# Patient Record
Sex: FEMALE | Race: WHITE | HISPANIC OR LATINO | Employment: OTHER | URBAN - METROPOLITAN AREA
[De-identification: names, ages, dates, MRNs, and addresses within clinical notes are randomized per-mention and may not be internally consistent; named-entity substitution may affect disease eponyms.]

---

## 2017-06-20 ENCOUNTER — ALLSCRIPTS OFFICE VISIT (OUTPATIENT)
Dept: OTHER | Facility: OTHER | Age: 57
End: 2017-06-20

## 2018-01-14 NOTE — MISCELLANEOUS
Provider Comments  Provider Comments:   Pt was a no-show for today's 1120am apt   L/M to call us and r/s      Signatures   Electronically signed by : Luis Armando Hess, ; Jun 20 2017 12:55PM EST                       (Author)

## 2018-06-19 ENCOUNTER — APPOINTMENT (INPATIENT)
Dept: NON INVASIVE DIAGNOSTICS | Facility: HOSPITAL | Age: 58
DRG: 375 | End: 2018-06-19
Payer: COMMERCIAL

## 2018-06-19 ENCOUNTER — HOSPITAL ENCOUNTER (INPATIENT)
Facility: HOSPITAL | Age: 58
LOS: 3 days | Discharge: HOME | DRG: 375 | End: 2018-06-22
Attending: SURGERY | Admitting: SURGERY
Payer: COMMERCIAL

## 2018-06-19 DIAGNOSIS — E11.8 TYPE 2 DIABETES MELLITUS WITH COMPLICATION, UNSPECIFIED WHETHER LONG TERM INSULIN USE: ICD-10-CM

## 2018-06-19 DIAGNOSIS — Z01.810 PREOPERATIVE CARDIOVASCULAR EXAMINATION: ICD-10-CM

## 2018-06-19 DIAGNOSIS — K63.89 COLONIC MASS: Primary | ICD-10-CM

## 2018-06-19 LAB
ALBUMIN SERPL BCP-MCNC: 2.8 G/DL (ref 3.5–5)
ALP SERPL-CCNC: 121 U/L (ref 46–116)
ALT SERPL W P-5'-P-CCNC: 14 U/L (ref 12–78)
ANION GAP SERPL CALCULATED.3IONS-SCNC: 4 MMOL/L (ref 4–13)
APTT PPP: 29 SECONDS (ref 24–36)
AST SERPL W P-5'-P-CCNC: 12 U/L (ref 5–45)
BASOPHILS # BLD AUTO: 0.05 THOUSANDS/ΜL (ref 0–0.1)
BASOPHILS NFR BLD AUTO: 0 % (ref 0–1)
BILIRUB SERPL-MCNC: 0.2 MG/DL (ref 0.2–1)
BUN SERPL-MCNC: 22 MG/DL (ref 5–25)
CALCIUM SERPL-MCNC: 8.8 MG/DL (ref 8.3–10.1)
CHLORIDE SERPL-SCNC: 104 MMOL/L (ref 100–108)
CO2 SERPL-SCNC: 29 MMOL/L (ref 21–32)
CREAT SERPL-MCNC: 1.62 MG/DL (ref 0.6–1.3)
EOSINOPHIL # BLD AUTO: 0.48 THOUSAND/ΜL (ref 0–0.61)
EOSINOPHIL NFR BLD AUTO: 4 % (ref 0–6)
ERYTHROCYTE [DISTWIDTH] IN BLOOD BY AUTOMATED COUNT: 14.2 % (ref 11.6–15.1)
GFR SERPL CREATININE-BSD FRML MDRD: 34 ML/MIN/1.73SQ M
GLUCOSE SERPL-MCNC: 164 MG/DL (ref 65–140)
HCT VFR BLD AUTO: 34.9 % (ref 34.8–46.1)
HGB BLD-MCNC: 11.1 G/DL (ref 11.5–15.4)
IMM GRANULOCYTES # BLD AUTO: 0.06 THOUSAND/UL (ref 0–0.2)
IMM GRANULOCYTES NFR BLD AUTO: 1 % (ref 0–2)
INR PPP: 0.9 (ref 0.86–1.17)
LYMPHOCYTES # BLD AUTO: 3.32 THOUSANDS/ΜL (ref 0.6–4.47)
LYMPHOCYTES NFR BLD AUTO: 28 % (ref 14–44)
MCH RBC QN AUTO: 28.5 PG (ref 26.8–34.3)
MCHC RBC AUTO-ENTMCNC: 31.8 G/DL (ref 31.4–37.4)
MCV RBC AUTO: 90 FL (ref 82–98)
MONOCYTES # BLD AUTO: 0.93 THOUSAND/ΜL (ref 0.17–1.22)
MONOCYTES NFR BLD AUTO: 8 % (ref 4–12)
NEUTROPHILS # BLD AUTO: 7.12 THOUSANDS/ΜL (ref 1.85–7.62)
NEUTS SEG NFR BLD AUTO: 59 % (ref 43–75)
NRBC BLD AUTO-RTO: 0 /100 WBCS
PLATELET # BLD AUTO: 471 THOUSANDS/UL (ref 149–390)
PMV BLD AUTO: 9.8 FL (ref 8.9–12.7)
POTASSIUM SERPL-SCNC: 4.5 MMOL/L (ref 3.5–5.3)
PROT SERPL-MCNC: 7 G/DL (ref 6.4–8.2)
PROTHROMBIN TIME: 12.3 SECONDS (ref 11.8–14.2)
RBC # BLD AUTO: 3.9 MILLION/UL (ref 3.81–5.12)
SODIUM SERPL-SCNC: 137 MMOL/L (ref 136–145)
WBC # BLD AUTO: 11.96 THOUSAND/UL (ref 4.31–10.16)

## 2018-06-19 PROCEDURE — 80053 COMPREHEN METABOLIC PANEL: CPT | Performed by: SURGERY

## 2018-06-19 PROCEDURE — 85730 THROMBOPLASTIN TIME PARTIAL: CPT | Performed by: SURGERY

## 2018-06-19 PROCEDURE — 93931 UPPER EXTREMITY STUDY: CPT

## 2018-06-19 PROCEDURE — 82948 REAGENT STRIP/BLOOD GLUCOSE: CPT

## 2018-06-19 PROCEDURE — 85025 COMPLETE CBC W/AUTO DIFF WBC: CPT | Performed by: SURGERY

## 2018-06-19 PROCEDURE — 85610 PROTHROMBIN TIME: CPT | Performed by: SURGERY

## 2018-06-19 PROCEDURE — 99221 1ST HOSP IP/OBS SF/LOW 40: CPT | Performed by: INTERNAL MEDICINE

## 2018-06-19 RX ORDER — ATORVASTATIN CALCIUM 80 MG/1
80 TABLET, FILM COATED ORAL DAILY
COMMUNITY
End: 2018-08-09 | Stop reason: SDUPTHER

## 2018-06-19 RX ORDER — OXYCODONE HYDROCHLORIDE 5 MG/1
5 TABLET ORAL EVERY 4 HOURS PRN
Status: DISCONTINUED | OUTPATIENT
Start: 2018-06-19 | End: 2018-06-22 | Stop reason: HOSPADM

## 2018-06-19 RX ORDER — FAMOTIDINE 20 MG/1
20 TABLET, FILM COATED ORAL 2 TIMES DAILY
COMMUNITY
End: 2018-08-30

## 2018-06-19 RX ORDER — ASPIRIN 81 MG/1
81 TABLET ORAL DAILY
Status: DISCONTINUED | OUTPATIENT
Start: 2018-06-20 | End: 2018-06-22 | Stop reason: HOSPADM

## 2018-06-19 RX ORDER — TRAZODONE HYDROCHLORIDE 50 MG/1
50 TABLET ORAL
COMMUNITY

## 2018-06-19 RX ORDER — FAMOTIDINE 20 MG/1
20 TABLET, FILM COATED ORAL 2 TIMES DAILY
Status: DISCONTINUED | OUTPATIENT
Start: 2018-06-19 | End: 2018-06-22 | Stop reason: HOSPADM

## 2018-06-19 RX ORDER — LISINOPRIL 20 MG/1
40 TABLET ORAL DAILY
Status: DISCONTINUED | OUTPATIENT
Start: 2018-06-20 | End: 2018-06-20

## 2018-06-19 RX ORDER — OXYCODONE HYDROCHLORIDE 10 MG/1
10 TABLET ORAL EVERY 4 HOURS PRN
Status: DISCONTINUED | OUTPATIENT
Start: 2018-06-19 | End: 2018-06-22 | Stop reason: HOSPADM

## 2018-06-19 RX ORDER — CLOPIDOGREL BISULFATE 75 MG/1
75 TABLET ORAL DAILY
Status: DISCONTINUED | OUTPATIENT
Start: 2018-06-20 | End: 2018-06-20

## 2018-06-19 RX ORDER — TRAZODONE HYDROCHLORIDE 50 MG/1
50 TABLET ORAL
Status: DISCONTINUED | OUTPATIENT
Start: 2018-06-19 | End: 2018-06-22 | Stop reason: HOSPADM

## 2018-06-19 RX ORDER — HEPARIN SODIUM 5000 [USP'U]/ML
5000 INJECTION, SOLUTION INTRAVENOUS; SUBCUTANEOUS EVERY 8 HOURS SCHEDULED
Status: DISCONTINUED | OUTPATIENT
Start: 2018-06-19 | End: 2018-06-20

## 2018-06-19 RX ORDER — ATORVASTATIN CALCIUM 80 MG/1
80 TABLET, FILM COATED ORAL
Status: DISCONTINUED | OUTPATIENT
Start: 2018-06-19 | End: 2018-06-22 | Stop reason: HOSPADM

## 2018-06-19 RX ORDER — SODIUM CHLORIDE, SODIUM LACTATE, POTASSIUM CHLORIDE, CALCIUM CHLORIDE 600; 310; 30; 20 MG/100ML; MG/100ML; MG/100ML; MG/100ML
100 INJECTION, SOLUTION INTRAVENOUS CONTINUOUS
Status: DISCONTINUED | OUTPATIENT
Start: 2018-06-19 | End: 2018-06-20

## 2018-06-19 RX ORDER — ASPIRIN 81 MG/1
81 TABLET ORAL DAILY
COMMUNITY
End: 2018-07-30 | Stop reason: HOSPADM

## 2018-06-19 RX ORDER — HEPARIN SODIUM 10000 [USP'U]/100ML
3-20 INJECTION, SOLUTION INTRAVENOUS
Status: DISCONTINUED | OUTPATIENT
Start: 2018-06-19 | End: 2018-06-20

## 2018-06-19 RX ORDER — CLOPIDOGREL BISULFATE 75 MG/1
75 TABLET ORAL DAILY
Status: ON HOLD | COMMUNITY
End: 2018-08-15

## 2018-06-19 RX ADMIN — HYDROMORPHONE HYDROCHLORIDE 0.5 MG: 1 INJECTION, SOLUTION INTRAMUSCULAR; INTRAVENOUS; SUBCUTANEOUS at 17:59

## 2018-06-19 RX ADMIN — OXYCODONE HYDROCHLORIDE 10 MG: 10 TABLET ORAL at 22:04

## 2018-06-19 RX ADMIN — METOPROLOL TARTRATE 12.5 MG: 25 TABLET ORAL at 22:03

## 2018-06-19 RX ADMIN — SODIUM CHLORIDE, SODIUM LACTATE, POTASSIUM CHLORIDE, AND CALCIUM CHLORIDE 100 ML/HR: .6; .31; .03; .02 INJECTION, SOLUTION INTRAVENOUS at 23:35

## 2018-06-19 RX ADMIN — ATORVASTATIN CALCIUM 80 MG: 80 TABLET, FILM COATED ORAL at 22:03

## 2018-06-19 RX ADMIN — TRAZODONE HYDROCHLORIDE 50 MG: 50 TABLET ORAL at 22:04

## 2018-06-19 RX ADMIN — HEPARIN SODIUM AND DEXTROSE 12 UNITS/KG/HR: 10000; 5 INJECTION INTRAVENOUS at 22:06

## 2018-06-19 RX ADMIN — FAMOTIDINE 20 MG: 20 TABLET ORAL at 22:03

## 2018-06-19 NOTE — CONSULTS
Inpatient Medical Consultation - [facility] Internal Medicine    Patient Information: Emily Abdullahi 60 y o  female MRN: [id number]  Unit/Bed#: [facility] 406-01 Encounter: [id number]  PCP: Drew Wang MD  Date of Admission:  6/19/2018  Date of Consultation: 06/19/18  Requesting Physician: Vandana England MD    Reason For Consultation:     Diabetes management    Assessment/Plan:    1  Diabetes mellitus type 2 with peripheral neuropathy, previous A1c 14 4 on record on 3/19/2016, patient told me she was on Lantus 38 units b i d  in addition to metformin, monitor blood sugar on insulin sliding scale, start Lantus at bedtime, check A1c, continue with diabetic diet  2  Intermittent right hand ischemia, workup and treatment per primary  3  Obstructed sigmoid colon mass, per colorectal plan for abdominal CT scan  4  Chronic kidney disease stage 3, baseline creatinine 1 18-1 20, monitor  5  CAD status post stent, continue aspirin statin and Lopressor  6  History of CVA on 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 associated with cocaine abuse  7  Remote history of cocaine abuse, last use was 1 month ago  8  Tobacco smoking, start nicotine patch  9  Hep C  10  Hyperlipidemia, continue statin  11  Schizoaffective disorder/ bipolar disorder, continue trazodone    Lab and Accu-Chek results are pending      VTE Prophylaxis: Heparin  / sequential compression device       Counseling / Coordination of Care Time: 1 hour  Greater than 50% of total time spent on patient counseling and coordination of care        History of Present Illness:    Emily Santos is a 60 y o  female who is originally admitted to the vascular service on 6/19/2018 due to intermittent right hand ischemia  SLIM  are consulted for diabetes mellitus management  Patient with underlying diabetes mellitus type 2 maintained on metformin and Lantus, last A1c on record is 14 4 on 3/19/2016, patient also with underlying CAD status post stent, hypertension, hyperlipidemia, diabetes neuropathy, history of CVA secondary to vasospasm from cocaine abuse, tobacco smoking, Schizoaffective disorder, bipolar disorder and hep C  Patient was admitted to [facility] 3 days ago due to abdominal pain and she was found to have partially obstructing sigmoid lesion  She was transferred to [facility] due to intermittent right hand numbness, weakness and possible ischemic    Daughters at bedside    Review of Systems:    Review of Systems   Constitutional: Negative for chills and fever  HENT: Negative for sore throat  Respiratory: Negative for chest tightness and shortness of breath  Cardiovascular: Negative for chest pain, palpitations and leg swelling  Gastrointestinal: Positive for abdominal pain, nausea and vomiting  Negative for diarrhea  Endocrine: Negative for polyuria  Genitourinary: Negative for difficulty urinating and dysuria  Neurological: Positive for numbness  Negative for dizziness, speech difficulty and headaches  All other systems reviewed and are negative        Past Medical and Surgical History:     Past Medical History:   Diagnosis Date    Bipolar depression ([facility] ) 3/17/2016    CAD S/P percutaneous coronary angioplasty 3/17/2016    Chronic pain     COPD (chronic obstructive pulmonary disease) (HCC)     CVA (cerebral vascular accident) ([facility] )     R sided weakness    Essential hypertension 3/17/2016    GERD (gastroesophageal reflux disease)     Hepatitis C     Heroin abuse 3/18/2016    History of gastric ulcer     Hypertension     NSTEMI (non-ST elevated myocardial infarction) ([facility] ) 07/2012    Psychiatric disorder 09/03/2014    Inpatient admission     [facility] disorder ([facility] ) 3/17/2016    Schizophrenia ([facility] ) 3/17/2016    STEMI (ST elevation [facility] infarction) ([facility] ) 12/2017    Type 2 diabetes mellitus ([facility] ) 3/17/2016       Past Surgical History:   Procedure Laterality Date    CARDIAC CATHETERIZATION  07/2016    CORONARY ANGIOPLASTY WITH STENT PLACEMENT  07/05/2012    MAKEDA (LAD), PTA (Diag)    CORONARY ANGIOPLASTY WITH STENT PLACEMENT  12/2017    MAKEDA/ PTA (RCA)       Meds/Allergies:    all medications and allergies reviewed    Allergies: No Known Allergies    Social History:     Marital Status: Single    Substance Use History:   History   Alcohol Use No     History   Smoking Status    Current Every Day Smoker    Types: Cigarettes   Smokeless Tobacco    Not on file     History   Drug Use    Types: Heroin       Family History:    non-contributory    Physical Exam:     Vitals:   Blood Pressure: 97/68 (06/19/18 1645)  Pulse: 68 (06/19/18 1645)  Temperature: 98 9 °F (37 2 °C) (06/19/18 1605)  Temp Source: Oral (06/19/18 1605)  Respirations: 18 (06/19/18 1605)  Weight - Scale: 58 6 kg (129 lb 3 oz) (06/19/18 1934)  SpO2: 97 % (06/19/18 1605)    Physical Exam   Constitutional: She is oriented to person, place, and time  She appears well-developed  HENT:   Head: Normocephalic and atraumatic  Mouth/Throat: Oropharynx is clear and moist  No oropharyngeal exudate  Eyes: Conjunctivae and EOM are normal  No scleral icterus  Neck: Normal range of motion  Neck supple  No JVD present  Cardiovascular: Normal rate, regular rhythm, normal heart sounds and intact distal pulses  No murmur heard  Pulmonary/Chest: Effort normal and breath sounds normal  No respiratory distress  She has no wheezes  Abdominal: Soft  Bowel sounds are normal  She exhibits no distension  There is tenderness (Pelvic tenderness)  There is no rebound  Musculoskeletal: Normal range of motion  She exhibits no edema or tenderness  Neurological: She is alert and oriented to person, place, and time  No cranial nerve deficit  Skin: Skin is warm and dry  No rash noted  Additional Data:     Lab Results: I have personally reviewed pertinent reports           previous lab results reviewed from Care everywhere        Invalid input(s): LABALBU        Imaging: I have personally reviewed pertinent reports  No results found  EKG, Pathology, and Other Studies Reviewed on Admission:   · yes    ** Please Note: This note has been constructed using a voice recognition system   **

## 2018-06-19 NOTE — CONSULTS
Consultation - Colorectal Surgery   Emily Abdullahi 60 y o  female MRN: [id number]  Unit/Bed#: [facility] 406-01 Encounter: [id number]    Assessment/Plan     Assessment:  62-year-old female with reportedly obstructing sigmoid colon mass, she was worked up at [facility] for possible colectomy versus diversion, at this point, she appears partially obstructed clinically and states she is passing gas and stools  She is not nauseated or vomiting  We do not have the images taken heart to review, we will obtain new CT scan of the abdomen pelvis with contrast    Plan:  Obtain the CT scan of abdomen pelvis  Patient will likely need colonoscopy following CT scan, with possible stent placement  Clear liquids as tolerated  Serial exams  We will plan for possible resection versus diversion this admission, will require extensive cardiac workup, this has been completed during her admission [facility], we will obtain these records to obtain their recommendations as well as consult our own Cardiology team for evaluation    History of Present Illness     HPI:  Emily Aiken is a 60 y o  female who presents with report of partially obstructing sigmoid mass  Patient presented to [facility] ED 2 days ago with abdominal pain and thin stools  Patient underwent CT scan at that time that demonstrated sigmoid colon mass with fecal stasis proximal to the mass concerning for possible obstruction  Patient denies nausea or vomiting  She has a good appetite  Patient denies recent weight loss or night sweats  Patient states she is passing gas and bowel movements  Patient does have abdominal pain, however is grossly nondistended at time of exam   Patient was subsequently transferred to [facility] for vascular surgery evaluation for suspected hand ischemia       Consults    Review of Systems  Complete 12 point review of systems was performed, all findings are negative unless otherwise noted by her HPI    Historical Information   Past Medical History:   Diagnosis Date    Bipolar depression ([place] [address] ) 3/17/2016    CAD S/P percutaneous coronary angioplasty 3/17/2016    Chronic pain     COPD (chronic obstructive pulmonary disease) (HCC)     CVA (cerebral vascular accident) ([place] [address] )     R sided weakness    Essential hypertension 3/17/2016    GERD (gastroesophageal reflux disease)     Hepatitis C     Heroin abuse 3/18/2016    History of gastric ulcer     Hypertension     NSTEMI (non-ST elevated myocardial infarction) ([place] [address] ) 07/2012    Psychiatric disorder 09/03/2014    Inpatient admission     Schizoaffective disorder ([place] [address] ) 3/17/2016    Schizophrenia ([place] [address] ) 3/17/2016    STEMI (ST elevation myocardial infarction) ([place] [address] ) 12/2017    Type 2 diabetes mellitus ([place] [address] ) 3/17/2016     Past Surgical History:   Procedure Laterality Date    CARDIAC CATHETERIZATION  07/2016    CORONARY ANGIOPLASTY WITH STENT PLACEMENT  07/05/2012    MAKEDA (LAD), PTA (Diag)    CORONARY ANGIOPLASTY WITH STENT PLACEMENT  12/2017    MAKEDA/ PTA (RCA)     Social History   History   Alcohol Use No     History   Drug Use    Types: Heroin     History   Smoking Status    Current Every Day Smoker    Types: Cigarettes   Smokeless Tobacco    Not on file     Family History: non-contributory    Meds/Allergies   all current active meds have been reviewed, current meds:   Current Facility-Administered Medications   Medication Dose Route Frequency    [START ON 6/20/2018] aspirin (ECOTRIN LOW STRENGTH) EC tablet 81 mg  81 mg Oral Daily    atorvastatin (LIPITOR) tablet 80 mg  80 mg Oral Daily With Dinner    [START ON 6/20/2018] clopidogrel (PLAVIX) tablet 75 mg  75 mg Oral Daily    famotidine (PEPCID) tablet 20 mg  20 mg Oral BID    heparin (porcine) subcutaneous injection 5,000 Units  5,000 Units Subcutaneous Q8H [facility]    HYDROmorphone (DILAUDID) injection 0 5 mg  0 5 mg Intravenous Q3H PRN    insulin lispro (HumaLOG) 100 units/mL subcutaneous injection 1-5 Units  1-5 Units Subcutaneous TID AC    [START ON 6/20/2018] lisinopril (ZESTRIL) tablet 40 mg  40 mg Oral Daily    metoprolol tartrate (LOPRESSOR) partial tablet 12 5 mg  12 5 mg Oral Q12H [address]    [START ON 6/20/2018] nicotine (NICODERM CQ) 7 mg/24hr TD 24 hr patch 1 patch  1 patch Transdermal Daily    oxyCODONE (ROXICODONE) immediate release tablet 10 mg  10 mg Oral Q4H PRN    oxyCODONE (ROXICODONE) IR tablet 5 mg  5 mg Oral Q4H PRN    traZODone (DESYREL) tablet 50 mg  50 mg Oral HS    and PTA meds:   Prior to Admission Medications   Prescriptions Last Dose Informant Patient Reported?  Taking?   aspirin (ECOTRIN LOW STRENGTH) 81 mg EC tablet   Yes Yes   Sig: Take 81 mg by mouth daily   atorvastatin (LIPITOR) 80 mg tablet   Yes Yes   Sig: Take 80 mg by mouth daily   clopidogrel (PLAVIX) 75 mg tablet   Yes Yes   Sig: Take 75 mg by mouth daily   famotidine (PEPCID) 20 mg tablet   Yes Yes   Sig: Take 20 mg by mouth 2 (two) times a day   metoprolol tartrate (LOPRESSOR) 25 mg tablet   Yes Yes   Sig: Take 25 mg by mouth every 12 (twelve) hours   traZODone (DESYREL) 50 mg tablet   Yes Yes   Sig: Take 50 mg by mouth daily at bedtime      Facility-Administered Medications: None     No Known Allergies    Objective   First Vitals:   Blood Pressure: (!) 83/45 (06/19/18 1605)  Pulse: 69 (06/19/18 1605)  Temperature: 98 9 °F (37 2 °C) (06/19/18 1605)  Temp Source: Oral (06/19/18 1605)  Respirations: 18 (06/19/18 1605)  SpO2: 97 % (06/19/18 1605)    Current Vitals:   Blood Pressure: (!) 83/45 (06/19/18 1605)  Pulse: 69 (06/19/18 1605)  Temperature: 98 9 °F (37 2 °C) (06/19/18 1605)  Temp Source: Oral (06/19/18 1605)  Respirations: 18 (06/19/18 1605)  SpO2: 97 % (06/19/18 1605)    No intake or output data in the 24 hours ending 06/19/18 1923    Invasive Devices     Peripheral Intravenous Line            Peripheral IV 03/17/16 Left Antecubital 823 days    Peripheral IV 06/19/18 Left Forearm less than 1 day                Physical Exam Constitutional: She is oriented to person, place, and time  She appears well-developed  HENT:   Head: Normocephalic  Eyes: Pupils are equal, round, and reactive to light  Neck: Normal range of motion  Cardiovascular: Normal rate and regular rhythm  Pulmonary/Chest: Effort normal    Abdominal: Soft  She exhibits no distension  There is tenderness  There is no rebound and no guarding  Musculoskeletal: Normal range of motion  She exhibits no edema  Neurological: She is alert and oriented to person, place, and time  Skin: Skin is warm and dry  Lab Results: CBC: No results found for: WBC, HGB, HCT, MCV, PLT, ADJUSTEDWBC, MCH, MCHC, RDW, MPV, NRBC, CMP: No results found for: NA, K, CL, CO2, ANIONGAP, BUN, CREATININE, GLUCOSE, CALCIUM, AST, ALT, ALKPHOS, PROT, ALBUMIN, BILITOT, EGFR, Coagulation: No results found for: PT, INR, APTT  Imaging: I have personally reviewed pertinent reports  and I have personally reviewed pertinent films in PACS  EKG, Pathology, and Other Studies: I have personally reviewed pertinent reports

## 2018-06-19 NOTE — H&P
H&P Exam - Vascular Surgery   Emily Abdullahi 60 y o  female MRN: [id number]  Unit/Bed#: [facility] 406-01 Encounter: [id number]    Assessment/Plan     Assessment:  60 y o  F female w/ partially obstructing colon mass in complaints of intermittent right hand ischemia  Patient states she has been having intermittent symptoms for the past several months, she states her symptoms spontaneously resolve and worsen  Given the subacute nature of this event, she may have right radial thrombus that may not be amenable to thrombectomy or lysis, regardless, her hand is not acutely threatened on exam    The more pressing issue at this time, appears to be her possibly obstructing colorectal lesion, we will consult Colorectal surgery to discuss further management    Plan: Will likely need colorectal surgery consult for colon mass   Will consult medicine and cardiology  No need for acute vascular intervention  We will start heparin gtt for poss h/o embolic event  Will obtain noninvasive studies for evaluate R hand perfusion      History of Present Illness     HPI:  Emily Osborn is a 60 y o  female who presents with several days of numbness and weakness of her right hand  Patient states approximately 2-3 days ago, she noted her hand to be numb at her finger tips and felt it was slightly weaker than normal  She states that she felt it was cool compared to her left hand  She was admitted to [facility] for abdominal pain at the same time and was found to have a partially obstructing sigmoid colon lesion  She was being worked up by Cardiology for clearance for colon resection, however states she had recurrent symptoms of numbness and coolness of her right hand today  She was seen and evaluated by vascular surgery at [facility] who recommended transfer to [facility]  On evaluation, patient states she has been having intermittent symptoms for the past several months  She does not take anticoagulation  She does not state that she has a known history of atrial fibrillation  She is complaining of mild abdominal pain, however at the time of evaluation, has sensation and motor of her hand       Review of Systems  A complete 12 point review of systems was performed, all findings are negative unless otherwise noted by the HPI    Historical Information   Past Medical History:   Diagnosis Date    Bipolar depression ([place] [address] ) 3/17/2016    CAD S/P percutaneous coronary angioplasty 3/17/2016    Chronic pain     COPD (chronic obstructive pulmonary disease) ([place] [address] )     CVA (cerebral vascular accident) ([place] [address] )     R sided weakness    Essential hypertension 3/17/2016    GERD (gastroesophageal reflux disease)     Hepatitis C     Heroin abuse 3/18/2016    History of gastric ulcer     Hypertension     NSTEMI (non-ST elevated myocardial infarction) ([place] [address] ) 07/2012    Psychiatric disorder 09/03/2014    Inpatient admission     Schizoaffective disorder ([place] [address] ) 3/17/2016    Schizophrenia ([place] [address] ) 3/17/2016    STEMI (ST elevation myocardial infarction) ([place] [address] ) 12/2017    Type 2 diabetes mellitus ([place] [address] ) 3/17/2016     Past Surgical History:   Procedure Laterality Date    CARDIAC CATHETERIZATION  07/2016    CORONARY ANGIOPLASTY WITH STENT PLACEMENT  07/05/2012    MAKEDA (LAD), PTA (Diag)    CORONARY ANGIOPLASTY WITH STENT PLACEMENT  12/2017    MAKEDA/ PTA (RCA)     Social History   History   Alcohol Use No     History   Drug Use    Types: Heroin     History   Smoking Status    Current Every Day Smoker    Types: Cigarettes   Smokeless Tobacco    Not on file     Family History: non-contributory    Meds/Allergies   all current active meds have been reviewed  No Known Allergies    Objective   Vitals: Blood pressure (!) 83/45, pulse 69, temperature 98 9 °F (37 2 °C), temperature source Oral, resp  rate 18, SpO2 97 %  ,There is no height or weight on file to calculate BMI    No intake or output data in the 24 hours ending 06/19/18 1746  Invasive Devices     Peripheral Intravenous Line            Peripheral IV 03/17/16 Left Antecubital 823 days    Peripheral IV 06/19/18 Left Forearm less than 1 day                Physical Exam   Constitutional: She is oriented to person, place, and time  No distress  HENT:   Head: Normocephalic and atraumatic  Eyes: Pupils are equal, round, and reactive to light  Neck: Normal range of motion  Cardiovascular: Normal rate and regular rhythm  2+ DP bilaterally, 2+ radial pulse left upper extremity    Ulnar and palmar Doppler signal present in right upper extremity, absent right radial signal   Pulmonary/Chest: Effort normal  No respiratory distress  Abdominal: Soft  She exhibits no distension  There is tenderness  There is no rebound and no guarding  Musculoskeletal: Normal range of motion  Neurological: She is alert and oriented to person, place, and time  Lab Results: Coags: No results found for: PT, PTT, INR, Creatinine: No results found for: CREATININE, CBC with diff: No results found for: WBC, HGB, HCT, MCV, PLT, ADJUSTEDWBC, MCH, MCHC, RDW, MPV, NRBC, BMP/CMP: No results found for: NA, K, CL, CO2, ANIONGAP, BUN, CREATININE, GLUCOSE, CALCIUM, AST, ALT, ALKPHOS, PROT, ALBUMIN, BILITOT, EGFR  Imaging: I have personally reviewed pertinent reports  EKG, Pathology, and Other Studies: I have personally reviewed pertinent reports        Code Status: Prior  Advance Directive and Living Will:      Power of :    POLST:

## 2018-06-20 PROBLEM — I25.10 CAD (CORONARY ARTERY DISEASE): Status: ACTIVE | Noted: 2018-06-20

## 2018-06-20 PROBLEM — M79.601 PAIN OF RIGHT UPPER EXTREMITY: Status: ACTIVE | Noted: 2018-06-20

## 2018-06-20 PROBLEM — N17.9 AKI (ACUTE KIDNEY INJURY) (HCC): Status: ACTIVE | Noted: 2018-06-20

## 2018-06-20 LAB
ANION GAP SERPL CALCULATED.3IONS-SCNC: 7 MMOL/L (ref 4–13)
APTT PPP: 33 SECONDS (ref 24–36)
ATRIAL RATE: 64 BPM
BASOPHILS # BLD AUTO: 0.05 THOUSANDS/ΜL (ref 0–0.1)
BASOPHILS NFR BLD AUTO: 0 % (ref 0–1)
BUN SERPL-MCNC: 22 MG/DL (ref 5–25)
CALCIUM SERPL-MCNC: 8.6 MG/DL (ref 8.3–10.1)
CEA SERPL-MCNC: 5.5 NG/ML (ref 0–3)
CHLORIDE SERPL-SCNC: 102 MMOL/L (ref 100–108)
CO2 SERPL-SCNC: 27 MMOL/L (ref 21–32)
CREAT SERPL-MCNC: 1.23 MG/DL (ref 0.6–1.3)
EOSINOPHIL # BLD AUTO: 0.57 THOUSAND/ΜL (ref 0–0.61)
EOSINOPHIL NFR BLD AUTO: 5 % (ref 0–6)
ERYTHROCYTE [DISTWIDTH] IN BLOOD BY AUTOMATED COUNT: 14.2 % (ref 11.6–15.1)
EST. AVERAGE GLUCOSE BLD GHB EST-MCNC: 252 MG/DL
GFR SERPL CREATININE-BSD FRML MDRD: 48 ML/MIN/1.73SQ M
GLUCOSE SERPL-MCNC: 128 MG/DL (ref 65–140)
GLUCOSE SERPL-MCNC: 163 MG/DL (ref 65–140)
GLUCOSE SERPL-MCNC: 176 MG/DL (ref 65–140)
GLUCOSE SERPL-MCNC: 206 MG/DL (ref 65–140)
GLUCOSE SERPL-MCNC: 261 MG/DL (ref 65–140)
GLUCOSE SERPL-MCNC: 290 MG/DL (ref 65–140)
HBA1C MFR BLD: 10.4 % (ref 4.2–6.3)
HCT VFR BLD AUTO: 33.7 % (ref 34.8–46.1)
HGB BLD-MCNC: 10.6 G/DL (ref 11.5–15.4)
IMM GRANULOCYTES # BLD AUTO: 0.07 THOUSAND/UL (ref 0–0.2)
IMM GRANULOCYTES NFR BLD AUTO: 1 % (ref 0–2)
LYMPHOCYTES # BLD AUTO: 3.45 THOUSANDS/ΜL (ref 0.6–4.47)
LYMPHOCYTES NFR BLD AUTO: 31 % (ref 14–44)
MCH RBC QN AUTO: 28 PG (ref 26.8–34.3)
MCHC RBC AUTO-ENTMCNC: 31.5 G/DL (ref 31.4–37.4)
MCV RBC AUTO: 89 FL (ref 82–98)
MONOCYTES # BLD AUTO: 0.93 THOUSAND/ΜL (ref 0.17–1.22)
MONOCYTES NFR BLD AUTO: 8 % (ref 4–12)
NEUTROPHILS # BLD AUTO: 6.21 THOUSANDS/ΜL (ref 1.85–7.62)
NEUTS SEG NFR BLD AUTO: 55 % (ref 43–75)
NRBC BLD AUTO-RTO: 0 /100 WBCS
P AXIS: 68 DEGREES
PLATELET # BLD AUTO: 433 THOUSANDS/UL (ref 149–390)
PMV BLD AUTO: 9.9 FL (ref 8.9–12.7)
POTASSIUM SERPL-SCNC: 4.7 MMOL/L (ref 3.5–5.3)
PR INTERVAL: 160 MS
QRS AXIS: 44 DEGREES
QRSD INTERVAL: 106 MS
QT INTERVAL: 408 MS
QTC INTERVAL: 420 MS
RBC # BLD AUTO: 3.78 MILLION/UL (ref 3.81–5.12)
SODIUM SERPL-SCNC: 136 MMOL/L (ref 136–145)
T WAVE AXIS: 37 DEGREES
VENTRICULAR RATE: 64 BPM
WBC # BLD AUTO: 11.28 THOUSAND/UL (ref 4.31–10.16)

## 2018-06-20 PROCEDURE — 83036 HEMOGLOBIN GLYCOSYLATED A1C: CPT | Performed by: SURGERY

## 2018-06-20 PROCEDURE — 93922 UPR/L XTREMITY ART 2 LEVELS: CPT | Performed by: SURGERY

## 2018-06-20 PROCEDURE — 85730 THROMBOPLASTIN TIME PARTIAL: CPT | Performed by: SURGERY

## 2018-06-20 PROCEDURE — 99222 1ST HOSP IP/OBS MODERATE 55: CPT | Performed by: INTERNAL MEDICINE

## 2018-06-20 PROCEDURE — 93005 ELECTROCARDIOGRAM TRACING: CPT

## 2018-06-20 PROCEDURE — 99222 1ST HOSP IP/OBS MODERATE 55: CPT | Performed by: SURGERY

## 2018-06-20 PROCEDURE — 85025 COMPLETE CBC W/AUTO DIFF WBC: CPT | Performed by: SURGERY

## 2018-06-20 PROCEDURE — 82948 REAGENT STRIP/BLOOD GLUCOSE: CPT

## 2018-06-20 PROCEDURE — 93926 LOWER EXTREMITY STUDY: CPT | Performed by: SURGERY

## 2018-06-20 PROCEDURE — 82378 CARCINOEMBRYONIC ANTIGEN: CPT | Performed by: SURGERY

## 2018-06-20 PROCEDURE — 99232 SBSQ HOSP IP/OBS MODERATE 35: CPT | Performed by: INTERNAL MEDICINE

## 2018-06-20 PROCEDURE — 80048 BASIC METABOLIC PNL TOTAL CA: CPT | Performed by: SURGERY

## 2018-06-20 PROCEDURE — 93010 ELECTROCARDIOGRAM REPORT: CPT | Performed by: INTERNAL MEDICINE

## 2018-06-20 RX ORDER — POLYETHYLENE GLYCOL 3350 17 G/17G
17 POWDER, FOR SOLUTION ORAL ONCE
Status: DISCONTINUED | OUTPATIENT
Start: 2018-06-21 | End: 2018-06-20

## 2018-06-20 RX ORDER — POLYETHYLENE GLYCOL 3350 17 G/17G
34 POWDER, FOR SOLUTION ORAL ONCE
Status: COMPLETED | OUTPATIENT
Start: 2018-06-20 | End: 2018-06-20

## 2018-06-20 RX ORDER — INSULIN GLARGINE 100 [IU]/ML
20 INJECTION, SOLUTION SUBCUTANEOUS
Status: DISCONTINUED | OUTPATIENT
Start: 2018-06-20 | End: 2018-06-22

## 2018-06-20 RX ORDER — HEPARIN SODIUM 5000 [USP'U]/ML
5000 INJECTION, SOLUTION INTRAVENOUS; SUBCUTANEOUS EVERY 8 HOURS SCHEDULED
Status: DISCONTINUED | OUTPATIENT
Start: 2018-06-20 | End: 2018-06-22 | Stop reason: HOSPADM

## 2018-06-20 RX ORDER — POLYETHYLENE GLYCOL 3350 17 G/17G
17 POWDER, FOR SOLUTION ORAL EVERY 4 HOURS
Status: DISCONTINUED | OUTPATIENT
Start: 2018-06-21 | End: 2018-06-22 | Stop reason: HOSPADM

## 2018-06-20 RX ADMIN — METOPROLOL TARTRATE 12.5 MG: 25 TABLET ORAL at 21:02

## 2018-06-20 RX ADMIN — ATORVASTATIN CALCIUM 80 MG: 80 TABLET, FILM COATED ORAL at 17:06

## 2018-06-20 RX ADMIN — INSULIN GLARGINE 20 UNITS: 100 INJECTION, SOLUTION SUBCUTANEOUS at 21:03

## 2018-06-20 RX ADMIN — INSULIN LISPRO 1 UNITS: 100 INJECTION, SOLUTION INTRAVENOUS; SUBCUTANEOUS at 17:04

## 2018-06-20 RX ADMIN — FAMOTIDINE 20 MG: 20 TABLET ORAL at 08:43

## 2018-06-20 RX ADMIN — NICOTINE 1 PATCH: 7 PATCH, EXTENDED RELEASE TRANSDERMAL at 08:42

## 2018-06-20 RX ADMIN — CLOPIDOGREL BISULFATE 75 MG: 75 TABLET ORAL at 08:43

## 2018-06-20 RX ADMIN — METOPROLOL TARTRATE 12.5 MG: 25 TABLET ORAL at 08:42

## 2018-06-20 RX ADMIN — HYDROMORPHONE HYDROCHLORIDE 0.5 MG: 1 INJECTION, SOLUTION INTRAMUSCULAR; INTRAVENOUS; SUBCUTANEOUS at 00:27

## 2018-06-20 RX ADMIN — OXYCODONE HYDROCHLORIDE 10 MG: 10 TABLET ORAL at 21:03

## 2018-06-20 RX ADMIN — TRAZODONE HYDROCHLORIDE 50 MG: 50 TABLET ORAL at 21:03

## 2018-06-20 RX ADMIN — INSULIN LISPRO 5 UNITS: 100 INJECTION, SOLUTION INTRAVENOUS; SUBCUTANEOUS at 08:42

## 2018-06-20 RX ADMIN — HYDROMORPHONE HYDROCHLORIDE 0.5 MG: 1 INJECTION, SOLUTION INTRAMUSCULAR; INTRAVENOUS; SUBCUTANEOUS at 22:38

## 2018-06-20 RX ADMIN — OXYCODONE HYDROCHLORIDE 10 MG: 10 TABLET ORAL at 06:28

## 2018-06-20 RX ADMIN — HYDROMORPHONE HYDROCHLORIDE 0.5 MG: 1 INJECTION, SOLUTION INTRAMUSCULAR; INTRAVENOUS; SUBCUTANEOUS at 10:03

## 2018-06-20 RX ADMIN — INSULIN LISPRO 2 UNITS: 100 INJECTION, SOLUTION INTRAVENOUS; SUBCUTANEOUS at 06:21

## 2018-06-20 RX ADMIN — ASPIRIN 81 MG: 81 TABLET, COATED ORAL at 08:43

## 2018-06-20 RX ADMIN — POLYETHYLENE GLYCOL 3350 34 G: 17 POWDER, FOR SOLUTION ORAL at 22:37

## 2018-06-20 RX ADMIN — HYDROMORPHONE HYDROCHLORIDE 0.5 MG: 1 INJECTION, SOLUTION INTRAMUSCULAR; INTRAVENOUS; SUBCUTANEOUS at 18:16

## 2018-06-20 RX ADMIN — INSULIN LISPRO 5 UNITS: 100 INJECTION, SOLUTION INTRAVENOUS; SUBCUTANEOUS at 17:05

## 2018-06-20 RX ADMIN — HEPARIN SODIUM 5000 UNITS: 5000 INJECTION, SOLUTION INTRAVENOUS; SUBCUTANEOUS at 17:00

## 2018-06-20 RX ADMIN — FAMOTIDINE 20 MG: 20 TABLET ORAL at 17:05

## 2018-06-20 RX ADMIN — HEPARIN SODIUM 5000 UNITS: 5000 INJECTION, SOLUTION INTRAVENOUS; SUBCUTANEOUS at 21:03

## 2018-06-20 RX ADMIN — INSULIN LISPRO 5 UNITS: 100 INJECTION, SOLUTION INTRAVENOUS; SUBCUTANEOUS at 12:11

## 2018-06-20 NOTE — CASE MANAGEMENT
Initial Clinical Review    Patient was admitted to [facility]  6/18 - 6/19  For c/o abdominal pain and she was found to have partially obstructing sigmoid lesion  She was transferred to [place] due to intermittent right hand numbness, weakness and possible ischemic bowel       Admission to [facility]  : Date/Time/Statement: 6/19/18 @ 1539     wgt  129 lb  3 oz   98 9   69   18   83/45   97% sat on ra    Orders Placed This Encounter   Procedures    Inpatient Admission     Standing Status:   Standing     Number of Occurrences:   1     Order Specific Question:   Admitting Physician     Answer:   Godwin Bray     Order Specific Question:   Level of Care     Answer:   Med Surg [16]     Order Specific Question:   Estimated length of stay     Answer:   Not Applicable     History of Illness:    60 y o  female who presents with several days of numbness and weakness of her right hand  Patient states approximately 2-3 days ago, she noted her hand to be numb at her finger tips and felt it was slightly weaker than normal  She states that she felt it was cool compared to her left hand  She was admitted to [facility] for abdominal pain at the same time and was found to have a partially obstructing sigmoid colon lesion  She was being worked up by Cardiology for clearance for colon resection, however states she had recurrent symptoms of numbness and coolness of her right hand today  She was seen and evaluated by vascular surgery at [facility] who recommended transfer to [facility]  On evaluation, patient states she has been having intermittent symptoms for the past several months  She does not take anticoagulation  She does not state that she has a known history of atrial fibrillation    Past Medical/Surgical History:    Active Ambulatory Problems     Diagnosis Date Noted    CVA (cerebral vascular accident) ([facility] [address] ) 03/17/2016    Type 2 diabetes mellitus with hyperglycemia ([facility] ) 03/17/2016    Schizoaffective disorder ([facility] ) 03/17/2016    Essential hypertension 03/17/2016    Bipolar depression ([place] [address] ) 03/17/2016    Schizophrenia ([place] [address] ) 03/17/2016     Resolved Ambulatory Problems     Diagnosis Date Noted    CAD S/P percutaneous coronary angioplasty 03/17/2016    [facility] 03/17/2016    Hyponatremia 03/17/2016    Heroin abuse 03/18/2016     Past Medical History:   Diagnosis Date    Bipolar depression ([place] [address] ) 3/17/2016    CAD S/P percutaneous coronary angioplasty 3/17/2016    Chronic pain     [facility] obstructive pulmonary disease[facility]     CVA (cerebral vascular accident) ([place] [address] )     Essential hypertension 3/17/2016    GERD (gastroesophageal reflux disease)     Hepatitis C     Heroin abuse 3/18/2016    History of gastric ulcer     Hypertension     NSTEMI (non-ST elevated myocardial infarction) ([place] [address] ) 07/2012    Psychiatric disorder 09/03/2014    Schizoaffective disorder ([place] [address] ) 3/17/2016    Schizophrenia ([place] [address] ) 3/17/2016    STEMI (ST elevation myocardial infarction) ([place] [address] ) 12/2017    Type 2 diabetes mellitus ([place] [address] ) 3/17/2016       Admitting Diagnosis: Right radial artery thrombus (HCC) [I74 2]    Assessment/Plan:   Will likely need colorectal surgery consult for colon mass   Will consult medicine and cardiology  No need for acute vascular intervention  We will start heparin gtt for poss h/o embolic event  Will obtain noninvasive studies for evaluate R hand perfusion        Admission Orders:  Scheduled Meds:   Current Facility-Administered Medications:  aspirin 81 mg Oral Daily Jacinto Patel MD   atorvastatin 80 mg Oral Daily With Valentino Dress, MD   famotidine 20 mg Oral BID Jacinto Patel MD   heparin (porcine) 5,000 Units Subcutaneous Q8H [facility] Re Tong PA-C   HYDROmorphone 0 5 mg Intravenous Q3H PRN Jacinto Patel MD   insulin glargine 20 Units Subcutaneous HS Luiz Aliment, DO   insulin lispro 1-5 Units Subcutaneous TID [facility] James Hanks MD   insulin lispro 5 Units Subcutaneous TID With Meals Mary Ann Mt, DO   metoprolol tartrate 12 5 mg Oral Q12H [address] James Hanks MD   nicotine 1 patch Transdermal Daily James Hanks MD   oxyCODONE 10 mg Oral Q4H PRN James Hanks MD   oxyCODONE 5 mg Oral Q4H PRN James Hanks MD   traZODone 50 mg Oral HS James Hanks MD          @ 2130  duplex of RUE = high grade stenosis of distal subclavian/axiallary artery and suspected occlusion of right radial artery mid/distal  Notified Mj Hensley from Vascular blue surgery team  Will obtain labs and start heparin gtt    6/20/2018  VASCULAR SURGERY   Pain of right upper extremity   Assessment & Plan     Intermittent right arm pain in setting of right axillary/distal subclavian stenosis >75% and occlusion vs HG stenosis mid-distal radial artery in patient with cocaine abuse and obstructing colon mass   UEAD- R 0 89, 1st digit pressure 40  Plan:  --Neurovascular checks  --Continue Heparin gtt  --Continue ASA, Plavix, lipitor       * Colonic mass   Assessment & Plan     Obstructing colon mass  Plan:  --Colorectal evaluation in progress       RYLAN (acute kidney injury) ([facility] [address] )   Assessment & Plan     Creatinine improving  Plan:  --IVF per medicine   --Hold lisinopril       CAD (coronary artery disease)   Assessment & Plan     CAD w/ Hx MI and coronary stenting, most recent 12/2017 STEMI w/ MAKEDA to LAD  --As per cardiology eval at [facility] "Patientt is at extreme risk of cardiac events  Patient is at risk of MI and death   Must weigh the merits of surgical procedure against the risks before having a procedure perfomed " Stress test was recommended  Plan:  --Cardiology consultation   --Continue ASA, Plavix, lipitor       6/20  COLORECTAL SURGERY  she appears partially obstructed clinically and states she is passing gas and stools  She is not nauseated or vomiting  We do not have the images taken heart to review, we will obtain new CT scan of the abdomen pelvis with contrast     (later)  cancelled the Plavix with a tentative goal of planning surgery  A colonoscopy will be needed, first  An MRI of the pelvis with rectal protocol is ordered, to evaluate invasion of tumor into the vagina or bladder  I viewed the CT  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 cath report in December 2017, Mid LAD has  within the previously stented segment   It is unchanged from previous angiogram  Distal LAD fills via left to left and right to left collaterals   -12 the EKG personally reviewed this admission; sinus rhythm with out any abnormal ST segment changes; patient currently denies chest pain, palpitations, shortness of breath, orthopnea, lightheadedness, dizziness    Hypertension-continue metoprolol, blood pressure has been well controlled this admission  Hyperlipidemia- Continue high intensity statin therapy   Uncontrolled Type 2 diabetes mellitus- hemoglobin A1c 10 4; management per SLIM  Tobacco abuse- patient educated regarding smoking cessation, nicotine patch has been ordered, nursing to provide smoking education packet upon discharge  Cocaine abuse- patient educated regarding cessation of drug use, she admits to having last used about 1 month ago

## 2018-06-20 NOTE — PROGRESS NOTES
Pt  Returned from stat duplex of RUE  Radiologist Tania Gerard reported high grade stenosis of distal subclavian/axiallary artery and suspected occlusion of right radial artery mid/distal  Notified Cynthia Polo from Vascular blue surgery team  Will obtain labs and start heparin gtt

## 2018-06-20 NOTE — PROGRESS NOTES
Vascular Surgery    Progress Note - Emily Abdullahi 1960, 60 y o  female MRN: [id number]    Unit/Bed#: [facility] 406-01 Encounter: [id number]    Primary Care Provider: Teri Stanford MD   Date and time admitted to hospital: 6/19/2018  3:39 PM    Pain of right upper extremity   Assessment & Plan    Intermittent right arm pain in setting of right axillary/distal subclavian stenosis >75% and occlusion vs HG stenosis mid-distal radial artery in patient with cocaine abuse and obstructing colon mass     UEAD- R 0 89, 1st digit pressure 40    Plan:  --Neurovascular checks  --Continue Heparin gtt  --Continue ASA, Plavix, lipitor        * Colonic mass   Assessment & Plan    Obstructing colon mass    Plan:  --Colorectal evaluation in progress        RYLAN (acute kidney injury) ([facility] [address] )   Assessment & Plan    Creatinine improving    Plan:  --IVF per medicine   --Hold lisinopril        CAD (coronary artery disease)   Assessment & Plan    CAD w/ Hx MI and coronary stenting, most recent 12/2017 STEMI w/ MAKEDA to LAD    --As per cardiology eval at [facility] "Patientt is at extreme risk of cardiac events  Patient is at risk of MI and death  Must weigh the merits of surgical procedure against the risks before having a procedure perfomed " Stress test was recommended    Plan:  --Cardiology consultation   --Continue ASA, Plavix, lipitor        Type 2 diabetes mellitus with complication [facility])   Assessment & Plan    Lab Results   Component Value Date    HGBA1C 10 4 (H) 06/20/2018       Recent Labs      06/19/18   2346  06/20/18   0611   POCGLU  206*  290*       Blood Sugar Average: Last 72 hrs:  (P) 248     Plan:  --SLIM management          Subjective:  Pt denies right arm pain this AM, no events overnight    Vitals:  /51 (BP Location: Left arm) Comment: Map70  Pulse 65   Temp 99 1 °F (37 3 °C) (Oral)   Resp 16   Wt 58 6 kg (129 lb 3 oz)   SpO2 94%   BMI 22 71 kg/m²     I/Os:  I/O last 3 completed shifts:   In: 293 1 [P O :240; I V :53 1]  Out: 650 [Urine:650]  No intake/output data recorded      Lab Results and Cultures:   Lab Results   Component Value Date    WBC 11 28 (H) 06/20/2018    HGB 10 6 (L) 06/20/2018    HCT 33 7 (L) 06/20/2018    MCV 89 06/20/2018     (H) 06/20/2018     Lab Results   Component Value Date    GLUCOSE 261 (H) 06/20/2018    CALCIUM 8 6 06/20/2018     06/20/2018    K 4 7 06/20/2018    CO2 27 06/20/2018     06/20/2018    BUN 22 06/20/2018    CREATININE 1 23 06/20/2018     Lab Results   Component Value Date    INR 0 90 06/19/2018    PROTIME 12 3 06/19/2018       Medications:  Current Facility-Administered Medications   Medication Dose Route Frequency    aspirin (ECOTRIN LOW STRENGTH) EC tablet 81 mg  81 mg Oral Daily    atorvastatin (LIPITOR) tablet 80 mg  80 mg Oral Daily With Dinner    clopidogrel (PLAVIX) tablet 75 mg  75 mg Oral Daily    famotidine (PEPCID) tablet 20 mg  20 mg Oral BID    heparin (porcine) 25,000 units in 250 mL infusion (premix)  3-20 Units/kg/hr (Order-Specific) Intravenous Titrated    HYDROmorphone (DILAUDID) injection 0 5 mg  0 5 mg Intravenous Q3H PRN    insulin glargine (LANTUS) subcutaneous injection 20 Units 0 2 mL  20 Units Subcutaneous HS    insulin lispro (HumaLOG) 100 units/mL subcutaneous injection 1-5 Units  1-5 Units Subcutaneous TID AC    insulin lispro (HumaLOG) 100 units/mL subcutaneous injection 5 Units  5 Units Subcutaneous TID With Meals    lactated ringers infusion  100 mL/hr Intravenous Continuous    metoprolol tartrate (LOPRESSOR) partial tablet 12 5 mg  12 5 mg Oral Q12H [facility]    nicotine (NICODERM CQ) 7 mg/24hr TD 24 hr patch 1 patch  1 patch Transdermal Daily    oxyCODONE (ROXICODONE) immediate release tablet 10 mg  10 mg Oral Q4H PRN    oxyCODONE (ROXICODONE) IR tablet 5 mg  5 mg Oral Q4H PRN    traZODone (DESYREL) tablet 50 mg  50 mg Oral HS       Physical Exam:    General appearance: alert and oriented, in no acute distress  Lungs: clear to auscultation bilaterally  Heart: regular rate and rhythm, S1, S2 normal, no murmur, click, rub or gallop  Abdomen: soft, non-tender; bowel sounds normal; no masses,  no organomegaly  Extremities: Right arm warm, M/S intact, pink    Pulse exam:  Radial: Right: doppler signal Left[de-identified] 2+  Ulnar: Right: doppler signal Left[de-identified] 1+  Palmar arch: Right: doppler signal Left: doppler signal      Inge Stone PA-C  6/20/2018

## 2018-06-20 NOTE — PLAN OF CARE
CARDIOVASCULAR - ADULT     Maintains optimal cardiac output and hemodynamic stability Progressing     Absence of cardiac dysrhythmias or at baseline rhythm Progressing        GASTROINTESTINAL - ADULT     Minimal or absence of nausea and/or vomiting Progressing     Maintains or returns to baseline bowel function Progressing     Maintains adequate nutritional intake Progressing     Establish and maintain optimal ostomy function Progressing        HEMATOLOGIC - ADULT     Maintains hematologic stability Progressing        METABOLIC, FLUID AND ELECTROLYTES - ADULT     Electrolytes maintained within normal limits Progressing     Fluid balance maintained Progressing     Glucose maintained within target range Progressing        Potential for Falls     Patient will remain free of falls Progressing

## 2018-06-20 NOTE — ASSESSMENT & PLAN NOTE
Lab Results   Component Value Date    HGBA1C 10 4 (H) 06/20/2018       Recent Labs      06/19/18   2346  06/20/18   0611   POCGLU  206*  290*       Blood Sugar Average: Last 72 hrs:  (P) 248     Plan:  --SLIM management

## 2018-06-20 NOTE — PROGRESS NOTES
[facility] Internal Medicine Progress Note  Patient: Emily Abdullahi 60 y o  female   MRN: [id number]  PCP: Rubin Escalante MD  Unit/Bed#: [facility] 406-01 Encounter: [id number]  Date Of Visit: 06/20/18    Assessment:    Principal Problem:    Colonic mass  Active Problems:    Type 2 diabetes mellitus with complication (HCC)    CKD (chronic kidney disease) stage 3, GFR 30-59 ml/min    Pain of right upper extremity    RYLAN (acute kidney injury) ([facility] [address] )      Plan:       1   DM type 2 with peripheral neuropathy, uncontrolled with hyperglycemia, A1c 10 4, start Lantus 20 units q h s  and Humalog with meals, continue to hold metformin, continue insulin sliding scale   2  Intermittent right hand ischemia, on heparin drip,  workup and treatment per primary  3  Obstructed sigmoid colon mass, per colorectal,  plan for abdominal CT scan  4  CKD stage 3, baseline creatinine 1 18-1 20, stable,  monitor  5  CAD s/p stent, continue aspirin, statin and Lopressor  6  History of CVA on 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 associated with cocaine abuse  7  Remote history of cocaine abuse, last use was 1 month ago  8  Tobacco smoking, continue nicotine patch  9  Hep C  10  Hyperlipidemia, continue statin  11  Schizoaffective disorder/ bipolar disorder, continue trazodone       VTE Pharmacologic Prophylaxis:   Pharmacologic: Heparin Drip  Mechanical VTE Prophylaxis in Place: Yes    Patient Centered Rounds: I have performed bedside rounds with nursing staff today  Discussions with Specialists or Other Care Team Provider:     Education and Discussions with Family / Patient:  Patient    Time Spent for Care: 30 minutes  More than 50% of total time spent on counseling and coordination of care as described above      Current Length of Stay: 1 day(s)    Current Patient Status: Inpatient   Certification Statement: The patient will continue to require additional inpatient hospital stay due to Management of right hand ischemia and abdominal mass        Code Status: Level 1 - Full Code      Subjective:   Patient seen and examined  Comfortable in bed  No chest pain or shortness of breath  Tolerating oral diet    Objective:     Vitals:   Temp (24hrs), Av 7 °F (37 1 °C), Min:98 2 °F (36 8 °C), Max:99 1 °F (37 3 °C)    HR:  [65-83] 65  Resp:  [16-18] 16  BP: ()/(45-68) 103/51  SpO2:  [94 %-99 %] 94 %  Body mass index is 22 71 kg/m²  Input and Output Summary (last 24 hours): Intake/Output Summary (Last 24 hours) at 18 0739  Last data filed at 18 3021   Gross per 24 hour   Intake           293 06 ml   Output              650 ml   Net          -356 94 ml       Physical Exam:     Physical Exam  Patient is awake alert in no acute distress  Lung clear to auscultation bilateral  Heart positive S1-S2 no murmur  Abdomen soft with pelvic tenderness positive bowel sounds  Lower extremities no edema      Additional Data:     Labs:      Results from last 7 days  Lab Units 18  0446   WBC Thousand/uL 11 28*   HEMOGLOBIN g/dL 10 6*   HEMATOCRIT % 33 7*   PLATELETS Thousands/uL 433*   NEUTROS PCT % 55   LYMPHS PCT % 31   MONOS PCT % 8   EOS PCT % 5       Results from last 7 days  Lab Units 18  0459 18  2139   SODIUM mmol/L 136 137   POTASSIUM mmol/L 4 7 4 5   CHLORIDE mmol/L 102 104   CO2 mmol/L 27 29   BUN mg/dL 22 22   CREATININE mg/dL 1 23 1 62*   CALCIUM mg/dL 8 6 8 8   TOTAL PROTEIN g/dL  --  7 0   BILIRUBIN TOTAL mg/dL  --  0 20   ALK PHOS U/L  --  121*   ALT U/L  --  14   AST U/L  --  12   GLUCOSE RANDOM mg/dL 261* 164*       Results from last 7 days  Lab Units 18  2139   INR  0 90       * I Have Reviewed All Lab Data Listed Above  * Additional Pertinent Lab Tests Reviewed:  [address] Admission Reviewed    Imaging:    Imaging Reports Reviewed Today Include:   Imaging Personally Reviewed by Myself Includes:     Recent Cultures (last 7 days):           Last 24 Hours Medication List:     Current Facility-Administered Medications:  aspirin 81 mg Oral Daily Michelene Carrel, MD    atorvastatin 80 mg Oral Daily With Christiano Pastrana MD    clopidogrel 75 mg Oral Daily Michelene Carrel, MD    famotidine 20 mg Oral BID Michelene Carrel, MD    heparin (porcine) 3-20 Units/kg/hr (Order-Specific) Intravenous Titrated May Porter MD Last Rate: 16 Units/kg/hr (06/20/18 0560)   heparin (porcine) 5,000 Units Subcutaneous [facility] Michelene Carrel, MD    HYDROmorphone 0 5 mg Intravenous Q3H PRN Michelene Carrel, MD    insulin lispro 1-5 Units Subcutaneous TID [facility] Michelene Carrel, MD    lactated ringers 100 mL/hr Intravenous Continuous Steph Spicer MD Last Rate: 100 mL/hr (06/19/18 9451)   metoprolol tartrate 12 5 mg Oral Q12H [address] Michelene Carrel, MD    nicotine 1 patch Transdermal Daily Michelene Carrel, MD    oxyCODONE 10 mg Oral Q4H PRN Michelene Carrel, MD    oxyCODONE 5 mg Oral Q4H PRN Michelene Carrel, MD    traZODone 50 mg Oral HS Michelene Carrel, MD         Today, Patient Was Seen By: Evelyn Felix DO    ** Please Note: This note has been constructed using a voice recognition system   **

## 2018-06-20 NOTE — ASSESSMENT & PLAN NOTE
Intermittent right arm pain in setting of right axillary/distal subclavian stenosis >75% and occlusion vs HG stenosis mid-distal radial artery in patient with cocaine abuse and obstructing colon mass     UEAD- R 0 89, 1st digit pressure 40    Plan:  --Neurovascular checks  --Continue Heparin gtt  --Continue ASA, Plavix, lipitor

## 2018-06-20 NOTE — ASSESSMENT & PLAN NOTE
CAD w/ Hx MI and coronary stenting, most recent 12/2017 STEMI w/ MAKEDA to LAD    --As per cardiology eval at [facility] "Patientt is at extreme risk of cardiac events  Patient is at risk of MI and death   Must weigh the merits of surgical procedure against the risks before having a procedure perfomed " Stress test was recommended    Plan:  --Cardiology consultation   --Continue ASA, Plavix, lipitor

## 2018-06-20 NOTE — PROGRESS NOTES
Progress Note - Colorectal Surgery   Emily Abdullahi 60 y o  female MRN: [id number]  Unit/Bed#: [facility] 406-01 Encounter: [id number]    Assessment:  30-year-old female with reportedly obstructing sigmoid colon mass and intermittent R hand ischemia    Plan:  Cardiac diet  IVF  Will order CT CAP when kidney function can tolerate  F/u cardiology  F/u CEA  SLIM following  Heparin drip per vascular, f/u UEADs  Primary per vascular surgery    Subjective/Objective   Chief Complaint:     Subjective: ELLIOT  Denies any numbness/tingling in hand  Has LLQ pain  No N/V  +F, +BMs-cannot describe them for me, denies blood in bowel movements  Objective:     Blood pressure 103/51, pulse 65, temperature 99 1 °F (37 3 °C), temperature source Oral, resp  rate 16, weight 58 6 kg (129 lb 3 oz), SpO2 94 %  ,Body mass index is 22 71 kg/m²  Intake/Output Summary (Last 24 hours) at 06/20/18 0543  Last data filed at 06/19/18 2240   Gross per 24 hour   Intake              240 ml   Output              200 ml   Net               40 ml       Invasive Devices     Peripheral Intravenous Line            Peripheral IV 03/17/16 Left Antecubital 824 days    Peripheral IV 06/19/18 Left Forearm less than 1 day                Physical Exam: NAD  AAOX3  Normal respiratory effort  Soft, TTP RLQ, ND  Non palp R radial pulse, <2 sec cap refil R hand  B/l 5/5  strength hands  No c/c/e    Lab, Imaging and other studies:  I have personally reviewed pertinent lab results    , CBC:   Lab Results   Component Value Date    WBC 11 28 (H) 06/20/2018    HGB 10 6 (L) 06/20/2018    HCT 33 7 (L) 06/20/2018    MCV 89 06/20/2018     (H) 06/20/2018    MCH 28 0 06/20/2018    MCHC 31 5 06/20/2018    RDW 14 2 06/20/2018    MPV 9 9 06/20/2018    NRBC 0 06/20/2018     VTE Pharmacologic Prophylaxis: Heparin  VTE Mechanical Prophylaxis: sequential compression device

## 2018-06-20 NOTE — CONSULTS
Consultation - Cardiology Team One  Emily Abdullahi 60 y o  female MRN: [id number]  Unit/Bed#: [facility] 406-01 Encounter: [id number]    Inpatient consult to Cardiology  Consult performed by: John Lam ordered by: Simona Alfonso        Chief complaint: Right hand numbness/weakness, abdominal pain    Physician Requesting Consult: Delisa Longo MD  Reason for Consult / Principal Problem:  Preoperative cardiovascular examination    History of Present Illness   HPI: Emily Pierce is a 60y o  year old female who has a history of  CAD with previous STEMI in December 2017 status post PCI of proximal RCA with placement of drug-eluting stent, also the status post stenting of the LAD and diagonal branch in July of 2012, hyperlipidemia, hypertension, CKD stage 3, history remote history of cocaine abuse approximately 1 month ago, current every day smoker, hepatitis-C, and uncontrolled type 2 diabetes with peripheral neuropathy  The patient follows with cardiologist Dr Emily Delacruz and was last seen in the office on 12/9/16; her scheduled appointment  The patient was to follow up with Dr Madeline Khan in the office on 6/20/17 however patient unfortunately did not appear for scheduled appointment  Patient presented to [address] [place] [address] with complaints of having intermittent symptoms of right hand numbness and weakness over the past several days; patient has had a that she has felt her right hand was very cool in comparison to her left hand  At the same time the patient also had complaints of abdominal pain, was worked up further and was found to have a partially obstructing sigmoid co ramon lesion  The patient was transferred over to [place] [address] for further evaluation by the colorectal surgery service, vascular surgery service, and Cardiology service for preoperative clearance for potential colorectal resection surgery         Cath Report [facility]) December 23, 2017  LM: Mid LM has 20-30% eccentric stenosis  LAD: Mid LAD has  within the previously stented segment  It is unchanged from previous angiogram  Distal LAD fills via left to left and right to left collaterals  LCF: Proximal and mid LCX has 20-30% long eccentric stenosis  RCA: 90-95% proximal RCA stenosis which did not improve after IC nitroglycerine  Mid to distal RCA with mild diffuse disease  Echocardiogram 03/18/16  Ejection fraction 55 to 60%, no regional wall motion abnormalities, wall thickness at the upper limits of normal, RV systolic function was normal wall thickness was normal; mitral valve valve structure was normal no evidence of stenosis or regurgitation; aortic valve, valve was trileaflet no evidence for stenosis or regurgitation the transaortic velocity was with in normal range; tricuspid valve, the valve structure was normal, there is no evidence for stenosis or regurgitation  Review of Systems   Cardiovascular: Negative for chest pain, claudication, cyanosis, dyspnea on exertion, irregular heartbeat, leg swelling, near-syncope, orthopnea, palpitations, paroxysmal nocturnal dyspnea and syncope  Respiratory: Negative for cough, shortness of breath and sleep disturbances due to breathing  Neurological:        Intermittent numbness and tingling in the right hand   All other systems reviewed and are negative      Historical Information   Past Medical History:   Diagnosis Date    Bipolar depression ([facility] ) 3/17/2016    CAD S/P percutaneous coronary angioplasty 3/17/2016    Chronic pain     COPD (chronic obstructive pulmonary disease) (HCC)     CVA (cerebral vascular accident) ([facility] )     R sided weakness    Essential hypertension 3/17/2016    GERD (gastroesophageal reflux disease)     Hepatitis C     Heroin abuse 3/18/2016    History of gastric ulcer     Hypertension     NSTEMI (non-ST elevated myocardial infarction) ([facility] ) 07/2012    Psychiatric disorder 09/03/2014    Inpatient admission     Schizoaffective disorder ([place] [address] ) 3/17/2016    Schizophrenia ([place] [address] ) 3/17/2016    STEMI (ST elevation myocardial infarction) ([place] [address] ) 12/2017    Type 2 diabetes mellitus ([place] [address] ) 3/17/2016     Past Surgical History:   Procedure Laterality Date    CARDIAC CATHETERIZATION  07/2016    CORONARY ANGIOPLASTY WITH STENT PLACEMENT  07/05/2012    MAKEDA (LAD), PTA (Diag)    CORONARY ANGIOPLASTY WITH STENT PLACEMENT  12/2017    MAKEDA/ PTA (RCA)     History   Alcohol Use No     History   Drug Use    Types: Heroin     History   Smoking Status    Current Every Day Smoker    Types: Cigarettes   Smokeless Tobacco    Not on file     Family History:   Family History   Problem Relation Age of Onset    Heart attack Father     Cancer Brother         gastric       Meds/Allergies   all current active meds have been reviewed, current meds:   Current Facility-Administered Medications   Medication Dose Route Frequency    aspirin (ECOTRIN LOW STRENGTH) EC tablet 81 mg  81 mg Oral Daily    atorvastatin (LIPITOR) tablet 80 mg  80 mg Oral Daily With Dinner    clopidogrel (PLAVIX) tablet 75 mg  75 mg Oral Daily    famotidine (PEPCID) tablet 20 mg  20 mg Oral BID    heparin (porcine) 25,000 units in 250 mL infusion (premix)  3-20 Units/kg/hr (Order-Specific) Intravenous Titrated    HYDROmorphone (DILAUDID) injection 0 5 mg  0 5 mg Intravenous Q3H PRN    insulin glargine (LANTUS) subcutaneous injection 20 Units 0 2 mL  20 Units Subcutaneous HS    insulin lispro (HumaLOG) 100 units/mL subcutaneous injection 1-5 Units  1-5 Units Subcutaneous TID AC    insulin lispro (HumaLOG) 100 units/mL subcutaneous injection 5 Units  5 Units Subcutaneous TID With Meals    lactated ringers infusion  100 mL/hr Intravenous Continuous    metoprolol tartrate (LOPRESSOR) partial tablet 12 5 mg  12 5 mg Oral Q12H [address]    nicotine (NICODERM CQ) 7 mg/24hr TD 24 hr patch 1 patch  1 patch Transdermal Daily    oxyCODONE (ROXICODONE) immediate release tablet 10 mg  10 mg Oral Q4H PRN  oxyCODONE (ROXICODONE) IR tablet 5 mg  5 mg Oral Q4H PRN    traZODone (DESYREL) tablet 50 mg  50 mg Oral HS    and PTA meds:   Prior to Admission Medications   Prescriptions Last Dose Informant Patient Reported? Taking?   aspirin (ECOTRIN LOW STRENGTH) 81 mg EC tablet   Yes Yes   Sig: Take 81 mg by mouth daily   atorvastatin (LIPITOR) 80 mg tablet   Yes Yes   Sig: Take 80 mg by mouth daily   clopidogrel (PLAVIX) 75 mg tablet   Yes Yes   Sig: Take 75 mg by mouth daily   famotidine (PEPCID) 20 mg tablet   Yes Yes   Sig: Take 20 mg by mouth 2 (two) times a day   metoprolol tartrate (LOPRESSOR) 25 mg tablet   Yes Yes   Sig: Take 25 mg by mouth every 12 (twelve) hours   traZODone (DESYREL) 50 mg tablet   Yes Yes   Sig: Take 50 mg by mouth daily at bedtime      Facility-Administered Medications: None       heparin (porcine) 3-20 Units/kg/hr (Order-Specific) Last Rate: 16 Units/kg/hr (06/20/18 0535)   lactated ringers 100 mL/hr Last Rate: 100 mL/hr (06/19/18 1724)       No Known Allergies    Objective   Vitals: Blood pressure 146/73, pulse 75, temperature 98 1 °F (36 7 °C), temperature source Oral, resp  rate 18, weight 58 6 kg (129 lb 3 oz), SpO2 94 %  ,     Body mass index is 22 71 kg/m²  ,     Systolic (50KNV), XPK:602 , Min:83 , ANUP:547     Diastolic (48SIZ), KARINA:46, Min:45, Max:73            Intake/Output Summary (Last 24 hours) at 06/20/18 0837  Last data filed at 06/20/18 8900   Gross per 24 hour   Intake           293 06 ml   Output              650 ml   Net          -356 94 ml     Weight (last 2 days)     Date/Time   Weight    06/19/18 1934  58 6 (129 19)            Invasive Devices     Peripheral Intravenous Line            Peripheral IV 03/17/16 Left Antecubital 824 days    Peripheral IV 06/19/18 Left Forearm less than 1 day                Physical Exam   Constitutional: She is oriented to person, place, and time  No distress  HENT:   Head: Normocephalic and atraumatic     Eyes: Conjunctivae and EOM are normal    Neck: Normal range of motion  Neck supple  Cardiovascular: Normal rate, regular rhythm and normal heart sounds  Right hand doppler signals (radial and ulnar), left hand +1 radial +1 ulnar, +1 b/l pedal pulses   Pulmonary/Chest: Effort normal and breath sounds normal    Abdominal: Soft  Bowel sounds are normal    Musculoskeletal: Normal range of motion  Neurological: She is alert and oriented to person, place, and time  Skin: Skin is warm and dry  She is not diaphoretic  Psychiatric: She has a normal mood and affect  Nursing note and vitals reviewed          LABORATORY RESULTS:      CBC with diff:   Results from last 7 days  Lab Units 06/20/18  0446 06/19/18  2139   WBC Thousand/uL 11 28* 11 96*   HEMOGLOBIN g/dL 10 6* 11 1*   HEMATOCRIT % 33 7* 34 9   MCV fL 89 90   PLATELETS Thousands/uL 433* 471*   MCH pg 28 0 28 5   MCHC g/dL 31 5 31 8   RDW % 14 2 14 2   MPV fL 9 9 9 8   NRBC AUTO /100 WBCs 0 0       CMP:  Results from last 7 days  Lab Units 06/20/18  0459 06/19/18  2139   SODIUM mmol/L 136 137   POTASSIUM mmol/L 4 7 4 5   CHLORIDE mmol/L 102 104   CO2 mmol/L 27 29   ANION GAP mmol/L 7 4   BUN mg/dL 22 22   CREATININE mg/dL 1 23 1 62*   GLUCOSE RANDOM mg/dL 261* 164*   CALCIUM mg/dL 8 6 8 8   AST U/L  --  12   ALT U/L  --  14   ALK PHOS U/L  --  121*   TOTAL PROTEIN g/dL  --  7 0   BILIRUBIN TOTAL mg/dL  --  0 20   EGFR ml/min/1 73sq m 48 34       BMP:  Results from last 7 days  Lab Units 06/20/18  0459 06/19/18  2139   SODIUM mmol/L 136 137   POTASSIUM mmol/L 4 7 4 5   CHLORIDE mmol/L 102 104   CO2 mmol/L 27 29   BUN mg/dL 22 22   CREATININE mg/dL 1 23 1 62*   GLUCOSE RANDOM mg/dL 261* 164*   CALCIUM mg/dL 8 6 8 8          No results found for: NTBNP                Results from last 7 days  Lab Units 06/20/18  0503   HEMOGLOBIN A1C % 10 4*                Results from last 7 days  Lab Units 06/19/18  2139   INR  0 90     Lipid Profile:   Lab Results   Component Value Date    CHOL 215 (H) 2016    CHOL 216 (H) 2016    CHOL 313 (H) 2016     Lab Results   Component Value Date    HDL 22 (L) 2016    HDL 21 (L) 2016    HDL 36 (L) 2016     Lab Results   Component Value Date    LDLCALC  2016      Comment:      Calculated LDL invalid, triglycerides >400 mg/dl    LDLCALC  2016      Comment:      Calculated LDL invalid, triglycerides >400 mg/dl    LDLCALC 200 (H) 2016     Lab Results   Component Value Date    TRIG 564 (H) 2016    TRIG 562 (H) 2016    TRIG 383 (H) 2016         Cardiac testing:   Results for orders placed during the hospital encounter of 16   Echo complete with contrast if indicated    Narrative [address] [place] [address], [address]  [phone]    Transthoracic Echocardiogram  2D, M-mode, Doppler, and Color Doppler    Study date:  18-Mar-2016    Patient: Paola Martinez  MR number: [id number]  Account number: [de-identified]  : 1960  Age: 61 years  Gender: Female  Status: Inpatient  Location: Bedside  Height: 63 in  Weight: 151 lb  BP: 116/ 67 mmHg    Indications: Stroke    Diagnoses: I63 9 - Cerebral infarction, unspecified    Sonographer:  HARJEET Salcido  Primary Physician:  Bennie Abarca MD  Referring Physician:  Marko Santoyo:  [facility]  Interpreting Physician:  Avi Jaquez, DO    SUMMARY    LEFT VENTRICLE:  Systolic function was normal  Ejection fraction was estimated in the range of  55 % to 60 %  There were no regional wall motion abnormalities  Wall thickness was at the upper limits of normal     HISTORY: PRIOR HISTORY: CAD, coronary angioplasty, heroin abuse, CVA, DM2,  schizophrenia, bipolar, hypertension, acute renal failure, tobacco abuse    PROCEDURE: The procedure was performed at the bedside  This was a routine  study  The transthoracic approach was used   The study included complete 2D  imaging, M-mode, complete spectral Doppler, and color Doppler  The heart rate  was 63 bpm, at the start of the study  Images were obtained from the  parasternal, apical, subcostal, and suprasternal notch acoustic windows  Image  quality was adequate  LEFT VENTRICLE: Size was normal  Systolic function was normal  Ejection  fraction was estimated in the range of 55 % to 60 %  There were no regional  wall motion abnormalities  Wall thickness was at the upper limits of normal   DOPPLER: Left ventricular diastolic function parameters were normal     RIGHT VENTRICLE: The size was normal  Systolic function was normal  Wall  thickness was normal     LEFT ATRIUM: Size was normal     RIGHT ATRIUM: Size was normal     MITRAL VALVE: Valve structure was normal  There was normal leaflet separation  DOPPLER: The transmitral velocity was within the normal range  There was no  evidence for stenosis  There was no regurgitation  AORTIC VALVE: The valve was trileaflet  Leaflets exhibited normal thickness and  normal cuspal separation  DOPPLER: Transaortic velocity was within the normal  range  There was no evidence for stenosis  There was no regurgitation  TRICUSPID VALVE: The valve structure was normal  There was normal leaflet  separation  DOPPLER: The transtricuspid velocity was within the normal range  There was no evidence for stenosis  There was no regurgitation  PULMONIC VALVE: Leaflets exhibited normal thickness, no calcification, and  normal cuspal separation  DOPPLER: The transpulmonic velocity was within the  normal range  There was no regurgitation  PERICARDIUM: There was no pericardial effusion  The pericardium was normal in  appearance  AORTA: The root exhibited normal size  SYSTEMIC VEINS: IVC: The inferior vena cava was normal in size and course  Respirophasic changes were normal     PULMONARY ARTERY: DOPPLER: The tricuspid jet envelope definition was inadequate  for estimation of RV systolic pressure   There are no indirect findings  (abnormal RV volume or geometry, altered pulmonary flow velocity profile, or  leftward septal displacement) which would suggest moderate or severe pulmonary  hypertension  SYSTEM MEASUREMENT TABLES    2D  %FS: 30 1 %  AV Diam: 2 6 cm  EDV(Teich): 80 8 ml  EF(Cube): 65 9 %  EF(Teich): 57 7 %  ESV(Cube): 26 2 ml  ESV(Teich): 34 2 ml  IVSd: 0 9 cm  LA Area: 14 6 cm2  LA Diam: 3 1 cm  LVEDV MOD A4C: 102 8 ml  LVEF MOD A4C: 73 8 %  LVESV MOD A4C: 26 9 ml  LVIDd: 4 3 cm  LVIDs: 3 cm  LVLd A4C: 8 1 cm  LVLs A4C: 6 7 cm  LVPWd: 1 cm  RA Area: 10 9 cm2  Rv Diam: 3 3 cm  SV MOD A4C: 75 9 ml  SV(Cube): 50 6 ml  SV(Teich): 46 7 ml    MM  TAPSE: 2 2 cm    PW  AVC: 410 5 ms  MELIDA: 410 5 ms  E': 0 1 m/s  E/E': 14 8  MV A [place]: 1 m/s  MV Dec [place]: 7 5 m/s2  MV DecT: 155 5 ms  MV E [place]: 1 2 m/s  MV E/A Ratio: 1 1    [facility] Accredited Echocardiography Laboratory    Prepared and electronically signed by    Avi Jaquez DO  Signed 18-Mar-2016 16:43:10       No results found for this or any previous visit  No procedure found  No results found for this or any previous visit  Imaging: I have personally reviewed pertinent reports  and I have personally reviewed pertinent films in PACS  No results found  Assessment/ Plan  Preoperative cardiac clearance- The patient is at moderate risk for a rodger-operative adverse cardiac event given her history of medication non-compliance, and coronary disease  CAD status post stenting of the proximal LAD in 2012  and status post PCI with placement of drug-eluting stent in 12/23/17 of the proximal RCA  -Patient states that she has been non-compliant with her cardiac medication is more specifically her aspirin, Plavix, and metoprolol    When asked when the patient had taken these medications last she had stated "I do not remember", per report by Dr Ayaan Pepe the patient had reported that she has not taken her ordered Plavix since 4/29/18  -According to [facility] cath report in December 2017, Mid LAD has  within the previously stented segment  It is unchanged from previous angiogram  Distal LAD fills via left to left and right to left collaterals   -12 the EKG personally reviewed this admission; sinus rhythm with out any abnormal ST segment changes; patient currently denies chest pain, palpitations, shortness of breath, orthopnea, lightheadedness, dizziness  -During this admission will continue aspirin, Plavix, high-intensity statin, and metoprolol  -Vascular surgery has been consuled to evaluate the patient's right hand numbness and tingling; noted patient has occlusive disease of her right subclavian artery, per vascular note; also colorectal surgery evaluating patient for possible surgical procedure  -Patient educated regarding medication compliance and adherence to her currently ordered doses and schedule  Hypertension-continue metoprolol, blood pressure has been well controlled this admission    Hyperlipidemia- Continue high intensity statin therapy     Uncontrolled Type 2 diabetes mellitus- hemoglobin A1c 10 4; management per SLIM    Tobacco abuse- patient educated regarding smoking cessation, nicotine patch has been ordered, nursing to provide smoking education packet upon discharge    Cocaine abuse- patient educated regarding cessation of drug use, she admits to having last used about 1 month ago    Thank you for allowing us to participate in this patient's care  This pt will follow up with Dr Florence Win once discharged  Counseling / Coordination of Care  Total floor / unit time spent today 45 minutes  Greater than 50% of total time was spent with the patient and / or family counseling and / or coordination of care  A description of the counseling / coordination of care: Review of history, current assessment, development of a plan        Code Status: Level 1 - Full Code    ** Please Note: Dragon 360 Dictation voice to text software may have been used in the creation of this document   **

## 2018-06-21 ENCOUNTER — APPOINTMENT (INPATIENT)
Dept: RADIOLOGY | Facility: HOSPITAL | Age: 58
DRG: 375 | End: 2018-06-21
Payer: COMMERCIAL

## 2018-06-21 LAB
ANION GAP SERPL CALCULATED.3IONS-SCNC: 7 MMOL/L (ref 4–13)
APTT PPP: 31 SECONDS (ref 24–36)
BUN SERPL-MCNC: 16 MG/DL (ref 5–25)
CALCIUM SERPL-MCNC: 8.8 MG/DL (ref 8.3–10.1)
CHLORIDE SERPL-SCNC: 103 MMOL/L (ref 100–108)
CO2 SERPL-SCNC: 28 MMOL/L (ref 21–32)
CREAT SERPL-MCNC: 1.03 MG/DL (ref 0.6–1.3)
ERYTHROCYTE [DISTWIDTH] IN BLOOD BY AUTOMATED COUNT: 14.2 % (ref 11.6–15.1)
GFR SERPL CREATININE-BSD FRML MDRD: 60 ML/MIN/1.73SQ M
GLUCOSE SERPL-MCNC: 111 MG/DL (ref 65–140)
GLUCOSE SERPL-MCNC: 120 MG/DL (ref 65–140)
GLUCOSE SERPL-MCNC: 185 MG/DL (ref 65–140)
GLUCOSE SERPL-MCNC: 209 MG/DL (ref 65–140)
GLUCOSE SERPL-MCNC: 248 MG/DL (ref 65–140)
HCT VFR BLD AUTO: 34.2 % (ref 34.8–46.1)
HGB BLD-MCNC: 10.6 G/DL (ref 11.5–15.4)
MCH RBC QN AUTO: 28.3 PG (ref 26.8–34.3)
MCHC RBC AUTO-ENTMCNC: 31 G/DL (ref 31.4–37.4)
MCV RBC AUTO: 91 FL (ref 82–98)
PLATELET # BLD AUTO: 448 THOUSANDS/UL (ref 149–390)
PMV BLD AUTO: 9.8 FL (ref 8.9–12.7)
POTASSIUM SERPL-SCNC: 4.2 MMOL/L (ref 3.5–5.3)
RBC # BLD AUTO: 3.75 MILLION/UL (ref 3.81–5.12)
SODIUM SERPL-SCNC: 138 MMOL/L (ref 136–145)
WBC # BLD AUTO: 10.04 THOUSAND/UL (ref 4.31–10.16)

## 2018-06-21 PROCEDURE — 82948 REAGENT STRIP/BLOOD GLUCOSE: CPT

## 2018-06-21 PROCEDURE — 85027 COMPLETE CBC AUTOMATED: CPT | Performed by: PHYSICIAN ASSISTANT

## 2018-06-21 PROCEDURE — A9585 GADOBUTROL INJECTION: HCPCS | Performed by: SURGERY

## 2018-06-21 PROCEDURE — 72197 MRI PELVIS W/O & W/DYE: CPT

## 2018-06-21 PROCEDURE — 99232 SBSQ HOSP IP/OBS MODERATE 35: CPT | Performed by: INTERNAL MEDICINE

## 2018-06-21 PROCEDURE — 99232 SBSQ HOSP IP/OBS MODERATE 35: CPT | Performed by: SURGERY

## 2018-06-21 PROCEDURE — 80048 BASIC METABOLIC PNL TOTAL CA: CPT | Performed by: PHYSICIAN ASSISTANT

## 2018-06-21 PROCEDURE — 85730 THROMBOPLASTIN TIME PARTIAL: CPT | Performed by: SURGERY

## 2018-06-21 RX ORDER — SODIUM CHLORIDE 9 MG/ML
75 INJECTION, SOLUTION INTRAVENOUS CONTINUOUS
Status: DISCONTINUED | OUTPATIENT
Start: 2018-06-21 | End: 2018-06-22

## 2018-06-21 RX ORDER — NICOTINE 21 MG/24HR
21 PATCH, TRANSDERMAL 24 HOURS TRANSDERMAL DAILY
Status: DISCONTINUED | OUTPATIENT
Start: 2018-06-22 | End: 2018-06-22 | Stop reason: HOSPADM

## 2018-06-21 RX ADMIN — POLYETHYLENE GLYCOL 3350 17 G: 17 POWDER, FOR SOLUTION ORAL at 14:25

## 2018-06-21 RX ADMIN — POLYETHYLENE GLYCOL 3350 17 G: 17 POWDER, FOR SOLUTION ORAL at 18:28

## 2018-06-21 RX ADMIN — POLYETHYLENE GLYCOL 3350 17 G: 17 POWDER, FOR SOLUTION ORAL at 22:04

## 2018-06-21 RX ADMIN — METOPROLOL TARTRATE 12.5 MG: 25 TABLET ORAL at 22:03

## 2018-06-21 RX ADMIN — OXYCODONE HYDROCHLORIDE 10 MG: 10 TABLET ORAL at 03:56

## 2018-06-21 RX ADMIN — INSULIN LISPRO 1 UNITS: 100 INJECTION, SOLUTION INTRAVENOUS; SUBCUTANEOUS at 18:28

## 2018-06-21 RX ADMIN — HEPARIN SODIUM 5000 UNITS: 5000 INJECTION, SOLUTION INTRAVENOUS; SUBCUTANEOUS at 22:03

## 2018-06-21 RX ADMIN — GADOBUTROL 6 ML: 604.72 INJECTION INTRAVENOUS at 18:19

## 2018-06-21 RX ADMIN — METOPROLOL TARTRATE 12.5 MG: 25 TABLET ORAL at 08:52

## 2018-06-21 RX ADMIN — ASPIRIN 81 MG: 81 TABLET, COATED ORAL at 08:52

## 2018-06-21 RX ADMIN — HEPARIN SODIUM 5000 UNITS: 5000 INJECTION, SOLUTION INTRAVENOUS; SUBCUTANEOUS at 05:53

## 2018-06-21 RX ADMIN — INSULIN LISPRO 2 UNITS: 100 INJECTION, SOLUTION INTRAVENOUS; SUBCUTANEOUS at 06:08

## 2018-06-21 RX ADMIN — POLYETHYLENE GLYCOL 3350, SODIUM SULFATE ANHYDROUS, SODIUM BICARBONATE, SODIUM CHLORIDE, POTASSIUM CHLORIDE 4000 ML: 236; 22.74; 6.74; 5.86; 2.97 POWDER, FOR SOLUTION ORAL at 15:03

## 2018-06-21 RX ADMIN — POLYETHYLENE GLYCOL 3350 17 G: 17 POWDER, FOR SOLUTION ORAL at 02:55

## 2018-06-21 RX ADMIN — FAMOTIDINE 20 MG: 20 TABLET ORAL at 18:28

## 2018-06-21 RX ADMIN — HYDROMORPHONE HYDROCHLORIDE 0.5 MG: 1 INJECTION, SOLUTION INTRAMUSCULAR; INTRAVENOUS; SUBCUTANEOUS at 05:52

## 2018-06-21 RX ADMIN — HEPARIN SODIUM 5000 UNITS: 5000 INJECTION, SOLUTION INTRAVENOUS; SUBCUTANEOUS at 14:20

## 2018-06-21 RX ADMIN — TRAZODONE HYDROCHLORIDE 50 MG: 50 TABLET ORAL at 22:03

## 2018-06-21 RX ADMIN — INSULIN LISPRO 5 UNITS: 100 INJECTION, SOLUTION INTRAVENOUS; SUBCUTANEOUS at 08:47

## 2018-06-21 RX ADMIN — OXYCODONE HYDROCHLORIDE 10 MG: 10 TABLET ORAL at 18:39

## 2018-06-21 RX ADMIN — FAMOTIDINE 20 MG: 20 TABLET ORAL at 08:52

## 2018-06-21 RX ADMIN — SODIUM CHLORIDE 75 ML/HR: 0.9 INJECTION, SOLUTION INTRAVENOUS at 11:32

## 2018-06-21 RX ADMIN — INSULIN GLARGINE 20 UNITS: 100 INJECTION, SOLUTION SUBCUTANEOUS at 22:03

## 2018-06-21 RX ADMIN — ATORVASTATIN CALCIUM 80 MG: 80 TABLET, FILM COATED ORAL at 18:28

## 2018-06-21 RX ADMIN — OXYCODONE HYDROCHLORIDE 10 MG: 10 TABLET ORAL at 08:59

## 2018-06-21 RX ADMIN — POLYETHYLENE GLYCOL 3350 17 G: 17 POWDER, FOR SOLUTION ORAL at 05:50

## 2018-06-21 RX ADMIN — OXYCODONE HYDROCHLORIDE 10 MG: 10 TABLET ORAL at 14:19

## 2018-06-21 RX ADMIN — NICOTINE 1 PATCH: 7 PATCH, EXTENDED RELEASE TRANSDERMAL at 08:50

## 2018-06-21 RX ADMIN — POLYETHYLENE GLYCOL 3350 17 G: 17 POWDER, FOR SOLUTION ORAL at 11:37

## 2018-06-21 NOTE — SOCIAL WORK
Pt is transferred to [facility] from [facility]  CM met w/ pt to obtain info  Pt reported she resides w/ her daughter only in a 2-story house w/ 13 steps between floors and 1 step to enter house  Pt's daughter Maxwell Jiménez ([phone]) is primary contact  Pt reported she uses a cane to ambulate, but is independent at baseline w/ performing her ADLS  Pt reported no additional DME in home  Pt reported recent hx of [address] services in 2018 w/ [facility]  Pt reported no hx of i/p rehab placements  Pt reported she has mental health Dx's of Bipolar Disorder and Schizophrenia  Pt denied having any hx of i/p psych tx  Pt reported she sees psychiatrist Dr Ronna Espinoza located in [place] [address]  Pt reported hx of i/p D&A tx in 2016 at [employer] located in [address] [place] [address]  Pt reported her PCP is Dr Portia Martinez through [facility] located in [facility]  Pt reported she uses [facility] pharmacy located at [address] in [facility] for her Rx needs  Pt reported she is not employed and receives SSDI benefits as her source of income  Pt is enrolled in [facility] for healthcare coverage and Rx benefits  Pt reported she does not have a legally pre-designated medical POA appointed for herself  Pt reported her family members can provide transportation for her at time of d/c     CM reviewed d/c planning process including the following: identifying help at home, patient preference for d/c planning needs, Discharge Lounge, [facility] Meds to Bed program, availability of treatment team to discuss questions or concerns patient and/or family may have regarding understanding medications and recognizing signs and symptoms once discharged  CM also encouraged patient to follow up with all recommended appointments after discharge  Patient advised of importance for patient and family to participate in managing patients medical well being   Patient/caregiver received discharge checklist  Content reviewed  Patient/caregiver encouraged to participate in discharge plan of care prior to discharge home  CM will reassess pt for d/c needs once recommendations for her aftercare are made by the tx team  CM to follow

## 2018-06-21 NOTE — ASSESSMENT & PLAN NOTE
Intermittent right arm pain in setting of right axillary/distal subclavian stenosis >75% and occlusion vs HG stenosis mid-distal radial artery in patient with cocaine abuse and obstructing colon mass     UEAD- R 0 89, 1st digit pressure 40    Plan:  --No planned vascular intervention at this time, outpatient follow-up in 2-4 weeks following discharge  --No need for full anticoagulation  --Continue ASA, Lipitor  --Plavix (MAKEDA) held for colorectal surgery

## 2018-06-21 NOTE — PROGRESS NOTES
[facility] Internal Medicine Progress Note  Patient: Emily Abdullahi 60 y o  female   MRN: [id number]  PCP: Nayeli Alan MD  Unit/Bed#: [facility] 406-01 Encounter: [id number]  Date Of Visit: 06/21/18    Assessment:    Principal Problem:    Colonic mass  Active Problems:    Type 2 diabetes mellitus with complication (HCC)    CKD (chronic kidney disease) stage 3, GFR 30-59 ml/min    Pain of right upper extremity    RYLAN (acute kidney injury) ([facility] [address] )    CAD (coronary artery disease)      Plan:       1   DM2 with peripheral neuropathy, uncontrolled with hyperglycemia, A1c 10 4, continue Lantus q h s  and Humalog, metformin on hold, continue insulin sliding scale    2  Right UE symptomatic ischemia, per vascular, no planned surgery continue aspirin statin, outpatient follow-up    3  Obstructed sigmoid colon mass, per colorectal, plan for colonoscopy tomorrow then surgery over the weekend  4  CKD stage 3, baseline creatinine 1 18-1 20, stable,  monitor  5  CAD s/p MAKEDA on 12/23/17 , continue aspirin, statin and Lopressor, cardiology is following, moderate risk for surgery  6  History of CVA on 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 associated with cocaine abuse  7  Remote history of cocaine abuse, last use was 1 month ago  8  Tobacco smoking, continue nicotine patch  9  Hep C  10  Hyperlipidemia, continue statin  11  Schizoaffective disorder/ bipolar disorder, continue trazodone       VTE Pharmacologic Prophylaxis:   Pharmacologic: Heparin   Mechanical VTE Prophylaxis in Place: Yes    Patient Centered Rounds: I have performed bedside rounds with nursing staff today  Discussions with Specialists or Other Care Team Provider:     Education and Discussions with Family / Patient:  Patient    Time Spent for Care: 30 minutes  More than 50% of total time spent on counseling and coordination of care as described above      Current Length of Stay: 2 day(s)    Current Patient Status: Inpatient   Certification Statement: The patient will continue to require additional inpatient hospital stay due to Management of right hand ischemia and abdominal mass        Code Status: Level 1 - Full Code      Subjective:   Patient seen and examined  Comfortable in bed  No chest pain or shortness of breath  Tolerating oral diet    Objective:     Vitals:   Temp (24hrs), Av 3 °F (36 8 °C), Min:97 6 °F (36 4 °C), Max:99 2 °F (37 3 °C)    HR:  [55-72] 70  Resp:  [16-18] 18  BP: (113-166)/(53-79) 127/63  SpO2:  [94 %-99 %] 99 %  Body mass index is 22 71 kg/m²  Input and Output Summary (last 24 hours): Intake/Output Summary (Last 24 hours) at 18 1046  Last data filed at 18 0800   Gross per 24 hour   Intake             1590 ml   Output             2600 ml   Net            -1010 ml       Physical Exam:     Physical Exam  Patient is awake alert in no acute distress  Lung clear to auscultation bilateral  Heart positive S1-S2 no murmur  Abdomen soft with pelvic tenderness positive bowel sounds  Lower extremities no edema      Additional Data:     Labs:      Results from last 7 days  Lab Units 18  0437 18  0446   WBC Thousand/uL 10 04 11 28*   HEMOGLOBIN g/dL 10 6* 10 6*   HEMATOCRIT % 34 2* 33 7*   PLATELETS Thousands/uL 448* 433*   NEUTROS PCT %  --  55   LYMPHS PCT %  --  31   MONOS PCT %  --  8   EOS PCT %  --  5       Results from last 7 days  Lab Units 18  0437  18  2139   SODIUM mmol/L 138  < > 137   POTASSIUM mmol/L 4 2  < > 4 5   CHLORIDE mmol/L 103  < > 104   CO2 mmol/L 28  < > 29   BUN mg/dL 16  < > 22   CREATININE mg/dL 1 03  < > 1 62*   CALCIUM mg/dL 8 8  < > 8 8   TOTAL PROTEIN g/dL  --   --  7 0   BILIRUBIN TOTAL mg/dL  --   --  0 20   ALK PHOS U/L  --   --  121*   ALT U/L  --   --  14   AST U/L  --   --  12   GLUCOSE RANDOM mg/dL 209*  < > 164*   < > = values in this interval not displayed  Results from last 7 days  Lab Units 18  2139   INR  0 90       * I Have Reviewed All Lab Data Listed Above    * Additional Pertinent Lab Tests Reviewed: [place] [address] Admission Reviewed    Imaging:    Imaging Reports Reviewed Today Include:   Imaging Personally Reviewed by Myself Includes:     Recent Cultures (last 7 days):           Last 24 Hours Medication List:     Current Facility-Administered Medications:  aspirin 81 mg Oral Daily Jn Peacock MD   atorvastatin 80 mg Oral Daily With Asuncion Angeles MD   famotidine 20 mg Oral BID Jn Peacock MD   heparin (porcine) 5,000 Units Subcutaneous Q8H [address] Darren Ortiz PA-C   insulin glargine 20 Units Subcutaneous HS Chu Worthington DO   insulin lispro 1-5 Units Subcutaneous TID [facility] Jn Peacock MD   insulin lispro 5 Units Subcutaneous TID With Meals Chu Worthington DO   metoprolol tartrate 12 5 mg Oral Q12H [address] Jn Peacock MD   [START ON 6/22/2018] nicotine 21 mg Transdermal Daily Jn Peacock MD   oxyCODONE 10 mg Oral Q4H PRN Jn Peacock MD   oxyCODONE 5 mg Oral Q4H PRN Jn Peacock MD   polyethylene glycol 4,000 mL Oral Once Staci Mcadams PA-C   polyethylene glycol 17 g Oral Q4H Amaris Nguyen MD   traZODone 50 mg Oral HS Jn Peacock MD        Today, Patient Was Seen By: Chu Worthington DO    ** Please Note: This note has been constructed using a voice recognition system   **

## 2018-06-21 NOTE — PROGRESS NOTES
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% and occlusion vs HG stenosis mid-distal radial artery in patient with cocaine abuse and obstructing colon mass     UEAD- R 0 89, 1st digit pressure 40    Plan:  --No planned vascular intervention at this time, outpatient follow-up in 2-4 weeks following discharge  --No need for full anticoagulation  --Continue ASA, Lipitor  --Plavix (MAKEDA) held for colorectal surgery              Subjective:  Pt denies hand pain or numbness    Vitals:  /63 (BP Location: Left arm)   Pulse 70   Temp 98 3 °F (36 8 °C) (Oral)   Resp 18   Wt 58 6 kg (129 lb 3 oz)   SpO2 99%   BMI 22 71 kg/m²     I/Os:  I/O last 3 completed shifts: In: 2907 8 [P O :1710;  I V :1197 8]  Out: 3050 [Urine:3050]  I/O this shift:  In: 120 [P O :120]  Out: 400 [Urine:400]    Lab Results and Cultures:   Lab Results   Component Value Date    WBC 10 04 06/21/2018    HGB 10 6 (L) 06/21/2018    HCT 34 2 (L) 06/21/2018    MCV 91 06/21/2018     (H) 06/21/2018     Lab Results   Component Value Date    GLUCOSE 209 (H) 06/21/2018    CALCIUM 8 8 06/21/2018     06/21/2018    K 4 2 06/21/2018    CO2 28 06/21/2018     06/21/2018    BUN 16 06/21/2018    CREATININE 1 03 06/21/2018     Lab Results   Component Value Date    INR 0 90 06/19/2018    PROTIME 12 3 06/19/2018        Medications:  Current Facility-Administered Medications   Medication Dose Route Frequency    aspirin (ECOTRIN LOW STRENGTH) EC tablet 81 mg  81 mg Oral Daily    atorvastatin (LIPITOR) tablet 80 mg  80 mg Oral Daily With Dinner    famotidine (PEPCID) tablet 20 mg  20 mg Oral BID    heparin (porcine) subcutaneous injection 5,000 Units  5,000 Units Subcutaneous Q8H [facility]    insulin glargine (LANTUS) subcutaneous injection 20 Units 0 2 mL  20 Units Subcutaneous HS    insulin lispro (HumaLOG) 100 units/mL subcutaneous injection 1-5 Units  1-5 Units Subcutaneous TID AC    insulin lispro (HumaLOG) 100 units/mL subcutaneous injection 5 Units  5 Units Subcutaneous TID With Meals    metoprolol tartrate (LOPRESSOR) partial tablet 12 5 mg  12 5 mg Oral Q12H [facility]    [START ON 6/22/2018] nicotine (NICODERM CQ) 21 mg/24 hr TD 24 hr patch 21 mg  21 mg Transdermal Daily    oxyCODONE (ROXICODONE) immediate release tablet 10 mg  10 mg Oral Q4H PRN    oxyCODONE (ROXICODONE) IR tablet 5 mg  5 mg Oral Q4H PRN    polyethylene glycol (MIRALAX) packet 17 g  17 g Oral Q4H    traZODone (DESYREL) tablet 50 mg  50 mg Oral HS       Physical Exam:    General appearance: alert and oriented, in no acute distress  Skin: Skin color, texture, turgor normal  No rashes or lesions  Neurologic: Grossly normal  Lungs: clear to auscultation bilaterally  Chest wall: no tenderness  Heart: regular rate and rhythm, S1, S2 normal, no murmur, click, rub or gallop  Abdomen: soft, non-tender; bowel sounds normal; no masses,  no organomegaly  Extremities: extremities normal, warm and well-perfused; no cyanosis, clubbing, or edema    Pulse exam:  Radial: Right: doppler signal Left[de-identified] 1+  Ulnar: Right: doppler signal Left[de-identified] 1+  Palmar Arch: Right: doppler signal Left: doppler signal    Alize Grande PA-C  6/21/2018

## 2018-06-21 NOTE — PROGRESS NOTES
Per white sx  Pt  To be going to MRI at 1630  Pt   To start bowel prep at 1500 per white sx  (mirian MANZO )

## 2018-06-21 NOTE — PROGRESS NOTES
Per MRI if pt  Has not had any changes or implants in the past two days they can transfer over her MRI screening form for old MRI order  Pt  And Pt 's daughter decline any changes/procedures/implants of any sort in the past two days  MRI tech aware

## 2018-06-21 NOTE — PROGRESS NOTES
Progress Note - Colorectal Surgery   Emily Abdullahi 60 y o  female MRN: [id number]  Unit/Bed#: [facility] 406-01 Encounter: [id number]    Assessment:  62-year-old female with reportedly obstructing sigmoid colon mass and intermittent R hand ischemia    Plan:  Clears-bowel prep for C scope tomorrow  F/u MRI Pelvis  Cardiology-moderate risk for surgery  CEA 5 5  SLIM following  Primary per vascular surgery-should be transferred to [place] service    Subjective/Objective   Chief Complaint:     Subjective: ELLIOT  Moving right hand without difficulty, asymptomatic  No abdominal pain, +BF  Objective:     Blood pressure 124/58, pulse 55, temperature 98 2 °F (36 8 °C), temperature source Oral, resp  rate 16, weight 58 6 kg (129 lb 3 oz), SpO2 97 %, not currently breastfeeding  ,Body mass index is 22 71 kg/m²  Intake/Output Summary (Last 24 hours) at 06/21/18 0550  Last data filed at 06/21/18 0403   Gross per 24 hour   Intake          2567 76 ml   Output             2850 ml   Net          -282 24 ml       Invasive Devices     Peripheral Intravenous Line            Peripheral IV 03/17/16 Left Antecubital 825 days    Peripheral IV 06/19/18 Left Forearm 1 day                Physical Exam: AAOx3  NAD  Normal respiratory effort  Soft, NT, ND  B/l hand 5/5  strength  Right hand motor-sensation intact, <2sec cap refil    Lab, Imaging and other studies:  I have personally reviewed pertinent lab results    , CBC:   Lab Results   Component Value Date    WBC 10 04 06/21/2018    HGB 10 6 (L) 06/21/2018    HCT 34 2 (L) 06/21/2018    MCV 91 06/21/2018     (H) 06/21/2018    MCH 28 3 06/21/2018    MCHC 31 0 (L) 06/21/2018    RDW 14 2 06/21/2018    MPV 9 8 06/21/2018     VTE Pharmacologic Prophylaxis: Heparin  VTE Mechanical Prophylaxis: sequential compression device

## 2018-06-21 NOTE — SOCIAL WORK
MCG Guide Used for Initial Round: intestinal obstruction/ colonic mass  Optimal GLOS: 2  Hospital Day: 2 days  DC Readiness:   Goal Length of Stay: 2 days   Note: Goal Length of Stay assumes optimal recovery, decision making, and care  Patients may be discharged to a lower level of care (either later than or sooner than the goal) when it is appropriate for their clinical status and care needs    Discharge Readiness  Return to top of Intestinal Obstruction RRG - [facility]   Discharge readiness is indicated by patient meeting Recovery Milestones, including ALL of the following:   Hemodynamic stability   Nausea and vomiting absent or controlled and acceptable for next level of care   Electrolytes normal or acceptable for next level of care   Pain absent or managed   Surgery not indicated   Ambulatory[M]   Oral hydration, medications, and diet   Discharge plans and education understood    Identified Barriers:  Plan abdominal surgery, DM2, CKD 3, right arm arterial stenosis, RYLAN, recent MAKEDA 12/17 for CAD, hx CVA, last used cocaine 1 month ago, hep C, schizoaffective disorder, bipolar disorder  Discussion Date (Time): 06/21/18 with Dr Yana Coppola

## 2018-06-21 NOTE — PROGRESS NOTES
Pt  Ordered for clear liquids  Per white sx  OK to hold off on with meal insulin and only provide sliding scale coverage  Pt  To be maintain tele order per white sx

## 2018-06-21 NOTE — PROGRESS NOTES
Spoke with MRI, they have patient scheduled for 4:45 pm this afternoon as long as the paperwork is completed  Spoke with Jose Raul Wilder RN and asked to please have the paperwork completed for the MRI  Important this MRI gets done today

## 2018-06-22 ENCOUNTER — ANESTHESIA EVENT (INPATIENT)
Dept: GASTROENTEROLOGY | Facility: HOSPITAL | Age: 58
DRG: 375 | End: 2018-06-22
Payer: COMMERCIAL

## 2018-06-22 ENCOUNTER — ANESTHESIA (INPATIENT)
Dept: GASTROENTEROLOGY | Facility: HOSPITAL | Age: 58
DRG: 375 | End: 2018-06-22
Payer: COMMERCIAL

## 2018-06-22 VITALS
OXYGEN SATURATION: 95 % | DIASTOLIC BLOOD PRESSURE: 93 MMHG | SYSTOLIC BLOOD PRESSURE: 166 MMHG | RESPIRATION RATE: 18 BRPM | HEART RATE: 77 BPM | WEIGHT: 129 LBS | TEMPERATURE: 98.1 F | HEIGHT: 63 IN | BODY MASS INDEX: 22.86 KG/M2

## 2018-06-22 LAB
ANION GAP SERPL CALCULATED.3IONS-SCNC: 11 MMOL/L (ref 4–13)
BASOPHILS # BLD AUTO: 0.05 THOUSANDS/ΜL (ref 0–0.1)
BASOPHILS NFR BLD AUTO: 0 % (ref 0–1)
BUN SERPL-MCNC: 10 MG/DL (ref 5–25)
CALCIUM SERPL-MCNC: 8.9 MG/DL (ref 8.3–10.1)
CHLORIDE SERPL-SCNC: 106 MMOL/L (ref 100–108)
CO2 SERPL-SCNC: 26 MMOL/L (ref 21–32)
CREAT SERPL-MCNC: 0.91 MG/DL (ref 0.6–1.3)
EOSINOPHIL # BLD AUTO: 0.61 THOUSAND/ΜL (ref 0–0.61)
EOSINOPHIL NFR BLD AUTO: 5 % (ref 0–6)
ERYTHROCYTE [DISTWIDTH] IN BLOOD BY AUTOMATED COUNT: 14.1 % (ref 11.6–15.1)
GFR SERPL CREATININE-BSD FRML MDRD: 69 ML/MIN/1.73SQ M
GLUCOSE SERPL-MCNC: 126 MG/DL (ref 65–140)
GLUCOSE SERPL-MCNC: 199 MG/DL (ref 65–140)
GLUCOSE SERPL-MCNC: 241 MG/DL (ref 65–140)
GLUCOSE SERPL-MCNC: 59 MG/DL (ref 65–140)
GLUCOSE SERPL-MCNC: 63 MG/DL (ref 65–140)
HCT VFR BLD AUTO: 36.1 % (ref 34.8–46.1)
HGB BLD-MCNC: 11.4 G/DL (ref 11.5–15.4)
IMM GRANULOCYTES # BLD AUTO: 0.08 THOUSAND/UL (ref 0–0.2)
IMM GRANULOCYTES NFR BLD AUTO: 1 % (ref 0–2)
LYMPHOCYTES # BLD AUTO: 3.65 THOUSANDS/ΜL (ref 0.6–4.47)
LYMPHOCYTES NFR BLD AUTO: 30 % (ref 14–44)
MAGNESIUM SERPL-MCNC: 1.7 MG/DL (ref 1.6–2.6)
MCH RBC QN AUTO: 28.6 PG (ref 26.8–34.3)
MCHC RBC AUTO-ENTMCNC: 31.6 G/DL (ref 31.4–37.4)
MCV RBC AUTO: 91 FL (ref 82–98)
MONOCYTES # BLD AUTO: 1.03 THOUSAND/ΜL (ref 0.17–1.22)
MONOCYTES NFR BLD AUTO: 9 % (ref 4–12)
NEUTROPHILS # BLD AUTO: 6.71 THOUSANDS/ΜL (ref 1.85–7.62)
NEUTS SEG NFR BLD AUTO: 55 % (ref 43–75)
NRBC BLD AUTO-RTO: 0 /100 WBCS
PLATELET # BLD AUTO: 480 THOUSANDS/UL (ref 149–390)
PMV BLD AUTO: 9.9 FL (ref 8.9–12.7)
POTASSIUM SERPL-SCNC: 3.4 MMOL/L (ref 3.5–5.3)
RBC # BLD AUTO: 3.98 MILLION/UL (ref 3.81–5.12)
SODIUM SERPL-SCNC: 143 MMOL/L (ref 136–145)
WBC # BLD AUTO: 12.13 THOUSAND/UL (ref 4.31–10.16)

## 2018-06-22 PROCEDURE — 0DBN8ZX EXCISION OF SIGMOID COLON, VIA NATURAL OR ARTIFICIAL OPENING ENDOSCOPIC, DIAGNOSTIC: ICD-10-PCS | Performed by: COLON & RECTAL SURGERY

## 2018-06-22 PROCEDURE — 80048 BASIC METABOLIC PNL TOTAL CA: CPT | Performed by: PHYSICIAN ASSISTANT

## 2018-06-22 PROCEDURE — 85025 COMPLETE CBC W/AUTO DIFF WBC: CPT | Performed by: PHYSICIAN ASSISTANT

## 2018-06-22 PROCEDURE — 88305 TISSUE EXAM BY PATHOLOGIST: CPT | Performed by: PATHOLOGY

## 2018-06-22 PROCEDURE — 83735 ASSAY OF MAGNESIUM: CPT | Performed by: PHYSICIAN ASSISTANT

## 2018-06-22 PROCEDURE — 99221 1ST HOSP IP/OBS SF/LOW 40: CPT | Performed by: UROLOGY

## 2018-06-22 PROCEDURE — 82948 REAGENT STRIP/BLOOD GLUCOSE: CPT

## 2018-06-22 PROCEDURE — 99222 1ST HOSP IP/OBS MODERATE 55: CPT | Performed by: INTERNAL MEDICINE

## 2018-06-22 PROCEDURE — 99232 SBSQ HOSP IP/OBS MODERATE 35: CPT | Performed by: INTERNAL MEDICINE

## 2018-06-22 RX ORDER — POTASSIUM CHLORIDE 20 MEQ/1
20 TABLET, EXTENDED RELEASE ORAL ONCE
Status: COMPLETED | OUTPATIENT
Start: 2018-06-22 | End: 2018-06-22

## 2018-06-22 RX ORDER — INSULIN GLARGINE 100 [IU]/ML
10 INJECTION, SOLUTION SUBCUTANEOUS
Status: DISCONTINUED | OUTPATIENT
Start: 2018-06-22 | End: 2018-06-22 | Stop reason: HOSPADM

## 2018-06-22 RX ORDER — POLYETHYLENE GLYCOL 3350 17 G/17G
17 POWDER, FOR SOLUTION ORAL 2 TIMES DAILY
Qty: 14 EACH | Refills: 0 | Status: SHIPPED | OUTPATIENT
Start: 2018-06-22 | End: 2018-07-16

## 2018-06-22 RX ORDER — PROPOFOL 10 MG/ML
INJECTION, EMULSION INTRAVENOUS AS NEEDED
Status: DISCONTINUED | OUTPATIENT
Start: 2018-06-22 | End: 2018-06-22 | Stop reason: SURG

## 2018-06-22 RX ORDER — CLOPIDOGREL BISULFATE 75 MG/1
75 TABLET ORAL DAILY
Status: DISCONTINUED | OUTPATIENT
Start: 2018-06-22 | End: 2018-06-22 | Stop reason: HOSPADM

## 2018-06-22 RX ORDER — DEXTROSE MONOHYDRATE 25 G/50ML
25 INJECTION, SOLUTION INTRAVENOUS ONCE
Status: COMPLETED | OUTPATIENT
Start: 2018-06-22 | End: 2018-06-22

## 2018-06-22 RX ADMIN — HEPARIN SODIUM 5000 UNITS: 5000 INJECTION, SOLUTION INTRAVENOUS; SUBCUTANEOUS at 05:28

## 2018-06-22 RX ADMIN — PROPOFOL 10 MG: 10 INJECTION, EMULSION INTRAVENOUS at 07:53

## 2018-06-22 RX ADMIN — PROPOFOL 30 MG: 10 INJECTION, EMULSION INTRAVENOUS at 07:41

## 2018-06-22 RX ADMIN — CLOPIDOGREL BISULFATE 75 MG: 75 TABLET ORAL at 10:05

## 2018-06-22 RX ADMIN — METOPROLOL TARTRATE 12.5 MG: 25 TABLET ORAL at 10:04

## 2018-06-22 RX ADMIN — NICOTINE 21 MG: 21 PATCH, EXTENDED RELEASE TRANSDERMAL at 10:02

## 2018-06-22 RX ADMIN — INSULIN LISPRO 1 UNITS: 100 INJECTION, SOLUTION INTRAVENOUS; SUBCUTANEOUS at 11:59

## 2018-06-22 RX ADMIN — SODIUM CHLORIDE: 0.9 INJECTION, SOLUTION INTRAVENOUS at 07:32

## 2018-06-22 RX ADMIN — FAMOTIDINE 20 MG: 20 TABLET ORAL at 10:05

## 2018-06-22 RX ADMIN — POTASSIUM CHLORIDE 20 MEQ: 1500 TABLET, EXTENDED RELEASE ORAL at 14:28

## 2018-06-22 RX ADMIN — PROPOFOL 20 MG: 10 INJECTION, EMULSION INTRAVENOUS at 07:44

## 2018-06-22 RX ADMIN — ASPIRIN 81 MG: 81 TABLET, COATED ORAL at 10:04

## 2018-06-22 RX ADMIN — ATORVASTATIN CALCIUM 80 MG: 80 TABLET, FILM COATED ORAL at 18:17

## 2018-06-22 RX ADMIN — SODIUM CHLORIDE 75 ML/HR: 0.9 INJECTION, SOLUTION INTRAVENOUS at 01:55

## 2018-06-22 RX ADMIN — POLYETHYLENE GLYCOL 3350 17 G: 17 POWDER, FOR SOLUTION ORAL at 05:32

## 2018-06-22 RX ADMIN — POLYETHYLENE GLYCOL 3350 17 G: 17 POWDER, FOR SOLUTION ORAL at 02:41

## 2018-06-22 RX ADMIN — FAMOTIDINE 20 MG: 20 TABLET ORAL at 18:16

## 2018-06-22 RX ADMIN — PROPOFOL 50 MG: 10 INJECTION, EMULSION INTRAVENOUS at 07:39

## 2018-06-22 RX ADMIN — DEXTROSE MONOHYDRATE 25 ML: 25 INJECTION, SOLUTION INTRAVENOUS at 06:41

## 2018-06-22 RX ADMIN — INSULIN LISPRO 3 UNITS: 100 INJECTION, SOLUTION INTRAVENOUS; SUBCUTANEOUS at 18:17

## 2018-06-22 RX ADMIN — INSULIN LISPRO 2 UNITS: 100 INJECTION, SOLUTION INTRAVENOUS; SUBCUTANEOUS at 18:17

## 2018-06-22 RX ADMIN — PROPOFOL 100 MG: 10 INJECTION, EMULSION INTRAVENOUS at 07:38

## 2018-06-22 RX ADMIN — HEPARIN SODIUM 5000 UNITS: 5000 INJECTION, SOLUTION INTRAVENOUS; SUBCUTANEOUS at 14:28

## 2018-06-22 RX ADMIN — PROPOFOL 20 MG: 10 INJECTION, EMULSION INTRAVENOUS at 07:48

## 2018-06-22 NOTE — PROGRESS NOTES
Per Noemy Rod  With blue sx - resident, Francisco Rojas  Will be down to evaluate pt 's left foot for  Pt s stated nerve pain

## 2018-06-22 NOTE — CONSULTS
Consultation - Radiation Oncology   Emily Abdullahi 60 y o  female MRN: [id number]  Unit/Bed#: [facility] 406-01 Encounter: [id number]        History of Present Illness   Physician Requesting Consult: Edison Hoang MD  Reason for Consult / Principal Problem:  Abdominal pains and right hand numbness  Hx and PE limited by:  No limitation  HPI: Emily Salgado is a 60y o  year old female who presents with symptoms of right hand ischemia and abdominal CT scan fo rher abdominal pains showed obstructing sigmoid mass  Additional findings of air in the vagina cannot exclude fistula and small fluid collection between sigmoid and bladder wall  There was a 1 3 cm lymph node to the left of the sigmoid mass  Vasvular study of right upper extremity reported arterial occlusive disease more pronounced in the radial artery  Doppler scan reported right upper limb occlusive disease or high-grade stenosis in the distal radial artery and 75% stenosis in the axillary and subclavian artery  MRI of the pelvis revealed findings of again the large sigmoid mass at least 10 cm and possible invasion of the bladder, few pericolic lymph nodes and the mass was adherent to the left lateral pelvic wall  This morning she had colonoscopy with findings of near obstructing mass with pinpoint lumen and it is at the sigmoid/rectosigmoid level approximately 20 cm from anal verge  Multiple biopsies were taken  Consults    Review of Systems    Constitutional: Negative for activity change, appetite change, chills, diaphoresis, fatigue, fever and unexpected weight change  HENT: Negative for congestion, dental problem, drooling, ear discharge, ear pain, facial swelling, hearing loss, mouth sores, nosebleeds, postnasal drip, rhinorrhea, sinus pressure, sneezing, sore throat, tinnitus, trouble swallowing and voice change  Eyes: Negative for photophobia, pain, discharge, redness, itching and visual disturbance     Respiratory: Negative for apnea, cough, choking, chest tightness, shortness of breath, wheezing and stridor  Cardiovascular: Negative for chest pain and palpitations  Gastrointestinal: Negative for abdominal distention, abdominal pain, anal bleeding, blood in stool, constipation, diarrhea, nausea, rectal pain and vomiting   large sigmoid colon mass  Endocrine: Negative for cold intolerance, heat intolerance, polydipsia, polyphagia and polyuria  Genitourinary: Negative for decreased urine volume, difficulty urinating, dyspareunia, dysuria, enuresis, flank pain, frequency, genital sores, hematuria, menstrual problem, pelvic pain, urgency, vaginal bleeding and vaginal discharge  Musculoskeletal: Negative for arthralgias, back pain, gait problem, joint swelling, myalgias, neck pain and neck stiffness  Skin: Negative for color change, pallor, rash and wound  Allergic/Immunologic: Negative for environmental allergies, food allergies and immunocompromised state  Neurological: Negative for dizziness, tremors, seizures, syncope, facial asymmetry, speech difficulty, weakness, light-headedness, numbness and headaches  Hematological: Negative for adenopathy  Does not bruise/bleed easily  Psychiatric/Behavioral: Negative for agitation, behavioral problems, confusion, decreased concentration, dysphoric mood, hallucinations, self-injury, sleep disturbance and suicidal ideas  The patient is not nervous/anxious and is not hyperactive  bipolar disorder    Historical Information   Previous Oncology History:  None    Past Medical History:   Diagnosis Date    Bipolar depression ([facility] ) 3/17/2016    CAD S/P percutaneous coronary angioplasty 3/17/2016    Chronic pain     Colonic mass     COPD (chronic obstructive pulmonary disease) (HCC)     CVA (cerebral vascular accident) ([facility] )     R sided weakness    Essential hypertension 3/17/2016    GERD (gastroesophageal reflux disease)     Hepatitis C     Heroin abuse 3/18/2016    History of gastric ulcer  Hypertension     NSTEMI (non-ST elevated myocardial infarction) ([place] [address] ) 07/2012    Psychiatric disorder 09/03/2014    Inpatient admission     Schizoaffective disorder ([place] [address] ) 3/17/2016    Schizophrenia ([place] [address] ) 3/17/2016    STEMI (ST elevation myocardial infarction) ([place] [address] ) 12/2017    Type 2 diabetes mellitus ([place] [address] ) 3/17/2016     Past Surgical History:   Procedure Laterality Date    CARDIAC CATHETERIZATION  07/2016    CORONARY ANGIOPLASTY WITH STENT PLACEMENT  07/05/2012    MAKEDA (LAD), PTA (Diag)    CORONARY ANGIOPLASTY WITH STENT PLACEMENT  12/2017    MAKEDA/ PTA (RCA)     OB/GYN History:  Not remarkable  Family History   Problem Relation Age of Onset    Heart attack Father     Cancer Brother         gastric     Social History   History   Alcohol Use No     History   Drug Use    Types: Heroin     Comment: only tried x 1      History   Smoking Status    Current Every Day Smoker    Types: Cigarettes   Smokeless Tobacco    Never Used       Meds/Allergies   all current active meds have been reviewed    No Known Allergies    Objective     Intake/Output Summary (Last 24 hours) at 06/22/18 1224  Last data filed at 06/22/18 1007   Gross per 24 hour   Intake          1916 25 ml   Output             2950 ml   Net         -1033 75 ml     Invasive Devices     Peripheral Intravenous Line            Peripheral IV 03/17/16 Left Antecubital 826 days    Peripheral IV 06/19/18 Left Forearm 2 days              Physical Exam She is alert and oriented, appears her stated age, cooperative not in acute distress  There are no palpable nodes  Lungs are clear  Heart tones are normal with sinus rhythm  Abdomen is soft, nontender and without masses  No signs of edema lower extremities  Defer pelvic examination  Lab Results: I have personally reviewed pertinent reports      Imaging Studies: I have personally reviewed pertinent films in PACS with a Radiologist   EKG, Pathology, and Other Studies: I have personally reviewed pertinent reports  Assessment/Plan     Assessment and Plan:  Pending results of biopsy probably carcinoma of sigmoid or rectosigmoid colon with near complete obstruction  If not resectable at this time, recommend kathi adjuvant chemo radiation therapy after diverting colostomy in view of potential fistula and also may completely obstruct colon at any time  Briefly discussed with patient and several members of her family  The radiation therapy course is usually 28 treatments total dose 50 4 Gy  It will take time to get the biopsy result back and will likely not available until middle of next week  Code Status: Level 1 - Full Code  Advance Directive and Living Will:      Power of :    POLST:      Counseling / Coordination of Care  Total floor / unit time spent today 30 minutes  Greater than 50% of total time was spent with the patient and / or family counseling and / or coordination of care  A description of the counseling / coordination of care:  Discuss with patient and large member family findings of tumor near obstructing at the rectosigmoid level  Biopsy result is pending

## 2018-06-22 NOTE — PROGRESS NOTES
Pt  C/o left foot "nerve pain"  Skin on top of left foot is red from pt  Stating she is putting pressure on top of left foot with right foot to help the pain  Pt  advised to try and avoid doing that so the skin does not break down  Pt  Is having pain with touching foot  GI nurse this morning stated she was able to palpate pulse over sock after pt  Had procedure  Pulse is palpated on left foot, foot is warm  Pt  Walking in halls at this time  Staci with white sx  Notified, was advised to contact blue sx  Joshua Berg  With blue sx  Notified and states she will be around to evaluate pt  Today

## 2018-06-22 NOTE — PROGRESS NOTES
I reviewed the case with Dr Nik Hurd from Radiation Oncology  I reviewed the symptoms with her  She is not obstructed and passes stool freely  She has no symptoms of passing stool per vagina  We feel it is safe to radiate her first and treat as needed for obstruction (colostomy)  We hope to initiate therapy by next week  I await the input from Medical Oncology

## 2018-06-22 NOTE — PROGRESS NOTES
[facility] Progress Note  Patient: Emily Abdullahi 60 y o  female   MRN: [id number]  PCP: Jadiel Lenz MD  Unit/Bed#: [facility] 406-01 Encounter: [id number]  Date Of Visit: 06/22/18    Assessment:    Principal Problem:    Colonic mass  Active Problems:    Type 2 diabetes mellitus with complication (HCC)    CKD (chronic kidney disease) stage 3, GFR 30-59 ml/min    Pain of right upper extremity    RYLAN (acute kidney injury) ([facility] [address] )    CAD (coronary artery disease)      Plan:       1   DM2 with peripheral neuropathy, A1c 10 4, decrease Lantus to 10 units q h s  due to hypoglycemia, continue with Humalog , metformin on hold, continue insulin sliding scale, change to diabetic diet  2  Intermittent RUE claudication with right axillary artery stenosis, per vascular no evidence of acute ischemia , continue aspirin statin, outpatient follow-up    3  Obstructed sigmoid colon mass, status post colonoscopy and biopsy today, management per colorectal  4  CKD stage 3, baseline creatinine 1 18-1 20, stable,  monitor  5  CAD s/p MAKEDA on 12/23/17 , continue aspirin, statin and Lopressor, cardiology is following, moderate risk for surgery  6  History of CVA on 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 associated with cocaine abuse  7  Remote history of cocaine abuse, last use was 1 month ago  8  Tobacco smoking, continue nicotine patch  9  Hep C  10  Hyperlipidemia, continue statin  11  Schizoaffective disorder/ bipolar disorder, continue trazodone       VTE Pharmacologic Prophylaxis:   Pharmacologic: Heparin   Mechanical VTE Prophylaxis in Place: Yes    Patient Centered Rounds: I have performed bedside rounds with nursing staff today  Discussions with Specialists or Other Care Team Provider:     Education and Discussions with Family / Patient:  Patient,  at bedside    Time Spent for Care: 30 minutes  More than 50% of total time spent on counseling and coordination of care as described above      Current Length of Stay: 3 day(s)    Current Patient Status: Inpatient   Certification Statement: The patient will continue to require additional inpatient hospital stay due to Management of right hand ischemia and abdominal mass        Code Status: Level 1 - Full Code      Subjective:   Patient seen and examined  Comfortable in bed  No chest pain or shortness of breath  Status post colonoscopy today    Objective:     Vitals:   Temp (24hrs), Av 1 °F (36 7 °C), Min:97 7 °F (36 5 °C), Max:98 3 °F (36 8 °C)    HR:  [57-79] 79  Resp:  [16-18] 18  BP: ()/(51-70) 93/69  SpO2:  [92 %-100 %] 92 %  Body mass index is 22 85 kg/m²  Input and Output Summary (last 24 hours): Intake/Output Summary (Last 24 hours) at 18 1331  Last data filed at 18 1227   Gross per 24 hour   Intake          1916 25 ml   Output             3150 ml   Net         -1233 75 ml       Physical Exam:     Physical Exam  Patient is awake alert in no acute distress  Lung clear to auscultation bilateral  Heart positive S1-S2 no murmur  Abdomen soft nontender positive bowel sounds  Lower extremities no edema      Additional Data:     Labs:      Results from last 7 days  Lab Units 18  0605   WBC Thousand/uL 12 13*   HEMOGLOBIN g/dL 11 4*   HEMATOCRIT % 36 1   PLATELETS Thousands/uL 480*   NEUTROS PCT % 55   LYMPHS PCT % 30   MONOS PCT % 9   EOS PCT % 5       Results from last 7 days  Lab Units 18  0605  18  2139   SODIUM mmol/L 143  < > 137   POTASSIUM mmol/L 3 4*  < > 4 5   CHLORIDE mmol/L 106  < > 104   CO2 mmol/L 26  < > 29   BUN mg/dL 10  < > 22   CREATININE mg/dL 0 91  < > 1 62*   CALCIUM mg/dL 8 9  < > 8 8   TOTAL PROTEIN g/dL  --   --  7 0   BILIRUBIN TOTAL mg/dL  --   --  0 20   ALK PHOS U/L  --   --  121*   ALT U/L  --   --  14   AST U/L  --   --  12   GLUCOSE RANDOM mg/dL 59*  < > 164*   < > = values in this interval not displayed      Results from last 7 days  Lab Units 18  2139   INR  0 90       * I Have Reviewed All Lab Data Listed Above  * Additional Pertinent Lab Tests Reviewed: [address] Admission Reviewed    Imaging:    Imaging Reports Reviewed Today Include:   Imaging Personally Reviewed by Myself Includes:     Recent Cultures (last 7 days):           Last 24 Hours Medication List:     Current Facility-Administered Medications:  aspirin 81 mg Oral Daily Mario Jean Baptiste MD   atorvastatin 80 mg Oral Daily With Dariusz Pride MD   clopidogrel 75 mg Oral Daily Staci Mcadams PA-C   famotidine 20 mg Oral BID Mario Jean Baptiste MD   heparin (porcine) 5,000 Units Subcutaneous Q8H [facility] Glendy Porter PA-C   insulin glargine 10 Units Subcutaneous HS Morteza Mcdonald DO   insulin lispro 1-5 Units Subcutaneous TID [facility] Mario Jean Baptiste MD   metoprolol tartrate 12 5 mg Oral Q12H [facility] Mario Jean Baptiste MD   nicotine 21 mg Transdermal Daily Mario Jean Baptiste MD   oxyCODONE 10 mg Oral Q4H PRN Mario Jean Baptiste MD   oxyCODONE 5 mg Oral Q4H PRN Mario Jean Baptiste MD   polyethylene glycol 17 g Oral Q4H Isma Hamlin MD   traZODone 50 mg Oral HS Mario Jean Baptiste MD        Today, Patient Was Seen By: Morteza Mcdonald DO    ** Please Note: This note has been constructed using a voice recognition system   **

## 2018-06-22 NOTE — DISCHARGE SUMMARY
Discharge Summary - Emily Abdullahi 60 y o  female MRN: [id number]    Unit/Bed#: [facility] 406-01 Encounter: [id number]    Admission Date:   Admission Orders     Ordered        06/19/18 1814  Inpatient Admission  Once               Admitting Diagnosis: Right radial artery thrombus ([address] ) [I74 2]    HPI: Emily Anderson is a 60 y o  female who presents with several days of numbness and weakness of her right hand  Patient states approximately 2-3 days ago, she noted her hand to be numb at her finger tips and felt it was slightly weaker than normal  She states that she felt it was cool compared to her left hand  She was admitted to [facility] for abdominal pain at the same time and was found to have a partially obstructing sigmoid colon lesion  She was being worked up by Cardiology for clearance for colon resection, however states she had recurrent symptoms of numbness and coolness of her right hand today  She was seen and evaluated by vascular surgery at [facility] who recommended transfer to [facility]  On evaluation, patient states she has been having intermittent symptoms for the past several months  She does not take anticoagulation  She does not state that she has a known history of atrial fibrillation  She is complaining of mild abdominal pain, however at the time of evaluation, has sensation and motor of her hand          Procedures Performed: No orders of the defined types were placed in this encounter  Summary of Hospital Course:  Patient was originally admitted to the vascular surgery service and found to have high-grade stenosis of her right radial artery, no evidence of acute ischemia of the right hand, and only intermittent arm claudication, she is asymptomatic from standpoint of her right arm during hospitalization  There is no indication for after intervention, she should continue her aspirin and Plavix    She was then transferred to the Colorectal surgery service for further workup of her partially obstructing colon mass  MRI was completed which showed a Large parasigmoid mass with pericolonic extension, abutting and extending into anterior superior uterus, tethers bladder wall suggestive invasion of bladder adhesed mass to L lateral pelvic sidewall fascia  She was prepped for colonoscopy which was completed and showed a near obstructing mass with a pinpoint lumen, multiple biopsies were taken  Post procedure patient was advanced to regular diet will continue to give it b i d  MiraLax  Radiation Oncology, Medical Oncology, and Urology were consulted for further workup of the mass and future plans with possible resection  Cardiology deemed her a moderate risk for surgery  During her hospital stay she continues to have flatus and bowel movements, she has no nausea vomiting, and no abdominal pain, remained asymptomatic in terms of her right hand  She is now fit for discharge home with further workup outpatient  Patient given clear instructions trend the hospital she is unable have a bowel movement, develops abdominal pain, nausea vomiting  Patient is instructed to take her aspirin and Plavix at home as well as MiraLax 2 times daily which she is given a prescription for, and continue a high fiber diet  She will follow up with Dr Dominik Batista of Colorectal surgery in two weeks  She will also follow up with Radiation Oncology and Medical Oncology  Significant Findings, Care, Treatment and Services Provided:   6/19 UEAD: high grade stenosis distal Rt radial art; 75% greater Rt axillary distal subclavian artery; Rt 1st digit 40 mmHg;  Left 1 st digit 71 mmHg  6/19 CTAP: 4mm LLL nodule f/u 1 yr; mid sigmoid colon mass, small fluid collection betw/ mid sigmoid and bladder w/ focal bladder thickening, prominent air in vagina, ? fistula  6/19 Nuclear stress: EF 67%  6/20 CEA: 5 5  6/21 Large parasigmoid mass with pericolonic extension, abutting and extending into anterior superior uterus, tethers bladder wall suggestive invasion of bladder adhesed mass to L lateral pelvic sidewall fascia   6/22 colonoscopy     Complications: none    Discharge Diagnosis: Right radial artery thrombus ([address] ) [I74 2]  Pelvic mass    Resolved Problems  Date Reviewed: 6/22/2018    None          Condition at Discharge: good         Discharge instructions/Information to patient and family:   See after visit summary for information provided to patient and family  Provisions for Follow-Up Care:  See after visit summary for information related to follow-up care and any pertinent home health orders  PCP: Warren De La Garza MD    Disposition: Home    Planned Readmission: Yes possibility exists if patient does not follow up outpatient and develops complete obstruction of her colon mass    Discharge Statement   I spent 30 minutes discharging the patient  This time was spent on the day of discharge  I had direct contact with the patient on the day of discharge  Additional documentation is required if more than 30 minutes were spent on discharge  Discharge Medications:  See after visit summary for reconciled discharge medications provided to patient and family

## 2018-06-22 NOTE — PROGRESS NOTES
Progress Note - Colorectal Surgery   Emily Abdullahi 60 y o  female MRN: [id number]  Unit/Bed#: [facility] 406-01 Encounter: [id number]    Assessment:  25-year-old female with reportedly obstructing sigmoid colon mass and intermittent R hand ischemia  -MRI pelvis shows mass abutting uterus and invading bladder and lateral pelvic wall    Plan:  NPO  IVF  Colonoscopy today  Cardiology-moderate risk for surgery  CEA 5 5  SLIM following  Vascular signed off  plavix on hold  SQH/SCDs    Subjective/Objective   Chief Complaint:     Subjective: ELLIOT  Having bowel movements with prep, reports they are clear  Wants to go home  No abdominal pain, denies pneumaturia or stool or gas per vagina  Objective:     Blood pressure 112/55, pulse 60, temperature 98 °F (36 7 °C), temperature source Oral, resp  rate 18, weight 58 6 kg (129 lb 3 oz), SpO2 99 %, not currently breastfeeding  ,Body mass index is 22 71 kg/m²  Intake/Output Summary (Last 24 hours) at 06/22/18 0538  Last data filed at 06/22/18 0155   Gross per 24 hour   Intake           1672 5 ml   Output             3550 ml   Net          -1877 5 ml       Invasive Devices     Peripheral Intravenous Line            Peripheral IV 03/17/16 Left Antecubital 826 days    Peripheral IV 06/19/18 Left Forearm 2 days                Physical Exam: NAD  AAOX3  normal respiratory effort  soft, NT, ND  R hadn <2 sec cap refil  5/5 b/l hadn  strength  no c/c/e    Lab, Imaging and other studies:  I have personally reviewed pertinent lab results    , CBC:   No results found for: WBC, HGB, HCT, MCV, PLT, ADJUSTEDWBC, MCH, MCHC, RDW, MPV, NRBC  VTE Pharmacologic Prophylaxis: Heparin  VTE Mechanical Prophylaxis: sequential compression device

## 2018-06-22 NOTE — OP NOTE
**** GI/ENDOSCOPY REPORT ****     PATIENT NAME: MOISES URENA ------ VISIT ID:  Patient ID: [id number]   YOB: 1960     INTRODUCTION: Colonoscopy - A 60 female patient presents for an inpatient   Colonoscopy at [address] [place] [address]  PREVIOUS COLONOSCOPY: No prior colonoscopy  INDICATIONS: Abnormal CT scan  CONSENT:  The benefits, risks, and alternatives to the procedure were   discussed and informed consent was obtained from the patient  PREPARATION: EKG, pulse, pulse oximetry and blood pressure were monitored   throughout the procedure  The patient was identified by myself both   verbally and by visual inspection of ID band  MEDICATIONS: Anesthesia-check records     PROCEDURE: The endoscope was passed without difficulty through the anus   under direct visualization and advanced to the sigmoid colon for   obstructing mass, poor preparation flushed copiously  RECTAL EXAM: Normal rectal exam      FINDINGS:  Near obstructing mass with pinpoint lumen, did not attempt pass   with gastroscope, multiple cold biopsies  This is at sigmoid/rectosigmoid   at ~20cm  COMPLICATIONS: There were no complications  IMPRESSIONS: Near obstructing mass with pinpoint lumen, did not attempt   pass with gastroscope, multiple cold biopsies  This is at   sigmoid/rectosigmoid at ~20cm  RECOMMENDATIONS: Low residue diet Ongoing surgical planning per Dr Anisa Phillips:     PATHOLOGY SPECIMENS: Yes     PROCEDURE CODES: Colonoscopy with biopsy     ICD-9 Codes: 793 4 Nonspecific (abnormal) findings on radiological and   other examination of gastrointestinal tract     ICD-10 Codes: R93 3 Abnormal findings on diagnostic imaging of other parts   of digestive tract     PERFORMED BY: ISHAAN Romero  on 06/22/2018  Version 1, electronically signed by ISHAAN Montague  on   06/22/2018 at 08:03

## 2018-06-22 NOTE — ANESTHESIA PREPROCEDURE EVALUATION
Review of Systems/Medical History  Patient summary reviewed  Chart reviewed      Cardiovascular  Hypertension controlled, Past MI > 6 months, CAD , CAD status: 2VD, Cardiac stents     Pulmonary  COPD moderate- medication dependent ,        GI/Hepatic    GERD well controlled, Liver disease , Hepatitis C,             Endo/Other  Diabetes well controlled type 2 Oral agent,      GYN       Hematology   Musculoskeletal       Neurology   Psychology   Depression , bipolar disorder, Schizophrenia             Physical Exam    Airway    Mallampati score: II  TM Distance: >3 FB  Neck ROM: limited     Dental   upper dentures and lower dentures,     Cardiovascular  Rhythm: regular, Rate: normal,     Pulmonary  Breath sounds clear to auscultation, Rhonchi, Decreased breath sounds,     Other Findings        Anesthesia Plan  ASA Score- 3     Anesthesia Type- IV sedation with anesthesia with ASA Monitors  Additional Monitors:   Airway Plan:         Plan Factors-    Induction- intravenous  Postoperative Plan- Plan for postoperative opioid use  Informed Consent- Anesthetic plan and risks discussed with patient  I personally reviewed this patient with the CRNA  Discussed and agreed on the Anesthesia Plan with the CRNA  Dulce Mcginnis

## 2018-06-22 NOTE — CONSULTS
UROLOGY CONSULTATION NOTE     Patient Identifiers: Emily Wilcox (MRN [id number])  Service Requesting Consultation:  Colorectal  Service Providing Consultation:  Urology, Park Sharp MD    Date of Service: 6/22/2018  Consults    Reason for Consultation:  Possible bladder invasion by sigmoid colon cancer    ASSESSMENT:     60 y o  old female with  cancer at junction of sigmoid and rectum, biopsy today by colonoscopy  MRI suggest possible invasion at the dome or anterior wall the bladder  Patient denies any bladder symptoms at all, no burning urgency frequency or gross hematuria  No prior bladder infections  PLAN:     Await biopsy results and further plans by Colorectal   Please notify urology as soon as you know any possible dates for surgery so we can coordinate and have some be available to assist as needed at surgery  If helpful, ureteral catheters can be placed preop      History of Present Illness:     Emily Wilcox is a 60 y o  old with a history of colon cancer as above    Past Medical, Past Surgical History:     Past Medical History:   Diagnosis Date    Bipolar depression ([place] [address] ) 3/17/2016    CAD S/P percutaneous coronary angioplasty 3/17/2016    Chronic pain     Colonic mass     COPD (chronic obstructive pulmonary disease) (HCC)     CVA (cerebral vascular accident) ([place] [address] )     R sided weakness    Essential hypertension 3/17/2016    GERD (gastroesophageal reflux disease)     Hepatitis C     Heroin abuse 3/18/2016    History of gastric ulcer     Hypertension     NSTEMI (non-ST elevated myocardial infarction) ([facility] ) 07/2012    Psychiatric disorder 09/03/2014    Inpatient admission     Schizoaffective disorder ([facility] ) 3/17/2016    Schizophrenia ([place] [address] ) 3/17/2016    STEMI (ST elevation myocardial infarction) ([facility] ) 12/2017    Type 2 diabetes mellitus ([facility] ) 3/17/2016   :    Past Surgical History:   Procedure Laterality Date    CARDIAC CATHETERIZATION  07/2016    CORONARY ANGIOPLASTY WITH STENT PLACEMENT  07/05/2012    MAKEDA (LAD), PTA (Diag)    CORONARY ANGIOPLASTY WITH STENT PLACEMENT  12/2017    MAKEDA/ PTA (RCA)   :    Medications, Allergies:     Current Facility-Administered Medications   Medication Dose Route Frequency    aspirin (ECOTRIN LOW STRENGTH) EC tablet 81 mg  81 mg Oral Daily    atorvastatin (LIPITOR) tablet 80 mg  80 mg Oral Daily With Dinner    clopidogrel (PLAVIX) tablet 75 mg  75 mg Oral Daily    famotidine (PEPCID) tablet 20 mg  20 mg Oral BID    heparin (porcine) subcutaneous injection 5,000 Units  5,000 Units Subcutaneous Q8H [address]    insulin glargine (LANTUS) subcutaneous injection 10 Units 0 1 mL  10 Units Subcutaneous HS    insulin lispro (HumaLOG) 100 units/mL subcutaneous injection 1-5 Units  1-5 Units Subcutaneous TID AC    insulin lispro (HumaLOG) 100 units/mL subcutaneous injection 3 Units  3 Units Subcutaneous TID With Meals    metoprolol tartrate (LOPRESSOR) partial tablet 12 5 mg  12 5 mg Oral Q12H ANNABELLA    nicotine (NICODERM CQ) 21 mg/24 hr TD 24 hr patch 21 mg  21 mg Transdermal Daily    oxyCODONE (ROXICODONE) immediate release tablet 10 mg  10 mg Oral Q4H PRN    oxyCODONE (ROXICODONE) IR tablet 5 mg  5 mg Oral Q4H PRN    polyethylene glycol (MIRALAX) packet 17 g  17 g Oral Q4H    traZODone (DESYREL) tablet 50 mg  50 mg Oral HS       Allergies:  No Known Allergies:    Social and Family History:   Social History:   Social History   Substance Use Topics    Smoking status: Current Every Day Smoker     Types: Cigarettes    Smokeless tobacco: Never Used    Alcohol use No        History   Smoking Status    Current Every Day Smoker    Types: Cigarettes   Smokeless Tobacco    Never Used       Family History:  Family History   Problem Relation Age of Onset    Heart attack Father     Cancer Brother         gastric   :     Review of Systems:     General:  No Fever, chills, or night sweats  Cardiac: Negative for chest pain      Pulmonary: Negative for shortness of breath  Gastrointestinal:  See present illness  Genitourinary:  No complaints  All other systems queried were negative  Physical Exam:     General appearance: alert and oriented, in no acute distress  Abdomen: soft, non-tender; bowel sounds normal; no masses,  no organomegaly      Labs:     Lab Results   Component Value Date    HGB 11 4 (L) 06/22/2018    HCT 36 1 06/22/2018    WBC 12 13 (H) 06/22/2018     (H) 06/22/2018   ]    Lab Results   Component Value Date     06/22/2018    K 3 4 (L) 06/22/2018     06/22/2018    CO2 26 06/22/2018    BUN 10 06/22/2018    CREATININE 0 91 06/22/2018    CALCIUM 8 9 06/22/2018    GLUCOSE 59 (L) 06/22/2018   ]    Imaging:     I personally reviewed the images and report of the following studies, and reviewed them with the patient:    MRI          Thank you for allowing me to participate in this patients care  Please do not hesitate to call with any additional questions    Archie Schwarz MD

## 2018-06-22 NOTE — DISCHARGE INSTRUCTIONS
F/u with Dr Florentino Jacob in 2 weeks- take miralax daily BID  F/u with radiation oncology and medical oncology

## 2018-06-22 NOTE — CONSULTS
Consultation - Medical Oncology   Emily Abdullahi 60 y o  female MRN: [id number]  Unit/Bed#: [facility] 406-01 Encounter: [id number]    History of Present Illness   Physician Requesting Consult: Autumn Mancia MD  Reason for Consult / Principal Problem:  Sigmoid colon tumor  HPI: Emily Mackenzie Conception is a 60y o  year old female who presents with a 2 month history of decreased appetite, left lower quadrant abdominal pain for which she has been using 3-4 doses of ibuprofen per day and weight loss from 150 lb to 125 lb  She notes that she wait about 180 lb a year ago  Stools have become pencil thin  Colonoscopy was done this morning by Dr Shiva Meade colon there was a near obstructing mass with pinpoint lumen at the sigmoid/rectosigmoid at 20 cm  Inpatient consult to Oncology  Consult performed by: Geoff Levy ordered by: Melvin Garlandoms:   General: Feels well, no chills or swaets  Head and Neck: No nosebleeds, no oral cavity or throat soreness  Cardiovascular: No chest pain, no lower extremity edema  Respriatory: No cough or dyspnea  GI:  See HPI  : No urinary frequency  Musculoskeletal: No back pains or joint pain    Skin: No skin rash  Neurological: No headache, no numbness, no weakness  Hematologic: No easy bruising  Psychiatric: No emotional problems    Historical Information   Past Medical History:   Diagnosis Date    Bipolar depression ([facility] ) 3/17/2016    CAD S/P percutaneous coronary angioplasty 3/17/2016    Chronic pain     Colonic mass     COPD (chronic obstructive pulmonary disease) (HCC)     [facility] )     R sided weakness    Essential hypertension 3/17/2016    GERD (gastroesophageal reflux disease)     Hepatitis C     Heroin abuse 3/18/2016    History of gastric ulcer     Hypertension     NSTEMI (non-ST elevated myocardial infarction) ([facility] ) 07/2012    Psychiatric disorder 09/03/2014    Inpatient admission     Schizoaffective disorder ([place] [address] ) 3/17/2016    Schizophrenia ([place] [address] ) 3/17/2016    STEMI (ST elevation myocardial infarction) ([place] [address] ) 2017    Type 2 diabetes mellitus ([place] [address] ) 3/17/2016     Past Surgical History:   Procedure Laterality Date    CARDIAC CATHETERIZATION  2016    CORONARY ANGIOPLASTY WITH STENT PLACEMENT  2012    MAKEDA (LAD), PTA (Diag)    CORONARY ANGIOPLASTY WITH STENT PLACEMENT  2017    MAKEDA/ PTA (RCA)     Social History   History   Alcohol Use No     History   Drug Use    Types: Heroin     Comment: only tried x 1      History   Smoking Status    Current Every Day Smoker    Types: Cigarettes   Smokeless Tobacco    Never Used     She has a common law  of 25 years  She has been a cocaine user in the distant past  She has a few alcoholic beverages per year  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    Family History:   Family History   Problem Relation Age of Onset    Heart attack Father     Cancer Brother         gastric     Her father  with advanced cancer at age 54  Her mother age 66 has diabetes mellitus and heart disease  She has 5 brothers, 1 has stomach cancer in remission another has liver cancer and cirrhosis of liver, and 3 other brothers have diabetes mellitus  She has 1 sister who was in good health    She has 3 daughters who are in good health    Meds/Allergies   current meds:   Current Facility-Administered Medications   Medication Dose Route Frequency    aspirin (ECOTRIN LOW STRENGTH) EC tablet 81 mg  81 mg Oral Daily    atorvastatin (LIPITOR) tablet 80 mg  80 mg Oral Daily With Dinner    clopidogrel (PLAVIX) tablet 75 mg  75 mg Oral Daily    famotidine (PEPCID) tablet 20 mg  20 mg Oral BID    heparin (porcine) subcutaneous injection 5,000 Units  5,000 Units Subcutaneous Q8H [facility]    insulin glargine (LANTUS) subcutaneous injection 10 Units 0 1 mL  10 Units Subcutaneous HS    insulin lispro (HumaLOG) 100 units/mL subcutaneous injection 1-5 Units  1-5 Units Subcutaneous TID AC    insulin lispro (HumaLOG) 100 units/mL subcutaneous injection 3 Units  3 Units Subcutaneous TID With Meals    metoprolol tartrate (LOPRESSOR) partial tablet 12 5 mg  12 5 mg Oral Q12H [facility]    nicotine (NICODERM CQ) 21 mg/24 hr TD 24 hr patch 21 mg  21 mg Transdermal Daily    oxyCODONE (ROXICODONE) immediate release tablet 10 mg  10 mg Oral Q4H PRN    oxyCODONE (ROXICODONE) IR tablet 5 mg  5 mg Oral Q4H PRN    polyethylene glycol (MIRALAX) packet 17 g  17 g Oral Q4H    traZODone (DESYREL) tablet 50 mg  50 mg Oral HS    and PTA meds:  Prescriptions Prior to Admission   Medication    aspirin (ECOTRIN LOW STRENGTH) 81 mg EC tablet    atorvastatin (LIPITOR) 80 mg tablet    clopidogrel (PLAVIX) 75 mg tablet    famotidine (PEPCID) 20 mg tablet    metoprolol tartrate (LOPRESSOR) 25 mg tablet    traZODone (DESYREL) 50 mg tablet     No Known Allergies    Objective   Vitals: Blood pressure 148/75, pulse 77, temperature 98 2 °F (36 8 °C), temperature source Oral, resp  rate 18, height 5' 3" (1 6 m), weight 58 5 kg (129 lb), SpO2 97 %, not currently breastfeeding  Intake/Output Summary (Last 24 hours) at 06/22/18 1723  Last data filed at 06/22/18 1626   Gross per 24 hour   Intake             1980 ml   Output             2500 ml   Net             -520 ml     Invasive Devices     Peripheral Intravenous Line            Peripheral IV 03/17/16 Left Antecubital 826 days    Peripheral IV 06/19/18 Left Forearm 3 days                Physical Exam: General appearance: Appears well  Head: Normocephalic  Eyes: Extraocular movements intact  Ears: No gross hearing deficit  Oropharynx: Clear  Neck: Supple, No lymphadenopathy  Chest: No axillary adenopathy  Lungs: Clear to auscultation bilaterally  Heart: Regular rate and rhythm  Abdomen: No tenderness, no hepatic or splenic enlargement;  No inguinal  lymphadenopathy  Extremities: No lower extremity edema bilaterally  Skin: No rashes  Neurologic: Grossly intact, no focal neurological deficit  Psychiatric: Oriented to person, place and time, normal mood and affect    ECOG 1    Lab Results:     From June 19, 2018:  Alk-phos 121, AST 12, ALT 14, bili 0 2    From June 20, 2018:  CEA is 5 5 (0 0-3 0)    From June 22, 2018:  Creatinine is 0 91, WBC 12 13, hemoglobin 11 4, platelets 982  CT of the abdomen pelvis from June 17, 2018: There is a 4 mm LLL nodule, liver and spleen are normal, no retroperitoneal lymphadenopathy, there is colonic obstruction in the midsigmoid colon likely due to a mass and possible small enhancing lymph node adjacent to the mass and a small fluid collection between the sigmoid colon and bladder with focal bladder thickening  Gated cardiac scan from June 19, 2018: The left ventricle is of normal size, no regional wall motion abnormalities, LVEF 67%    MRI of the pelvis from June 21, 2018: There is a 10 cm mass of the sigmoid colon which indents the dome of the bladder, the fat plane between the mass in the posterior aspect of the urinary bladder is obscured with suggestion of bladder invasion, the fat plane between the mass in the anterior and superior aspect of the fundus of the uterus is obscured, there is focal extension of the mass into the deep lateral pelvic wall fascia, there are few pericolonic nodes  Assessment/Plan     Assessment:    Obstructing mass of the sigmoid/rectosigmoid on colonoscopy earlier today, on MRI of the pelvis there is a 10 cm mass of the sigmoid colon with suggestion of bladder invasion, the mass abuts the uterus and there is focal extension of the mass into the deep lateral pelvic wall fascia, T4b Nx, stage IIIC  Biopsies of the sigmoid/rectosigmoid mass were taken earlier today and surgical pathology is yet pending      Plan:    Anticipating colonic adenocarcinoma, this lesion is locally unresectable because of the high risk of positive surgical margins at the site of pelvic sidewall invasion as discussed with Dr Anderson Pelaez by telephone today  Therefore, according to NCCN guidelines version 2 2 1018 for colon cancer initial management with infusional 5 FU/radiation therapy (preferred) or capecitabine/radiotherapy (preferred) or bolus 5 FU plus folinic acid/radiotherapy is recommended  Afterwards, the patient is to be re-evaluated for conversion to resectable disease and then surgery with or without intraoperative radiotherapy or systemic therapy  The purpose, means administration, potential risk of capecitabine given in conjunction with radiotherapy was reviewed with the patient and with her significant other Rebecca De Paz and her daughter April Mariscal at the bedside today  Capecitabine 825 mg/m2 b i d  throughout the entire course of radiation therapy is planned  Alternatively, 5 fluorouracil infusion given in conjunction with radiotherapy  As noted by Karina Nicolas a course of radiation in 28 fractions to a2 total dose of 50 4 Gy is anticipated  Counseling / Coordination of Care  Total floor / unit time spent today 45 minutes  Greater than 50% of total time was spent with the patient and / or family counseling and / or coordination of care  A description of the counseling / coordination of care:  Diagnostic tests, impressions and recommendations were reviewed with the patient and her significant other Rebecca De Paz and her daughter April Mariscal at the bedside today and with Phan Collins by telephone today

## 2018-06-23 NOTE — NURSING NOTE
Pt left floor with daughter and significant other to home, discharge paper work reviewed with pt and daughter, all pt belongings packed up and taken with, will discharge pt

## 2018-06-25 ENCOUNTER — APPOINTMENT (OUTPATIENT)
Dept: RADIATION ONCOLOGY | Facility: CLINIC | Age: 58
End: 2018-06-25
Attending: RADIOLOGY
Payer: COMMERCIAL

## 2018-06-25 DIAGNOSIS — C20 MALIGNANT NEOPLASM OF RECTUM (HCC): Primary | ICD-10-CM

## 2018-06-25 PROCEDURE — 77334 RADIATION TREATMENT AID(S): CPT | Performed by: RADIOLOGY

## 2018-06-25 PROCEDURE — 77470 SPECIAL RADIATION TREATMENT: CPT | Performed by: RADIOLOGY

## 2018-06-25 PROCEDURE — 77290 THER RAD SIMULAJ FIELD CPLX: CPT | Performed by: RADIOLOGY

## 2018-06-28 ENCOUNTER — TELEPHONE (OUTPATIENT)
Dept: INFUSION CENTER | Facility: CLINIC | Age: 58
End: 2018-06-28

## 2018-06-29 ENCOUNTER — TELEPHONE (OUTPATIENT)
Dept: HEMATOLOGY ONCOLOGY | Facility: CLINIC | Age: 58
End: 2018-06-29

## 2018-06-29 DIAGNOSIS — C19 RECTOSIGMOID CANCER (HCC): Primary | ICD-10-CM

## 2018-06-29 RX ORDER — CAPECITABINE 500 MG/1
1000 TABLET, FILM COATED ORAL 2 TIMES DAILY
Qty: 168 TABLET | Refills: 0 | Status: ON HOLD | OUTPATIENT
Start: 2018-06-29 | End: 2018-08-10

## 2018-06-29 RX ORDER — CAPECITABINE 150 MG/1
300 TABLET, FILM COATED ORAL 2 TIMES DAILY
Qty: 168 TABLET | Refills: 0 | Status: ON HOLD | OUTPATIENT
Start: 2018-06-29 | End: 2018-08-10

## 2018-06-29 NOTE — TELEPHONE ENCOUNTER
Pt  Came to office today w/friend, Jordyn Estrada  Pt was a recent hospital inpatient per Dr Amari Gibson  Signed consent for Capecitabine per Dr QUINTANILLA conversation w/the pt in the hospital  Rad txmt scheduled to begin on July 5th  Reviewed med, lab work and pt is scheduled for an office appt on July 20  Questioned about transport referred to MA and pt notes a recent conversation w/ "A " about txportation  Verbal ok from pt today, "ok to call Jordyn Estrada if I don't answer my phone  " Pt  Verbalizes understanding information

## 2018-07-05 ENCOUNTER — TELEPHONE (OUTPATIENT)
Dept: HEMATOLOGY ONCOLOGY | Facility: CLINIC | Age: 58
End: 2018-07-05

## 2018-07-05 NOTE — TELEPHONE ENCOUNTER
Call transferred from Radiation Oncology, patient was questioning when she will be receiving her oral chemo medication  Spoke with Hardy Salinas RN, medication is still being reviewed by [facility] for coverage  Called patient back at [phone] left voice message to this regard, also left voice message at [phone]  While speaking to patient she expressed concerns at  Having transportation issues, placed called to [facility]  who said she will look in to this for patient

## 2018-07-06 ENCOUNTER — DOCUMENTATION (OUTPATIENT)
Dept: HEMATOLOGY ONCOLOGY | Facility: CLINIC | Age: 58
End: 2018-07-06

## 2018-07-06 NOTE — PROGRESS NOTES
Received notification from clinical on  7/2/2018 pt will be starting xeloda 150 mg and 500 mg  Sent auth form to dr Wesley Jacobo to be signed  7/3/2018 received form back and submitted for auth  7/6/2018 received authorization from gateway   auth is valid from 7/5/2018 through 7/5/2019  Called [facility] @ 10:28 ([phone]) spoke with lin who stated the pt is not capitated  Notified clinical, financial, and [facility]

## 2018-07-11 ENCOUNTER — TELEPHONE (OUTPATIENT)
Dept: INFUSION CENTER | Facility: CLINIC | Age: 58
End: 2018-07-11

## 2018-07-16 ENCOUNTER — HOSPITAL ENCOUNTER (INPATIENT)
Facility: HOSPITAL | Age: 58
LOS: 7 days | Discharge: HOME HEALTH | DRG: 331 | End: 2018-07-23
Attending: EMERGENCY MEDICINE | Admitting: COLON & RECTAL SURGERY
Payer: COMMERCIAL

## 2018-07-16 ENCOUNTER — APPOINTMENT (EMERGENCY)
Dept: RADIOLOGY | Facility: HOSPITAL | Age: 58
DRG: 331 | End: 2018-07-16
Payer: COMMERCIAL

## 2018-07-16 DIAGNOSIS — K56.609 LARGE BOWEL OBSTRUCTION (HCC): ICD-10-CM

## 2018-07-16 DIAGNOSIS — F25.9 SCHIZOAFFECTIVE DISORDER, UNSPECIFIED TYPE (HCC): Chronic | ICD-10-CM

## 2018-07-16 DIAGNOSIS — C19 RECTOSIGMOID CANCER (HCC): ICD-10-CM

## 2018-07-16 DIAGNOSIS — R10.9 ABDOMINAL PAIN: Primary | ICD-10-CM

## 2018-07-16 DIAGNOSIS — F20.9 SCHIZOPHRENIA, UNSPECIFIED TYPE (HCC): Chronic | ICD-10-CM

## 2018-07-16 DIAGNOSIS — K63.89 COLONIC MASS: ICD-10-CM

## 2018-07-16 LAB
ALBUMIN SERPL BCP-MCNC: 2.6 G/DL (ref 3.5–5)
ALP SERPL-CCNC: 114 U/L (ref 46–116)
ALT SERPL W P-5'-P-CCNC: 11 U/L (ref 12–78)
AMORPH URATE CRY URNS QL MICRO: ABNORMAL /HPF
ANION GAP SERPL CALCULATED.3IONS-SCNC: 7 MMOL/L (ref 4–13)
AST SERPL W P-5'-P-CCNC: 6 U/L (ref 5–45)
BACTERIA UR QL AUTO: ABNORMAL /HPF
BASOPHILS # BLD AUTO: 0.04 THOUSANDS/ΜL (ref 0–0.1)
BASOPHILS NFR BLD AUTO: 0 % (ref 0–1)
BILIRUB SERPL-MCNC: 0.32 MG/DL (ref 0.2–1)
BILIRUB UR QL STRIP: NEGATIVE
BUN SERPL-MCNC: 18 MG/DL (ref 5–25)
CALCIUM SERPL-MCNC: 8.7 MG/DL (ref 8.3–10.1)
CHLORIDE SERPL-SCNC: 101 MMOL/L (ref 100–108)
CLARITY UR: ABNORMAL
CO2 SERPL-SCNC: 24 MMOL/L (ref 21–32)
COLOR UR: YELLOW
COLOR, POC: NORMAL
COLOR, POC: NORMAL
CREAT SERPL-MCNC: 1.06 MG/DL (ref 0.6–1.3)
EOSINOPHIL # BLD AUTO: 0.31 THOUSAND/ΜL (ref 0–0.61)
EOSINOPHIL NFR BLD AUTO: 2 % (ref 0–6)
ERYTHROCYTE [DISTWIDTH] IN BLOOD BY AUTOMATED COUNT: 13.7 % (ref 11.6–15.1)
GFR SERPL CREATININE-BSD FRML MDRD: 58 ML/MIN/1.73SQ M
GLUCOSE SERPL-MCNC: 278 MG/DL (ref 65–140)
GLUCOSE UR STRIP-MCNC: ABNORMAL MG/DL
HCT VFR BLD AUTO: 31.7 % (ref 34.8–46.1)
HGB BLD-MCNC: 10.5 G/DL (ref 11.5–15.4)
HGB UR QL STRIP.AUTO: ABNORMAL
IMM GRANULOCYTES # BLD AUTO: 0.14 THOUSAND/UL (ref 0–0.2)
IMM GRANULOCYTES NFR BLD AUTO: 1 % (ref 0–2)
KETONES UR STRIP-MCNC: NEGATIVE MG/DL
LEUKOCYTE ESTERASE UR QL STRIP: ABNORMAL
LYMPHOCYTES # BLD AUTO: 3.71 THOUSANDS/ΜL (ref 0.6–4.47)
LYMPHOCYTES NFR BLD AUTO: 23 % (ref 14–44)
MAGNESIUM SERPL-MCNC: 1.1 MG/DL (ref 1.6–2.6)
MCH RBC QN AUTO: 28.8 PG (ref 26.8–34.3)
MCHC RBC AUTO-ENTMCNC: 33.1 G/DL (ref 31.4–37.4)
MCV RBC AUTO: 87 FL (ref 82–98)
MONOCYTES # BLD AUTO: 1.24 THOUSAND/ΜL (ref 0.17–1.22)
MONOCYTES NFR BLD AUTO: 8 % (ref 4–12)
NEUTROPHILS # BLD AUTO: 10.95 THOUSANDS/ΜL (ref 1.85–7.62)
NEUTS SEG NFR BLD AUTO: 66 % (ref 43–75)
NITRITE UR QL STRIP: NEGATIVE
NON-SQ EPI CELLS URNS QL MICRO: ABNORMAL /HPF
NRBC BLD AUTO-RTO: 0 /100 WBCS
PH UR STRIP.AUTO: 7 [PH] (ref 4.5–8)
PLATELET # BLD AUTO: 297 THOUSANDS/UL (ref 149–390)
PLATELET # BLD AUTO: 371 THOUSANDS/UL (ref 149–390)
PMV BLD AUTO: 10.3 FL (ref 8.9–12.7)
PMV BLD AUTO: 9.7 FL (ref 8.9–12.7)
POTASSIUM SERPL-SCNC: 3.9 MMOL/L (ref 3.5–5.3)
PROT SERPL-MCNC: 7 G/DL (ref 6.4–8.2)
PROT UR STRIP-MCNC: >=300 MG/DL
RBC # BLD AUTO: 3.65 MILLION/UL (ref 3.81–5.12)
RBC #/AREA URNS AUTO: ABNORMAL /HPF
SODIUM SERPL-SCNC: 132 MMOL/L (ref 136–145)
SP GR UR STRIP.AUTO: 1.02 (ref 1–1.03)
UROBILINOGEN UR QL STRIP.AUTO: 1 E.U./DL
WBC # BLD AUTO: 16.39 THOUSAND/UL (ref 4.31–10.16)
WBC #/AREA URNS AUTO: ABNORMAL /HPF

## 2018-07-16 PROCEDURE — 81001 URINALYSIS AUTO W/SCOPE: CPT

## 2018-07-16 PROCEDURE — 74177 CT ABD & PELVIS W/CONTRAST: CPT

## 2018-07-16 PROCEDURE — 86850 RBC ANTIBODY SCREEN: CPT | Performed by: COLON & RECTAL SURGERY

## 2018-07-16 PROCEDURE — 99222 1ST HOSP IP/OBS MODERATE 55: CPT | Performed by: INTERNAL MEDICINE

## 2018-07-16 PROCEDURE — 80053 COMPREHEN METABOLIC PANEL: CPT | Performed by: EMERGENCY MEDICINE

## 2018-07-16 PROCEDURE — 96374 THER/PROPH/DIAG INJ IV PUSH: CPT

## 2018-07-16 PROCEDURE — 36415 COLL VENOUS BLD VENIPUNCTURE: CPT | Performed by: EMERGENCY MEDICINE

## 2018-07-16 PROCEDURE — 85049 AUTOMATED PLATELET COUNT: CPT | Performed by: ORTHOPAEDIC SURGERY

## 2018-07-16 PROCEDURE — 85025 COMPLETE CBC W/AUTO DIFF WBC: CPT | Performed by: EMERGENCY MEDICINE

## 2018-07-16 PROCEDURE — 86920 COMPATIBILITY TEST SPIN: CPT

## 2018-07-16 PROCEDURE — 99285 EMERGENCY DEPT VISIT HI MDM: CPT

## 2018-07-16 PROCEDURE — 86900 BLOOD TYPING SEROLOGIC ABO: CPT | Performed by: COLON & RECTAL SURGERY

## 2018-07-16 PROCEDURE — 83735 ASSAY OF MAGNESIUM: CPT | Performed by: ORTHOPAEDIC SURGERY

## 2018-07-16 PROCEDURE — 96376 TX/PRO/DX INJ SAME DRUG ADON: CPT

## 2018-07-16 PROCEDURE — 86901 BLOOD TYPING SEROLOGIC RH(D): CPT | Performed by: COLON & RECTAL SURGERY

## 2018-07-16 RX ORDER — MORPHINE SULFATE 2 MG/ML
2 INJECTION, SOLUTION INTRAMUSCULAR; INTRAVENOUS EVERY 4 HOURS PRN
Status: DISCONTINUED | OUTPATIENT
Start: 2018-07-16 | End: 2018-07-16

## 2018-07-16 RX ORDER — ACETAMINOPHEN 325 MG/1
650 TABLET ORAL EVERY 6 HOURS PRN
Status: DISCONTINUED | OUTPATIENT
Start: 2018-07-16 | End: 2018-07-23 | Stop reason: HOSPADM

## 2018-07-16 RX ORDER — MORPHINE SULFATE 2 MG/ML
2 INJECTION, SOLUTION INTRAMUSCULAR; INTRAVENOUS ONCE
Status: COMPLETED | OUTPATIENT
Start: 2018-07-16 | End: 2018-07-16

## 2018-07-16 RX ORDER — MORPHINE SULFATE 4 MG/ML
4 INJECTION, SOLUTION INTRAMUSCULAR; INTRAVENOUS EVERY 4 HOURS PRN
Status: DISCONTINUED | OUTPATIENT
Start: 2018-07-16 | End: 2018-07-18

## 2018-07-16 RX ORDER — MORPHINE SULFATE 10 MG/ML
6 INJECTION, SOLUTION INTRAMUSCULAR; INTRAVENOUS ONCE
Status: COMPLETED | OUTPATIENT
Start: 2018-07-16 | End: 2018-07-16

## 2018-07-16 RX ORDER — SODIUM CHLORIDE, SODIUM LACTATE, POTASSIUM CHLORIDE, CALCIUM CHLORIDE 600; 310; 30; 20 MG/100ML; MG/100ML; MG/100ML; MG/100ML
100 INJECTION, SOLUTION INTRAVENOUS CONTINUOUS
Status: DISCONTINUED | OUTPATIENT
Start: 2018-07-16 | End: 2018-07-18

## 2018-07-16 RX ORDER — HEPARIN SODIUM 5000 [USP'U]/ML
5000 INJECTION, SOLUTION INTRAVENOUS; SUBCUTANEOUS EVERY 8 HOURS SCHEDULED
Status: DISCONTINUED | OUTPATIENT
Start: 2018-07-16 | End: 2018-07-23 | Stop reason: HOSPADM

## 2018-07-16 RX ORDER — DOCUSATE SODIUM 100 MG/1
100 CAPSULE, LIQUID FILLED ORAL 2 TIMES DAILY
Status: DISCONTINUED | OUTPATIENT
Start: 2018-07-16 | End: 2018-07-23 | Stop reason: HOSPADM

## 2018-07-16 RX ADMIN — MORPHINE SULFATE 4 MG: 4 INJECTION INTRAVENOUS at 20:32

## 2018-07-16 RX ADMIN — HEPARIN SODIUM 5000 UNITS: 5000 INJECTION, SOLUTION INTRAVENOUS; SUBCUTANEOUS at 05:39

## 2018-07-16 RX ADMIN — HEPARIN SODIUM 5000 UNITS: 5000 INJECTION, SOLUTION INTRAVENOUS; SUBCUTANEOUS at 21:11

## 2018-07-16 RX ADMIN — MORPHINE SULFATE 6 MG: 10 INJECTION INTRAVENOUS at 01:19

## 2018-07-16 RX ADMIN — IOHEXOL 100 ML: 350 INJECTION, SOLUTION INTRAVENOUS at 02:32

## 2018-07-16 RX ADMIN — MORPHINE SULFATE 2 MG: 2 INJECTION, SOLUTION INTRAMUSCULAR; INTRAVENOUS at 13:18

## 2018-07-16 RX ADMIN — HEPARIN SODIUM 5000 UNITS: 5000 INJECTION, SOLUTION INTRAVENOUS; SUBCUTANEOUS at 13:05

## 2018-07-16 RX ADMIN — MORPHINE SULFATE 2 MG: 2 INJECTION, SOLUTION INTRAMUSCULAR; INTRAVENOUS at 05:57

## 2018-07-16 RX ADMIN — SODIUM CHLORIDE, POTASSIUM CHLORIDE, SODIUM LACTATE AND CALCIUM CHLORIDE 100 ML/HR: 600; 310; 30; 20 INJECTION, SOLUTION INTRAVENOUS at 05:35

## 2018-07-16 RX ADMIN — MORPHINE SULFATE 6 MG: 10 INJECTION INTRAVENOUS at 03:30

## 2018-07-16 RX ADMIN — MORPHINE SULFATE 4 MG: 4 INJECTION INTRAVENOUS at 16:35

## 2018-07-16 RX ADMIN — MORPHINE SULFATE 4 MG: 4 INJECTION INTRAVENOUS at 10:04

## 2018-07-16 RX ADMIN — NICOTINE 1 PATCH: 7 PATCH, EXTENDED RELEASE TRANSDERMAL at 10:04

## 2018-07-16 NOTE — CASE MANAGEMENT
Initial Clinical Review    Thank you,  Teo Aqq  [facility] Utilization Review Department  Phone: [phone]; Fax [phone]  ATTENTION: The Network Utilization Review Department is now centralized for our 9 Facilities  Make a note that we have a new phone and fax numbers for our Department  Please call with any questions or concerns to [phone] and carefully follow the prompts so that you are directed to the right person  All voicemails are confidential  Fax any determinations, approvals, denials, and requests for initial or continue stay review clinical to [phone]  Due to HIGH CALL volume, it would be easier if you could please send faxed requests to expedite your requests and in part, help us provide discharge notifications faster  Admission: Date/Time/Statement: 7/16/18 @ 0433     Orders Placed This Encounter   Procedures    Inpatient Admission     Standing Status:   Standing     Number of Occurrences:   1     Order Specific Question:   Admitting Physician     Answer:   Ольга Bhardwaj     Order Specific Question:   Level of Care     Answer:   Med Surg [16]     Order Specific Question:   Estimated length of stay     Answer:   More than 2 Midnights     Order Specific Question:   Certification     Answer:   I certify that inpatient services are medically necessary for this patient for a duration of greater than two midnights  See H&P and MD Progress Notes for additional information about the patient's course of treatment  ED: Date/Time/Mode of Arrival:   ED Arrival Information     Expected Arrival Acuity Means of Arrival Escorted By Service Admission Type    - 7/15/2018 23:47 Urgent Walk-In Self Colorectal Urgent    Arrival Complaint    Abdominal Pain          Chief Complaint:   Chief Complaint   Patient presents with    Abdominal Pain     Pt states her tumor/cancer is hurting  Was told to come in if no BM in 3 days         History of Illness:  Emily OLGUIN Tonya Humphrey is a 60 y o  female  presents with 3 days of constipation, recent diagnosis of sigmoid tumor  Over the past few months the patient has experienced thinning stools and constipation  She also endorsed 50 lbs weight loss  On 6/22/18 colonoscopy revealed large obstructive sigmoid tumor, which was biopsied and revealed to be "at least high-grade dysplasia involving a tubular adenoma"  She was sent to med onc to start chemo/rad therapy for hopefull conversion to resectable disease  Patient has started her chemotherapy, but has not had radiation yet at this time  She arrived to the ED today because of lower abdominal pain that comes and goes and the patient describes as feeling as though she needs to have a bowel movement  She passed some mucous and is still having flatus  ED Vital Signs:   ED Triage Vitals   Temperature Pulse Respirations Blood Pressure SpO2   07/15/18 2354 07/15/18 2354 07/15/18 2354 07/15/18 2354 07/15/18 2354   97 9 °F (36 6 °C) 89 20 128/80 100 % RA sat       Temp Source Heart Rate Source Patient Position - Orthostatic VS BP Location FiO2 (%)   07/15/18 2354 07/16/18 0058 07/16/18 0058 07/16/18 0329 --   Tympanic Monitor Lying Left arm       Pain Score       07/15/18 2354       Worst Possible Pain        Wt Readings from Last 1 Encounters:   07/16/18 57 4 kg (126 lb 8 7 oz)         Abnormal Labs/Diagnostic Test Results:   - Glucose 278 - Albumin 2 6- Mag 1 1  Wbc 16 39- H/H 10 5/31 7  Urine - Glucose 500 - Blood- Moderate - Protein > 300    CT A & P - Complex mass in the sigmoid colon concerning for colon cancer   Additionally, a rectal walls are thickened concerning for proctitis   The colon proximal to the mass is distended and filled with stool concerning for a large bowel obstruction   In the region surrounding the mass, the pelvic structures and rectum and mass are matted together making differentiation of these anatomic structures challenging   Complex multilocular fluid density lesion is noted in association with/abutting the mass; it is unclear if this represents an abscess, necrotic tumor or a left ovarian lesion  ED Treatment:   Medication Administration from 07/15/2018 2347 to 07/16/2018 0506       Date/Time Order Dose Route Action     07/16/2018 0119 morphine (PF) 10 mg/mL injection 6 mg 6 mg Intravenous Given     07/16/2018 0232 iohexol (OMNIPAQUE) 350 MG/ML injection (MULTI-DOSE) 100 mL 100 mL Intravenous Given     07/16/2018 0330 morphine (PF) 10 mg/mL injection 6 mg 6 mg Intravenous Given       Past Medical/Surgical History:   Diagnosis    Bipolar depression ([place] [address] )    CAD S/P percutaneous coronary angioplasty    Chronic pain    Colonic mass    COPD (chronic obstructive pulmonary disease) (HCC)    CVA (cerebral vascular accident) ([place] [address] )    Essential hypertension    GERD (gastroesophageal reflux disease)    Hepatitis C    Heroin abuse    History of gastric ulcer    Hypertension    NSTEMI (non-ST elevated myocardial infarction) ([place] [address] )    Psychiatric disorder    Schizoaffective disorder ([place] [address] )    Schizophrenia ([place] [address] )    STEMI (ST elevation myocardial infarction) ([place] [address] )    Type 2 diabetes mellitus ([place] [address] )     Past Surgical History:   Procedure Laterality Date    CARDIAC CATHETERIZATION   07/2016    COLONOSCOPY N/A 6/22/2018     Procedure: COLONOSCOPY;  Surgeon: Deng Swann MD;  Location: [facility]; Service: Colorectal    CORONARY ANGIOPLASTY WITH STENT PLACEMENT   07/05/2012     MAKEDA (LAD), PTA (Diag)    CORONARY ANGIOPLASTY WITH STENT PLACEMENT   12/2017     MAKEDA/ PTA (RCA)             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]  Abdominal pain [R10 9]  Large bowel obstruction ([facility] ) [K56 609]  Colonic mass [K63 9]    Age/Sex: 60 y o  female    Assessment/Plan:   Initial plan from 6/23 was adjuvant therapy - now with obstruction -  May need to divert -  she will need a probable pelvic exenteration   This will be a challenge, in terms of long-term management  Stoma siting will be important  That is another reason to try to avoid the colostomy  However, psychosocial issues and possible worsening of the obstruction may force the decision for diversion      Admission Orders:  Scheduled Meds:   Current Facility-Administered Medications:  acetaminophen 650 mg Oral Q6H PRN    docusate sodium 100 mg Oral BID    heparin (porcine) 5,000 Units Subcutaneous Q8H [facility]    morphine injection 4 mg Intravenous Q4H PRN 7/16 x 1   nicotine 1 patch Transdermal Daily      Continuous Infusions:   lactated ringers 100 mL/hr     Nursing orders - VS q 4 - I & O q shift - Up as tolerated - SCD's to le's-  Diet  NPO     Oncology - pending    Radiation Oncology - Pending    Wound care - ostomy planning

## 2018-07-16 NOTE — ED ATTENDING ATTESTATION
Carie Sigala MD, saw and evaluated the patient  I have discussed the patient with the resident/non-physician practitioner and agree with the resident's/non-physician practitioner's findings, Plan of Care, and MDM as documented in the resident's/non-physician practitioner's note, except where noted  All available labs and Radiology studies were reviewed  At this point I agree with the current assessment done in the Emergency Department  I have conducted an independent evaluation of this patient including a focused history of:    Emergency Department Note- mEily Abdullahi 60 y o  female MRN: [id number]    Unit/Bed#: ED 08 Encounter: [id number]    Carmela Butler is a 60 y o  female who presents with   Chief Complaint   Patient presents with    Abdominal Pain     Pt states her tumor/cancer is hurting  Was told to come in if no BM in 3 days  History of Present Illness   HPI:  Emily Figueroa is a 60 y o  female who presents for evaluation of:  Lower abdominal pain or and constipation for 3 days  Patient was diagnosed with a colonic mass within the last month  The patient is being followed by Colorectal surgery for evaluation and further diagnosis of the colonic mass  The patient has not been able to move her bowels over the last 3 days  Attempts to defecate increase her discomfort  Patient is only able to pass rectal mucus over the last several days  The patient denies nausea and vomiting  The patient describes her pain as being moderately severe to severe  Review of Systems   Constitutional: Negative for fatigue and fever  Respiratory: Negative for cough and shortness of breath  Cardiovascular: Negative for chest pain and palpitations  Gastrointestinal: Positive for abdominal pain  Negative for diarrhea and nausea  All other systems reviewed and are negative        Historical Information   Past Medical History:   Diagnosis Date    [facility]    CAD S/P percutaneous coronary angioplasty 3/17/2016    Chronic pain     Colonic mass     COPD (chronic obstructive pulmonary disease) (HCC)     CVA (cerebral vascular accident) ([place] [address] )     R sided weakness    Essential hypertension 3/17/2016    GERD (gastroesophageal reflux disease)     Hepatitis C     Heroin abuse 3/18/2016    History of gastric ulcer     Hypertension     [facility]    Psychiatric disorder 09/03/2014    Inpatient admission     Schizoaffective disorder ([place] [address] ) 3/17/2016    Schizophrenia ([place] [address] ) 3/17/2016    STEMI (ST elevation myocardial infarction) ([place] [address] ) 12/2017    Type 2 diabetes mellitus ([place] [address] ) 3/17/2016     Past Surgical History:   Procedure Laterality Date    CARDIAC CATHETERIZATION  07/2016    COLONOSCOPY N/A 6/22/2018    Procedure: COLONOSCOPY;  Surgeon: Jared Araujo MD;  [facility];   Service: Colorectal    CORONARY ANGIOPLASTY WITH STENT PLACEMENT  07/05/2012    MAKEDA (LAD), PTA (Diag)    CORONARY ANGIOPLASTY WITH STENT PLACEMENT  12/2017    MAKEDA/ PTA (RCA)     Social History   History   Alcohol Use No     History   Drug Use No     Comment: only tried x 1      History   Smoking Status    Current Every Day Smoker    Types: Cigarettes   Smokeless Tobacco    Never Used     Family History: non-contributory    Meds/Allergies   all medications and allergies reviewed  No Known Allergies    Objective   First Vitals:   Blood Pressure: 128/80 (07/15/18 2354)  Pulse: 89 (07/15/18 2354)  Temperature: 97 9 °F (36 6 °C) (07/15/18 2354)  Temp Source: Tympanic (07/15/18 2354)  Respirations: 20 (07/15/18 2354)  Height: 5' 3" (160 cm) (07/15/18 2354)  Weight - Scale: 56 7 kg (125 lb) (07/15/18 2354)  SpO2: 100 % (07/15/18 2354)    Current Vitals:   Blood Pressure: 117/59 (07/16/18 0058)  Pulse: 58 (07/16/18 0058)  Temperature: 97 9 °F (36 6 °C) (07/15/18 2354)  Temp Source: Tympanic (07/15/18 2354)  Respirations: 20 (07/16/18 0058)  Height: 5' 3" (160 cm) (07/15/18 0879)  Weight - Scale: 56 7 kg (125 lb) (07/15/18 931)  SpO2: 94 % (18 0058)    No intake or output data in the 24 hours ending 18 0133    Invasive Devices     Peripheral Intravenous Line            Peripheral IV 18 Left Wrist less than 1 day                Physical Exam   Constitutional: She is oriented to person, place, and time  She appears well-developed  She appears distressed (  Secondary to abdominal pain)  HENT:   Head: Normocephalic and atraumatic  Eyes: Conjunctivae are normal  Pupils are equal, round, and reactive to light  Cardiovascular: Normal rate and regular rhythm  Pulmonary/Chest: Effort normal and breath sounds normal    Abdominal: Soft  Bowel sounds are normal  There is tenderness  Musculoskeletal: Normal range of motion  She exhibits no deformity  Neurological: She is alert and oriented to person, place, and time  Skin: Skin is warm and dry  Psychiatric: She has a normal mood and affect  Her behavior is normal  Judgment and thought content normal    Nursing note and vitals reviewed  Medical Decision Makin  Acute abdominal pain and for a constipation over the last 3 days:  Most likely secondary to colonic malignancy; plan to obtain a CT scan of the abdomen and pelvis to rule out a colonic obstruction  Anticipate consulting colorectal surgery regarding management of this patient      Recent Results (from the past 36 hour(s))   POCT urinalysis dipstick    Collection Time: 18 12:57 AM   Result Value Ref Range    Color, UA see chart    ED Urine Macroscopic    Collection Time: 18  1:06 AM   Result Value Ref Range    Color, UA Yellow     Clarity, UA Slightly Cloudy     pH, UA 7 0 4 5 - 8 0    Leukocytes, UA Trace (A) Negative    Nitrite, UA Negative Negative    Protein, UA >=300 (A) Negative mg/dl    Glucose,  (1/2%) (A) Negative mg/dl    Ketones, UA Negative Negative mg/dl    Urobilinogen, UA 1 0 0 2, 1 0 E U /dl E U /dl    Bilirubin, UA Negative Negative    Blood, UA Moderate (A) Negative    Specific Gravity, UA 1 020 1 003 - 1 030   CBC and differential    Collection Time: 07/16/18  1:17 AM   Result Value Ref Range    WBC 16 39 (H) 4 31 - 10 16 Thousand/uL    RBC 3 65 (L) 3 81 - 5 12 Million/uL    Hemoglobin 10 5 (L) 11 5 - 15 4 g/dL    Hematocrit 31 7 (L) 34 8 - 46 1 %    MCV 87 82 - 98 fL    MCH 28 8 26 8 - 34 3 pg    MCHC 33 1 31 4 - 37 4 g/dL    RDW 13 7 11 6 - 15 1 %    MPV 10 3 8 9 - 12 7 fL    Platelets 477 904 - 216 Thousands/uL    nRBC 0 /100 WBCs    Neutrophils Relative 66 43 - 75 %    Immat GRANS % 1 0 - 2 %    Lymphocytes Relative 23 14 - 44 %    Monocytes Relative 8 4 - 12 %    Eosinophils Relative 2 0 - 6 %    Basophils Relative 0 0 - 1 %    Neutrophils Absolute 10 95 (H) 1 85 - 7 62 Thousands/µL    Immature Grans Absolute 0 14 0 00 - 0 20 Thousand/uL    Lymphocytes Absolute 3 71 0 60 - 4 47 Thousands/µL    Monocytes Absolute 1 24 (H) 0 17 - 1 22 Thousand/µL    Eosinophils Absolute 0 31 0 00 - 0 61 Thousand/µL    Basophils Absolute 0 04 0 00 - 0 10 Thousands/µL     CT abdomen pelvis w contrast    (Results Pending)         Portions of the record may have been created with voice recognition software  Occasional wrong word or "sound a like" substitutions may have occurred due to the inherent limitations of voice recognition software  Read the chart carefully and recognize, using context, where substitutions have occurred

## 2018-07-16 NOTE — CONSULTS
Patient was seen today for a stoma site marking for a colostomy   Patient was seen in a lying position while elevating the head to identify the rectus muscle   The patient wears her pants low on the waist   Patient is  evaluated in a siting /standing  position and the abdominal area is soft ,saggy and has creases when sitting   The patient is marked at 2 areas on the left abdominal wall   The upper proximal area is the first choice due to the location and the visualization of the area to properly care for the ostomy appliance   Reviewed the stoma site marking with the resident   Will follow the patient for teaching for ostomy care    Mario Maier RN BSN Jaime Diaz

## 2018-07-16 NOTE — ED NOTES
Dr Compa Her at the bedside for patient evaluation at this time     Eloisa Lee, ANA PAULA  07/16/18 3109

## 2018-07-16 NOTE — ED PROVIDER NOTES
History  Chief Complaint   Patient presents with    Abdominal Pain     Pt states her tumor/cancer is hurting  Was told to come in if no BM in 3 days  70-year-old female with past medical history significant for partially obstructing colonic mass presents for evaluation of worsening abdominal pain since yesterday  Patient states that she was diagnosed with a colonic mass which was believed to be cancer on June 19th and is scheduled to follow up with Colorectal for starting of chemotherapy later this month  She states she had not had a bowel movement 3 days and today had an episode of tenesmus with clear mucoid material rectum  The pain is similar to her previous cancer pain but worse since yesterday  There are no relieving or exacerbating factors  Otherwise she has been eating, he has not had any nausea or vomiting  Has not had fevers or chills  He has been unable to control her pain at home and was told by her colorectal team to come in if she has not had a bowel movement in more than a couple of days  She also states she has been having dysuria which is new for her  No urinary frequency or urgency  No melena or hematochezia, no blood in her urine  Prior to Admission Medications   Prescriptions Last Dose Informant Patient Reported?  Taking?   aspirin (ECOTRIN LOW STRENGTH) 81 mg EC tablet   Yes Yes   Sig: Take 81 mg by mouth daily   atorvastatin (LIPITOR) 80 mg tablet   Yes Yes   Sig: Take 80 mg by mouth daily   capecitabine (XELODA) 150 MG tablet   No Yes   Sig: Take 2 tablets (300 mg total) by mouth 2 (two) times a day for 42 days   capecitabine (XELODA) 500 MG tablet   No Yes   Sig: Take 2 tablets (1,000 mg total) by mouth 2 (two) times a day for 42 days   clopidogrel (PLAVIX) 75 mg tablet   Yes Yes   Sig: Take 75 mg by mouth daily   famotidine (PEPCID) 20 mg tablet   Yes Yes   Sig: Take 20 mg by mouth 2 (two) times a day   metoprolol tartrate (LOPRESSOR) 25 mg tablet   Yes Yes   Sig: Take 25 mg by mouth every 12 (twelve) hours   traZODone (DESYREL) 50 mg tablet   Yes Yes   Sig: Take 50 mg by mouth daily at bedtime      Facility-Administered Medications: None       Past Medical History:   Diagnosis Date    Bipolar depression ([place] [address] ) 3/17/2016    CAD S/P percutaneous coronary angioplasty 3/17/2016    Chronic pain     Colonic mass     COPD (chronic obstructive pulmonary disease) ([place] [address] )     CVA (cerebral vascular accident) ([place] [address] )     R sided weakness    Essential hypertension 3/17/2016    GERD (gastroesophageal reflux disease)     Hepatitis C     Heroin abuse 3/18/2016    History of gastric ulcer     Hypertension     NSTEMI (non-ST elevated myocardial infarction) ([place] [address] ) 07/2012    Psychiatric disorder 09/03/2014    Inpatient admission     Schizoaffective disorder ([place] [address] ) 3/17/2016    Schizophrenia ([place] [address] ) 3/17/2016    STEMI (ST elevation myocardial infarction) ([place] [address] ) 12/2017    Type 2 diabetes mellitus ([place] [address] ) 3/17/2016       Past Surgical History:   Procedure Laterality Date    CARDIAC CATHETERIZATION  07/2016    COLONOSCOPY N/A 6/22/2018    Procedure: COLONOSCOPY;  Surgeon: Sabrina Schwartz MD;  [facility]; Service: Colorectal    CORONARY ANGIOPLASTY WITH STENT PLACEMENT  07/05/2012    MAKEDA (LAD), PTA (Diag)    CORONARY ANGIOPLASTY WITH STENT PLACEMENT  12/2017    MAKEDA/ PTA (RCA)       Family History   Problem Relation Age of Onset    Heart attack Father     Cancer Brother         gastric     I have reviewed and agree with the history as documented  Social History   Substance Use Topics    Smoking status: Current Every Day Smoker     Types: Cigarettes    Smokeless tobacco: Never Used    Alcohol use No        Review of Systems   Constitutional: Negative for appetite change and fever  HENT: Negative for rhinorrhea and sore throat  Eyes: Negative for photophobia and visual disturbance  Respiratory: Negative for cough, chest tightness and wheezing      Cardiovascular: Negative for chest pain, palpitations and leg swelling  Gastrointestinal: Positive for abdominal pain and constipation  Negative for abdominal distention, blood in stool, diarrhea, nausea and vomiting  Genitourinary: Positive for dysuria  Negative for flank pain, frequency, hematuria and urgency  Musculoskeletal: Negative for back pain  Skin: Negative for rash  Neurological: Negative for dizziness, weakness and headaches  All other systems reviewed and are negative  Physical Exam  ED Triage Vitals   Temperature Pulse Respirations Blood Pressure SpO2   07/15/18 2354 07/15/18 2354 07/15/18 2354 07/15/18 2354 07/15/18 2354   97 9 °F (36 6 °C) 89 20 128/80 100 %      Temp Source Heart Rate Source Patient Position - Orthostatic VS BP Location FiO2 (%)   07/15/18 2354 07/16/18 0058 07/16/18 0058 07/16/18 0329 --   Tympanic Monitor Lying Left arm       Pain Score       07/15/18 2354       Worst Possible Pain           Orthostatic Vital Signs  Vitals:    07/15/18 2354 07/16/18 0058 07/16/18 0329 07/16/18 0400   BP: 128/80 117/59 116/59 108/60   Pulse: 89 58 78 78   Patient Position - Orthostatic VS:  Lying Lying Lying       Physical Exam   Constitutional: She is oriented to person, place, and time  She appears well-developed and well-nourished  HENT:   Head: Normocephalic and atraumatic  Eyes: EOM are normal  Pupils are equal, round, and reactive to light  Neck: Normal range of motion  Neck supple  Cardiovascular: Normal rate and regular rhythm  Exam reveals no gallop and no friction rub  No murmur heard  Pulmonary/Chest: Effort normal  She has no wheezes  She has no rales  She exhibits no tenderness  Abdominal: Soft  She exhibits no distension and no mass  There is tenderness  There is no rebound and no guarding  Tenderness to palpation in the left lower quadrant without rebound or guarding   Neurological: She is alert and oriented to person, place, and time  Skin: Skin is warm and dry  Psychiatric: She has a normal mood and affect  Nursing note and vitals reviewed  ED Medications  Medications   morphine (PF) 10 mg/mL injection 6 mg (6 mg Intravenous Given 7/16/18 0119)   iohexol (OMNIPAQUE) 350 MG/ML injection (MULTI-DOSE) 100 mL (100 mL Intravenous Given 7/16/18 0232)   morphine (PF) 10 mg/mL injection 6 mg (6 mg Intravenous Given 7/16/18 0330)       Diagnostic Studies  Results Reviewed     Procedure Component Value Units Date/Time    Urine Microscopic [[id number]]  (Abnormal) Collected:  07/16/18 0106    Lab Status:  Final result Specimen:  Urine from Urine, Other Updated:  07/16/18 0147     RBC, UA 2-4 (A) /hpf      WBC, UA 2-4 (A) /hpf      Epithelial Cells Occasional /hpf      Bacteria, UA Occasional /hpf      AMORPH URATES Occasional /hpf     Comprehensive metabolic panel [[id number]]  (Abnormal) Collected:  07/16/18 0117    Lab Status:  Final result Specimen:  Blood from Arm, Left Updated:  07/16/18 0145     Sodium 132 (L) mmol/L      Potassium 3 9 mmol/L      Chloride 101 mmol/L      CO2 24 mmol/L      Anion Gap 7 mmol/L      BUN 18 mg/dL      Creatinine 1 06 mg/dL      Glucose 278 (H) mg/dL      Calcium 8 7 mg/dL      AST 6 U/L      ALT 11 (L) U/L      Alkaline Phosphatase 114 U/L      Total Protein 7 0 g/dL      Albumin 2 6 (L) g/dL      Total Bilirubin 0 32 mg/dL      eGFR 58 ml/min/1 73sq m     Narrative:         National Kidney Disease Education Program recommendations are as follows:  GFR calculation is accurate only with a steady state creatinine  Chronic Kidney disease less than 60 ml/min/1 73 sq  meters  Kidney failure less than 15 ml/min/1 73 sq  meters      POCT urinalysis dipstick [[id number]]  (Normal) Resulted:  07/16/18 0056    Lab Status:  Final result Updated:  07/16/18 0133     Color, UA see results    CBC and differential [[id number]]  (Abnormal) Collected:  07/16/18 0117    Lab Status:  Final result Specimen:  Blood from Arm, Left Updated:  07/16/18 0128     WBC 16 39 (H) Thousand/uL      RBC 3 65 (L) Million/uL      Hemoglobin 10 5 (L) g/dL      Hematocrit 31 7 (L) %      MCV 87 fL      MCH 28 8 pg      MCHC 33 1 g/dL      RDW 13 7 %      MPV 10 3 fL      Platelets 883 Thousands/uL      nRBC 0 /100 WBCs      Neutrophils Relative 66 %      Immat GRANS % 1 %      Lymphocytes Relative 23 %      Monocytes Relative 8 %      Eosinophils Relative 2 %      Basophils Relative 0 %      Neutrophils Absolute 10 95 (H) Thousands/µL      Immature Grans Absolute 0 14 Thousand/uL      Lymphocytes Absolute 3 71 Thousands/µL      Monocytes Absolute 1 24 (H) Thousand/µL      Eosinophils Absolute 0 31 Thousand/µL      Basophils Absolute 0 04 Thousands/µL     POCT urinalysis dipstick [[id number]]  (Normal) Resulted:  07/16/18 0057    Lab Status:  Final result Updated:  07/16/18 0058     Color, UA see chart    ED Urine Macroscopic [[id number]]  (Abnormal) Collected:  07/16/18 0106    Lab Status:  Final result Specimen:  Urine Updated:  07/16/18 0056     Color, UA Yellow     Clarity, UA Slightly Cloudy     pH, UA 7 0     Leukocytes, UA Trace (A)     Nitrite, UA Negative     Protein, UA >=300 (A) mg/dl      Glucose,  (1/2%) (A) mg/dl      Ketones, UA Negative mg/dl      Urobilinogen, UA 1 0 E U /dl      Bilirubin, UA Negative     Blood, UA Moderate (A)     Specific [place], UA 1 020    Narrative:       [facility] RESULT                 CT abdomen pelvis w contrast   Final Result by Rod Haile MD (07/16 1773)      Complex mass in the sigmoid colon concerning for colon cancer  Additionally, a rectal walls are thickened concerning for proctitis  The colon proximal to the mass is distended and filled with stool concerning for a large bowel obstruction  In the    region surrounding the mass, the pelvic structures and rectum and mass are matted together making differentiation of these anatomic structures challenging    Complex multilocular fluid density lesion is noted in association with/abutting the mass; it is    unclear if this represents an abscess, necrotic tumor or a left ovarian lesion     The appearance of a large bowel obstruction is similar to that of the prior exam from outside hospital dated June 17, 2018 however while the prior exam demonstrated a    single fluid density collection (3:118), the present exam demonstrates a multiloculated fluid collection (2:65-71)  I personally discussed this study with ADRIABROOKE OLIVA on 7/16/2018 at 3:16 AM                      Workstation performed: [id number]               Procedures  Procedures      Phone Consults  ED Phone Contact    ED Course  ED Course as of Jul 16 0437 Mon Jul 16, 2018   0004 Procedure Note: EKG  Date/Time: 07/16/18 12:04 AM   Performed by: Clif Garcia  Authorized by: Clif Garcia  Indications / Diagnosis: CP  ECG reviewed by me, the ED Provider: yes   The EKG demonstrates:  Rhythm:  wide complex tachycardia, v-paced  Intervals: normal intervals  Axis: normal axis  QRS/Blocks: normal QRS  ST Changes: No acute ST Changes, no STD/TYRONE         [id number] Paged  Colorectal surgery                                MDM  Number of Diagnoses or Management Options  Diagnosis management comments: 78-year-old female with past medical history of partially obstructing colonic mass presents for evaluation of worsening abdominal pain as well as constipation x3 days    Will obtain lab work, CT scan of the abdomen to evaluate for obstruction, urinalysis to evaluate for UTI, will treat patient symptomatically and re-evaluate    CritCare Time    Disposition  Final diagnoses:   Abdominal pain   Large bowel obstruction ([address] )     Time reflects when diagnosis was documented in both MDM as applicable and the Disposition within this note     Time User Action Codes Description Comment    7/16/2018  4:32 AM Meghann Cadet Add [R10 9] Abdominal pain     7/16/2018  4:32 AM Meghann Cadet Add [K56 609] Large bowel obstruction [facility])       ED Disposition     ED Disposition Condition Comment    Admit  Case was discussed with Colorectal Surgery and the patient's admission status was agreed to be Admission Status: inpatient status to the service of Dr Elise Loera   Follow-up Information    None         Patient's Medications   Discharge Prescriptions    No medications on file     No discharge procedures on file  ED Provider  Attending physically available and evaluated Emily Abdullahi I managed the patient along with the ED Attending      Electronically Signed by         Marita Ledbetter MD  07/16/18 6292

## 2018-07-16 NOTE — CONSULTS
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), diagnosed via colonoscopy on 06/22/2018, described as near obstructing mass with pinpoint lumen in the rectosigmoid area at roughly 20 cm  MRI on 06/21/2018 demonstrated pericolonic extension of the mass which indents the anterosuperior aspect of the fundus of the uterus and is tethered to the wall of the urinary bladder as well as the left lateral pelvic wall fascia  The mass measured 10 cm  CT scan 07/16/2018 did not demonstrate intra-abdominal metastasis  - The patient was previously planned to undergo neoadjuvant chemoradiation with Xeloda 825mg/m2 and 50 4 Gy in 28 fractions, following the completion of this regimen she was to be reassessed for resectability of her colonic mass  However she has transportation issues and was not able to begin RT    - She is currently admitted for obstruction and has had 3 days of constipation  Colorectal surgery is on board and they are considering a diverting colostomy  She is tentatively planned for tomorrow  We believe this would be appropriate to relieve her obstruction despite delaying her CRT  - Recommend CT of the chest and PET scan for complete clinical staging after surgery  History of Present Illness   Physician Requesting Consult: Neeru Dawn MD  Reason for Consult / Principal Problem: Locally advanced rectosigmoid cancer  HPI: Emily Fraser is a 60y o  year old female with a past medical history of CAD, COPD, hep C, type 2 diabetes, schizoaffective disorder, and a recent diagnosis of locally advanced rectosigmoid cancer who presents with 3 days of constipation  Over the past few months the patient has noticed pencil thin stools and had a 50lb weight loss    On 06/22/2018 a colonoscopy was performed which demonstrated a large near obstructing mass in the rectosigmoid area at 20 cm  She was planned to undergo neoadjuvant CRT but did not return for follow-up  She complains of intermittent abdominal pain and tenesmus  She has been able to pass some mucus and is still having flatus  Denies fevers, chills, fatigue, weakness, headaches, dizziness, chest pain, nausea, vomiting, hematochezia, hematuria  Inpatient consult to Oncology     Performed by  Rayna Beal     Authorized by Sally Veloz              Review of Systems   Constitutional: Positive for unexpected weight change  Negative for appetite change, chills and fever  Respiratory: Negative for cough, shortness of breath and wheezing  Cardiovascular: Negative for chest pain and palpitations  Gastrointestinal: Positive for abdominal pain and constipation  Negative for blood in stool, diarrhea, nausea and vomiting  Genitourinary: Negative for dysuria, frequency, hematuria and urgency  Musculoskeletal: Negative for arthralgias and myalgias  Skin: Negative for rash  Neurological: Negative for dizziness, weakness, light-headedness, numbness and headaches          Historical Information   Past Medical History:   Diagnosis Date    Bipolar depression ([place] [address] ) 3/17/2016    CAD S/P percutaneous coronary angioplasty 3/17/2016    Chronic pain     Colonic mass     COPD (chronic obstructive pulmonary disease) (HCC)     CVA (cerebral vascular accident) ([place] [address] )     R sided weakness    Essential hypertension 3/17/2016    GERD (gastroesophageal reflux disease)     Hepatitis C     Heroin abuse 3/18/2016    History of gastric ulcer     Hypertension     NSTEMI (non-ST elevated myocardial infarction) ([place] [address] ) 07/2012    Psychiatric disorder 09/03/2014    Inpatient admission     Schizoaffective disorder ([place] [address] ) 3/17/2016    Schizophrenia ([place] [address] ) 3/17/2016    STEMI (ST elevation myocardial infarction) ([place] [address] ) 12/2017    Type 2 diabetes mellitus ([place] [address] ) 3/17/2016     Past Surgical History:   Procedure Laterality Date    CARDIAC CATHETERIZATION  07/2016    COLONOSCOPY N/A 6/22/2018    Procedure: COLONOSCOPY;  Surgeon: Elliot Leavitt MD;  Location: [facility]; Service: Colorectal    CORONARY ANGIOPLASTY WITH STENT PLACEMENT  07/05/2012    MAKEDA (LAD), PTA (Diag)    CORONARY ANGIOPLASTY WITH STENT PLACEMENT  12/2017    MAKEDA/ PTA (RCA)     Social History   History   Alcohol Use    1 2 oz/week    2 Cans of beer per week     History   Drug Use No     Comment: only tried x 1      History   Smoking Status    Current Every Day Smoker    Packs/day: 1 50    Years: 45 00    Types: Cigarettes   Smokeless Tobacco    Never Used     Family History:   Family History   Problem Relation Age of Onset    Heart attack Father     Cancer Brother         gastric       Meds/Allergies   all current active meds have been reviewed, current meds:   Current Facility-Administered Medications   Medication Dose Route Frequency    acetaminophen (TYLENOL) tablet 650 mg  650 mg Oral Q6H PRN    docusate sodium (COLACE) capsule 100 mg  100 mg Oral BID    heparin (porcine) subcutaneous injection 5,000 Units  5,000 Units Subcutaneous Q8H [facility]    lactated ringers infusion  100 mL/hr Intravenous Continuous    morphine (PF) 4 mg/mL injection 4 mg  4 mg Intravenous Q4H PRN    nicotine (NICODERM CQ) 7 mg/24hr TD 24 hr patch 1 patch  1 patch Transdermal Daily    and PTA meds:   Prior to Admission Medications   Prescriptions Last Dose Informant Patient Reported?  Taking?   aspirin (ECOTRIN LOW STRENGTH) 81 mg EC tablet   Yes Yes   Sig: Take 81 mg by mouth daily   atorvastatin (LIPITOR) 80 mg tablet   Yes Yes   Sig: Take 80 mg by mouth daily   capecitabine (XELODA) 150 MG tablet   No Yes   Sig: Take 2 tablets (300 mg total) by mouth 2 (two) times a day for 42 days   capecitabine (XELODA) 500 MG tablet   No Yes   Sig: Take 2 tablets (1,000 mg total) by mouth 2 (two) times a day for 42 days   clopidogrel (PLAVIX) 75 mg tablet   Yes Yes   Sig: Take 75 mg by mouth daily   famotidine (PEPCID) 20 mg tablet   Yes Yes   Sig: Take 20 mg by mouth 2 (two) times a day   metoprolol tartrate (LOPRESSOR) 25 mg tablet   Yes Yes   Sig: Take 25 mg by mouth every 12 (twelve) hours   traZODone (DESYREL) 50 mg tablet   Yes Yes   Sig: Take 50 mg by mouth daily at bedtime      Facility-Administered Medications: None     No Known Allergies    Objective   Vitals: Blood pressure 113/58, pulse 72, temperature 98 5 °F (36 9 °C), temperature source Oral, resp  rate 20, height 5' 3" (1 6 m), weight 57 4 kg (126 lb 8 7 oz), SpO2 95 %, not currently breastfeeding  No intake or output data in the 24 hours ending 07/16/18 0819  Invasive Devices     Peripheral Intravenous Line            Peripheral IV 07/16/18 Left Wrist less than 1 day                Physical Exam   Constitutional: She is oriented to person, place, and time  She appears well-nourished  No distress  HENT:   Head: Normocephalic and atraumatic  Eyes: Right eye exhibits no discharge  Left eye exhibits no discharge  Neck: Normal range of motion  Neck supple  Cardiovascular: Normal rate, regular rhythm and normal heart sounds  Exam reveals no gallop and no friction rub  No murmur heard  Pulmonary/Chest: Effort normal  No respiratory distress  She has no wheezes  She has rales  Abdominal: Soft  Bowel sounds are normal  She exhibits distension  There is tenderness  There is no rebound and no guarding  RUQ and LUQ tenderness   Musculoskeletal: Normal range of motion  Neurological: She is alert and oriented to person, place, and time  Skin: Skin is warm and dry  No rash noted  She is not diaphoretic  No pallor  Psychiatric: She has a normal mood and affect  Her behavior is normal        Lab Results:   I have reviewed all pertinent labs    CBC:   Lab Results   Component Value Date    WBC 16 39 (H) 07/16/2018    HGB 10 5 (L) 07/16/2018    HCT 31 7 (L) 07/16/2018    MCV 87 07/16/2018  07/16/2018    MCH 28 8 07/16/2018    MCHC 33 1 07/16/2018    RDW 13 7 07/16/2018    MPV 9 7 07/16/2018    NRBC 0 07/16/2018     CMP:   Lab Results   Component Value Date     (L) 07/16/2018     07/16/2018    CO2 24 07/16/2018    ANIONGAP 7 07/16/2018    BUN 18 07/16/2018    CREATININE 1 06 07/16/2018    GLUCOSE 278 (H) 07/16/2018    CALCIUM 8 7 07/16/2018    AST 6 07/16/2018    ALT 11 (L) 07/16/2018    ALKPHOS 114 07/16/2018    PROT 7 0 07/16/2018    BILITOT 0 32 07/16/2018    EGFR 58 07/16/2018     Imaging Studies: I have personally reviewed pertinent films in PACS   CT ABDOMEN AND PELVIS WITH IV CONTRAST     INDICATION:   abdominal pain      COMPARISON: None      TECHNIQUE:  CT examination of the abdomen and pelvis was performed  Axial, sagittal, and coronal 2D reformatted images were created from the source data and submitted for interpretation      Radiation dose length product (DLP) for this visit:  401 2 mGy-cm   This examination, like all CT scans performed in the [facility], was performed utilizing techniques to minimize radiation dose exposure, including the use of iterative   reconstruction and automated exposure control      IV Contrast:  100 mL of iohexol (OMNIPAQUE)  Enteric Contrast:  Enteric contrast was not administered      FINDINGS:     ABDOMEN     LOWER CHEST:  No clinically significant abnormality identified in the visualized lower chest  Bilateral basilar areas of subsegmental atelectasis        LIVER/BILIARY TREE:  Unremarkable      GALLBLADDER:  Gallbladder is surgically absent      SPLEEN:  Unremarkable      PANCREAS:  Unremarkable      ADRENAL GLANDS:  Unremarkable      KIDNEYS/URETERS:  Unremarkable  No hydronephrosis      STOMACH AND BOWEL:  Complex mass in the sigmoid colon concerning for colon cancer  Additionally, a rectal walls are thickened concerning for proctitis    The colon proximal to the mass is distended and filled with stool concerning for a large bowel   obstruction  In the region surrounding the mass, the pelvic structures and rectum and mass aren't matted together making differentiation of these anatomic structures challenging  Complex multilocular fluid density lesion is noted in association with   the mass; it is unclear if this represents an abscess or necrotic tumor     The appearance of a large bowel obstruction is similar to that of the prior exam from outside hospital dated June 17, 2018  The prior exam demonstrated a single fluid density   collection (3:118); the present exam demonstrates a multiloculated fluid collection (2:65-71)     APPENDIX:  No findings to suggest appendicitis      ABDOMINOPELVIC CAVITY:  No ascites or free intraperitoneal air  No lymphadenopathy      VESSELS:  Unremarkable for patient's age      PELVIS     REPRODUCTIVE ORGANS:  Unremarkable for patient's age      URINARY BLADDER:  Unremarkable      ABDOMINAL WALL/INGUINAL REGIONS:  Symmetric calcifications are seen in the right upper quadrant     OSSEOUS STRUCTURES:  No acute fracture or destructive osseous lesion      IMPRESSION:     Complex mass in the sigmoid colon concerning for colon cancer  Additionally, a rectal walls are thickened concerning for proctitis  The colon proximal to the mass is distended and filled with stool concerning for a large bowel obstruction  In the   region surrounding the mass, the pelvic structures and rectum and mass are matted together making differentiation of these anatomic structures challenging  Complex multilocular fluid density lesion is noted in association with/abutting the mass; it is   unclear if this represents an abscess, necrotic tumor or a left ovarian lesion     The appearance of a large bowel obstruction is similar to that of the prior exam from outside hospital dated June 17, 2018 however while the prior exam demonstrated a   single fluid density collection (3:118), the present exam demonstrates a multiloculated fluid collection (2:65-71)  MRI OF THE PELVIS WITH AND WITHOUT CONTRAST (GYNECOLOGIC)     INDICATION:     COMPARISON:     TECHNIQUE: Sagittal 2-D fiesta, coronal T2, sagittal T2, axial T2, axial diffusion-weighted, axial T1, post contrast fat sat coronal, sagittal images are obtained  IV Contrast:  6 mL of gadobutrol injection (MULTI-DOSE)      FINDINGS:BOWEL:  There is a mass which involves the sigmoid colon  This mass measures about 10 cm in length  This mass indents the dome of the urinary bladder  The fat plane between this mass and the posterior aspect of the urinary bladder is   obscured     There is no polypoidal extension of this mass within the lumen of the urinary bladder  The fat plane between this mass and the anterior and superior aspect of the fundus of the uterus is obscured  This mass is adherent to the anterior   aspect of the fundus of the uterus  However there are no foci of myometrial enhancement  There is a pericolonic extension of this lesion on the left side as seen in image 20 of series 6  This Focal extension is likely related to the deep lateral pelvic wall fascia  The left ovary lies posterior to the mass, as seen in image 25  Fluid-containing nonenhancing area is also noted measuring about 1 4 x 2 1 cm at the level of adhesion of this mass to the urinary bladder suggest fluid collection      UTERUS: The uterus measures 6 6 x 3 6 x 5 1 cm  Junctional zone:  Normal in thickness  Myometrium:  A fibroid is seen measuring about 7 mm  Endometrium: Endometrial thickness measures about 3 7 mm ADNEXA:    No adnexal mass is seen  The left ovary measures 1 4 x 1 6 cm and the right ovary measures 1 8 x 1 2 cm  BLADDER: There is no polypoidal mass within the urinary bladder  There is thickening of the urinary bladder wall at the dome just to the left of the midline      PELVIC CAVITY:  There is no pelvic sidewall lymph node enlargement seen    Few pericolonic lymph nodes are seen  Small amount of free fluid in the pelvis  OSSEOUS STRUCTURES:   No osseous destruction      VASCULAR STRUCTURES:  Visualized vasculature is normal      PELVIC WALL:  This mass comes in contact with the deep endopelvic fascia on the left side     IMPRESSION:     There is a large sigmoid mass with the pericolonic extension  This mass abuts and indents the anterosuperior aspect of the fundus of the uterus  The fat plane between this mass and the dome of the urinary bladder is obliterated with thickening and   tethering of the wall of the urinary bladder suggesting bladder invasion      The left ureter passes at the posterior aspect of this mass as seen in coronal image 25 of series 14 and in image 14 of series 13  This mass measures at least 10 cm     A fluid collection is also seen in relation to the anterior aspect of this mass and superior aspect of the bladder measuring 1 4 x 2 1 cm at the level of bladder involvement     Few pericolonic lymph nodes are seen     Adhesion of the mass to the left lateral pelvic wall fascia is also seen     No focal bony lesions seen  EKG, Pathology, and Other Studies: I have personally reviewed pertinent reports  VTE Prophylaxis: Sequential compression device (Venodyne)  and Heparin    Code Status: Prior    Counseling / Coordination of Care  Total floor / unit time spent today 60 minutes  Greater than 50% of total time was spent with the patient and / or family counseling and / or coordination of care  A description of the counseling / coordination of care: Chemotherapy for locally advanced rectosigmoid cancer, compliance issues, therapy options for near complete malignant bowel obstruction

## 2018-07-16 NOTE — H&P
H&P - Colorectal Surgery   Emily Abdullahi 60 y o  female MRN: [id number]  Unit/Bed#: ED 08 Encounter: [id number]    Assessment/Plan   Assessment:  Ms Roberto Coppola is a 61 yo female with history of large obstructing sigmoid tumor, who presents with 3 days of constipation  Plan:  - Admit to Colorectal Surgery, Dr Schilling Even  - Diet NPO   - IVF  - Celestina Pat  - Appropriate home meds      History of Present Illness   HPI:  Ilma Neda Cranker is a 60 y o  female with history of CAD, BPD, Schizoaffective, COPD, HEP C, HTN, GERD, T2DM, and recent new diagnosis of sigmoid tumor who presents with 3 days of constipation  Over the past few months the patient has experienced thinning stools and constipation  She also endorsed 50 lbs weight loss  On 6/22/18 colonoscopy revealed large obstructive sigmoid tumor, which was biopsied and revealed to be "at least high-grade dysplasia involving a tubular adenoma"  She was sent to med onc to start chemo/rad therapy for hopefull conversion to resectable disease  Patient has started her chemotherapy, but has not had radiation yet at this time  She arrived to the ED today because of lower abdominal pain that comes and goes and the patient describes as feeling as though she needs to have a bowel movement  She passed some mucous and is still having flatus  She denied any fever, chills, nausea, or emesis  Consults    Review of Systems   Constitutional: Positive for unexpected weight change  Negative for diaphoresis and fever  HENT: Negative  Eyes: Negative  Respiratory: Negative for shortness of breath  Cardiovascular: Negative for chest pain  Gastrointestinal: Positive for abdominal pain and constipation  Negative for nausea and vomiting  Endocrine: Negative  Genitourinary: Negative  Musculoskeletal: Negative  Skin: Negative  Allergic/Immunologic: Negative  Neurological: Negative for light-headedness and headaches  Hematological: Negative  Psychiatric/Behavioral: Negative  Historical Information   Past Medical History:   Diagnosis Date    Bipolar depression ([place] [address] ) 3/17/2016    CAD S/P percutaneous coronary angioplasty 3/17/2016    Chronic pain     Colonic mass     COPD (chronic obstructive pulmonary disease) (HCC)     CVA (cerebral vascular accident) ([place] [address] )     R sided weakness    Essential hypertension 3/17/2016    GERD (gastroesophageal reflux disease)     Hepatitis C     Heroin abuse 3/18/2016    History of gastric ulcer     Hypertension     NSTEMI (non-ST elevated myocardial infarction) ([place] [address] ) 07/2012    Psychiatric disorder 09/03/2014    Inpatient admission     Schizoaffective disorder ([place] [address] ) 3/17/2016    Schizophrenia ([place] [address] ) 3/17/2016    STEMI (ST elevation myocardial infarction) ([place] [address] ) 12/2017    Type 2 diabetes mellitus ([place] [address] ) 3/17/2016     Past Surgical History:   Procedure Laterality Date    CARDIAC CATHETERIZATION  07/2016    COLONOSCOPY N/A 6/22/2018    Procedure: COLONOSCOPY;  Surgeon: Andre Acharya MD;  [facility]; Service: Colorectal    CORONARY ANGIOPLASTY WITH STENT PLACEMENT  07/05/2012    MAKEDA (LAD), PTA (Diag)    CORONARY ANGIOPLASTY WITH STENT PLACEMENT  12/2017    MAKEDA/ PTA (RCA)     Social History   History   Alcohol Use No     History   Drug Use No     Comment: only tried x 1      History   Smoking Status    Current Every Day Smoker    Types: Cigarettes   Smokeless Tobacco    Never Used     Family History: non-contributory    Meds/Allergies   PTA meds:   Prior to Admission Medications   Prescriptions Last Dose Informant Patient Reported?  Taking?   aspirin (ECOTRIN LOW STRENGTH) 81 mg EC tablet   Yes Yes   Sig: Take 81 mg by mouth daily   atorvastatin (LIPITOR) 80 mg tablet   Yes Yes   Sig: Take 80 mg by mouth daily   capecitabine (XELODA) 150 MG tablet   No Yes   Sig: Take 2 tablets (300 mg total) by mouth 2 (two) times a day for 42 days   capecitabine (XELODA) 500 MG tablet   No Yes Sig: Take 2 tablets (1,000 mg total) by mouth 2 (two) times a day for 42 days   clopidogrel (PLAVIX) 75 mg tablet   Yes Yes   Sig: Take 75 mg by mouth daily   famotidine (PEPCID) 20 mg tablet   Yes Yes   Sig: Take 20 mg by mouth 2 (two) times a day   metoprolol tartrate (LOPRESSOR) 25 mg tablet   Yes Yes   Sig: Take 25 mg by mouth every 12 (twelve) hours   traZODone (DESYREL) 50 mg tablet   Yes Yes   Sig: Take 50 mg by mouth daily at bedtime      Facility-Administered Medications: None     No Known Allergies    Objective   First Vitals:   Blood Pressure: 128/80 (07/15/18 2354)  Pulse: 89 (07/15/18 2354)  Temperature: 97 9 °F (36 6 °C) (07/15/18 2354)  Temp Source: Tympanic (07/15/18 2354)  Respirations: 20 (07/15/18 2354)  Height: 5' 3" (160 cm) (07/15/18 2354)  Weight - Scale: 56 7 kg (125 lb) (07/15/18 2354)  SpO2: 100 % (07/15/18 2354)    Current Vitals:   Blood Pressure: 116/59 (07/16/18 0329)  Pulse: (!) 54 (07/16/18 0329)  Temperature: 97 9 °F (36 6 °C) (07/15/18 2354)  Temp Source: Tympanic (07/15/18 2354)  Respirations: 20 (07/16/18 0329)  Height: 5' 3" (160 cm) (07/15/18 2354)  Weight - Scale: 56 7 kg (125 lb) (07/15/18 2354)  SpO2: 97 % (07/16/18 0329)    No intake or output data in the 24 hours ending 07/16/18 0334    Invasive Devices     Peripheral Intravenous Line            Peripheral IV 07/16/18 Left Wrist less than 1 day                Physical Exam   Constitutional: She is oriented to person, place, and time  She appears well-developed and well-nourished  HENT:   Head: Normocephalic and atraumatic  Mouth/Throat: Oropharynx is clear and moist    Eyes: Conjunctivae and EOM are normal  Pupils are equal, round, and reactive to light  Neck: Neck supple  No JVD present  No thyromegaly present  Cardiovascular: Normal rate, regular rhythm and intact distal pulses  Pulmonary/Chest: Effort normal and breath sounds normal  No respiratory distress  Abdominal: Soft  She exhibits distension   There is no tenderness  There is no rebound and no guarding  Musculoskeletal: Normal range of motion  She exhibits no edema  Lymphadenopathy:     She has no cervical adenopathy  Neurological: She is alert and oriented to person, place, and time  Skin: Skin is warm and dry  Psychiatric: She has a normal mood and affect  Her behavior is normal        Lab Results:   CBC:   Lab Results   Component Value Date    WBC 16 39 (H) 07/16/2018    HGB 10 5 (L) 07/16/2018    HCT 31 7 (L) 07/16/2018    MCV 87 07/16/2018     07/16/2018    MCH 28 8 07/16/2018    MCHC 33 1 07/16/2018    RDW 13 7 07/16/2018    MPV 10 3 07/16/2018    NRBC 0 07/16/2018   , CMP:   Lab Results   Component Value Date     (L) 07/16/2018    K 3 9 07/16/2018     07/16/2018    CO2 24 07/16/2018    ANIONGAP 7 07/16/2018    BUN 18 07/16/2018    CREATININE 1 06 07/16/2018    GLUCOSE 278 (H) 07/16/2018    CALCIUM 8 7 07/16/2018    AST 6 07/16/2018    ALT 11 (L) 07/16/2018    ALKPHOS 114 07/16/2018    PROT 7 0 07/16/2018    BILITOT 0 32 07/16/2018    EGFR 58 07/16/2018   , Coagulation: No results found for: PT, INR, APTT, Urinalysis:   Lab Results   Component Value Date    COLORU Yellow 07/16/2018    CLARITYU Slightly Cloudy 07/16/2018    SPECGRAV 1 020 07/16/2018    PHUR 7 0 07/16/2018    LEUKOCYTESUR Trace (A) 07/16/2018    NITRITE Negative 07/16/2018    PROTEINUA >=300 (A) 07/16/2018    GLUCOSEU 500 (1/2%) (A) 07/16/2018    KETONESU Negative 07/16/2018    BILIRUBINUR Negative 07/16/2018    BLOODU Moderate (A) 07/16/2018     Imaging: I have personally reviewed pertinent reports  EKG, Pathology, and Other Studies: I have personally reviewed pertinent reports

## 2018-07-16 NOTE — PLAN OF CARE
Problem: Potential for Falls  Goal: Patient will remain free of falls  INTERVENTIONS:  - Assess patient frequently for physical needs  -  Identify cognitive and physical deficits and behaviors that affect risk of falls  -  [place] fall precautions as indicated by assessment   - Educate patient/family on patient safety including physical limitations  - Instruct patient to call for assistance with activity based on assessment  - Modify environment to reduce risk of injury  - Consider OT/PT consult to assist with strengthening/mobility   Outcome: Progressing      Problem: Prexisting or High Potential for Compromised Skin Integrity  Goal: Skin integrity is maintained or improved  INTERVENTIONS:  - Identify patients at risk for skin breakdown  - Assess and monitor skin integrity  - Assess and monitor nutrition and hydration status  - Monitor labs (i e  albumin)  - Assess for incontinence   - Turn and reposition patient  - Assist with mobility/ambulation  - Relieve pressure over bony prominences  - Avoid friction and shearing  - Provide appropriate hygiene as needed including keeping skin clean and dry  - Evaluate need for skin moisturizer/barrier cream  - Collaborate with interdisciplinary team (i e  Nutrition, Rehabilitation, etc )   - Patient/family teaching   Outcome: Progressing      Problem: Nutrition/Hydration-ADULT  Goal: Nutrient/Hydration intake appropriate for improving, restoring or maintaining nutritional needs  Monitor and assess patient's nutrition/hydration status for malnutrition (ex- brittle hair, bruises, dry skin, pale skin and conjunctiva, muscle wasting, smooth red tongue, and disorientation)  Collaborate with interdisciplinary team and initiate plan and interventions as ordered  Monitor patient's weight and dietary intake as ordered or per policy  Utilize nutrition screening tool and intervene per policy   Determine patient's food preferences and provide high-protein, high-caloric foods as appropriate       INTERVENTIONS:  - Monitor oral intake, urinary output, labs, and treatment plans  - Assess nutrition and hydration status and recommend course of action  - Evaluate amount of meals eaten  - Assist patient with eating if necessary   - Allow adequate time for meals  - Recommend/ encourage appropriate diets, oral nutritional supplements, and vitamin/mineral supplements  - Order, calculate, and assess calorie counts as needed  - Recommend, monitor, and adjust tube feedings and TPN/PPN based on assessed needs  - Assess need for intravenous fluids  - Provide specific nutrition/hydration education as appropriate  - Include patient/family/caregiver in decisions related to nutrition   Outcome: Progressing

## 2018-07-17 ENCOUNTER — ANESTHESIA EVENT (INPATIENT)
Dept: PERIOP | Facility: HOSPITAL | Age: 58
DRG: 331 | End: 2018-07-17
Payer: COMMERCIAL

## 2018-07-17 ENCOUNTER — ANESTHESIA (INPATIENT)
Dept: PERIOP | Facility: HOSPITAL | Age: 58
DRG: 331 | End: 2018-07-17
Payer: COMMERCIAL

## 2018-07-17 LAB
ABO GROUP BLD: NORMAL
ANION GAP SERPL CALCULATED.3IONS-SCNC: 5 MMOL/L (ref 4–13)
BASOPHILS # BLD AUTO: 0.05 THOUSANDS/ΜL (ref 0–0.1)
BASOPHILS NFR BLD AUTO: 0 % (ref 0–1)
BLD GP AB SCN SERPL QL: NEGATIVE
BUN SERPL-MCNC: 10 MG/DL (ref 5–25)
CALCIUM SERPL-MCNC: 9.1 MG/DL (ref 8.3–10.1)
CHLORIDE SERPL-SCNC: 105 MMOL/L (ref 100–108)
CO2 SERPL-SCNC: 26 MMOL/L (ref 21–32)
CREAT SERPL-MCNC: 0.94 MG/DL (ref 0.6–1.3)
EOSINOPHIL # BLD AUTO: 0.53 THOUSAND/ΜL (ref 0–0.61)
EOSINOPHIL NFR BLD AUTO: 4 % (ref 0–6)
ERYTHROCYTE [DISTWIDTH] IN BLOOD BY AUTOMATED COUNT: 13.8 % (ref 11.6–15.1)
GFR SERPL CREATININE-BSD FRML MDRD: 67 ML/MIN/1.73SQ M
GLUCOSE SERPL-MCNC: 124 MG/DL (ref 65–140)
GLUCOSE SERPL-MCNC: 74 MG/DL (ref 65–140)
GLUCOSE SERPL-MCNC: 89 MG/DL (ref 65–140)
HCT VFR BLD AUTO: 34 % (ref 34.8–46.1)
HGB BLD-MCNC: 10.8 G/DL (ref 11.5–15.4)
IMM GRANULOCYTES # BLD AUTO: 0.09 THOUSAND/UL (ref 0–0.2)
IMM GRANULOCYTES NFR BLD AUTO: 1 % (ref 0–2)
LYMPHOCYTES # BLD AUTO: 3.09 THOUSANDS/ΜL (ref 0.6–4.47)
LYMPHOCYTES NFR BLD AUTO: 22 % (ref 14–44)
MCH RBC QN AUTO: 28.2 PG (ref 26.8–34.3)
MCHC RBC AUTO-ENTMCNC: 31.8 G/DL (ref 31.4–37.4)
MCV RBC AUTO: 89 FL (ref 82–98)
MONOCYTES # BLD AUTO: 0.93 THOUSAND/ΜL (ref 0.17–1.22)
MONOCYTES NFR BLD AUTO: 7 % (ref 4–12)
NEUTROPHILS # BLD AUTO: 9.5 THOUSANDS/ΜL (ref 1.85–7.62)
NEUTS SEG NFR BLD AUTO: 66 % (ref 43–75)
NRBC BLD AUTO-RTO: 0 /100 WBCS
PLATELET # BLD AUTO: 413 THOUSANDS/UL (ref 149–390)
PMV BLD AUTO: 10.1 FL (ref 8.9–12.7)
POTASSIUM SERPL-SCNC: 4.2 MMOL/L (ref 3.5–5.3)
RBC # BLD AUTO: 3.83 MILLION/UL (ref 3.81–5.12)
RH BLD: POSITIVE
SODIUM SERPL-SCNC: 136 MMOL/L (ref 136–145)
SPECIMEN EXPIRATION DATE: NORMAL
WBC # BLD AUTO: 14.19 THOUSAND/UL (ref 4.31–10.16)

## 2018-07-17 PROCEDURE — 0D1L4Z4 BYPASS TRANSVERSE COLON TO CUTANEOUS, PERCUTANEOUS ENDOSCOPIC APPROACH: ICD-10-PCS | Performed by: COLON & RECTAL SURGERY

## 2018-07-17 PROCEDURE — 85025 COMPLETE CBC W/AUTO DIFF WBC: CPT | Performed by: STUDENT IN AN ORGANIZED HEALTH CARE EDUCATION/TRAINING PROGRAM

## 2018-07-17 PROCEDURE — 82948 REAGENT STRIP/BLOOD GLUCOSE: CPT

## 2018-07-17 PROCEDURE — 80048 BASIC METABOLIC PNL TOTAL CA: CPT | Performed by: STUDENT IN AN ORGANIZED HEALTH CARE EDUCATION/TRAINING PROGRAM

## 2018-07-17 RX ORDER — ONDANSETRON 2 MG/ML
INJECTION INTRAMUSCULAR; INTRAVENOUS AS NEEDED
Status: DISCONTINUED | OUTPATIENT
Start: 2018-07-17 | End: 2018-07-17 | Stop reason: SURG

## 2018-07-17 RX ORDER — GLYCOPYRROLATE 0.2 MG/ML
INJECTION INTRAMUSCULAR; INTRAVENOUS AS NEEDED
Status: DISCONTINUED | OUTPATIENT
Start: 2018-07-17 | End: 2018-07-17 | Stop reason: SURG

## 2018-07-17 RX ORDER — DEXAMETHASONE SODIUM PHOSPHATE 4 MG/ML
8 INJECTION, SOLUTION INTRA-ARTICULAR; INTRALESIONAL; INTRAMUSCULAR; INTRAVENOUS; SOFT TISSUE ONCE AS NEEDED
Status: DISCONTINUED | OUTPATIENT
Start: 2018-07-17 | End: 2018-07-17 | Stop reason: HOSPADM

## 2018-07-17 RX ORDER — OXYCODONE HYDROCHLORIDE AND ACETAMINOPHEN 5; 325 MG/1; MG/1
1 TABLET ORAL EVERY 4 HOURS PRN
Status: DISCONTINUED | OUTPATIENT
Start: 2018-07-17 | End: 2018-07-23 | Stop reason: HOSPADM

## 2018-07-17 RX ORDER — PROPOFOL 10 MG/ML
INJECTION, EMULSION INTRAVENOUS AS NEEDED
Status: DISCONTINUED | OUTPATIENT
Start: 2018-07-17 | End: 2018-07-17 | Stop reason: SURG

## 2018-07-17 RX ORDER — CEFAZOLIN SODIUM 1 G/3ML
INJECTION, POWDER, FOR SOLUTION INTRAMUSCULAR; INTRAVENOUS AS NEEDED
Status: DISCONTINUED | OUTPATIENT
Start: 2018-07-17 | End: 2018-07-17 | Stop reason: SURG

## 2018-07-17 RX ORDER — MIDAZOLAM HYDROCHLORIDE 1 MG/ML
INJECTION INTRAMUSCULAR; INTRAVENOUS AS NEEDED
Status: DISCONTINUED | OUTPATIENT
Start: 2018-07-17 | End: 2018-07-17 | Stop reason: SURG

## 2018-07-17 RX ORDER — FENTANYL CITRATE 50 UG/ML
INJECTION, SOLUTION INTRAMUSCULAR; INTRAVENOUS AS NEEDED
Status: DISCONTINUED | OUTPATIENT
Start: 2018-07-17 | End: 2018-07-17 | Stop reason: SURG

## 2018-07-17 RX ORDER — ONDANSETRON 2 MG/ML
4 INJECTION INTRAMUSCULAR; INTRAVENOUS ONCE AS NEEDED
Status: DISCONTINUED | OUTPATIENT
Start: 2018-07-17 | End: 2018-07-17 | Stop reason: HOSPADM

## 2018-07-17 RX ORDER — EPHEDRINE SULFATE 50 MG/ML
INJECTION, SOLUTION INTRAVENOUS AS NEEDED
Status: DISCONTINUED | OUTPATIENT
Start: 2018-07-17 | End: 2018-07-17 | Stop reason: SURG

## 2018-07-17 RX ORDER — ALBUMIN, HUMAN INJ 5% 5 %
SOLUTION INTRAVENOUS CONTINUOUS PRN
Status: DISCONTINUED | OUTPATIENT
Start: 2018-07-17 | End: 2018-07-17 | Stop reason: SURG

## 2018-07-17 RX ORDER — OXYCODONE HYDROCHLORIDE AND ACETAMINOPHEN 5; 325 MG/1; MG/1
2 TABLET ORAL EVERY 4 HOURS PRN
Status: DISCONTINUED | OUTPATIENT
Start: 2018-07-17 | End: 2018-07-18

## 2018-07-17 RX ORDER — SODIUM CHLORIDE 9 MG/ML
INJECTION, SOLUTION INTRAVENOUS CONTINUOUS PRN
Status: DISCONTINUED | OUTPATIENT
Start: 2018-07-17 | End: 2018-07-17 | Stop reason: SURG

## 2018-07-17 RX ORDER — DIPHENHYDRAMINE HYDROCHLORIDE 50 MG/ML
12.5 INJECTION INTRAMUSCULAR; INTRAVENOUS ONCE AS NEEDED
Status: DISCONTINUED | OUTPATIENT
Start: 2018-07-17 | End: 2018-07-17 | Stop reason: HOSPADM

## 2018-07-17 RX ORDER — LIDOCAINE HYDROCHLORIDE 10 MG/ML
INJECTION, SOLUTION INFILTRATION; PERINEURAL AS NEEDED
Status: DISCONTINUED | OUTPATIENT
Start: 2018-07-17 | End: 2018-07-17 | Stop reason: SURG

## 2018-07-17 RX ORDER — FENTANYL CITRATE/PF 50 MCG/ML
25 SYRINGE (ML) INJECTION
Status: DISCONTINUED | OUTPATIENT
Start: 2018-07-17 | End: 2018-07-17 | Stop reason: HOSPADM

## 2018-07-17 RX ORDER — LABETALOL HYDROCHLORIDE 5 MG/ML
INJECTION, SOLUTION INTRAVENOUS AS NEEDED
Status: DISCONTINUED | OUTPATIENT
Start: 2018-07-17 | End: 2018-07-17 | Stop reason: SURG

## 2018-07-17 RX ORDER — ROCURONIUM BROMIDE 10 MG/ML
INJECTION, SOLUTION INTRAVENOUS AS NEEDED
Status: DISCONTINUED | OUTPATIENT
Start: 2018-07-17 | End: 2018-07-17 | Stop reason: SURG

## 2018-07-17 RX ADMIN — GLYCOPYRROLATE 0.4 MG: 0.2 INJECTION, SOLUTION INTRAMUSCULAR; INTRAVENOUS at 19:37

## 2018-07-17 RX ADMIN — DOCUSATE SODIUM 100 MG: 100 CAPSULE, LIQUID FILLED ORAL at 10:18

## 2018-07-17 RX ADMIN — FENTANYL CITRATE 25 MCG: 50 INJECTION, SOLUTION INTRAMUSCULAR; INTRAVENOUS at 20:13

## 2018-07-17 RX ADMIN — FENTANYL CITRATE 25 MCG: 50 INJECTION, SOLUTION INTRAMUSCULAR; INTRAVENOUS at 20:08

## 2018-07-17 RX ADMIN — NEOSTIGMINE METHYLSULFATE 3 MG: 1 INJECTION, SOLUTION INTRAMUSCULAR; INTRAVENOUS; SUBCUTANEOUS at 19:37

## 2018-07-17 RX ADMIN — SODIUM CHLORIDE, POTASSIUM CHLORIDE, SODIUM LACTATE AND CALCIUM CHLORIDE 100 ML/HR: 600; 310; 30; 20 INJECTION, SOLUTION INTRAVENOUS at 02:46

## 2018-07-17 RX ADMIN — HYDROMORPHONE HYDROCHLORIDE 1 MG: 1 INJECTION, SOLUTION INTRAMUSCULAR; INTRAVENOUS; SUBCUTANEOUS at 20:01

## 2018-07-17 RX ADMIN — FENTANYL CITRATE 25 MCG: 50 INJECTION, SOLUTION INTRAMUSCULAR; INTRAVENOUS at 20:24

## 2018-07-17 RX ADMIN — MORPHINE SULFATE 4 MG: 4 INJECTION INTRAVENOUS at 10:08

## 2018-07-17 RX ADMIN — FENTANYL CITRATE 25 MCG: 50 INJECTION, SOLUTION INTRAMUSCULAR; INTRAVENOUS at 20:48

## 2018-07-17 RX ADMIN — FENTANYL CITRATE 50 MCG: 50 INJECTION, SOLUTION INTRAMUSCULAR; INTRAVENOUS at 18:50

## 2018-07-17 RX ADMIN — NICOTINE 1 PATCH: 7 PATCH, EXTENDED RELEASE TRANSDERMAL at 10:15

## 2018-07-17 RX ADMIN — ALBUMIN (HUMAN): 12.5 SOLUTION INTRAVENOUS at 19:19

## 2018-07-17 RX ADMIN — MORPHINE SULFATE 4 MG: 4 INJECTION INTRAVENOUS at 14:10

## 2018-07-17 RX ADMIN — SODIUM CHLORIDE, POTASSIUM CHLORIDE, SODIUM LACTATE AND CALCIUM CHLORIDE 100 ML/HR: 600; 310; 30; 20 INJECTION, SOLUTION INTRAVENOUS at 14:09

## 2018-07-17 RX ADMIN — HEPARIN SODIUM 5000 UNITS: 5000 INJECTION, SOLUTION INTRAVENOUS; SUBCUTANEOUS at 14:09

## 2018-07-17 RX ADMIN — ONDANSETRON 4 MG: 2 INJECTION INTRAMUSCULAR; INTRAVENOUS at 19:29

## 2018-07-17 RX ADMIN — ALBUMIN (HUMAN): 12.5 SOLUTION INTRAVENOUS at 18:36

## 2018-07-17 RX ADMIN — LABETALOL HYDROCHLORIDE 10 MG: 5 INJECTION, SOLUTION INTRAVENOUS at 19:43

## 2018-07-17 RX ADMIN — ROCURONIUM BROMIDE 50 MG: 10 INJECTION INTRAVENOUS at 18:00

## 2018-07-17 RX ADMIN — PROPOFOL 150 MG: 10 INJECTION, EMULSION INTRAVENOUS at 18:00

## 2018-07-17 RX ADMIN — SODIUM CHLORIDE: 0.9 INJECTION, SOLUTION INTRAVENOUS at 18:20

## 2018-07-17 RX ADMIN — HEPARIN SODIUM 5000 UNITS: 5000 INJECTION, SOLUTION INTRAVENOUS; SUBCUTANEOUS at 21:25

## 2018-07-17 RX ADMIN — HYDROMORPHONE HYDROCHLORIDE 0.5 MG: 1 INJECTION, SOLUTION INTRAMUSCULAR; INTRAVENOUS; SUBCUTANEOUS at 18:55

## 2018-07-17 RX ADMIN — OXYCODONE HYDROCHLORIDE AND ACETAMINOPHEN 2 TABLET: 5; 325 TABLET ORAL at 23:23

## 2018-07-17 RX ADMIN — HYDROMORPHONE HYDROCHLORIDE 1 MG: 1 INJECTION, SOLUTION INTRAMUSCULAR; INTRAVENOUS; SUBCUTANEOUS at 19:47

## 2018-07-17 RX ADMIN — MORPHINE SULFATE 4 MG: 4 INJECTION INTRAVENOUS at 05:36

## 2018-07-17 RX ADMIN — LIDOCAINE HYDROCHLORIDE 50 MG: 10 INJECTION, SOLUTION INFILTRATION; PERINEURAL at 18:00

## 2018-07-17 RX ADMIN — CEFAZOLIN 1000 MG: 1 INJECTION, POWDER, FOR SOLUTION INTRAVENOUS at 18:40

## 2018-07-17 RX ADMIN — MIDAZOLAM 2 MG: 1 INJECTION INTRAMUSCULAR; INTRAVENOUS at 17:55

## 2018-07-17 RX ADMIN — FENTANYL CITRATE 50 MCG: 50 INJECTION, SOLUTION INTRAMUSCULAR; INTRAVENOUS at 18:00

## 2018-07-17 RX ADMIN — SODIUM CHLORIDE, POTASSIUM CHLORIDE, SODIUM LACTATE AND CALCIUM CHLORIDE: 600; 310; 30; 20 INJECTION, SOLUTION INTRAVENOUS at 17:57

## 2018-07-17 RX ADMIN — EPHEDRINE SULFATE 5 MG: 50 INJECTION, SOLUTION INTRAMUSCULAR; INTRAVENOUS; SUBCUTANEOUS at 18:14

## 2018-07-17 RX ADMIN — FENTANYL CITRATE 25 MCG: 50 INJECTION, SOLUTION INTRAMUSCULAR; INTRAVENOUS at 20:03

## 2018-07-17 RX ADMIN — HYDROMORPHONE HYDROCHLORIDE 1 MG: 1 INJECTION, SOLUTION INTRAMUSCULAR; INTRAVENOUS; SUBCUTANEOUS at 21:29

## 2018-07-17 RX ADMIN — Medication 500 MG: at 18:40

## 2018-07-17 NOTE — PROGRESS NOTES
Progress Note - Colorectal Surgery   Emily Abdullahi 60 y o  female MRN: [id number]  Unit/Bed#: [facility] 621-01 Encounter: [id number]    Assessment:  58F with known history of large obstructing sigmoid tumor, presenting with 3 days of constipation  Plan:  -NPO  -IVF  -SQH  -Plan for OR add-on today    Subjective/Objective   Chief Complaint: constipation    Subjective: awaiting surgery, no problems overnight    Objective:   Blood pressure 131/61, pulse 74, temperature 98 7 °F (37 1 °C), temperature source Oral, resp  rate 20, height 5' 3" (1 6 m), weight 57 4 kg (126 lb 8 7 oz), SpO2 99 %, not currently breastfeeding  ,Body mass index is 22 42 kg/m²  Intake/Output Summary (Last 24 hours) at 07/17/18 0531  Last data filed at 07/17/18 0300   Gross per 24 hour   Intake          3558 33 ml   Output             1975 ml   Net          1583 33 ml       Invasive Devices     Peripheral Intravenous Line            Peripheral IV 07/16/18 Left Antecubital less than 1 day                Physical Exam:   General: No acute distress  HEENT: Normocephalic, atraumatic with MMM  No scleral icterus  Neck: Normal ROM   No tracheal deviation  Cardio: Normal rate, regular rhythym  Pulm: Normal respiratory effort  Abdomen: Soft, moderately distended but non-tender  Extremities: TAPIA, No edema  Neuro: Cranial nerves II-XII intact  Psych: Normal affect      Lab, Imaging and other studies:  CBC:   Lab Results   Component Value Date     07/16/2018    MPV 9 7 07/16/2018   , CMP: No results found for: NA, K, CL, CO2, ANIONGAP, BUN, CREATININE, GLUCOSE, CALCIUM, AST, ALT, ALKPHOS, PROT, ALBUMIN, BILITOT, EGFR  VTE Pharmacologic Prophylaxis: Heparin  VTE Mechanical Prophylaxis: sequential compression device

## 2018-07-17 NOTE — OP NOTE
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]     * Large bowel obstruction ([address] ) [R67 415]    Procedure(s) (LRB):  LAPAROSCOPY DIAGNOSTIC, Laproscopic transverse loop colostomy, lysis of adhesions (N/A)    Specimen(s):  * No specimens in log *    Estimated Blood Loss:   50 mL    Drains:  Colostomy Loop RUQ (Active)   Stomal Appliance Clean;Dry; Intact;1 piece 2018  7:29 PM   Stoma Shape Round 2018  7:29 PM   Number of days: 0       Urethral Catheter Latex 16 Fr  (Active)   Number of days: 0       Anesthesia Type:   * No anesthesia type entered *    Operative Indications:  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]  Large bowel obstruction ([address] ) [K56 609]      Operative Findings:  Area of transverse colon that was easily able to be pulled to the abdominal wall  Adhesions to the anterior abdominal wall    Complications:   None    Procedure and Technique:  Patient was seen in preoperative holding  The risks and benefits of procedure were explained to the patient was agreeable to proceed  She was then taken back to the operating room and identified by name and birth date  She is placed on the operating room table in supine position and general anesthesia was then achieved  The patient was then positioned in lithotomy with her arms tucked and padded in the usual fashion  The abdomen was prepped with chlorhexidine and the patient was given preoperative Ancef and Flagyl prior to beginning the procedure  A time-out was held prior to beginning of the procedure  An infraumbilical incision was made  An 11 mm port was then inserted in the abdomen under direct visualization  The bowel underlying the port insertion was checked and was found to be free of any injury  Next an additional port 12 mm was placed in the right upper quadrant  Adhesions were lysed from the anterior abdominal wall, additional ports were placed in the right lower quadrant and the left upper quadrant  These were 5 mm ports  Next the bowel was grasped and pulled up through the 12 mm port in the right upper quadrant  This was done easily, and was again inspected intra-abdominally was found to be free of any tension or twisting  The remainder of the ports were closed with 4 O Monocryl and histocryl  The ostomy was then opened and matured both in the proximal and distal directions with with 3-0 chromic suture  The patient tolerated the procedure and was extubated  She was brought to PACU in stable condition  Dr Eloy Cruz was present and scrubbed for the entire case         Patient Disposition:  PACU     SIGNATURE: Leighann Mejia  DATE: July 17, 2018  TIME: 7:57 PM

## 2018-07-17 NOTE — ANESTHESIA PREPROCEDURE EVALUATION
Review of Systems/Medical History  Patient summary reviewed  Chart reviewed  No history of anesthetic complications     Cardiovascular  EKG reviewed, Hypertension controlled, Past MI > 6 months, CAD , CAD status: 2VD, Cardiac stents    Comment: POOR EXERCISE TOLERANCE ,  Pulmonary  COPD mild- PRN medicaiton ,        GI/Hepatic    GERD well controlled, Hepatitis B,   Comment: COLONIC MASS, GASTRIC ULCER          Endo/Other  Diabetes poorly controlled type 2 Insulin,      GYN       Hematology   Musculoskeletal       Neurology    CVA , no residual symptoms,    Psychology   Depression , bipolar disorder, Schizophrenia    Comment: Schizoaffective disorder, CHRONIC PAIN, IVDA         Physical Exam    Airway    Mallampati score: II  TM Distance: >3 FB  Neck ROM: full     Dental   lower dentures and upper dentures,     Cardiovascular      Pulmonary      Other Findings        Anesthesia Plan  ASA Score- 4 Emergent    Anesthesia Type- general with ASA Monitors  Additional Monitors: arterial line  Airway Plan: ETT  Comment: GETA, IV'S, EMANUEL, POSSIBLE BLOOD PRODUCTS/ PROLONGED ETT, AND ALL COMPLICATIONS DISCUSSED W/ PT  Plan Factors-    Induction- intravenous  Postoperative Plan- Plan for postoperative opioid use  Planned trial extubation    Informed Consent- Anesthetic plan and risks discussed with patient  I personally reviewed this patient with the CRNA  Discussed and agreed on the Anesthesia Plan with the CRNA             Lab Results   Component Value Date    WBC 14 19 (H) 07/17/2018    HGB 10 8 (L) 07/17/2018     (H) 07/17/2018     Lab Results   Component Value Date     07/17/2018    K 4 2 07/17/2018    BUN 10 07/17/2018    CREATININE 0 94 07/17/2018    GLUCOSE 124 07/17/2018     Lab Results   Component Value Date    PTT 31 06/21/2018      Lab Results   Component Value Date    INR 0 90 06/19/2018       Blood type A    Lab Results   Component Value Date    HGBA1C 10 4 (H) 06/20/2018

## 2018-07-17 NOTE — RESTORATIVE TECHNICIAN NOTE
Restorative Specialist Mobility Note       Activity: Ambulate in [place]aragoza, Ambulate in room, Bathroom privileges, Dangle, Stand at bedside (Educated/encouraged pt to ambulate with assistance 3-4 x's/day   Pt callbell, phone/tray within reach )     Assistive Device: None          [employer] BS, Restorative Technician, [employer]

## 2018-07-17 NOTE — ANESTHESIA POSTPROCEDURE EVALUATION
Post-Op Assessment Note      /87 (07/17/18 1953)    Temp 98 5 °F (36 9 °C) (07/17/18 1953)    Pulse 78 (07/17/18 1953)   Resp 18 (07/17/18 1953)    SpO2 100 % (07/17/18 1953)

## 2018-07-17 NOTE — ANESTHESIA PROCEDURE NOTES
Arterial Line Insertion  Date/Time: 7/17/2018 6:13 PM  Performed by: Angela Cortez by: Sukhjinder Eye   Consent: Written consent obtained  Risks and benefits: risks, benefits and alternatives were discussed  Consent given by: patient and spouse  Patient understanding: patient states understanding of the procedure being performed  Patient consent: the patient's understanding of the procedure matches consent given  Procedure consent: procedure consent matches procedure scheduled  Relevant documents: relevant documents present and verified  Test results: test results available and properly labeled  Required items: required blood products, implants, devices, and special equipment available  Patient identity confirmed: verbally with patient, arm band, provided demographic data and hospital-assigned identification number  Time out: Immediately prior to procedure a "time out" was called to verify the correct patient, procedure, equipment, support staff and site/side marked as required  Preparation: Patient was prepped and draped in the usual sterile fashion  Indications: hemodynamic monitoring  Orientation:  Left  Location: radial artery  Sedation:  Patient sedated: GETA      Procedure Details:  Denver's test normal: yes  Needle gauge: 20  Seldinger technique: Seldinger technique used  Cutdown: cutdown required  Number of attempts: 1    Post-procedure:  Post-procedure: dressing applied  Waveform: good waveform  Post-procedure CNS: normal  Patient tolerance: Patient tolerated the procedure well with no immediate complications

## 2018-07-17 NOTE — PLAN OF CARE
Nutrition/Hydration-ADULT     Nutrient/Hydration intake appropriate for improving, restoring or maintaining nutritional needs Not Progressing

## 2018-07-17 NOTE — ANESTHESIA POSTPROCEDURE EVALUATION
Post-Op Assessment Note      CV Status:  Stable    Mental Status:  Agitated    Hydration Status:  Stable    PONV Controlled:  None    Airway Patency:  Patent    Post Op Vitals Reviewed: Yes          Staff: Anesthesiologist           /87 (07/17/18 1953)    Temp 98 5 °F (36 9 °C) (07/17/18 1953)    Pulse 78 (07/17/18 1953)   Resp 18 (07/17/18 1953)    SpO2 100 % (07/17/18 1953)

## 2018-07-18 LAB
ANION GAP SERPL CALCULATED.3IONS-SCNC: 6 MMOL/L (ref 4–13)
BASOPHILS # BLD AUTO: 0.04 THOUSANDS/ΜL (ref 0–0.1)
BASOPHILS NFR BLD AUTO: 0 % (ref 0–1)
BUN SERPL-MCNC: 11 MG/DL (ref 5–25)
CALCIUM SERPL-MCNC: 8.5 MG/DL (ref 8.3–10.1)
CHLORIDE SERPL-SCNC: 105 MMOL/L (ref 100–108)
CO2 SERPL-SCNC: 27 MMOL/L (ref 21–32)
CREAT SERPL-MCNC: 0.97 MG/DL (ref 0.6–1.3)
EOSINOPHIL # BLD AUTO: 0.15 THOUSAND/ΜL (ref 0–0.61)
EOSINOPHIL NFR BLD AUTO: 1 % (ref 0–6)
ERYTHROCYTE [DISTWIDTH] IN BLOOD BY AUTOMATED COUNT: 13.8 % (ref 11.6–15.1)
GFR SERPL CREATININE-BSD FRML MDRD: 64 ML/MIN/1.73SQ M
GLUCOSE SERPL-MCNC: 85 MG/DL (ref 65–140)
HCT VFR BLD AUTO: 29 % (ref 34.8–46.1)
HGB BLD-MCNC: 9 G/DL (ref 11.5–15.4)
IMM GRANULOCYTES # BLD AUTO: 0.06 THOUSAND/UL (ref 0–0.2)
IMM GRANULOCYTES NFR BLD AUTO: 1 % (ref 0–2)
LYMPHOCYTES # BLD AUTO: 2.14 THOUSANDS/ΜL (ref 0.6–4.47)
LYMPHOCYTES NFR BLD AUTO: 18 % (ref 14–44)
MCH RBC QN AUTO: 27.9 PG (ref 26.8–34.3)
MCHC RBC AUTO-ENTMCNC: 31 G/DL (ref 31.4–37.4)
MCV RBC AUTO: 90 FL (ref 82–98)
MONOCYTES # BLD AUTO: 0.89 THOUSAND/ΜL (ref 0.17–1.22)
MONOCYTES NFR BLD AUTO: 8 % (ref 4–12)
NEUTROPHILS # BLD AUTO: 8.41 THOUSANDS/ΜL (ref 1.85–7.62)
NEUTS SEG NFR BLD AUTO: 72 % (ref 43–75)
NRBC BLD AUTO-RTO: 0 /100 WBCS
PLATELET # BLD AUTO: 399 THOUSANDS/UL (ref 149–390)
PMV BLD AUTO: 10.4 FL (ref 8.9–12.7)
POTASSIUM SERPL-SCNC: 4.3 MMOL/L (ref 3.5–5.3)
RBC # BLD AUTO: 3.23 MILLION/UL (ref 3.81–5.12)
SODIUM SERPL-SCNC: 138 MMOL/L (ref 136–145)
WBC # BLD AUTO: 11.69 THOUSAND/UL (ref 4.31–10.16)

## 2018-07-18 PROCEDURE — 99254 IP/OBS CNSLTJ NEW/EST MOD 60: CPT | Performed by: PSYCHIATRY & NEUROLOGY

## 2018-07-18 PROCEDURE — 85025 COMPLETE CBC W/AUTO DIFF WBC: CPT | Performed by: STUDENT IN AN ORGANIZED HEALTH CARE EDUCATION/TRAINING PROGRAM

## 2018-07-18 PROCEDURE — 80048 BASIC METABOLIC PNL TOTAL CA: CPT | Performed by: STUDENT IN AN ORGANIZED HEALTH CARE EDUCATION/TRAINING PROGRAM

## 2018-07-18 RX ORDER — DEXTROSE, SODIUM CHLORIDE, AND POTASSIUM CHLORIDE 5; .45; .15 G/100ML; G/100ML; G/100ML
50 INJECTION INTRAVENOUS CONTINUOUS
Status: DISCONTINUED | OUTPATIENT
Start: 2018-07-18 | End: 2018-07-19

## 2018-07-18 RX ORDER — TRAZODONE HYDROCHLORIDE 50 MG/1
50 TABLET ORAL
Status: DISCONTINUED | OUTPATIENT
Start: 2018-07-18 | End: 2018-07-23 | Stop reason: HOSPADM

## 2018-07-18 RX ORDER — OXYCODONE HYDROCHLORIDE AND ACETAMINOPHEN 5; 325 MG/1; MG/1
2 TABLET ORAL EVERY 4 HOURS PRN
Status: DISCONTINUED | OUTPATIENT
Start: 2018-07-18 | End: 2018-07-23 | Stop reason: HOSPADM

## 2018-07-18 RX ADMIN — OXYCODONE HYDROCHLORIDE AND ACETAMINOPHEN 2 TABLET: 5; 325 TABLET ORAL at 12:21

## 2018-07-18 RX ADMIN — HEPARIN SODIUM 5000 UNITS: 5000 INJECTION, SOLUTION INTRAVENOUS; SUBCUTANEOUS at 14:18

## 2018-07-18 RX ADMIN — NICOTINE 1 PATCH: 7 PATCH, EXTENDED RELEASE TRANSDERMAL at 09:44

## 2018-07-18 RX ADMIN — SODIUM CHLORIDE, POTASSIUM CHLORIDE, SODIUM LACTATE AND CALCIUM CHLORIDE 100 ML/HR: 600; 310; 30; 20 INJECTION, SOLUTION INTRAVENOUS at 06:22

## 2018-07-18 RX ADMIN — HEPARIN SODIUM 5000 UNITS: 5000 INJECTION, SOLUTION INTRAVENOUS; SUBCUTANEOUS at 06:22

## 2018-07-18 RX ADMIN — SODIUM CHLORIDE 500 ML: 0.9 INJECTION, SOLUTION INTRAVENOUS at 17:48

## 2018-07-18 RX ADMIN — HYDROMORPHONE HYDROCHLORIDE 0.5 MG: 1 INJECTION, SOLUTION INTRAMUSCULAR; INTRAVENOUS; SUBCUTANEOUS at 23:01

## 2018-07-18 RX ADMIN — DOCUSATE SODIUM 100 MG: 100 CAPSULE, LIQUID FILLED ORAL at 17:42

## 2018-07-18 RX ADMIN — OXYCODONE HYDROCHLORIDE AND ACETAMINOPHEN 2 TABLET: 5; 325 TABLET ORAL at 06:21

## 2018-07-18 RX ADMIN — DEXTROSE, SODIUM CHLORIDE, AND POTASSIUM CHLORIDE 50 ML/HR: 5; .45; .15 INJECTION INTRAVENOUS at 09:49

## 2018-07-18 RX ADMIN — HYDROMORPHONE HYDROCHLORIDE 1 MG: 1 INJECTION, SOLUTION INTRAMUSCULAR; INTRAVENOUS; SUBCUTANEOUS at 04:16

## 2018-07-18 RX ADMIN — HEPARIN SODIUM 5000 UNITS: 5000 INJECTION, SOLUTION INTRAVENOUS; SUBCUTANEOUS at 21:39

## 2018-07-18 RX ADMIN — OXYCODONE HYDROCHLORIDE AND ACETAMINOPHEN 2 TABLET: 5; 325 TABLET ORAL at 20:10

## 2018-07-18 RX ADMIN — DOCUSATE SODIUM 100 MG: 100 CAPSULE, LIQUID FILLED ORAL at 09:45

## 2018-07-18 RX ADMIN — OXYCODONE HYDROCHLORIDE AND ACETAMINOPHEN 2 TABLET: 5; 325 TABLET ORAL at 16:04

## 2018-07-18 RX ADMIN — HYDROMORPHONE HYDROCHLORIDE 1 MG: 1 INJECTION, SOLUTION INTRAMUSCULAR; INTRAVENOUS; SUBCUTANEOUS at 09:45

## 2018-07-18 NOTE — SOCIAL WORK
Completed MA-51 and PASRR faxed to [address] [place] [address] [phone] for anticipated d/c to SNF

## 2018-07-18 NOTE — PROGRESS NOTES
Progress Note - Colorectal Surgery   Emily Abdullahi 60 y o  female MRN: [id number]  Unit/Bed#: [facility] 621-01 Encounter: [id number]    Assessment:  58F POD 1 from a diagnostic laparoscopy and diverting loop transverse ileostomy  Her pain is well controlled and she feels well    Plan:  -OOB/AAT  -Advance diet as tolerated  -colostomy teaching per nursing      Subjective/Objective   Chief Complaint: left hand "tingly"    Subjective: feels like her left hand is sometimes tingling, sometimes numb since surgery  States it feels cold    Objective: No distress, patient resting comfortably in bed    Blood pressure 121/63, pulse 92, temperature 98 7 °F (37 1 °C), temperature source Oral, resp  rate 18, height 5' 3" (1 6 m), weight 57 4 kg (126 lb 8 7 oz), SpO2 94 %, not currently breastfeeding  ,Body mass index is 22 42 kg/m²  Intake/Output Summary (Last 24 hours) at 07/18/18 0612  Last data filed at 07/18/18 0401   Gross per 24 hour   Intake          4101 67 ml   Output             3205 ml   Net           896 67 ml       Invasive Devices     Peripheral Intravenous Line            Peripheral IV 07/17/18 Left Forearm less than 1 day    Peripheral IV 07/17/18 Right Forearm less than 1 day    Peripheral IV 07/17/18 Right Forearm less than 1 day          Drain            Colostomy Loop RUQ less than 1 day    Urethral Catheter Latex 16 Fr  less than 1 day                Physical Exam:   General: No acute distress  HEENT: Normocephalic, atraumatic with MMM  No scleral icterus  Neck: Normal ROM  No tracheal deviation  Cardio: Normal rate, regular rhythym  No murmurs, rubs, or gallops  Pulm: Normal respiratory effort  Abdomen: Soft, non-tender, non-distended  Right sided ostomy with 50-100cc of serosanguinous output  Extremities: TAPIA, No edema   Left hand sensate, good radial pulse, 5+hand  strength, all equal to right hand  Neuro: Cranial nerves II-XII intact  Psych: Normal affect      Lab, Imaging and other studies:CBC: No results found for: WBC, HGB, HCT, MCV, PLT, ADJUSTEDWBC, MCH, MCHC, RDW, MPV, NRBC, CMP: No results found for: NA, K, CL, CO2, ANIONGAP, BUN, CREATININE, GLUCOSE, CALCIUM, AST, ALT, ALKPHOS, PROT, ALBUMIN, BILITOT, EGFR  VTE Pharmacologic Prophylaxis: Heparin  VTE Mechanical Prophylaxis: sequential compression device

## 2018-07-18 NOTE — PROGRESS NOTES
Post-Op Check - White Surgery   Emily Abdullahi 60 y o  female MRN: [id number]  Unit/Bed#: [facility] 621-01 Encounter: [id number]    Assessment:  59 y/o F s/p diagnostic laparoscopy, laparoscopic transverse loop colostomy, lysis of adhesions    Plan:  --NPO; ADAT  --LR @ 100 cc/hr  --Pain control  --Bowel regimen  --DVT ppx: SQH  --IS  --OOB, ambulate    Subjective/Objective     Subjective:     Pt is c/o 10/10 diffuse abdominal pain; pt had just received Percocet minutes before arrival  Currently NPO  Denies any N/V/D  Denies any flatus, bm  Objective:     Blood pressure 127/72, pulse 69, temperature 97 8 °F (36 6 °C), temperature source Oral, resp  rate 20, height 5' 3" (1 6 m), weight 57 4 kg (126 lb 8 7 oz), SpO2 100 %, not currently breastfeeding  ,Body mass index is 22 42 kg/m²  Intake/Output Summary (Last 24 hours) at 07/17/18 2353  Last data filed at 07/17/18 2052   Gross per 24 hour   Intake             5505 ml   Output             3725 ml   Net             1780 ml       Invasive Devices     Peripheral Intravenous Line            Peripheral IV 07/17/18 Left Forearm less than 1 day    Peripheral IV 07/17/18 Right Forearm less than 1 day    Peripheral IV 07/17/18 Right Forearm less than 1 day          Drain            Colostomy Loop RUQ less than 1 day    Urethral Catheter Latex 16 Fr  less than 1 day                Physical Exam:     GEN: NAD  HEENT: MMM  CV: RRR  Lung: normal effort  Ab: Soft, mildly distended, non-tender to palpation; stoma site appears pink, healthy; brown drainage from ostomy bag   Extrem: No CCE  Neuro:  A+Ox3, motor and sensation grossly intact  : Hanks in place    Lab, Imaging and other studies:  CBC:   Lab Results   Component Value Date    WBC 14 19 (H) 07/17/2018    HGB 10 8 (L) 07/17/2018    HCT 34 0 (L) 07/17/2018    MCV 89 07/17/2018     (H) 07/17/2018    MCH 28 2 07/17/2018    MCHC 31 8 07/17/2018    RDW 13 8 07/17/2018    MPV 10 1 07/17/2018    NRBC 0 07/17/2018   , CMP:   Lab Results   Component Value Date     07/17/2018    K 4 2 07/17/2018     07/17/2018    CO2 26 07/17/2018    ANIONGAP 5 07/17/2018    BUN 10 07/17/2018    CREATININE 0 94 07/17/2018    GLUCOSE 124 07/17/2018    CALCIUM 9 1 07/17/2018    EGFR 67 07/17/2018     VTE Pharmacologic Prophylaxis: Heparin  VTE Mechanical Prophylaxis: sequential compression device
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
## 2018-07-18 NOTE — WOUND OSTOMY CARE
Progress Note- Ostomy  Emily Abdullahi 60 y o  female  [id number]  [facility] 6[facility] 621-01        Assessment:  POD# 1 Patient is out of bed in chair, reports no nausea or abdominal discomfort, initially hesitant to ET nurse presence and ignoring by remaining with eyes closed  Once patient open her eyes, she was made aware ET nurse's visit purpose, patient listening but have no questions  Teaching started by introducing patient to what a colostomy, how it works, healing process and daily care  Patient was provided with "Life After Colostomy Surgery" booklet day prior to surgery with encouragement to read, however patient did not  Reviewed how to empty, clean skin and stoma, changing pouch and choosing appropriated pouching system as well as diet and ADL with an ostomy  Patient provided with additional ostomy care related booklet for reading, however patient asked reading materials to be placed on windowsill  Supplies placed in room, patient made aware ET nurse will return tomorrow for first pouch change with continued teaching  Stoma is well budded, red moist and placed to her RUQ, once piece pouch is in place with no leakage and minimal serosanguinous output  Plan: We will see patient tomorrow for teaching  Vitals:    07/18/18 0700   BP: 109/55   Pulse: 77   Resp: 18   Temp: 98 7 °F (37 1 °C)   SpO2: 99%       Incision 07/17/18 Abdomen Other (Comment) (Active)   Incision Description Clean;Dry; Intact 7/17/2018 11:23 PM   Monica-wound Assessment Clean;Dry; Intact 7/17/2018 11:23 PM   Closure Hystocryl 7/17/2018 11:23 PM   Drainage Amount None 7/17/2018 11:23 PM   Dressing Open to air 7/17/2018 11:23 PM   Patient Tolerance Tolerated well 7/17/2018 11:23 PM   Number of days: 1     Colostomy Loop RUQ (Active)   Stomal Appliance 1 piece;Clean;Dry; Intact 7/17/2018 11:23 PM   Stoma Assessment [place] 7/17/2018 11:23 PM   Stoma Shape Round 7/17/2018 11:23 PM   Peristomal Assessment Clean; Intact 7/17/2018 11:23 PM   Output (mL) 50 mL 7/18/2018 11:00 AM   Number of days: 1       Wound care to continue following, please call ext [phone] with questions or concerns      Tosin Knight RN, BSN, Darragh Axon

## 2018-07-18 NOTE — NUTRITION
07/18/18 1627   Recommendations/Interventions   Summary pt triggered for nutrition consult due to report of wt loss; per physician notes pt claims to have had a 50lb wt loss (? time frame) however all available documented/ presumed measured wts do not reveal 50lb wt loss   Interventions Diet: to Advance   Nutrition Recommendations Continue diet as ordered; Other (specify)  (increase oral diet as tolerated to CCD1)

## 2018-07-18 NOTE — SOCIAL WORK
GUILLERMO met with Pt and family to discuss the recommendation of SNF which she reported being agreeable to, and requested a referral to [facility]  CM has made the requested SNF referral and explained the Option process to Pt and family

## 2018-07-19 LAB
ANION GAP SERPL CALCULATED.3IONS-SCNC: 6 MMOL/L (ref 4–13)
BASOPHILS # BLD AUTO: 0.03 THOUSANDS/ΜL (ref 0–0.1)
BASOPHILS NFR BLD AUTO: 0 % (ref 0–1)
BUN SERPL-MCNC: 9 MG/DL (ref 5–25)
CALCIUM SERPL-MCNC: 8.5 MG/DL (ref 8.3–10.1)
CHLORIDE SERPL-SCNC: 104 MMOL/L (ref 100–108)
CO2 SERPL-SCNC: 26 MMOL/L (ref 21–32)
CREAT SERPL-MCNC: 0.77 MG/DL (ref 0.6–1.3)
EOSINOPHIL # BLD AUTO: 0.36 THOUSAND/ΜL (ref 0–0.61)
EOSINOPHIL NFR BLD AUTO: 4 % (ref 0–6)
ERYTHROCYTE [DISTWIDTH] IN BLOOD BY AUTOMATED COUNT: 13.7 % (ref 11.6–15.1)
GFR SERPL CREATININE-BSD FRML MDRD: 85 ML/MIN/1.73SQ M
GLUCOSE SERPL-MCNC: 167 MG/DL (ref 65–140)
HCT VFR BLD AUTO: 29.1 % (ref 34.8–46.1)
HGB BLD-MCNC: 9.1 G/DL (ref 11.5–15.4)
IMM GRANULOCYTES # BLD AUTO: 0.04 THOUSAND/UL (ref 0–0.2)
IMM GRANULOCYTES NFR BLD AUTO: 0 % (ref 0–2)
LYMPHOCYTES # BLD AUTO: 1.61 THOUSANDS/ΜL (ref 0.6–4.47)
LYMPHOCYTES NFR BLD AUTO: 17 % (ref 14–44)
MCH RBC QN AUTO: 28.5 PG (ref 26.8–34.3)
MCHC RBC AUTO-ENTMCNC: 31.3 G/DL (ref 31.4–37.4)
MCV RBC AUTO: 91 FL (ref 82–98)
MONOCYTES # BLD AUTO: 0.92 THOUSAND/ΜL (ref 0.17–1.22)
MONOCYTES NFR BLD AUTO: 10 % (ref 4–12)
NEUTROPHILS # BLD AUTO: 6.71 THOUSANDS/ΜL (ref 1.85–7.62)
NEUTS SEG NFR BLD AUTO: 69 % (ref 43–75)
NRBC BLD AUTO-RTO: 0 /100 WBCS
PLATELET # BLD AUTO: 423 THOUSANDS/UL (ref 149–390)
PMV BLD AUTO: 10 FL (ref 8.9–12.7)
POTASSIUM SERPL-SCNC: 4.2 MMOL/L (ref 3.5–5.3)
RBC # BLD AUTO: 3.19 MILLION/UL (ref 3.81–5.12)
SODIUM SERPL-SCNC: 136 MMOL/L (ref 136–145)
WBC # BLD AUTO: 9.67 THOUSAND/UL (ref 4.31–10.16)

## 2018-07-19 PROCEDURE — 85025 COMPLETE CBC W/AUTO DIFF WBC: CPT | Performed by: PHYSICIAN ASSISTANT

## 2018-07-19 PROCEDURE — G8979 MOBILITY GOAL STATUS: HCPCS

## 2018-07-19 PROCEDURE — G8978 MOBILITY CURRENT STATUS: HCPCS

## 2018-07-19 PROCEDURE — 80048 BASIC METABOLIC PNL TOTAL CA: CPT | Performed by: PHYSICIAN ASSISTANT

## 2018-07-19 PROCEDURE — 97162 PT EVAL MOD COMPLEX 30 MIN: CPT

## 2018-07-19 RX ADMIN — HEPARIN SODIUM 5000 UNITS: 5000 INJECTION, SOLUTION INTRAVENOUS; SUBCUTANEOUS at 05:27

## 2018-07-19 RX ADMIN — DOCUSATE SODIUM 100 MG: 100 CAPSULE, LIQUID FILLED ORAL at 09:58

## 2018-07-19 RX ADMIN — OXYCODONE HYDROCHLORIDE AND ACETAMINOPHEN 2 TABLET: 5; 325 TABLET ORAL at 14:45

## 2018-07-19 RX ADMIN — OXYCODONE HYDROCHLORIDE AND ACETAMINOPHEN 2 TABLET: 5; 325 TABLET ORAL at 21:42

## 2018-07-19 RX ADMIN — HEPARIN SODIUM 5000 UNITS: 5000 INJECTION, SOLUTION INTRAVENOUS; SUBCUTANEOUS at 21:42

## 2018-07-19 RX ADMIN — DOCUSATE SODIUM 100 MG: 100 CAPSULE, LIQUID FILLED ORAL at 20:05

## 2018-07-19 RX ADMIN — OXYCODONE HYDROCHLORIDE AND ACETAMINOPHEN 2 TABLET: 5; 325 TABLET ORAL at 09:58

## 2018-07-19 RX ADMIN — NICOTINE 1 PATCH: 7 PATCH, EXTENDED RELEASE TRANSDERMAL at 09:56

## 2018-07-19 RX ADMIN — HEPARIN SODIUM 5000 UNITS: 5000 INJECTION, SOLUTION INTRAVENOUS; SUBCUTANEOUS at 14:41

## 2018-07-19 NOTE — PROGRESS NOTES
Progress Note - Colorectal Surgery   Emily Abdullahi 60 y o  female MRN: [id number]  Unit/Bed#: [facility] 621-01 Encounter: [id number]    Assessment:  58F POD 1 from a diagnostic laparoscopy and diverting loop transverse ileostomy  Her pain is well controlled and she feels well    Plan:  - Diet - Maintain Clears  - OOB/Ambulate  - Continue work with CM  - PT/OT      Subjective/Objective   Chief Complaint:     Subjective: No complaints overnight  Mild pain in abdomen  No bowel movements into colostomy, no passing of flatus  Patient able to tolerate PO Clear liquids with no issues  No nausea vomiting, fevers or chills  Ambulated  Objective:     Blood pressure 123/56, pulse 80, temperature 98 9 °F (37 2 °C), temperature source Oral, resp  rate 18, height 5' 3" (1 6 m), weight 57 4 kg (126 lb 8 7 oz), SpO2 92 %, not currently breastfeeding  ,Body mass index is 22 42 kg/m²  Intake/Output Summary (Last 24 hours) at 07/19/18 0439  Last data filed at 07/19/18 0415   Gross per 24 hour   Intake          2158 33 ml   Output             2250 ml   Net           -91 67 ml       Invasive Devices     Peripheral Intravenous Line            Peripheral IV 07/17/18 Left Forearm 1 day    Peripheral IV 07/17/18 Right Forearm 1 day    Peripheral IV 07/17/18 Right Forearm 1 day          Drain            Colostomy Loop RUQ 1 day                Physical Exam:  General: AAOx3  Respiratory: BS b/l  Abdomen: Soft, NT, no rebound/guarding  Incision c/d/i, bowel sounds minimal  Diverting Loop Transverse Colostomy - no gas output, serosanguinous output of 125mL   No true stool yet  Heart: RRR, S1s2  Ext: Warm no cyanosis          Lab, Imaging and other studies:CBC:   Lab Results   Component Value Date    WBC 11 69 (H) 07/18/2018    HGB 9 0 (L) 07/18/2018    HCT 29 0 (L) 07/18/2018    MCV 90 07/18/2018     (H) 07/18/2018    MCH 27 9 07/18/2018    MCHC 31 0 (L) 07/18/2018    RDW 13 8 07/18/2018    MPV 10 4 07/18/2018    NRBC 0 07/18/2018   , CMP:   Lab Results   Component Value Date     07/18/2018    K 4 3 07/18/2018     07/18/2018    CO2 27 07/18/2018    ANIONGAP 6 07/18/2018    BUN 11 07/18/2018    CREATININE 0 97 07/18/2018    GLUCOSE 85 07/18/2018    CALCIUM 8 5 07/18/2018    EGFR 64 07/18/2018     VTE Pharmacologic Prophylaxis: Heparin  VTE Mechanical Prophylaxis: sequential compression device

## 2018-07-19 NOTE — RESTORATIVE TECHNICIAN NOTE
Restorative Specialist Mobility Note       Activity: Ambulate in zaragoza, Ambulate in room, Bathroom privileges, Dangle, Stand at bedside, Chair (Educated/encouraged pt to ambulate with assistance 3-4 x's/day   Pt callbell, phone/tray within reach )     Assistive Device: None          Cathy MICHELE, Restorative Technician, [employer]

## 2018-07-19 NOTE — WOUND OSTOMY CARE
Progress Note- Ostomy  Emily Abdullahi 60 y o  female  [id number]  [facility] 621-[facility] 621-01        Assessment:  Patient seen early afternoon for continue ostomy care teaching and first pouch change post op  On arrival patient laying bed stating she is now comfortable and ready to go sleep and would prefer ET nurse to return tomorrow  However she was receptive to verbal review of teaching done yesterday, she was able to remember correctly information provided and participated in emptying pouch  Stoma remains well budded, pink, moist with intact one piece pouch in place  Abdomen is minimally distended and non tender, no evidence of flatus or stool, minimal serosanguinos effluent noted  Reviewed how to empty and burp bag, encouraged patient to read booklets given  Plan: We will patient tomorrow for pouch change and continued teaching  Vitals:    07/19/18 0716   BP: 112/61   Pulse: 78   Resp: 18   Temp: 98 2 °F (36 8 °C)   SpO2: 95%     Incision 07/17/18 Abdomen Other (Comment) (Active)   Incision Description Clean;Dry; Intact 7/18/2018  8:00 PM   Monica-wound Assessment Clean;Dry; Intact 7/18/2018  8:00 PM   Closure Hystocryl 7/18/2018  8:00 PM   Drainage Amount None 7/18/2018  8:00 PM   Dressing Open to air 7/18/2018  8:00 PM   Patient Tolerance Tolerated well 7/18/2018 10:30 AM   Number of days: 2     Colostomy Loop RUQ (Active)   Stomal Appliance 1 piece;Clean;Dry; Intact 7/18/2018 10:30 AM   Stoma Assessment [place] 7/18/2018 10:30 AM   Stoma Shape Round 7/18/2018 10:30 AM   Peristomal Assessment Clean; Intact 7/18/2018 10:30 AM   Output (mL) 0 mL 7/18/2018  9:00 PM   Number of days: 2       Primary RN at bed side, aware of plans and amount of fluid emptied  We will continue following with care and teaching  Dana Loja, RN, BSN, [employer]

## 2018-07-19 NOTE — PHYSICAL THERAPY NOTE
Physical Therapy Initial Evaluation   Aileen Appiahcelia, PT   07/19/18 1641   Note Type   Note type Eval only   Pain Assessment   Pain Assessment 0-10   Pain Score No Pain   Home Living   Type of Home House   Home Layout Two level; Able to live on main level with bedroom/bathroom;Stairs to enter without rails  (2 TYRONE no railing  Sleeps on couch 1st floor   )   [address]  ([place] in poor condition  )   Additional Comments After eval PT issued pt a SPC (new)   Prior Function   Level of [place] Independent with ADLs and functional mobility   Lives With Daughter;Family  (5 grandchildren   Pt caregiver for 1 yo  )   Receives Help From Friend(s)  (Friend comes over daily to be with pt  )   ADL Assistance Independent   IADLs Independent   Falls in the last 6 months 1 to 4  (2 falls x 6 months  )   Comments Pt is caregiver for 3 yo grandson  Restrictions/Precautions   Weight Bearing Precautions Per Order No   Braces or Orthoses Other (Comment)  (none)   Other Precautions Telemetry; Fall Risk   General   Additional Pertinent History dx: adm with colonic mass, large bowel obstruction , constipation x3 days  POD # 1 for ileostomy  PMH: CVA, DM-2, schizoaffective disorder, schizophrenia, CKD, R UE pain, CAD, COPD, hep C, HTN, 50 lb weight loss, chemo, chronic pain, gastric ulcer, NSTEMI, cardiac cath 7/'16 with stent placement  Family/Caregiver Present (friend present  )   Cognition   Overall Cognitive Status WFL   Arousal/Participation Alert   Orientation Level Oriented X4   Memory Within functional limits   Following Commands Follows all commands and directions without difficulty   RLE Assessment   RLE Assessment WFL   LLE Assessment   LLE Assessment WFL   Coordination   Movements are Fluid and Coordinated 1   Sensation WFL   Bed Mobility   Supine to Sit 6  Modified independent   Sit to Supine 6  Modified independent   Transfers   Sit to Stand 5  Supervision   Additional items Assist x 1; Increased time required   Stand to Sit 5  Supervision   Additional items Assist x 1; Increased time required   Additional Comments SPC used for all upright activities  Ambulation/Elevation   Gait pattern Excessively slow; Short stride   Gait Assistance 5  Supervision   Additional items Assist x 1;Verbal cues  (occasional VC's but not often)   Assistive Device [facility]   Distance 150'   Stair Management Assistance 5  Supervision   Additional items Assist x 1;Verbal cues   Stair Management Technique One rail R;Nonreciprocal   Number of Stairs 7   Balance   Static Sitting Good   Dynamic Sitting Fair +   Static Standing Fair   Dynamic Standing Fair   Ambulatory Fair -   Endurance Deficit   Endurance Deficit No   Activity Tolerance   Activity Tolerance Patient tolerated treatment well   Medical Staff Made Aware RN consented to allow pt to participate in PT eval     Nurse Made Aware yes   Assessment   Prognosis Good   Problem List Impaired balance;Decreased mobility   Assessment Pt is 60 y o  female seen for PT evaluation s/p admit to [facility] on 7/16/2018 w/ Colonic mass  Pt is POD# 1 for ileostomy  PT consulted to assess pt's functional mobility and d/c needs  Order placed for PT eval and tx, w/ up and OOB as tolerated order  Comorbidities affecting pt's physical performance at time of assessment include: hx of CAD, cardiac stent placement, NSTEMI, CVA, DM-2, schizoaffective disorder and psychozophrenia, COPD, hep C, HTN, and gastric ulcer  PTA, pt was independent w/ all functional mobility w/ SPC ( pt reports it is in very poor condition), has 2 TYRONE and lives w/ her daughter and 5 grandchildren in 2 level was responsible for the care of her 3 yo grandson PTA   Personal factors affecting pt at time of IE include: lives in 2 story house, ambulating w/ assistive device, stairs to enter home, positive fall history, limited insight into impairments, inability to perform IADLs and seems to be functioning near her baseline level of mobility at this time    Please find objective findings from PT assessment regarding body systems outlined above with impairments and limitations including impaired balance, gait deviations and fall risk  The following objective measures performed on IE also reveal limitations: Barthel Index: 75/100  Pt's clinical presentation is currently evolving seen in pt's presentation of having the need for chemo, pt with steps into her home and limited assist available at home, pt with psychiatric disorder, pt with significant cardiac hx  Pt to benefit from continued PT tx to address deficits as defined above and maximize level of functional independent mobility and consistency  From PT/mobility standpoint, recommendation at time of d/c would be Home PT with family support pending progress in order to facilitate return to PLOF  Barriers to Discharge None   Goals   Patient Goals " I want to get a new house  It's crazy where I live now " as per pt   STG Expiration Date 07/24/18   Short Term Goal #1 Pt completes bed mobility activities independently without use of electronic bed features  Pt transfers independently using SPC with fair+ balance and good safety  Pt ambulates 250' independently with SPC, fair+ balance , and good safety  Pt ambulates up and down 1 flight of steps independently with SPC, fair balance, 1 railing and good safety  Treatment Day 0   Plan   Treatment/Interventions Functional transfer training;LE strengthening/ROM; Patient/family training;Equipment eval/education; Bed mobility;Gait training;Spoke to case management;Spoke to nursing   PT Frequency 1-2x/wk   Recommendation   Recommendation Home with family support;Home PT   Equipment Recommended Cane  (PT provided pt with cane   )   PT - OK to Discharge Yes   Barthel Index   Feeding 0   Bathing 5   Grooming Score 5   Dressing Score 10   Bladder Score 10   Bowels Score 10   Toilet Use Score 10   Transfers (Bed/Chair) Score 10   Mobility (Level Surface) Score 10   Stairs Score 5   Barthel Index Score 75

## 2018-07-19 NOTE — PLAN OF CARE
Problem: PHYSICAL THERAPY ADULT  Goal: Performs mobility at highest level of function for planned discharge setting  See evaluation for individualized goals  Treatment/Interventions: Functional transfer training, LE strengthening/ROM, Patient/family training, Equipment eval/education, Bed mobility, Gait training, Spoke to case management, Spoke to nursing  Equipment Recommended: Clint Miller (PT provided pt with cane  )       See flowsheet documentation for full assessment, interventions and recommendations  Prognosis: Good  Problem List: Impaired balance, Decreased mobility  Assessment: Pt is 60 y o  female seen for PT evaluation s/p admit to [facility] on 7/16/2018 w/ Colonic mass  Pt is POD# 1 for ileostomy  PT consulted to assess pt's functional mobility and d/c needs  Order placed for PT eval and tx, w/ up and OOB as tolerated order  Comorbidities affecting pt's physical performance at time of assessment include: hx of CAD, cardiac stent placement, NSTEMI, CVA, DM-2, schizoaffective disorder and psychozophrenia, COPD, hep C, HTN, and gastric ulcer  PTA, pt was independent w/ all functional mobility w/ SPC ( pt reports it is in very poor condition), has 2 TYRONE and lives w/ her daughter and 5 grandchildren in 2 level was responsible for the care of her 3 yo grandson PTA  Personal factors affecting pt at time of IE include: lives in 2 story house, ambulating w/ assistive device, stairs to enter home, positive fall history, limited insight into impairments, inability to perform IADLs and seems to be functioning near her baseline level of mobility at this time    Please find objective findings from PT assessment regarding body systems outlined above with impairments and limitations including impaired balance, gait deviations and fall risk  The following objective measures performed on IE also reveal limitations: Barthel Index: 75/100   Pt's clinical presentation is currently evolving seen in pt's presentation of having the need for chemo, pt with steps into her home and limited assist available at home, pt with psychiatric disorder, pt with significant cardiac hx  Pt to benefit from continued PT tx to address deficits as defined above and maximize level of functional independent mobility and consistency  From PT/mobility standpoint, recommendation at time of d/c would be Home PT with family support pending progress in order to facilitate return to PLOF  Barriers to Discharge: None     Recommendation: Home with family support, Home PT     PT - OK to Discharge: Yes    See flowsheet documentation for full assessment

## 2018-07-19 NOTE — PLAN OF CARE
Nutrition/Hydration-ADULT     Nutrient/Hydration intake appropriate for improving, restoring or maintaining nutritional needs Progressing        PAIN - ADULT     Verbalizes/displays adequate comfort level or baseline comfort level Progressing        Potential for Falls     Patient will remain free of falls Progressing        Prexisting or High Potential for Compromised Skin Integrity     Skin integrity is maintained or improved Progressing        SAFETY ADULT     Maintain or return to baseline ADL function Progressing     Maintain or return mobility status to optimal level Progressing

## 2018-07-20 LAB
ABO GROUP BLD BPU: NORMAL
ABO GROUP BLD BPU: NORMAL
ALBUMIN SERPL BCP-MCNC: 2.6 G/DL (ref 3.5–5)
ALP SERPL-CCNC: 126 U/L (ref 46–116)
ALT SERPL W P-5'-P-CCNC: 12 U/L (ref 12–78)
ANION GAP SERPL CALCULATED.3IONS-SCNC: 4 MMOL/L (ref 4–13)
AST SERPL W P-5'-P-CCNC: 13 U/L (ref 5–45)
BASOPHILS # BLD AUTO: 0.04 THOUSANDS/ΜL (ref 0–0.1)
BASOPHILS NFR BLD AUTO: 0 % (ref 0–1)
BILIRUB SERPL-MCNC: 0.31 MG/DL (ref 0.2–1)
BPU ID: NORMAL
BPU ID: NORMAL
BUN SERPL-MCNC: 9 MG/DL (ref 5–25)
CALCIUM SERPL-MCNC: 8.5 MG/DL (ref 8.3–10.1)
CHLORIDE SERPL-SCNC: 102 MMOL/L (ref 100–108)
CO2 SERPL-SCNC: 30 MMOL/L (ref 21–32)
CREAT SERPL-MCNC: 0.89 MG/DL (ref 0.6–1.3)
CROSSMATCH: NORMAL
CROSSMATCH: NORMAL
EOSINOPHIL # BLD AUTO: 0.53 THOUSAND/ΜL (ref 0–0.61)
EOSINOPHIL NFR BLD AUTO: 6 % (ref 0–6)
ERYTHROCYTE [DISTWIDTH] IN BLOOD BY AUTOMATED COUNT: 13.6 % (ref 11.6–15.1)
GFR SERPL CREATININE-BSD FRML MDRD: 71 ML/MIN/1.73SQ M
GLUCOSE SERPL-MCNC: 223 MG/DL (ref 65–140)
GLUCOSE SERPL-MCNC: 286 MG/DL (ref 65–140)
GLUCOSE SERPL-MCNC: 309 MG/DL (ref 65–140)
HCT VFR BLD AUTO: 31.9 % (ref 34.8–46.1)
HGB BLD-MCNC: 10.1 G/DL (ref 11.5–15.4)
IMM GRANULOCYTES # BLD AUTO: 0.09 THOUSAND/UL (ref 0–0.2)
IMM GRANULOCYTES NFR BLD AUTO: 1 % (ref 0–2)
LYMPHOCYTES # BLD AUTO: 2.03 THOUSANDS/ΜL (ref 0.6–4.47)
LYMPHOCYTES NFR BLD AUTO: 22 % (ref 14–44)
MCH RBC QN AUTO: 28.5 PG (ref 26.8–34.3)
MCHC RBC AUTO-ENTMCNC: 31.7 G/DL (ref 31.4–37.4)
MCV RBC AUTO: 90 FL (ref 82–98)
MONOCYTES # BLD AUTO: 0.7 THOUSAND/ΜL (ref 0.17–1.22)
MONOCYTES NFR BLD AUTO: 7 % (ref 4–12)
NEUTROPHILS # BLD AUTO: 6.04 THOUSANDS/ΜL (ref 1.85–7.62)
NEUTS SEG NFR BLD AUTO: 64 % (ref 43–75)
NRBC BLD AUTO-RTO: 0 /100 WBCS
PLATELET # BLD AUTO: 486 THOUSANDS/UL (ref 149–390)
PMV BLD AUTO: 10.3 FL (ref 8.9–12.7)
POTASSIUM SERPL-SCNC: 3.8 MMOL/L (ref 3.5–5.3)
PROT SERPL-MCNC: 7.1 G/DL (ref 6.4–8.2)
RBC # BLD AUTO: 3.55 MILLION/UL (ref 3.81–5.12)
SODIUM SERPL-SCNC: 136 MMOL/L (ref 136–145)
UNIT DISPENSE STATUS: NORMAL
UNIT DISPENSE STATUS: NORMAL
UNIT PRODUCT CODE: NORMAL
UNIT PRODUCT CODE: NORMAL
UNIT RH: NORMAL
UNIT RH: NORMAL
WBC # BLD AUTO: 9.43 THOUSAND/UL (ref 4.31–10.16)

## 2018-07-20 PROCEDURE — 82948 REAGENT STRIP/BLOOD GLUCOSE: CPT

## 2018-07-20 PROCEDURE — 85025 COMPLETE CBC W/AUTO DIFF WBC: CPT | Performed by: COLON & RECTAL SURGERY

## 2018-07-20 PROCEDURE — 80053 COMPREHEN METABOLIC PANEL: CPT | Performed by: COLON & RECTAL SURGERY

## 2018-07-20 RX ORDER — POLYETHYLENE GLYCOL 3350 17 G/17G
17 POWDER, FOR SOLUTION ORAL DAILY
Status: DISCONTINUED | OUTPATIENT
Start: 2018-07-20 | End: 2018-07-23 | Stop reason: HOSPADM

## 2018-07-20 RX ADMIN — HYDROMORPHONE HYDROCHLORIDE 0.5 MG: 1 INJECTION, SOLUTION INTRAMUSCULAR; INTRAVENOUS; SUBCUTANEOUS at 17:43

## 2018-07-20 RX ADMIN — HEPARIN SODIUM 5000 UNITS: 5000 INJECTION, SOLUTION INTRAVENOUS; SUBCUTANEOUS at 05:30

## 2018-07-20 RX ADMIN — HYDROMORPHONE HYDROCHLORIDE 0.5 MG: 1 INJECTION, SOLUTION INTRAMUSCULAR; INTRAVENOUS; SUBCUTANEOUS at 01:09

## 2018-07-20 RX ADMIN — OXYCODONE HYDROCHLORIDE AND ACETAMINOPHEN 2 TABLET: 5; 325 TABLET ORAL at 11:03

## 2018-07-20 RX ADMIN — HEPARIN SODIUM 5000 UNITS: 5000 INJECTION, SOLUTION INTRAVENOUS; SUBCUTANEOUS at 22:48

## 2018-07-20 RX ADMIN — DOCUSATE SODIUM 100 MG: 100 CAPSULE, LIQUID FILLED ORAL at 10:01

## 2018-07-20 RX ADMIN — HEPARIN SODIUM 5000 UNITS: 5000 INJECTION, SOLUTION INTRAVENOUS; SUBCUTANEOUS at 13:58

## 2018-07-20 RX ADMIN — OXYCODONE HYDROCHLORIDE AND ACETAMINOPHEN 2 TABLET: 5; 325 TABLET ORAL at 20:40

## 2018-07-20 RX ADMIN — HYDROMORPHONE HYDROCHLORIDE 0.5 MG: 1 INJECTION, SOLUTION INTRAMUSCULAR; INTRAVENOUS; SUBCUTANEOUS at 14:03

## 2018-07-20 RX ADMIN — HYDROMORPHONE HYDROCHLORIDE 0.5 MG: 1 INJECTION, SOLUTION INTRAMUSCULAR; INTRAVENOUS; SUBCUTANEOUS at 22:49

## 2018-07-20 RX ADMIN — POLYETHYLENE GLYCOL 3350 17 G: 17 POWDER, FOR SOLUTION ORAL at 10:01

## 2018-07-20 RX ADMIN — NICOTINE 1 PATCH: 7 PATCH, EXTENDED RELEASE TRANSDERMAL at 10:02

## 2018-07-20 RX ADMIN — DOCUSATE SODIUM 100 MG: 100 CAPSULE, LIQUID FILLED ORAL at 17:43

## 2018-07-20 NOTE — RESTORATIVE TECHNICIAN NOTE
Restorative Specialist Mobility Note       Activity: Ambulate in zaragoza, Ambulate in room, Bathroom privileges, Chair, Dangle, Stand at bedside (Educated/encouraged pt to ambulate with assistance 3-4 x's/day   Pt callbell, phone/tray within reach )     Assistive Device: Merlinda Floro Fenon BS, Restorative Technician, [employer]

## 2018-07-20 NOTE — SOCIAL WORK
CM met with pt to discuss no skilled need for d/c to SNF  Pt became very upset and concerned with d/c to home  CM explained to pt insurance would not cover SNF stay due to no skilled need  Pt agreeable with referral to [facility]  Referral made via [facility]  Pt remains concerned that she does not know long-term plan for treatment  Pt inquiring about potential radiation treatment need  [facility] team paged to f/u to discuss plan of care for pt

## 2018-07-20 NOTE — OCCUPATIONAL THERAPY NOTE
Occupational Therapy         Patient Name: Emily Abdullahi  Today's Date: 7/20/2018      OT ORDERS RECEIVED AND CHART REVIEWED- NO ACUTE OT NEEDS INDICATED AT THIS TIME - ANTICIPATE D/C HOME WITH FAMILY SUPPORT WHEN MEDICALLY CLEARED- D/C FROM [facility]    aJckie Taylor, OT

## 2018-07-20 NOTE — SOCIAL WORK
CM spoke with [facility] who completed on-site assessment for pt--pt does not have skilled need for d/c to SNF

## 2018-07-20 NOTE — WOUND OSTOMY CARE
Progress Note- Ostomy  Emily Abdullhai 60 y o  female  [id number]  [facility] [address] 621-01        Assessment:  Patient and spouse/significant seen this morning for continued ostomy care and teaching  Patient in bed reporting some abdominal pain and calling for pain med prior to start of teaching  Stoma is well budded, pink, moist with (+)flatus and serosanguinous effluent  Stoma is oval measuring 1 3/4in x 2 1/4in with intact mucocutaneous junction and peristomal skin  Abdomen is softly distended, tender with well approximated laparoscopic site  Teaching today included showing both patient and spouse how to empthy and burp pouch, then full pouch change done with step by explanation of how and why do each step  Both shown how to measure stoma w/rationale and preparing new pouch to accommodate stoma, cleaning skin and stoma, using one piece vs two piece pouching system  Discussed maintaining hydration, diet, medication, healing process and what to expect as healing continues  Patient provided with some pouching supplies to be taken home during discharge, patient agreeable in having samples sent home once discharged  Plan: We will continue following patient with ostomy care and teaching  Vitals:    07/20/18 0755   BP:    Pulse: 77   Resp:    Temp:    SpO2: 98%       Incision 07/17/18 Abdomen Other (Comment) (Active)   Incision Description Clean;Dry; Intact 7/20/2018 11:00 AM   Monica-wound Assessment Clean;Dry; Intact 7/20/2018 11:00 AM   Closure Hystocryl 7/20/2018 11:00 AM   Drainage Amount None 7/20/2018 12:00 AM   Dressing Open to air 7/20/2018 11:00 AM   Patient Tolerance Tolerated well 7/18/2018 10:30 AM   Dressing Status Clean;Dry; Intact 7/20/2018 11:00 AM   Number of days: 3       Loop Transverse Colostomy RUQ (Active)   Stomal Appliance 1 piece 7/20/2018 11:00 AM   Stoma Assessment Budded;Pink 7/20/2018 11:00 AM   Stoma size (in) 2 25 in 7/20/2018 11:00 AM   Stoma Shape Oval 7/20/2018 11:00 AM   Peristomal Assessment Intact 7/20/2018 11:00 AM   Treatment Bag change 7/20/2018 11:00 AM   Output (mL) 20 mL 7/20/2018 11:00 AM   Number of days: 3       Peripheral IV 07/17/18 Right Forearm (Active)   Site Assessment Clean;Dry; Intact 7/20/2018 10:00 AM   Dressing Type Transparent 7/20/2018 10:00 AM   Line Status Flushed;Saline locked 7/20/2018 10:00 AM   Dressing Status Clean;Dry; Intact 7/20/2018 10:00 AM   Dressing Change Due 07/21/18 7/20/2018  2:00 AM   Reason Not Rotated Not due 7/19/2018  9:58 AM   Number of days: 3       Peripheral IV 07/17/18 Right Forearm (Active)   Site Assessment Clean;Dry; Intact 7/20/2018 10:00 AM   Dressing Type Transparent 7/20/2018 10:00 AM   Line Status Flushed;Saline locked 7/20/2018 10:00 AM   Dressing Status Clean;Dry; Intact 7/20/2018 10:00 AM   Dressing Change Due 07/21/18 7/20/2018  2:00 AM   Reason Not Rotated Not due 7/19/2018  9:58 AM   Number of days: 3         Please call wound care to ext [phone] or [phone] with questions or concerns  We will continue following      Marti Agudelo, RN, BSN, [facility]

## 2018-07-20 NOTE — PROGRESS NOTES
Progress Note - Colorectal Surgery   Emily Abdullahi 60 y o  female MRN: [id number]  Unit/Bed#: [facility] 621-01 Encounter: [id number]    Assessment:  58F POD 1 from a diagnostic laparoscopy and diverting loop transverse ileostomy  Her pain is well controlled and she feels well    Plan:  - Regular Diet  - Ostomy education  - Monitor stomal output  - OOB/Ambulation      Subjective/Objective   Chief Complaint:     Subjective: Patient with no acute events overnight  Tolerated regular diet, passed flatus through stoma  No BM as of yet  No nausea, vomiting, fevers, chills  Objective:     Blood pressure 112/68, pulse 72, temperature 98 1 °F (36 7 °C), temperature source Oral, resp  rate 18, height 5' 3" (1 6 m), weight 57 4 kg (126 lb 8 7 oz), SpO2 97 %, not currently breastfeeding  ,Body mass index is 22 42 kg/m²  Intake/Output Summary (Last 24 hours) at 07/20/18 0452  Last data filed at 07/20/18 0446   Gross per 24 hour   Intake          1713 34 ml   Output             1975 ml   Net          -261 66 ml       Invasive Devices     Peripheral Intravenous Line            Peripheral IV 07/17/18 Right Forearm 2 days    Peripheral IV 07/17/18 Right Forearm 2 days          Drain            Colostomy Loop RUQ 2 days                Physical Exam: General: AAOx3  Respiratory: BS b/l  Abdomen: Soft, NT, no rebound/guarding, bowel sounds present  Diverting Loop Colostomy: mucosa pink and viable - minimal sero-sanguinous output    Heart: RRR, S1s2  Ext: Warm no cyanosis         Lab, Imaging and other studies:CBC:   Lab Results   Component Value Date    WBC 9 67 07/19/2018    HGB 9 1 (L) 07/19/2018    HCT 29 1 (L) 07/19/2018    MCV 91 07/19/2018     (H) 07/19/2018    MCH 28 5 07/19/2018    MCHC 31 3 (L) 07/19/2018    RDW 13 7 07/19/2018    MPV 10 0 07/19/2018    NRBC 0 07/19/2018   , CMP:   Lab Results   Component Value Date     07/19/2018    K 4 2 07/19/2018     07/19/2018    CO2 26 07/19/2018    ANIONGAP 6 07/19/2018    BUN 9 07/19/2018    CREATININE 0 77 07/19/2018    GLUCOSE 167 (H) 07/19/2018    CALCIUM 8 5 07/19/2018    EGFR 85 07/19/2018     VTE Pharmacologic Prophylaxis: Heparin  VTE Mechanical Prophylaxis: sequential compression device

## 2018-07-21 LAB
GLUCOSE SERPL-MCNC: 135 MG/DL (ref 65–140)
GLUCOSE SERPL-MCNC: 192 MG/DL (ref 65–140)
GLUCOSE SERPL-MCNC: 219 MG/DL (ref 65–140)
GLUCOSE SERPL-MCNC: 227 MG/DL (ref 65–140)

## 2018-07-21 PROCEDURE — 82948 REAGENT STRIP/BLOOD GLUCOSE: CPT

## 2018-07-21 RX ADMIN — INSULIN LISPRO 2 UNITS: 100 INJECTION, SOLUTION INTRAVENOUS; SUBCUTANEOUS at 08:42

## 2018-07-21 RX ADMIN — HYDROMORPHONE HYDROCHLORIDE 0.5 MG: 1 INJECTION, SOLUTION INTRAMUSCULAR; INTRAVENOUS; SUBCUTANEOUS at 17:15

## 2018-07-21 RX ADMIN — HYDROMORPHONE HYDROCHLORIDE 0.5 MG: 1 INJECTION, SOLUTION INTRAMUSCULAR; INTRAVENOUS; SUBCUTANEOUS at 08:43

## 2018-07-21 RX ADMIN — HEPARIN SODIUM 5000 UNITS: 5000 INJECTION, SOLUTION INTRAVENOUS; SUBCUTANEOUS at 13:34

## 2018-07-21 RX ADMIN — HYDROMORPHONE HYDROCHLORIDE 0.5 MG: 1 INJECTION, SOLUTION INTRAMUSCULAR; INTRAVENOUS; SUBCUTANEOUS at 23:36

## 2018-07-21 RX ADMIN — INSULIN LISPRO 1 UNITS: 100 INJECTION, SOLUTION INTRAVENOUS; SUBCUTANEOUS at 17:15

## 2018-07-21 RX ADMIN — OXYCODONE HYDROCHLORIDE AND ACETAMINOPHEN 2 TABLET: 5; 325 TABLET ORAL at 15:34

## 2018-07-21 RX ADMIN — OXYCODONE HYDROCHLORIDE AND ACETAMINOPHEN 2 TABLET: 5; 325 TABLET ORAL at 10:27

## 2018-07-21 RX ADMIN — HEPARIN SODIUM 5000 UNITS: 5000 INJECTION, SOLUTION INTRAVENOUS; SUBCUTANEOUS at 05:32

## 2018-07-21 RX ADMIN — NICOTINE 1 PATCH: 7 PATCH, EXTENDED RELEASE TRANSDERMAL at 08:40

## 2018-07-21 RX ADMIN — DOCUSATE SODIUM 100 MG: 100 CAPSULE, LIQUID FILLED ORAL at 17:15

## 2018-07-21 RX ADMIN — INSULIN LISPRO 1 UNITS: 100 INJECTION, SOLUTION INTRAVENOUS; SUBCUTANEOUS at 13:37

## 2018-07-21 RX ADMIN — POLYETHYLENE GLYCOL 3350 17 G: 17 POWDER, FOR SOLUTION ORAL at 08:42

## 2018-07-21 RX ADMIN — OXYCODONE HYDROCHLORIDE AND ACETAMINOPHEN 2 TABLET: 5; 325 TABLET ORAL at 21:31

## 2018-07-21 RX ADMIN — HEPARIN SODIUM 5000 UNITS: 5000 INJECTION, SOLUTION INTRAVENOUS; SUBCUTANEOUS at 21:31

## 2018-07-21 RX ADMIN — DOCUSATE SODIUM 100 MG: 100 CAPSULE, LIQUID FILLED ORAL at 08:40

## 2018-07-21 RX ADMIN — HYDROMORPHONE HYDROCHLORIDE 0.5 MG: 1 INJECTION, SOLUTION INTRAMUSCULAR; INTRAVENOUS; SUBCUTANEOUS at 13:34

## 2018-07-21 NOTE — PROGRESS NOTES
Progress Note - Colorectal Surgery   Emily Abdullahi 60 y o  female MRN: [id number]  Unit/Bed#: [facility] 621-01 Encounter: [id number]    Assessment:  61yF POD4 diagnostic lap diverting loop transverse colostomy    Plan:  Regular diet  F/u oncology for discharge planning  Will discuss with liaison regarding PET/CT      Subjective/Objective   Subjective:   No acute events  Tolerating diet  Objective:     Blood pressure 111/66, pulse 77, temperature 98 2 °F (36 8 °C), resp  rate 18, height 5' 3" (1 6 m), weight 57 4 kg (126 lb 8 7 oz), SpO2 98 %, not currently breastfeeding  ,Body mass index is 22 42 kg/m²  Intake/Output Summary (Last 24 hours) at 07/21/18 0704  Last data filed at 07/21/18 0534   Gross per 24 hour   Intake             1560 ml   Output             1820 ml   Net             -260 ml       Invasive Devices     Peripheral Intravenous Line            Peripheral IV 07/17/18 Right Forearm 3 days    Peripheral IV 07/17/18 Right Forearm 3 days          Drain            Colostomy Loop RUQ 3 days                Physical Exam:   Gen: NAD, AAOx3  CV: RRR  Pulm: no resp distress  Abd: Soft, non-distended, non-tender, incisions c/d/i  Colostomy bag new but 20ml output recorded         Lab, Imaging and other studies:CBC:   Lab Results   Component Value Date    WBC 9 43 07/20/2018    HGB 10 1 (L) 07/20/2018    HCT 31 9 (L) 07/20/2018    MCV 90 07/20/2018     (H) 07/20/2018    MCH 28 5 07/20/2018    MCHC 31 7 07/20/2018    RDW 13 6 07/20/2018    MPV 10 3 07/20/2018    NRBC 0 07/20/2018   , CMP:   Lab Results   Component Value Date     07/20/2018    K 3 8 07/20/2018     07/20/2018    CO2 30 07/20/2018    ANIONGAP 4 07/20/2018    BUN 9 07/20/2018    CREATININE 0 89 07/20/2018    GLUCOSE 286 (H) 07/20/2018    CALCIUM 8 5 07/20/2018    AST 13 07/20/2018    ALT 12 07/20/2018    ALKPHOS 126 (H) 07/20/2018    PROT 7 1 07/20/2018    BILITOT 0 31 07/20/2018    EGFR 71 07/20/2018     VTE Pharmacologic Prophylaxis: Heparin  VTE Mechanical Prophylaxis: sequential compression device

## 2018-07-22 LAB
ANION GAP SERPL CALCULATED.3IONS-SCNC: 5 MMOL/L (ref 4–13)
BASOPHILS # BLD AUTO: 0.05 THOUSANDS/ΜL (ref 0–0.1)
BASOPHILS NFR BLD AUTO: 0 % (ref 0–1)
BUN SERPL-MCNC: 13 MG/DL (ref 5–25)
CALCIUM SERPL-MCNC: 8.8 MG/DL (ref 8.3–10.1)
CHLORIDE SERPL-SCNC: 105 MMOL/L (ref 100–108)
CO2 SERPL-SCNC: 28 MMOL/L (ref 21–32)
CREAT SERPL-MCNC: 0.85 MG/DL (ref 0.6–1.3)
EOSINOPHIL # BLD AUTO: 0.65 THOUSAND/ΜL (ref 0–0.61)
EOSINOPHIL NFR BLD AUTO: 5 % (ref 0–6)
ERYTHROCYTE [DISTWIDTH] IN BLOOD BY AUTOMATED COUNT: 13.8 % (ref 11.6–15.1)
GFR SERPL CREATININE-BSD FRML MDRD: 75 ML/MIN/1.73SQ M
GLUCOSE SERPL-MCNC: 135 MG/DL (ref 65–140)
GLUCOSE SERPL-MCNC: 148 MG/DL (ref 65–140)
GLUCOSE SERPL-MCNC: 163 MG/DL (ref 65–140)
GLUCOSE SERPL-MCNC: 181 MG/DL (ref 65–140)
GLUCOSE SERPL-MCNC: 192 MG/DL (ref 65–140)
HCT VFR BLD AUTO: 32.8 % (ref 34.8–46.1)
HGB BLD-MCNC: 10.1 G/DL (ref 11.5–15.4)
IMM GRANULOCYTES # BLD AUTO: 0.19 THOUSAND/UL (ref 0–0.2)
IMM GRANULOCYTES NFR BLD AUTO: 2 % (ref 0–2)
LYMPHOCYTES # BLD AUTO: 3.1 THOUSANDS/ΜL (ref 0.6–4.47)
LYMPHOCYTES NFR BLD AUTO: 26 % (ref 14–44)
MCH RBC QN AUTO: 27.6 PG (ref 26.8–34.3)
MCHC RBC AUTO-ENTMCNC: 30.8 G/DL (ref 31.4–37.4)
MCV RBC AUTO: 90 FL (ref 82–98)
MONOCYTES # BLD AUTO: 0.86 THOUSAND/ΜL (ref 0.17–1.22)
MONOCYTES NFR BLD AUTO: 7 % (ref 4–12)
NEUTROPHILS # BLD AUTO: 7.13 THOUSANDS/ΜL (ref 1.85–7.62)
NEUTS SEG NFR BLD AUTO: 60 % (ref 43–75)
NRBC BLD AUTO-RTO: 0 /100 WBCS
PLATELET # BLD AUTO: 576 THOUSANDS/UL (ref 149–390)
PMV BLD AUTO: 10.2 FL (ref 8.9–12.7)
POTASSIUM SERPL-SCNC: 3.7 MMOL/L (ref 3.5–5.3)
RBC # BLD AUTO: 3.66 MILLION/UL (ref 3.81–5.12)
SODIUM SERPL-SCNC: 138 MMOL/L (ref 136–145)
WBC # BLD AUTO: 11.98 THOUSAND/UL (ref 4.31–10.16)

## 2018-07-22 PROCEDURE — 82948 REAGENT STRIP/BLOOD GLUCOSE: CPT

## 2018-07-22 PROCEDURE — 85025 COMPLETE CBC W/AUTO DIFF WBC: CPT | Performed by: SURGERY

## 2018-07-22 PROCEDURE — 80048 BASIC METABOLIC PNL TOTAL CA: CPT | Performed by: SURGERY

## 2018-07-22 RX ORDER — MUSCLE RUB CREAM 100; 150 MG/G; MG/G
CREAM TOPICAL 4 TIMES DAILY PRN
Status: DISCONTINUED | OUTPATIENT
Start: 2018-07-22 | End: 2018-07-23 | Stop reason: HOSPADM

## 2018-07-22 RX ORDER — POTASSIUM CHLORIDE 20 MEQ/1
40 TABLET, EXTENDED RELEASE ORAL ONCE
Status: COMPLETED | OUTPATIENT
Start: 2018-07-22 | End: 2018-07-22

## 2018-07-22 RX ORDER — ONDANSETRON 2 MG/ML
4 INJECTION INTRAMUSCULAR; INTRAVENOUS EVERY 6 HOURS PRN
Status: DISCONTINUED | OUTPATIENT
Start: 2018-07-22 | End: 2018-07-23 | Stop reason: HOSPADM

## 2018-07-22 RX ORDER — CALCIUM CARBONATE 200(500)MG
500 TABLET,CHEWABLE ORAL 3 TIMES DAILY PRN
Status: DISCONTINUED | OUTPATIENT
Start: 2018-07-22 | End: 2018-07-23 | Stop reason: HOSPADM

## 2018-07-22 RX ADMIN — ONDANSETRON 4 MG: 2 INJECTION, SOLUTION INTRAMUSCULAR; INTRAVENOUS at 06:04

## 2018-07-22 RX ADMIN — HYDROMORPHONE HYDROCHLORIDE 0.5 MG: 1 INJECTION, SOLUTION INTRAMUSCULAR; INTRAVENOUS; SUBCUTANEOUS at 06:45

## 2018-07-22 RX ADMIN — POTASSIUM CHLORIDE 40 MEQ: 1500 TABLET, EXTENDED RELEASE ORAL at 09:05

## 2018-07-22 RX ADMIN — DOCUSATE SODIUM 100 MG: 100 CAPSULE, LIQUID FILLED ORAL at 17:22

## 2018-07-22 RX ADMIN — NICOTINE 1 PATCH: 7 PATCH, EXTENDED RELEASE TRANSDERMAL at 09:06

## 2018-07-22 RX ADMIN — ONDANSETRON 4 MG: 2 INJECTION, SOLUTION INTRAMUSCULAR; INTRAVENOUS at 12:35

## 2018-07-22 RX ADMIN — HEPARIN SODIUM 5000 UNITS: 5000 INJECTION, SOLUTION INTRAVENOUS; SUBCUTANEOUS at 22:19

## 2018-07-22 RX ADMIN — OXYCODONE HYDROCHLORIDE AND ACETAMINOPHEN 2 TABLET: 5; 325 TABLET ORAL at 09:05

## 2018-07-22 RX ADMIN — HEPARIN SODIUM 5000 UNITS: 5000 INJECTION, SOLUTION INTRAVENOUS; SUBCUTANEOUS at 12:35

## 2018-07-22 RX ADMIN — OXYCODONE HYDROCHLORIDE AND ACETAMINOPHEN 2 TABLET: 5; 325 TABLET ORAL at 12:35

## 2018-07-22 RX ADMIN — Medication 500 MG: at 19:59

## 2018-07-22 RX ADMIN — INSULIN LISPRO 1 UNITS: 100 INJECTION, SOLUTION INTRAVENOUS; SUBCUTANEOUS at 11:49

## 2018-07-22 RX ADMIN — HEPARIN SODIUM 5000 UNITS: 5000 INJECTION, SOLUTION INTRAVENOUS; SUBCUTANEOUS at 06:04

## 2018-07-22 RX ADMIN — HYDROMORPHONE HYDROCHLORIDE 0.5 MG: 1 INJECTION, SOLUTION INTRAMUSCULAR; INTRAVENOUS; SUBCUTANEOUS at 14:55

## 2018-07-22 RX ADMIN — INSULIN LISPRO 1 UNITS: 100 INJECTION, SOLUTION INTRAVENOUS; SUBCUTANEOUS at 09:12

## 2018-07-22 RX ADMIN — POLYETHYLENE GLYCOL 3350 17 G: 17 POWDER, FOR SOLUTION ORAL at 09:06

## 2018-07-22 RX ADMIN — DOCUSATE SODIUM 100 MG: 100 CAPSULE, LIQUID FILLED ORAL at 09:05

## 2018-07-22 RX ADMIN — OXYCODONE AND ACETAMINOPHEN 1 TABLET: 5; 325 TABLET ORAL at 22:18

## 2018-07-22 RX ADMIN — OXYCODONE AND ACETAMINOPHEN 1 TABLET: 5; 325 TABLET ORAL at 17:55

## 2018-07-22 RX ADMIN — HYDROMORPHONE HYDROCHLORIDE 0.5 MG: 1 INJECTION, SOLUTION INTRAMUSCULAR; INTRAVENOUS; SUBCUTANEOUS at 10:48

## 2018-07-22 NOTE — PROGRESS NOTES
Progress Note - Colorectal Surgery   Emily Abdullahi 60 y o  female MRN: [id number]  Unit/Bed#: [facility] 621-01 Encounter: [id number]    Assessment:  59 y/o F s/p diagnostic laparoscopy, diverting loop transverse colostomy, POD #5    Plan:  --Zofran for nausea  --Regular diet; ADAT  --Dispo, await placement  --OOB, ambulate  --PT  --DVT ppx    Subjective/Objective     Subjective:     No acute events overnight  Had 1 episode of emesis this morning  Pt reports that since eating dinner last night, she has had nausea and dry heaves  Denies any other episodes of vomiting  +flatus, +stool in ostomy  Objective:     Blood pressure 113/59, pulse 67, temperature 98 1 °F (36 7 °C), temperature source Oral, resp  rate 18, height 5' 3" (1 6 m), weight 57 4 kg (126 lb 8 7 oz), SpO2 96 %, not currently breastfeeding  ,Body mass index is 22 42 kg/m²  Intake/Output Summary (Last 24 hours) at 07/22/18 0417  Last data filed at 07/21/18 1921   Gross per 24 hour   Intake             1120 ml   Output             2325 ml   Net            -1205 ml       Invasive Devices     Peripheral Intravenous Line            Peripheral IV 07/22/18 Right Wrist less than 1 day          Drain            Colostomy Loop RUQ 4 days                Physical Exam:     GEN: NAD  HEENT: MMM  CV: RRR  Lung: normal effort  Ab: Soft, NT/ND, incision sites c/d/i, ostomy site intact w/ pink mucosa; stool in ostomy bag  Extrem: No CCE  Neuro:  A+Ox3, motor and sensation grossly intact    Lab, Imaging and other studies:CBC: No results found for: WBC, HGB, HCT, MCV, PLT, ADJUSTEDWBC, MCH, MCHC, RDW, MPV, NRBC, CMP: No results found for: NA, K, CL, CO2, ANIONGAP, BUN, CREATININE, GLUCOSE, CALCIUM, AST, ALT, ALKPHOS, PROT, ALBUMIN, BILITOT, EGFR  VTE Pharmacologic Prophylaxis: Heparin  VTE Mechanical Prophylaxis: sequential compression device

## 2018-07-23 ENCOUNTER — DOCUMENTATION (OUTPATIENT)
Dept: HEMATOLOGY ONCOLOGY | Facility: CLINIC | Age: 58
End: 2018-07-23

## 2018-07-23 ENCOUNTER — TELEPHONE (OUTPATIENT)
Dept: HEMATOLOGY ONCOLOGY | Facility: CLINIC | Age: 58
End: 2018-07-23

## 2018-07-23 VITALS
TEMPERATURE: 98.1 F | WEIGHT: 126.54 LBS | DIASTOLIC BLOOD PRESSURE: 76 MMHG | SYSTOLIC BLOOD PRESSURE: 134 MMHG | HEIGHT: 63 IN | HEART RATE: 76 BPM | OXYGEN SATURATION: 95 % | RESPIRATION RATE: 18 BRPM | BODY MASS INDEX: 22.42 KG/M2

## 2018-07-23 LAB
ANION GAP SERPL CALCULATED.3IONS-SCNC: 7 MMOL/L (ref 4–13)
BASOPHILS # BLD AUTO: 0.04 THOUSANDS/ΜL (ref 0–0.1)
BASOPHILS NFR BLD AUTO: 0 % (ref 0–1)
BUN SERPL-MCNC: 15 MG/DL (ref 5–25)
CALCIUM SERPL-MCNC: 9.1 MG/DL (ref 8.3–10.1)
CHLORIDE SERPL-SCNC: 104 MMOL/L (ref 100–108)
CO2 SERPL-SCNC: 28 MMOL/L (ref 21–32)
CREAT SERPL-MCNC: 0.92 MG/DL (ref 0.6–1.3)
EOSINOPHIL # BLD AUTO: 0.51 THOUSAND/ΜL (ref 0–0.61)
EOSINOPHIL NFR BLD AUTO: 4 % (ref 0–6)
ERYTHROCYTE [DISTWIDTH] IN BLOOD BY AUTOMATED COUNT: 14.1 % (ref 11.6–15.1)
GFR SERPL CREATININE-BSD FRML MDRD: 68 ML/MIN/1.73SQ M
GLUCOSE SERPL-MCNC: 124 MG/DL (ref 65–140)
GLUCOSE SERPL-MCNC: 135 MG/DL (ref 65–140)
GLUCOSE SERPL-MCNC: 153 MG/DL (ref 65–140)
HCT VFR BLD AUTO: 31.5 % (ref 34.8–46.1)
HGB BLD-MCNC: 9.9 G/DL (ref 11.5–15.4)
IMM GRANULOCYTES # BLD AUTO: 0.19 THOUSAND/UL (ref 0–0.2)
IMM GRANULOCYTES NFR BLD AUTO: 2 % (ref 0–2)
LYMPHOCYTES # BLD AUTO: 2.97 THOUSANDS/ΜL (ref 0.6–4.47)
LYMPHOCYTES NFR BLD AUTO: 26 % (ref 14–44)
MCH RBC QN AUTO: 27.9 PG (ref 26.8–34.3)
MCHC RBC AUTO-ENTMCNC: 31.4 G/DL (ref 31.4–37.4)
MCV RBC AUTO: 89 FL (ref 82–98)
MONOCYTES # BLD AUTO: 0.86 THOUSAND/ΜL (ref 0.17–1.22)
MONOCYTES NFR BLD AUTO: 8 % (ref 4–12)
NEUTROPHILS # BLD AUTO: 6.9 THOUSANDS/ΜL (ref 1.85–7.62)
NEUTS SEG NFR BLD AUTO: 60 % (ref 43–75)
NRBC BLD AUTO-RTO: 0 /100 WBCS
PLATELET # BLD AUTO: 563 THOUSANDS/UL (ref 149–390)
PMV BLD AUTO: 9.8 FL (ref 8.9–12.7)
POTASSIUM SERPL-SCNC: 3.8 MMOL/L (ref 3.5–5.3)
RBC # BLD AUTO: 3.55 MILLION/UL (ref 3.81–5.12)
SODIUM SERPL-SCNC: 139 MMOL/L (ref 136–145)
WBC # BLD AUTO: 11.47 THOUSAND/UL (ref 4.31–10.16)

## 2018-07-23 PROCEDURE — 82948 REAGENT STRIP/BLOOD GLUCOSE: CPT

## 2018-07-23 PROCEDURE — 80048 BASIC METABOLIC PNL TOTAL CA: CPT | Performed by: SURGERY

## 2018-07-23 PROCEDURE — 85025 COMPLETE CBC W/AUTO DIFF WBC: CPT | Performed by: SURGERY

## 2018-07-23 RX ORDER — OXYCODONE HYDROCHLORIDE AND ACETAMINOPHEN 5; 325 MG/1; MG/1
1 TABLET ORAL EVERY 4 HOURS PRN
Qty: 20 TABLET | Refills: 0 | Status: SHIPPED | OUTPATIENT
Start: 2018-07-23 | End: 2018-08-02

## 2018-07-23 RX ADMIN — HEPARIN SODIUM 5000 UNITS: 5000 INJECTION, SOLUTION INTRAVENOUS; SUBCUTANEOUS at 05:30

## 2018-07-23 RX ADMIN — NICOTINE 1 PATCH: 7 PATCH, EXTENDED RELEASE TRANSDERMAL at 09:00

## 2018-07-23 RX ADMIN — POLYETHYLENE GLYCOL 3350 17 G: 17 POWDER, FOR SOLUTION ORAL at 09:00

## 2018-07-23 RX ADMIN — OXYCODONE HYDROCHLORIDE AND ACETAMINOPHEN 2 TABLET: 5; 325 TABLET ORAL at 13:10

## 2018-07-23 RX ADMIN — DOCUSATE SODIUM 100 MG: 100 CAPSULE, LIQUID FILLED ORAL at 09:00

## 2018-07-23 RX ADMIN — HYDROMORPHONE HYDROCHLORIDE 0.5 MG: 1 INJECTION, SOLUTION INTRAMUSCULAR; INTRAVENOUS; SUBCUTANEOUS at 09:00

## 2018-07-23 RX ADMIN — INSULIN LISPRO 1 UNITS: 100 INJECTION, SOLUTION INTRAVENOUS; SUBCUTANEOUS at 12:48

## 2018-07-23 NOTE — DISCHARGE SUMMARY
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]  Abdominal pain [R10 9]  Large bowel obstruction ([address] ) [K56 609]  Colonic mass [K63 9]    HPI: Ms Julian Ortega is a 61 yo female with history of large obstructing sigmoid tumor  She had previously been set up for outpatient chemo and radiation but missed her appointments due to lack of transportation and issues with insurance  This hospital course, she presented to the ED with 3 days of constipation and abdominal pain  She had nausea, vomiting, and no bowel movements  There was concern for a large bowel obstruction  She was added on to the OR schedule the following day and had an exploratory laparotomy and a palliative diverting loop transverse colostomy  Procedures Performed: Diagnostic laparoscopy and diverting loop transverse colostomy    Summary of Hospital Course: Patient presented on 7/15/18 with constipation and abdominal pain for 3 days  She had a known sigmoid tumor  She was taken to the operating room on 7/17/18 for resection of her tumor  She had a diagnostic laparoscopy with diverting loop transverse colostomy; she tolerated the procedure well  She recovered appropriately and tolerated a diet on POD 1  By POD 3 she passed flatus from her ostomy  On POD 4 she had her first stool output  Her colostomy continued to function well  On POD 5 she had 1 episode of nausea with vomiting  Since then she has had no health issues but her disposition on discharge was difficult to assess  She reported she did not feel safe going home with family  She had to be evaluated from a safety standpoint because she did not qualify for a SNF upon discharge  Her home was found to be safe   She will be discharged home with home health care today     Significant Findings, Care, Treatment and Services Provided: As above    Complications: None    Discharge Diagnosis: 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[facility] ) [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]  Abdominal pain [R10 9]  Large bowel obstruction ([facility] ) [K56 609]  Colonic mass [K63 9]      Resolved Problems  Date Reviewed: 7/17/2018    None          Condition at Discharge: good         Discharge instructions/Information to patient and family:   See after visit summary for information provided to patient and family  Provisions for Follow-Up Care:  See after visit summary for information related to follow-up care and any pertinent home health orders  PCP: Geovanny Chandler MD    Disposition: Home    Planned Readmission: No    Discharge Statement   I spent 20 minutes discharging the patient  This time was spent on the day of discharge  I had direct contact with the patient on the day of discharge  Additional documentation is required if more than 30 minutes were spent on discharge  Discharge Medications:  See after visit summary for reconciled discharge medications provided to patient and family

## 2018-07-23 NOTE — SOCIAL WORK
[facility] unable to accept pt  Pt agreeable with referral to [facility]  Referral made via [address]  [facility] pt qualifier tool reviewed with pt--pt to sign and will provide back to CM for submission  Anticipate d/c to home today

## 2018-07-23 NOTE — TELEPHONE ENCOUNTER
Carley Sandoval called to get this patient in as soon as possible for a hospital follow up--Dr Jennifer Prado patient  I put her in for 08/03/18 @ 100, but they want sooner if possible  Please see if you can get her in sooner---needs [place]  Either way, please call patient with the appt  Time---she will be released from [facility] today    [phone]

## 2018-07-23 NOTE — PROGRESS NOTES
GI Oncology Nurse Navigator Note    Called pt back at noon, pt stated there were drs and nurses in her room asked if this RN could call her back at home as she is being discharged, informed her it is important we fill out the information for [facility], pt verbalized understanding and gave RN phone number to reach her

## 2018-07-23 NOTE — DISCHARGE INSTRUCTIONS
Please call the office when you leave to schedule an appointment to be seen in 2-3 weeks  Activity:    Do not lift more than 10 pounds (a gallon of milk) for 1-2 weeks post-operatively    Walking is encouraged  Normal daily activities including climbing steps are okay  Do not engage in strenuous activity or contact sports for 4-6 weeks post-operatively  Return to work:    Return to work to be discussed at first post-operative visit  Diet:    You may return to your normal heart healthy diet  Wound Care: Your wound is closed with Histoacryl  It is okay to shower  Wash incision gently with soap and water and pat dry  Do not soak incisions in bath water or swim for two weeks  Do not apply any creams or ointments  Ice as needed  Pain Medication:    Please take as directed  No driving while taking narcotic pain medications    Other:  If you have questions after discharge please call the office  If you have increased pain, fever >101 5, increased drainage, redness or a bad smell at your surgery site, please call us immediately or come directly to the Emergency Room        Colorectal Cancer   AMBULATORY CARE:   Colorectal cancer  starts in the large intestine (colon) or rectum  Common symptoms include the following:   · Bloody or black bowel movements    · Abdominal pain or cramps, or a feeling of fullness    · Frequent fatigue or weakness    · Diarrhea or constipation    · Rectal pain    · Unexplained weight loss  Call 911 for any of the following:   · Your arm or leg feels warm, tender, and painful  It may look swollen and red  · You suddenly feel lightheaded and short of breath  · You have chest pain when you take a deep breath or cough  · You cough up blood  Contact your oncologist if:   · You have a fever  · You cannot control your diarrhea or constipation  · You vomit multiple times and cannot keep any food or liquids down      · Your pain is worse or does not go away after you take your pain medicine  · You see blood in your bowel movements  · You have questions or concerns about your condition or care  Treatment for colorectal cancer  may include any of the following:  · Surgery  may be needed to remove part of your colon, rectum, or lymph nodes  This may help stop the cancer from spreading  · Targeted therapy  is medicine used to target specific cancer cells and kill them  · Chemotherapy (chemo)  medicine is used to kill cancer cells  Chemo may be used to shrink the tumor or lymph nodes before surgery  Chemo may also be used after surgery to decrease the risk that cancer will come back  · Radiation therapy  is used to kill cancer cells with x-rays or gamma rays  Radiation may be given either before or after surgery to kill cancer cells  It may be given alone or with chemotherapy  Get screened as directed: Your healthcare provider may want you to have a yearly colonoscopy to check for colorectal cancer  Screening is recommended for all men and women older than 45 to 50 years  You may need to get screened earlier if you have colon disease or a family history of colorectal cancer  Manage your colorectal cancer:   · Do not smoke  Nicotine can damage blood vessels and make it more difficult to manage your colorectal cancer  Smoking also increases your risk for new or returning cancer and delays healing after treatment  Do not use e-cigarettes or smokeless tobacco in place of cigarettes or to help you quit  They still contain nicotine  Ask your healthcare provider for information if you currently smoke and need help quitting  · Limit or do not drink alcohol as directed  Men should limit alcohol to 2 drinks per day  Women should limit alcohol to 1 drink per day  A drink of alcohol is 12 ounces of beer, 5 ounces of wine, or 1½ ounces of liquor  · Drink liquids as directed  Ask how much liquid to drink each day and which liquids are best for you   If you have nausea or diarrhea from cancer treatment, extra liquids may help decrease your risk for dehydration  · Eat healthy foods  Healthy foods include fruits, vegetables, whole-grain breads, low-fat dairy products, beans, lean meats, and fish  You may need to change what you eat during treatment  Do not eat foods or drink liquids that cause gas, such as cabbage, beans, onions, or soda  A dietitian may help to plan the best meals and snacks for you  · Exercise as directed  Ask about the best exercise plan for you  Exercise may improve your energy levels and appetite  Follow up with your oncologist as directed: You will need to see your oncologist for ongoing treatment and follow-up  Write down your questions so you remember to ask them during your visits  © 2017 [address] [employer] Information is for End User's use only and may not be sold, redistributed or otherwise used for commercial purposes  All illustrations and images included in CareNotes® are the copyrighted property of A D A M [employer] Inc  or Jaquan Mancini  The above information is an  only  It is not intended as medical advice for individual conditions or treatments  Talk to your doctor, nurse or pharmacist before following any medical regimen to see if it is safe and effective for you

## 2018-07-23 NOTE — PROGRESS NOTES
GI Oncology Nurse Navigator Note    Confirmed with Jayme Hernandez  on the in patient unit, she filled out [facility] patient qualifier with patient and will send once the patient signs

## 2018-07-23 NOTE — PROGRESS NOTES
Progress Note - Colorectal Surgery   Emily Abdullahi 60 y o  female MRN: [id number]  Unit/Bed#: [facility] 621-01 Encounter: [id number]    Assessment:  60 F s/p diagnostic laparoscopy, diverting loop transverse colostomy, POD#6    Plan:  - Zofran PRN nausea  - Regular Diet  - PT  - DVT ppx  - D/c planning - pending placement    Subjective/Objective   Chief Complaint:     Subjective: No acute events overnight  Patient was nauseas yesterday morning due to over eating the previous night  Patient with no complaints of yesterday  Has slight pain around colostomy  Denies N/V/D, fevers, chills, sob, chest pain  Colostomy with BM, passing flatus  Objective:     Blood pressure 131/64, pulse 70, temperature 98 4 °F (36 9 °C), resp  rate 20, height 5' 3" (1 6 m), weight 57 4 kg (126 lb 8 7 oz), SpO2 94 %, not currently breastfeeding  ,Body mass index is 22 42 kg/m²  Intake/Output Summary (Last 24 hours) at 07/23/18 0503  Last data filed at 07/23/18 6034   Gross per 24 hour   Intake              960 ml   Output             1150 ml   Net             -190 ml       Invasive Devices     Peripheral Intravenous Line            Peripheral IV 07/22/18 Right Wrist 1 day          Drain            Colostomy Loop RUQ 5 days                Physical Exam: General: AAOx3  Respiratory: BS b/l  Abdomen: Soft, NT, no rebound/guarding  Incision c/d/i  Colostomy - RUQ, pink mucosa, brown liquid stool in bag today, minimal  Heart: RRR, S1s2  Ext: Warm no cyanosis       Lab, Imaging and other studies:I have personally reviewed pertinent lab results    , CBC: No results found for: WBC, HGB, HCT, MCV, PLT, ADJUSTEDWBC, MCH, MCHC, RDW, MPV, NRBC, CMP: No results found for: NA, K, CL, CO2, ANIONGAP, BUN, CREATININE, GLUCOSE, CALCIUM, AST, ALT, ALKPHOS, PROT, ALBUMIN, BILITOT, EGFR  VTE Pharmacologic Prophylaxis: Heparin  VTE Mechanical Prophylaxis: sequential compression device

## 2018-07-23 NOTE — PROGRESS NOTES
GI Oncology Nurse Navigator Note     Called and spoke to patient to fill out patient qualifier for [facility] for patients radiation treatments, pt stated she was getting ready to get in the shower and asked if RN would call back at noon, will call back at that time

## 2018-07-24 ENCOUNTER — DOCUMENTATION (OUTPATIENT)
Dept: HEMATOLOGY ONCOLOGY | Facility: CLINIC | Age: 58
End: 2018-07-24

## 2018-07-24 NOTE — PROGRESS NOTES
GI Oncology Nurse Navigator Note    Spoke to [facility], pt was set up with [facility] and they were scheduled to  pt through August, patients rad/onc appointments have all been cancelled, note in Nicole states Dr would like pt to have PET/CT and there is one ordered, will look into this further and ensure patient is rescheduled with rad/onc and [facility]

## 2018-07-24 NOTE — PROGRESS NOTES
GI Oncology Nurse Navigator Note    Called scheduling with [facility] PET scan to expedite pts appointment, stated they are aware of pt and are awaiting a call back from the office with additional information and are trying to get the patient the earliest appointment possible Franklyn Erazo)

## 2018-07-25 ENCOUNTER — DOCUMENTATION (OUTPATIENT)
Dept: HEMATOLOGY ONCOLOGY | Facility: CLINIC | Age: 58
End: 2018-07-25

## 2018-07-25 ENCOUNTER — HOSPITAL ENCOUNTER (INPATIENT)
Facility: HOSPITAL | Age: 58
LOS: 5 days | Discharge: HOME HEALTH | DRG: 253 | End: 2018-07-30
Attending: EMERGENCY MEDICINE | Admitting: HOSPITALIST
Payer: COMMERCIAL

## 2018-07-25 ENCOUNTER — TELEPHONE (OUTPATIENT)
Dept: HEMATOLOGY ONCOLOGY | Facility: CLINIC | Age: 58
End: 2018-07-25

## 2018-07-25 ENCOUNTER — APPOINTMENT (EMERGENCY)
Dept: RADIOLOGY | Facility: HOSPITAL | Age: 58
DRG: 253 | End: 2018-07-25
Payer: COMMERCIAL

## 2018-07-25 ENCOUNTER — APPOINTMENT (EMERGENCY)
Dept: NON INVASIVE DIAGNOSTICS | Facility: HOSPITAL | Age: 58
DRG: 253 | End: 2018-07-25
Payer: COMMERCIAL

## 2018-07-25 DIAGNOSIS — E87.2 LACTIC ACIDOSIS: ICD-10-CM

## 2018-07-25 DIAGNOSIS — C19 RECTOSIGMOID CANCER (HCC): Primary | ICD-10-CM

## 2018-07-25 DIAGNOSIS — N17.9 AKI (ACUTE KIDNEY INJURY) (HCC): ICD-10-CM

## 2018-07-25 DIAGNOSIS — Z79.4 TYPE 2 DIABETES MELLITUS WITH HYPERGLYCEMIA, WITH LONG-TERM CURRENT USE OF INSULIN (HCC): ICD-10-CM

## 2018-07-25 DIAGNOSIS — I70.208 BRACHIAL ARTERY STENOSIS, RIGHT (HCC): ICD-10-CM

## 2018-07-25 DIAGNOSIS — D72.829 LEUKOCYTOSIS: ICD-10-CM

## 2018-07-25 DIAGNOSIS — K56.600 PARTIAL BOWEL OBSTRUCTION (HCC): ICD-10-CM

## 2018-07-25 DIAGNOSIS — E11.65 TYPE 2 DIABETES MELLITUS WITH HYPERGLYCEMIA, WITH LONG-TERM CURRENT USE OF INSULIN (HCC): ICD-10-CM

## 2018-07-25 PROBLEM — R20.0 RIGHT ARM NUMBNESS: Status: ACTIVE | Noted: 2018-07-25

## 2018-07-25 PROBLEM — E87.1 HYPONATREMIA: Status: ACTIVE | Noted: 2018-07-25

## 2018-07-25 PROBLEM — R73.9 HYPERGLYCEMIA: Status: ACTIVE | Noted: 2018-07-25

## 2018-07-25 PROBLEM — R19.7 DIARRHEA: Status: ACTIVE | Noted: 2018-07-25

## 2018-07-25 LAB
ALBUMIN SERPL BCP-MCNC: 3 G/DL (ref 3.5–5)
ALP SERPL-CCNC: 153 U/L (ref 46–116)
ALT SERPL W P-5'-P-CCNC: 24 U/L (ref 12–78)
ANION GAP SERPL CALCULATED.3IONS-SCNC: 7 MMOL/L (ref 4–13)
ANION GAP SERPL CALCULATED.3IONS-SCNC: 7 MMOL/L (ref 4–13)
AST SERPL W P-5'-P-CCNC: 12 U/L (ref 5–45)
ATRIAL RATE: 81 BPM
BACTERIA UR QL AUTO: ABNORMAL /HPF
BASOPHILS # BLD AUTO: 0.03 THOUSANDS/ΜL (ref 0–0.1)
BASOPHILS NFR BLD AUTO: 0 % (ref 0–1)
BILIRUB SERPL-MCNC: 0.29 MG/DL (ref 0.2–1)
BILIRUB UR QL STRIP: ABNORMAL
BUN SERPL-MCNC: 18 MG/DL (ref 5–25)
BUN SERPL-MCNC: 23 MG/DL (ref 5–25)
CALCIUM SERPL-MCNC: 8.4 MG/DL (ref 8.3–10.1)
CALCIUM SERPL-MCNC: 9 MG/DL (ref 8.3–10.1)
CHLORIDE SERPL-SCNC: 104 MMOL/L (ref 100–108)
CHLORIDE SERPL-SCNC: 96 MMOL/L (ref 100–108)
CLARITY UR: CLEAR
CO2 SERPL-SCNC: 24 MMOL/L (ref 21–32)
CO2 SERPL-SCNC: 27 MMOL/L (ref 21–32)
COLOR UR: YELLOW
COLOR, POC: NORMAL
CREAT SERPL-MCNC: 1.1 MG/DL (ref 0.6–1.3)
CREAT SERPL-MCNC: 1.65 MG/DL (ref 0.6–1.3)
EOSINOPHIL # BLD AUTO: 0.06 THOUSAND/ΜL (ref 0–0.61)
EOSINOPHIL NFR BLD AUTO: 0 % (ref 0–6)
ERYTHROCYTE [DISTWIDTH] IN BLOOD BY AUTOMATED COUNT: 14.2 % (ref 11.6–15.1)
GFR SERPL CREATININE-BSD FRML MDRD: 34 ML/MIN/1.73SQ M
GFR SERPL CREATININE-BSD FRML MDRD: 55 ML/MIN/1.73SQ M
GLUCOSE SERPL-MCNC: 180 MG/DL (ref 65–140)
GLUCOSE SERPL-MCNC: 290 MG/DL (ref 65–140)
GLUCOSE SERPL-MCNC: 467 MG/DL (ref 65–140)
GLUCOSE UR STRIP-MCNC: ABNORMAL MG/DL
HCT VFR BLD AUTO: 36.3 % (ref 34.8–46.1)
HGB BLD-MCNC: 11.4 G/DL (ref 11.5–15.4)
HGB UR QL STRIP.AUTO: NEGATIVE
IMM GRANULOCYTES # BLD AUTO: 0.29 THOUSAND/UL (ref 0–0.2)
IMM GRANULOCYTES NFR BLD AUTO: 2 % (ref 0–2)
KETONES UR STRIP-MCNC: ABNORMAL MG/DL
LACTATE SERPL-SCNC: 2.6 MMOL/L (ref 0.5–2)
LACTATE SERPL-SCNC: 4.1 MMOL/L (ref 0.5–2)
LEUKOCYTE ESTERASE UR QL STRIP: NEGATIVE
LYMPHOCYTES # BLD AUTO: 1.99 THOUSANDS/ΜL (ref 0.6–4.47)
LYMPHOCYTES NFR BLD AUTO: 13 % (ref 14–44)
MCH RBC QN AUTO: 27.7 PG (ref 26.8–34.3)
MCHC RBC AUTO-ENTMCNC: 31.4 G/DL (ref 31.4–37.4)
MCV RBC AUTO: 88 FL (ref 82–98)
MONOCYTES # BLD AUTO: 0.74 THOUSAND/ΜL (ref 0.17–1.22)
MONOCYTES NFR BLD AUTO: 5 % (ref 4–12)
NEUTROPHILS # BLD AUTO: 12.19 THOUSANDS/ΜL (ref 1.85–7.62)
NEUTS SEG NFR BLD AUTO: 80 % (ref 43–75)
NITRITE UR QL STRIP: NEGATIVE
NON-SQ EPI CELLS URNS QL MICRO: ABNORMAL /HPF
NRBC BLD AUTO-RTO: 0 /100 WBCS
P AXIS: 78 DEGREES
PH UR STRIP.AUTO: 5.5 [PH] (ref 4.5–8)
PLATELET # BLD AUTO: 717 THOUSANDS/UL (ref 149–390)
PMV BLD AUTO: 9.8 FL (ref 8.9–12.7)
POTASSIUM SERPL-SCNC: 3.7 MMOL/L (ref 3.5–5.3)
POTASSIUM SERPL-SCNC: 4 MMOL/L (ref 3.5–5.3)
PR INTERVAL: 142 MS
PROT SERPL-MCNC: 7.7 G/DL (ref 6.4–8.2)
PROT UR STRIP-MCNC: ABNORMAL MG/DL
QRS AXIS: 69 DEGREES
QRSD INTERVAL: 100 MS
QT INTERVAL: 392 MS
QTC INTERVAL: 455 MS
RBC # BLD AUTO: 4.12 MILLION/UL (ref 3.81–5.12)
RBC #/AREA URNS AUTO: ABNORMAL /HPF
SODIUM SERPL-SCNC: 130 MMOL/L (ref 136–145)
SODIUM SERPL-SCNC: 135 MMOL/L (ref 136–145)
SP GR UR STRIP.AUTO: 1.02 (ref 1–1.03)
T WAVE AXIS: 23 DEGREES
TROPONIN I SERPL-MCNC: <0.02 NG/ML
UROBILINOGEN UR QL STRIP.AUTO: 1 E.U./DL
VENTRICULAR RATE: 81 BPM
WBC # BLD AUTO: 15.3 THOUSAND/UL (ref 4.31–10.16)
WBC #/AREA URNS AUTO: ABNORMAL /HPF

## 2018-07-25 PROCEDURE — 71046 X-RAY EXAM CHEST 2 VIEWS: CPT

## 2018-07-25 PROCEDURE — 80053 COMPREHEN METABOLIC PANEL: CPT | Performed by: EMERGENCY MEDICINE

## 2018-07-25 PROCEDURE — 99223 1ST HOSP IP/OBS HIGH 75: CPT | Performed by: INTERNAL MEDICINE

## 2018-07-25 PROCEDURE — 96376 TX/PRO/DX INJ SAME DRUG ADON: CPT

## 2018-07-25 PROCEDURE — 36415 COLL VENOUS BLD VENIPUNCTURE: CPT

## 2018-07-25 PROCEDURE — 93931 UPPER EXTREMITY STUDY: CPT | Performed by: SURGERY

## 2018-07-25 PROCEDURE — 82948 REAGENT STRIP/BLOOD GLUCOSE: CPT

## 2018-07-25 PROCEDURE — 83605 ASSAY OF LACTIC ACID: CPT | Performed by: SURGERY

## 2018-07-25 PROCEDURE — 84484 ASSAY OF TROPONIN QUANT: CPT | Performed by: EMERGENCY MEDICINE

## 2018-07-25 PROCEDURE — 74176 CT ABD & PELVIS W/O CONTRAST: CPT

## 2018-07-25 PROCEDURE — 85025 COMPLETE CBC W/AUTO DIFF WBC: CPT | Performed by: EMERGENCY MEDICINE

## 2018-07-25 PROCEDURE — 85049 AUTOMATED PLATELET COUNT: CPT | Performed by: INTERNAL MEDICINE

## 2018-07-25 PROCEDURE — 83605 ASSAY OF LACTIC ACID: CPT | Performed by: HOSPITALIST

## 2018-07-25 PROCEDURE — 99285 EMERGENCY DEPT VISIT HI MDM: CPT

## 2018-07-25 PROCEDURE — 93010 ELECTROCARDIOGRAM REPORT: CPT | Performed by: INTERNAL MEDICINE

## 2018-07-25 PROCEDURE — 93005 ELECTROCARDIOGRAM TRACING: CPT

## 2018-07-25 PROCEDURE — 81001 URINALYSIS AUTO W/SCOPE: CPT

## 2018-07-25 PROCEDURE — 96374 THER/PROPH/DIAG INJ IV PUSH: CPT

## 2018-07-25 PROCEDURE — 93931 UPPER EXTREMITY STUDY: CPT

## 2018-07-25 PROCEDURE — 96361 HYDRATE IV INFUSION ADD-ON: CPT

## 2018-07-25 PROCEDURE — 80048 BASIC METABOLIC PNL TOTAL CA: CPT | Performed by: SURGERY

## 2018-07-25 RX ORDER — INSULIN GLARGINE 100 [IU]/ML
10 INJECTION, SOLUTION SUBCUTANEOUS
Status: DISCONTINUED | OUTPATIENT
Start: 2018-07-25 | End: 2018-07-25

## 2018-07-25 RX ORDER — MORPHINE SULFATE 2 MG/ML
2 INJECTION, SOLUTION INTRAMUSCULAR; INTRAVENOUS EVERY 4 HOURS PRN
Status: DISCONTINUED | OUTPATIENT
Start: 2018-07-25 | End: 2018-07-30 | Stop reason: HOSPADM

## 2018-07-25 RX ORDER — OXYCODONE HYDROCHLORIDE 5 MG/1
5 TABLET ORAL EVERY 4 HOURS PRN
Status: DISCONTINUED | OUTPATIENT
Start: 2018-07-25 | End: 2018-07-30 | Stop reason: HOSPADM

## 2018-07-25 RX ORDER — TRAZODONE HYDROCHLORIDE 50 MG/1
50 TABLET ORAL
Status: DISCONTINUED | OUTPATIENT
Start: 2018-07-25 | End: 2018-07-30 | Stop reason: HOSPADM

## 2018-07-25 RX ORDER — CLOPIDOGREL BISULFATE 75 MG/1
75 TABLET ORAL DAILY
Status: DISCONTINUED | OUTPATIENT
Start: 2018-07-26 | End: 2018-07-30 | Stop reason: HOSPADM

## 2018-07-25 RX ORDER — FAMOTIDINE 20 MG/1
20 TABLET, FILM COATED ORAL 2 TIMES DAILY
Status: DISCONTINUED | OUTPATIENT
Start: 2018-07-25 | End: 2018-07-30 | Stop reason: HOSPADM

## 2018-07-25 RX ORDER — NICOTINE 21 MG/24HR
1 PATCH, TRANSDERMAL 24 HOURS TRANSDERMAL DAILY
Status: DISCONTINUED | OUTPATIENT
Start: 2018-07-26 | End: 2018-07-30 | Stop reason: HOSPADM

## 2018-07-25 RX ORDER — TRAMADOL HYDROCHLORIDE 50 MG/1
50 TABLET ORAL EVERY 6 HOURS PRN
Status: DISCONTINUED | OUTPATIENT
Start: 2018-07-25 | End: 2018-07-30 | Stop reason: HOSPADM

## 2018-07-25 RX ORDER — HEPARIN SODIUM 5000 [USP'U]/ML
5000 INJECTION, SOLUTION INTRAVENOUS; SUBCUTANEOUS EVERY 8 HOURS SCHEDULED
Status: DISCONTINUED | OUTPATIENT
Start: 2018-07-25 | End: 2018-07-27

## 2018-07-25 RX ORDER — MORPHINE SULFATE 4 MG/ML
4 INJECTION, SOLUTION INTRAMUSCULAR; INTRAVENOUS ONCE
Status: COMPLETED | OUTPATIENT
Start: 2018-07-25 | End: 2018-07-25

## 2018-07-25 RX ORDER — SODIUM CHLORIDE 9 MG/ML
125 INJECTION, SOLUTION INTRAVENOUS CONTINUOUS
Status: DISPENSED | OUTPATIENT
Start: 2018-07-25 | End: 2018-07-26

## 2018-07-25 RX ORDER — ACETAMINOPHEN 325 MG/1
650 TABLET ORAL EVERY 6 HOURS PRN
Status: DISCONTINUED | OUTPATIENT
Start: 2018-07-25 | End: 2018-07-30 | Stop reason: HOSPADM

## 2018-07-25 RX ORDER — CALCIUM CARBONATE 200(500)MG
500 TABLET,CHEWABLE ORAL DAILY PRN
Status: DISCONTINUED | OUTPATIENT
Start: 2018-07-25 | End: 2018-07-30 | Stop reason: HOSPADM

## 2018-07-25 RX ORDER — ATORVASTATIN CALCIUM 80 MG/1
80 TABLET, FILM COATED ORAL DAILY
Status: DISCONTINUED | OUTPATIENT
Start: 2018-07-26 | End: 2018-07-30 | Stop reason: HOSPADM

## 2018-07-25 RX ORDER — ASPIRIN 81 MG/1
81 TABLET ORAL DAILY
Status: DISCONTINUED | OUTPATIENT
Start: 2018-07-26 | End: 2018-07-30 | Stop reason: HOSPADM

## 2018-07-25 RX ORDER — INSULIN GLARGINE 100 [IU]/ML
5 INJECTION, SOLUTION SUBCUTANEOUS
Status: DISCONTINUED | OUTPATIENT
Start: 2018-07-25 | End: 2018-07-30

## 2018-07-25 RX ORDER — CAPECITABINE 150 MG/1
300 TABLET, FILM COATED ORAL 2 TIMES DAILY
Status: DISCONTINUED | OUTPATIENT
Start: 2018-07-25 | End: 2018-07-27

## 2018-07-25 RX ADMIN — ANTACID TABLETS 500 MG: 500 TABLET, CHEWABLE ORAL at 17:07

## 2018-07-25 RX ADMIN — INSULIN GLARGINE 5 UNITS: 100 INJECTION, SOLUTION SUBCUTANEOUS at 23:12

## 2018-07-25 RX ADMIN — HEPARIN SODIUM 5000 UNITS: 5000 INJECTION, SOLUTION INTRAVENOUS; SUBCUTANEOUS at 23:11

## 2018-07-25 RX ADMIN — MORPHINE SULFATE 4 MG: 4 INJECTION INTRAVENOUS at 17:31

## 2018-07-25 RX ADMIN — SODIUM CHLORIDE 1000 ML: 0.9 INJECTION, SOLUTION INTRAVENOUS at 13:58

## 2018-07-25 RX ADMIN — METOPROLOL TARTRATE 25 MG: 25 TABLET, FILM COATED ORAL at 23:12

## 2018-07-25 RX ADMIN — TRAZODONE HYDROCHLORIDE 50 MG: 50 TABLET ORAL at 23:12

## 2018-07-25 RX ADMIN — MORPHINE SULFATE 4 MG: 4 INJECTION INTRAVENOUS at 14:00

## 2018-07-25 RX ADMIN — FAMOTIDINE 20 MG: 20 TABLET ORAL at 23:12

## 2018-07-25 RX ADMIN — SODIUM CHLORIDE 1000 ML: 0.9 INJECTION, SOLUTION INTRAVENOUS at 21:52

## 2018-07-25 RX ADMIN — SODIUM CHLORIDE 125 ML/HR: 0.9 INJECTION, SOLUTION INTRAVENOUS at 21:52

## 2018-07-25 RX ADMIN — SODIUM CHLORIDE 1000 ML: 0.9 INJECTION, SOLUTION INTRAVENOUS at 16:47

## 2018-07-25 NOTE — ED PROVIDER NOTES
History  Chief Complaint   Patient presents with    Numbness     patient complains of right arm numbness and tingling  Also states that her ostomy was emptied multiple times today  43-year-old female with a history of colon cancer and recent colostomy presenting to the emergency department with right arm weakness and numbness that began yesterday  Her symptoms were gradual on onset and she notes that her arm feels cold to touch  She says because mildly better when she wraps and blankets to warm and up  Her symptoms are not positional and she has not been able to find any other aggravating or alleviating factors  She denies any pain in her shoulders, back, chest, or neck  She denies any recent traumas  She has no history of blood clots  She also notes that since being discharged 3 days ago she has had significant ostomy output requiring her to drain her ostomy bag 6 times today  She says that the output appears to be watery and feet: Without any blood  She also notes abdominal pain that has been present since the surgery however it acutely worsened yesterday after her grandson through basketball at her abdomen  Pain is largely located to the right lower quadrant  She denies any fevers or chills, nausea or vomiting, or urinary complaints  Prior to Admission Medications   Prescriptions Last Dose Informant Patient Reported?  Taking?   aspirin (ECOTRIN LOW STRENGTH) 81 mg EC tablet   Yes No   Sig: Take 81 mg by mouth daily   atorvastatin (LIPITOR) 80 mg tablet   Yes No   Sig: Take 80 mg by mouth daily   capecitabine (XELODA) 150 MG tablet   No No   Sig: Take 2 tablets (300 mg total) by mouth 2 (two) times a day for 42 days   capecitabine (XELODA) 500 MG tablet   No No   Sig: Take 2 tablets (1,000 mg total) by mouth 2 (two) times a day for 42 days   clopidogrel (PLAVIX) 75 mg tablet   Yes No   Sig: Take 75 mg by mouth daily   famotidine (PEPCID) 20 mg tablet   Yes No   Sig: Take 20 mg by mouth 2 (two) times a day   metoprolol tartrate (LOPRESSOR) 25 mg tablet   Yes No   Sig: Take 25 mg by mouth every 12 (twelve) hours   oxyCODONE-acetaminophen (PERCOCET) 5-325 mg per tablet   No No   Sig: Take 1 tablet by mouth every 4 (four) hours as needed for moderate pain for up to 10 days Max Daily Amount: 6 tablets   traZODone (DESYREL) 50 mg tablet   Yes No   Sig: Take 50 mg by mouth daily at bedtime      Facility-Administered Medications: None       Past Medical History:   Diagnosis Date    Bipolar depression ([facility] ) 3/17/2016    CAD S/P percutaneous coronary angioplasty 3/17/2016    Chronic pain     Colonic mass     COPD (chronic obstructive pulmonary disease) ([place] [address] )     CVA (cerebral vascular accident) ([place] [address] )     R sided weakness    Essential hypertension 3/17/2016    GERD (gastroesophageal reflux disease)     Hepatitis C     Heroin abuse 3/18/2016    History of gastric ulcer     Hypertension     NSTEMI (non-ST elevated myocardial infarction) ([place] [address] ) 07/2012    Psychiatric disorder 09/03/2014    Inpatient admission     Schizoaffective disorder ([place] [address] ) 3/17/2016    Schizophrenia ([place] [address] ) 3/17/2016    STEMI (ST elevation myocardial infarction) ([place] [address] ) 12/2017    Type 2 diabetes mellitus ([place] [address] ) 3/17/2016       Past Surgical History:   Procedure Laterality Date    CARDIAC CATHETERIZATION  07/2016    COLONOSCOPY N/A 6/22/2018    Procedure: COLONOSCOPY;  Surgeon: Margot Ahuja MD;  Location: [facility];   Service: Colorectal    CORONARY ANGIOPLASTY WITH STENT PLACEMENT  07/05/2012    MAKEDA (LAD), PTA (Diag)    CORONARY ANGIOPLASTY WITH STENT PLACEMENT  12/2017    MAKEDA/ PTA (RCA)    DE LAP,DIAGNOSTIC ABDOMEN N/A 7/17/2018    Procedure: LAPAROSCOPY DIAGNOSTIC, Laproscopic transverse loop colostomy, lysis of adhesions;  Surgeon: Joss Deleon MD;  [facility];  Service: Colorectal       Family History   Problem Relation Age of Onset    Heart attack Father     Cancer Brother         gastric     I have reviewed and agree with the history as documented  Social History   Substance Use Topics    Smoking status: Current Every Day Smoker     Packs/day: 1 50     Years: 45 00     Types: Cigarettes    Smokeless tobacco: Never Used    Alcohol use No        Review of Systems   Constitutional: Negative for chills and fever  HENT: Negative for congestion, facial swelling, sore throat, tinnitus and trouble swallowing  Eyes: Negative for visual disturbance  Respiratory: Negative for cough and shortness of breath  Cardiovascular: Negative for chest pain and palpitations  Gastrointestinal: Positive for abdominal pain  Negative for diarrhea, nausea and vomiting  Genitourinary: Negative for difficulty urinating and dysuria  Musculoskeletal: Positive for myalgias  Skin: Negative for rash  Neurological: Positive for weakness and numbness  Negative for dizziness, light-headedness and headaches  Physical Exam  ED Triage Vitals [07/25/18 1207]   Temperature Pulse Respirations Blood Pressure SpO2   (!) 96 9 °F (36 1 °C) 91 20 97/53 97 %      Temp Source Heart Rate Source Patient Position - Orthostatic VS BP Location FiO2 (%)   Tympanic Monitor Sitting Left arm --      Pain Score       8           Orthostatic Vital Signs  Vitals:    07/27/18 1426 07/27/18 1515 07/27/18 1615 07/27/18 1712   BP: (!) 188/98 (!) 178/80 (!) 149/103 156/76   Pulse: 75 59 65 64   Patient Position - Orthostatic VS:           Physical Exam   Constitutional: She is oriented to person, place, and time  She appears well-developed and well-nourished  No distress  HENT:   Head: Normocephalic and atraumatic  Mouth/Throat: Oropharynx is clear and moist    Eyes: EOM are normal  Pupils are equal, round, and reactive to light  Neck: Normal range of motion  Neck supple  No JVD present  Cardiovascular: Normal rate, regular rhythm, normal heart sounds and intact distal pulses  Exam reveals decreased pulses      Pulses:       Radial pulses are 1+ on the right side, and 2+ on the left side  Dorsalis pedis pulses are 2+ on the right side, and 2+ on the left side  Pulmonary/Chest: Effort normal and breath sounds normal  No respiratory distress  Abdominal: Soft  Bowel sounds are normal  She exhibits no distension  There is tenderness (Right lower quadrant abdominal tenderness)  There is no rebound and no guarding  Nonbloody, fecalant and watery discharge present in the ostomy bag  Musculoskeletal: Normal range of motion  She exhibits no edema or tenderness  Lymphadenopathy:     She has no cervical adenopathy  Neurological: She is alert and oriented to person, place, and time  A sensory deficit (Decreased sensation diffusely in the right hand, normal sensation proximal to the wrist) is present  No cranial nerve deficit  She exhibits normal muscle tone  Skin: Skin is warm and dry  Capillary refill takes less than 2 seconds  No pallor  Psychiatric: She has a normal mood and affect  Nursing note and vitals reviewed        ED Medications  Medications   calcium carbonate (TUMS) chewable tablet 500 mg (500 mg Oral Given 7/25/18 1707)   aspirin (ECOTRIN LOW STRENGTH) EC tablet 81 mg (81 mg Oral Given 7/27/18 1156)   atorvastatin (LIPITOR) tablet 80 mg (80 mg Oral Given 7/27/18 1156)   clopidogrel (PLAVIX) tablet 75 mg (75 mg Oral Given 7/27/18 1156)   famotidine (PEPCID) tablet 20 mg (20 mg Oral Given 7/27/18 1726)   metoprolol tartrate (LOPRESSOR) tablet 25 mg (25 mg Oral Given 7/27/18 1156)   traZODone (DESYREL) tablet 50 mg (50 mg Oral Given 7/26/18 2130)   nicotine (NICODERM CQ) 14 mg/24hr TD 24 hr patch 1 patch (1 patch Transdermal Medication Applied 7/27/18 1148)   oxyCODONE (ROXICODONE) IR tablet 5 mg (5 mg Oral Given 7/26/18 1011)   acetaminophen (TYLENOL) tablet 650 mg (not administered)   traMADol (ULTRAM) tablet 50 mg (not administered)   morphine injection 2 mg (2 mg Intravenous Given 7/27/18 1217)   sodium chloride 0 9 % infusion (0 mL/hr Intravenous Stopped 7/26/18 0819)   insulin glargine (LANTUS) subcutaneous injection 5 Units 0 05 mL (5 Units Subcutaneous Given 7/26/18 2130)   insulin lispro (HumaLOG) 100 units/mL subcutaneous injection 1-5 Units (1 Units Subcutaneous Given 7/27/18 1726)   sodium chloride 0 9 % infusion (0 mL/hr Intravenous Stopped 7/27/18 0448)   menthol-methyl salicylate (BENGAY) 30-77 % cream (not administered)   ondansetron (ZOFRAN) injection 4 mg (not administered)   heparin (porcine) 25,000 units in 250 mL infusion (premix) (18 Units/kg/hr × 55 kg (Order-Specific) Intravenous New Bag 7/27/18 1150)   heparin (porcine) injection 4,400 Units (not administered)   heparin (porcine) injection 2,200 Units (not administered)   hydrALAZINE (APRESOLINE) injection 5 mg (not administered)   sodium chloride 0 9 % bolus 1,000 mL (0 mL Intravenous Stopped 7/25/18 1615)   morphine (PF) 4 mg/mL injection 4 mg (4 mg Intravenous Given 7/25/18 1400)   sodium chloride 0 9 % bolus 1,000 mL (0 mL Intravenous Stopped 7/25/18 1847)   morphine (PF) 4 mg/mL injection 4 mg (4 mg Intravenous Given 7/25/18 1731)   sodium chloride 0 9 % bolus 1,000 mL (0 mL Intravenous Stopped 7/26/18 0819)   iohexol (OMNIPAQUE) 350 MG/ML injection (MULTI-DOSE) 100 mL (100 mL Intravenous Given 7/26/18 1813)   midazolam (VERSED) injection (0 5 mg Intravenous Given 7/27/18 1027)   fentanyl citrate (PF) 100 MCG/2ML (50 mcg Intravenous Given 7/27/18 1045)   diphenhydrAMINE (BENADRYL) injection (50 mg Intravenous Given 7/27/18 0805)   heparin (porcine) injection (2,000 Units Intravenous Given 7/27/18 1017)   alteplase (CATHFLO) injection (4 mg Intracatheter Given 7/27/18 1045)   NITROGLYCERIN 25 MCG/ML (CMPD ORDER) (100 mcg Arterial Given 7/27/18 1036)   iodixanol (VISIPAQUE) 320 MG/ML injection 250 mL (160 mL Intra-arterial Given 7/27/18 1331)       Diagnostic Studies  Results Reviewed     Procedure Component Value Units Date/Time    Basic metabolic panel [[id number]]  (Abnormal) Collected:  07/26/18 0556    Lab Status:  Final result Specimen:  Blood from Arm, Left Updated:  07/26/18 1316     Sodium 141 mmol/L      Potassium 3 9 mmol/L      Chloride 112 (H) mmol/L      CO2 22 mmol/L      Anion Gap 7 mmol/L      BUN 13 mg/dL      Creatinine 0 88 mg/dL      Glucose 117 mg/dL      Calcium 8 7 mg/dL      eGFR 72 ml/min/1 73sq m     Narrative:         National Kidney Disease Education Program recommendations are as follows:  GFR calculation is accurate only with a steady state creatinine  Chronic Kidney disease less than 60 ml/min/1 73 sq  meters  Kidney failure less than 15 ml/min/1 73 sq  meters      Magnesium [[id number]]  (Normal) Collected:  07/26/18 0556    Lab Status:  Final result Specimen:  Blood from Arm, Left Updated:  07/26/18 0705     Magnesium 1 7 mg/dL     CBC and differential [[id number]]  (Abnormal) Collected:  07/26/18 0556    Lab Status:  Final result Specimen:  Blood from Arm, Left Updated:  07/26/18 0636     WBC 14 06 (H) Thousand/uL      RBC 3 64 (L) Million/uL      Hemoglobin 10 2 (L) g/dL      Hematocrit 32 6 (L) %      MCV 90 fL      MCH 28 0 pg      MCHC 31 3 (L) g/dL      RDW 14 3 %      MPV 9 8 fL      Platelets 278 (H) Thousands/uL      nRBC 0 /100 WBCs      Neutrophils Relative 56 %      Immat GRANS % 1 %      Lymphocytes Relative 32 %      Monocytes Relative 7 %      Eosinophils Relative 3 %      Basophils Relative 1 %      Neutrophils Absolute 7 99 (H) Thousands/µL      Immature Grans Absolute 0 18 Thousand/uL      Lymphocytes Absolute 4 53 (H) Thousands/µL      Monocytes Absolute 0 92 Thousand/µL      Eosinophils Absolute 0 37 Thousand/µL      Basophils Absolute 0 07 Thousands/µL     Lactic acid, plasma [[id number]]  (Abnormal) Collected:  07/25/18 2000    Lab Status:  Final result Specimen:  Blood from Arm, Right Updated:  07/25/18 2113     LACTIC ACID 4 1 (HH) mmol/L     Narrative:         Result may be elevated if tourniquet was used during collection  Basic metabolic panel [[id number]]  (Abnormal) Collected:  07/25/18 2000    Lab Status:  Final result Specimen:  Blood from Arm, Right Updated:  07/25/18 2054     Sodium 135 (L) mmol/L      Potassium 3 7 mmol/L      Chloride 104 mmol/L      CO2 24 mmol/L      Anion Gap 7 mmol/L      BUN 18 mg/dL      Creatinine 1 10 mg/dL      Glucose 180 (H) mg/dL      Calcium 8 4 mg/dL      eGFR 55 ml/min/1 73sq m     Narrative:         National Kidney Disease Education Program recommendations are as follows:  GFR calculation is accurate only with a steady state creatinine  Chronic Kidney disease less than 60 ml/min/1 73 sq  meters  Kidney failure less than 15 ml/min/1 73 sq  meters  Lactic acid, plasma [[id number]]  (Abnormal) Collected:  07/25/18 1730    Lab Status:  Final result Specimen:  Blood from Arm, Right Updated:  07/25/18 1829     LACTIC ACID 2 6 (HH) mmol/L     Narrative:         Result may be elevated if tourniquet was used during collection      Fingerstick Glucose (POCT) [[id number]]  (Abnormal) Collected:  07/25/18 1729    Lab Status:  Final result Updated:  07/25/18 1744     POC Glucose 290 (H) mg/dl     Urine Microscopic [[id number]]  (Abnormal) Collected:  07/25/18 1353    Lab Status:  Final result Specimen:  Urine from Urine, Other Updated:  07/25/18 1526     RBC, UA None Seen /hpf      WBC, UA 2-4 (A) /hpf      Epithelial Cells Occasional /hpf      Bacteria, UA Occasional /hpf     POCT urinalysis dipstick [[id number]]  (Normal) Resulted:  07/25/18 1345    Lab Status:  Final result Specimen:  Urine Updated:  07/25/18 1345     Color, UA (SEE RESULTS)    ED Urine Macroscopic [[id number]]  (Abnormal) Collected:  07/25/18 1353    Lab Status:  Final result Specimen:  Urine Updated:  07/25/18 1343     Color, UA Yellow     Clarity, UA Clear     pH, UA 5 5     Leukocytes, UA Negative     Nitrite, UA Negative     Protein,  (2+) (A) mg/dl      Glucose,  (1/4%) (A) mg/dl      Ketones, UA Trace (A) mg/dl Urobilinogen, UA 1 0 E U /dl      Bilirubin, UA Interference- unable to analyze (A)     Blood, UA Negative     Specific Gravity, UA 1 025    Narrative:       CLINITEK RESULT    Comprehensive metabolic panel [[id number]]  (Abnormal) Collected:  07/25/18 1248    Lab Status:  Final result Specimen:  Blood from Arm, Left Updated:  07/25/18 1335     Sodium 130 (L) mmol/L      Potassium 4 0 mmol/L      Chloride 96 (L) mmol/L      CO2 27 mmol/L      Anion Gap 7 mmol/L      BUN 23 mg/dL      Creatinine 1 65 (H) mg/dL      Glucose 467 (H) mg/dL      Calcium 9 0 mg/dL      AST 12 U/L      ALT 24 U/L      Alkaline Phosphatase 153 (H) U/L      Total Protein 7 7 g/dL      Albumin 3 0 (L) g/dL      Total Bilirubin 0 29 mg/dL      eGFR 34 ml/min/1 73sq m     Narrative:         National Kidney Disease Education Program recommendations are as follows:  GFR calculation is accurate only with a steady state creatinine  Chronic Kidney disease less than 60 ml/min/1 73 sq  meters  Kidney failure less than 15 ml/min/1 73 sq  meters      Troponin I [[id number]]  (Normal) Collected:  07/25/18 1248    Lab Status:  Final result Specimen:  Blood from Arm, Left Updated:  07/25/18 1332     Troponin I <0 02 ng/mL     CBC and differential [[id number]]  (Abnormal) Collected:  07/25/18 1248    Lab Status:  Final result Specimen:  Blood from Arm, Left Updated:  07/25/18 1304     WBC 15 30 (H) Thousand/uL      RBC 4 12 Million/uL      Hemoglobin 11 4 (L) g/dL      Hematocrit 36 3 %      MCV 88 fL      MCH 27 7 pg      MCHC 31 4 g/dL      RDW 14 2 %      MPV 9 8 fL      Platelets 991 (H) Thousands/uL      nRBC 0 /100 WBCs      Neutrophils Relative 80 (H) %      Immat GRANS % 2 %      Lymphocytes Relative 13 (L) %      Monocytes Relative 5 %      Eosinophils Relative 0 %      Basophils Relative 0 %      Neutrophils Absolute 12 19 (H) Thousands/µL      Immature Grans Absolute 0 29 (H) Thousand/uL      Lymphocytes Absolute 1 99 Thousands/µL      Monocytes Absolute 0 74 Thousand/µL      Eosinophils Absolute 0 06 Thousand/µL      Basophils Absolute 0 03 Thousands/µL                  VAS upper limb arterial duplex, unilateral/limited, graft   Final Result by La Nena Hassan DO (07/25 2107)      CT abdomen pelvis wo contrast   Final Result by Verena Calle MD (07/25 9434)      1  Ascending colonic pneumatosis is a concerning finding  Review of chart shows that the patient's surgery was 8 days ago  Postsurgical pneumatosis can occur, but the finding also raises concern for potential ischemia  No free air or portal venous    gas  Recommend correlation with lactate levels  2   Distention of the distal small bowel and dilation of the proximal colon to the level of the patient's loop colostomy  This could represent postoperative ileus (favored) or partial obstruction         3   Encapsulated fluid collection adjacent to the patient's rectosigmoid mass appears smaller than on prior and is now unilocular (instead a multilocular), measuring 2 6 cm  No encapsulated collections adjacent to the ascending colon affected by    pneumatosis  Note:  I personally discussed this study with Dr Denzel Maza on 7/25/2018 at 2:51 PM       Workstation performed: [id number]         X-ray chest 2 views   Final Result by Manuel Cedillo MD (07/25 5819)      No acute cardiopulmonary disease  Workstation performed: [id number]         CTA upper extremity right w wo contrast    (Results Pending)   CTA chest wo w contrast    (Results Pending)   IR upper extremity    (Results Pending)         Procedures  Procedures   Normal sinus rhythm with a rate of 81  Normal axis and normal QRS complex  There are T-wave inversions in lead 3 similar to previous EKG as well as ST depressions in AVF which are new when compared to the EKG from June June 20, 2018    Conscious Sedation Assessment      Classification Score   ASA Scale Assessment  3-Severe systemic disease that results in functional limitation filed at 07/27/2018 0757   Mallampati Classification  Class I: soft palate, uvula, fauces, pillars visible - No Difficulty filed at 07/27/2018 1653          Phone Consults  ED Phone Contact    ED Course  ED Course as of Jul 27 2015 Wed Jul 25, 2018   154 50-year-old female with a history of colon cancer and recent colostomy presenting to the emergency department with right arm weakness and numbness that began yesterday  Her symptoms were gradual on onset and she notes that her arm feels cold to touch  She says because mildly better when she wraps and blankets to warm and up  Her symptoms are not positional and she has not been able to find any other aggravating or alleviating factors  She denies any pain in her shoulders, back, chest, or neck  She denies any recent traumas  She has no history of blood clots  She also notes that since being discharged 3 days ago she has had significant ostomy output requiring her to drain her ostomy bag 6 times today  She says that the output appears to be watery and feet: Without any blood  She also notes abdominal pain that has been present since the surgery however it acutely worsened yesterday after her grandson through basketball at her abdomen  Pain is largely located to the right lower quadrant  She denies any fevers or chills, nausea or vomiting, or urinary complaints   with white surgery, they will evaluate  MDM  Number of Diagnoses or Management Options  Partial bowel obstruction [facility]):   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[facility]):   Diagnosis management comments: 50-year-old female presenting emergency department with abdominal pain and increased ostomy output 5 days after having the loop ostomy placed  She has an elevated white blood cell count an elevated sugar on presentation which is concerning for acute inflammation    Her CT of her abdomen reveals a possible postoperative ileus versus partia small bowel obstruction  She is also complaining of right arm numbness and weakness that began 1 day ago  On evaluation she does have a decreased pulse in the right radial, however this is still palpable  She has a normal muscle strength in the right extremity on exam   Vascular or arterial duplex reveals a high-grade stenosis of the brachial artery which is similar to previous scan 1 month ago  The surgery service was consulted and recommended admission to the medical service with surgery consult for vascular surgery and colorectal surgery  CritCare Time    Disposition  Final diagnoses:   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[facility] )   Partial bowel obstruction (HCC)   Lactic acidosis   Brachial artery stenosis, right (HCC)   Leukocytosis   RYLAN (acute kidney injury) ([facility] )     Time reflects when diagnosis was documented in both MDM as applicable and the Disposition within this note     Time User Action Codes Description Comment    7/25/2018  4:11 PM [place], [address] [employer] 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[facility] )     7/25/2018  4:11 PM [place], [address] [facility] 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[facility] )     7/25/2018  4:11 PM Barak Ramirez Modify [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[facility] )     7/25/2018  4:12 PM [place], [address] [facility] [L36 096] Partial bowel obstruction ([facility] )     7/25/2018  7:41 PM [place], Lendia Shaka Add [E87 2] Lactic acidosis     7/25/2018  7:41 PM [place], Lendia Shaka Add [I70 208] Brachial artery stenosis, right ([facility] )     7/25/2018  7:41 PM Barak Speedy Add [W47 776] Leukocytosis     7/25/2018  7:41 PM Te, Lendia Shaka Add [N17 9] RYLAN (acute kidney injury) [facility])       ED Disposition     ED Disposition Condition Comment    Admit  Case was discussed with FLAKITA and the patient's admission status was agreed to be Admission Status: inpatient status to the service of Dr Dave Guzman           Follow-up Information     Follow up With Specialties Details Why Contact Info    [facility]  Follow up  Ph: [phone]    Adalid Avila  Follow up  Ph: [phone]  Fax: [phone] Current Discharge Medication List      CONTINUE these medications which have NOT CHANGED    Details   aspirin (ECOTRIN LOW STRENGTH) 81 mg EC tablet Take 81 mg by mouth daily      atorvastatin (LIPITOR) 80 mg tablet Take 80 mg by mouth daily      !! 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 ! capecitabine (XELODA) 500 MG tablet Take 2 tablets (1,000 mg total) by mouth 2 (two) times a day for 42 days  Qty: 168 tablet, Refills: 0    Associated Diagnoses: 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HCC)      clopidogrel (PLAVIX) 75 mg tablet Take 75 mg by mouth daily      famotidine (PEPCID) 20 mg tablet Take 20 mg by mouth 2 (two) times a day      metoprolol tartrate (LOPRESSOR) 25 mg tablet Take 25 mg by mouth every 12 (twelve) hours      oxyCODONE-acetaminophen (PERCOCET) 5-325 mg per tablet Take 1 tablet by mouth every 4 (four) hours as needed for moderate pain for up to 10 days Max Daily Amount: 6 tablets  Qty: 20 tablet, Refills: 0    Associated Diagnoses: 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 - Potential duplicate medications found  Please discuss with provider  No discharge procedures on file  ED Provider  Attending physically available and evaluated Emily Abdullahi I managed the patient along with the ED Attending      Electronically Signed by         Madelaine Molina MD  07/27/18 7873       Madelaine Molina MD  07/27/18 7653

## 2018-07-25 NOTE — H&P
Consultation  - Colorectal Surgery   Emily Abdullahi 60 y o  female MRN: [id number]  Unit/Bed#: ED 14 Encounter: [id number]    Assessment/Plan     Assessment:  60 F with high output end colostomy and dehydration, with transverse colostomy s/p diagnostic laparoscopy with diverting loop colostomy for obstructing sigmoid mass on 7/17    - Patient with Glucose at 467 -- has not taken insulin since discharge  - RYLAN - Cr 1 65 up from   92  - WBC 15k    Plan:  - Repeat Glucose  - F/u Lactate   - C  Diff PCR  - Continue IVF Hydration - [email or web address]  - Clears Diet  - Monitor Colostomy Output   - Strict I/O's  - Admit to [facility]    History of Present Illness     HPI:  Emily Lara is a 60 y o  female who presents with presents today with complaints of high output from her end colostomy for the past day  Patient states that two days prior she was resting at home when her grandson hit in in her ostomy site with a toy and she felt some sharp pain at that point  Over the next day she noticed that whenever she would get up to empty her ostomy bag it would be ready to empty within about 30 minutes after laying down  Patient also mentions having some right arm numbness and tingling  Patient was able to tolerate a diet at this time at home and was still passing flatus via the stoma  She states that last couple of nights she experienced chills and night sweats  No fevers  She denies nausea or vomiting  She is somewhat tearful and stressed about home situation  Review of Systems   Constitutional: Positive for chills and fatigue  Negative for fever  HENT: Negative  Eyes: Negative  Respiratory: Negative for chest tightness and shortness of breath  Cardiovascular: Negative for chest pain  Gastrointestinal: Positive for abdominal pain and diarrhea  Negative for abdominal distention, blood in stool, constipation, nausea and vomiting  Endocrine: Negative      Genitourinary: Negative for difficulty urinating, dyspareunia, hematuria, pelvic pain and urgency  Musculoskeletal: Negative  Skin: Negative  Allergic/Immunologic: Negative  Neurological: Negative  Hematological: Negative  Psychiatric/Behavioral: Negative  Historical Information   Past Medical History:   Diagnosis Date    Bipolar depression ([place] [address] ) 3/17/2016    CAD S/P percutaneous coronary angioplasty 3/17/2016    Chronic pain     Colonic mass     [facility]     CVA (cerebral vascular accident) ([place] [address] )     R sided weakness    Essential hypertension 3/17/2016    GERD (gastroesophageal reflux disease)     Hepatitis C     Heroin abuse 3/18/2016    History of gastric ulcer     Hypertension     NSTEMI (non-ST elevated myocardial infarction) ([place] [address] ) 07/2012    Psychiatric disorder 09/03/2014    Inpatient admission     Schizoaffective disorder ([place] [address] ) 3/17/2016    Schizophrenia ([place] [address] ) 3/17/2016    STEMI (ST elevation myocardial infarction) ([place] [address] ) 12/2017    Type 2 diabetes mellitus ([place] [address] ) 3/17/2016     Past Surgical History:   Procedure Laterality Date    CARDIAC CATHETERIZATION  07/2016    COLONOSCOPY N/A 6/22/2018    Procedure: COLONOSCOPY;  Surgeon: Woody Walden MD;  [facility];   Service: Colorectal    CORONARY ANGIOPLASTY WITH STENT PLACEMENT  07/05/2012    MAKEDA (LAD), PTA (Diag)    CORONARY ANGIOPLASTY WITH STENT PLACEMENT  12/2017    MAKEDA/ PTA (RCA)    MO LAP,DIAGNOSTIC ABDOMEN N/A 7/17/2018    Procedure: LAPAROSCOPY DIAGNOSTIC, Laproscopic transverse loop colostomy, lysis of adhesions;  Surgeon: Jaye Carroll MD;  [facility];  Service: Colorectal     Social History   History   Alcohol Use No     History   Drug Use No     Comment: only tried x 1      History   Smoking Status    Current Every Day Smoker    Packs/day: 1 50    Years: 45 00    Types: Cigarettes   Smokeless Tobacco    Never Used     Family History: non-contributory    Meds/Allergies   all medications and allergies reviewed  No Known Allergies    Objective   First Vitals:   Blood Pressure: 97/53 (07/25/18 1207)  Pulse: 91 (07/25/18 1207)  Temperature: (!) 96 9 °F (36 1 °C) (07/25/18 1207)  Temp Source: Tympanic (07/25/18 1207)  Respirations: 20 (07/25/18 1207)  Weight - Scale: 57 4 kg (126 lb 8 7 oz) (07/25/18 1207)  SpO2: 97 % (07/25/18 1207)    Current Vitals:   Blood Pressure: 143/62 (07/25/18 1700)  Pulse: 74 (07/25/18 1700)  Temperature: (!) 96 9 °F (36 1 °C) (07/25/18 1207)  Temp Source: Tympanic (07/25/18 1207)  Respirations: 18 (07/25/18 1700)  Weight - Scale: 57 4 kg (126 lb 8 7 oz) (07/25/18 1207)  SpO2: 100 % (07/25/18 1700)      Intake/Output Summary (Last 24 hours) at 07/25/18 1725  Last data filed at 07/25/18 1615   Gross per 24 hour   Intake             1000 ml   Output                0 ml   Net             1000 ml       Invasive Devices     Peripheral Intravenous Line            Peripheral IV 07/25/18 Left Antecubital less than 1 day          Drain            Colostomy Loop RUQ 7 days                Physical Exam   Constitutional: She is oriented to person, place, and time  HENT:   Head: Normocephalic and atraumatic  Neck: Normal range of motion  No JVD present  Cardiovascular: Normal rate, regular rhythm and normal heart sounds  Pulmonary/Chest: Effort normal  No respiratory distress  She has wheezes  Abdominal: Soft  Bowel sounds are normal  She exhibits no distension  There is no rebound and no guarding  Tenderness is appropriate and only located adjacent to RUQ Transverse Loop Colostomy    RUQ Transverse Loop Colostomy - liquid feculent green output  Mucosa is pink except for two areas of white streaking on the mucosa at 1 o'clock position   Musculoskeletal: Normal range of motion  Neurological: She is alert and oriented to person, place, and time  Skin: Skin is warm and dry  She is not diaphoretic     Skin tenting      RUE - Equal  strength bilaterally, no sensation or motor deficit in right hand or arm  Lab Results:   I have personally reviewed pertinent lab results  , CBC:   Lab Results   Component Value Date    WBC 15 30 (H) 07/25/2018    HGB 11 4 (L) 07/25/2018    HCT 36 3 07/25/2018    MCV 88 07/25/2018     (H) 07/25/2018    MCH 27 7 07/25/2018    MCHC 31 4 07/25/2018    RDW 14 2 07/25/2018    MPV 9 8 07/25/2018    NRBC 0 07/25/2018   , CMP:   Lab Results   Component Value Date     (L) 07/25/2018    K 4 0 07/25/2018    CL 96 (L) 07/25/2018    CO2 27 07/25/2018    ANIONGAP 7 07/25/2018    BUN 23 07/25/2018    CREATININE 1 65 (H) 07/25/2018    GLUCOSE 467 (H) 07/25/2018    CALCIUM 9 0 07/25/2018    AST 12 07/25/2018    ALT 24 07/25/2018    ALKPHOS 153 (H) 07/25/2018    PROT 7 7 07/25/2018    BILITOT 0 29 07/25/2018    EGFR 34 07/25/2018   Repeat Glucose POC - 290  Lactate      Imaging: I have personally reviewed pertinent reports  CT AP w/o Contrast - 7/25 in ED - IMPRESSION:     1  Ascending colonic pneumatosis is a concerning finding  Review of chart shows that the patient's surgery was 8 days ago  Postsurgical pneumatosis can occur, but the finding also raises concern for potential ischemia  No free air or portal venous   gas  Recommend correlation with lactate levels      2  Distention of the distal small bowel and dilation of the proximal colon to the level of the patient's loop colostomy  This could represent postoperative ileus (favored) or partial obstruction        3   Encapsulated fluid collection adjacent to the patient's rectosigmoid mass appears smaller than on prior and is now unilocular (instead a multilocular), measuring 2 6 cm  No encapsulated collections adjacent to the ascending colon affected by   pneumatosis  EKG, Pathology, and Other Studies: I have personally reviewed pertinent reports        Code Status: Prior  Advance Directive and Living Will:      Power of :    POLST:      Counseling / Coordination of Care  Total floor / unit time spent today 30 minutes  Greater than 50% of total time was spent with the patient and / or family counseling and / or coordination of care  A description of the counseling / coordination of care: 30

## 2018-07-25 NOTE — ED ATTENDING ATTESTATION
Adrian Hubbard MD, saw and evaluated the patient  I have discussed the patient with the resident/non-physician practitioner and agree with the resident's/non-physician practitioner's findings, Plan of Care, and MDM as documented in the resident's/non-physician practitioner's note, except where noted  All available labs and Radiology studies were reviewed  At this point I agree with the current assessment done in the Emergency Department  I have conducted an independent evaluation of this patient a history and physical is as follows: This 67-year-old female presents today with two complaints  The 1st complaint is right sided abdominal pain status post colostomy due to a sigmoid mass performed eight days ago  Patient states her grandson hit her in the belly with a plastic toy a ball yesterday  Patient also thinks that has been increased ostomy output although no blood  Patient has had no vomiting and no fever  Patient's 2nd complaint is of numbness, weakness, cold sensation to her right upper extremity  Patient denies history of similar in the past   Patient states that she is walking she feels like she has to hold the arm  On exam patient is awake and alert in no distress with stable vital signs  Patient has regular heart rate without murmur  Patient has clear lung sounds bilaterally  Patient has an ostomy in place to her abdomen  There is brown stool in this  Patient does have mild diffuse tenderness to her abdomen which could be consistent with her postoperative state, although patient states this is worse than she had yesterday  The patient has no swelling to arms or legs  Patient's right upper extremity does have a 1+ radial pulse there also scars from multiple ABGs attempted I am sure while admitted last week  Of note patient's hand is cool to touch  She is neurovascularly intact with intact  strength intact sensation     will check basic blood work will check CT scan of abdomen in light of recent surgery and abdominal trauma  Will also evaluate arterial ultrasound of upper extremity to ensure there is good blood flow  Patient will likely require admission  Patient's blood work is significant for a blood sugar greater than 400 in this known diabetic  Patient also has an RYLAN with an increasing creatinine of almost one since discharge  Patient's CT scan was significant for some pneumatosis which could be postoperative but could also be a complication  Surgery was consulted, will await their input    Patient was signed out to oncoming team   Critical Care Time  CritCare Time    Procedures

## 2018-07-25 NOTE — ED NOTES
Red surgery resident Aracelis Ward made aware of lactic and will notify white surgery resident Lucretia Tovar  Of lab result       Daryl An RN  07/25/18 3358

## 2018-07-25 NOTE — PROGRESS NOTES
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

## 2018-07-25 NOTE — CONSULTS
Consultation - Vascular Surgery   Emily Abdullahi 60 y o  female MRN: [id number]  Unit/Bed#: ED 14 Encounter: [id number]    Assessment/Plan     Assessment:  60 F with high output end colostomy and dehydration, with transverse colostomy s/p diagnostic laparoscopy with diverting loop colostomy for obstructing sigmoid mass on 7/17    - RUE with occlusion vs high grade stenosis in the  axillary/proximal brachial artery and a suspect occlusion in the mid to distal  radial artery  There is a 42 mm Hg Left >Right brachial pressure gradient  Forearm/Upper arm index:  0 55 (Prior 0 89)  - Patient with Glucose at 467 -- has not taken insulin since discharge      Plan:  - monitor RUE  At this time hand is warm with doppler palmar and ulnar   Hold off on heparin gtt at this time as this is chronic  Will discuss with attending further plans       History of Present Illness     HPI:  Emily Tripp is a 60 y o  female who presents with presents today with complaints of high output from her end colostomy for the past day  Patient states that two days prior she was resting at home when her grandson hit in in her ostomy site with a toy and she felt some sharp pain at that point  Over the next day she noticed that whenever she would get up to empty her ostomy bag it would be ready to empty within about 30 minutes after laying down  Patient also mentions having some right arm numbness and tingling  Patient was able to tolerate a diet at this time at home and was still passing flatus via the stoma  She states that last couple of nights she experienced chills and night sweats  No fevers  She denies nausea or vomiting  She is somewhat tearful and stressed about home situation  Review of Systems   Constitutional: Positive for chills and fatigue  Negative for fever  HENT: Negative  Eyes: Negative  Respiratory: Negative for chest tightness and shortness of breath  Cardiovascular: Negative for chest pain  Gastrointestinal: Positive for abdominal pain and diarrhea  Negative for abdominal distention, blood in stool, constipation, nausea and vomiting  Endocrine: Negative  Genitourinary: Negative for difficulty urinating, dyspareunia, hematuria, pelvic pain and urgency  Musculoskeletal: Negative  Skin: Negative  Allergic/Immunologic: Negative  Neurological: Negative  Hematological: Negative  Psychiatric/Behavioral: Negative  Historical Information   Past Medical History:   Diagnosis Date    Bipolar depression ([place] [address] ) 3/17/2016    CAD S/P percutaneous coronary angioplasty 3/17/2016    Chronic pain     Colonic mass     COPD (chronic obstructive pulmonary disease) ([facility])     CVA (cerebral vascular accident) ([place] [address] )     R sided weakness    Essential hypertension 3/17/2016    GERD (gastroesophageal reflux disease)     Hepatitis C     Heroin abuse 3/18/2016    History of gastric ulcer     Hypertension     NSTEMI (non-ST elevated myocardial infarction) ([place] [address] ) 07/2012    Psychiatric disorder 09/03/2014    Inpatient admission     Schizoaffective disorder ([place] [address] ) 3/17/2016    Schizophrenia ([place] [address] ) 3/17/2016    STEMI (ST elevation myocardial infarction) ([place] [address] ) 12/2017    Type 2 diabetes mellitus ([place] [address] ) 3/17/2016     Past Surgical History:   Procedure Laterality Date    CARDIAC CATHETERIZATION  07/2016    COLONOSCOPY N/A 6/22/2018    Procedure: COLONOSCOPY;  Surgeon: Andre Acharya MD;  [facility];   Service: Colorectal    CORONARY ANGIOPLASTY WITH STENT PLACEMENT  07/05/2012    MAKEDA (LAD), PTA (Diag)    CORONARY ANGIOPLASTY WITH STENT PLACEMENT  12/2017    MAKEDA/ PTA (RCA)    VA LAP,DIAGNOSTIC ABDOMEN N/A 7/17/2018    Procedure: LAPAROSCOPY DIAGNOSTIC, Laproscopic transverse loop colostomy, lysis of adhesions;  Surgeon: Neeru Dawn MD;  [facility];  Service: Colorectal     Social History   History   Alcohol Use No     History   Drug Use No     Comment: only tried x 1      History   Smoking Status    Current Every Day Smoker    Packs/day: 1 50    Years: 45 00    Types: Cigarettes   Smokeless Tobacco    Never Used     Family History: non-contributory    Meds/Allergies   all medications and allergies reviewed  No Known Allergies    Objective   First Vitals:   Blood Pressure: 97/53 (07/25/18 1207)  Pulse: 91 (07/25/18 1207)  Temperature: (!) 96 9 °F (36 1 °C) (07/25/18 1207)  Temp Source: Tympanic (07/25/18 1207)  Respirations: 20 (07/25/18 1207)  Weight - Scale: 57 4 kg (126 lb 8 7 oz) (07/25/18 1207)  SpO2: 97 % (07/25/18 1207)    Current Vitals:   Blood Pressure: 143/62 (07/25/18 1700)  Pulse: 74 (07/25/18 1700)  Temperature: (!) 96 9 °F (36 1 °C) (07/25/18 1207)  Temp Source: Tympanic (07/25/18 1207)  Respirations: 18 (07/25/18 1700)  Weight - Scale: 57 4 kg (126 lb 8 7 oz) (07/25/18 1207)  SpO2: 100 % (07/25/18 1700)      Intake/Output Summary (Last 24 hours) at 07/25/18 1725  Last data filed at 07/25/18 1615   Gross per 24 hour   Intake             1000 ml   Output                0 ml   Net             1000 ml       Invasive Devices     Peripheral Intravenous Line            Peripheral IV 07/25/18 Left Antecubital less than 1 day          Drain            Colostomy Loop RUQ 7 days                Physical Exam   Constitutional: She is oriented to person, place, and time  HENT:   Head: Normocephalic and atraumatic  Neck: Normal range of motion  No JVD present  Cardiovascular: Normal rate, regular rhythm and normal heart sounds  Pulmonary/Chest: Effort normal  No respiratory distress  She has wheezes  Abdominal: Soft  Bowel sounds are normal  She exhibits no distension  There is no rebound and no guarding     Tenderness is appropriate and only located adjacent to RUQ Transverse Loop Colostomy    RUQ Transverse Loop Colostomy - liquid feculent green output    RUE: doppler radial pulse at 1/3 distal arm, palpable ulnar + doppler palmar   Hand warm + movement sensation   LUE: warm palpble radial     Musculoskeletal: Normal range of motion  Neurological: She is alert and oriented to person, place, and time  Skin: Skin is warm and dry  She is not diaphoretic  Skin tenting      RUE - Equal  strength bilaterally, no sensation or motor deficit in right hand or arm  Lab Results:   I have personally reviewed pertinent lab results  , CBC:   Lab Results   Component Value Date    WBC 15 30 (H) 07/25/2018    HGB 11 4 (L) 07/25/2018    HCT 36 3 07/25/2018    MCV 88 07/25/2018     (H) 07/25/2018    MCH 27 7 07/25/2018    MCHC 31 4 07/25/2018    RDW 14 2 07/25/2018    MPV 9 8 07/25/2018    NRBC 0 07/25/2018   , CMP:   Lab Results   Component Value Date     (L) 07/25/2018    K 4 0 07/25/2018    CL 96 (L) 07/25/2018    CO2 27 07/25/2018    ANIONGAP 7 07/25/2018    BUN 23 07/25/2018    CREATININE 1 65 (H) 07/25/2018    GLUCOSE 467 (H) 07/25/2018    CALCIUM 9 0 07/25/2018    AST 12 07/25/2018    ALT 24 07/25/2018    ALKPHOS 153 (H) 07/25/2018    PROT 7 7 07/25/2018    BILITOT 0 29 07/25/2018    EGFR 34 07/25/2018   Repeat Glucose POC - 290  Lactate      Imaging: I have personally reviewed pertinent reports  CT AP w/o Contrast - 7/25 in ED - IMPRESSION:     1  Ascending colonic pneumatosis is a concerning finding  Review of chart shows that the patient's surgery was 8 days ago  Postsurgical pneumatosis can occur, but the finding also raises concern for potential ischemia  No free air or portal venous   gas  Recommend correlation with lactate levels      2  Distention of the distal small bowel and dilation of the proximal colon to the level of the patient's loop colostomy  This could represent postoperative ileus (favored) or partial obstruction        3   Encapsulated fluid collection adjacent to the patient's rectosigmoid mass appears smaller than on prior and is now unilocular (instead a multilocular), measuring 2 6 cm    No encapsulated collections adjacent to the ascending colon affected by   pneumatosis  EKG, Pathology, and Other Studies: I have personally reviewed pertinent reports  Code Status: Prior  Advance Directive and Living Will:      Power of :    POLST:      Counseling / Coordination of Care  Total floor / unit time spent today  minutes  Greater than 50% of total time was spent with the patient and / or family counseling and / or coordination of care  A description of the counseling / coordination of care: Brady Davis

## 2018-07-25 NOTE — PROGRESS NOTES
GI Oncology Nurse Navigator Note    Received a call from [facility] Pa[facility] (PET/CT), stated they can get pt in for PET/CT on 8/1 at 0930, attempted to call pt to change to earlier appointment, spoke to pts boyfriend Feliciano Carver who stated he will be with pt in 15 minutes and will have her call RN back at that time, if pt is agreeable will call [facility] and arrange transportation

## 2018-07-25 NOTE — ED NOTES
Assisted pt with colostomy bag draining  Cleaned and changed pt's bedding  Pt ambulated with no distress to bathroom  Pt aware we need urine sample        Jason Farnsworth RN  56/52/26 [id number]

## 2018-07-26 ENCOUNTER — APPOINTMENT (INPATIENT)
Dept: RADIOLOGY | Facility: HOSPITAL | Age: 58
DRG: 253 | End: 2018-07-26
Payer: COMMERCIAL

## 2018-07-26 PROBLEM — K56.7 ILEUS (HCC): Status: ACTIVE | Noted: 2018-07-25

## 2018-07-26 LAB
ANION GAP SERPL CALCULATED.3IONS-SCNC: 7 MMOL/L (ref 4–13)
BASOPHILS # BLD AUTO: 0.07 THOUSANDS/ΜL (ref 0–0.1)
BASOPHILS NFR BLD AUTO: 1 % (ref 0–1)
BUN SERPL-MCNC: 13 MG/DL (ref 5–25)
C DIFF TOX GENS STL QL NAA+PROBE: NORMAL
CALCIUM SERPL-MCNC: 8.7 MG/DL (ref 8.3–10.1)
CHLORIDE SERPL-SCNC: 112 MMOL/L (ref 100–108)
CO2 SERPL-SCNC: 22 MMOL/L (ref 21–32)
CREAT SERPL-MCNC: 0.88 MG/DL (ref 0.6–1.3)
EOSINOPHIL # BLD AUTO: 0.37 THOUSAND/ΜL (ref 0–0.61)
EOSINOPHIL NFR BLD AUTO: 3 % (ref 0–6)
ERYTHROCYTE [DISTWIDTH] IN BLOOD BY AUTOMATED COUNT: 14.3 % (ref 11.6–15.1)
GFR SERPL CREATININE-BSD FRML MDRD: 72 ML/MIN/1.73SQ M
GLUCOSE SERPL-MCNC: 117 MG/DL (ref 65–140)
GLUCOSE SERPL-MCNC: 132 MG/DL (ref 65–140)
GLUCOSE SERPL-MCNC: 141 MG/DL (ref 65–140)
GLUCOSE SERPL-MCNC: 146 MG/DL (ref 65–140)
GLUCOSE SERPL-MCNC: 166 MG/DL (ref 65–140)
GLUCOSE SERPL-MCNC: 324 MG/DL (ref 65–140)
HCT VFR BLD AUTO: 32.6 % (ref 34.8–46.1)
HGB BLD-MCNC: 10.2 G/DL (ref 11.5–15.4)
IMM GRANULOCYTES # BLD AUTO: 0.18 THOUSAND/UL (ref 0–0.2)
IMM GRANULOCYTES NFR BLD AUTO: 1 % (ref 0–2)
LACTATE SERPL-SCNC: 0.8 MMOL/L (ref 0.5–2)
LYMPHOCYTES # BLD AUTO: 4.53 THOUSANDS/ΜL (ref 0.6–4.47)
LYMPHOCYTES NFR BLD AUTO: 32 % (ref 14–44)
MAGNESIUM SERPL-MCNC: 1.7 MG/DL (ref 1.6–2.6)
MCH RBC QN AUTO: 28 PG (ref 26.8–34.3)
MCHC RBC AUTO-ENTMCNC: 31.3 G/DL (ref 31.4–37.4)
MCV RBC AUTO: 90 FL (ref 82–98)
MONOCYTES # BLD AUTO: 0.92 THOUSAND/ΜL (ref 0.17–1.22)
MONOCYTES NFR BLD AUTO: 7 % (ref 4–12)
NEUTROPHILS # BLD AUTO: 7.99 THOUSANDS/ΜL (ref 1.85–7.62)
NEUTS SEG NFR BLD AUTO: 56 % (ref 43–75)
NRBC BLD AUTO-RTO: 0 /100 WBCS
PLATELET # BLD AUTO: 608 THOUSANDS/UL (ref 149–390)
PLATELET # BLD AUTO: 616 THOUSANDS/UL (ref 149–390)
PMV BLD AUTO: 10.1 FL (ref 8.9–12.7)
PMV BLD AUTO: 9.8 FL (ref 8.9–12.7)
POTASSIUM SERPL-SCNC: 3.9 MMOL/L (ref 3.5–5.3)
RBC # BLD AUTO: 3.64 MILLION/UL (ref 3.81–5.12)
SODIUM SERPL-SCNC: 141 MMOL/L (ref 136–145)
WBC # BLD AUTO: 14.06 THOUSAND/UL (ref 4.31–10.16)

## 2018-07-26 PROCEDURE — 82948 REAGENT STRIP/BLOOD GLUCOSE: CPT

## 2018-07-26 PROCEDURE — 80048 BASIC METABOLIC PNL TOTAL CA: CPT | Performed by: SURGERY

## 2018-07-26 PROCEDURE — 87493 C DIFF AMPLIFIED PROBE: CPT | Performed by: SURGERY

## 2018-07-26 PROCEDURE — 99232 SBSQ HOSP IP/OBS MODERATE 35: CPT | Performed by: INTERNAL MEDICINE

## 2018-07-26 PROCEDURE — 83735 ASSAY OF MAGNESIUM: CPT | Performed by: SURGERY

## 2018-07-26 PROCEDURE — 73206 CT ANGIO UPR EXTRM W/O&W/DYE: CPT

## 2018-07-26 PROCEDURE — 99233 SBSQ HOSP IP/OBS HIGH 50: CPT | Performed by: SURGERY

## 2018-07-26 PROCEDURE — 85025 COMPLETE CBC W/AUTO DIFF WBC: CPT | Performed by: SURGERY

## 2018-07-26 RX ORDER — SODIUM CHLORIDE 9 MG/ML
75 INJECTION, SOLUTION INTRAVENOUS CONTINUOUS
Status: DISPENSED | OUTPATIENT
Start: 2018-07-26 | End: 2018-07-27

## 2018-07-26 RX ORDER — MUSCLE RUB CREAM 100; 150 MG/G; MG/G
CREAM TOPICAL 4 TIMES DAILY PRN
Status: DISCONTINUED | OUTPATIENT
Start: 2018-07-26 | End: 2018-07-30 | Stop reason: HOSPADM

## 2018-07-26 RX ADMIN — IOHEXOL 100 ML: 350 INJECTION, SOLUTION INTRAVENOUS at 18:13

## 2018-07-26 RX ADMIN — ATORVASTATIN CALCIUM 80 MG: 80 TABLET, FILM COATED ORAL at 11:07

## 2018-07-26 RX ADMIN — HEPARIN SODIUM 5000 UNITS: 5000 INJECTION, SOLUTION INTRAVENOUS; SUBCUTANEOUS at 21:30

## 2018-07-26 RX ADMIN — METOPROLOL TARTRATE 25 MG: 25 TABLET, FILM COATED ORAL at 11:07

## 2018-07-26 RX ADMIN — OXYCODONE HYDROCHLORIDE 5 MG: 5 TABLET ORAL at 10:11

## 2018-07-26 RX ADMIN — HEPARIN SODIUM 5000 UNITS: 5000 INJECTION, SOLUTION INTRAVENOUS; SUBCUTANEOUS at 05:34

## 2018-07-26 RX ADMIN — METOPROLOL TARTRATE 25 MG: 25 TABLET, FILM COATED ORAL at 21:30

## 2018-07-26 RX ADMIN — CLOPIDOGREL BISULFATE 75 MG: 75 TABLET, FILM COATED ORAL at 11:06

## 2018-07-26 RX ADMIN — INSULIN LISPRO 3 UNITS: 100 INJECTION, SOLUTION INTRAVENOUS; SUBCUTANEOUS at 12:52

## 2018-07-26 RX ADMIN — OXYCODONE HYDROCHLORIDE 5 MG: 5 TABLET ORAL at 00:10

## 2018-07-26 RX ADMIN — INSULIN GLARGINE 5 UNITS: 100 INJECTION, SOLUTION SUBCUTANEOUS at 21:30

## 2018-07-26 RX ADMIN — SODIUM CHLORIDE 75 ML/HR: 0.9 INJECTION, SOLUTION INTRAVENOUS at 17:42

## 2018-07-26 RX ADMIN — FAMOTIDINE 20 MG: 20 TABLET ORAL at 17:48

## 2018-07-26 RX ADMIN — TRAZODONE HYDROCHLORIDE 50 MG: 50 TABLET ORAL at 21:30

## 2018-07-26 RX ADMIN — HEPARIN SODIUM 5000 UNITS: 5000 INJECTION, SOLUTION INTRAVENOUS; SUBCUTANEOUS at 15:11

## 2018-07-26 RX ADMIN — FAMOTIDINE 20 MG: 20 TABLET ORAL at 11:06

## 2018-07-26 RX ADMIN — MORPHINE SULFATE 2 MG: 2 INJECTION, SOLUTION INTRAMUSCULAR; INTRAVENOUS at 21:38

## 2018-07-26 RX ADMIN — NICOTINE 1 PATCH: 14 PATCH, EXTENDED RELEASE TRANSDERMAL at 11:06

## 2018-07-26 RX ADMIN — ASPIRIN 81 MG: 81 TABLET, COATED ORAL at 11:06

## 2018-07-26 NOTE — PLAN OF CARE
Problem: DISCHARGE PLANNING - CARE MANAGEMENT  Goal: Discharge to post-acute care or home with appropriate resources  INTERVENTIONS:  - Conduct assessment to determine patient/family and health care team treatment goals, and need for post-acute services based on payer coverage, community resources, and patient preferences, and barriers to discharge  - Address psychosocial, clinical, and financial barriers to discharge as identified in assessment in conjunction with the patient/family and health care team  - Arrange appropriate level of post-acute services according to patient's   needs and preference and payer coverage in collaboration with the physician and health care team  - Communicate with and update the patient/family, physician, and health care team regarding progress on the discharge plan  - Arrange appropriate transportation to post-acute venues  -Anticipate return to home with resumption of [place] [id number] services  Outcome: Progressing

## 2018-07-26 NOTE — MEDICAL STUDENT
MEDICAL STUDENT  Inpatient H&P for TRAINING ONLY   Not Part of Legal Medical Record     Medical Student Progress Note    Unit/Bed#: [facility] 610-01 Encounter: [id number]        Emily Abdullahi 60 y o  female [id number]    Hospital Stay Days: 1      Assessment/Plan:      Right arm numbness  Secondary to: Brachial artery stenosis, right [facility])  Presented with Right arm numbness, tingling and coolness that has been occurring for months- worked up on 6/19 admission with dopplers showing stenosis vs occlusion in distal radial artery and 75% or> stenosis in axillary/distal subclavian artery: no intervention at the time because was only intermittent claudication; to continue ASA and plavix  7/26 VAS doppler study showed occlusion vs  High grade stenosis of axillary and proximal brachial artery and possible occlusion of mid-distal radial artery in RUE  Vascular and IR consulted for possible Agram  Continue neurovascular checks Q4H of right arm  Per vascular: doppler found of palmar and ulnar arteries; pulses not well appreciated per my exam  F/u vascular plan   Defer heparin gtt per vascular as condition is chronic      Type 2 diabetes mellitus with hyperglycemia ([facility] [address] )  Presented to ED with blood glucose of 467 and stated she had not taken insulin since discharge 7/17 after colostomy  She states she is supposed to use insulin and metformin at home  Story was erratic as to insulin use   Mostly uses the metformin  Started lantus 5 units at bedtime with humalog ss for meals  POCT Blood glucose today is 132 with serum glucose at 117; well controlled and stable  Patient denied any hypoglycemic symptoms      RYLAN (acute kidney injury) ([facility] [address] )   resolved after fluid hydration; likely secondary to dehydration and hyperglycemia with possible diabetic nephropathy component component given UA from ED: 250 glucose, trace ketones, 2+ protein  Cre 1 65->1 10->0 88      Lactic acidosis  Resolved; likely secondary to dehydration from high volume ostomy output; given fluid boluses for elevation  2 6->4 1-> 0 8      Partial bowel obstruction ([facility])  Secondary to colon cancer  7/25 abdominal CT:   1  ascending colonic pneumatosis; postsurgical vs  Ischemia (less likely as lactate levels normalized)   2  Distension of distal small bowel and dilation of proximal colon to level of loop colostomy; postop ileus vs partial obstruction   3  Encapsulated fluid adjacent to rectosigmoid mass; unilocular now and smaller than prior imaging  Sigmoid colon mass dx 6/19 requiring diverting loop colostomy when became obstructive  Was scheduled to start adjuvant chemotherapy for mass but patient had compliance issues and presented to ED with obstruction before starting chemotherapy so colorectal surgery opted for colostomy  Continue with current chemotherapy regimen; patient must bring in medication from home= xeloda   States she has been compliant with xeloda regimen twice daily  PET scan for staging not completed       Diarrhea  Was having to change colostomy bag frequently before admission  IVF given in ED to rehydrate; had lactic acidosis; resolved now  States she has changed colostomy bag 6-7x this AM but bag was only 1/4 full each time  Cdiff negative  Continue high fiber diet to bulk stool   WBC trending down; 15 30->14 06      Hyponatremia  Likely secondary to diarrhea; hypovolemic hyponatremia  130->135->141  Received NS IVF; Resolved    Disposition: Further hospitalization; discharge date unknown at this time; awaiting vascular surgery recommendations      Subjective:   Continues to have numbness in right forearm with tingling in fingers of right hand associated with coolness of fingers  Numbness in right arm began a few days before admission  Continues to have crampy abdominal pain on left side traveling around to her umbilicus which she states has been bothering her variably since her diverting loop colostomy done 7/17   She states ostomy output has decreased since admission but still changed bag 6-7 times just this morning but bag was only 1/4 full each time she changed it  She denies N, fevers, chills, SOB, CP and palpitations  Tolerated GI high fiber diet well this morning, eating all of her breakfast  No additional complaints at this time  Vitals: Temp (24hrs), Av 8 °F (36 6 °C), Min:96 9 °F (36 1 °C), Max:98 6 °F (37 °C)  Current: Temperature: 97 9 °F (36 6 °C)  Vitals:    18 1845 18 1854 18 2200 18 0745   BP:  116/50 125/84 135/83   BP Location:  Left arm Left arm    Pulse: 66 66 65 77   Resp:  16    Temp:   98 6 °F (37 °C) 97 9 °F (36 6 °C)   TempSrc:   Oral    SpO2: 97% 96% 96% 97%   Weight:        Body mass index is 22 42 kg/m²  I/O last 24 hours: In: 4306 3 [I V :1306 3; IV Piggyback:3000]  Out: 1100 [Urine:800; Stool:300]     Review of Systems: see above    Physical Exam:  Physical Exam   General appearance: alert and oriented, in no acute distress  Back: symmetric; no abnormalities  Lungs: clear to auscultation bilaterally  Heart: regular rate and rhythm  Abdomen: abnormal findings:  abdominal distension in LUQ and LLQ- tympanic to percussion, dull to percussion over R side  tenderness to palpation left upper and lower quadrant and inferior to umbilicus  Ostomy noted with brown liquid output in bag with mild hernia around ostomy site  Ostomy site, clean and dry   stretch marks noted across abdomen midaxillary lines bilaterally  Extremities: bilateral lower extremities and left upper extremity warm and well-perfused; RUE cool at fingers and hand with poor perfusion; pallor of fingers of right hand; Right hand > 3 sec cap refill  Pulses: RUE pulses not appreciated, LUE pulses appreciated 2+; carotid pulse 2+ bilaterally; pedal pulses 2+ bilaterally  Neurologic: Sensory: decreased sensation to touch RUE vs LUE; decreased sensation to touch right foot vs left foot; numbness and tingling in right forearm and hand; right fingers and hand cool to touch     Invasive Devices     Peripheral Intravenous Line            Peripheral IV 07/25/18 Left Antecubital less than 1 day          Drain            Colostomy Loop RUQ 8 days                    Labs:   Recent Results (from the past 24 hour(s))   ECG 12 lead    Collection Time: 07/25/18 12:20 PM   Result Value Ref Range    Ventricular Rate 81 BPM    Atrial Rate 81 BPM    UT Interval 142 ms    QRSD Interval 100 ms    QT Interval 392 ms    QTC Interval 455 ms    P Axis 78 degrees    QRS Axis 69 degrees    T Wave Axis 23 degrees   Comprehensive metabolic panel    Collection Time: 07/25/18 12:48 PM   Result Value Ref Range    Sodium 130 (L) 136 - 145 mmol/L    Potassium 4 0 3 5 - 5 3 mmol/L    Chloride 96 (L) 100 - 108 mmol/L    CO2 27 21 - 32 mmol/L    Anion Gap 7 4 - 13 mmol/L    BUN 23 5 - 25 mg/dL    Creatinine 1 65 (H) 0 60 - 1 30 mg/dL    Glucose 467 (H) 65 - 140 mg/dL    Calcium 9 0 8 3 - 10 1 mg/dL    AST 12 5 - 45 U/L    ALT 24 12 - 78 U/L    Alkaline Phosphatase 153 (H) 46 - 116 U/L    Total Protein 7 7 6 4 - 8 2 g/dL    Albumin 3 0 (L) 3 5 - 5 0 g/dL    Total Bilirubin 0 29 0 20 - 1 00 mg/dL    eGFR 34 ml/min/1 73sq m   CBC and differential    Collection Time: 07/25/18 12:48 PM   Result Value Ref Range    WBC 15 30 (H) 4 31 - 10 16 Thousand/uL    RBC 4 12 3 81 - 5 12 Million/uL    Hemoglobin 11 4 (L) 11 5 - 15 4 g/dL    Hematocrit 36 3 34 8 - 46 1 %    MCV 88 82 - 98 fL    MCH 27 7 26 8 - 34 3 pg    MCHC 31 4 31 4 - 37 4 g/dL    RDW 14 2 11 6 - 15 1 %    MPV 9 8 8 9 - 12 7 fL    Platelets 843 (H) 961 - 390 Thousands/uL    nRBC 0 /100 WBCs    Neutrophils Relative 80 (H) 43 - 75 %    Immat GRANS % 2 0 - 2 %    Lymphocytes Relative 13 (L) 14 - 44 %    Monocytes Relative 5 4 - 12 %    Eosinophils Relative 0 0 - 6 %    Basophils Relative 0 0 - 1 %    Neutrophils Absolute 12 19 (H) 1 85 - 7 62 Thousands/µL    Immature Grans Absolute 0 29 (H) 0 00 - 0 20 Thousand/uL    Lymphocytes Absolute 1  99 0 60 - 4 47 Thousands/µL    Monocytes Absolute 0 74 0 17 - 1 22 Thousand/µL    Eosinophils Absolute 0 06 0 00 - 0 61 Thousand/µL    Basophils Absolute 0 03 0 00 - 0 10 Thousands/µL   Troponin I    Collection Time: 07/25/18 12:48 PM   Result Value Ref Range    Troponin I <0 02 <=0 04 ng/mL   POCT urinalysis dipstick    Collection Time: 07/25/18  1:45 PM   Result Value Ref Range    Color, UA (SEE RESULTS)    ED Urine Macroscopic    Collection Time: 07/25/18  1:53 PM   Result Value Ref Range    Color, UA Yellow     Clarity, UA Clear     pH, UA 5 5 4 5 - 8 0    Leukocytes, UA Negative Negative    Nitrite, UA Negative Negative    Protein,  (2+) (A) Negative mg/dl    Glucose,  (1/4%) (A) Negative mg/dl    Ketones, UA Trace (A) Negative mg/dl    Urobilinogen, UA 1 0 0 2, 1 0 E U /dl E U /dl    Bilirubin, UA Interference- unable to analyze (A) Negative    Blood, UA Negative Negative    Specific Gravity, UA 1 025 1 003 - 1 030   Urine Microscopic    Collection Time: 07/25/18  1:53 PM   Result Value Ref Range    RBC, UA None Seen None Seen, 0-5 /hpf    WBC, UA 2-4 (A) None Seen, 0-5, 5-55, 5-65 /hpf    Epithelial Cells Occasional None Seen, Occasional /hpf    Bacteria, UA Occasional None Seen, Occasional /hpf   Fingerstick Glucose (POCT)    Collection Time: 07/25/18  5:29 PM   Result Value Ref Range    POC Glucose 290 (H) 65 - 140 mg/dl   Lactic acid, plasma    Collection Time: 07/25/18  5:30 PM   Result Value Ref Range    LACTIC ACID 2 6 (HH) 0 5 - 2 0 mmol/L   Basic metabolic panel    Collection Time: 07/25/18  8:00 PM   Result Value Ref Range    Sodium 135 (L) 136 - 145 mmol/L    Potassium 3 7 3 5 - 5 3 mmol/L    Chloride 104 100 - 108 mmol/L    CO2 24 21 - 32 mmol/L    Anion Gap 7 4 - 13 mmol/L    BUN 18 5 - 25 mg/dL    Creatinine 1 10 0 60 - 1 30 mg/dL    Glucose 180 (H) 65 - 140 mg/dL    Calcium 8 4 8 3 - 10 1 mg/dL    eGFR 55 ml/min/1 73sq m   Lactic acid, plasma    Collection Time: 07/25/18  8:00 PM Result Value Ref Range    LACTIC ACID 4 1 (HH) 0 5 - 2 0 mmol/L   Fingerstick Glucose (POCT)    Collection Time: 07/25/18 10:31 PM   Result Value Ref Range    POC Glucose 166 (H) 65 - 140 mg/dl   Platelet count    Collection Time: 07/25/18 11:42 PM   Result Value Ref Range    Platelets 549 (H) 343 - 390 Thousands/uL    MPV 10 1 8 9 - 12 7 fL   Lactic acid, plasma    Collection Time: 07/25/18 11:42 PM   Result Value Ref Range    LACTIC ACID 0 8 0 5 - 2 0 mmol/L   Basic metabolic panel    Collection Time: 07/26/18  5:56 AM   Result Value Ref Range    Sodium 141 136 - 145 mmol/L    Potassium 3 9 3 5 - 5 3 mmol/L    Chloride 112 (H) 100 - 108 mmol/L    CO2 22 21 - 32 mmol/L    Anion Gap 7 4 - 13 mmol/L    BUN 13 5 - 25 mg/dL    Creatinine 0 88 0 60 - 1 30 mg/dL    Glucose 117 65 - 140 mg/dL    Calcium 8 7 8 3 - 10 1 mg/dL    eGFR 72 ml/min/1 73sq m   CBC and differential    Collection Time: 07/26/18  5:56 AM   Result Value Ref Range    WBC 14 06 (H) 4 31 - 10 16 Thousand/uL    RBC 3 64 (L) 3 81 - 5 12 Million/uL    Hemoglobin 10 2 (L) 11 5 - 15 4 g/dL    Hematocrit 32 6 (L) 34 8 - 46 1 %    MCV 90 82 - 98 fL    MCH 28 0 26 8 - 34 3 pg    MCHC 31 3 (L) 31 4 - 37 4 g/dL    RDW 14 3 11 6 - 15 1 %    MPV 9 8 8 9 - 12 7 fL    Platelets 046 (H) 523 - 390 Thousands/uL    nRBC 0 /100 WBCs    Neutrophils Relative 56 43 - 75 %    Immat GRANS % 1 0 - 2 %    Lymphocytes Relative 32 14 - 44 %    Monocytes Relative 7 4 - 12 %    Eosinophils Relative 3 0 - 6 %    Basophils Relative 1 0 - 1 %    Neutrophils Absolute 7 99 (H) 1 85 - 7 62 Thousands/µL    Immature Grans Absolute 0 18 0 00 - 0 20 Thousand/uL    Lymphocytes Absolute 4 53 (H) 0 60 - 4 47 Thousands/µL    Monocytes Absolute 0 92 0 17 - 1 22 Thousand/µL    Eosinophils Absolute 0 37 0 00 - 0 61 Thousand/µL    Basophils Absolute 0 07 0 00 - 0 10 Thousands/µL   Magnesium    Collection Time: 07/26/18  5:56 AM   Result Value Ref Range    Magnesium 1 7 1 6 - 2 6 mg/dL   Fingerstick Glucose (POCT)    Collection Time: 07/26/18  7:53 AM   Result Value Ref Range    POC Glucose 132 65 - 140 mg/dl       Radiology Results: I have personally reviewed pertinent reports  VAS upper limb arterial duplex, unilateral/limited, graft   Final Result by Rolo Pham DO (07/25 2103)   RUL:occlusion vs high grade stenosis in axillary/proximal brachial artery and suspect occlusion in mid to distal radial artery  42mmHg L >R brachial pressure gradient  GILLIAN: patent   CT abdomen pelvis wo contrast   Final Result by Liam Holt MD (07/25 3546)      1  Ascending colonic pneumatosis is a concerning finding  Review of chart shows that the patient's surgery was 8 days ago  Postsurgical pneumatosis can occur, but the finding also raises concern for potential ischemia  No free air or portal venous    gas  Recommend correlation with lactate levels  2   Distention of the distal small bowel and dilation of the proximal colon to the level of the patient's loop colostomy  This could represent postoperative ileus (favored) or partial obstruction         3   Encapsulated fluid collection adjacent to the patient's rectosigmoid mass appears smaller than on prior and is now unilocular (instead a multilocular), measuring 2 6 cm  No encapsulated collections adjacent to the ascending colon affected by    pneumatosis  Note:  I personally discussed this study with Dr Sosa Tracy on 7/25/2018 at 2:51 PM       Workstation performed: [id number]         X-ray chest 2 views   Final Result by Jessica Cespedes MD (07/25 5621)      No acute cardiopulmonary disease  Workstation performed: [id number]         IR consult    (Results Pending)       Other Diagnostic Testing:   I have personally reviewed pertinent reports          Active Meds:   Current Facility-Administered Medications   Medication Dose Route Frequency    acetaminophen (TYLENOL) tablet 650 mg  650 mg Oral Q6H PRN    aspirin (ECOTRIN LOW STRENGTH) EC tablet 81 mg  81 mg Oral Daily    atorvastatin (LIPITOR) tablet 80 mg  80 mg Oral Daily    calcium carbonate (TUMS) chewable tablet 500 mg  500 mg Oral Daily PRN    capecitabine (XELODA) tablet 300 mg  300 mg Oral BID    clopidogrel (PLAVIX) tablet 75 mg  75 mg Oral Daily    famotidine (PEPCID) tablet 20 mg  20 mg Oral BID    heparin (porcine) subcutaneous injection 5,000 Units  5,000 Units Subcutaneous Q8H [facility]    insulin glargine (LANTUS) subcutaneous injection 5 Units 0 05 mL  5 Units Subcutaneous HS    insulin lispro (HumaLOG) 100 units/mL subcutaneous injection 1-5 Units  1-5 Units Subcutaneous 4x Daily (AC & HS)    metoprolol tartrate (LOPRESSOR) tablet 25 mg  25 mg Oral Q12H ANNABELLA    morphine injection 2 mg  2 mg Intravenous Q4H PRN    nicotine (NICODERM CQ) 14 mg/24hr TD 24 hr patch 1 patch  1 patch Transdermal Daily    oxyCODONE (ROXICODONE) IR tablet 5 mg  5 mg Oral Q4H PRN    traMADol (ULTRAM) tablet 50 mg  50 mg Oral Q6H PRN    traZODone (DESYREL) tablet 50 mg  50 mg Oral HS         VTE Pharmacologic Prophylaxis: Heparin  VTE Mechanical Prophylaxis: sequential compression device    Ruma Terry

## 2018-07-26 NOTE — PROGRESS NOTES
Progress Note - Vascular Surgery   Emily Abdullahi 60 y o  female MRN: [id number]  Unit/Bed#: [facility] 610-01 Encounter: [id number]    Assessment:  Pt is a 58y o  F with dehydration from high colostomy output and right arm numbness/tingling  -LA cleared to 0 8    Plan:  Neurovascular checks of right arm  Will discuss with attending this am  Colorectal sx following  Primary per SLIM    Subjective/Objective   Chief Complaint:     Subjective: ELLIOT  No change in right arm numbness/tingling  No F/C  Objective:     Blood pressure 125/84, pulse 65, temperature 98 6 °F (37 °C), temperature source Oral, resp  rate 16, weight 57 4 kg (126 lb 8 7 oz), SpO2 96 %, not currently breastfeeding  ,Body mass index is 22 42 kg/m²  Intake/Output Summary (Last 24 hours) at 07/26/18 0550  Last data filed at 07/26/18 0300   Gross per 24 hour   Intake          3641 67 ml   Output              300 ml   Net          3341 67 ml       Invasive Devices     Peripheral Intravenous Line            Peripheral IV 07/25/18 Left Antecubital less than 1 day          Drain            Colostomy Loop RUQ 8 days                Physical Exam: NAD  AAOX3  Right hand with <3sec cap refill  4/5  strength b/l hands  Normal respiratory effort  Soft, NT, ND  Ostomy pink with liquid brown stool  No c/c/e        Lab, Imaging and other studies:  I have personally reviewed pertinent lab results    , CBC:   Lab Results   Component Value Date    WBC 15 30 (H) 07/25/2018    HGB 11 4 (L) 07/25/2018    HCT 36 3 07/25/2018    MCV 88 07/25/2018     (H) 07/25/2018    MCH 27 7 07/25/2018    MCHC 31 4 07/25/2018    RDW 14 2 07/25/2018    MPV 10 1 07/25/2018    NRBC 0 07/25/2018   , CMP:   Lab Results   Component Value Date     (L) 07/25/2018    K 3 7 07/25/2018     07/25/2018    CO2 24 07/25/2018    ANIONGAP 7 07/25/2018    BUN 18 07/25/2018    CREATININE 1 10 07/25/2018    GLUCOSE 180 (H) 07/25/2018    CALCIUM 8 4 07/25/2018    AST 12 07/25/2018    ALT 24 07/25/2018    ALKPHOS 153 (H) 07/25/2018    PROT 7 7 07/25/2018    BILITOT 0 29 07/25/2018    EGFR 55 07/25/2018     VTE Pharmacologic Prophylaxis: Heparin  VTE Mechanical Prophylaxis: sequential compression device

## 2018-07-26 NOTE — CASE MANAGEMENT
Initial Clinical Review    Thank you,  Teo Aqq  [facility] Utilization Review Department  Phone: [phone]; Fax [phone]  ATTENTION: The Network Utilization Review Department is now centralized for our 9 Facilities  Make a note that we have a new phone and fax numbers for our Department  Please call with any questions or concerns to [phone] and carefully follow the prompts so that you are directed to the right person  All voicemails are confidential  Fax any determinations, approvals, denials, and requests for initial or continue stay review clinical to [phone]  Due to HIGH CALL volume, it would be easier if you could please send faxed requests to expedite your requests and in part, help us provide discharge notifications faster  Admission: Date/Time/Statement: 7/25/18 @ 1942     Orders Placed This Encounter   Procedures    Inpatient Admission (expected length of stay for this patient is greater than two midnights)     Standing Status:   Standing     Number of Occurrences:   1     Order Specific Question:   Admitting Physician     Answer:   Crissy Cooley [[id number]]     Order Specific Question:   Level of Care     Answer:   Med Surg [16]     Order Specific Question:   Estimated length of stay     Answer:   More than 2 Midnights     Order Specific Question:   Certification     Answer:   I certify that inpatient services are medically necessary for this patient for a duration of greater than two midnights  See H&P and MD Progress Notes for additional information about the patient's course of treatment           ED: Date/Time/Mode of Arrival:   ED Arrival Information     Expected Arrival Acuity Means of Arrival Escorted By Service Admission Type    - 7/25/2018 11:56 Urgent Walk-In Self General Medicine Urgent    Arrival Complaint    Device Malfunction          Chief Complaint:   Chief Complaint   Patient presents with    Numbness     patient complains of right arm numbness and tingling  Also states that her ostomy was emptied multiple times today  History of Illness: Emily Pickard is a 60 y o  female presents with right arm numbness  She was recently admitted from 7/16-7/23 with large bowel obstruction s/p ex lap with palliative diverting loop transverse colostomy on 7/17  She says that yesterday night she noted her right forearm from elbow down to her fingers was getting numb, tingling, felt cold, and at times painful  This continued today and she came to the ED  She does not have any restrictions to range of motion  Upper right arm sensation is intact  She also endorses pain in her colostomy site and says she has noticed increased stool output since yesterday and had emptied it 7 times today (was full to the brim 3-4 times)  She has not noticed any bloody output  Had an episode of nonbloody emesis yesterday  She does note that yesterday her 3yo grandson dropped his toy basketball hoop stand on her abdomen causing pain  She has had decreased PO intake since discharge due to feelings of depression  She also hasn't taken any insulin since she is "giving up on everything"      ED Vital Signs:   ED Triage Vitals [07/25/18 1207]   Temperature Pulse Respirations Blood Pressure SpO2   (!) 96 9 °F (36 1 °C) 91 20 97/53 97 % RA sat      Temp Source Heart Rate Source Patient Position - Orthostatic VS BP Location FiO2 (%)   Tympanic Monitor Sitting Left arm --      Pain Score       8        Wt Readings from Last 1 Encounters:   07/25/18 57 4 kg (126 lb 8 7 oz)         Abnormal Labs/Diagnostic Test Results:      Ref Range & Units 7/26/18 0556 7/25/18 2342 7/25/18 1248   WBC 4 31 - 10 16 Thousand/uL 14 06    15 30     RBC 3 81 - 5 12 Million/uL 3 64    4 12    Hemoglobin 11 5 - 15 4 g/dL 10 2    11 4     Hematocrit 34 8 - 46 1 % 32 6    36 3    MCV 82 - 98 fL 90   88    MCH 26 8 - 34 3 pg 28 0   27 7    MCHC 31 4 - 37 4 g/dL 31 3    31 4    RDW 11 6 - 15 1 % 14 3   14 2    MPV 8 9 - 12 7 fL 9 8  10 1  9 8    Platelets 101 - 582 Thousands/uL 616   608   717     nRBC /100 WBCs 0   0    Neutrophils Relative 43 - 75 % 56   80     Immat GRANS % 0 - 2 % 1   2    Lymphocytes Relative 14 - 44 % 32   13     Monocytes Relative 4 - 12 % 7   5    Eosinophils Relative 0 - 6 % 3   0    Basophils Relative 0 - 1 % 1   0    Neutrophils Absolute 1 85 - 7 62 Thousands/µL 7 99    12 19     Immature Grans Absolute 0 00 - 0 20 Thousand/uL 0 18   0 29     Lymphocytes Absolute 0 60 - 4 47 Thousands/µL 4 53    1 99    Monocytes Absolute 0 17 - 1 22 Thousand/µL 0 92   0 74    Eosinophils Absolute 0 00 - 0 61 Thousand/µL 0 37   0 06    Basophils Absolute 0 00 - 0 10 Thousands/µL 0 07   0 03               Ref Range & Units 7/26/18 0556 7/25/18 2000 7/25/18 1248   Sodium 136 - 145 mmol/L 141  135   130     Potassium 3 5 - 5 3 mmol/L 3 9  3 7  4 0    Chloride 100 - 108 mmol/L 112   104  96     CO2 21 - 32 mmol/L 22  24  27    Anion Gap 4 - 13 mmol/L 7  7  7    BUN 5 - 25 mg/dL 13  18  23    Creatinine 0 60 - 1 30 mg/dL 0 88  1 10CM  1 65CM     Glucose 65 - 140 mg/dL 117  180CM   467CM     Calcium 8 3 - 10 1 mg/dL 8 7  8 4  9 0    eGFR ml/min/1 73sq m 72  55  34              Ref Range & Units 7/25/18 2342 7/25/18 2000 7/25/18 1730   LACTIC ACID 0 5 - 2 0 mmol/L 0 8  4 1   2 6               Urine - Protein 100 - Glucose 250 - ketones - trace   C Diff- pending    Ct A & P - 1   Ascending colonic pneumatosis is a concerning finding   Review of chart shows that the patient's surgery was 8 days ago   Postsurgical pneumatosis can occur, but the finding also raises concern for potential ischemia   No free air or portal venous gas   Recommend correlation with lactate levels  2   Distention of the distal small bowel and dilation of the proximal colon to the level of the patient's loop colostomy   This could represent postoperative ileus (favored) or partial obstruction       3   Encapsulated fluid collection adjacent to the patient's rectosigmoid mass appears smaller than on prior and is now unilocular (instead a multilocular), measuring 2 6 cm   No encapsulated collections adjacent to the ascending colon affected by pneumatosis  ED Treatment:   Medication Administration from 07/25/2018 1156 to 07/25/2018 2021       Date/Time Order Dose Route Action     07/25/2018 1358 sodium chloride 0 9 % bolus 1,000 mL 1,000 mL Intravenous New Bag     07/25/2018 1400 morphine (PF) 4 mg/mL injection 4 mg 4 mg Intravenous Given     07/25/2018 1707 calcium carbonate (TUMS) chewable tablet 500 mg 500 mg Oral Given     07/25/2018 1647 sodium chloride 0 9 % bolus 1,000 mL 1,000 mL Intravenous New Bag     07/25/2018 1731 morphine (PF) 4 mg/mL injection 4 mg 4 mg Intravenous Given       Past Medical/Surgical History:   Diagnosis    Bipolar depression [facility]    CAD S/P percutaneous coronary angioplasty    Chronic pain    Colonic mass    COPD (chronic obstructive pulmonary disease) [facility]    CVA (cerebral vascular accident) [facility]    Essential hypertension    GERD (gastroesophageal reflux disease)    Hepatitis C    Heroin abuse    History of gastric ulcer    Hypertension    NSTEMI (non-ST elevated myocardial infarction) ([facility]    Psychiatric disorder    Schizoaffective disorder ([facility]    Schizophrenia ([facility]    [facility]    Type 2 diabetes mellitus ([facility] )     Past Surgical History:   Procedure Laterality Date    CARDIAC CATHETERIZATION   07/2016    COLONOSCOPY N/A 6/22/2018     Procedure: COLONOSCOPY;  Surgeon: Ranjan Varela MD;  Location: [facility];   Service: Colorectal    CORONARY ANGIOPLASTY WITH STENT PLACEMENT   07/05/2012     MAKEDA (LAD), PTA (Diag)    CORONARY ANGIOPLASTY WITH STENT PLACEMENT   12/2017     MAKEDA/ PTA (RCA)    MI LAP,DIAGNOSTIC ABDOMEN N/A 7/17/2018     Procedure: LAPAROSCOPY DIAGNOSTIC, Laproscopic transverse loop colostomy, lysis of adhesions;  Surgeon: Donald Lindsay MD; Location: [facility];  Service: Colorectal             Admitting Diagnosis: Lactic acidosis [E87 2]  Numbness [R20 0]  Leukocytosis [D72 829]  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]  Brachial artery stenosis, right ([facility] ) [I70 208]  RYLAN (acute kidney injury) ([facility] ) [N17 9]  Partial bowel obstruction ([facility] ) [K56 600]    Age/Sex: 60 y o  female    Assessment/Plan:   # Right arm numbness  - unlikely peripheral neuropathy given entire right forearm has decreased sensation and cool to touch  - doppler with occlusion vs high grade stenosis in axillary/proximal brachial artery and ?occlusion in mid-distal radial artery  - seen by vascular surgery - advised to hold off on heparin gtt  - neurovascular checks q4h     # Partial obstructinon vs post-op ileus  - soft, nontender abdomen and making stools  - appreciate surgery recs  - clear liquids  - will continue on IVFs   - pain management prn     # Ascending colonic pneumatosis  -? 2/2 post-op however lactate elevated with ?ischemia  - repeat lactate pending  - surgery onboard     # Increased stool output, diarrhea  - elevated WBCs  - check c diff  - CT findings as above     # Lactic acidosis  - likely elevated to poor PO intake and increased stool output however also has colonic pneumatosis as above and need to r/o ischemic colitis  - s/p 2L NS  - recheck  - surgery onboard     # RYLAN  - Cr elevated to 1 65 from prior of 0 92  - likely 2/2 prerenal  - IVFs as above  - avoid nephrotoxins     # Hyponatremia  - possible 2/2 poor intake  - IVFs asa lauri     # Hyperglycemia, T2DM  - was not discharged on insulin on last admission however was on lantus and SSI at that time   No home PO hypoglycemics  - will start on lantus 5U  - SSI  - will need to monitor 24hr regimen and adjust accordingly     # CAD s/p MAKEDA - cont ASA, statin, plavix, metoprolol  # Sigmoid tumor - cont xeloda  # GERD - cont famotidine    Admission Orders:  Scheduled Meds:   Current Facility-Administered Medications:  acetaminophen 650 mg Oral Q6H PRN    aspirin 81 mg Oral Daily    atorvastatin 80 mg Oral Daily    calcium carbonate 500 mg Oral Daily PRN 7/25 x 1   capecitabine 300 mg Oral BID    clopidogrel 75 mg Oral Daily    famotidine 20 mg Oral BID    heparin (porcine) 5,000 Units Subcutaneous Q8H [address]    insulin glargine 5 Units Subcutaneous HS    insulin lispro 1-5 Units Subcutaneous 4x Daily (AC & HS)    metoprolol tartrate 25 mg Oral Q12H ANNABELLA    morphine injection 2 mg Intravenous Q4H PRN    nicotine 1 patch Transdermal Daily    oxyCODONE 5 mg Oral Q4H PRN 7/26 x 2    traMADol 50 mg Oral Q6H PRN    traZODone 50 mg Oral HS        NSS @ 125 ml/hr - d/c'd 7/25 @ 07:51    Nursing orders - Vs q 4 - I & O q shift - neurovascular Checks q 4 (R arm) - SCD's to le's -  Diet Hi Fiber       Hilmar rectal Surgical eval     Vascular Surgery - RUE with occlusion vs high grade stenosis in the  axillary/proximal brachial artery and a suspect occlusion in the mid to distal radial artery  There is a 42 mm Hg Left >Right brachial pressure gradient    Forearm/Upper arm index:  0 55 (Prior 0 89)

## 2018-07-26 NOTE — PROGRESS NOTES
[facility] - Internal Medicine Progress Note      PATIENT INFORMATION      Patient: Emily Abdullahi 60 y o  female   MRN: [id number]  PCP: Ann-Marie Isaacs MD  Unit/Bed#: [facility] 610-01 Encounter: [id number]  Date Of Visit: 07/26/18       ASSESSMENTS & PLAN        1  Right upper extremity numbness  · RUE arterial duplex revealed axillary and proximal brachial artery stenosis with possible mid-distal radial artery occlusion - appreciate vascular surgery input who request interventional radiology evaluation for right upper extremity arteriogram    2  Rectosigmoid mass - Possible postoperative ileus - High colostomy output  · S/p transverse/diverting loop colostomy for palliative diversion previously  · Appreciate colorectal surgery input - monitor colostomy output and monitor electrolytes for insensible losses - maintain hydration is needed  · CT imaging revealed questionable postoperative pneumatosis/ileus - less likely bowel obstruction as patient is tolerating meals with colostomy output    3  Acute kidney injury  · Normalized with aggressive IV fluid hydration    4  Insulin-dependent diabetes mellitus  · Last HbA1c of 10 4 in June 2018 - patient openly acknowledges noncompliance with insulin regimen  · Thoroughly counseled on cessation  · Continue basal insulin with additional SSI coverage per Accu-Cheks    5  Lactic acidosis  · Likely secondary to dehydration with high colostomy output - normalized with multiple IV fluid boluses on admission    6  Leukocytosis  · Likely reactive secondary to acute issue coupled with dehydration - afebrile  · Monitor WBC count    7  Tobacco abuse  · Encourage cessation - transdermal nicotine patch on board    8  CAD  · Status post prior angioplasty - continue ASA/Plavix/Lipitor/Lopressor    9  Essential hypertension  · Continue Lopressor    10    GERD  · Continue Pepcid      DVT Prophylaxis:  Heparin SC      SUBJECTIVE     Seen/examined earlier this morning  Complains of intermittent anxiety and openly acknowledges she is noncompliant with her medications at home  She denies any nausea/vomiting at this time and is currently tolerating her high-fiber diet  OBJECTIVE     Vitals:   Temp (24hrs), Av 3 °F (36 8 °C), Min:97 9 °F (36 6 °C), Max:98 6 °F (37 °C)    HR:  [65-77] 77  Resp:  [16-21] 16  BP: (116-145)/(50-84) 135/83  SpO2:  [96 %-100 %] 97 %  Body mass index is 22 42 kg/m²  Input and Output Summary (last 24 hours):        Intake/Output Summary (Last 24 hours) at 18 1447  Last data filed at 18 1300   Gross per 24 hour   Intake          4906 25 ml   Output             1800 ml   Net          3106 25 ml       Physical Exam:     GENERAL:  Mildly disheveled - no emergent distress  HEAD:  Normocephalic - atraumatic  EYES: PERRL - EOMI   MOUTH:  Mucosa moist  NECK:  Supple - full range of motion  CARDIAC:  Regular rate/rhythm - S1/S2 positive  PULMONARY:  Clear breath sounds bilaterally - nonlabored respirations  ABDOMEN:  Soft - nontender/nondistended - active bowel sounds - ostomy bag in place with brown stool  MUSCULOSKELETAL:  Motor strength/range of motion intact - adequate right hand capillary refill with the collateral bilateral upper extremity motor strength  NEUROLOGIC:  Alert/oriented x 3  SKIN:  Chronic wrinkles/blemishes   PSYCHIATRIC:  Mood/affect stable      ADDITIONAL DATA     Labs & Recent Cultures:       Results from last 7 days  Lab Units 18  0556   WBC Thousand/uL 14 06*   HEMOGLOBIN g/dL 10 2*   HEMATOCRIT % 32 6*   PLATELETS Thousands/uL 616*   NEUTROS PCT % 56   LYMPHS PCT % 32   MONOS PCT % 7   EOS PCT % 3       Results from last 7 days  Lab Units 18  0556  18  1248   SODIUM mmol/L 141  < > 130*   POTASSIUM mmol/L 3 9  < > 4 0   CHLORIDE mmol/L 112*  < > 96*   CO2 mmol/L 22  < > 27   BUN mg/dL 13  < > 23   CREATININE mg/dL 0 88  < > 1 65*   CALCIUM mg/dL 8 7  < > 9 0   TOTAL PROTEIN g/dL  --   -- 7  7   BILIRUBIN TOTAL mg/dL  --   --  0 29   ALK PHOS U/L  --   --  153*   ALT U/L  --   --  24   AST U/L  --   --  12   GLUCOSE RANDOM mg/dL 117  < > 467*   < > = values in this interval not displayed  Results from last 7 days  Lab Units 07/26/18  1212   C DIFF TOXIN B  NEGATIVE for C difficle toxin by PCR  Last 24 Hours Medication List:     Current Facility-Administered Medications:  acetaminophen 650 mg Oral Q6H PRN Urmila Rm MD   aspirin 81 mg Oral Daily Urmila Rm MD   atorvastatin 80 mg Oral Daily Urmila Rm MD   calcium carbonate 500 mg Oral Daily PRN Raymond Daniels MD   capecitabine 300 mg Oral BID Urmila Rm MD   clopidogrel 75 mg Oral Daily Urmila Rm MD   famotidine 20 mg Oral BID Urmila Rm MD   heparin (porcine) 5,000 Units Subcutaneous [facility] Urmila Rm MD   insulin glargine 5 Units Subcutaneous HS Urmila Rm MD   insulin lispro 1-5 Units Subcutaneous 4x Daily (AC & HS) Surinder Eason MD   metoprolol tartrate 25 mg Oral Q12H [facility] Urmila Rm MD   morphine injection 2 mg Intravenous Q4H PRN Urmila Rm MD   nicotine 1 patch Transdermal Daily Urmila Rm MD   oxyCODONE 5 mg Oral Q4H PRN Urmila Rm MD   traMADol 50 mg Oral Q6H PRN Urmila Rm MD   traZODone 50 mg Oral HS Urmila Rm MD          Time Spent for Care: 37 minutes  More than 50% of total time spent on counseling and coordination of care as described above  Current Length of Stay: 1 day(s)      Code Status: Level 1 - Full Code          ** Please Note: This note is constructed using a voice recognition dictation system   **

## 2018-07-26 NOTE — PROGRESS NOTES
IR brief consult note:     Patient with right upper extremity symptoms, radial artery occlusion suggested on US  Patient is not currently NPO I am informed  Will order a CTA for vascular anatomy planning and lesion evaluation for tonight  Will also make patient NPO after midnight for anticipation of intervention tomorrow

## 2018-07-26 NOTE — H&P
H&P- Emily Abdullahi 1960, 60 y o  female MRN: [id number]    Unit/Bed#: [facility] 610-01 Encounter: [id number]    Primary Care Provider: Cortes Mai MD   Date and time admitted to hospital: 7/25/2018 12:28 PM      Assessment/Plan:  60 y o  female with a medical history significant for large obstructing sigmoid tumor s/p chemo/XRT, CAD s/p MAKEDA in Dec 2017, COPD, T2DM, stroke in 2016, HTN, bipolar d/o, schizophrenia, HCV who presents with right arm numbness  # Right arm numbness  - unlikely peripheral neuropathy given entire right forearm has decreased sensation and cool to touch  - doppler with occlusion vs high grade stenosis in axillary/proximal brachial artery and ?occlusion in mid-distal radial artery  - seen by vascular surgery - advised to hold off on heparin gtt  - neurovascular checks q4h    # Partial obstructinon vs post-op ileus  - soft, nontender abdomen and making stools  - appreciate surgery recs  - clear liquids  - will continue on IVFs   - pain management prn    # Ascending colonic pneumatosis  -? 2/2 post-op however lactate elevated with ?ischemia  - repeat lactate pending  - surgery onboard    # Increased stool output, diarrhea  - elevated WBCs  - check c diff  - CT findings as above    # Lactic acidosis  - likely elevated to poor PO intake and increased stool output however also has colonic pneumatosis as above and need to r/o ischemic colitis  - s/p 2L NS  - recheck  - surgery onboard    # RYLAN  - Cr elevated to 1 65 from prior of 0 92  - likely 2/2 prerenal  - IVFs as above  - avoid nephrotoxins    # Hyponatremia  - possible 2/2 poor intake  - IVFs asa lauri    # Hyperglycemia, T2DM  - was not discharged on insulin on last admission however was on lantus and SSI at that time   No home PO hypoglycemics  - will start on lantus 5U  - SSI  - will need to monitor 24hr regimen and adjust accordingly    # CAD s/p MAKEDA - cont ASA, statin, plavix, metoprolol  # Sigmoid tumor - cont xeloda  # GERD - cont famotidine    FEN: Diet Clear Liquid  VTE Prophylaxis: Heparin  / sequential compression device   Code: Level 1 - Full Code, as discussed at bedside on admission    I have discussed the plan of care with: patient    Anticipated Length of Stay:  Patient will be admitted on an Inpatient basis with an anticipated length of stay of greater than 2 midnights  Justification for Hospital Stay: IV fluids, partial obstruction    Total Time for Visit, including Counseling / Coordination of Care: 1 hour  Greater than 50% of this total time spent on direct patient counseling and coordination of care  Chief Complaint: numbness in right arm    History of Present Illness:  Eimly Santos is a 60 y o  female with a medical history significant for large obstructing sigmoid tumor s/p chemo/XRT, CAD s/p MAKEDA in Dec 2017, COPD, T2DM, stroke in 2016, HTN, bipolar d/o, schizophrenia, HCV who presents with right arm numbness  She was recently admitted from 7/16-7/23 with large bowel obstruction s/p ex lap with palliative diverting loop transverse colostomy on 7/17  She says that yesterday night she noted her right forearm from elbow down to her fingers was getting numb, tingling, felt cold, and at times painful  This continued today and she came to the ED  She does not have any restrictions to range of motion  Upper right arm sensation is intact  She also endorses pain in her colostomy site and says she has noticed increased stool output since yesterday and had emptied it 7 times today (was full to the brim 3-4 times)  She has not noticed any bloody output  Had an episode of nonbloody emesis yesterday  She does note that yesterday her 3yo grandson dropped his toy basketball hoop stand on her abdomen causing pain  She has had decreased PO intake since discharge due to feelings of depression  She also hasn't taken any insulin since she is "giving up on everything"  Denies SI  No chest pain, back pain, SOB, fevers  No weakness      In the ER, she was given morphine 4mg IV x 2 and 2L NS  Review of Systems:  All other review of systems reviewed and are negative except for as above in HPI  Past Medical History:     Past Medical History:   Diagnosis Date    [facility] ) 3/17/2016    CAD S/P percutaneous coronary angioplasty 3/17/2016    Chronic pain     Colonic mass     COPD (chronic obstructive pulmonary disease) (HCC)     CVA (cerebral vascular accident) ([place] [address] )     R sided weakness    Essential hypertension 3/17/2016    GERD (gastroesophageal reflux disease)     Hepatitis C     Heroin abuse 3/18/2016    History of gastric ulcer     Hypertension     NSTEMI (non-ST elevated myocardial infarction) ([place] [address] ) 07/2012    Psychiatric disorder 09/03/2014    Inpatient admission     Schizoaffective disorder ([place] [address] ) 3/17/2016    Schizophrenia ([place] [address] ) 3/17/2016    STEMI (ST elevation myocardial infarction) ([place] [address] ) 12/2017    Type 2 diabetes mellitus ([place] [address] ) 3/17/2016       Past Surgical History:    Past Surgical History:   Procedure Laterality Date    CARDIAC CATHETERIZATION  07/2016    COLONOSCOPY N/A 6/22/2018    Procedure: COLONOSCOPY;  Surgeon: Nik Harper MD;  Location: [facility]; Service: Colorectal    CORONARY ANGIOPLASTY WITH STENT PLACEMENT  07/05/2012    MAKEDA (LAD), PTA (Diag)    CORONARY ANGIOPLASTY WITH STENT PLACEMENT  12/2017    MAKEDA/ PTA (RCA)    CT LAP,DIAGNOSTIC ABDOMEN N/A 7/17/2018    Procedure: LAPAROSCOPY DIAGNOSTIC, Laproscopic transverse loop colostomy, lysis of adhesions;  Surgeon: Rd Arshad MD;  Location: [facility];  Service: Colorectal       Home Medications:    Prior to Admission medications    Medication Sig Start Date End Date Taking?  Authorizing Provider   aspirin (ECOTRIN LOW STRENGTH) 81 mg EC tablet Take 81 mg by mouth daily    Historical Provider, MD   atorvastatin (LIPITOR) 80 mg tablet Take 80 mg by mouth daily    Historical Provider, MD   capecitabine (XELODA) 150 MG tablet Take 2 tablets (300 mg total) by mouth 2 (two) times a day for 42 days 6/29/18 8/10/18  Dorothy Nicholson MD   capecitabine (XELODA) 500 MG tablet Take 2 tablets (1,000 mg total) by mouth 2 (two) times a day for 42 days 6/29/18 8/10/18  Dorothy Nicholson MD   clopidogrel (PLAVIX) 75 mg tablet Take 75 mg by mouth daily    Historical Provider, MD   famotidine (PEPCID) 20 mg tablet Take 20 mg by mouth 2 (two) times a day    Historical Provider, MD   metoprolol tartrate (LOPRESSOR) 25 mg tablet Take 25 mg by mouth every 12 (twelve) hours    Historical Provider, MD   oxyCODONE-acetaminophen (PERCOCET) 5-325 mg per tablet Take 1 tablet by mouth every 4 (four) hours as needed for moderate pain for up to 10 days Max Daily Amount: 6 tablets 7/23/18 8/2/18  Kely Avalos MD   traZODone (DESYREL) 50 mg tablet Take 50 mg by mouth daily at bedtime    Historical Provider, MD       Home meds reviewed and reconciled based on recent discharge      Allergies:  No Known Allergies    Social History:     Marital Status: Single   Substance Use History:   History   Alcohol Use No     History   Smoking Status    Current Every Day Smoker    Packs/day: 1 50    Years: 45 00    Types: Cigarettes   Smokeless Tobacco    Never Used     History   Drug Use No     Comment: only tried x 1        Family History:  Family History   Problem Relation Age of Onset    Heart attack Father     Cancer Brother         gastric       Physical Exam:     Vitals:   Blood Pressure: 116/50 (07/25/18 1854)  Pulse: 66 (07/25/18 1854)  Temperature: (!) 96 9 °F (36 1 °C) (07/25/18 1207)  Temp Source: Tympanic (07/25/18 1207)  Respirations: 18 (07/25/18 1854)  Weight - Scale: 57 4 kg (126 lb 8 7 oz) (07/25/18 1207)  SpO2: 96 % (07/25/18 1854)    General: Awake, alert, no acute distress  HEENT: NC/AT, EOMI, dry mm  Neck: supple, no LAD  Lungs: CTA bl, no w/c/r  Heart: regular, nl S1/S2, no appreciable murmurs  Abdomen: +BS, soft, nontender, no guarding, colostomy site is clean  Back: no spinal tenderness  Ext: no LE edema  Skin: no rash  Neuro: 5/5 strength throughout, decreased sensation throughout the right forearm and hand    Additional Data:     Lab Results: I have personally reviewed pertinent reports  and I have personally reviewed pertinent films in PACS      Results from last 7 days  Lab Units 07/25/18  1248   WBC Thousand/uL 15 30*   HEMOGLOBIN g/dL 11 4*   HEMATOCRIT % 36 3   PLATELETS Thousands/uL 717*   NEUTROS PCT % 80*   LYMPHS PCT % 13*   MONOS PCT % 5   EOS PCT % 0       Results from last 7 days  Lab Units 07/25/18 2000 07/25/18  1248   SODIUM mmol/L 135* 130*   POTASSIUM mmol/L 3 7 4 0   CHLORIDE mmol/L 104 96*   CO2 mmol/L 24 27   BUN mg/dL 18 23   CREATININE mg/dL 1 10 1 65*   CALCIUM mg/dL 8 4 9 0   TOTAL PROTEIN g/dL  --  7 7   BILIRUBIN TOTAL mg/dL  --  0 29   ALK PHOS U/L  --  153*   ALT U/L  --  24   AST U/L  --  12   GLUCOSE RANDOM mg/dL 180* 467*           Imaging: I have personally reviewed pertinent reports  and I have personally reviewed pertinent films in PACS    Ct Abdomen Pelvis Wo Contrast    Result Date: 7/25/2018  Narrative: CT ABDOMEN AND PELVIS WITHOUT IV CONTRAST INDICATION:   History of recent loop colostomy  Right lower quadrant pain    COMPARISON:  CT of the abdomen/pelvis dated 7/16/2018  TECHNIQUE:  CT examination of the abdomen and pelvis was performed without intravenous contrast   Axial, sagittal, and coronal 2D reformatted images were created from the source data and submitted for interpretation  Radiation dose length product (DLP) for this visit:  331 15 mGy-cm   This examination, like all CT scans performed in the [facility], was performed utilizing techniques to minimize radiation dose exposure, including the use of iterative  reconstruction and automated exposure control  Enteric contrast was administered   Note: Image numbers reported for findings (if any) are taken from axial series 2 unless otherwise indicated  FINDINGS: ABDOMEN LOWER CHEST:  5 mm x 3 mm solid, noncalcified pulmonary nodule in the left lower lobe on image 5  The finding is unchanged from most recent prior, but not imaged before 2018  No acute findings at the lung bases are lower mediastinum  LIVER/BILIARY TREE:  Unremarkable  GALLBLADDER:  Gallbladder is surgically absent  SPLEEN:  Unremarkable  PANCREAS:  Unremarkable  ADRENAL GLANDS:  Mild adrenal hypertrophy without focal nodules  KIDNEYS/URETERS:  Unremarkable  No hydronephrosis  STOMACH AND BOWEL:  Distal small bowel is distended but not frankly dilated  There or air-fluid levels demonstrated, but none are differential (i e , 2 cm height difference in the same loop)  Ascending colon is also dilated air-fluid level  Linear to bubbly pneumatosis is demonstrated within the ascending colonic wall  No portal venous gas  Colonic dilation extends to the patient's loop colostomy  There is moderate amount of retained stool in the colon distal to the colostomy  Rectosigmoid mass was better evaluated on prior postcontrast study  2 6 cm rounded low-density focus adjacent to or within the mass on image 72 appears smaller than prior  APPENDIX:  A normal appendix was visualized  ABDOMINOPELVIC CAVITY:  Small amount of free pelvic fluid  Other than the small collection adjacent to the rectosigmoid mass, there are no new or increasing encapsulated fluid collections  No free air  Stranding in the fat at the patient's stoma is not unexpected given recency of surgery  VESSELS:  Unremarkable for patient's age  PELVIS REPRODUCTIVE ORGANS:  Unremarkable for patient's age  URINARY BLADDER:  Normal when accounting for degree of nondistention  ABDOMINAL WALL/INGUINAL REGIONS:  Changes from the recent laparotomy and loop colostomy  No peristomal hernia  OSSEOUS STRUCTURES:  Partially calcified disc extrusion in the spinal canal at the L5-S1 level  No acute fracture or destructive osseous lesion  Impression: 1  Ascending colonic pneumatosis is a concerning finding  Review of chart shows that the patient's surgery was 8 days ago  Postsurgical pneumatosis can occur, but the finding also raises concern for potential ischemia  No free air or portal venous gas  Recommend correlation with lactate levels  2   Distention of the distal small bowel and dilation of the proximal colon to the level of the patient's loop colostomy  This could represent postoperative ileus (favored) or partial obstruction    3   Encapsulated fluid collection adjacent to the patient's rectosigmoid mass appears smaller than on prior and is now unilocular (instead a multilocular), measuring 2 6 cm  No encapsulated collections adjacent to the ascending colon affected by pneumatosis  Note:  I personally discussed this study with Dr Rick Mckoy on 7/25/2018 at 2:51 PM  Workstation performed: [id number]     X-ray Chest 2 Views    Result Date: 7/25/2018  Narrative: CHEST INDICATION:   chest pain  COMPARISON:  None EXAM PERFORMED/VIEWS:  XR CHEST PA & LATERAL FINDINGS: Cardiomediastinal silhouette appears unremarkable  The lungs are clear  No pneumothorax or pleural effusion  Osseous structures appear within normal limits for patient age  Impression: No acute cardiopulmonary disease  Workstation performed: [id number]     Vas Upper Limb Arterial Duplex, Unilateral/limited, Graft    Result Date: 7/25/2018  Narrative:  [facility] REPORT CLINICAL: Indications: Patient complains of intermittent right arm pain and numbness which becomes cold to the touch  Risk Factors The patient has history of HTN, Diabetes, CVA, Renal Disease, and CA  FINDINGS:  Right                Impression  Pressure (mmHg)  PSV (cm/s)  Subclavian                                               153  Axillary, 2nd Part                                        69  Prox   Brachial       100%                                     Wrist 66              Dist  Radial Artery  100%                                     Mid Brachial                                              30  Dist  Brachial                                            27  Dist  Ulnar Artery                                        27  Prox  Radial                                              26  Mid Radial           100%                                     Prox  Ulnar                                               33  Mid  Ulnar                                                20   Left                 Pressure (mmHg)  PSV (cm/s)  Subclavian                                   210  Axillary, 2nd Part                           142  Prox  Brachial                                63  Wrist                            120              Dist  Radial Artery                           17  Mid Brachial                                  61  Dist  Ulnar Artery                            27     CONCLUSION: RIGHT UPPER LIMB: ABNORMAL: This resting evaluation reveals an occlusion vs high grade stenosis in the axillary/proximal brachial artery and a suspect occlusion in the mid to distal radial artery  There is a 42 mm Hg Left >Right brachial pressure gradient  Forearm/Upper arm index:  0 55 (Prior 0 89)  LEFT UPPER LIMB: Patent subclavian, axillary, brachial, radial and ulnar arteries  Forearm/Upper arm index: 1 00 (Prior 0 96)  Compared to previous study on 2018, there is a probable occlusion vs previously noted high grade stenosis in the axillary/proximal brachial artery  EKG, Pathology, and Other Studies Reviewed on Admission:   EKbpm, sinus, twi in III, aVF    Normal sinus rhythm  Possible Left atrial enlargement  Borderline ECG  When compared with ECG of 2018 07:40,  T wave inversion now evident in Inferior leads  Confirmed by Juan M Weller, [address] [place] [address] [id number]) on 2018 2:38:00 PM    Allscripts / Epic Records Reviewed:  Yes

## 2018-07-26 NOTE — PROGRESS NOTES
Progress Note - Colorectal Surgery   Emily Abdullahi 60 y o  female MRN: [id number]  Unit/Bed#: [facility] 610-01 Encounter: [id number]    Assessment:  Pt is a 58y o  F with dehydration from high colostomy output and right arm numbness/tingling  -LA cleared to 0 8    Plan:  Clears  F/u C  diff  Strict glucose control  Vascular surgery following  Primary per SLIM    Subjective/Objective   Chief Complaint:     Subjective: ELLIOT  No changes in right arm symptoms  Decrease in ostomy output, emptied by nursing once last light with runny brown stool  Objective:     Blood pressure 125/84, pulse 65, temperature 98 6 °F (37 °C), temperature source Oral, resp  rate 16, weight 57 4 kg (126 lb 8 7 oz), SpO2 96 %, not currently breastfeeding  ,Body mass index is 22 42 kg/m²  Intake/Output Summary (Last 24 hours) at 07/26/18 0450  Last data filed at 07/26/18 0300   Gross per 24 hour   Intake          3641 67 ml   Output              300 ml   Net          3341 67 ml       Invasive Devices     Peripheral Intravenous Line            Peripheral IV 07/25/18 Left Antecubital less than 1 day          Drain            Colostomy Loop RUQ 8 days                Physical Exam: NAD  AAOX3  Normal respiratory effort  Soft, NT, ND  Right hand <3 sec cap refil, 4/5  strength b/l hands  Ostomy pink and functioning with runny brown stool  No c/c/e      Lab, Imaging and other studies:  I have personally reviewed pertinent lab results    , CBC:   Lab Results   Component Value Date    WBC 15 30 (H) 07/25/2018    HGB 11 4 (L) 07/25/2018    HCT 36 3 07/25/2018    MCV 88 07/25/2018     (H) 07/25/2018    MCH 27 7 07/25/2018    MCHC 31 4 07/25/2018    RDW 14 2 07/25/2018    MPV 10 1 07/25/2018    NRBC 0 07/25/2018   , CMP:   Lab Results   Component Value Date     (L) 07/25/2018    K 3 7 07/25/2018     07/25/2018    CO2 24 07/25/2018    ANIONGAP 7 07/25/2018    BUN 18 07/25/2018    CREATININE 1 10 07/25/2018    GLUCOSE 180 (H) 07/25/2018    CALCIUM 8 4 07/25/2018    AST 12 07/25/2018    ALT 24 07/25/2018    ALKPHOS 153 (H) 07/25/2018    PROT 7 7 07/25/2018    BILITOT 0 29 07/25/2018    EGFR 55 07/25/2018     VTE Pharmacologic Prophylaxis: Heparin  VTE Mechanical Prophylaxis: sequential compression device

## 2018-07-26 NOTE — PLAN OF CARE
GASTROINTESTINAL - ADULT     Minimal or absence of nausea and/or vomiting Progressing     Establish and maintain optimal ostomy function Progressing        METABOLIC, FLUID AND ELECTROLYTES - ADULT     Electrolytes maintained within normal limits Progressing     Fluid balance maintained Progressing     Glucose maintained within target range Progressing        NEUROSENSORY - ADULT     Achieves stable or improved neurological status Progressing        Potential for Falls     Patient will remain free of falls Progressing        SKIN/TISSUE INTEGRITY - ADULT     Skin integrity remains intact Progressing

## 2018-07-26 NOTE — SOCIAL WORK
Met with pt and discussed role of CM  Pt re-admit for R arm numbness/ high colostomy outpt  Pt lives with her dtr Jeanette Salter and 5 children in a 2-story home with 2 steps at entrance  Pt is independent in ADLs  Pt has DME including: cane  Pt currently open to [facility] and is agreeable with referral for resumption of care  Referral made via [address]  Pt has dx Bipolar and Schizoaffective d/o  Pt denies having any inpt MH or D&A tx hx  Preference for pharmacy is [facility] [address] in [place], [place]  No POA  Main contact: Significant Narda Antonieta ([phone])  CM reviewed d/c planning process including the following: identifying help at home, patient preference for d/c planning needs, Discharge Lounge, [facility] Meds to Bed program, availability of treatment team to discuss questions or concerns patient and/or family may have regarding understanding medications and recognizing signs and symptoms once discharged  CM also encouraged patient to follow up with all recommended appointments after discharge  Patient advised of importance for patient and family to participate in managing patients medical well being  Patient/caregiver received discharge checklist  Content reviewed  Patient/caregiver encouraged to participate in discharge plan of care prior to discharge home

## 2018-07-27 ENCOUNTER — APPOINTMENT (INPATIENT)
Dept: RADIOLOGY | Facility: HOSPITAL | Age: 58
DRG: 253 | End: 2018-07-27
Payer: COMMERCIAL

## 2018-07-27 ENCOUNTER — TELEPHONE (OUTPATIENT)
Dept: HEMATOLOGY ONCOLOGY | Facility: CLINIC | Age: 58
End: 2018-07-27

## 2018-07-27 ENCOUNTER — DOCUMENTATION (OUTPATIENT)
Dept: HEMATOLOGY ONCOLOGY | Facility: CLINIC | Age: 58
End: 2018-07-27

## 2018-07-27 LAB
ANION GAP SERPL CALCULATED.3IONS-SCNC: 8 MMOL/L (ref 4–13)
APTT PPP: 54 SECONDS (ref 24–36)
APTT PPP: >210 SECONDS (ref 24–36)
BASOPHILS # BLD AUTO: 0.05 THOUSANDS/ΜL (ref 0–0.1)
BASOPHILS NFR BLD AUTO: 0 % (ref 0–1)
BUN SERPL-MCNC: 10 MG/DL (ref 5–25)
CALCIUM SERPL-MCNC: 8.8 MG/DL (ref 8.3–10.1)
CHLORIDE SERPL-SCNC: 110 MMOL/L (ref 100–108)
CO2 SERPL-SCNC: 23 MMOL/L (ref 21–32)
CREAT SERPL-MCNC: 0.82 MG/DL (ref 0.6–1.3)
EOSINOPHIL # BLD AUTO: 0.37 THOUSAND/ΜL (ref 0–0.61)
EOSINOPHIL NFR BLD AUTO: 3 % (ref 0–6)
ERYTHROCYTE [DISTWIDTH] IN BLOOD BY AUTOMATED COUNT: 14.3 % (ref 11.6–15.1)
ERYTHROCYTE [DISTWIDTH] IN BLOOD BY AUTOMATED COUNT: 14.4 % (ref 11.6–15.1)
GFR SERPL CREATININE-BSD FRML MDRD: 79 ML/MIN/1.73SQ M
GLUCOSE SERPL-MCNC: 118 MG/DL (ref 65–140)
GLUCOSE SERPL-MCNC: 131 MG/DL (ref 65–140)
GLUCOSE SERPL-MCNC: 133 MG/DL (ref 65–140)
GLUCOSE SERPL-MCNC: 172 MG/DL (ref 65–140)
GLUCOSE SERPL-MCNC: 199 MG/DL (ref 65–140)
HCT VFR BLD AUTO: 29.7 % (ref 34.8–46.1)
HCT VFR BLD AUTO: 32.4 % (ref 34.8–46.1)
HGB BLD-MCNC: 9 G/DL (ref 11.5–15.4)
HGB BLD-MCNC: 9.8 G/DL (ref 11.5–15.4)
IMM GRANULOCYTES # BLD AUTO: 0.11 THOUSAND/UL (ref 0–0.2)
IMM GRANULOCYTES NFR BLD AUTO: 1 % (ref 0–2)
INR PPP: 0.94 (ref 0.86–1.17)
INR PPP: 1.02 (ref 0.86–1.17)
KCT BLD-ACNC: 155 SEC (ref 89–137)
KCT BLD-ACNC: 155 SEC (ref 89–137)
LYMPHOCYTES # BLD AUTO: 3.76 THOUSANDS/ΜL (ref 0.6–4.47)
LYMPHOCYTES NFR BLD AUTO: 31 % (ref 14–44)
MCH RBC QN AUTO: 27.8 PG (ref 26.8–34.3)
MCH RBC QN AUTO: 27.8 PG (ref 26.8–34.3)
MCHC RBC AUTO-ENTMCNC: 30.2 G/DL (ref 31.4–37.4)
MCHC RBC AUTO-ENTMCNC: 30.3 G/DL (ref 31.4–37.4)
MCV RBC AUTO: 92 FL (ref 82–98)
MCV RBC AUTO: 92 FL (ref 82–98)
MONOCYTES # BLD AUTO: 0.75 THOUSAND/ΜL (ref 0.17–1.22)
MONOCYTES NFR BLD AUTO: 6 % (ref 4–12)
NEUTROPHILS # BLD AUTO: 7.01 THOUSANDS/ΜL (ref 1.85–7.62)
NEUTS SEG NFR BLD AUTO: 59 % (ref 43–75)
NRBC BLD AUTO-RTO: 0 /100 WBCS
PLATELET # BLD AUTO: 405 THOUSANDS/UL (ref 149–390)
PLATELET # BLD AUTO: 556 THOUSANDS/UL (ref 149–390)
PMV BLD AUTO: 10.3 FL (ref 8.9–12.7)
PMV BLD AUTO: 9.8 FL (ref 8.9–12.7)
POTASSIUM SERPL-SCNC: 3.5 MMOL/L (ref 3.5–5.3)
PROTHROMBIN TIME: 12.7 SECONDS (ref 11.8–14.2)
PROTHROMBIN TIME: 13.5 SECONDS (ref 11.8–14.2)
RBC # BLD AUTO: 3.24 MILLION/UL (ref 3.81–5.12)
RBC # BLD AUTO: 3.53 MILLION/UL (ref 3.81–5.12)
SODIUM SERPL-SCNC: 141 MMOL/L (ref 136–145)
SPECIMEN SOURCE: ABNORMAL
SPECIMEN SOURCE: ABNORMAL
WBC # BLD AUTO: 11.5 THOUSAND/UL (ref 4.31–10.16)
WBC # BLD AUTO: 12.05 THOUSAND/UL (ref 4.31–10.16)

## 2018-07-27 PROCEDURE — 80048 BASIC METABOLIC PNL TOTAL CA: CPT | Performed by: INTERNAL MEDICINE

## 2018-07-27 PROCEDURE — 03C93ZZ EXTIRPATION OF MATTER FROM RIGHT ULNAR ARTERY, PERCUTANEOUS APPROACH: ICD-10-PCS | Performed by: RADIOLOGY

## 2018-07-27 PROCEDURE — C1887 CATHETER, GUIDING: HCPCS

## 2018-07-27 PROCEDURE — 99233 SBSQ HOSP IP/OBS HIGH 50: CPT | Performed by: INTERNAL MEDICINE

## 2018-07-27 PROCEDURE — C1769 GUIDE WIRE: HCPCS

## 2018-07-27 PROCEDURE — 36217 PLACE CATHETER IN ARTERY: CPT | Performed by: RADIOLOGY

## 2018-07-27 PROCEDURE — 85610 PROTHROMBIN TIME: CPT | Performed by: RADIOLOGY

## 2018-07-27 PROCEDURE — 82948 REAGENT STRIP/BLOOD GLUCOSE: CPT

## 2018-07-27 PROCEDURE — 85730 THROMBOPLASTIN TIME PARTIAL: CPT | Performed by: RADIOLOGY

## 2018-07-27 PROCEDURE — B31NYZZ FLUOROSCOPY OF OTHER UPPER ARTERIES USING OTHER CONTRAST: ICD-10-PCS | Performed by: RADIOLOGY

## 2018-07-27 PROCEDURE — 37246 TRLUML BALO ANGIOP 1ST ART: CPT | Performed by: RADIOLOGY

## 2018-07-27 PROCEDURE — C1894 INTRO/SHEATH, NON-LASER: HCPCS

## 2018-07-27 PROCEDURE — C2623 CATH, TRANSLUMIN, DRUG-COAT: HCPCS

## 2018-07-27 PROCEDURE — 03753ZZ DILATION OF RIGHT AXILLARY ARTERY, PERCUTANEOUS APPROACH: ICD-10-PCS | Performed by: RADIOLOGY

## 2018-07-27 PROCEDURE — 3E05317 INTRODUCTION OF OTHER THROMBOLYTIC INTO PERIPHERAL ARTERY, PERCUTANEOUS APPROACH: ICD-10-PCS | Performed by: RADIOLOGY

## 2018-07-27 PROCEDURE — C1725 CATH, TRANSLUMIN NON-LASER: HCPCS

## 2018-07-27 PROCEDURE — 37184 PRIM ART M-THRMBC 1ST VSL: CPT

## 2018-07-27 PROCEDURE — 76937 US GUIDE VASCULAR ACCESS: CPT | Performed by: RADIOLOGY

## 2018-07-27 PROCEDURE — C1757 CATH, THROMBECTOMY/EMBOLECT: HCPCS

## 2018-07-27 PROCEDURE — 85027 COMPLETE CBC AUTOMATED: CPT | Performed by: RADIOLOGY

## 2018-07-27 PROCEDURE — 99153 MOD SED SAME PHYS/QHP EA: CPT

## 2018-07-27 PROCEDURE — C1760 CLOSURE DEV, VASC: HCPCS

## 2018-07-27 PROCEDURE — 99152 MOD SED SAME PHYS/QHP 5/>YRS: CPT

## 2018-07-27 PROCEDURE — 85610 PROTHROMBIN TIME: CPT | Performed by: STUDENT IN AN ORGANIZED HEALTH CARE EDUCATION/TRAINING PROGRAM

## 2018-07-27 PROCEDURE — 03773ZZ DILATION OF RIGHT BRACHIAL ARTERY, PERCUTANEOUS APPROACH: ICD-10-PCS | Performed by: RADIOLOGY

## 2018-07-27 PROCEDURE — 37246 TRLUML BALO ANGIOP 1ST ART: CPT

## 2018-07-27 PROCEDURE — 37186 SEC ART THROMBECTOMY ADD-ON: CPT | Performed by: RADIOLOGY

## 2018-07-27 PROCEDURE — 85347 COAGULATION TIME ACTIVATED: CPT

## 2018-07-27 PROCEDURE — 03C73ZZ EXTIRPATION OF MATTER FROM RIGHT BRACHIAL ARTERY, PERCUTANEOUS APPROACH: ICD-10-PCS | Performed by: RADIOLOGY

## 2018-07-27 PROCEDURE — 99152 MOD SED SAME PHYS/QHP 5/>YRS: CPT | Performed by: RADIOLOGY

## 2018-07-27 PROCEDURE — 85730 THROMBOPLASTIN TIME PARTIAL: CPT | Performed by: INTERNAL MEDICINE

## 2018-07-27 PROCEDURE — 85025 COMPLETE CBC W/AUTO DIFF WBC: CPT | Performed by: INTERNAL MEDICINE

## 2018-07-27 RX ORDER — HEPARIN SODIUM 1000 [USP'U]/ML
2200 INJECTION, SOLUTION INTRAVENOUS; SUBCUTANEOUS AS NEEDED
Status: DISCONTINUED | OUTPATIENT
Start: 2018-07-27 | End: 2018-07-30

## 2018-07-27 RX ORDER — DIPHENHYDRAMINE HYDROCHLORIDE 50 MG/ML
INJECTION INTRAMUSCULAR; INTRAVENOUS CODE/TRAUMA/SEDATION MEDICATION
Status: COMPLETED | OUTPATIENT
Start: 2018-07-27 | End: 2018-07-27

## 2018-07-27 RX ORDER — HEPARIN SODIUM 1000 [USP'U]/ML
4400 INJECTION, SOLUTION INTRAVENOUS; SUBCUTANEOUS AS NEEDED
Status: DISCONTINUED | OUTPATIENT
Start: 2018-07-27 | End: 2018-07-30

## 2018-07-27 RX ORDER — HEPARIN SODIUM 10000 [USP'U]/100ML
3-30 INJECTION, SOLUTION INTRAVENOUS
Status: DISCONTINUED | OUTPATIENT
Start: 2018-07-27 | End: 2018-07-30

## 2018-07-27 RX ORDER — HYDRALAZINE HYDROCHLORIDE 20 MG/ML
5 INJECTION INTRAMUSCULAR; INTRAVENOUS EVERY 6 HOURS PRN
Status: DISCONTINUED | OUTPATIENT
Start: 2018-07-27 | End: 2018-07-30 | Stop reason: HOSPADM

## 2018-07-27 RX ORDER — ONDANSETRON 2 MG/ML
4 INJECTION INTRAMUSCULAR; INTRAVENOUS EVERY 6 HOURS PRN
Status: DISCONTINUED | OUTPATIENT
Start: 2018-07-27 | End: 2018-07-30 | Stop reason: HOSPADM

## 2018-07-27 RX ORDER — FENTANYL CITRATE 50 UG/ML
INJECTION, SOLUTION INTRAMUSCULAR; INTRAVENOUS CODE/TRAUMA/SEDATION MEDICATION
Status: COMPLETED | OUTPATIENT
Start: 2018-07-27 | End: 2018-07-27

## 2018-07-27 RX ORDER — MIDAZOLAM HYDROCHLORIDE 1 MG/ML
INJECTION INTRAMUSCULAR; INTRAVENOUS CODE/TRAUMA/SEDATION MEDICATION
Status: COMPLETED | OUTPATIENT
Start: 2018-07-27 | End: 2018-07-27

## 2018-07-27 RX ORDER — HEPARIN SODIUM 1000 [USP'U]/ML
INJECTION, SOLUTION INTRAVENOUS; SUBCUTANEOUS CODE/TRAUMA/SEDATION MEDICATION
Status: COMPLETED | OUTPATIENT
Start: 2018-07-27 | End: 2018-07-27

## 2018-07-27 RX ADMIN — TRAZODONE HYDROCHLORIDE 50 MG: 50 TABLET ORAL at 21:00

## 2018-07-27 RX ADMIN — MORPHINE SULFATE 2 MG: 2 INJECTION, SOLUTION INTRAMUSCULAR; INTRAVENOUS at 12:17

## 2018-07-27 RX ADMIN — NICOTINE 1 PATCH: 14 PATCH, EXTENDED RELEASE TRANSDERMAL at 11:48

## 2018-07-27 RX ADMIN — FENTANYL CITRATE 25 MCG: 50 INJECTION, SOLUTION INTRAMUSCULAR; INTRAVENOUS at 09:55

## 2018-07-27 RX ADMIN — NITROGLYCERIN 75 MCG: 20 INJECTION INTRAVENOUS at 10:07

## 2018-07-27 RX ADMIN — INSULIN LISPRO 1 UNITS: 100 INJECTION, SOLUTION INTRAVENOUS; SUBCUTANEOUS at 17:26

## 2018-07-27 RX ADMIN — FENTANYL CITRATE 25 MCG: 50 INJECTION, SOLUTION INTRAMUSCULAR; INTRAVENOUS at 09:20

## 2018-07-27 RX ADMIN — NITROGLYCERIN 100 MCG: 20 INJECTION INTRAVENOUS at 10:00

## 2018-07-27 RX ADMIN — DIPHENHYDRAMINE HYDROCHLORIDE 50 MG: 50 INJECTION, SOLUTION INTRAMUSCULAR; INTRAVENOUS at 08:05

## 2018-07-27 RX ADMIN — MIDAZOLAM 0.5 MG: 1 INJECTION INTRAMUSCULAR; INTRAVENOUS at 08:53

## 2018-07-27 RX ADMIN — MIDAZOLAM 0.5 MG: 1 INJECTION INTRAMUSCULAR; INTRAVENOUS at 10:27

## 2018-07-27 RX ADMIN — ALTEPLASE 4 MG: 2.2 INJECTION, POWDER, LYOPHILIZED, FOR SOLUTION INTRAVENOUS at 10:45

## 2018-07-27 RX ADMIN — HEPARIN SODIUM 2000 UNITS: 1000 INJECTION INTRAVENOUS; SUBCUTANEOUS at 09:45

## 2018-07-27 RX ADMIN — METOPROLOL TARTRATE 25 MG: 25 TABLET, FILM COATED ORAL at 11:56

## 2018-07-27 RX ADMIN — FENTANYL CITRATE 25 MCG: 50 INJECTION, SOLUTION INTRAMUSCULAR; INTRAVENOUS at 10:27

## 2018-07-27 RX ADMIN — HEPARIN SODIUM 2000 UNITS: 1000 INJECTION INTRAVENOUS; SUBCUTANEOUS at 10:17

## 2018-07-27 RX ADMIN — INSULIN GLARGINE 5 UNITS: 100 INJECTION, SOLUTION SUBCUTANEOUS at 21:00

## 2018-07-27 RX ADMIN — FENTANYL CITRATE 25 MCG: 50 INJECTION, SOLUTION INTRAMUSCULAR; INTRAVENOUS at 08:39

## 2018-07-27 RX ADMIN — HEPARIN SODIUM 18 UNITS/KG/HR: 10000 INJECTION, SOLUTION INTRAVENOUS at 11:50

## 2018-07-27 RX ADMIN — ASPIRIN 81 MG: 81 TABLET, COATED ORAL at 11:56

## 2018-07-27 RX ADMIN — MIDAZOLAM 0.5 MG: 1 INJECTION INTRAMUSCULAR; INTRAVENOUS at 09:55

## 2018-07-27 RX ADMIN — HEPARIN SODIUM 2200 UNITS: 1000 INJECTION, SOLUTION INTRAVENOUS; SUBCUTANEOUS at 21:06

## 2018-07-27 RX ADMIN — CLOPIDOGREL BISULFATE 75 MG: 75 TABLET, FILM COATED ORAL at 11:56

## 2018-07-27 RX ADMIN — ATORVASTATIN CALCIUM 80 MG: 80 TABLET, FILM COATED ORAL at 11:56

## 2018-07-27 RX ADMIN — FAMOTIDINE 20 MG: 20 TABLET ORAL at 17:26

## 2018-07-27 RX ADMIN — FENTANYL CITRATE 50 MCG: 50 INJECTION, SOLUTION INTRAMUSCULAR; INTRAVENOUS at 08:00

## 2018-07-27 RX ADMIN — METOPROLOL TARTRATE 25 MG: 25 TABLET, FILM COATED ORAL at 21:02

## 2018-07-27 RX ADMIN — FENTANYL CITRATE 50 MCG: 50 INJECTION, SOLUTION INTRAMUSCULAR; INTRAVENOUS at 08:16

## 2018-07-27 RX ADMIN — HEPARIN SODIUM 3000 UNITS: 1000 INJECTION INTRAVENOUS; SUBCUTANEOUS at 08:39

## 2018-07-27 RX ADMIN — MIDAZOLAM 1 MG: 1 INJECTION INTRAMUSCULAR; INTRAVENOUS at 08:00

## 2018-07-27 RX ADMIN — FENTANYL CITRATE 50 MCG: 50 INJECTION, SOLUTION INTRAMUSCULAR; INTRAVENOUS at 10:45

## 2018-07-27 RX ADMIN — FAMOTIDINE 20 MG: 20 TABLET ORAL at 11:56

## 2018-07-27 RX ADMIN — INSULIN LISPRO 1 UNITS: 100 INJECTION, SOLUTION INTRAVENOUS; SUBCUTANEOUS at 21:00

## 2018-07-27 RX ADMIN — IODIXANOL 160 ML: 320 INJECTION, SOLUTION INTRAVASCULAR at 13:31

## 2018-07-27 RX ADMIN — NITROGLYCERIN 75 MCG: 20 INJECTION INTRAVENOUS at 10:20

## 2018-07-27 RX ADMIN — MORPHINE SULFATE 2 MG: 2 INJECTION, SOLUTION INTRAMUSCULAR; INTRAVENOUS at 21:09

## 2018-07-27 RX ADMIN — MIDAZOLAM 0.5 MG: 1 INJECTION INTRAMUSCULAR; INTRAVENOUS at 09:20

## 2018-07-27 RX ADMIN — FENTANYL CITRATE 25 MCG: 50 INJECTION, SOLUTION INTRAMUSCULAR; INTRAVENOUS at 08:35

## 2018-07-27 RX ADMIN — FENTANYL CITRATE 25 MCG: 50 INJECTION, SOLUTION INTRAMUSCULAR; INTRAVENOUS at 08:54

## 2018-07-27 RX ADMIN — NITROGLYCERIN 100 MCG: 20 INJECTION INTRAVENOUS at 10:36

## 2018-07-27 RX ADMIN — ALTEPLASE 4 MG: 2.2 INJECTION, POWDER, LYOPHILIZED, FOR SOLUTION INTRAVENOUS at 09:51

## 2018-07-27 NOTE — PLAN OF CARE
DISCHARGE PLANNING - CARE MANAGEMENT     Discharge to post-acute care or home with appropriate resources Progressing        GASTROINTESTINAL - ADULT     Minimal or absence of nausea and/or vomiting Progressing     Establish and maintain optimal ostomy function Progressing        METABOLIC, FLUID AND ELECTROLYTES - ADULT     Electrolytes maintained within normal limits Progressing     Fluid balance maintained Progressing     Glucose maintained within target range Progressing        NEUROSENSORY - ADULT     Achieves stable or improved neurological status Progressing        Nutrition/Hydration-ADULT     Nutrient/Hydration intake appropriate for improving, restoring or maintaining nutritional needs Progressing        PAIN - ADULT     Verbalizes/displays adequate comfort level or baseline comfort level Progressing        Potential for Falls     Patient will remain free of falls Progressing        SKIN/TISSUE INTEGRITY - ADULT     Skin integrity remains intact Progressing

## 2018-07-27 NOTE — MEDICAL STUDENT
Paloma Mcnamara MEDICAL STUDENT  Inpatient H&P for TRAINING ONLY   Not Part of Legal Medical Record     Medical Student Progress Note    Unit/Bed#: [facility] 610-01 Encounter: [id number]        Emily Abdullahi 60 y o  female [id number]    Hospital Stay Days: 2      Assessment/Plan:   Right arm numbness  Secondary to: Brachial artery stenosis, right [facility])  Presented with Right arm numbness, tingling and coolness that has been occurring for months- worked up on 6/19 admission with dopplers showing stenosis vs occlusion in distal radial artery and 75% or> stenosis in axillary/distal subclavian artery: no intervention at the time because was only intermittent claudication; to continue ASA and plavix  7/26 VAS doppler study showed occlusion vs  High grade stenosis of axillary and proximal brachial artery and possible occlusion of mid-distal radial artery in RUE  Vascular and IR consulted   Continue neurovascular checks Q4H of right arm  Per vascular: doppler found of palmar and ulnar arteries; pulses not well appreciated per my exam  IR today for possible RUE intervention and agram; f/u CTA RUE performed last night    For axillary-brachial artery chronic total occlusion: 3mm then 4mm drug coated balloon angioplasty   New embolus mid brachial artery treated with cat6 suction thrombectomy and tpa   Ulnar artery embolus treated with cat 3 suction thrombectomy and tpa  On heparin gtt; will need long-term anticoagulation due to new clot in brachial artery; compliance may be an issue so wouldn't suggest coumadin  eliquis might be a better choice       Type 2 diabetes mellitus with hyperglycemia ([address] )  Presented to ED with blood glucose of 467 and stated she had not taken insulin since discharge 7/17 after colostomy  She states she is supposed to use insulin and metformin at home  Story was erratic as to insulin use   Mostly uses the metformin  Started lantus 5 units at bedtime with humalog ss for meals  POCT Blood glucose today is 133; well controlled and stable with current insulin regimen       RYLAN (acute kidney injury) ([address] )   Resolved after fluid hydration; likely secondary to dehydration and hyperglycemia with possible diabetic nephropathy component component given UA from ED: 250 glucose, trace ketones, 2+ protein  Cre 1 65->1 10->0 88->0 82       Lactic acidosis  Resolved; likely secondary to dehydration from high volume ostomy output; given fluid boluses for elevation  2 6->4 1-> 0 8       Partial bowel obstruction (HCC) vs  Ileus  Obstruction secondary to colon cancer vs ileus secondary to post-surgery  7/25 abdominal CT:              1  ascending colonic pneumatosis; postsurgical vs  Ischemia (less likely as lactate levels normalized)              2  Distension of distal small bowel and dilation of proximal colon to level of loop colostomy; postop ileus vs partial obstruction              3  Encapsulated fluid adjacent to rectosigmoid mass; unilocular now and smaller than prior imaging  Sigmoid colon mass dx 6/19 requiring diverting loop colostomy when became obstructive  Was scheduled to start adjuvant chemotherapy for mass but patient had compliance issues and presented to ED with obstruction before starting chemotherapy so colorectal surgery opted for colostomy  Continue with current chemotherapy regimen; patient must bring in medication from home= xeloda; states she has none at home to bring in              States she has been compliant with xeloda regimen twice daily but has none at home that she has been taking  PET scan for staging not completed   Strict f/u plan with oncology and gastroenterology      Diarrhea  Was having to change colostomy bag frequently before admission  IVF given in ED to rehydrate; had lactic acidosis; resolved now  States she has changed colostomy bag 6-7x this AM but bag was only 1/4 full each time  Cdiff negative  Was on high fiber diet before agram; colorectal surgery states   WBC trending down; 15 30->14 06->12 05->11 50  NPO for agram and IR procedure, colorectal surgery recommends regular diet post-IR       Hyponatremia  Likely secondary to diarrhea; hypovolemic hyponatremia  130->135->141  Received NS IVF; Resolved      Anemia  Hb 10 2-> 9 8; normocytic with stable hematocrit; thrombophilia 556  Likely anemia of chronic disease secondary to colon cancer       Hypertension, Essential  BP spike from 0269-3320 today to 180s/80s likely secondary to upcoming IR procedure  Given versed at 8am with gradual decrease in BP to 169/76  Continue ASA, Lipitor, Plavix, lopressor 25mg BID    Disposition: further hospitalization; discharge date potentially Monday       Subjective:   Was seen post-IR revascularization procedure  Groggy from anesthesia and stated her hands felt very cold  Offered no other complaints  Interview limited by patient's sedation  Vitals: Temp (24hrs), Av 2 °F (36 8 °C), Min:97 5 °F (36 4 °C), Max:98 6 °F (37 °C)  Current: Temperature: 98 6 °F (37 °C)  Vitals:    18 0759 18 0804 18 0809 18 0814   BP: (!) 187/82 (!) 180/69 (!) 179/79 (!) 189/82   Pulse: 61 61 61 61   Resp:     Temp:       TempSrc:       SpO2: 100% 100% 100% 100%   Weight:       Height:        Body mass index is 22 42 kg/m²  I/O last 24 hours: In: 2277 1 [P O :780; I V :1497 1]  Out: 4950 [Urine:4450; Stool:500]      Physical Exam: General appearance: pale, slowed mentation and mildly alert to vocal stimulus secondary to recent anesthesia   Extremities: right hand pale, cold with ecchymosis from IR intervention;  Left hand mildly pale but well perfused  Skin: pallor of right hand and fingers and distal forearm     Invasive Devices     Peripheral Intravenous Line            Peripheral IV 18 Left Antecubital 1 day          Drain            Colostomy Loop RUQ 9 days                          Labs:   Recent Results (from the past 24 hour(s))   Fingerstick Glucose (POCT) Collection Time: 07/26/18 11:44 AM   Result Value Ref Range    POC Glucose 324 (H) 65 - 140 mg/dl   Clostridium difficile toxin by PCR    Collection Time: 07/26/18 12:12 PM   Result Value Ref Range    C difficile toxin by PCR NEGATIVE for C difficle toxin by PCR  NEGATIVE for C difficle toxin by PCR      Fingerstick Glucose (POCT)    Collection Time: 07/26/18  5:09 PM   Result Value Ref Range    POC Glucose 141 (H) 65 - 140 mg/dl   Fingerstick Glucose (POCT)    Collection Time: 07/26/18  9:05 PM   Result Value Ref Range    POC Glucose 146 (H) 65 - 140 mg/dl   CBC and differential    Collection Time: 07/27/18  5:15 AM   Result Value Ref Range    WBC 12 05 (H) 4 31 - 10 16 Thousand/uL    RBC 3 53 (L) 3 81 - 5 12 Million/uL    Hemoglobin 9 8 (L) 11 5 - 15 4 g/dL    Hematocrit 32 4 (L) 34 8 - 46 1 %    MCV 92 82 - 98 fL    MCH 27 8 26 8 - 34 3 pg    MCHC 30 2 (L) 31 4 - 37 4 g/dL    RDW 14 3 11 6 - 15 1 %    MPV 10 3 8 9 - 12 7 fL    Platelets 051 (H) 641 - 390 Thousands/uL    nRBC 0 /100 WBCs    Neutrophils Relative 59 43 - 75 %    Immat GRANS % 1 0 - 2 %    Lymphocytes Relative 31 14 - 44 %    Monocytes Relative 6 4 - 12 %    Eosinophils Relative 3 0 - 6 %    Basophils Relative 0 0 - 1 %    Neutrophils Absolute 7 01 1 85 - 7 62 Thousands/µL    Immature Grans Absolute 0 11 0 00 - 0 20 Thousand/uL    Lymphocytes Absolute 3 76 0 60 - 4 47 Thousands/µL    Monocytes Absolute 0 75 0 17 - 1 22 Thousand/µL    Eosinophils Absolute 0 37 0 00 - 0 61 Thousand/µL    Basophils Absolute 0 05 0 00 - 0 10 Thousands/µL   Basic metabolic panel    Collection Time: 07/27/18  5:15 AM   Result Value Ref Range    Sodium 141 136 - 145 mmol/L    Potassium 3 5 3 5 - 5 3 mmol/L    Chloride 110 (H) 100 - 108 mmol/L    CO2 23 21 - 32 mmol/L    Anion Gap 8 4 - 13 mmol/L    BUN 10 5 - 25 mg/dL    Creatinine 0 82 0 60 - 1 30 mg/dL    Glucose 118 65 - 140 mg/dL    Calcium 8 8 8 3 - 10 1 mg/dL    eGFR 79 ml/min/1 73sq m   Protime-INR    Collection Time: 07/27/18  5:15 AM   Result Value Ref Range    Protime 12 7 11 8 - 14 2 seconds    INR 0 94 0 86 - 1 17       Radiology Results: I have personally reviewed pertinent reports  Pending CTA RUE read    Other Diagnostic Testing:   I have personally reviewed pertinent reports      IR procedure note as outlined above in A/P    Active Meds:   Current Facility-Administered Medications   Medication Dose Route Frequency    acetaminophen (TYLENOL) tablet 650 mg  650 mg Oral Q6H PRN    aspirin (ECOTRIN LOW STRENGTH) EC tablet 81 mg  81 mg Oral Daily    atorvastatin (LIPITOR) tablet 80 mg  80 mg Oral Daily    calcium carbonate (TUMS) chewable tablet 500 mg  500 mg Oral Daily PRN    capecitabine (XELODA) tablet 300 mg  300 mg Oral BID    clopidogrel (PLAVIX) tablet 75 mg  75 mg Oral Daily    diphenhydrAMINE (BENADRYL) injection   Intravenous Code/Trauma/Sedation Med    famotidine (PEPCID) tablet 20 mg  20 mg Oral BID    fentanyl citrate (PF) 100 MCG/2ML   Intravenous Code/Trauma/Sedation Med    heparin (porcine) subcutaneous injection 5,000 Units  5,000 Units Subcutaneous Q8H [facility]    insulin glargine (LANTUS) subcutaneous injection 5 Units 0 05 mL  5 Units Subcutaneous HS    insulin lispro (HumaLOG) 100 units/mL subcutaneous injection 1-5 Units  1-5 Units Subcutaneous 4x Daily (AC & HS)    menthol-methyl salicylate (BENGAY) 74-85 % cream   Apply externally 4x Daily PRN    metoprolol tartrate (LOPRESSOR) tablet 25 mg  25 mg Oral Q12H [facility]    midazolam (VERSED) injection    Code/Trauma/Sedation Med    morphine injection 2 mg  2 mg Intravenous Q4H PRN    nicotine (NICODERM CQ) 14 mg/24hr TD 24 hr patch 1 patch  1 patch Transdermal Daily    oxyCODONE (ROXICODONE) IR tablet 5 mg  5 mg Oral Q4H PRN    traMADol (ULTRAM) tablet 50 mg  50 mg Oral Q6H PRN    traZODone (DESYREL) tablet 50 mg  50 mg Oral HS         VTE Pharmacologic Prophylaxis: Heparin  VTE Mechanical Prophylaxis: sequential compression device [employer]

## 2018-07-27 NOTE — PROGRESS NOTES
Progress Note - Colorectal Surgery   Emily Abdullahi 60 y o  female MRN: [id number]  Unit/Bed#: [facility] 610-01 Encounter: [id number]    Assessment:  Pt is a 58y o  F with dehydration from high colostomy output and right arm numbness/tingling  Plan:  - regular diet after arteriogram today  - f/u C diff  - monitor ostomy output  - no indication for SNF placement per PT/OT/CM      Subjective/Objective   Chief Complaint:     Subjective: Still complains of R hand numbness and tingling  Tolerated diet yesterday    Objective:     Blood pressure 156/71, pulse 64, temperature 97 5 °F (36 4 °C), resp  rate 16, height 5' 3" (1 6 m), weight 57 4 kg (126 lb 8 7 oz), SpO2 95 %, not currently breastfeeding  ,Body mass index is 22 42 kg/m²  Intake/Output Summary (Last 24 hours) at 07/27/18 0559  Last data filed at 07/27/18 0515   Gross per 24 hour   Intake          2277 08 ml   Output             4950 ml   Net         -2672 92 ml       Invasive Devices     Peripheral Intravenous Line            Peripheral IV 07/25/18 Left Antecubital 1 day          Drain            Colostomy Loop RUQ 9 days                Physical Exam:   NAD  Norm resp effort on RA  RRR  Abd soft, NT/ND  RUE w doppler radial/ulnar/palmar  Ostomy in place with air and stool in bag, stoma pink and healthy      Lab, Imaging and other studies:  I have personally reviewed pertinent lab results    , CBC:   No results found for: WBC, HGB, HCT, MCV, PLT, ADJUSTEDWBC, MCH, MCHC, RDW, MPV, NRBC, CMP:   No results found for: NA, K, CL, CO2, ANIONGAP, BUN, CREATININE, GLUCOSE, CALCIUM, AST, ALT, ALKPHOS, PROT, ALBUMIN, BILITOT, EGFR  VTE Pharmacologic Prophylaxis: Heparin  VTE Mechanical Prophylaxis: sequential compression device

## 2018-07-27 NOTE — PLAN OF CARE
PAIN - ADULT     Verbalizes/displays adequate comfort level or baseline comfort level Not Progressing          DISCHARGE PLANNING - CARE MANAGEMENT     Discharge to post-acute care or home with appropriate resources Progressing        GASTROINTESTINAL - ADULT     Minimal or absence of nausea and/or vomiting Progressing     Establish and maintain optimal ostomy function Progressing        METABOLIC, FLUID AND ELECTROLYTES - ADULT     Electrolytes maintained within normal limits Progressing     Fluid balance maintained Progressing     Glucose maintained within target range Progressing        NEUROSENSORY - ADULT     Achieves stable or improved neurological status Progressing        Nutrition/Hydration-ADULT     Nutrient/Hydration intake appropriate for improving, restoring or maintaining nutritional needs Progressing        Potential for Falls     Patient will remain free of falls Progressing        SKIN/TISSUE INTEGRITY - ADULT     Skin integrity remains intact Progressing

## 2018-07-27 NOTE — PROGRESS NOTES
GI Oncology Nurse Navigator Note    Spoke to Ghulam Snow,  today about patient and asked to be involved with patient as patient seems to need some additional help, explained what has been going on with the patient and that she is currently in patient at [facility]

## 2018-07-27 NOTE — PROGRESS NOTES
[facility] Hospitalist Service - Internal Medicine Progress Note      PATIENT INFORMATION      Patient: Emily Abdullahi 60 y o  female   MRN: [id number]  PCP: Tiesha Andersen MD  Unit/Bed#: [facility] 610-01 Encounter: [id number]  Date Of Visit: 07/27/18       ASSESSMENTS & PLAN        1  Right upper extremity numbness - Right brachial artery stenosis  · RUE arterial duplex revealed axillary and proximal brachial artery stenosis with possible mid-distal radial artery occlusion - CTA a right arm today also revealed approximate 4 cm occlusion of the proximal brachial artery - underwent IR guided brachial artery angioplasty with incidental finding of a 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 status post suction thrombectomy with tPA administration - placed on heparin drip     2  Rectosigmoid mass - Possible postoperative ileus - High colostomy output  · S/p transverse/diverting loop colostomy for palliative diversion previously  · Appreciate colorectal surgery input - monitor colostomy output and monitor electrolytes for insensible losses - maintain hydration is needed  · CT imaging revealed questionable postoperative pneumatosis/ileus - less likely bowel obstruction as patient is tolerating meals with colostomy output  · Stool for C  difficile PCR negative    3  Acute kidney injury  · Normalized with aggressive IV fluid hydration    4  Insulin-dependent diabetes mellitus   · Last HbA1c of 10 4 in June 2018 - patient openly acknowledges noncompliance with insulin regimen  · Thoroughly counseled on cessation  · Continue basal insulin with additional SSI coverage per Accu-Cheks    5  Lactic acidosis  · Likely secondary to dehydration with high colostomy output - normalized with multiple IV fluid boluses on admission    6  Leukocytosis  · Likely reactive secondary to acute issue coupled with dehydration - remains afebrile  · Monitor WBC count    7    Tobacco abuse  · Encourage cessation - transdermal nicotine patch on board    8  CAD  · Status post prior angioplasty - continue ASA/Plavix/Lipitor/Lopressor    9  Essential hypertension  · Continue Lopressor  · PRN IV Hydralazine for BP spikes     10  GERD  · Continue Pepcid      DVT Prophylaxis:  Heparin drip      SUBJECTIVE     Seen/examined earlier today shortly after vascular intervention  She reported some tingling/numbness of her right arm but denied any significant pain upon my encounter  Generally weak/fatigued this morning  OBJECTIVE     Vitals:   Temp (24hrs), Av 2 °F (36 8 °C), Min:97 5 °F (36 4 °C), Max:98 6 °F (37 °C)    HR:  [58-85] 64  Resp:  [18] 18  BP: (135-189)/() 156/76  SpO2:  [95 %-100 %] 100 %  Body mass index is 22 42 kg/m²  Input and Output Summary (last 24 hours):        Intake/Output Summary (Last 24 hours) at 18 1903  Last data filed at 18 1400   Gross per 24 hour   Intake           1252 5 ml   Output             4400 ml   Net          -3147 5 ml       Physical Exam:     GENERAL:  No emergent distress  HEAD:  Normocephalic - atraumatic  EYES: PERRL - EOMI   MOUTH:  Mucosa moist  NECK:  Supple - full range of motion  CARDIAC:  Regular rate/rhythm - S1/S2 positive  PULMONARY:  Clear to auscultation bilaterally - nonlabored respirations  ABDOMEN:  Soft - nontender/nondistended - active bowel sounds - ostomy bag in place with brown stool  MUSCULOSKELETAL:  Motor strength/range of motion intact but mildly deconditioned today  NEUROLOGIC:  Alert/oriented  SKIN:  Chronic wrinkles/blemishes       ADDITIONAL DATA     Labs & Recent Cultures:       Results from last 7 days  Lab Units 18  1139 18  0515   WBC Thousand/uL 11 50* 12 05*   HEMOGLOBIN g/dL 9 0* 9 8*   HEMATOCRIT % 29 7* 32 4*   PLATELETS Thousands/uL 405* 556*   NEUTROS PCT %  --  59   LYMPHS PCT %  --  31   MONOS PCT %  --  6   EOS PCT %  --  3       Results from last 7 days  Lab Units 18  0515  18  1248   SODIUM mmol/L 141  < > 130*   POTASSIUM mmol/L 3 5  < > 4 0   CHLORIDE mmol/L 110*  < > 96*   CO2 mmol/L 23  < > 27   BUN mg/dL 10  < > 23   CREATININE mg/dL 0 82  < > 1 65*   CALCIUM mg/dL 8 8  < > 9 0   TOTAL PROTEIN g/dL  --   --  7 7   BILIRUBIN TOTAL mg/dL  --   --  0 29   ALK PHOS U/L  --   --  153*   ALT U/L  --   --  24   AST U/L  --   --  12   GLUCOSE RANDOM mg/dL 118  < > 467*   < > = values in this interval not displayed  Results from last 7 days  Lab Units 07/27/18  1139   INR  1 02           Results from last 7 days  Lab Units 07/26/18  1212   C DIFF TOXIN B  NEGATIVE for C difficle toxin by PCR  Last 24 Hours Medication List:     Current Facility-Administered Medications:  acetaminophen 650 mg Oral Q6H PRN Kathe Hamlin MD    aspirin 81 mg Oral Daily Kathe Hamlin MD    atorvastatin 80 mg Oral Daily Kathe Hamlin MD    calcium carbonate 500 mg Oral Daily PRN Maximilian Barrios MD    clopidogrel 75 mg Oral Daily Kathe Hamlin MD    famotidine 20 mg Oral BID Kathe Hamlin MD    heparin (porcine) 3-30 Units/kg/hr (Order-Specific) Intravenous Titrated Jesi Chinchilla MD Last Rate: 18 Units/kg/hr (07/27/18 1150)   heparin (porcine) 2,200 Units Intravenous PRN Jesi Chinchilla MD    heparin (porcine) 4,400 Units Intravenous PRN Jesi Chinchilla MD    hydrALAZINE 5 mg Intravenous Q6H PRN Lesli Whalen MD    insulin glargine 5 Units Subcutaneous HS Kathe Hamlin MD    insulin lispro 1-5 Units Subcutaneous 4x Daily (AC & HS) Lesli Whalen MD    menthol-methyl salicylate  Apply externally 4x Daily PRN Erik Park PA-C    metoprolol tartrate 25 mg Oral Q12H [facility] Kathe Hamlin MD    morphine injection 2 mg Intravenous Q4H PRN Kathe Hamlin MD    nicotine 1 patch Transdermal Daily Kathe Hamlin MD    ondansetron 4 mg Intravenous Q6H PRN Jesi Chinchilla MD    oxyCODONE 5 mg Oral Q4H PRN Kathe Hamlin MD    traMADol 50 mg Oral Q6H PRN Kathe Hamlin MD    traZODone 50 mg Oral HS Kathe Hamlin MD           Time Spent for Care: 36 minutes    More than 50% of total time spent on counseling and coordination of care as described above  Current Length of Stay: 2 day(s)      Code Status: Level 1 - Full Code          ** Please Note: This note is constructed using a voice recognition dictation system   **

## 2018-07-27 NOTE — TELEPHONE ENCOUNTER
Call from GI Nurse NavigatorDung   Pt hospitalized 7 17-23 2018/ReAdmitted to hospital 7 25 18  Question concerning plan of care/medication  Pt was originally seen by Dr Tati Lawrence 6 22 18 while inpt  at the hospital  Rx-Capecitabine(Xeloda) was discussed inpt w/the pt  Per call from Dr Tati Lawrence   Pt was to come to the office for medication education and signing of consent  Called pt-appt scheduled for June 26 @ 1000 for med consent  Pt  was a no show on 6 26 18  Several calls to the home and daughter's cell phone to follow up  Pt  arrived at the office on 6 29 18 for med consent appt  Pt arrived w/a male "caregiver" per pt  Pattie Marques  Pt requesting that the med information be reviewed w/the "caregiver" present  Medication information reviewed, consent was signed and pt  made aware the insurance auth process takes a few days  Rec'd call from pharmacy on 7 17 18-med auth process was complete and the phcy was trying to contact the pt  via phone about delivery of med  Pharmacy was made aware on 7 17 18 the pt was being seen in the ER  Called pharmacy to follow up today-the medication has not been shipped as of this date

## 2018-07-27 NOTE — BRIEF OP NOTE (RAD/CATH)
Procedure Note    1  US guided right common femoral access  2  Right upper extremity angiogram   3  Crossing right upper extremity axillary/brachial artery chronic total occlusion  4  3 mm balloon angioplasty of occlusion segment  5  4 mm impact drug coated balloon angioplasty of occlusion segment   6  Pharmacologic and mechanical thrombectomy of Brachial and ulnar artery embolus  7  4 mm repeat angioplasty of occlusion segment   8  Completion right upper extremity angiogram   9  Groin puncture site angiogram   10  Groin puncture site closure with mynx  PATIENT NAME: Emily Abdullahi  : 1960  MRN: [id number]     Pre-op Diagnosis:   1  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[address] )    2  Partial bowel obstruction ([address] )    3  Lactic acidosis    4  Brachial artery stenosis, right (HCC)    5  Leukocytosis    6  RYLAN (acute kidney injury) ([address] )      Post-op Diagnosis:   1  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[address] )    2  Partial bowel obstruction ([address] )    3  Lactic acidosis    4  Brachial artery stenosis, right (HCC)    5  Leukocytosis    6  RYLAN (acute kidney injury) ([facility] )        Surgeon:   Arlette Guajardo MD  Assistants:     No qualified resident was available, Resident is only observing    Estimated Blood Loss: less than 500 mL     Findings:     Right upper extremity axillary extending to brachial artery chronic total occlusion which was crossed and treated with 3 mm and subsequent 4 mm drug coated balloon angioplasty  Occluded segment was now open and with good flow  Unfortunately there was embolus in the mid brachial artery which was new  This was addressed with cat 6 suction thrombectomy, TPA administration intraarterial  Brachial artery clot was cleared, but smaller segment now distal in the ulnar artery  Cat 3 suction thrombectomy performed as well as additional TPA, with clearing of the ulnar artery embolus  The ulnar artery when injected directly filled nicely, but injecting from the sheath flow was seen in the artery but very sluggish  Pulse was doppler able in the wrist, and capillary refill was maintained  Neurosensory and motor strength intact  Right groin puncture site was closed with mynx successfully  Specimens: none    Complications:  Distal embolus after  crossing and angioplasty  This was mostly cleared, but final imaging was patent but decreased rate of flow        Anesthesia: Conscious sedation and Rosalind Lee MD     Date: 7/27/2018  Time: 11:05 AM

## 2018-07-27 NOTE — PROGRESS NOTES
Progress Note - Vascular Surgery   Emily Abdullahi 60 y o  female MRN: [id number]  Unit/Bed#: [facility] 610-01 Encounter: [id number]    Assessment:  Pt is a 58y o  F with dehydration from high colostomy output and right arm numbness/tingling  Plan:  - NPO now  - diet after agram per colorectal  - f/u CTA read  - IR today for arteriogram RUE  - SQH/SCDs      Subjective/Objective   Chief Complaint:     Subjective: Still complains of R hand numbness and tingling  Tolerated diet yesterday    Objective:     Blood pressure 156/71, pulse 64, temperature 97 5 °F (36 4 °C), resp  rate 16, height 5' 3" (1 6 m), weight 57 4 kg (126 lb 8 7 oz), SpO2 95 %, not currently breastfeeding  ,Body mass index is 22 42 kg/m²  Intake/Output Summary (Last 24 hours) at 07/27/18 0703  Last data filed at 07/27/18 0515   Gross per 24 hour   Intake          2277 08 ml   Output             4950 ml   Net         -2672 92 ml       Invasive Devices     Peripheral Intravenous Line            Peripheral IV 07/25/18 Left Antecubital 1 day          Drain            Colostomy Loop RUQ 9 days                Physical Exam:   NAD  Norm resp effort on RA  RRR  Abd soft, NT/ND  RUE w doppler radial/ulnar/palmar  Ostomy in place with air and stool in bag, stoma pink and healthy      Lab, Imaging and other studies:  I have personally reviewed pertinent lab results    , CBC:   Lab Results   Component Value Date    WBC 12 05 (H) 07/27/2018    HGB 9 8 (L) 07/27/2018    HCT 32 4 (L) 07/27/2018    MCV 92 07/27/2018     (H) 07/27/2018    MCH 27 8 07/27/2018    MCHC 30 2 (L) 07/27/2018    RDW 14 3 07/27/2018    MPV 10 3 07/27/2018    NRBC 0 07/27/2018   , CMP:   Lab Results   Component Value Date     07/27/2018    K 3 5 07/27/2018     (H) 07/27/2018    CO2 23 07/27/2018    ANIONGAP 8 07/27/2018    BUN 10 07/27/2018    CREATININE 0 82 07/27/2018    GLUCOSE 118 07/27/2018    CALCIUM 8 8 07/27/2018    EGFR 79 07/27/2018     VTE Pharmacologic Prophylaxis: Heparin  VTE Mechanical Prophylaxis: sequential compression device

## 2018-07-28 PROBLEM — K21.9 GERD (GASTROESOPHAGEAL REFLUX DISEASE): Status: ACTIVE | Noted: 2018-07-28

## 2018-07-28 PROBLEM — D72.829 LEUKOCYTOSIS: Status: ACTIVE | Noted: 2018-07-28

## 2018-07-28 PROBLEM — K62.89 RECTAL MASS: Status: ACTIVE | Noted: 2018-07-28

## 2018-07-28 PROBLEM — Z72.0 TOBACCO ABUSE: Status: ACTIVE | Noted: 2018-07-28

## 2018-07-28 LAB
ANION GAP SERPL CALCULATED.3IONS-SCNC: 11 MMOL/L (ref 4–13)
APTT PPP: 49 SECONDS (ref 24–36)
APTT PPP: 60 SECONDS (ref 24–36)
APTT PPP: 67 SECONDS (ref 24–36)
BASOPHILS # BLD AUTO: 0.04 THOUSANDS/ΜL (ref 0–0.1)
BASOPHILS NFR BLD AUTO: 0 % (ref 0–1)
BUN SERPL-MCNC: 11 MG/DL (ref 5–25)
CALCIUM SERPL-MCNC: 8.3 MG/DL (ref 8.3–10.1)
CHLORIDE SERPL-SCNC: 106 MMOL/L (ref 100–108)
CO2 SERPL-SCNC: 22 MMOL/L (ref 21–32)
CREAT SERPL-MCNC: 1.04 MG/DL (ref 0.6–1.3)
EOSINOPHIL # BLD AUTO: 0.54 THOUSAND/ΜL (ref 0–0.61)
EOSINOPHIL NFR BLD AUTO: 4 % (ref 0–6)
ERYTHROCYTE [DISTWIDTH] IN BLOOD BY AUTOMATED COUNT: 14.3 % (ref 11.6–15.1)
GFR SERPL CREATININE-BSD FRML MDRD: 59 ML/MIN/1.73SQ M
GLUCOSE SERPL-MCNC: 134 MG/DL (ref 65–140)
GLUCOSE SERPL-MCNC: 194 MG/DL (ref 65–140)
GLUCOSE SERPL-MCNC: 214 MG/DL (ref 65–140)
GLUCOSE SERPL-MCNC: 258 MG/DL (ref 65–140)
GLUCOSE SERPL-MCNC: 284 MG/DL (ref 65–140)
HCT VFR BLD AUTO: 29.4 % (ref 34.8–46.1)
HGB BLD-MCNC: 9.4 G/DL (ref 11.5–15.4)
IMM GRANULOCYTES # BLD AUTO: 0.16 THOUSAND/UL (ref 0–0.2)
IMM GRANULOCYTES NFR BLD AUTO: 1 % (ref 0–2)
LYMPHOCYTES # BLD AUTO: 3.86 THOUSANDS/ΜL (ref 0.6–4.47)
LYMPHOCYTES NFR BLD AUTO: 27 % (ref 14–44)
MCH RBC QN AUTO: 28.5 PG (ref 26.8–34.3)
MCHC RBC AUTO-ENTMCNC: 32 G/DL (ref 31.4–37.4)
MCV RBC AUTO: 89 FL (ref 82–98)
MONOCYTES # BLD AUTO: 0.76 THOUSAND/ΜL (ref 0.17–1.22)
MONOCYTES NFR BLD AUTO: 5 % (ref 4–12)
NEUTROPHILS # BLD AUTO: 8.86 THOUSANDS/ΜL (ref 1.85–7.62)
NEUTS SEG NFR BLD AUTO: 63 % (ref 43–75)
NRBC BLD AUTO-RTO: 0 /100 WBCS
PLATELET # BLD AUTO: 609 THOUSANDS/UL (ref 149–390)
PMV BLD AUTO: 9.7 FL (ref 8.9–12.7)
POTASSIUM SERPL-SCNC: 3.5 MMOL/L (ref 3.5–5.3)
RBC # BLD AUTO: 3.3 MILLION/UL (ref 3.81–5.12)
SODIUM SERPL-SCNC: 139 MMOL/L (ref 136–145)
WBC # BLD AUTO: 14.22 THOUSAND/UL (ref 4.31–10.16)

## 2018-07-28 PROCEDURE — 85025 COMPLETE CBC W/AUTO DIFF WBC: CPT | Performed by: INTERNAL MEDICINE

## 2018-07-28 PROCEDURE — 99232 SBSQ HOSP IP/OBS MODERATE 35: CPT | Performed by: INTERNAL MEDICINE

## 2018-07-28 PROCEDURE — 82948 REAGENT STRIP/BLOOD GLUCOSE: CPT

## 2018-07-28 PROCEDURE — 80048 BASIC METABOLIC PNL TOTAL CA: CPT | Performed by: INTERNAL MEDICINE

## 2018-07-28 PROCEDURE — 85730 THROMBOPLASTIN TIME PARTIAL: CPT | Performed by: INTERNAL MEDICINE

## 2018-07-28 RX ADMIN — INSULIN LISPRO 2 UNITS: 100 INJECTION, SOLUTION INTRAVENOUS; SUBCUTANEOUS at 17:47

## 2018-07-28 RX ADMIN — FAMOTIDINE 20 MG: 20 TABLET ORAL at 08:26

## 2018-07-28 RX ADMIN — MORPHINE SULFATE 2 MG: 2 INJECTION, SOLUTION INTRAMUSCULAR; INTRAVENOUS at 08:24

## 2018-07-28 RX ADMIN — CLOPIDOGREL BISULFATE 75 MG: 75 TABLET, FILM COATED ORAL at 08:26

## 2018-07-28 RX ADMIN — NICOTINE 1 PATCH: 14 PATCH, EXTENDED RELEASE TRANSDERMAL at 08:28

## 2018-07-28 RX ADMIN — ASPIRIN 81 MG: 81 TABLET, COATED ORAL at 08:26

## 2018-07-28 RX ADMIN — INSULIN GLARGINE 5 UNITS: 100 INJECTION, SOLUTION SUBCUTANEOUS at 21:38

## 2018-07-28 RX ADMIN — METOPROLOL TARTRATE 25 MG: 25 TABLET, FILM COATED ORAL at 08:26

## 2018-07-28 RX ADMIN — HEPARIN SODIUM 22 UNITS/KG/HR: 10000 INJECTION, SOLUTION INTRAVENOUS at 08:30

## 2018-07-28 RX ADMIN — FAMOTIDINE 20 MG: 20 TABLET ORAL at 17:25

## 2018-07-28 RX ADMIN — INSULIN LISPRO 3 UNITS: 100 INJECTION, SOLUTION INTRAVENOUS; SUBCUTANEOUS at 21:38

## 2018-07-28 RX ADMIN — TRAZODONE HYDROCHLORIDE 50 MG: 50 TABLET ORAL at 21:03

## 2018-07-28 RX ADMIN — ATORVASTATIN CALCIUM 80 MG: 80 TABLET, FILM COATED ORAL at 08:26

## 2018-07-28 RX ADMIN — OXYCODONE HYDROCHLORIDE 5 MG: 5 TABLET ORAL at 17:25

## 2018-07-28 RX ADMIN — HEPARIN SODIUM 2200 UNITS: 1000 INJECTION, SOLUTION INTRAVENOUS; SUBCUTANEOUS at 03:40

## 2018-07-28 RX ADMIN — METOPROLOL TARTRATE 25 MG: 25 TABLET, FILM COATED ORAL at 21:03

## 2018-07-28 RX ADMIN — INSULIN LISPRO 2 UNITS: 100 INJECTION, SOLUTION INTRAVENOUS; SUBCUTANEOUS at 08:38

## 2018-07-28 NOTE — PROGRESS NOTES
[address] Hospitalist Service - Internal Medicine Progress Note       PATIENT INFORMATION      Patient: Emily Abdullahi 60 y o  female   MRN: [id number]  PCP: Rick Salgado MD  Unit/Bed#: [facility] 610-01 Encounter: [id number]  Date Of Visit: 07/28/18       ASSESSMENTS & PLAN     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   Assessment & Plan    · RUE arterial duplex revealed axillary and proximal brachial artery stenosis with possible mid-distal radial artery occlusion - CTA a right arm on 7/26 also revealed approximate 4 cm occlusion of the proximal brachial artery per vascular surgeon (awaiting official radiologist read)  · underwent IR guided brachial artery angioplasty yesterday with incidental finding of a 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 status post suction thrombectomy with tPA administration - placed on heparin drip   · Will appreciate case management input regarding pricing for oral anticoagulation agents however noncompliance to other home medications noted       Rectosigmoid mass - High colostomy output   Assessment & Plan    - S/p transverse/diverting loop colostomy for palliative diversion previously  - Appreciate colorectal surgery input - monitor colostomy output and monitor electrolytes for insensible losses - maintain hydration is needed  - CT imaging on admission revealed questionable postoperative pneumatosis/ileus - less likely bowel obstruction as patient is tolerating meals with colostomy output   - Stool for C  difficile PCR negative  - noncompliant with home Xeloda regimen       RYLAN (acute kidney injury)    Assessment & Plan    - normalized with IV fluids  - monitor renal function      Insulin-dependent diabetes mellitus   Assessment & Plan    - HbA1c of 10 4 in June 2018 - admits to noncompliance with home insulin regimen  - thoroughly counseled on compliance  - continue basal insulin with additional SSI coverage per Accu-Cheks       Lactic acidosis Assessment & Plan    - initially due to intravascular depletion with high colostomy output - normalized with aggressive IV fluid hydration/boluses on admission       Leukocytosis   Assessment & Plan    - reactive secondary to acute issues   - continue WBC count monitoring - remains afebrile      CAD (coronary artery disease)   Assessment & Plan    - status post prior angioplasty - continue ASA/Plavix/Lipitor/Lopressor      Essential hypertension   Assessment & Plan    - continue Lopressor  - PRN IV Hydralazine for BP spikes       GERD (gastroesophageal reflux disease)   Assessment & Plan    - continue Pepcid        Tobacco abuse   Assessment & Plan    - counseled on cessation  - transdermal nicotine patch on board        VTE Prophylaxis:  Heparin drip      SUBJECTIVE     Seen/examined earlier today  No acute overnight events  She notes that her right arm is cooler than her left although she denies any tingling/numbness currently or pain  She also notes her stool formation in her colostomy bag is more formed today  OBJECTIVE     Vitals:   Temp (24hrs), Av 3 °F (36 8 °C), Min:97 9 °F (36 6 °C), Max:98 4 °F (36 9 °C)    HR:  [59-65] 64  Resp:  [18] 18  BP: (108-178)/() 119/66  SpO2:  [99 %-100 %] 100 %  Body mass index is 22 42 kg/m²  Input and Output Summary (last 24 hours):        Intake/Output Summary (Last 24 hours) at 18 1429  Last data filed at 18 1419   Gross per 24 hour   Intake           1142 1 ml   Output             1900 ml   Net           -757 9 ml       Physical Exam:     GENERAL:  Well-developed/nourished - no acute distress  HEAD:  Normocephalic - atraumatic  EYES: PERRL - EOMI   MOUTH:  Mucosa moist  NECK:  Supple - full range of motion  CARDIAC:  Regular rate/rhythm - S1/S2 positive  PULMONARY:  Clear breath sounds bilaterally - nonlabored respirations  ABDOMEN:  Soft - nontender/nondistended - active bowel sounds - ostomy bag intact with formed stool  MUSCULOSKELETAL: Motor strength/range of motion intact - right arm cooler to touch than left although improved from yesterday  NEUROLOGIC:  Alert/oriented x 3  SKIN:  Age-appropriate wrinkles/blemishes       ADDITIONAL DATA       Labs & Recent Cultures:       Results from last 7 days  Lab Units 07/28/18  0519   WBC Thousand/uL 14 22*   HEMOGLOBIN g/dL 9 4*   HEMATOCRIT % 29 4*   PLATELETS Thousands/uL 609*   NEUTROS PCT % 63   LYMPHS PCT % 27   MONOS PCT % 5   EOS PCT % 4       Results from last 7 days  Lab Units 07/28/18  0519  07/25/18  1248   SODIUM mmol/L 139  < > 130*   POTASSIUM mmol/L 3 5  < > 4 0   CHLORIDE mmol/L 106  < > 96*   CO2 mmol/L 22  < > 27   BUN mg/dL 11  < > 23   CREATININE mg/dL 1 04  < > 1 65*   CALCIUM mg/dL 8 3  < > 9 0   TOTAL PROTEIN g/dL  --   --  7 7   BILIRUBIN TOTAL mg/dL  --   --  0 29   ALK PHOS U/L  --   --  153*   ALT U/L  --   --  24   AST U/L  --   --  12   GLUCOSE RANDOM mg/dL 194*  < > 467*   < > = values in this interval not displayed  Results from last 7 days  Lab Units 07/27/18  1139   INR  1 02           Results from last 7 days  Lab Units 07/26/18  1212   C DIFF TOXIN B  NEGATIVE for C difficle toxin by PCR            Last 24 Hours Medication List:     Current Facility-Administered Medications:  acetaminophen 650 mg Oral Q6H PRN Josiane Marinelli MD    aspirin 81 mg Oral Daily Josiane Marinelli MD    atorvastatin 80 mg Oral Daily Josiane Marinelli MD    calcium carbonate 500 mg Oral Daily PRN Karina Aiken MD    clopidogrel 75 mg Oral Daily Josiane Marinelli MD    famotidine 20 mg Oral BID Josiane Marinelli MD    heparin (porcine) 3-30 Units/kg/hr (Order-Specific) Intravenous Titrated Mnauela Tom MD Last Rate: 22 Units/kg/hr (07/28/18 0830)   heparin (porcine) 2,200 Units Intravenous PRN Manuela Tom MD    heparin (porcine) 4,400 Units Intravenous PRN Manuela Tom MD    hydrALAZINE 5 mg Intravenous Q6H PRN Mera Scott MD    insulin glargine 5 Units Subcutaneous HS Josiane Marinelli MD    insulin lispro 1-5 Units Subcutaneous 4x Daily (AC & HS) Saintclair Curling, MD    menthol-methyl salicylate  Apply externally 4x Daily PRN Sanford Orr PA-C    metoprolol tartrate 25 mg Oral Q12H [facility] Yvette Calvert MD    morphine injection 2 mg Intravenous Q4H PRN Yvette Calvert MD    nicotine 1 patch Transdermal Daily Yvette Calvert MD    ondansetron 4 mg Intravenous Q6H PRN Kobe Triplett MD    oxyCODONE 5 mg Oral Q4H PRN Yevtte Calvert MD    traMADol 50 mg Oral Q6H PRN Yvette Calvert MD    traZODone 50 mg Oral HS Yvette Calvert MD           Time Spent for Care: 33 minutes  More than 50% of total time spent on counseling and coordination of care as described above  Current Length of Stay: 3 day(s)      Code Status: Level 1 - Full Code         ** Please Note: This note is constructed using a voice recognition dictation system   **

## 2018-07-28 NOTE — ASSESSMENT & PLAN NOTE
- S/p transverse/diverting loop colostomy for palliative diversion previously  - Appreciate colorectal surgery input - monitor colostomy output and monitor electrolytes for insensible losses - maintain hydration is needed  - CT imaging on admission revealed questionable postoperative pneumatosis/ileus - less likely bowel obstruction as patient is tolerating meals with colostomy output   - Stool for C  difficile PCR negative  - noncompliant with home Xeloda regimen

## 2018-07-28 NOTE — ASSESSMENT & PLAN NOTE
· RUE arterial duplex revealed axillary and proximal brachial artery stenosis with possible mid-distal radial artery occlusion - CTA a right arm on 7/26 also revealed approximate 4 cm occlusion of the proximal brachial artery per vascular surgeon   · underwent IR guided brachial artery angioplasty on 7/27 with incidental finding of a 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 status post suction thrombectomy with tPA administration - remains on heparin drip - await right groin pseudoaneurysm study  · will appreciate case management input regarding pricing for oral anticoagulation agents however noncompliance to other home medications noted including insulin and oral chemotherapy agent so concern to further noncompliance to any new meds exists - patient thoroughly counseled on compliance

## 2018-07-28 NOTE — ASSESSMENT & PLAN NOTE
- initially due to intravascular depletion with high colostomy output - normalized with aggressive IV fluid hydration/boluses on admission

## 2018-07-28 NOTE — PROGRESS NOTES
Progress Note - General Surgery   Emily Abdullahi 60 y o  female MRN: [id number]  Unit/Bed#: [facility] 610-01 Encounter: [id number]    Assessment:  58F with PMH of R axillary/brachial thrombosis s/p thromboectomy 7/27/18    Plan:  -heparin gtt, see if CM can get price for NOAC  -continue neurovascular checks  -ok to discharge from a vascular surgery standpoint    Subjective/Objective   Chief Complaint: bad gas overnight    Subjective: feels fine, no abdominal pain, no n/v, no darrhea per colostomy    Objective:   Blood pressure 108/56, pulse 63, temperature 98 4 °F (36 9 °C), temperature source Oral, resp  rate 18, height 5' 3" (1 6 m), weight 57 4 kg (126 lb 8 7 oz), SpO2 100 %, not currently breastfeeding  ,Body mass index is 22 42 kg/m²  Intake/Output Summary (Last 24 hours) at 07/28/18 0700  Last data filed at 07/28/18 0510   Gross per 24 hour   Intake            842 1 ml   Output             2200 ml   Net          -1357 9 ml       Invasive Devices     Peripheral Intravenous Line            Peripheral IV 07/27/18 Left Forearm less than 1 day          Drain            Colostomy Loop RUQ 10 days              Physical Exam:   General: No acute distress  HEENT: Normocephalic, atraumatic with MMM  No scleral icterus  Neck: Normal ROM  No tracheal deviation  Cardio: Normal rate  Pulm: Normal respiratory effort  Abdomen: Soft, non-tender, non-distended  Colostomy bag with some loose brown stool and gas  Extremities: TAPIA, No edema   R hand with doppler signals in the ulnar artery and palmar arch  Neuro: Cranial nerves II-XII intact  Psych: Normal affect        Lab, Imaging and other studies:  CBC:   Lab Results   Component Value Date    WBC 14 22 (H) 07/28/2018    HGB 9 4 (L) 07/28/2018    HCT 29 4 (L) 07/28/2018    MCV 89 07/28/2018     (H) 07/28/2018    MCH 28 5 07/28/2018    MCHC 32 0 07/28/2018    RDW 14 3 07/28/2018    MPV 9 7 07/28/2018    NRBC 0 07/28/2018   , CMP:   Lab Results   Component Value Date    NA 139 07/28/2018    K 3 5 07/28/2018     07/28/2018    CO2 22 07/28/2018    ANIONGAP 11 07/28/2018    BUN 11 07/28/2018    CREATININE 1 04 07/28/2018    GLUCOSE 194 (H) 07/28/2018    CALCIUM 8 3 07/28/2018    EGFR 59 07/28/2018   , Coagulation:   Lab Results   Component Value Date    INR 1 02 07/27/2018     VTE Pharmacologic Prophylaxis: Heparin  VTE Mechanical Prophylaxis: sequential compression device

## 2018-07-28 NOTE — ASSESSMENT & PLAN NOTE
- HbA1c of 10 4 in June 2018 - admits to noncompliance with home insulin regimen  - thoroughly counseled on compliance  - continue basal insulin with additional SSI coverage per Accu-Cheks

## 2018-07-29 ENCOUNTER — APPOINTMENT (INPATIENT)
Dept: NON INVASIVE DIAGNOSTICS | Facility: HOSPITAL | Age: 58
DRG: 253 | End: 2018-07-29
Payer: COMMERCIAL

## 2018-07-29 LAB
APTT PPP: 129 SECONDS (ref 24–36)
APTT PPP: 58 SECONDS (ref 24–36)
APTT PPP: 64 SECONDS (ref 24–36)
BASOPHILS # BLD AUTO: 0.05 THOUSANDS/ΜL (ref 0–0.1)
BASOPHILS NFR BLD AUTO: 0 % (ref 0–1)
EOSINOPHIL # BLD AUTO: 0.6 THOUSAND/ΜL (ref 0–0.61)
EOSINOPHIL NFR BLD AUTO: 4 % (ref 0–6)
ERYTHROCYTE [DISTWIDTH] IN BLOOD BY AUTOMATED COUNT: 14.5 % (ref 11.6–15.1)
GLUCOSE SERPL-MCNC: 180 MG/DL (ref 65–140)
GLUCOSE SERPL-MCNC: 212 MG/DL (ref 65–140)
GLUCOSE SERPL-MCNC: 287 MG/DL (ref 65–140)
GLUCOSE SERPL-MCNC: 299 MG/DL (ref 65–140)
HCT VFR BLD AUTO: 31.8 % (ref 34.8–46.1)
HGB BLD-MCNC: 9.7 G/DL (ref 11.5–15.4)
IMM GRANULOCYTES # BLD AUTO: 0.15 THOUSAND/UL (ref 0–0.2)
IMM GRANULOCYTES NFR BLD AUTO: 1 % (ref 0–2)
LYMPHOCYTES # BLD AUTO: 2.53 THOUSANDS/ΜL (ref 0.6–4.47)
LYMPHOCYTES NFR BLD AUTO: 18 % (ref 14–44)
MCH RBC QN AUTO: 27.9 PG (ref 26.8–34.3)
MCHC RBC AUTO-ENTMCNC: 30.5 G/DL (ref 31.4–37.4)
MCV RBC AUTO: 91 FL (ref 82–98)
MONOCYTES # BLD AUTO: 0.82 THOUSAND/ΜL (ref 0.17–1.22)
MONOCYTES NFR BLD AUTO: 6 % (ref 4–12)
NEUTROPHILS # BLD AUTO: 9.91 THOUSANDS/ΜL (ref 1.85–7.62)
NEUTS SEG NFR BLD AUTO: 71 % (ref 43–75)
NRBC BLD AUTO-RTO: 0 /100 WBCS
PLATELET # BLD AUTO: 605 THOUSANDS/UL (ref 149–390)
PMV BLD AUTO: 9.6 FL (ref 8.9–12.7)
RBC # BLD AUTO: 3.48 MILLION/UL (ref 3.81–5.12)
WBC # BLD AUTO: 14.06 THOUSAND/UL (ref 4.31–10.16)

## 2018-07-29 PROCEDURE — 82948 REAGENT STRIP/BLOOD GLUCOSE: CPT

## 2018-07-29 PROCEDURE — 93926 LOWER EXTREMITY STUDY: CPT

## 2018-07-29 PROCEDURE — 93925 LOWER EXTREMITY STUDY: CPT | Performed by: SURGERY

## 2018-07-29 PROCEDURE — 85730 THROMBOPLASTIN TIME PARTIAL: CPT | Performed by: INTERNAL MEDICINE

## 2018-07-29 PROCEDURE — 85025 COMPLETE CBC W/AUTO DIFF WBC: CPT | Performed by: INTERNAL MEDICINE

## 2018-07-29 PROCEDURE — 99232 SBSQ HOSP IP/OBS MODERATE 35: CPT | Performed by: INTERNAL MEDICINE

## 2018-07-29 RX ADMIN — FAMOTIDINE 20 MG: 20 TABLET ORAL at 17:31

## 2018-07-29 RX ADMIN — TRAZODONE HYDROCHLORIDE 50 MG: 50 TABLET ORAL at 21:06

## 2018-07-29 RX ADMIN — ASPIRIN 81 MG: 81 TABLET, COATED ORAL at 08:33

## 2018-07-29 RX ADMIN — INSULIN LISPRO 3 UNITS: 100 INJECTION, SOLUTION INTRAVENOUS; SUBCUTANEOUS at 08:30

## 2018-07-29 RX ADMIN — OXYCODONE HYDROCHLORIDE 5 MG: 5 TABLET ORAL at 07:27

## 2018-07-29 RX ADMIN — FAMOTIDINE 20 MG: 20 TABLET ORAL at 08:33

## 2018-07-29 RX ADMIN — NICOTINE 1 PATCH: 14 PATCH, EXTENDED RELEASE TRANSDERMAL at 08:32

## 2018-07-29 RX ADMIN — METOPROLOL TARTRATE 25 MG: 25 TABLET, FILM COATED ORAL at 21:06

## 2018-07-29 RX ADMIN — HEPARIN SODIUM 2200 UNITS: 1000 INJECTION, SOLUTION INTRAVENOUS; SUBCUTANEOUS at 07:28

## 2018-07-29 RX ADMIN — ATORVASTATIN CALCIUM 80 MG: 80 TABLET, FILM COATED ORAL at 08:33

## 2018-07-29 RX ADMIN — OXYCODONE HYDROCHLORIDE 5 MG: 5 TABLET ORAL at 17:31

## 2018-07-29 RX ADMIN — INSULIN GLARGINE 5 UNITS: 100 INJECTION, SOLUTION SUBCUTANEOUS at 21:06

## 2018-07-29 RX ADMIN — HEPARIN SODIUM 22 UNITS/KG/HR: 10000 INJECTION, SOLUTION INTRAVENOUS at 03:59

## 2018-07-29 RX ADMIN — INSULIN LISPRO 2 UNITS: 100 INJECTION, SOLUTION INTRAVENOUS; SUBCUTANEOUS at 17:31

## 2018-07-29 RX ADMIN — METOPROLOL TARTRATE 25 MG: 25 TABLET, FILM COATED ORAL at 08:33

## 2018-07-29 RX ADMIN — INSULIN LISPRO 1 UNITS: 100 INJECTION, SOLUTION INTRAVENOUS; SUBCUTANEOUS at 13:12

## 2018-07-29 RX ADMIN — CLOPIDOGREL BISULFATE 75 MG: 75 TABLET, FILM COATED ORAL at 08:33

## 2018-07-29 RX ADMIN — INSULIN LISPRO 3 UNITS: 100 INJECTION, SOLUTION INTRAVENOUS; SUBCUTANEOUS at 21:06

## 2018-07-29 NOTE — PROGRESS NOTES
[address] Hospitalist Service - Internal Medicine Progress Note       PATIENT INFORMATION      Patient: Emily Abdullahi 60 y o  female   MRN: [id number]  PCP: Vargas Moffett MD  Unit/Bed#: [facility] 610-01 Encounter: [id number]  Date Of Visit: 07/29/18       ASSESSMENTS & PLAN     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   Assessment & Plan    · RUE arterial duplex revealed axillary and proximal brachial artery stenosis with possible mid-distal radial artery occlusion - CTA a right arm on 7/26 also revealed approximate 4 cm occlusion of the proximal brachial artery per vascular surgeon   · underwent IR guided brachial artery angioplasty on 7/27 with incidental finding of a 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 status post suction thrombectomy with tPA administration - remains on heparin drip - await right groin pseudoaneurysm study  · will appreciate case management input regarding pricing for oral anticoagulation agents however noncompliance to other home medications noted including insulin and oral chemotherapy agent so concern to further noncompliance to any new meds exists - patient thoroughly counseled on compliance        Rectosigmoid mass - High colostomy output   Assessment & Plan    - S/p transverse/diverting loop colostomy for palliative diversion previously  - Appreciate colorectal surgery input - monitor colostomy output and monitor electrolytes for insensible losses - maintain hydration is needed  - CT imaging on admission revealed questionable postoperative pneumatosis/ileus - less likely bowel obstruction as patient is tolerating meals with colostomy output   - Stool for C  difficile PCR negative  - noncompliant with home Xeloda regimen         RYLAN (acute kidney injury)    Assessment & Plan    - normalized with IV fluids  - monitor renal function          Insulin-dependent diabetes mellitus   Assessment & Plan    - HbA1c of 10 4 in June 2018 - admits to noncompliance with home insulin regimen  - thoroughly counseled on compliance  - continue basal insulin with additional SSI coverage per Accu-Cheks           Lactic acidosis   Assessment & Plan    - initially due to intravascular depletion with high colostomy output - normalized with aggressive IV fluid hydration/boluses on admission         Leukocytosis   Assessment & Plan    - reactive secondary to acute issues   - continue WBC count monitoring - remains afebrile        CAD (coronary artery disease)   Assessment & Plan    - status post prior angioplasty - continue ASA/Plavix/Lipitor/Lopressor          Essential hypertension   Assessment & Plan    - continue Lopressor  - PRN IV Hydralazine for BP spikes         GERD (gastroesophageal reflux disease)   Assessment & Plan    - continue Pepcid        Tobacco abuse   Assessment & Plan    - counseled on cessation  - transdermal nicotine patch on board            VTE Prophylaxis:  Heparin drip      SUBJECTIVE     No acute overnight events  She notes that her right arm discomfort has improved today and extremity feels warmer to touch similar to her left arm  OBJECTIVE     Vitals:   Temp (24hrs), Av 9 °F (36 6 °C), Min:97 5 °F (36 4 °C), Max:98 2 °F (36 8 °C)    HR:  [63-69] 69  Resp:  [18-20] 20  BP: (123-127)/(60-62) 127/62  SpO2:  [100 %] 100 %  Body mass index is 22 42 kg/m²  Input and Output Summary (last 24 hours):        Intake/Output Summary (Last 24 hours) at 18 1616  Last data filed at 18 1400   Gross per 24 hour   Intake          1133 82 ml   Output             3925 ml   Net         -2791 18 ml       Physical Exam:     GENERAL:  Well-developed/nourished - no immediate distress  HEAD:  Normocephalic - atraumatic  EYES: PERRL - EOMI   MOUTH:  Mucosa moist  NECK:  Supple - full range of motion  CARDIAC:  Regular rate/rhythm - S1/S2 positive  PULMONARY:  Clear to auscultation bilaterally - nonlabored respirations  ABDOMEN:  Soft - nontender/nondistended - active bowel sounds - ostomy bag intact with formed stool  MUSCULOSKELETAL:  Motor strength/range of motion intact - right arm warm to touch today  NEUROLOGIC:  Alert/oriented x 3  SKIN:   chronic wrinkles/blemishes       ADDITIONAL DATA       Labs & Recent Cultures:       Results from last 7 days  Lab Units 07/29/18  0600   WBC Thousand/uL 14 06*   HEMOGLOBIN g/dL 9 7*   HEMATOCRIT % 31 8*   PLATELETS Thousands/uL 605*   NEUTROS PCT % 71   LYMPHS PCT % 18   MONOS PCT % 6   EOS PCT % 4       Results from last 7 days  Lab Units 07/28/18  0519  07/25/18  1248   SODIUM mmol/L 139  < > 130*   POTASSIUM mmol/L 3 5  < > 4 0   CHLORIDE mmol/L 106  < > 96*   CO2 mmol/L 22  < > 27   BUN mg/dL 11  < > 23   CREATININE mg/dL 1 04  < > 1 65*   CALCIUM mg/dL 8 3  < > 9 0   TOTAL PROTEIN g/dL  --   --  7 7   BILIRUBIN TOTAL mg/dL  --   --  0 29   ALK PHOS U/L  --   --  153*   ALT U/L  --   --  24   AST U/L  --   --  12   GLUCOSE RANDOM mg/dL 194*  < > 467*   < > = values in this interval not displayed  Results from last 7 days  Lab Units 07/27/18  1139   INR  1 02           Results from last 7 days  Lab Units 07/26/18  1212   C DIFF TOXIN B  NEGATIVE for C difficle toxin by PCR            Last 24 Hours Medication List:     Current Facility-Administered Medications:  acetaminophen 650 mg Oral Q6H PRN Holley Dunne MD    aspirin 81 mg Oral Daily Holley Dunne MD    atorvastatin 80 mg Oral Daily Holley Dunne MD    calcium carbonate 500 mg Oral Daily PRN Zoraida Watts MD    clopidogrel 75 mg Oral Daily Holley Dunne MD    famotidine 20 mg Oral BID Holley Dunne MD    heparin (porcine) 3-30 Units/kg/hr (Order-Specific) Intravenous Titrated Heaven Espinoza MD Last Rate: Stopped (07/29/18 1441)   heparin (porcine) 2,200 Units Intravenous PRN Heaven Espinoza MD    heparin (porcine) 4,400 Units Intravenous PRN Mau Krause MD    hydrALAZINE 5 mg Intravenous Q6H PRN Deng Carvalho MD    insulin glargine 5 Units Subcutaneous HS Holley Dunne MD insulin lispro 1-5 Units Subcutaneous 4x Daily (AC & HS) Wally Cooney MD    menthol-methyl salicylate  Apply externally 4x Daily PRN Rosmery Pemberton PA-C    metoprolol tartrate 25 mg Oral Q12H [facility] Susan Crooks MD    morphine injection 2 mg Intravenous Q4H PRN Susan Crooks MD    nicotine 1 patch Transdermal Daily Susan Crooks MD    ondansetron 4 mg Intravenous Q6H PRN Blaine Chamberlain MD    oxyCODONE 5 mg Oral Q4H PRN Susan Crooks MD    traMADol 50 mg Oral Q6H PRN Susan Crooks MD    traZODone 50 mg Oral HS Susan Crooks MD           Time Spent for Care: 34 minutes  More than 50% of total time spent on counseling and coordination of care as described above  Current Length of Stay: 4 day(s)      Code Status: Level 1 - Full Code         ** Please Note: This note is constructed using a voice recognition dictation system   **

## 2018-07-29 NOTE — PROGRESS NOTES
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    Plan:  - successful intervention of right brachial thromboembolus  - will transition from heparin gtt to orals  Continue with therapeutic anticoagulation  - case management about NOAC pricing prior to initiation   - CTA RUE and Chest final read pending   - stable for discharge from vascular standpoint  ______________________________________________________________________    Subjective:  No acute events overnight  She admits to some abdominal pain, but is passing gas, and having regular bowel movements  She states that her right upper extremity, and hand are improving since the surgery  She denies any coldness or tingling in her right hand  She denies any recent nausea, vomiting, fever, chills, shortness of breath, or chest pains  Vitals:  /60   Pulse 63   Temp 97 5 °F (36 4 °C) (Oral)   Resp 18   Ht 5' 3" (1 6 m) Comment: per patient  Wt 57 4 kg (126 lb 8 7 oz)   SpO2 100%   BMI 22 42 kg/m²     I/Os:  I/O last 3 completed shifts: In: 1862 1 [P O :1680; I V :182 1]  Out: 3800 [Urine:3800]  I/O this shift:   In: 0   Out: 2200 [Urine:1600; Stool:600]    Lab Results and Cultures:   CBC with diff:   Lab Results   Component Value Date    WBC 14 22 (H) 07/28/2018    HGB 9 4 (L) 07/28/2018    HCT 29 4 (L) 07/28/2018    MCV 89 07/28/2018     (H) 07/28/2018    MCH 28 5 07/28/2018    MCHC 32 0 07/28/2018    RDW 14 3 07/28/2018    MPV 9 7 07/28/2018    NRBC 0 07/28/2018   ,   BMP/CMP:  Lab Results   Component Value Date     07/28/2018     06/19/2018    K 3 5 07/28/2018    K 4 2 06/19/2018     07/28/2018     06/19/2018    CO2 22 07/28/2018    CO2 26 06/19/2018    ANIONGAP 11 07/28/2018    ANIONGAP 7 06/19/2018    BUN 11 07/28/2018    BUN 16 06/19/2018    CREATININE 1 04 07/28/2018    CREATININE 1 06 09/03/2014    GLUCOSE 194 (H) 07/28/2018    GLUCOSE 175 (H) 06/17/2018    CALCIUM 8 3 07/28/2018    CALCIUM 9 4 06/19/2018    AST 12 07/25/2018    AST 12 (L) 06/17/2018    ALT 24 07/25/2018    ALT 22 06/17/2018    ALKPHOS 153 (H) 07/25/2018    ALKPHOS 134 (H) 06/17/2018    PROT 7 7 07/25/2018    PROT 7 2 06/17/2018    BILITOT 0 29 07/25/2018    BILITOT 0 4 06/17/2018    EGFR 59 07/28/2018   ,   Lipid Panel:   Lab Results   Component Value Date    CHOL 215 (H) 03/18/2016    CHOL 216 (H) 03/18/2016    CHOL 279 09/03/2014   ,   Coags:   Lab Results   Component Value Date    PTT 67 (H) 07/28/2018    INR 1 02 07/27/2018   ,     Blood Culture:   Lab Results   Component Value Date    BLOODCX No Growth After 5 Days   03/21/2016   ,   Urinalysis:   Lab Results   Component Value Date    COLORU Yellow 07/25/2018    COLORU YELLOW 06/17/2018    CLARITYU Clear 07/25/2018    CLARITYU CLOUDY 06/17/2018    SPECGRAV 1 025 07/25/2018    SPECGRAV 1 010 06/17/2018    PHUR 5 5 07/25/2018    PHUR 6 06/17/2018    LEUKOCYTESUR Negative 07/25/2018    LEUKOCYTESUR >=500/uL (A) 06/17/2018    NITRITE Negative 07/25/2018    NITRITE NEGATIVE 06/17/2018    PROTEINUA 100 (2+) (A) 07/25/2018    PROTEINUA 100 (A) 06/17/2018    GLUCOSEU 250 (1/4%) (A) 07/25/2018    GLUCOSEU NEGATIVE 06/17/2018    KETONESU Trace (A) 07/25/2018    KETONESU NEGATIVE 06/17/2018    BILIRUBINUR Interference- unable to analyze (A) 07/25/2018    BILIRUBINUR NEGATIVE 06/17/2018    BLOODU Negative 07/25/2018    BLOODU 25/uL (A) 06/17/2018   ,   Urine Culture:   Lab Results   Component Value Date    URINECX >100,000 cfu/ml Escherichia coli 03/17/2016   ,   Wound Culure: No results found for: WOUNDCULT    Medications:  Current Facility-Administered Medications   Medication Dose Route Frequency    acetaminophen (TYLENOL) tablet 650 mg  650 mg Oral Q6H PRN    aspirin (ECOTRIN LOW STRENGTH) EC tablet 81 mg  81 mg Oral Daily    atorvastatin (LIPITOR) tablet 80 mg  80 mg Oral Daily  calcium carbonate (TUMS) chewable tablet 500 mg  500 mg Oral Daily PRN    clopidogrel (PLAVIX) tablet 75 mg  75 mg Oral Daily    famotidine (PEPCID) tablet 20 mg  20 mg Oral BID    heparin (porcine) 25,000 units in 250 mL infusion (premix)  3-30 Units/kg/hr (Order-Specific) Intravenous Titrated    heparin (porcine) injection 2,200 Units  2,200 Units Intravenous PRN    heparin (porcine) injection 4,400 Units  4,400 Units Intravenous PRN    hydrALAZINE (APRESOLINE) injection 5 mg  5 mg Intravenous Q6H PRN    insulin glargine (LANTUS) subcutaneous injection 5 Units 0 05 mL  5 Units Subcutaneous HS    insulin lispro (HumaLOG) 100 units/mL subcutaneous injection 1-5 Units  1-5 Units Subcutaneous 4x Daily (AC & HS)    menthol-methyl salicylate (BENGAY) 48-58 % cream   Apply externally 4x Daily PRN    metoprolol tartrate (LOPRESSOR) tablet 25 mg  25 mg Oral Q12H [address]    morphine injection 2 mg  2 mg Intravenous Q4H PRN    nicotine (NICODERM CQ) 14 mg/24hr TD 24 hr patch 1 patch  1 patch Transdermal Daily    ondansetron (ZOFRAN) injection 4 mg  4 mg Intravenous Q6H PRN    oxyCODONE (ROXICODONE) IR tablet 5 mg  5 mg Oral Q4H PRN    traMADol (ULTRAM) tablet 50 mg  50 mg Oral Q6H PRN    traZODone (DESYREL) tablet 50 mg  50 mg Oral HS       Physical Exam:    General: Alert and oriented, NAD, non-toxic in appearance  CV: RRR, no murmurs noted  Respiratory: Clear to ausculation B/l, no rales or wheezing noted  Abdominal: Soft, non-tender, bowel sounds normal  Extremities:  No edema bilaterally    Right hand with dopplerable radial signal, and palpable ulnar signal   Motor sensory in the right upper extremities intact  Neurologic: No neurological deficits noted      Miladys Kraus  7/29/2018

## 2018-07-30 ENCOUNTER — DOCUMENTATION (OUTPATIENT)
Dept: HEMATOLOGY ONCOLOGY | Facility: CLINIC | Age: 58
End: 2018-07-30

## 2018-07-30 VITALS
HEART RATE: 66 BPM | OXYGEN SATURATION: 97 % | TEMPERATURE: 97.9 F | DIASTOLIC BLOOD PRESSURE: 58 MMHG | HEIGHT: 63 IN | SYSTOLIC BLOOD PRESSURE: 113 MMHG | RESPIRATION RATE: 18 BRPM | BODY MASS INDEX: 22.42 KG/M2 | WEIGHT: 126.54 LBS

## 2018-07-30 PROBLEM — N17.9 AKI (ACUTE KIDNEY INJURY) (HCC): Status: RESOLVED | Noted: 2018-06-20 | Resolved: 2018-07-30

## 2018-07-30 PROBLEM — R20.0 RIGHT ARM NUMBNESS: Status: RESOLVED | Noted: 2018-07-25 | Resolved: 2018-07-30

## 2018-07-30 PROBLEM — K56.7 ILEUS (HCC): Status: RESOLVED | Noted: 2018-07-25 | Resolved: 2018-07-30

## 2018-07-30 PROBLEM — E87.2 LACTIC ACIDOSIS: Status: RESOLVED | Noted: 2018-07-25 | Resolved: 2018-07-30

## 2018-07-30 PROBLEM — R19.7 DIARRHEA: Status: RESOLVED | Noted: 2018-07-25 | Resolved: 2018-07-30

## 2018-07-30 PROBLEM — E87.1 HYPONATREMIA: Status: RESOLVED | Noted: 2018-07-25 | Resolved: 2018-07-30

## 2018-07-30 LAB
APTT PPP: 116 SECONDS (ref 24–36)
APTT PPP: 59 SECONDS (ref 24–36)
BASOPHILS # BLD AUTO: 0.03 THOUSANDS/ΜL (ref 0–0.1)
BASOPHILS NFR BLD AUTO: 0 % (ref 0–1)
EOSINOPHIL # BLD AUTO: 0.56 THOUSAND/ΜL (ref 0–0.61)
EOSINOPHIL NFR BLD AUTO: 5 % (ref 0–6)
ERYTHROCYTE [DISTWIDTH] IN BLOOD BY AUTOMATED COUNT: 14.5 % (ref 11.6–15.1)
GLUCOSE SERPL-MCNC: 202 MG/DL (ref 65–140)
GLUCOSE SERPL-MCNC: 265 MG/DL (ref 65–140)
HCT VFR BLD AUTO: 29.6 % (ref 34.8–46.1)
HGB BLD-MCNC: 9.4 G/DL (ref 11.5–15.4)
IMM GRANULOCYTES # BLD AUTO: 0.09 THOUSAND/UL (ref 0–0.2)
IMM GRANULOCYTES NFR BLD AUTO: 1 % (ref 0–2)
LYMPHOCYTES # BLD AUTO: 4.01 THOUSANDS/ΜL (ref 0.6–4.47)
LYMPHOCYTES NFR BLD AUTO: 33 % (ref 14–44)
MCH RBC QN AUTO: 28.3 PG (ref 26.8–34.3)
MCHC RBC AUTO-ENTMCNC: 31.8 G/DL (ref 31.4–37.4)
MCV RBC AUTO: 89 FL (ref 82–98)
MONOCYTES # BLD AUTO: 0.71 THOUSAND/ΜL (ref 0.17–1.22)
MONOCYTES NFR BLD AUTO: 6 % (ref 4–12)
NEUTROPHILS # BLD AUTO: 6.71 THOUSANDS/ΜL (ref 1.85–7.62)
NEUTS SEG NFR BLD AUTO: 55 % (ref 43–75)
NRBC BLD AUTO-RTO: 0 /100 WBCS
PLATELET # BLD AUTO: 591 THOUSANDS/UL (ref 149–390)
PMV BLD AUTO: 9.6 FL (ref 8.9–12.7)
RBC # BLD AUTO: 3.32 MILLION/UL (ref 3.81–5.12)
WBC # BLD AUTO: 12.11 THOUSAND/UL (ref 4.31–10.16)

## 2018-07-30 PROCEDURE — 82948 REAGENT STRIP/BLOOD GLUCOSE: CPT

## 2018-07-30 PROCEDURE — 85025 COMPLETE CBC W/AUTO DIFF WBC: CPT | Performed by: INTERNAL MEDICINE

## 2018-07-30 PROCEDURE — 99239 HOSP IP/OBS DSCHRG MGMT >30: CPT | Performed by: INTERNAL MEDICINE

## 2018-07-30 PROCEDURE — 85730 THROMBOPLASTIN TIME PARTIAL: CPT | Performed by: INTERNAL MEDICINE

## 2018-07-30 RX ORDER — INSULIN GLARGINE 100 [IU]/ML
10 INJECTION, SOLUTION SUBCUTANEOUS
Qty: 10 ML | Refills: 0 | Status: SHIPPED | OUTPATIENT
Start: 2018-07-30 | End: 2018-10-05 | Stop reason: HOSPADM

## 2018-07-30 RX ORDER — INSULIN GLARGINE 100 [IU]/ML
10 INJECTION, SOLUTION SUBCUTANEOUS
Status: DISCONTINUED | OUTPATIENT
Start: 2018-07-30 | End: 2018-07-30 | Stop reason: HOSPADM

## 2018-07-30 RX ADMIN — HEPARIN SODIUM 22 UNITS/KG/HR: 10000 INJECTION, SOLUTION INTRAVENOUS at 02:22

## 2018-07-30 RX ADMIN — INSULIN LISPRO 2 UNITS: 100 INJECTION, SOLUTION INTRAVENOUS; SUBCUTANEOUS at 12:40

## 2018-07-30 RX ADMIN — ONDANSETRON 4 MG: 2 INJECTION, SOLUTION INTRAMUSCULAR; INTRAVENOUS at 09:18

## 2018-07-30 RX ADMIN — MENTHOL, METHYL SALICYLATE: 10; 15 CREAM TOPICAL at 09:12

## 2018-07-30 RX ADMIN — ASPIRIN 81 MG: 81 TABLET, COATED ORAL at 09:10

## 2018-07-30 RX ADMIN — OXYCODONE HYDROCHLORIDE 5 MG: 5 TABLET ORAL at 05:32

## 2018-07-30 RX ADMIN — INSULIN LISPRO 1 UNITS: 100 INJECTION, SOLUTION INTRAVENOUS; SUBCUTANEOUS at 09:07

## 2018-07-30 RX ADMIN — NICOTINE 1 PATCH: 14 PATCH, EXTENDED RELEASE TRANSDERMAL at 09:09

## 2018-07-30 RX ADMIN — ATORVASTATIN CALCIUM 80 MG: 80 TABLET, FILM COATED ORAL at 09:10

## 2018-07-30 RX ADMIN — HEPARIN SODIUM 2200 UNITS: 1000 INJECTION, SOLUTION INTRAVENOUS; SUBCUTANEOUS at 05:33

## 2018-07-30 RX ADMIN — METOPROLOL TARTRATE 25 MG: 25 TABLET, FILM COATED ORAL at 09:10

## 2018-07-30 RX ADMIN — CLOPIDOGREL BISULFATE 75 MG: 75 TABLET, FILM COATED ORAL at 09:10

## 2018-07-30 RX ADMIN — FAMOTIDINE 20 MG: 20 TABLET ORAL at 09:10

## 2018-07-30 NOTE — PROGRESS NOTES
I have been in touch with Imani Baer, cancer care coordinator  We have been doing extensive planning for initiation of cancer treatment since her prior admission  Ms Jordan Oviedo is aware of the admission  The critical need for treatment has been discussed with the patient and her family, as well  Compliance is a problem

## 2018-07-30 NOTE — PROGRESS NOTES
07/30/18 0900   [facility] [address] [facility] 62   Spiritual Beliefs/Perceptions   Concept of God Accepting   Relationship with God Close   Support Systems Children   Coping Responses   Patient Coping Open/discussion   Plan of Care   Comments cultivated a relatioship of care and support; facilitated story telling; provided chaplaincy education; offered spiritual resources and sacrament; provided prayer; pt expressed gratitude   Assessment Completed by: Unit visit

## 2018-07-30 NOTE — PROGRESS NOTES
GI Oncology Nurse Navigator Note    Brittnee Crenshaw to [facility] [place] today to see patient in order to coordinate patients upcoming appointments so patient can get chemo/rads started, spoke to Dr Nohemy Buck, Dr Vicky Hernandes RN, Harlo Fothergill RN, and Ghanshyam Coles all in an attempt to properly coordinate patients care as patient is difficult to reach by phone, patient was already scheduled for PET/CT for 8/1, per Dr Dale Donohue, keeping this appointment scheduled, this RN personally [facility], [facility], pt is set up to be picked up for this appointment, this information was written down on list and given to patient and verbally explained to patient and boyfriend, Symone Glass, both verbalized understanding, set up appointment for patient with Dr Drew Westbrook for 8/3 for [facility] per Dr Dale Donohue at [facility] and then to see Dr Zenia Bryson at 1pm both at the [facility], this RN personally set up [employer], spoke to Armani Abernathy, pt is set up to be picked up, this information was written down on a list and given to patient and verbally explained to patient and boyfriend, Symone Glass, both verbalized understanding, called patients PCP office, Dr Iain Brothres to set up follow up appointment, informed by that office patient already has appointment made for 8/27 at 9:30am and that this is the earliest appointment they have, this appointment was also written on the list and given to patient and verbally explained to patient and boyfriendSymone, both verbalized understanding, explained the importance of all of the above appointments to patient and boyfriend, both verbalized understanding, both patient and boyfriend were given this RNs card with phone number and instructed to call this RN if there were any needs, questions or concerns, will continue to follow up with this patient

## 2018-07-30 NOTE — SOCIAL WORK
Scripts for [employer] and Xarelto sent to [employer] for [employer]

## 2018-07-30 NOTE — PROGRESS NOTES
07/30/18 1000   Clinical Encounter Type   Visited With Patient and family together   Routine Visit Introduction   Religion Encounters   Religion Needs Prayer   Patient Spiritual Encounters   Spiritual Encounter Notes pt appreciated visit    Family Spiritual Encounters   Family Participation in Care 5   Family Support During Treatment 5

## 2018-07-30 NOTE — DISCHARGE INSTRUCTIONS
DISCHARGE INSTRUCTIONS  ARTERIOGRAM/ANGIOPLASTY/STENT    Following discharge from the hospital, you may have some questions about your procedure, your activities or your general condition  These instructions may answer some of your questions and help you adjust during the first few weeks following your operation  ACTIVITY: The evening following the procedure you should be sure someone remains with you until the next morning  Rest as much as possible, sitting, lying or reclining  Avoid lifting heavy objects  The following day, limit your activity to walking  Walking up steps and normal activities may be resumed as you feel ready  You should not drive a car for at least two days following discharge from the hospital  You may ride in a car  If you have any questions regarding a particular activity, please discuss with your doctor or nurse before you are discharged  DIET:  Resume your normal diet  Try to eat low fat and low cholesterol foods  Drink more liquids than usual for the next 24 hours  INCISION: Your doctor may have chosen to use a type of adhesive glue, to close your incision  The glue is used to cover the incision, assist in closure, and prevent contamination  This adhesive will darken and peel away on its own within one to two weeks  You may shower after the procedure, but do not scrub the incision or submerge in water  It is normal to have some bruising, swelling or mild discoloration around the incision  IF increasing redness or pain develops, call our office immediately  If present, you may remove the band-aid or steri-strips over your wound after two days  If you notice any active bleeding at the site, apply pressure to the site and call our office ([phone])  FOLLOW UP STUDIES:  Your doctor will discuss whether further treatments or follow-up studies are necessary at your first post procedure visit      Appt w/ Dr Emily Caldwell: 8/16/18 at Jennifer Latif [address] [facility], [address] [facility], [place], [address] [place] [address]    PLEASE CALL THE OFFICE IF YOU HAVE ANY QUESTIONS  [phone] Oumar Saldaña [facility] FREE [phone]  [address] [facility] , [address], [place], [address]  [address], [place], [address]  [address], [facility], [address]  [address] [facility], One [facility],[address], [facility], [address]  [address], [address], Devin Doherty 34  [address] [facility], [facility] 97   1201 [facility], [address] [place] [address], [address] [place] [address]                                                     One [facility], [address] [facility], [facility] 6                 ARTERIOGRAM    WHAT YOU SHOULD KNOW:   An angiogram is a procedure to look at arteries in your body  Arteries are the blood vessels that carry blood from your heart to your body  AFTER YOU LEAVE:     Self-care:   · Limit activity: Rest for the remainder of the day of your procedure  Have some one with you until the next morning  Keep your arm or leg straight as much as possible  Rest as much as possible, sitting lying or reclining  Walk only to go to the bathroom, to bed or to eat  If the angiogram catheter was put in your leg, use the stairs as little as possible  No driving  · Keep your wound clean and dry  You may shower 24 hours after your procedure  The bandage you have on should fall off in 2-3 days  If there is any drainage from the puncture site, you should put on a clean bandage  · Watch for bleeding and bruising: It is normal to have a bruise and soreness where the angiogram catheter went in  · Diet:   · You may resume your regular diet, Sips of flat soda will help with mild nausea    · Drink more liquids than usual for the next 24 hours      · IMMEDIATELY Contact Interventional Radiology at [phone] Hi PATIENTS: Contact Interventional Radiology at [phone]) Bozena Andrade PATIENTS: Contact Interventional Radiology at [phone]) if any of the following occur:  · If your bruise gets larger or if you notice any active bleeding  APPLY DIRECT PRESSURE TO THE BLEEDING SITE  · If you notice increased swelling or have increased pain at the puncture site   · If you have any numbness or pain in the extremity of the puncture site   · If that extremity seems cold or pale      · You have fever greater than 101  · Persistent nausea or vomitting    Follow up with your primary healthcare provider  as directed: Write down your questions so you remember to ask them during your visits

## 2018-07-30 NOTE — DISCHARGE SUMMARY
Discharge Summary - [facility] Hospitalist Service - Internal Medicine      Patient Information: Emily Abdullahi 60 y o  female MRN: [id number]  Unit/Bed#: [facility] 610-01 Encounter: [id number]    Discharging Physician / Practitioner: Lesli Whalen MD  PCP: Eun Phoenix MD  Admission Date:   Admission Orders     Ordered        07/25/18 1942  Inpatient Admission (expected length of stay for this patient is greater than two midnights)  Once             Discharge Date: 07/30/18      Reason for Admission:  Right arm numbness/discomfort      Discharge Diagnoses:     Principal Problem (Resolved):    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    Chronic Problems:    Rectosigmoid mass    Insulin-dependent diabetes mellitus    CAD (coronary artery disease)    Essential hypertension    GERD (gastroesophageal reflux disease)    Tobacco abuse    Medication noncompliance    Resolved Problems:    RYLAN (acute kidney injury)     Lactic acidosis    Ileus     Diarrhea    Hyponatremia    High colostomy output      Consultations During Hospital Stay:  · Vascular surgery  · Colorectal surgery  · Interventional radiology       Hospital Course:     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   Assessment & Plan     · RUE arterial duplex revealed axillary and proximal brachial artery stenosis with possible mid-distal radial artery occlusion - CTA a right arm on 7/26 also revealed approximate 4 cm occlusion of the proximal brachial artery per vascular surgeon   · underwent IR guided brachial artery angioplasty on 7/27 with incidental finding of a 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 status post suction thrombectomy with tPA administration - heparin drip transitioned to Xarelto on discharge (15 mg BID x 21 days, then 20 mg daily thereafter)  · however noncompliance to other home medications noted including insulin and oral chemotherapy agent is a concern to further noncompliance to any new meds exists - patient thoroughly counseled on compliance will be discharged with home health/visiting nurse services       Rectosigmoid mass - High colostomy output   Assessment & Plan     - S/p transverse/diverting loop colostomy for palliative diversion previously  - Appreciate colorectal surgery input - monitored colostomy output and monitored electrolytes for insensible losses - maintained on hydration as needed  - CT imaging on admission revealed questionable postoperative pneumatosis/ileus - less likely bowel obstruction as patient is tolerating meals with colostomy output - symptomatically resolved  - Stool for C  difficile PCR negative  - noncompliant with home Xeloda regimen - outpatient follow-up with Medical Oncology and Radiation Oncology as scheduled       RYLAN (acute kidney injury)    Assessment & Plan     - normalized with IV fluids       Insulin-dependent diabetes mellitus   Assessment & Plan     - HbA1c of 10 4 in June 2018 - admits to noncompliance with home insulin regimen  - thoroughly counseled on compliance  - continued on basal insulin at discharge - ordered additional SSI coverage per Accu-Cheks through inpatient course       Lactic acidosis   Assessment & Plan     - initially due to intravascular depletion with high colostomy output - normalized with aggressive IV fluid hydration/boluses on admission        Leukocytosis   Assessment & Plan     - reactive secondary to acute issues   - progressively improved through day of discharge - remains afebrile       CAD (coronary artery disease)   Assessment & Plan     - status post prior angioplasty - continue ASA/Plavix/Lipitor/Lopressor       Essential hypertension   Assessment & Plan     - continue Lopressor  - PRN IV Hydralazine for BP spikes during inpatient course       GERD (gastroesophageal reflux disease)   Assessment & Plan     - continue Pepcid       Tobacco abuse   Assessment & Plan     - counseled on cessation  - transdermal nicotine patch was on board during inpatient course           Condition at Discharge: fair       Discharge Day Visit / Exam:     Vitals: Blood Pressure: 113/58 (07/30/18 0650)  Pulse: 66 (07/30/18 0650)  Temperature: 97 9 °F (36 6 °C) (07/30/18 0650)  Temp Source: Oral (07/30/18 0650)  Respirations: 18 (07/30/18 0650)  Height: 5' 3" (160 cm) (per patient) (07/26/18 1410)  Weight - Scale: 57 4 kg (126 lb 8 7 oz) (07/25/18 1207)  SpO2: 97 % (07/30/18 0650)      Physical exam - I had a face-to-face encounter with the patient on day of discharge  Discussion with Patient and/or Family:  The patient has been advised to return to the ER immediately if any symptoms recur or worsen  Discharge instructions/Information to Patient and/or Family:   See after visit summary for information provided to patient and/or family  Provisions for Follow-Up Care:  See after visit summary for information related to follow-up care and any pertinent home health orders  Disposition:   Home [place] [facility] [place] [facility] services       Discharge Medications:  See after visit summary for reconciled discharge medications provided to patient and/or family  Discharge Statement:  I spent 38 minutes discharging the patient  This time was spent on the day of discharge  I had direct contact with the patient on the day of discharge  Greater than 50% of the total time was spent examining patient, answering all patient questions, arranging and discussing plan of care with patient as well as directly providing post-discharge instructions  Additional time then spent on discharge activities     ** Please Note: This note is constructed using a voice recognition dictation system   **

## 2018-07-30 NOTE — PROGRESS NOTES
Progress Note - Vascular Surgery   Emily Abdullahi 60 y o  female MRN: [id number]  Unit/Bed#: [facility] 610-01 Encounter: [id number]    Assessment:  58F with PMH of R axillary/brachial thrombosis s/p thromboectomy 7/27/18  She had a R groin PSA scan yesterday  Her R hand feels the same today and her numbness/tingling is at baseline    Plan:  -f/u PSA scan, groin site c/d/i without hematoma or pulsatile mass  -continue neurovascular checks  -ok to d/c from vascular surgery once anticoagulation is established    Subjective/Objective   Chief Complaint: none    Subjective: slept fine, no n/v/chills/fevers    Objective:   Blood pressure 113/58, pulse 66, temperature 97 9 °F (36 6 °C), temperature source Oral, resp  rate 18, height 5' 3" (1 6 m), weight 57 4 kg (126 lb 8 7 oz), SpO2 97 %, not currently breastfeeding  ,Body mass index is 22 42 kg/m²  Intake/Output Summary (Last 24 hours) at 07/30/18 0705  Last data filed at 07/30/18 3355   Gross per 24 hour   Intake          1253 82 ml   Output             3325 ml   Net         -2071 18 ml       Invasive Devices     Peripheral Intravenous Line            Peripheral IV 07/27/18 Left Forearm 2 days          Drain            Colostomy Loop RUQ 12 days                Physical Exam:   General: No acute distress  HEENT: Normocephalic, atraumatic  Neck: No tracheal deviation  Cardio: Normal rate  Pulm: Normal respiratory effort  Abdomen: Soft, non-tender, non-distended  Extremities: TAPIA, No edema   R arm with palpable ulnar pulse, doppler signals at proximal radial artery and palmar arch  Neuro: Cranial nerves II-XII intact  Psych: Normal affect      Lab, Imaging and other studies:  CBC:   Lab Results   Component Value Date    WBC 12 11 (H) 07/30/2018    HGB 9 4 (L) 07/30/2018    HCT 29 6 (L) 07/30/2018    MCV 89 07/30/2018     (H) 07/30/2018    MCH 28 3 07/30/2018    MCHC 31 8 07/30/2018    RDW 14 5 07/30/2018    MPV 9 6 07/30/2018    NRBC 0 07/30/2018   , CMP: No results found for: NA, K, CL, CO2, ANIONGAP, BUN, CREATININE, GLUCOSE, CALCIUM, AST, ALT, ALKPHOS, PROT, ALBUMIN, BILITOT, EGFR  VTE Pharmacologic Prophylaxis: Heparin  VTE Mechanical Prophylaxis: sequential compression device

## 2018-07-30 NOTE — SOCIAL WORK
Xarelto co-pay $2 58  Pt to be d/c with free 30-day supply  Pt to be d/c to home today with [facility]

## 2018-08-01 ENCOUNTER — HOSPITAL ENCOUNTER (OUTPATIENT)
Dept: RADIOLOGY | Age: 58
Discharge: HOME | End: 2018-08-01
Payer: COMMERCIAL

## 2018-08-01 DIAGNOSIS — C19 MALIGNANT NEOPLASM OF RECTOSIGMOID (COLON) (HCC): ICD-10-CM

## 2018-08-01 LAB — GLUCOSE SERPL-MCNC: 180 MG/DL (ref 65–140)

## 2018-08-01 PROCEDURE — 78815 PET IMAGE W/CT SKULL-THIGH: CPT

## 2018-08-01 PROCEDURE — A9552 F18 FDG: HCPCS

## 2018-08-01 PROCEDURE — 82948 REAGENT STRIP/BLOOD GLUCOSE: CPT

## 2018-08-01 RX ADMIN — IOHEXOL 5 ML: 240 INJECTION, SOLUTION INTRATHECAL; INTRAVASCULAR; INTRAVENOUS; ORAL at 09:49

## 2018-08-02 ENCOUNTER — TELEPHONE (OUTPATIENT)
Dept: HEMATOLOGY ONCOLOGY | Facility: CLINIC | Age: 58
End: 2018-08-02

## 2018-08-02 ENCOUNTER — DOCUMENTATION (OUTPATIENT)
Dept: HEMATOLOGY ONCOLOGY | Facility: CLINIC | Age: 58
End: 2018-08-02

## 2018-08-02 NOTE — PROGRESS NOTES
GI Oncology Nurse Navigator Note    Spoke to Luz Elena, pts boyfriend, reminded him of patients appointments tomorrow (rad/onc 11am and Dr Mychal Sanabria) stated he was going to be taking pt to these appointments, asked if he remembered to cancel [facility], stated he was going to ask Emily if she wanted him to take her or if she wanted [facility] to take her and if he was going to take her, he would call [facility] and cancel them (this RN provided him with [address] phone number in the hospital), reviewed location of the appointments and how important these appointments are for the patient, Luz Elena verbalized understanding and ensured this RN pt would be at both appointments, will continue to watch this pt

## 2018-08-03 ENCOUNTER — OFFICE VISIT (OUTPATIENT)
Dept: HEMATOLOGY ONCOLOGY | Facility: CLINIC | Age: 58
End: 2018-08-03
Payer: COMMERCIAL

## 2018-08-03 ENCOUNTER — APPOINTMENT (OUTPATIENT)
Dept: RADIATION ONCOLOGY | Facility: CLINIC | Age: 58
End: 2018-08-03
Attending: RADIOLOGY
Payer: COMMERCIAL

## 2018-08-03 ENCOUNTER — DOCUMENTATION (OUTPATIENT)
Dept: HEMATOLOGY ONCOLOGY | Facility: HOSPITAL | Age: 58
End: 2018-08-03

## 2018-08-03 ENCOUNTER — DOCUMENTATION (OUTPATIENT)
Dept: HEMATOLOGY ONCOLOGY | Facility: CLINIC | Age: 58
End: 2018-08-03

## 2018-08-03 VITALS
HEART RATE: 76 BPM | SYSTOLIC BLOOD PRESSURE: 122 MMHG | DIASTOLIC BLOOD PRESSURE: 70 MMHG | HEIGHT: 63 IN | RESPIRATION RATE: 18 BRPM | TEMPERATURE: 98.4 F | WEIGHT: 123.4 LBS | BODY MASS INDEX: 21.86 KG/M2

## 2018-08-03 DIAGNOSIS — C19 RECTOSIGMOID CANCER (HCC): Primary | ICD-10-CM

## 2018-08-03 DIAGNOSIS — I70.208 BRACHIAL ARTERY STENOSIS, RIGHT (HCC): ICD-10-CM

## 2018-08-03 DIAGNOSIS — C78.7 LIVER METASTASIS (HCC): ICD-10-CM

## 2018-08-03 PROCEDURE — 99215 OFFICE O/P EST HI 40 MIN: CPT | Performed by: INTERNAL MEDICINE

## 2018-08-03 PROCEDURE — 77290 THER RAD SIMULAJ FIELD CPLX: CPT | Performed by: RADIOLOGY

## 2018-08-03 NOTE — PROGRESS NOTES
Pt seen for office visit today post recent hospitalization w/discharge on 7 30 18  Dx:Colon Ca, St  4  Per Dr QUINTANILLA-chemo plan as follows Oxaliplatin 85mg/m2, Folinic Acid 400mg/m2, 5FU 1200mg/m2 over 48 hours d1-2, 5FU 400mg d  1  No radiation txmt plan at this time per Dr Angie Montero    Education session w/pt post office visit  Pt  Initially did not want to remain in the office for the education session  Advised education and consenting for chemo meds/therapy are part of the policy and procedure for the practice  Pt states "I want to go home " Spouse/significant other also wanting to leave the office  Pt  agrees to the education session but states "make it quick and what do I need to sign " Education and consenting completed  Pt  engagement at the time of the session is minimal  Spouse/SO did leave the office and did not participate in the session  Pt  noting that she "told home health not to come to her house" as planned at hospital discharge  Pt states " I just didn't have time when they wanted to come to my house " Pt  requesting transportation means and colostomy care supplies  Pt  made aware transportation would be set up for the appts  scheduled in our office today  ([facility] had been established for the pt at time of hospital discharge )  Encouraged pt  to call home health and plan to be evaluated for home care as planned at discharge  Pt  verbalizes understanding information above  Notable pt engagement is minimal for the session today  Will send email to social service and GI nurse navigator for completion of interdisciplinary communication concerning the patient's visit today

## 2018-08-03 NOTE — PROGRESS NOTES
GI Oncology Nurse Navigator Note    Received phone call from Minnie Laguna, he stated he did not think he could take her to her appointments today, asked him if he cancelled [facility], he stated he did not, asked Emily if she cancelled, at that point she got on the phone, this RN personally asked her if she cancelled [facility], she stated she did not, she asked what time [facility] would arrive, RN asked what time they arrived when they picked her up for her last appointment, she stated they came approximately 45 minutes prior to appointment time, this RN stated they would most likely be there about the same amount of time before this appointment as well, informed her this appointment time was 11am, Emily asked "how long is all of this going to take", informed her she has an 11am appointment with rad/onc and they are going to get her prepped to start her treatments so it may take a while there and then she is to go to Dr Melissa Green office for a 1pm appointment and that was for a visit, instructed her to finish her appointment with rad/onc and go to Dr Melissa Green office, reviewed the importance of these appointments to pt, pt did verbalize understanding    Received another call from Shashank--stated he now decided he was going to personally drive Emily to these appointments and take her to breakfast first, instructed him to call [facility] and cancel, provided him with the phone number, encouraged him to call in case he changes his mind about taking her again, also reiterated the importance of Emily getting to these appointments today and how the continuity of her care is dependant upon keeping these appointments, Luz Elena verbalized understanding

## 2018-08-03 NOTE — PROGRESS NOTES
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  On CT angiogram of the right arm of July 26, 2018 there was an approximate 4 cm occlusion of the proximal brachial artery  IR guided brachial artery angiography on July 27, 2018 revealed 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 for which she underwent suction thrombectomy with tPA administration, heparin infusion transition to rivaroxaban  The rivaroxaban has been well tolerated, there has been no nose bleeds or gingival bleeding or excessive bruising  At this time she notes decreased appetite  She denies abdominal pains  There has been loose stool in the colostomy bag  Hematology/Oncology History:     Obstructing mass of the sigmoid/rectosigmoid on colonoscopy June 22, 2018, on MRI of the pelvis there is a 10 cm mass of the sigmoid colon with suggestion of bladder invasion, the mass abuts the uterus and there is focal extension of the mass into the deep lateral pelvic wall fascia, T4b Nx, stage IIIC  Biopsies of the sigmoid/rectosigmoid mass revealed at least high-grade dysplasia involving a tubular adenoma  Review ofsystems:    General: Feels well, no chills or swaets  Head and Neck: No nosebleeds, no oral cavity or throat soreness  Cardiovascular: No chest pain, no lower extremity edema  Respriatory: No cough or dyspnea  GI:  See HPI  : No urinary frequency  Musculoskeletal: No back pains or joint pain    Skin: No skin rash  Neurological: No headache, no numbness, no weakness  Hematologic: No easy bruising  Psychiatric: No emotional problems    Physical Examination:    Blood pressure 122/70, pulse 76, temperature 98 4 °F (36 9 °C), resp  rate 18, height 5' 3" (1 6 m), weight 56 kg (123 lb 6 4 oz), not currently breastfeeding  Body surface area is 1 58 meters squared  General appearance: Appears well  Head: Normocephalic  Eyes: Extraocular movements intact  Ears: No gross hearing deficit  Oropharynx: Clear  Neck: Supple, No lymphadenopathy  Chest: No axillary adenopathy  Lungs: Clear to auscultation bilaterally  Heart: Regular rate and rhythm  Abdomen: The right upper colostomy with liquidy brown stool in the colostomy bag, recent abdominal surgical sites are all well healed  No inguinal lymphadenopathy  Extremities: No lower extremity edema bilaterally  Skin: No rashes  There faded bruises of the upper extremities bilaterally  Neurologic: Grossly intact, no focal neurological deficit  Psychiatric: Oriented to person, place and time, normal mood and affect     ECOG 1    Laboratory:    From June 17, 2018:  CEA is 11 8 (0 0-3 0)    From July 16, 2018:  AST 6, ALT 11, alk-phos 114, bili 0 32  From July 27, 2018:  PT INR is 1 02, creatinine is 0 82    From July 30, 2018:  WBC is 12 11, hemoglobin 9 4, platelets 266    PET-CT from August 1, 2018: There is a 6 mm LLL lung nodule, too small for PET evaluation, hypermetabolic anterior liver mass, SUV 6 9, most compatible with metastasis ill-defined on corresponding CT images but measures approximately 2 3 x 2 cm based upon the extent of FDG activity, large sigmoid mass, SUV 17 7, compatible with no malignancy with infiltration 2 adjacent soft tissues contiguous with bladder and uterus      Assessment/Plan:    Obstructing mass of the sigmoid/rectosigmoid on colonoscopy June 22, 2018, on MRI of the pelvis there is a 10 cm mass of the sigmoid colon with suggestion of bladder invasion, the mass abuts the uterus and there is focal extension of the mass into the deep lateral pelvic wall fascia, T4b Nx, stage IIIC  Biopsies of the sigmoid/rectosigmoid mass revealed at least high-grade dysplasia involving a tubular adenoma  In addition on PET-CT there is a hypermetabolic anterior liver mass approximately 2 3 x 2 cm compatible with metastasis  I discussed this case with Dr Ted Ahmadi by telephone today who indicated that he does not recommend starting with chemoradiation as had been previously planned in view of the suspected liver metastasis in addition to the patient having undergone diverting colostomy  He recommended to begin with systemic therapy and then consider chemoradiation to the pelvic mass if there has been a favorable response to the systemic therapy  The patient was instructed not to take capecitabine has had been previously planned to be given in conjunction with the radiotherapy  Further evaluation with biopsy of the anterior liver lesion would be recommended to obtain a tissue diagnosis and document metastatic disease provided that the lesion could be detected on imaging studies suitable for biopsy  The other option to obtain a tissue diagnosis would be that of sigmoidoscopy  Sufficient tissue should be obtained not only for diagnosis but for molecular testing including mismatch repair protein deficiency, KRAS and NRAS mutation analysis and B Berto mutation analysis  Anticipating a diagnosis of colonic adenocarcinoma, potential chemotherapy with a FOLFOX regimen was reviewed with the patient and Rebecca Learn her common-law  of 25 years including the purpose, means administration and potential risks  The patient was given information regarding the chemotherapy agents and written informed consent was obtained  The plan is oxaliplatin 85 mg/m2 day 1, 5  mg/m2 day 1, folinic acid 400 mg/m2 day 1 and 5 fluorouracil 1200 mg/m2 over 48 hr days 1-2 on an every 2 week schedule    If this regimen is well tolerated and in particular if there is documented liver metastasis, bevacizumab may be added to this regimen  Prior to initiation of the chemotherapy regimen the patient will require chest port in view of limited IV access  The patient and her  Rebecca Zandra are aware to seek medical attention for fever i e  temperature  100 5 or higher, chills, nausea, mouth soreness, liquidy stool in the colostomy, pain or redness or of the hands or feet, nose bleeds, easy bruising, other bleeding, excessive fatigue, or if other new problems arise  Otherwise, plan to see her again in about 5 weeks

## 2018-08-03 NOTE — PATIENT INSTRUCTIONS
The patient and her  Charli Gordon who was with her in the office today aware to seek medical attention for fever i e  temperature  100 5 or higher, chills, nausea, mouth soreness, liquidy stool in the colostomy, pain or redness or of the hands or feet, nose bleeds, easy bruising, other bleeding,excessive fatigue, or if other new problems arise

## 2018-08-06 ENCOUNTER — DOCUMENTATION (OUTPATIENT)
Dept: HEMATOLOGY ONCOLOGY | Facility: CLINIC | Age: 58
End: 2018-08-06

## 2018-08-06 RX ORDER — DOCUSATE SODIUM 100 MG/1
100 CAPSULE, LIQUID FILLED ORAL
COMMUNITY
End: 2018-08-30

## 2018-08-06 RX ORDER — DULOXETIN HYDROCHLORIDE 60 MG/1
60 CAPSULE, DELAYED RELEASE ORAL DAILY
COMMUNITY
End: 2018-10-05 | Stop reason: HOSPADM

## 2018-08-06 RX ORDER — DIVALPROEX SODIUM 250 MG/1
3 TABLET, DELAYED RELEASE ORAL DAILY
COMMUNITY
End: 2018-10-05 | Stop reason: HOSPADM

## 2018-08-06 RX ORDER — IBUPROFEN 600 MG/1
1 TABLET ORAL EVERY 6 HOURS PRN
COMMUNITY
End: 2018-08-30

## 2018-08-06 RX ORDER — ASPIRIN 81 MG/1
TABLET, CHEWABLE ORAL
COMMUNITY
End: 2018-08-09

## 2018-08-06 RX ORDER — NITROGLYCERIN 0.4 MG/1
0.4 TABLET SUBLINGUAL
COMMUNITY
Start: 2017-12-26 | End: 2018-10-05 | Stop reason: HOSPADM

## 2018-08-06 RX ORDER — SENNA PLUS 8.6 MG/1
TABLET ORAL
Status: ON HOLD | COMMUNITY
End: 2018-10-05

## 2018-08-06 RX ORDER — TERBINAFINE HYDROCHLORIDE 250 MG/1
1 TABLET ORAL DAILY
COMMUNITY
End: 2018-10-05 | Stop reason: HOSPADM

## 2018-08-06 RX ORDER — BENZONATATE 200 MG/1
CAPSULE ORAL
COMMUNITY
End: 2018-08-30

## 2018-08-06 RX ORDER — PREGABALIN 50 MG/1
1 CAPSULE ORAL DAILY
Status: ON HOLD | COMMUNITY
End: 2018-08-10

## 2018-08-06 RX ORDER — DOCUSATE SODIUM 100 MG/1
CAPSULE, LIQUID FILLED ORAL
COMMUNITY
End: 2018-08-06 | Stop reason: SDUPTHER

## 2018-08-06 RX ORDER — INSULIN ASPART 100 [IU]/ML
38 INJECTION, SUSPENSION SUBCUTANEOUS 2 TIMES DAILY
Status: ON HOLD | COMMUNITY
End: 2018-09-10

## 2018-08-06 RX ORDER — PROMETHAZINE HYDROCHLORIDE 12.5 MG/1
12.5 TABLET ORAL DAILY
COMMUNITY
End: 2018-10-05 | Stop reason: HOSPADM

## 2018-08-06 RX ORDER — MIRTAZAPINE 15 MG/1
1 TABLET, FILM COATED ORAL DAILY
COMMUNITY
End: 2018-10-05 | Stop reason: HOSPADM

## 2018-08-06 RX ORDER — LISINOPRIL 40 MG/1
TABLET ORAL
Refills: 0 | COMMUNITY
Start: 2018-06-04 | End: 2018-08-09

## 2018-08-06 RX ORDER — ATENOLOL 25 MG/1
TABLET ORAL
Refills: 0 | COMMUNITY
Start: 2018-04-29 | End: 2018-08-09 | Stop reason: ALTCHOICE

## 2018-08-06 RX ORDER — FAMOTIDINE 20 MG/1
1 TABLET, FILM COATED ORAL DAILY
COMMUNITY
End: 2018-08-06 | Stop reason: SDUPTHER

## 2018-08-06 NOTE — PROGRESS NOTES
GI Oncology Nurse Navigator Note    Called and spoke to Luz Elena, patients , asked if this RN should set up [address] transport for all of patients upcoming infusion appointments and appointment for cardiology and vascular center, he stated yes, informed him that Elif Gill take patient to appointment for port insertion that she must have someone take her for that appointment, informed him of this multiple times while on the phone, he did verbalize understanding, this RN called [facility], spoke to Nathan Cruz, all appointments set up and reviewed with Nathan Cruz

## 2018-08-08 ENCOUNTER — DOCUMENTATION (OUTPATIENT)
Dept: HEMATOLOGY ONCOLOGY | Facility: CLINIC | Age: 58
End: 2018-08-08

## 2018-08-08 PROBLEM — E78.2 MIXED HYPERLIPIDEMIA: Status: ACTIVE | Noted: 2018-08-08

## 2018-08-08 NOTE — PROGRESS NOTES
GI Oncology Nurse Navigator Note    Received call back from Emily, she stated she was calling because she was so tired, stated she does something and then has to rest right away, states she walks up the stairs and has to sit and rest because she is so tired, denies SOB, denies lightheadedness, states she is not sleeping at night but states this is not new, she said her daughter and 3year old grandson live with her and they keep her up at night, states she is eating well, states she has a service that is similar to meals on wheels that brings her meals and she eats those, states she is drinking plenty of fluids because everyone has told her to do so, asked if she is checking her blood sugars, she stated she is and they have been fine and she also stated she is taking all of her medication as ordered, asked if she is feeling any different than when she was discharged from the hospital, stated nothing is different besides the tiredness, she asked if this RN could come to her house and check on her and do some light cleaning for her, informed her that this RN could not do that for her, she then stated she would like to go into a nursing home but her insurance would not pay for her to go there either, she stated she needs someone to care for her as she is too tired to do this herself, she stated there is a nurse that lives 3 houses down from her and she asked her to wash her and do some light cleaning for her and this nurse stated she has other patients right now and she could not, patient asked about getting home health care to come and assist her, there was a referral from her last hospital discharge, informed patient this RN will look into this for her and will let her know what is found, message sent to Todd Mathis RN to help in finding out about home health care, will also reach out to her  Catherine

## 2018-08-09 ENCOUNTER — DOCUMENTATION (OUTPATIENT)
Dept: HEMATOLOGY ONCOLOGY | Facility: CLINIC | Age: 58
End: 2018-08-09

## 2018-08-09 ENCOUNTER — OFFICE VISIT (OUTPATIENT)
Dept: CARDIOLOGY CLINIC | Facility: CLINIC | Age: 58
End: 2018-08-09
Payer: COMMERCIAL

## 2018-08-09 ENCOUNTER — ANESTHESIA EVENT (OUTPATIENT)
Dept: PERIOP | Facility: HOSPITAL | Age: 58
End: 2018-08-09

## 2018-08-09 VITALS
DIASTOLIC BLOOD PRESSURE: 60 MMHG | BODY MASS INDEX: 21.93 KG/M2 | HEART RATE: 64 BPM | SYSTOLIC BLOOD PRESSURE: 90 MMHG | HEIGHT: 63 IN | WEIGHT: 123.8 LBS

## 2018-08-09 DIAGNOSIS — I25.10 CORONARY ARTERY DISEASE INVOLVING NATIVE CORONARY ARTERY OF NATIVE HEART WITHOUT ANGINA PECTORIS: Primary | ICD-10-CM

## 2018-08-09 DIAGNOSIS — I10 ESSENTIAL HYPERTENSION: Chronic | ICD-10-CM

## 2018-08-09 DIAGNOSIS — E78.2 MIXED HYPERLIPIDEMIA: ICD-10-CM

## 2018-08-09 PROCEDURE — 99214 OFFICE O/P EST MOD 30 MIN: CPT | Performed by: INTERNAL MEDICINE

## 2018-08-09 RX ORDER — ATORVASTATIN CALCIUM 10 MG/1
10 TABLET, FILM COATED ORAL DAILY
Qty: 30 TABLET | Refills: 11 | Status: SHIPPED | OUTPATIENT
Start: 2018-08-09 | End: 2018-10-05 | Stop reason: HOSPADM

## 2018-08-09 NOTE — PROGRESS NOTES
Cardiology Follow Up    Emily Abdullahi  1960  [id number]  [address] [facility] [address] [phone]    Reason for visit: Overdue for follow-up  Here for CAD status post nontransmural myocardial infarction 2012 with stenting of the left anterior descending artery and diagonal branch at that time  Recovery of wall motion was seen  Also has hypertension hyperlipidemia with myocardial infarction in July of 2016 with closure of the stent to the left anterior descending artery  1  Coronary artery disease involving native coronary artery of native heart without angina pectoris     2  Essential hypertension     3  Mixed hyperlipidemia         Interval History: The patient returns after a long hiatus  She has been diagnosed with colon cancer metastatic to the liver  She had a diverting colostomy and will be undergoing chemotherapy  She denies CP or SOB  She denies edema  She denies palpitations or dizziness  She recently had a brachial artery PTA and was put on Xarelto    Had peripheral clots    Patient Active Problem List   Diagnosis    CVA (cerebral vascular accident) ([address] )    Insulin-dependent diabetes mellitus    Schizoaffective disorder, bipolar type ([address] )    Essential hypertension    Colonic mass    Type 2 diabetes mellitus with complication ([address] )    CKD (chronic kidney disease) stage 3, GFR 30-59 ml/min    Pain of right upper extremity    CAD (coronary artery disease)    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HCC)    Large bowel obstruction ([address] )    Brachial artery stenosis, right (HCC)    Rectosigmoid mass - High colostomy output    Leukocytosis    GERD (gastroesophageal reflux disease)    Tobacco abuse    Liver metastasis (HCC)    Mixed hyperlipidemia     Past Medical History:   Diagnosis Date    Bipolar depression [facility] [address] [facility] 3/17/2016    CAD S/P percutaneous coronary angioplasty 3/17/2016    Chronic pain  Colonic mass     COPD (chronic obstructive pulmonary disease) (HCC)     CVA (cerebral vascular accident) ([place] [address] )     R sided weakness    Essential hypertension 3/17/2016    GERD (gastroesophageal reflux disease)     Hepatitis C     Heroin abuse 3/18/2016    History of gastric ulcer     Hypertension     NSTEMI (non-ST elevated myocardial infarction) ([place] [address] ) 07/2012    Psychiatric disorder 09/03/2014    Inpatient admission     Schizoaffective disorder ([place] [address] ) 3/17/2016    Schizophrenia ([place] [address] ) 3/17/2016    STEMI (ST elevation myocardial infarction) ([place] [address] ) 12/2017    Type 2 diabetes mellitus ([place] [address] ) 3/17/2016     Social History     Social History    Marital status: Single     Spouse name: N/A    Number of children: N/A    Years of education: N/A     Occupational History    Not on file  Social History Main Topics    Smoking status: Current Every Day Smoker     Packs/day: 1 50     Years: 45 00     Types: Cigarettes    Smokeless tobacco: Never Used    Alcohol use No    Drug use: No      Comment: only tried x 1     Sexual activity: Not on file     Other Topics Concern    Not on file     Social History Narrative    No narrative on file      Family History   Problem Relation Age of Onset    Heart attack Father     Cancer Brother         gastric     Past Surgical History:   Procedure Laterality Date    CARDIAC CATHETERIZATION  07/2016    COLONOSCOPY N/A 6/22/2018    Procedure: COLONOSCOPY;  Surgeon: Shiva Meade MD;  Location: [facility];   Service: Colorectal    CORONARY ANGIOPLASTY WITH STENT PLACEMENT  07/05/2012    MAKEDA (LAD), PTA (Diag)    CORONARY ANGIOPLASTY WITH STENT PLACEMENT  12/2017    MAKEDA/ PTA (RCA)    CA LAP,DIAGNOSTIC ABDOMEN N/A 7/17/2018    Procedure: LAPAROSCOPY DIAGNOSTIC, Laproscopic transverse loop colostomy, lysis of adhesions;  Surgeon: Autumn Mancia MD;  Location: [facility];  Service: Colorectal       Current Outpatient Prescriptions:     aspirin 81 mg chewable tablet, aspirin 81 mg chewable tablet, Disp: , Rfl:     atenolol (TENORMIN) 25 mg tablet, , Disp: , Rfl: 0    atorvastatin (LIPITOR) 80 mg tablet, Take 80 mg by mouth daily, Disp: , Rfl:     benzonatate (TESSALON) 200 MG capsule, Take by mouth, Disp: , Rfl:     capecitabine (XELODA) 150 MG tablet, Take 2 tablets (300 mg total) by mouth 2 (two) times a day for 42 days, Disp: 168 tablet, Rfl: 0    capecitabine (XELODA) 500 MG tablet, Take 2 tablets (1,000 mg total) by mouth 2 (two) times a day for 42 days, Disp: 168 tablet, Rfl: 0    clopidogrel (PLAVIX) 75 mg tablet, Take 75 mg by mouth daily, Disp: , Rfl:     divalproex sodium (DEPAKOTE) 250 mg EC tablet, Take 3 tablets by mouth daily, Disp: , Rfl:     docusate sodium (COLACE) 100 mg capsule, Take 100 mg by mouth, Disp: , Rfl:     DULoxetine (CYMBALTA) 60 mg delayed release capsule, Take 1 capsule by mouth daily, Disp: , Rfl:     famotidine (PEPCID) 20 mg tablet, Take 20 mg by mouth 2 (two) times a day, Disp: , Rfl:     ibuprofen (MOTRIN) 600 mg tablet, Take 1 tablet by mouth every 6 (six) hours as needed, Disp: , Rfl:     insulin aspart protamine-insulin aspart (NOVOLOG MIX 70/30) 100 units/mL injection, Inject under the skin Twice daily, Disp: , Rfl:     insulin glargine (LANTUS) 100 units/mL subcutaneous injection, Inject 10 Units under the skin daily at bedtime, Disp: 10 mL, Rfl: 0    lisinopril (ZESTRIL) 40 mg tablet, , Disp: , Rfl: 0    metFORMIN (GLUCOPHAGE) 1000 MG tablet, metformin 1,000 mg tablet, Disp: , Rfl:     metoprolol tartrate (LOPRESSOR) 25 mg tablet, Take 25 mg by mouth every 12 (twelve) hours, Disp: , Rfl:     mirtazapine (REMERON) 15 mg tablet, Take 1 tablet by mouth, Disp: , Rfl:     nitroglycerin (NITROSTAT) 0 4 mg SL tablet, Place 0 4 mg under the tongue, Disp: , Rfl:     pregabalin (LYRICA) 50 mg capsule, Take 1 capsule by mouth daily, Disp: , Rfl:     promethazine (PHENERGAN) 12 5 MG tablet, Take by mouth, Disp: , Rfl:   [START ON 8/20/2018] rivaroxaban (XARELTO) 20 mg tablet, Take 1 tablet (20 mg total) by mouth daily with breakfast - starting 8/20, Disp: 30 tablet, Rfl: 0    senna ([employer] SENNA) 8 6 MG tablet, Take by mouth, Disp: , Rfl:     terbinafine (LAMISIL) 250 mg tablet, Take 1 tablet by mouth daily, Disp: , Rfl:     traZODone (DESYREL) 50 mg tablet, Take 50 mg by mouth daily at bedtime, Disp: , Rfl:   No Known Allergies    Review of Systems:  Review of Systems   Constitutional: Positive for appetite change, fatigue and unexpected weight change  Negative for diaphoresis  Respiratory: Positive for cough  Negative for chest tightness, shortness of breath and wheezing  Cardiovascular: Negative for chest pain, palpitations and leg swelling  Gastrointestinal: Positive for abdominal pain  Negative for blood in stool, constipation and diarrhea  Genitourinary: Positive for frequency  Negative for dysuria, hematuria and urgency  Musculoskeletal: Positive for arthralgias  Negative for back pain, gait problem and joint swelling  Neurological: Negative for dizziness, speech difficulty, light-headedness, numbness and headaches  Psychiatric/Behavioral: Positive for dysphoric mood  Negative for agitation, behavioral problems, confusion and decreased concentration  Physical Exam:  Vitals:    08/09/18 1129   BP: 90/60   BP Location: Left arm   Patient Position: Sitting   Cuff Size: Standard   Pulse: 64   Weight: 56 2 kg (123 lb 12 8 oz)   Height: 5' 3" (1 6 m)       Physical Exam   Constitutional: She is oriented to person, place, and time  No distress  cachexic   HENT:   Head: Normocephalic and atraumatic  Mouth/Throat: No oropharyngeal exudate  Eyes: No scleral icterus  Conjunctiva pale   Neck: Neck supple  Normal carotid pulses and no JVD present  Carotid bruit is not present  No thyromegaly present  Cardiovascular: Normal rate and regular rhythm  Exam reveals no gallop and no friction rub      Murmur heard    Crescendo decrescendo systolic (basal) murmur is present with a grade of 2/6    No diastolic murmur is present   Pulses:       Dorsalis pedis pulses are 0 on the right side, and 0 on the left side  Posterior tibial pulses are 0 on the right side, and 0 on the left side  Pulmonary/Chest: She has decreased breath sounds  She has no wheezes  She has no rhonchi  She has no rales  Abdominal: Soft  There is no hepatosplenomegaly  There is tenderness in the right lower quadrant and left lower quadrant  There is no guarding  Musculoskeletal: She exhibits deformity (kyphosis)  She exhibits no edema or tenderness  Neurological: She is alert and oriented to person, place, and time  She has normal strength  No cranial nerve deficit or sensory deficit  Skin: Skin is dry  No rash noted  No erythema  No pallor  Psychiatric: She has a normal mood and affect  Her behavior is normal  Judgment and thought content normal           Discussion/Summary:  1    CAD  Stenting in 2012 with recurrent MI an occluded stent of the left anterior descending artery without intervention in 2016  Having no active angina  Still on dual antiplatelet therapy despite Xarelto being added  Will stop aspirin at this time to hopefully avoid any bleeding complications  Continue low-dose beta-blocker in the form metoprolol 25 mg q 12 hours  2  Essential hypertension  Blood pressure borderline low  Will stop lisinopril and continue metoprolol at the current dosage  3  Mixed hyperlipidemia  Patient remains on high dose atorvastatin despite metastatic involvement of  Liver    Will reduce to 10 mg daily      FU 6 months      Mary Kate Zavaleta MD

## 2018-08-10 ENCOUNTER — ANESTHESIA (OUTPATIENT)
Dept: PERIOP | Facility: HOSPITAL | Age: 58
End: 2018-08-10

## 2018-08-10 ENCOUNTER — HOSPITAL ENCOUNTER (OUTPATIENT)
Facility: HOSPITAL | Age: 58
Setting detail: OUTPATIENT SURGERY
Discharge: HOME | End: 2018-08-10
Attending: COLON & RECTAL SURGERY | Admitting: COLON & RECTAL SURGERY
Payer: COMMERCIAL

## 2018-08-10 ENCOUNTER — TELEPHONE (OUTPATIENT)
Dept: CARDIOLOGY CLINIC | Facility: CLINIC | Age: 58
End: 2018-08-10

## 2018-08-10 ENCOUNTER — HOSPITAL ENCOUNTER (OUTPATIENT)
Dept: RADIOLOGY | Facility: HOSPITAL | Age: 58
Setting detail: OUTPATIENT SURGERY
Discharge: HOME | End: 2018-08-10
Payer: COMMERCIAL

## 2018-08-10 ENCOUNTER — TELEPHONE (OUTPATIENT)
Dept: HEMATOLOGY ONCOLOGY | Facility: CLINIC | Age: 58
End: 2018-08-10

## 2018-08-10 VITALS
RESPIRATION RATE: 18 BRPM | SYSTOLIC BLOOD PRESSURE: 99 MMHG | TEMPERATURE: 100 F | DIASTOLIC BLOOD PRESSURE: 55 MMHG | HEART RATE: 70 BPM | OXYGEN SATURATION: 100 %

## 2018-08-10 DIAGNOSIS — C80.1 CANCER (HCC): ICD-10-CM

## 2018-08-10 LAB — GLUCOSE SERPL-MCNC: 101 MG/DL (ref 65–140)

## 2018-08-10 PROCEDURE — 82948 REAGENT STRIP/BLOOD GLUCOSE: CPT

## 2018-08-10 RX ORDER — SODIUM CHLORIDE, SODIUM LACTATE, POTASSIUM CHLORIDE, CALCIUM CHLORIDE 600; 310; 30; 20 MG/100ML; MG/100ML; MG/100ML; MG/100ML
125 INJECTION, SOLUTION INTRAVENOUS CONTINUOUS
Status: DISCONTINUED | OUTPATIENT
Start: 2018-08-10 | End: 2018-08-10 | Stop reason: HOSPADM

## 2018-08-10 NOTE — TELEPHONE ENCOUNTER
Per Dr Beather Kussmaul back from Dr Sarah Gonzalez   Pt to have port placement and bx done on 8 15 18

## 2018-08-10 NOTE — TELEPHONE ENCOUNTER
Phone call from Isidro/Dr Nolvia Freeman colon Rectal surgeon  Emily was sched to have port placement today and did not tell them she was taking Plavix  Reviewing Dr Anna Silver note, she was also to be on Xarelto  Reviewing [facility] medications, during her visit today it appears xarelto was taken off her medication list     Dr Nolvia Freeman is requesting a hold request for her Plavix  They are trying to resched port placement for Aug 14 or 15        Order may be faxed to [phone]

## 2018-08-10 NOTE — TELEPHONE ENCOUNTER
Patient was scheduled to have a port placement today done by Forde Bloch  However, the patient took Plavix this morning and Dr Bruno was no longer comfortable doing the port placement  Alba Kwon from his office called to speak to Og Martinez to find out if she can do oral chemo or if the patient needs the port  In which case, it would need to be rescheduled  She needs a call back   [phone]

## 2018-08-13 ENCOUNTER — DOCUMENTATION (OUTPATIENT)
Dept: HEMATOLOGY ONCOLOGY | Facility: CLINIC | Age: 58
End: 2018-08-13

## 2018-08-13 ENCOUNTER — TELEPHONE (OUTPATIENT)
Dept: HEMATOLOGY ONCOLOGY | Facility: CLINIC | Age: 58
End: 2018-08-13

## 2018-08-13 ENCOUNTER — HOSPITAL ENCOUNTER (OUTPATIENT)
Dept: INFUSION CENTER | Facility: CLINIC | Age: 58
Discharge: HOME | End: 2018-08-13

## 2018-08-13 NOTE — PROGRESS NOTES
GI Oncology Nurse Navigator Note    Received a call from Luz Elena, patients , Luz Elena had questions regarding patients treatment and what the plan was, informed him there would be an update later this week, then Emily got on the phone, she stated she had stopped taking her Plavix as instructed to prepare for her surgery on 8/15 and asked if this was still happening, this RN informed her this was still scheduled, she also asked about home health care, reminded her that this RN called [address] last week and they stated they were going to call patient to set up appointment to come to her home, Emily stated she did not receive a phone call from them, we reviewed the correct phone number and that was in fact the phone number this RN provided to [facility], informed pt this RN would reach out to them again to see what happened (after phone call with patient, this RN spoke to Eric Felix at [address] and they informed this RN they did call this patient on 8/9 and left a message on that phone number's voicemail and did not receive a call back from patient, they stated they would call her again today and try to set something up for this week with her), patient also asked about colostomy bags, instructed her to ask home health care about this as well, patient also asked about receiving free Ensure or Boost and  asked about gas cards because of running patient to appointments and not having enough money, will reach out to Catherine  to ask about helping with these issues, instructed patient to call with any other questions or concerns, will call tomorrow to remind of surgical appointment Wednesday

## 2018-08-14 ENCOUNTER — DOCUMENTATION (OUTPATIENT)
Dept: HEMATOLOGY ONCOLOGY | Facility: CLINIC | Age: 58
End: 2018-08-14

## 2018-08-14 ENCOUNTER — DOCUMENTATION (OUTPATIENT)
Dept: RADIATION ONCOLOGY | Facility: HOSPITAL | Age: 58
End: 2018-08-14

## 2018-08-14 NOTE — PROGRESS NOTES
GI Oncology Nurse Navigator Note    Called [facility] and cancelled [place] pickup for tomorrow 8/15 for infusion appointment that was cancelled

## 2018-08-14 NOTE — PRE-PROCEDURE INSTRUCTIONS
Pre-Surgery Instructions:   Medication Instructions    atorvastatin (LIPITOR) 10 mg tablet Instructed patient per Anesthesia Guidelines   clopidogrel (PLAVIX) 75 mg tablet Patient was instructed by Physician and understands   divalproex sodium (DEPAKOTE) 250 mg EC tablet Instructed patient per Anesthesia Guidelines   docusate sodium (COLACE) 100 mg capsule Instructed patient per Anesthesia Guidelines   DULoxetine (CYMBALTA) 60 mg delayed release capsule Instructed patient per Anesthesia Guidelines   famotidine (PEPCID) 20 mg tablet Instructed patient per Anesthesia Guidelines   ibuprofen (MOTRIN) 600 mg tablet Patient was instructed by Physician and understands   insulin glargine (LANTUS) 100 units/mL subcutaneous injection Instructed patient per Anesthesia Guidelines   metFORMIN (GLUCOPHAGE) 1000 MG tablet Instructed patient per Anesthesia Guidelines   metoprolol tartrate (LOPRESSOR) 25 mg tablet Instructed patient per Anesthesia Guidelines   mirtazapine (REMERON) 15 mg tablet Instructed patient per Anesthesia Guidelines   nitroglycerin (NITROSTAT) 0 4 mg SL tablet Instructed patient per Anesthesia Guidelines   promethazine (PHENERGAN) 12 5 MG tablet Instructed patient per Anesthesia Guidelines   senna ([employer] SENNA) 8 6 MG tablet Instructed patient per Anesthesia Guidelines   terbinafine (LAMISIL) 250 mg tablet Instructed patient per Anesthesia Guidelines   traZODone (DESYREL) 50 mg tablet Instructed patient per Anesthesia Guidelines  Pre op and bathing instructions reviewed  Pt will get hibiclens

## 2018-08-14 NOTE — PROGRESS NOTES
GI Oncology Nurse Navigator Note    Received a call from [facility], stated he was concerned the procedure for Emily tomorrow (8/15) is in [facility] and he only has enough gas to get her here and not home, informed him this RN was working on getting him a gas card, he also stated he did not have enough money to get an enema needed for prep for her procedure tomorrow, asked him about looking into the generic brand, he stated the generic brand is $4 and that is all the money he has, informed him this RN will call the surgeons office and speak to them about this issue and will return a call to him when all of the above has been addressed and this RN has answers for him, asked [facility] if they received a call from [facility] care yesterday, he stated he was not sure, informed him this RN called them yesterday and last week and they stated they reached out to patient last Thursday and received no call back, [facility] asked which phone number this RN had given them, this RN told him , he stated that is the best number, and the [facility] stated they were going to call yesterday, [facility] then stated he believes they did call her yesterday and they will be coming to see her Thursday    Will speak with Vikas De La Torre about gas card and [place] [employer] in surgeons office and will get back to patient

## 2018-08-14 NOTE — PROGRESS NOTES
GI Oncology Nurse Navigator Note    Spoke to [facility] from Dr Burns Apo office, informed her pt tried to  prescription for enema but it was not at the pharmacy    Call back from [facility], she stated they did not have the enema in stock but ordered another one and they had it and it was covered by patients insurance and stated she called the patient and provided them with this information

## 2018-08-14 NOTE — PROGRESS NOTES
I was approached by Georgi Bridges and Claudia Williamson RN OCN re pt's need for gas cards  Provided Claudia with a $50 00 gas card from [facility] and 2 $25 00 gas cards from [facility] that claudia will give to the pt/significant other on 8/15/18

## 2018-08-14 NOTE — PROGRESS NOTES
GI Oncology Nurse Navigator Note    Spoke to social Catherine worker, asked about providing patient and  with gas cards per their request, stated she would speak with Magalis Tapia  at the [facility] (pt will have procedure at [facility]), also asked about the patients request for ensure or boost (pt is diabetic) will speak to dietician or financial counselor per Catherine to see what we can do for patient

## 2018-08-14 NOTE — PROGRESS NOTES
GI Oncology Nurse Navigator Note    Spoke to both Emily and Phyllis Pollock today and let them know that this RN will be providing them with gas cards tomorrow 8/15 when they come for the procedure, reviewed that they are coming to [place] for the procedure, both verbalized understanding, Phyllis Pollock stated he was informed by Violette Najera from Dr Vipul Hamilton office that she would call in a prescription for the enema and he went to the pharmacy and it was not there, informed them this RN would reach out to Violette Najera and find out what was going on, informed them they would receive a call after 4pm with a time they would need to be here tomorrow for procedure to make sure they get the phone call, both verbalized understanding, informed them no eating or drinking after midnight, both verbalized understanding, Phyllis Pollock stated he would get Emily the enemas no matter what, he stated he may have to borrow money but he will get it, informed them this RN will make some calls and get back to them

## 2018-08-15 ENCOUNTER — DOCUMENTATION (OUTPATIENT)
Dept: HEMATOLOGY ONCOLOGY | Facility: CLINIC | Age: 58
End: 2018-08-15

## 2018-08-15 ENCOUNTER — HOSPITAL ENCOUNTER (OUTPATIENT)
Dept: INFUSION CENTER | Facility: CLINIC | Age: 58
Discharge: HOME | End: 2018-08-15

## 2018-08-15 ENCOUNTER — HOSPITAL ENCOUNTER (OUTPATIENT)
Facility: HOSPITAL | Age: 58
Setting detail: OUTPATIENT SURGERY
Discharge: HOME | End: 2018-08-15
Attending: COLON & RECTAL SURGERY | Admitting: COLON & RECTAL SURGERY
Payer: COMMERCIAL

## 2018-08-15 ENCOUNTER — APPOINTMENT (OUTPATIENT)
Dept: RADIOLOGY | Facility: HOSPITAL | Age: 58
End: 2018-08-15
Payer: COMMERCIAL

## 2018-08-15 ENCOUNTER — ANESTHESIA (OUTPATIENT)
Dept: PERIOP | Facility: HOSPITAL | Age: 58
End: 2018-08-15
Payer: COMMERCIAL

## 2018-08-15 ENCOUNTER — ANESTHESIA EVENT (OUTPATIENT)
Dept: PERIOP | Facility: HOSPITAL | Age: 58
End: 2018-08-15
Payer: COMMERCIAL

## 2018-08-15 VITALS
BODY MASS INDEX: 21.26 KG/M2 | DIASTOLIC BLOOD PRESSURE: 80 MMHG | TEMPERATURE: 97.2 F | HEART RATE: 74 BPM | OXYGEN SATURATION: 100 % | RESPIRATION RATE: 20 BRPM | SYSTOLIC BLOOD PRESSURE: 139 MMHG | HEIGHT: 63 IN | WEIGHT: 120 LBS

## 2018-08-15 DIAGNOSIS — K63.89 COLONIC MASS: Primary | ICD-10-CM

## 2018-08-15 DIAGNOSIS — C19 MALIGNANT NEOPLASM OF RECTOSIGMOID JUNCTION (HCC): ICD-10-CM

## 2018-08-15 LAB
GLUCOSE SERPL-MCNC: 163 MG/DL (ref 65–140)
GLUCOSE SERPL-MCNC: 286 MG/DL (ref 65–140)

## 2018-08-15 PROCEDURE — 88305 TISSUE EXAM BY PATHOLOGIST: CPT | Performed by: PATHOLOGY

## 2018-08-15 PROCEDURE — C1788 PORT, INDWELLING, IMP: HCPCS | Performed by: COLON & RECTAL SURGERY

## 2018-08-15 PROCEDURE — 71045 X-RAY EXAM CHEST 1 VIEW: CPT

## 2018-08-15 PROCEDURE — 77001 FLUOROGUIDE FOR VEIN DEVICE: CPT

## 2018-08-15 PROCEDURE — 88363 XM ARCHIVE TISSUE MOLEC ANAL: CPT | Performed by: PATHOLOGY

## 2018-08-15 PROCEDURE — 88342 IMHCHEM/IMCYTCHM 1ST ANTB: CPT | Performed by: PATHOLOGY

## 2018-08-15 PROCEDURE — 88341 IMHCHEM/IMCYTCHM EA ADD ANTB: CPT | Performed by: PATHOLOGY

## 2018-08-15 PROCEDURE — 82948 REAGENT STRIP/BLOOD GLUCOSE: CPT

## 2018-08-15 DEVICE — PORT HIGH PROFILE 8FR KIT PRO-FUSE: Type: IMPLANTABLE DEVICE | Site: CHEST  WALL | Status: FUNCTIONAL

## 2018-08-15 RX ORDER — OXYCODONE HYDROCHLORIDE AND ACETAMINOPHEN 5; 325 MG/1; MG/1
1 TABLET ORAL EVERY 4 HOURS PRN
Qty: 20 TABLET | Refills: 0 | Status: SHIPPED | OUTPATIENT
Start: 2018-08-15 | End: 2018-08-24 | Stop reason: SDUPTHER

## 2018-08-15 RX ORDER — OXYCODONE HYDROCHLORIDE AND ACETAMINOPHEN 5; 325 MG/1; MG/1
1 TABLET ORAL EVERY 4 HOURS PRN
Status: DISCONTINUED | OUTPATIENT
Start: 2018-08-15 | End: 2018-08-15 | Stop reason: HOSPADM

## 2018-08-15 RX ORDER — LABETALOL HYDROCHLORIDE 5 MG/ML
10 INJECTION, SOLUTION INTRAVENOUS AS NEEDED
Status: DISCONTINUED | OUTPATIENT
Start: 2018-08-15 | End: 2018-08-15 | Stop reason: HOSPADM

## 2018-08-15 RX ORDER — ONDANSETRON 2 MG/ML
4 INJECTION INTRAMUSCULAR; INTRAVENOUS ONCE AS NEEDED
Status: DISCONTINUED | OUTPATIENT
Start: 2018-08-15 | End: 2018-08-15 | Stop reason: HOSPADM

## 2018-08-15 RX ORDER — SODIUM CHLORIDE 9 MG/ML
INJECTION, SOLUTION INTRAVENOUS CONTINUOUS PRN
Status: DISCONTINUED | OUTPATIENT
Start: 2018-08-15 | End: 2018-08-15 | Stop reason: SURG

## 2018-08-15 RX ORDER — LIDOCAINE HYDROCHLORIDE 10 MG/ML
INJECTION, SOLUTION INFILTRATION; PERINEURAL AS NEEDED
Status: DISCONTINUED | OUTPATIENT
Start: 2018-08-15 | End: 2018-08-15 | Stop reason: HOSPADM

## 2018-08-15 RX ORDER — METOPROLOL TARTRATE 5 MG/5ML
INJECTION INTRAVENOUS AS NEEDED
Status: DISCONTINUED | OUTPATIENT
Start: 2018-08-15 | End: 2018-08-15 | Stop reason: SURG

## 2018-08-15 RX ORDER — CLOPIDOGREL BISULFATE 75 MG/1
75 TABLET ORAL DAILY
Refills: 0
Start: 2018-08-17 | End: 2018-10-05 | Stop reason: HOSPADM

## 2018-08-15 RX ORDER — PROPOFOL 10 MG/ML
INJECTION, EMULSION INTRAVENOUS CONTINUOUS PRN
Status: DISCONTINUED | OUTPATIENT
Start: 2018-08-15 | End: 2018-08-15 | Stop reason: SURG

## 2018-08-15 RX ORDER — MIDAZOLAM HYDROCHLORIDE 1 MG/ML
INJECTION INTRAMUSCULAR; INTRAVENOUS AS NEEDED
Status: DISCONTINUED | OUTPATIENT
Start: 2018-08-15 | End: 2018-08-15 | Stop reason: SURG

## 2018-08-15 RX ORDER — ONDANSETRON 2 MG/ML
INJECTION INTRAMUSCULAR; INTRAVENOUS AS NEEDED
Status: DISCONTINUED | OUTPATIENT
Start: 2018-08-15 | End: 2018-08-15 | Stop reason: SURG

## 2018-08-15 RX ORDER — FENTANYL CITRATE/PF 50 MCG/ML
25 SYRINGE (ML) INJECTION
Status: DISCONTINUED | OUTPATIENT
Start: 2018-08-15 | End: 2018-08-15 | Stop reason: HOSPADM

## 2018-08-15 RX ORDER — PROPOFOL 10 MG/ML
INJECTION, EMULSION INTRAVENOUS AS NEEDED
Status: DISCONTINUED | OUTPATIENT
Start: 2018-08-15 | End: 2018-08-15 | Stop reason: SURG

## 2018-08-15 RX ADMIN — SODIUM CHLORIDE: 0.9 INJECTION, SOLUTION INTRAVENOUS at 12:00

## 2018-08-15 RX ADMIN — INSULIN HUMAN 5 UNITS: 100 INJECTION, SOLUTION PARENTERAL at 12:36

## 2018-08-15 RX ADMIN — MIDAZOLAM HYDROCHLORIDE 1 MG: 1 INJECTION, SOLUTION INTRAMUSCULAR; INTRAVENOUS at 12:00

## 2018-08-15 RX ADMIN — PROPOFOL 50 MG: 10 INJECTION, EMULSION INTRAVENOUS at 12:13

## 2018-08-15 RX ADMIN — FENTANYL CITRATE 25 MCG: 50 INJECTION INTRAMUSCULAR; INTRAVENOUS at 13:59

## 2018-08-15 RX ADMIN — PROPOFOL 70 MG: 10 INJECTION, EMULSION INTRAVENOUS at 12:05

## 2018-08-15 RX ADMIN — ONDANSETRON 4 MG: 2 INJECTION INTRAMUSCULAR; INTRAVENOUS at 12:00

## 2018-08-15 RX ADMIN — PROPOFOL 50 MG: 10 INJECTION, EMULSION INTRAVENOUS at 12:17

## 2018-08-15 RX ADMIN — MIDAZOLAM HYDROCHLORIDE 1 MG: 1 INJECTION, SOLUTION INTRAMUSCULAR; INTRAVENOUS at 12:04

## 2018-08-15 RX ADMIN — FENTANYL CITRATE 25 MCG: 50 INJECTION INTRAMUSCULAR; INTRAVENOUS at 13:54

## 2018-08-15 RX ADMIN — PROPOFOL 100 MCG/KG/MIN: 10 INJECTION, EMULSION INTRAVENOUS at 12:07

## 2018-08-15 RX ADMIN — SODIUM CHLORIDE: 0.9 INJECTION, SOLUTION INTRAVENOUS at 13:30

## 2018-08-15 RX ADMIN — PROPOFOL 100 MG: 10 INJECTION, EMULSION INTRAVENOUS at 12:23

## 2018-08-15 RX ADMIN — PROPOFOL 50 MG: 10 INJECTION, EMULSION INTRAVENOUS at 12:19

## 2018-08-15 RX ADMIN — CEFAZOLIN SODIUM 1000 MG: 2 SOLUTION INTRAVENOUS at 12:06

## 2018-08-15 RX ADMIN — OXYCODONE HYDROCHLORIDE AND ACETAMINOPHEN 1 TABLET: 5; 325 TABLET ORAL at 14:44

## 2018-08-15 RX ADMIN — METOPROLOL TARTRATE 2 MG: 1 INJECTION, SOLUTION INTRAVENOUS at 12:59

## 2018-08-15 NOTE — ANESTHESIA PREPROCEDURE EVALUATION
Review of Systems/Medical History  Patient summary reviewed    No history of anesthetic complications     Cardiovascular  EKG reviewed, Exercise tolerance (METS): >4,  Hyperlipidemia, Hypertension controlled, Past MI > 6 months, CAD , CAD status: 2VD, Cardiac stents > 1 year    Pulmonary  Smoker cigarette smoker  , COPD mild- PRN medicaiton ,        GI/Hepatic    GERD well controlled, Hepatitis C, GI malignancy,        Negative  ROS        Endo/Other  Diabetes poorly controlled type 2 Insulin,      GYN       Hematology  Negative hematology ROS      Musculoskeletal  Negative musculoskeletal ROS        Neurology    CVA , no residual symptoms,    Psychology   Depression ,              Physical Exam    Airway    Mallampati score: I  TM Distance: >3 FB  Neck ROM: full     Dental   lower dentures and upper dentures,     Cardiovascular  Rhythm: regular, Rate: normal,     Pulmonary  Breath sounds clear to auscultation,     Other Findings        Anesthesia Plan  ASA Score- 3     Anesthesia Type- IV sedation with anesthesia with ASA Monitors  Additional Monitors:   Airway Plan:         Plan Factors- Patient instructed to abstain from smoking on day of procedure  Patient smoked on day of surgery  Induction- intravenous  Postoperative Plan-   Planned trial extubation    Informed Consent- Anesthetic plan and risks discussed with patient (The patient's FSG is 286  discuessed with the patient and Dr Brian Malloy  this may be the patient's best condition  Dr Brian Malloy likes the procedure done today  )  I personally reviewed this patient with the CRNA  Discussed and agreed on the Anesthesia Plan with the CRNA  Dulce Mcginnis

## 2018-08-15 NOTE — ANESTHESIA POSTPROCEDURE EVALUATION
Post-Op Assessment Note      CV Status:  Stable    Mental Status:  Alert and awake    Hydration Status:  Euvolemic    PONV Controlled:  Controlled    Airway Patency:  Patent    Post Op Vitals Reviewed: Yes          Staff: CRNA, Anesthesiologist           BP (!) (P) 183/85 (08/15/18 1335)    Temp (!) (P) 97 °F (36 1 °C) (08/15/18 1335)    Pulse (P) 68 (08/15/18 1335)   Resp (P) 15 (08/15/18 1335)    SpO2 (P) 100 % (08/15/18 1335)

## 2018-08-15 NOTE — PROGRESS NOTES
GI Oncology Nurse Navigator Note    Received voicemail this morning from Luz Elena stating he did not receive a call from anyone last evening stating what time they are to report to the [facility] procedures today 8/15, called and spoke to [facility] at Dr Tali Gonzalez office, stated she will call over to pre admission testing to see if she can find a time for the patient to report and call patient and call this RN back    Received call back from [facility], stated she spoke to pre admission testing and patient is to report at 11am and to do the enema at 10am, stated she spoke to patient and relayed this information, this RN will also call patient to ensure she understands, pre admission testing called a phone number that was listed in the patients chart that patient states is her daughters phone number and patient stated to never use that phone number, this RN removed that phone number and replaced with phone number she provided

## 2018-08-15 NOTE — OP NOTE
OPERATIVE REPORT  PATIENT NAME: Emily Abdullahi    :  1960  MRN: [id number]  Pt Location: AN OR ROOM 02    SURGERY DATE: 8/15/2018    Surgeon(s) and Role:  Panel 1:     * Richardson Felton MD - Primary    Panel 2:     * Richardson Felton MD - Primary    Preop Diagnosis:  Malignant neoplasm of rectosigmoid junction ([address] ) Harrold Severance    Post-Op Diagnosis Codes:     * Malignant neoplasm of rectosigmoid junction ([address] ) [place]ld Severance    Procedure:  Flexible sigmoidoscopy with multiple biopsies    Specimen(s):  ID Type Source Tests Collected by Time Destination   1 : rectosigmoid mass bx #1 Tissue Colon TISSUE [place] Maximo Richardson Felton MD 8/15/2018 1208    2 : recto sigmoid colon bx #2 Tissue Colon TISSUE EXAM Richardson Felton MD 8/15/2018 1214        Estimated Blood Loss:   Minimal    Drains:  [REMOVED] Colostomy Loop RUQ (Removed)   Number of days: 24       Anesthesia Type:   IV Sedation with Anesthesia    Operative Indications:  Malignant neoplasm of rectosigmoid junction ([address] ) [C19]      Operative Findings:  Large fungating villous mass the rectosigmoid colon  Multiple biopsies taken  Complications:   None    Procedure and Technique:  After preoperative identification the preoperative holding area, the stigma to the operating room placed in the left lateral decubitus position  Patient was placed under mac level IV anesthesia  Time-out was undertaken procedure begun  The scope was introduced through the anus and advanced to the rectosigmoid colon  Approximately 25-30 cm was a large fungating mass  Multiple biopsies were taken of this to include attempted to take deeper sections  There is a significant Gloria component however multiple large pieces of tissue were retrieved  Scope was withdrawn procedure was completed  The patient was then prepared for Port-A-Cath insertion  Please see separate dictation for this description     I was present for the entire procedure    Patient Disposition:  PACU     SIGNATURE: Richardson Felton MD  DATE: August 15, 2018  TIME: 12:22 PM

## 2018-08-15 NOTE — PROGRESS NOTES
GI Oncology Nurse Navigator Note    Met with Jeremy Olmedo and Emily in the surgical waiting area and presented them with the gift cards provided by  Marely Huffman, gave them $50 gift card to [employer] and $25 gift card to [place], the other $25 Nanda gift card to be returned to [facility] as per [facility], reminded pt of vascular appointment tomorrow, pt had no recollection of appointment, they stated they had the home care nurse coming tomorrow, informed them this RN would get appointments coordinated and call them, also informed them of a plan as follows:    Pt to undergo port placement and sigmoidoscopy with biopsies today, follow up appointment with Dr Gerardo Denson on 8/28/18 at 10:20am, as long as results of biopsy are in, plan will be set up by Dr Gerardo Denson, pt and  verbalized understanding    Called [facility] home care to coordinate appointment for tomorrow to not interfere with vascular appointment,  stated pt has appointment today, cancelled that appointment as pt is in hospital for procedure, pt cannot have home care tomorrow per [facility] due to office visit on same day, rescheduled pt for Friday with home care, home care will call patient Thursday after 4pm with a time for Friday    [facility], informed him home care was to come in today and that appointment was now cancelled, stated he forgot, informed him the new appointment was set for Friday 8/17 and that home care RN will call Thursday 8/16 after 4pm with time for Friday arrival, he verbalized understanding, also reminded him of appointment with vascular at [facility] tomorrow 8/16 at 1:00pm and that [facility] is set up to pick her up and take her there, Jeremy Olmedo verbalized understanding, no other questions at this time, instructed them to call with any other questions

## 2018-08-15 NOTE — H&P
History and Physical   Colon and Rectal Surgery   Emily Abdullahi 60 y o  female MRN: [id number]  Unit/Bed#: OR [facility] Encounter: [id number]  08/15/18   @NOW    No chief complaint on file  History of Present Illness   HPI:  Ilma Margarito Cogan is a 60 y o  female who presents with metastatic rectosigmoid cancer  She has undergone diverting colostomy to treat obstruction of this  She is preparing to undergo chemotherapy  She does have metastatic disease by CT scan  She presents today for further evaluation  Specifically she needs insertion of Port-A-Cath for chemotherapy as well as biopsies requested a primary tumor for further pathologic evaluation  Historical Information   Past Medical History:   Diagnosis Date    Bipolar depression ([facility] ) 3/17/2016    CAD S/P percutaneous coronary angioplasty 3/17/2016    Chronic pain     Colonic mass     COPD (chronic obstructive pulmonary disease) (HCC)     CVA (cerebral vascular accident) ([facility] )     R sided weakness    Essential hypertension 3/17/2016    GERD (gastroesophageal reflux disease)     Hepatitis C     Heroin abuse 3/18/2016    History of gastric ulcer     Hypertension     NSTEMI (non-ST elevated myocardial infarction) ([facility] ) 07/2012    Psychiatric disorder 09/03/2014    Inpatient admission     Schizoaffective disorder ([facility] ) 3/17/2016    Schizophrenia ([place] [address] ) 3/17/2016    STEMI (ST elevation myocardial infarction) [facility] ) 12/2017    Type 2 diabetes mellitus ([facility] ) 3/17/2016     Past Surgical History:   Procedure Laterality Date    CARDIAC CATHETERIZATION  07/2016    COLONOSCOPY N/A 6/22/2018    Procedure: COLONOSCOPY;  Surgeon: Manuela Galindo MD;  Location: [facility];   Service: Colorectal    CORONARY ANGIOPLASTY WITH STENT PLACEMENT  07/05/2012    MAKEDA (LAD), PTA (Diag)    CORONARY ANGIOPLASTY WITH STENT PLACEMENT  12/2017    MAKEDA/ PTA (RCA)    TX LAP,DIAGNOSTIC ABDOMEN N/A 7/17/2018    Procedure: LAPAROSCOPY DIAGNOSTIC, Laproscopic transverse loop colostomy, lysis of adhesions;  Surgeon: Deandra Disla MD;  Location: [facility];  Service: Colorectal       Meds/Allergies     Prescriptions Prior to Admission   Medication    atorvastatin (LIPITOR) 10 mg tablet    clopidogrel (PLAVIX) 75 mg tablet    divalproex sodium (DEPAKOTE) 250 mg EC tablet    docusate sodium (COLACE) 100 mg capsule    DULoxetine (CYMBALTA) 60 mg delayed release capsule    famotidine (PEPCID) 20 mg tablet    ibuprofen (MOTRIN) 600 mg tablet    insulin glargine (LANTUS) 100 units/mL subcutaneous injection    metFORMIN (GLUCOPHAGE) 1000 MG tablet    metoprolol tartrate (LOPRESSOR) 25 mg tablet    mirtazapine (REMERON) 15 mg tablet    promethazine (PHENERGAN) 12 5 MG tablet    senna ([employer] SENNA) 8 6 MG tablet    terbinafine (LAMISIL) 250 mg tablet    traZODone (DESYREL) 50 mg tablet    benzonatate (TESSALON) 200 MG capsule    insulin aspart protamine-insulin aspart (NOVOLOG MIX 70/30) 100 units/mL injection    nitroglycerin (NITROSTAT) 0 4 mg SL tablet         Current Facility-Administered Medications:     ceFAZolin (ANCEF) IVPB (premix) 2,000 mg, 2,000 mg, Intravenous, Once, Karol Gloria MD    No Known Allergies      Social History   History   Alcohol Use    1 2 oz/week    2 Cans of beer per week     Comment: daily     History   Drug Use No     Comment: only tried x 1      History   Smoking Status    Current Every Day Smoker    Packs/day: 1 00    Years: 45 00    Types: Cigarettes   Smokeless Tobacco    Never Used         Family History:   Family History   Problem Relation Age of Onset    Heart attack Father     Cancer Brother         gastric         Objective     Current Vitals:   Blood Pressure: 125/70 (08/15/18 1122)  Pulse: 79 (08/15/18 1122)  Temperature: 97 7 °F (36 5 °C) (08/15/18 1122)  Temp Source: Temporal (08/15/18 1122)  Respirations: 18 (08/15/18 1122)  Height: 5' 3" (160 cm) (08/15/18 1122)  Weight - Scale: 54 4 kg (120 lb) (08/15/18 1122)  SpO2: 99 % (08/15/18 1122)  No intake or output data in the 24 hours ending 08/15/18 1136    Physical Exam:  General:  Resting comfortably in bed   Eyes:Sclera anicteric  ENT: Trachea midline  Pulm:  Symmetric chest raise  No respiratory Distress  CV:  Regular on monitor  Abdomen:  Soft NT ND  Extremities:  No clubbing/ cyanosis/ edema    Lab Results: I have personally reviewed pertinent lab results  Imaging: I have personally reviewed pertinent reports  ASSESSMENT:  Emily Tripp is a 60 y o  female who presents for outpatient sigmoidoscopy with biopsies and port placement      PLAN:  Patient notes she has been off all medications to include anticoagulation and anti-platelet agents for the past 5 days  Risks/ Benefits reviewed to include but not limited to anesthesia, bleeding, missed lesions, and colonoscopic perforation requiring surgery  Wrist support reviewed to include but not be limited to risk of bleeding risk of infection requiring port removal, risk of injury to adjacent structures to include pneumothorax were all described to the patient  Patient understands these risks and wished to proceed

## 2018-08-15 NOTE — DISCHARGE INSTRUCTIONS
May shower beginning tomorrow  Wound should be kept clean and dry and should not be immersed in water for 2 weeks  Plavix to be restarted on Friday in 48 hours  All other medications can be restarted today  Pain medication as prescribed for pain not to be combined with Tylenol  May use ibuprofen as well     Call for worsening pain, incision changes or fever greater than 101 5  4 weeks follow-up Dr Evangelina Henry [phone]

## 2018-08-15 NOTE — OP NOTE
OPERATIVE REPORT  PATIENT NAME: Emily Abdullahi    :  1960  MRN: [id number]  Pt Location: AN OR ROOM 02    SURGERY DATE: 8/15/2018    Surgeon(s) and Role:  Panel 1:     * Carlyn Peabody, MD - Primary    Panel 2:     * Carlyn Peabody, MD - Primary    Preop Diagnosis:  Malignant neoplasm of rectosigmoid junction ([address] ) Kit Mehrdad    Post-Op Diagnosis Codes:     * Malignant neoplasm of rectosigmoid junction ([address] ) [C19]    Procedure(s) (LRB):  INSERTION VENOUS PORT (PORT-A-CATH) (N/A)  SIGMOIDOSCOPY FLEXIBLE (N/A)    Specimen(s):  ID Type Source Tests Collected by Time Destination   1 : rectosigmoid mass bx #1 Tissue Colon TISSUE Debby Pelaez MD 8/15/2018 1208    2 : recto sigmoid colon bx #2 Tissue Colon MIRANDA Pelaez MD 8/15/2018 1214        Estimated Blood Loss:   Minimal    Drains:  [REMOVED] Colostomy Loop RUQ (Removed)   Number of days: 24       Anesthesia Type:   IV Sedation with Anesthesia    Operative Indications:  Malignant neoplasm of rectosigmoid junction ([address] ) [C19]      Operative Findings:  Patent right internal jugular vein    Complications:   None    Procedure and Technique:  After preoperative identification preoperative holding area the patient was taken to the operating room  Patient underwent flexible sigmoidoscopy  The room was then converted to sterile room  Patient was placed on the operating room table  Patient was placed in supine position with arms tucked  Patient was prepped and draped usual sterile fashion  Time-out was undertaken procedure begun  Right internal jugular vein was interrogated using the SonoSite ultrasound  This noted be widely patent  Using single stick technique the vessel was cannulated with good blood flow return  Wire was introduced into the needle and the wire was noted on fluoroscopy to into the right atrium  There is small amount of ectopy so this was withdrawn into the superior vena cava    Incision was made below the genu of the clavicle by about 2 finger breaths  Pocket was made down to the clavicle pectoral fascia  The tunnel was then placed from an incision at the needle in the right neck to the incision at the right chest through which the catheter was tunneled  Catheter was flushed with saline  The needle was then removed in the introducer sheath was placed over the wire via sterile Seldinger technique  The trocar was removed and the sheath was left intact  The catheter was then placed through the sheath and advanced  Good blood return was noted  Fluoroscopy confirmed this to be present the superior vena cava  The sheath was then removed and the catheter was placed in the subcutaneous position  There was some oozing from the tract so pressure was held for 5 minutes  No bleeding was noted the completion of this  The port was then secured to the clavipectoral fascia using 2 0 Prolene sutures  The port was then hooked to the catheter after separate flush  Good blood return and good flush were easily noted was saline to the catheter  After the catheter was attached to the port the lock was left in place  Final flush was obtained using heparinized saline  This was then placed in a subcutaneous position  Prolene sutures were tightened down  Skin was then closed using 3 0 Vicryl in 4 O Monocryl  This was used as skin dressing no bleeding was noted completion of the case  Patient was awakened from anesthesia and transferred to recovery room for stat chest x-ray was ordered     I was present for the entire procedure    Patient Disposition:  PACU     SIGNATURE: Bonita Serarno MD  DATE: August 15, 2018  TIME: 1:33 PM

## 2018-08-16 ENCOUNTER — OFFICE VISIT (OUTPATIENT)
Dept: VASCULAR SURGERY | Facility: CLINIC | Age: 58
End: 2018-08-16
Payer: COMMERCIAL

## 2018-08-16 VITALS
BODY MASS INDEX: 21.26 KG/M2 | SYSTOLIC BLOOD PRESSURE: 98 MMHG | DIASTOLIC BLOOD PRESSURE: 56 MMHG | HEART RATE: 66 BPM | RESPIRATION RATE: 16 BRPM | WEIGHT: 120 LBS | HEIGHT: 63 IN

## 2018-08-16 DIAGNOSIS — N18.30 CKD (CHRONIC KIDNEY DISEASE) STAGE 3, GFR 30-59 ML/MIN (HCC): ICD-10-CM

## 2018-08-16 DIAGNOSIS — K56.609 LARGE BOWEL OBSTRUCTION (HCC): ICD-10-CM

## 2018-08-16 DIAGNOSIS — I70.208 BRACHIAL ARTERY STENOSIS, RIGHT (HCC): Primary | ICD-10-CM

## 2018-08-16 PROCEDURE — 99213 OFFICE O/P EST LOW 20 MIN: CPT | Performed by: SURGERY

## 2018-08-16 NOTE — PATIENT INSTRUCTIONS
Recommend you stop smoking  During recent hospitalization you underwent a procedure to examine the circulation to your right arm  A blockage was found and was adequately treated  You now have good circulation to the right hand  No need to follow up with regards to right upper extremity as you have adequate circulation at this time  Please call with any questions and/or concerns

## 2018-08-16 NOTE — PROGRESS NOTES
Assessment/Plan:    Brachial artery stenosis, right ([facility] [address] )    Ms Lizbeth Waggoner is a 67-year-old with multiple comorbidities who we recently were consulted to see during hospitalization   Due to concern for right upper extremity /hand ischemia  She was hospitalized for large bowel obstruction  She was ultimately found to have a large colonic mass now  status post colostomy  Noninvasive imaging suggested  High-grade stenosis versus occlusion of the right axillary, brachial and radial arteries  She underwent right upper extremity arteriography with successful angioplasty of axillary/ brachial occlusions  She follows up in the office for routine postprocedure visit  She underwent insertion of chemo port yesterday by Colorectal surgery  Her right hand is warm and well perfused  She has a palpable right brachial and ulnar pulse  Right radial artery pulse is not palpable consistent with arteriography findings of chronic occlusion  No additional vascular workup /intervention warranted at this time  Patient may follow up on an as-needed basis  Counseled on the ill effects of continued smoking  Diagnoses and all orders for this visit:    Brachial artery stenosis, right (HCC)    CKD (chronic kidney disease) stage 3, GFR 30-59 ml/min    Large bowel obstruction (HCC)          Subjective:      Patient ID: Emily Lara is a 60 y o  female  Patient is new to our practice  Patient had an [address] on 7/27/2018  Patient was experiencing a cold and numb right hand prior to procedure She does still experience numbness at times and it is still cold daily   Patient is diabetic  Patient takes Plavix and Lipitor  She currently smokes 6-7 cigarettes daily  Emily   Is a 67-year-old recently admitted secondary to large bowel obstruction  She was found to have an obstructing colonic mass  She is status post colostomy  She underwent insertion of chemo port on 08/ 15    Vascular surgery was consulted during hospitalization for an obstructing colonic mass due to concern for right upper extremity ischemia  Noninvasive imaging did demonstrate occlusion of the axillary/ brachial as well as distal radial arteries  There are reports that patient was a former IV drug user however patient reports that she only tried once "a long long time ago"  She continues to smoke  She continues to complain of occasional tingling to the right hand  Denies any weakness  Sensation is grossly intact  The following portions of the patient's history were reviewed and updated as appropriate: allergies, current medications, past family history, past medical history, past social history, past surgical history and problem list     Review of Systems   Constitutional: Negative  HENT: Negative  Eyes: Negative  Respiratory: Negative  Cardiovascular: Negative  Negative for leg swelling  Gastrointestinal: Negative  Negative for diarrhea, nausea and vomiting  Endocrine: Positive for cold intolerance  Genitourinary: Negative  Musculoskeletal: Positive for gait problem  Skin: Negative  Allergic/Immunologic: Negative  Neurological: Positive for numbness  Hematological: Bruises/bleeds easily  Psychiatric/Behavioral: Negative          Vitals:    08/16/18 1332   BP: 98/56   BP Location: Left arm   Patient Position: Sitting   Pulse: 66   Resp: 16   Weight: 54 4 kg (120 lb)   Height: 5' 3" (1 6 m)       Patient Active Problem List   Diagnosis    CVA (cerebral vascular accident) ([address] )    Insulin-dependent diabetes mellitus    Schizoaffective disorder, bipolar type ([address] )    Essential hypertension    Colonic mass    Type 2 diabetes mellitus with complication (HCC)    CKD (chronic kidney disease) stage 3, GFR 30-59 ml/min    Pain of right upper extremity    CAD (coronary artery disease)    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HCC)    Large bowel obstruction (HCC)    Brachial artery stenosis, right (HCC)    Rectosigmoid mass - High colostomy output    Leukocytosis    GERD (gastroesophageal reflux disease)    Tobacco abuse    Liver metastasis ([facility] [address] )    Mixed hyperlipidemia       Past Surgical History:   Procedure Laterality Date    CARDIAC CATHETERIZATION  07/2016    COLONOSCOPY N/A 6/22/2018    Procedure: COLONOSCOPY;  Surgeon: Jared Araujo MD;  [facility]: [facility]; Service: Colorectal    CORONARY ANGIOPLASTY WITH STENT PLACEMENT  07/05/2012    MAKEDA (LAD), PTA (Diag)    CORONARY ANGIOPLASTY WITH STENT PLACEMENT  12/2017    MAKEDA/ PTA (RCA)    HI INSERT PICC W/ SUB-Q PORT N/A 8/15/2018    Procedure: INSERTION VENOUS PORT (PORT-A-CATH); Surgeon: Nellie Osborn MD;  [facility];  Service: Colorectal    HI LAP,DIAGNOSTIC ABDOMEN N/A 7/17/2018    Procedure: LAPAROSCOPY DIAGNOSTIC, Laproscopic transverse loop colostomy, lysis of adhesions;  Surgeon: Simon Lee MD;  [facility];  Service: Colorectal    HI SIGMOIDOSCOPY FLX DX W/COLLJ SPEC BR/WA IF PFRMD N/A 8/15/2018    Procedure: Manjit Carlin;  Surgeon: Nellie Osborn MD;  [facility];  Service: Colorectal       Family History   Problem Relation Age of Onset    Heart attack Father     Cancer Brother         gastric       Social History     Social History    Marital status: Single     Spouse name: N/A    Number of children: N/A    Years of education: N/A     Occupational History    Not on file       Social History Main Topics    Smoking status: Current Every Day Smoker     Packs/day: 1 00     Years: 45 00     Types: Cigarettes    Smokeless tobacco: Never Used    Alcohol use 1 2 oz/week     2 Cans of beer per week      Comment: daily    Drug use: No      Comment: only tried x 1     Sexual activity: Not on file     Other Topics Concern    Not on file     Social History Narrative    No narrative on file       No Known Allergies      Current Outpatient Prescriptions:     atorvastatin (LIPITOR) 10 mg tablet, Take 1 tablet (10 mg total) by mouth daily, Disp: 30 tablet, Rfl: 11    [START ON 8/17/2018] clopidogrel (PLAVIX) 75 mg tablet, Take 1 tablet (75 mg total) by mouth daily, Disp: , Rfl: 0    divalproex sodium (DEPAKOTE) 250 mg EC tablet, Take 3 tablets by mouth daily, Disp: , Rfl:     DULoxetine (CYMBALTA) 60 mg delayed release capsule, Take 60 mg by mouth daily  , Disp: , Rfl:     famotidine (PEPCID) 20 mg tablet, Take 20 mg by mouth 2 (two) times a day, Disp: , Rfl:     ibuprofen (MOTRIN) 600 mg tablet, Take 1 tablet by mouth every 6 (six) hours as needed, Disp: , Rfl:     insulin aspart protamine-insulin aspart (NOVOLOG MIX 70/30) 100 units/mL injection, Inject 38 Units under the skin Twice daily  , Disp: , Rfl:     insulin glargine (LANTUS) 100 units/mL subcutaneous injection, Inject 10 Units under the skin daily at bedtime, Disp: 10 mL, Rfl: 0    metoprolol tartrate (LOPRESSOR) 25 mg tablet, Take 25 mg by mouth every 12 (twelve) hours, Disp: , Rfl:     mirtazapine (REMERON) 15 mg tablet, Take 1 tablet by mouth daily  , Disp: , Rfl:     nitroglycerin (NITROSTAT) 0 4 mg SL tablet, Place 0 4 mg under the tongue, Disp: , Rfl:     oxyCODONE-acetaminophen (PERCOCET) 5-325 mg per tablet, Take 1 tablet by mouth every 4 (four) hours as needed for moderate pain for up to 10 days Max Daily Amount: 6 tablets, Disp: 20 tablet, Rfl: 0    promethazine (PHENERGAN) 12 5 MG tablet, Take 12 5 mg by mouth daily  , Disp: , Rfl:     senna ([employer] SENNA) 8 6 MG tablet, Take by mouth, Disp: , Rfl:     terbinafine (LAMISIL) 250 mg tablet, Take 1 tablet by mouth daily, Disp: , Rfl:     traZODone (DESYREL) 50 mg tablet, Take 50 mg by mouth daily at bedtime, Disp: , Rfl:     benzonatate (TESSALON) 200 MG capsule, Take by mouth, Disp: , Rfl:     docusate sodium (COLACE) 100 mg capsule, Take 100 mg by mouth, Disp: , Rfl:     metFORMIN (GLUCOPHAGE) 1000 MG tablet, metformin 1,000 mg tablet  BID, Disp: , Rfl:   No current facility-administered medications for this visit  Objective:      BP 98/56 (BP Location: Left arm, Patient Position: Sitting)   Pulse 66   Resp 16   Ht 5' 3" (1 6 m)   Wt 54 4 kg (120 lb)   BMI 21 26 kg/m²          Physical Exam   Constitutional: She appears well-nourished  No distress  HENT:   Head: Normocephalic and atraumatic  Eyes: No scleral icterus  Neck: No JVD present  No tracheal deviation present  Cardiovascular: Normal rate and regular rhythm  Palpable right ulnar pulse  Right wrist radial artery nonpalpable consistent with known occlusion  Easily palpable right brachial artery pulse  Right hand/fingers warm and well perfused with good capillary refill  Pulmonary/Chest: Effort normal and breath sounds normal    Abdominal:     Colostomy bag in place  Abdomen is soft and nondistended  Musculoskeletal: She exhibits no edema  Neurological: She is alert  Imaging:    I have reviewed the imaging and the findings are:  Impression:      "Successful crossing of right upper extremity distal axillary and proximal brachial occlusion  This was treated successfully with angioplasty with good result across this lesion      After the angioplasty there was distal embolus for which mechanical and pharmacologic thrombectomy was performed    The single ulnar artery was patent at the end of the procedure although decreased flow may be secondary to spasm or small residual clot not identified on angiogram      Radial artery is completely occluded "

## 2018-08-20 ENCOUNTER — DOCUMENTATION (OUTPATIENT)
Dept: HEMATOLOGY ONCOLOGY | Facility: CLINIC | Age: 58
End: 2018-08-20

## 2018-08-20 NOTE — PROGRESS NOTES
GI Oncology Nurse Navigator Note    Spoke to Emily today, since her pathology results are complete asked to reschedule her appointment with Dr Fannie Arboleda, there was an open appointment for this Friday 8/24 at 12:40, pt stated she could make that appointment, confirmed with Lauryn (Dr Gloria Leach MA) called and set up transportation with [facility] and re-confirmed with Emily of new appointment date, time and location, pt verbalized understanding, will call patient again on Thursday before appointment to remind her, other appointment cancelled

## 2018-08-20 NOTE — ANESTHESIA POSTPROCEDURE EVALUATION
Post-Op Assessment Note      CV Status:  Stable    Mental Status:  Lethargic    Hydration Status:  Stable and euvolemic    PONV Controlled:  None    Airway Patency:  Patent and adequate    Post Op Vitals Reviewed: Yes          Staff: CRNA           BP   92/55   Temp      Pulse  58   Resp   16   SpO2   95 WDL

## 2018-08-21 ENCOUNTER — DOCUMENTATION (OUTPATIENT)
Dept: HEMATOLOGY ONCOLOGY | Facility: CLINIC | Age: 58
End: 2018-08-21

## 2018-08-21 NOTE — PROGRESS NOTES
GI Oncology Nurse Navigator Note    Received a call from Emily asking what time the home health care nurse would be coming, informed her that was a question she would have to ask [facility] home health and the phone number was given to her, she stated her and Symone Call were no longer together but he was still taking calls on her behalf, asked how this RN was to get in touch with her regarding appointments and scheduling, she stated the phone at the house was now in working order and she would call back when she found out the number and provide this RN with the number, expressed the importance of this RN having that phone number so that the lines of communication can stay open and we can ensure she gets to the appointments and we can schedule her appropriately, also reminded her of her upcoming appointment on Friday with Dr Zenia Bryson and Torri Mckeon was scheduled to pick her up, she verbalized understanding but at the same time did not let RN completely finish sentence before she stated she would call her daughter to find out the phone number and call this RN back to give the phone number, will await call back with new phone number and add to patients demographic area of chart

## 2018-08-21 NOTE — PROGRESS NOTES
GI Oncology Nurse Navigator Note    Received a call from Emily with new phone number, stated they did not have a home phone number for a little while but now they do and can now reach her at the new phone number, stated she tried to reach the home health care nurse to see what time she will be coming today but there was no answer, she thinks it is between 11 and 2, instructed her to stay at the house between that time as it is important for her to receive the home care, she verbalized understanding and agreed, she stated she does need the help at home and was just trying to find out what time as she has an appointment at the [facility] at 2pm and was supposed to go look at an apartment later today but stated she will wait for the nurse as she has trouble bathing herself, spoke about the appointment with Dr Wesley Jacobo on Friday at 12:40 and reiterated that [address] will be picking her up, informed her that this appointment is very important and that she should not miss it, she asked if he will be hooking her up to a pump before she leaves the appointment, informed her that the appointment is for him to give her the results of the test (biopsy) she had done last week and to give her the plan that will be best for her for the future so that she may move forward, she stated she understood this, she stated she was going to all of her appointments that have been scheduled thus far, informed her this RN was aware of this and that this RN will continue to help her and coordinate her appointments while she is continuing to attend them, she stated she was going to continue to attend her appointments because she wants to get better, while on the phone with her she asked someone to give her a cigarette, counseled patient on the harmful effects of smoking and patient stated she wanted to quit but needs patches, informed her that would be something to speak to her doctor about, pt agreeable, will send her information on smoking cessation, instructed her to call with any other questions or concerns, will continue to monitor

## 2018-08-23 ENCOUNTER — DOCUMENTATION (OUTPATIENT)
Dept: HEMATOLOGY ONCOLOGY | Facility: CLINIC | Age: 58
End: 2018-08-23

## 2018-08-23 NOTE — PROGRESS NOTES
GI Oncology Nurse Navigator Note    Spoke to Emily today, called to remind her of her appointment tomorrow 8/24/18 with Dr Jairo Hull in [facility] at 12:40, she stated she did remember and that someone had called and reminded her that "the bus" ([facility]) will be there at "11 something", informed her she was correct, she had some questions about home health care such as if they could come out more often than they told her and how long it would take until her colostomy supplies would come in, informed her she would have to ask them, she stated they would be coming out tomorrow after her doctor appointment, no other questions at this time, instructed her to call with any other questions or concerns

## 2018-08-24 ENCOUNTER — OFFICE VISIT (OUTPATIENT)
Dept: HEMATOLOGY ONCOLOGY | Facility: CLINIC | Age: 58
End: 2018-08-24
Payer: COMMERCIAL

## 2018-08-24 ENCOUNTER — TELEPHONE (OUTPATIENT)
Dept: HEMATOLOGY ONCOLOGY | Facility: CLINIC | Age: 58
End: 2018-08-24

## 2018-08-24 VITALS
HEART RATE: 78 BPM | HEIGHT: 63 IN | RESPIRATION RATE: 17 BRPM | BODY MASS INDEX: 22.15 KG/M2 | WEIGHT: 125 LBS | OXYGEN SATURATION: 96 % | SYSTOLIC BLOOD PRESSURE: 128 MMHG | DIASTOLIC BLOOD PRESSURE: 72 MMHG | TEMPERATURE: 97.9 F

## 2018-08-24 DIAGNOSIS — C78.7 LIVER METASTASIS (HCC): Primary | ICD-10-CM

## 2018-08-24 DIAGNOSIS — C19 RECTOSIGMOID CANCER (HCC): ICD-10-CM

## 2018-08-24 DIAGNOSIS — K63.89 COLONIC MASS: ICD-10-CM

## 2018-08-24 PROCEDURE — 99214 OFFICE O/P EST MOD 30 MIN: CPT | Performed by: INTERNAL MEDICINE

## 2018-08-24 RX ORDER — OXYCODONE HYDROCHLORIDE AND ACETAMINOPHEN 5; 325 MG/1; MG/1
1 TABLET ORAL EVERY 8 HOURS PRN
Qty: 60 TABLET | Refills: 0 | Status: SHIPPED | OUTPATIENT
Start: 2018-08-24 | End: 2018-09-03

## 2018-08-24 NOTE — PATIENT INSTRUCTIONS
The patient is aware to seek medical attention for fever i e  temperature  100 5 or higher, chills, nausea, mouth soreness, liquidy stool in the colostomy, pain or redness or of the hands or feet, nose bleeds, easy bruising, other bleeding, excessive fatigue, or if other new problems arise

## 2018-08-24 NOTE — TELEPHONE ENCOUNTER
Spoke with pt and she asked "Do you have any bags for my colostomy"  I told pt we do not carry those in our office  I asked pt where she gets them from usually and she said "my nurse that comes to my house gives them to me"  I asked her to contact her nurse and ask if she is able to get more and when she can get them considering the nurse only comes once a week the pt said  The pt did not have the number so I investigated and got the number  I was able to get the main number for [facility] ([phone]) and gave it to the pt  Pt stated that's she will call and ask for more  I also was looking in pts chart and seen a note from Cyndee Monsivais (nurse navigator for GI), that pt mentioned her home nurse will be coming today (8/24/18)  I asked pt about her nurse coming today and she said she will be coming, I told pt to ask about the bags tonight when the nurse comes and she agreed

## 2018-08-24 NOTE — PROGRESS NOTES
8/24/2018    Emily Win    She notes decreased appetite, vague lower abdominal area pressure and easy fatigue  She has semi formed stool in the colostomy bag  She has been taking about 2 doses of oxycodone-acetaminophen 5-325 mg per day  Hematology/Oncology History:    1  Obstructing mass of the sigmoid/rectosigmoid on colonoscopy June 22, 2018, on MRI of the pelvis there is a 10 cm mass of the sigmoid colon with suggestion of bladder invasion, the mass abuts the uterus and there is focal extension of the mass into the deep lateral pelvic wall fascia, T4b Nx, stage IIIC  Biopsies of the sigmoid/rectosigmoid mass revealed at least high-grade dysplasia involving a tubular adenoma  Laparoscopic transverse loop colostomy and lysis of adhesions was done on July 17, 2018  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, 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  2  IR guided brachial artery angiography on July 27, 2018 revealed 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 for which she underwent suction thrombectomy with tPA administration, heparin infusion and  transition to rivaroxaban  3  Right chest port was placed on August 15, 2018  Review of systems:      General:  She notes fatigue, no chills or swaets  Head and Neck: No nosebleeds, no oral cavity or throat soreness  Cardiovascular: No chest pain, no lower extremity edema  Respriatory: No cough or dyspnea  GI:  See HPI  : No urinary frequency  Musculoskeletal: No back pains or joint pain  Skin: No skin rash  Neurological: No headache, no numbness, no weakness  Hematologic: No easy bruising  Psychiatric: No emotional problems    Physical Examination:    Blood pressure 128/72, pulse 78, temperature 97 9 °F (36 6 °C), temperature source Tympanic, resp  rate 17, height 5' 3" (1 6 m), weight 56 7 kg (125 lb), SpO2 96 %, not currently breastfeeding  Body surface area is 1 58 meters squared       General appearance: Appears well  Head: Normocephalic  Eyes: Extraocular movements intact  Ears: No gross hearing deficit  Oropharynx: Clear  Neck: Supple, No lymphadenopathy  Chest: No axillary adenopathy, the right chest port site is healing well  Lungs: Clear to auscultation bilaterally  Heart: Regular rate and rhythm  Abdomen: The right upper colostomy with liquidy brown stool in the colostomy bag  No inguinal lymphadenopathy  Extremities: No lower extremity edema bilaterally  Skin: No rashes  There faded bruises of the upper extremities bilaterally  Neurologic: Grossly intact, no focal neurological deficit  Psychiatric: Oriented to person, place and time, normal mood and affect     ECOG 1    Laboratory:    PET-CT from August 1, 2018: There is a 6 mm LLL lung nodule, too small for PET evaluation, hypermetabolic anterior liver mass, SUV 6 9, most compatible with metastasis ill-defined on corresponding CT images but measures approximately 2 3 x 2 cm based upon the extent of FDG activity, large sigmoid mass, SUV 17 7, compatible with no malignancy with infiltration 2 adjacent soft tissues contiguous with bladder and uterus  Assessment/Plan:    Obstructing mass of the sigmoid/rectosigmoid on colonoscopy June 22, 2018, on MRI of the pelvis there is a 10 cm mass of the sigmoid colon with suggestion of bladder invasion, the mass abuts the uterus and there is focal extension of the mass into the deep lateral pelvic wall fascia, T4b Nx, stage IIIC  In addition on PET-CT there is a hypermetabolic anterior liver mass approximately 2 3 x 2 cm compatible with metastasis  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, 2018 confirm moderately differentiated adenocarcinoma      Tumor tissue from August 15, 2018 is to be sent for KRAS and NRAS mutation analysis and B Berto mutation analysis      Potential chemotherapy with a FOLFOX regimen was again reviewed with the patient    She is aware of risk of nausea, mouth soreness, loose stool in the colostomy, pain or redness of the hands or feet, pain or numbness of the extremities, cold sensitivity especially of the mouth, hands and feet, fever or serious infection, nose bleeds or other bleeding complications especially being on anticoagulation with rivaroxaban, excessive fatigue, and other potential chemotherapy related side effects  The patient was given information regarding the chemotherapy agents and written informed consent was obtained  The plan is oxaliplatin 85 mg/m2 day 1, 5  mg/m2 day 1, folinic acid 400 mg/m2 day 1 and 5 fluorouracil 1200 mg/m2 over 48 hr days 1-2 on an every 2 week schedule  Treatment is scheduled to begin on August 28, 2018  If this regimen is well tolerated, bevacizumab may be added to this regimen       The patient is aware to seek medical attention for fever i e  temperature  100 5 or higher, chills, nausea, mouth soreness, liquidy stool in the colostomy, pain or redness or of the hands or feet, nose bleeds, easy bruising, other bleeding, excessive fatigue, or if other new problems arise  Otherwise, plan to see her again in 4 weeks

## 2018-08-27 ENCOUNTER — DOCUMENTATION (OUTPATIENT)
Dept: HEMATOLOGY ONCOLOGY | Facility: CLINIC | Age: 58
End: 2018-08-27

## 2018-08-27 RX ORDER — FLUOROURACIL 50 MG/ML
632 INJECTION, SOLUTION INTRAVENOUS ONCE
Status: COMPLETED | OUTPATIENT
Start: 2018-08-28 | End: 2018-08-28

## 2018-08-27 RX ORDER — DEXTROSE MONOHYDRATE 50 MG/ML
20 INJECTION, SOLUTION INTRAVENOUS CONTINUOUS
Status: DISCONTINUED | OUTPATIENT
Start: 2018-08-28 | End: 2018-08-31 | Stop reason: HOSPADM

## 2018-08-27 NOTE — PROGRESS NOTES
GI Oncology Nurse Navigator Note    Received a call from Emily, she asked what time [facility] would be picking her up for her infusion tomorrow 8/28/18, informed her her appointment time was 11:30am so they would most likely be arriving at her home between 10:30 and 10:45am, she verbalized understanding, she stated "I will be there 4 hours" informed her that was correct, she asked what time she would be "getting out of there", informed her it would be approximately 3-3:30pm, she again verbalized understanding, she also asked when her "bags" (colostomy) would be arriving, informed her this was information I did not have, she then stated home care told her either Friday or today, instructed her to wait until today and if they do not arrive today to try calling the home health care nurse to see if she has heard anything further, pt agreeable, pt stated she is still looking for an apartment, informed her this RN will try to get over to [place] infusion for her first appointment, pt agreeable, instructed her to call with any other questions or concerns

## 2018-08-28 ENCOUNTER — HOSPITAL ENCOUNTER (OUTPATIENT)
Dept: INFUSION CENTER | Facility: CLINIC | Age: 58
Discharge: HOME | End: 2018-08-28
Payer: COMMERCIAL

## 2018-08-28 ENCOUNTER — DOCUMENTATION (OUTPATIENT)
Dept: HEMATOLOGY ONCOLOGY | Facility: CLINIC | Age: 58
End: 2018-08-28

## 2018-08-28 VITALS
HEIGHT: 63 IN | SYSTOLIC BLOOD PRESSURE: 128 MMHG | RESPIRATION RATE: 18 BRPM | TEMPERATURE: 98 F | HEART RATE: 74 BPM | BODY MASS INDEX: 22.66 KG/M2 | DIASTOLIC BLOOD PRESSURE: 74 MMHG | WEIGHT: 127.87 LBS

## 2018-08-28 DIAGNOSIS — C19 RECTOSIGMOID CANCER (HCC): ICD-10-CM

## 2018-08-28 DIAGNOSIS — C78.7 LIVER METASTASIS (HCC): ICD-10-CM

## 2018-08-28 LAB
ALBUMIN SERPL BCP-MCNC: 3 G/DL (ref 3.5–5.7)
ALP SERPL-CCNC: 105 U/L (ref 46–116)
ALT SERPL W P-5'-P-CCNC: 16 U/L (ref 12–78)
ANION GAP SERPL CALCULATED.3IONS-SCNC: 9 MMOL/L (ref 4–13)
ANISOCYTOSIS BLD QL SMEAR: PRESENT
AST SERPL W P-5'-P-CCNC: 20 U/L (ref 5–45)
BASOPHILS # BLD AUTO: 0 THOUSAND/UL (ref 0–0.1)
BASOPHILS NFR MAR MANUAL: 0 % (ref 0–1)
BILIRUB SERPL-MCNC: 0.4 MG/DL (ref 0.2–1)
BUN SERPL-MCNC: 22 MG/DL (ref 5–25)
CALCIUM SERPL-MCNC: 9.2 MG/DL (ref 8.3–10.1)
CEA SERPL-MCNC: 8.8 NG/ML (ref 0–3)
CHLORIDE SERPL-SCNC: 104 MMOL/L (ref 98–108)
CO2 SERPL-SCNC: 24 MMOL/L (ref 21–32)
CREAT SERPL-MCNC: 0.8 MG/DL (ref 0.6–1.3)
EOSINOPHIL # BLD AUTO: 0.22 THOUSAND/UL (ref 0–0.61)
EOSINOPHIL NFR BLD MANUAL: 2 % (ref 0–6)
ERYTHROCYTE [DISTWIDTH] IN BLOOD BY AUTOMATED COUNT: 16.2 % (ref 11.6–15.1)
GFR SERPL CREATININE-BSD FRML MDRD: 81 ML/MIN/1.73SQ M
GLUCOSE SERPL-MCNC: 309 MG/DL (ref 65–140)
HCT VFR BLD AUTO: 29.6 % (ref 34.8–46.1)
HGB BLD-MCNC: 9 G/DL (ref 11.5–15.4)
LYMPHOCYTES # BLD AUTO: 2.89 THOUSAND/UL (ref 0.6–4.47)
LYMPHOCYTES # BLD AUTO: 26 % (ref 14–44)
MCH RBC QN AUTO: 27 PG (ref 26.8–34.3)
MCHC RBC AUTO-ENTMCNC: 30.5 G/DL (ref 31.4–37.4)
MCV RBC AUTO: 89 FL (ref 82–98)
MONOCYTES # BLD AUTO: 0.33 THOUSAND/UL (ref 0–1.22)
MONOCYTES NFR BLD AUTO: 3 % (ref 4–12)
NEUTS BAND NFR BLD MANUAL: 2 % (ref 0–8)
NEUTS SEG # BLD: 7.66 THOUSAND/UL (ref 1.81–6.82)
NEUTS SEG NFR BLD AUTO: 67 % (ref 43–75)
PLATELET # BLD AUTO: 334 THOUSANDS/UL (ref 149–390)
PLATELET BLD QL SMEAR: ADEQUATE
PMV BLD AUTO: 7.8 FL (ref 8.9–12.7)
POTASSIUM SERPL-SCNC: 4.7 MMOL/L (ref 3.5–5.3)
PROT SERPL-MCNC: 7 G/DL (ref 6.4–8.2)
RBC # BLD AUTO: 3.34 MILLION/UL (ref 3.81–5.12)
SODIUM SERPL-SCNC: 137 MMOL/L (ref 136–145)
TOTAL CELLS COUNTED SPEC: 100
WBC # BLD AUTO: 11.1 THOUSAND/UL (ref 4.31–10.16)
WBC NRBC COR # BLD: 11.1 THOUSAND/UL (ref 4.31–10.16)

## 2018-08-28 PROCEDURE — 85027 COMPLETE CBC AUTOMATED: CPT

## 2018-08-28 PROCEDURE — 96415 CHEMO IV INFUSION ADDL HR: CPT

## 2018-08-28 PROCEDURE — 80053 COMPREHEN METABOLIC PANEL: CPT

## 2018-08-28 PROCEDURE — 96413 CHEMO IV INFUSION 1 HR: CPT

## 2018-08-28 PROCEDURE — 96368 THER/DIAG CONCURRENT INF: CPT

## 2018-08-28 PROCEDURE — 96367 TX/PROPH/DG ADDL SEQ IV INF: CPT

## 2018-08-28 PROCEDURE — 85007 BL SMEAR W/DIFF WBC COUNT: CPT

## 2018-08-28 PROCEDURE — G0498 CHEMO EXTEND IV INFUS W/PUMP: HCPCS

## 2018-08-28 PROCEDURE — 82378 CARCINOEMBRYONIC ANTIGEN: CPT

## 2018-08-28 PROCEDURE — 96411 CHEMO IV PUSH ADDL DRUG: CPT

## 2018-08-28 RX ADMIN — DEXTROSE 20 ML/HR: 5 SOLUTION INTRAVENOUS at 12:55

## 2018-08-28 RX ADMIN — DEXAMETHASONE SODIUM PHOSPHATE: 10 INJECTION, SOLUTION INTRAMUSCULAR; INTRAVENOUS at 12:55

## 2018-08-28 RX ADMIN — OXALIPLATIN 134 MG: 5 INJECTION, SOLUTION INTRAVENOUS at 13:15

## 2018-08-28 RX ADMIN — FLUOROURACIL 632 MG: 50 INJECTION, SOLUTION INTRAVENOUS at 15:22

## 2018-08-28 RX ADMIN — LEUCOVORIN CALCIUM 632 MG: 200 INJECTION, POWDER, LYOPHILIZED, FOR SOLUTION INTRAMUSCULAR; INTRAVENOUS at 13:15

## 2018-08-28 NOTE — PROGRESS NOTES
GI Oncology Nurse Navigator Note    Received a call this morning from Luz Elena asking what time [facility] was coming to pick Emily up for her infusion, stated she forgot, informed him they would probably be there between 10:30-10:45am, he stated they found an apartment and they were very excited and would be hopefully moving in soon, he also thanked this RN for all of the help    Met Emily at the [facility] for her first infusion, we reviewed going home with the pump and making sure to not let the dog or her grandson get at the pump, verbalized understanding, she asked how often she has to come for treatment, informed her every 2 weeks, we also spoke about the apartment and she spoke about how good that would be for her and Luz Elena to have their own place as she stated the other place has too many people that come and go and she cannot rest and Mingjoe and her daughter do not get along, she stated Luz Elena takes good care of her and cooks for her and it will be good for them, she states she just wants to get better and she has been going to all of her appointments and trying to get better, all of her questions were answered, Law Moser the cancer counselor joined us and also spoke to her, Emily was also concerned that her colostomy bags had not been delivered yet, spoke to Luz Elena and he stated [facility] called and they are expected to deliver the supplies today, instructed them to call with any other questions or concerns

## 2018-08-28 NOTE — PLAN OF CARE
Problem: Potential for Falls  Goal: Patient will remain free of falls  INTERVENTIONS:  - Assess patient frequently for physical needs  -  Identify cognitive and physical deficits and behaviors that affect risk of falls    -  [place] fall precautions as indicated by assessment   - Educate patient/family on patient safety including physical limitations  - Instruct patient to call for assistance with activity based on assessment  - Modify environment to reduce risk of injury  - Consider OT/PT consult to assist with strengthening/mobility   Outcome: Progressing

## 2018-08-28 NOTE — PROGRESS NOTES
Pt arrived to unit for cycle 1 FOLFOX  Pt anxious but pleasant and in good spirits  Pt's CBC, CMP drawn and results within approved parameters for treatment today  Pt tolerated all treatment meds well without adverse reaction  Pt left unit with PAC accessed, CADD pump infusing 5FU as per orders  Pt knowledgeable re: care of accessed PAC at home  Pt verbalized understanding of monitoring and care of CADD pump as well  Pt aware of need to return at 1330 on Thursday for CADD pump disconnect  Pt left unit in stable condition without question or concern

## 2018-08-29 ENCOUNTER — DOCUMENTATION (OUTPATIENT)
Dept: HEMATOLOGY ONCOLOGY | Facility: CLINIC | Age: 58
End: 2018-08-29

## 2018-08-29 NOTE — PROGRESS NOTES
GI Oncology Nurse Navigator Note    Received a message from Emily, called back and spoke to her, she stated she received a call yesterday from someone asking her for her insurance information and stated they needed it for her colostomy bags, she is concerned as she is wearing her last bag, she said she called someone (was unsure who) and was waiting for a call back, this RN called [address] to find out the status, they stated they do the ordering from an outside company and just help with the patients first order, informed them that she is on her last bag, they stated they could have the home nurse bring her a couple bags at her next home visit, stated that would be a great help, they stated she is scheduled to be seen again on 8/31, they also stated she has missed some appointments and they do have a hard time getting ahold of her at times, the company for the colostomy supplies is [employer], account # and order # were provided, called [address] and spoke to representative, they looked up her account and stated there were some missing pieces, also stated they were going to send her some samples immediately so she did not run out of supplies, helped them with the information needed as they stated she has been difficult to get ahold of, they stated they now had all of the information they needed and will process the order, called Emily back and informed her of the above information, and that there would be sample bags arriving within the next few days and the home nurse would be bringing a couple as well, also instructed her to make sure she is answering her phone as people are trying to call her to facilitate and manage her care and are not able to get in touch with her, she stated she was having trouble with her phone but her daughter fixed it last night and she will now be able to receive phone calls, asked how she was feeling today and she stated she was feeling good, she asked what time [facility] would be picking her up tomorrow for her pump d/c, informed her they would most likely be there between 12:30-12:45, pt verbalized understanding, instructed her to call with any other questions or concerns, will continue to follow

## 2018-08-30 ENCOUNTER — DOCUMENTATION (OUTPATIENT)
Dept: HEMATOLOGY ONCOLOGY | Facility: CLINIC | Age: 58
End: 2018-08-30

## 2018-08-30 ENCOUNTER — HOSPITAL ENCOUNTER (EMERGENCY)
Facility: HOSPITAL | Age: 58
Discharge: HOME | End: 2018-08-30
Attending: EMERGENCY MEDICINE
Payer: COMMERCIAL

## 2018-08-30 ENCOUNTER — HOSPITAL ENCOUNTER (OUTPATIENT)
Dept: INFUSION CENTER | Facility: CLINIC | Age: 58
Discharge: HOME | End: 2018-08-30
Payer: COMMERCIAL

## 2018-08-30 VITALS
TEMPERATURE: 96.7 F | HEART RATE: 87 BPM | OXYGEN SATURATION: 99 % | RESPIRATION RATE: 16 BRPM | SYSTOLIC BLOOD PRESSURE: 137 MMHG | DIASTOLIC BLOOD PRESSURE: 72 MMHG

## 2018-08-30 VITALS
TEMPERATURE: 97.6 F | RESPIRATION RATE: 16 BRPM | DIASTOLIC BLOOD PRESSURE: 69 MMHG | SYSTOLIC BLOOD PRESSURE: 107 MMHG | HEART RATE: 96 BPM

## 2018-08-30 DIAGNOSIS — T81.31XA DEHISCENCE OF OPERATIVE WOUND, INITIAL ENCOUNTER: Primary | ICD-10-CM

## 2018-08-30 PROCEDURE — 99283 EMERGENCY DEPT VISIT LOW MDM: CPT

## 2018-08-30 RX ORDER — BACITRACIN, NEOMYCIN, POLYMYXIN B 400; 3.5; 5 [USP'U]/G; MG/G; [USP'U]/G
1 OINTMENT TOPICAL ONCE
Status: COMPLETED | OUTPATIENT
Start: 2018-08-30 | End: 2018-08-30

## 2018-08-30 RX ADMIN — Medication 300 UNITS: at 14:10

## 2018-08-30 RX ADMIN — BACITRACIN, NEOMYCIN, POLYMYXIN B 1 SMALL APPLICATION: 400; 3.5; 5 OINTMENT TOPICAL at 16:32

## 2018-08-30 NOTE — PROGRESS NOTES
Pt  Denies new symptoms or concerns at this time  5FU cadd pump disconnected and flushed per protocol as ordered  RN noted tan/yellow exudate oozing out of lateral side of port a cath incision  Area appears mildly reddened at this time  Pt  Denies pain and is afebrile  Office of Dr Shruthi Zurita notified  Spoke with Angel Degroot RN who instructed to send Pt to ER  Pt  Cain Six to go to [facility] ER  Pt  [facility]  Pt  Verbalizes agreement and understanding  RN notified [facility] ER and spoke to BAILEY  Assessment and instruction provided

## 2018-08-30 NOTE — DISCHARGE INSTRUCTIONS
Keep wound clean with gentle soap and water, cover with light dressing until it is fully healed well  You can neosporin on the open site  Follow up or return to ED if you experience fever, increasing pain at the port site, or increasing drainage from the open part

## 2018-08-30 NOTE — CONSULTS
Consultation - General Surgery   Emily Abdullahi 60 y o  female MRN: [id number]  Unit/Bed#: ED 27 Encounter: [id number]    Assessment/Plan     Assessment:  Pleasant 59 yo F recently diagnosed with advanced stage colon adenocarcinoma s/p recent right sided Mediport insertion now with partial wound separation prompting ER evaluation and surgery second opinion  1  Partial wound disruption, POA   -right chest wall Mediport   -2 weeks post-op   -no signs of infection   -no full thickness disruption   -wound care instructions provided   -advised to monitor for progression/infection    Plan:  Patient clinically stable without indication of wound infection or full thickness wound separation  No surgical intervention needed  No obvious suture exposed, if Monocryl suture used this will eventually breakdown and slough off with time  If exposed suture becomes evident, could be cut/removed bedside  Wound care instructions provided  Follow up with Dr Kaitlynn Michael for wound check  History of Present Illness   HPI:  Emily Boles is a 60 y o  female with recent diagnosis of advanced stage colon adenocarcinoma s/p Mediport insertion on 08/15/2018 found to have drainage from incision while at treatment center prompting ER evaluation and surgical opinion  Denies pain, fever, swelling  Voices no concerns  Consult to surgery general  Consult performed by: Lucinda Latif  Consult ordered by: Dank Howell  Reason for consult: surgical wound  Assessment/Recommendations: Partial wound disruption s/p placement right chest wall Mediport 2 weeks prior  No evidence of infection, abscess, cellulitis, or full thickness disruption  Review of Systems   Constitutional: Negative for fever  Respiratory: Negative for shortness of breath  Cardiovascular: Negative for chest pain         Historical Information   Past Medical History:   Diagnosis Date    Bipolar depression ([facility] [address] ) 3/17/2016    CAD S/P percutaneous coronary angioplasty 3/17/2016    Chronic pain     Colonic mass     COPD (chronic obstructive pulmonary disease) (HCC)     CVA (cerebral vascular accident) ([place] [address] )     R sided weakness    Essential hypertension 3/17/2016    GERD (gastroesophageal reflux disease)     Hepatitis C     Heroin abuse 3/18/2016    History of gastric ulcer     Hypertension     NSTEMI (non-ST elevated myocardial infarction) ([place] [address] ) 07/2012    Psychiatric disorder 09/03/2014    Inpatient admission     Schizoaffective disorder ([place] [address] ) 3/17/2016    Schizophrenia ([place] [address] ) 3/17/2016    STEMI (ST elevation myocardial infarction) ([place] [address] ) 12/2017    Type 2 diabetes mellitus ([place] [address] ) 3/17/2016     Past Surgical History:   Procedure Laterality Date    CARDIAC CATHETERIZATION  07/2016    COLONOSCOPY N/A 6/22/2018    Procedure: COLONOSCOPY;  Surgeon: Woody Walden MD;  [facility]; Service: Colorectal    CORONARY ANGIOPLASTY WITH STENT PLACEMENT  07/05/2012    MAKEDA (LAD), PTA (Diag)    CORONARY ANGIOPLASTY WITH STENT PLACEMENT  12/2017    MAKEDA/ PTA (RCA)    FL GUIDED CENTRAL VENOUS ACCESS REPLACEMENT  8/15/2018    [place] INSERT PICC W/ SUB-Q PORT N/A 8/15/2018    Procedure: INSERTION VENOUS PORT (PORT-A-CATH);   Surgeon: Kendall Parr MD;  [facility];  Service: Colorectal    [place] LAP,DIAGNOSTIC ABDOMEN N/A 7/17/2018    Procedure: LAPAROSCOPY DIAGNOSTIC, Laproscopic transverse loop colostomy, lysis of adhesions;  Surgeon: Jaye Carroll MD;  [facility];  Service: Colorectal    W[place] SIGMOIDOSCOPY FLX DX W/COLLJ SPEC BR/WA IF PFRMD N/A 8/15/2018    Procedure: Maybelle Matter;  Surgeon: Kendall Parr MD;  [facility];  Service: Colorectal     Social History   History   Alcohol Use No     History   Drug Use No     Comment: only tried x 1      History   Smoking Status    Current Every Day Smoker    Packs/day: 1 00    Years: 45 00    Types: Cigarettes   Smokeless Tobacco    Never Used Family History:   Family History   Problem Relation Age of Onset    Heart attack Father     Cancer Brother         gastric       Meds/Allergies   all current active meds have been reviewed  No Known Allergies    Objective   First Vitals:   Blood Pressure: 137/72 (08/30/18 1544)  Pulse: 87 (08/30/18 1544)  Temperature: (!) 96 7 °F (35 9 °C) (08/30/18 1544)  Temp Source: Temporal (08/30/18 1544)  Respirations: 16 (08/30/18 1544)  SpO2: 99 % (08/30/18 1544)    Current Vitals:   Blood Pressure: 137/72 (08/30/18 1544)  Pulse: 87 (08/30/18 1544)  Temperature: (!) 96 7 °F (35 9 °C) (08/30/18 1544)  Temp Source: Temporal (08/30/18 1544)  Respirations: 16 (08/30/18 1544)  SpO2: 99 % (08/30/18 1544)    No intake or output data in the 24 hours ending 08/30/18 1631    Invasive Devices     Central Venous Catheter Line            Port A Cath 08/28/18 Right Chest 2 days                Physical Exam   Constitutional: She is oriented to person, place, and time  She appears well-developed and well-nourished  No distress  HENT:   Head: Normocephalic and atraumatic  Eyes: Conjunctivae are normal  Pupils are equal, round, and reactive to light  Neck: Normal range of motion  Cardiovascular: Normal rate and regular rhythm  Pulmonary/Chest: No respiratory distress  Musculoskeletal: Normal range of motion  Neurological: She is alert and oriented to person, place, and time  Skin: Skin is warm and dry  She is not diaphoretic  Psychiatric: She has a normal mood and affect  Her behavior is normal        Lab Results: I have personally reviewed pertinent lab results  Imaging: I have personally reviewed pertinent reports  EKG, Pathology, and Other Studies: I have personally reviewed pertinent reports  Counseling / Coordination of Care  Total floor / unit time spent today 15 minutes  Greater than 50% of total time was spent with the patient and / or family counseling and / or coordination of care    A description of the counseling / coordination of care: patient education, treatment planning

## 2018-08-30 NOTE — ED NOTES
Pt reporting that her ride home is growing impatient, pt requesting to leave department at this time without waiting for discharge instructions  Dr Robbins Self made aware  Reviewed basic wound care with patient, instructing her to keep incision clean and dry and that she may apply triple antibiotic topically as needed, pt acknowledged understanding        Horace Downs RN  08/30/18 1524

## 2018-08-30 NOTE — PLAN OF CARE
Problem: PAIN - ADULT  Goal: Verbalizes/displays adequate comfort level or baseline comfort level  Interventions:  - Encourage patient to monitor pain and request assistance  - Assess pain using appropriate pain scale  - Administer analgesics based on type and severity of pain and evaluate response  - Implement non-pharmacological measures as appropriate and evaluate response  - Consider cultural and social influences on pain and pain management  - Notify physician/advanced practitioner if interventions unsuccessful or patient reports new pain  Outcome: Progressing      Problem: INFECTION - ADULT  Goal: Absence or prevention of progression during hospitalization  INTERVENTIONS:  - Assess and monitor for signs and symptoms of infection  - Monitor lab/diagnostic results  - Monitor all insertion sites, i e  indwelling lines, tubes, and drains  - Monitor endotracheal (as able) and nasal secretions for changes in amount and color  - [place] appropriate cooling/warming therapies per order  - Administer medications as ordered  - Instruct and encourage patient and family to use good hand hygiene technique  - Identify and instruct in appropriate isolation precautions for identified infection/condition  Outcome: Progressing    Goal: Absence of fever/infection during neutropenic period  INTERVENTIONS:  - Monitor WBC  - Implement neutropenic guidelines  Outcome: Progressing      Problem: Knowledge Deficit  Goal: Patient/family/caregiver demonstrates understanding of disease process, treatment plan, medications, and discharge instructions  Complete learning assessment and assess knowledge base    Interventions:  - Provide teaching at level of understanding  - Provide teaching via preferred learning methods  Outcome: Progressing

## 2018-08-30 NOTE — PROGRESS NOTES
GI Oncology Nurse Navigator Note    Received a call from Luz Elena, he stated that he and Emily will be most likely moving into their new apartment tomorrow, the new address was given [address], [facility] [address], asked if they were definitely moving tomorrow as things would need to be changed, he stated he thinks so, instructed him to call this RN when the move is definite so this RN can help facilitate the necessary changes, such as [facility] pick ups, address change in system, etc, Luz Elena stated he will call when he has the keys in his hands, Luz Elena also stated he called Emily to wake her up as she has an appointment to have her pump disconnected this morning and she stated her colostomy bag had leaked, she has her last one on, informed him of the conversation Emily and I had yesterday (see note from yesterday) informed Luz Elena this RN would call Orville Rajan today and see if the order has shipped yet and would then call him back    [facility] [employer] and spoke to Friday [facility], informed her of why this RN was calling and asked her if Emily's order has shipped yet, she stated that the order was being processed and she was fairly certain the order would be shipped out today and would be shipped out via overnight and the patient should have the supplies by tomorrow 8/31, asked Friday [facility] about the samples the lady from yesterday stated she had shipped out, Friday [facility] stated the samples that were shipped yesterday were overnighted via [employer] and Emily should be receiving them today    Jaycee Betancur back and informed him of the above information, he was pleased with this information and stated he would wait at the house for the supplies to arrive, he stated he was excited to move to the new apartment so Emily could have more time to rest as the place where she is staying now is very hectic and there are multiple people in and out at all times, explained to him that she will need to rest and take care of herself while she is going through treatment, Bakari Mejía also asked if it was possible to have more gas cards as he is running her to other appointments such as podiatry appointments, informed him that is not something this RN determines, will send this request to their cancer counselor, Zachary Porter verbalized understanding, Bakari Mejía will inform this RN when move is complete and this RN will inform correct people/places of new address, instructed Bakari Mejía to call with any other questions or concerns

## 2018-08-30 NOTE — PROGRESS NOTES
GI Oncology Nurse Navigator Note    Received a message from Luz Elena asking if this RN knew where Emily was, stated she left this morning to have her pump d/c'd and was not home yet at 3:30pm, looked through her notes and saw pt was at the ED, and then was discharged, called and spoke to Luz Elena who said he had already picked her up, instructed them to call with any other questions or concerns

## 2018-08-30 NOTE — ED PROVIDER NOTES
History  Chief Complaint   Patient presents with    Vascular Access Problem     had chemotherapy treatment today, reports nurse telling patient to be seen in ED for "pus" coming out of port side  port was accessed today  59 yo female sent to ED from chemo infusion center for evaluation of the recently placed port site with concern for "pus"   She completed infusion using the site today  She denies fever, chills, or pain at the site  History provided by:  Patient      Prior to Admission Medications   Prescriptions Last Dose Informant Patient Reported? Taking?    DULoxetine (CYMBALTA) 60 mg delayed release capsule  Self Yes Yes   Sig: Take 60 mg by mouth daily     atorvastatin (LIPITOR) 10 mg tablet  Self No Yes   Sig: Take 1 tablet (10 mg total) by mouth daily   clopidogrel (PLAVIX) 75 mg tablet  Self No Yes   Sig: Take 1 tablet (75 mg total) by mouth daily   divalproex sodium (DEPAKOTE) 250 mg EC tablet  Self Yes Yes   Sig: Take 3 tablets by mouth daily   insulin aspart protamine-insulin aspart (NOVOLOG MIX 70/30) 100 units/mL injection  Self Yes Yes   Sig: Inject 38 Units under the skin Twice daily     insulin glargine (LANTUS) 100 units/mL subcutaneous injection  Self No Yes   Sig: Inject 10 Units under the skin daily at bedtime   metFORMIN (GLUCOPHAGE) 1000 MG tablet  Self Yes Yes   Sig: metformin 1,000 mg tablet  BID   metoprolol tartrate (LOPRESSOR) 25 mg tablet  Self Yes Yes   Sig: Take 25 mg by mouth every 12 (twelve) hours   mirtazapine (REMERON) 15 mg tablet  Self Yes Yes   Sig: Take 1 tablet by mouth daily     nitroglycerin (NITROSTAT) 0 4 mg SL tablet  Self Yes Yes   Sig: Place 0 4 mg under the tongue   oxyCODONE-acetaminophen (PERCOCET) 5-325 mg per tablet   No Yes   Sig: Take 1 tablet by mouth every 8 (eight) hours as needed for moderate pain for up to 10 days Max Daily Amount: 3 tablets   promethazine (PHENERGAN) 12 5 MG tablet  Self Yes Yes   Sig: Take 12 5 mg by mouth daily     senna ([employer] SENNA) 8 6 MG tablet  Self Yes Yes   Sig: Take by mouth   terbinafine (LAMISIL) 250 mg tablet  Self Yes Yes   Sig: Take 1 tablet by mouth daily   traZODone (DESYREL) 50 mg tablet  Self Yes Yes   Sig: Take 50 mg by mouth daily at bedtime      Facility-Administered Medications: None       Past Medical History:   Diagnosis Date    Bipolar depression ([place] [address] ) 3/17/2016    CAD S/P percutaneous coronary angioplasty 3/17/2016    Chronic pain     Colonic mass     COPD (chronic obstructive pulmonary disease) (HCC)     CVA (cerebral vascular accident) ([place] [address] )     R sided weakness    Essential hypertension 3/17/2016    GERD (gastroesophageal reflux disease)     Hepatitis C     Heroin abuse 3/18/2016    History of gastric ulcer     Hypertension     NSTEMI (non-ST elevated myocardial infarction) ([place] [address] ) 07/2012    Psychiatric disorder 09/03/2014    Inpatient admission     Schizoaffective disorder ([place] [address] ) 3/17/2016    Schizophrenia ([place] [address] ) 3/17/2016    STEMI (ST elevation myocardial infarction) ([place] [address] ) 12/2017    Type 2 diabetes mellitus ([place] [address] ) 3/17/2016       Past Surgical History:   Procedure Laterality Date    CARDIAC CATHETERIZATION  07/2016    COLONOSCOPY N/A 6/22/2018    Procedure: COLONOSCOPY;  Surgeon: Nik Harper MD;  [facility]; Service: Colorectal    CORONARY ANGIOPLASTY WITH STENT PLACEMENT  07/05/2012    MAKEDA (LAD), PTA (Diag)    CORONARY ANGIOPLASTY WITH STENT PLACEMENT  12/2017    MAKEDA/ PTA (RCA)    FL GUIDED CENTRAL VENOUS ACCESS REPLACEMENT  8/15/2018    [place] INSERT PICC W/ SUB-Q PORT N/A 8/15/2018    Procedure: INSERTION VENOUS PORT (PORT-A-CATH);   Surgeon: Kisha Sanabria MD;  [facility];  Service: Colorectal    [place] LAP,DIAGNOSTIC ABDOMEN N/A 7/17/2018    Procedure: LAPAROSCOPY DIAGNOSTIC, Laproscopic transverse loop colostomy, lysis of adhesions;  Surgeon: Juan Kwon MD;  [facility];  Service: Colorectal    [place] SIGMOIDOSCOPY FLX DX W/COLLJ SPEC BR/WA IF PFRMD N/A 8/15/2018    Procedure: Emily Sheth;  Surgeon: Mary Drake MD;  [facility];  Service: Colorectal       Family History   Problem Relation Age of Onset    Heart attack Father     Cancer Brother         gastric     I have reviewed and agree with the history as documented  Social History   Substance Use Topics    Smoking status: Current Every Day Smoker     Packs/day: 1 00     Years: 45 00     Types: Cigarettes    Smokeless tobacco: Never Used    Alcohol use No        Review of Systems   Constitutional: Negative for appetite change, chills and fever  HENT: Negative for sore throat  Respiratory: Negative for cough, shortness of breath and wheezing  Cardiovascular: Negative for chest pain and palpitations  Gastrointestinal: Negative for abdominal pain, diarrhea, nausea and vomiting  Genitourinary: Negative for dysuria and hematuria  Musculoskeletal: Negative for neck pain  Skin: Negative for rash  Neurological: Negative for dizziness, weakness and headaches  Psychiatric/Behavioral: Negative for suicidal ideas  All other systems reviewed and are negative  Physical Exam  Physical Exam   Constitutional: She is oriented to person, place, and time  She appears well-developed and well-nourished  No distress  HENT:   Head: Normocephalic and atraumatic  Right Ear: External ear normal    Left Ear: External ear normal    Nose: Nose normal    Eyes: Conjunctivae and EOM are normal  Pupils are equal, round, and reactive to light  Neck: Normal range of motion  Neck supple  Cardiovascular: Normal rate and regular rhythm  Pulmonary/Chest: Effort normal  She exhibits no tenderness and no crepitus  Abdominal: Soft  Musculoskeletal: Normal range of motion  Neurological: She is alert and oriented to person, place, and time  She has normal strength  Gait normal    Skin: Skin is warm and dry  No rash noted  She is not diaphoretic     Psychiatric: She has a normal mood and affect  Her behavior is normal  Judgment and thought content normal    Nursing note and vitals reviewed  Vital Signs  ED Triage Vitals [08/30/18 1544]   Temperature Pulse Respirations Blood Pressure SpO2   (!) 96 7 °F (35 9 °C) 87 16 137/72 99 %      Temp Source Heart Rate Source Patient Position - Orthostatic VS BP Location FiO2 (%)   Temporal Monitor Sitting Left arm --      Pain Score       No Pain           Vitals:    08/30/18 1544   BP: 137/72   Pulse: 87   Patient Position - Orthostatic VS: Sitting       Visual Acuity      ED Medications  Medications   neomycin-bacitracin-polymyxin b (NEOSPORIN) ointment 1 small application (1 small application Topical Given 8/30/18 1632)       Diagnostic Studies  Results Reviewed     None                 No orders to display              Procedures  Procedures       Phone Contacts  ED Phone Contact    ED Course  ED Course as of Aug 30 1640   Thu Aug 30, 2018   1632 Johnny Min with Dr Danica Leblanc, examined wound in ED and agrees it is minimal dehiscence at what is likely the subcu suture site   He cleaned it up and dressed, with recommendation to keep clean with gentle soap/water and follow up as planned  MDM  CritCare Time    Disposition  Final diagnoses:   Dehiscence of operative wound, initial encounter     Time reflects when diagnosis was documented in both MDM as applicable and the Disposition within this note     Time User Action Codes Description Comment    8/30/2018  4:14 PM Lory Hough Add [[id number]] Dehiscence of operative wound, initial encounter       ED Disposition     ED Disposition Condition Comment    Discharge  Emily A Abdullahi discharge to home/self care      Condition at discharge: Good        Follow-up Information     Follow up With Specialties Details Why Vlad Zhao MD Hematology and Oncology, Oncology, Hematology Go to For followup as needed [id number] [facility] [address] [facility]  [phone]            Patient's Medications   Discharge Prescriptions    No medications on file     No discharge procedures on file      ED Provider  Electronically Signed by           Guille Concepcion MD  08/30/18 1640

## 2018-09-04 ENCOUNTER — DOCUMENTATION (OUTPATIENT)
Dept: HEMATOLOGY ONCOLOGY | Facility: CLINIC | Age: 58
End: 2018-09-04

## 2018-09-04 NOTE — PROGRESS NOTES
GI Oncology Nurse Navigator Note    Received a call from Emily, stated her and Francois Diaz have moved into their new apartment and she reviewed the new address with this RN, also, a new home phone number was given, Emily stated home care needs to know the new address, asked her if she made them aware, stated she did not and she thinks they are coming to the home today and she did not have their phone number as everything was still in boxes, informed her this RN would call and inform them of the change, [address] and spoke to Ольга Solares, informed him of the patients new address and home phone number and he stated the home aide was going out tomorrow and the RN would be going out on Thursday, call made to [address] transport, informed them of the new address and new home phone number as well, called Emily back to let her know home care was not going to her home today that they would be coming out as mentioned above and to make sure she answers her phone as to not miss the calls from the aide and nurse so they can come in the home and complete their visits, Emily verbalized understanding, home address and home phone number changed in demographic area of patients chart as well, message sent to Dr Pizarro Kaushik office also to make them aware of the changes, instructed pt to call with any other questions or concerns

## 2018-09-07 ENCOUNTER — DOCUMENTATION (OUTPATIENT)
Dept: HEMATOLOGY ONCOLOGY | Facility: CLINIC | Age: 58
End: 2018-09-07

## 2018-09-07 ENCOUNTER — APPOINTMENT (INPATIENT)
Dept: RADIOLOGY | Facility: HOSPITAL | Age: 58
DRG: 683 | End: 2018-09-07
Payer: COMMERCIAL

## 2018-09-07 ENCOUNTER — HOSPITAL ENCOUNTER (INPATIENT)
Facility: HOSPITAL | Age: 58
LOS: 3 days | Discharge: HOME HEALTH | DRG: 683 | End: 2018-09-10
Attending: EMERGENCY MEDICINE | Admitting: INTERNAL MEDICINE
Payer: COMMERCIAL

## 2018-09-07 ENCOUNTER — APPOINTMENT (EMERGENCY)
Dept: RADIOLOGY | Facility: HOSPITAL | Age: 58
DRG: 683 | End: 2018-09-07
Payer: COMMERCIAL

## 2018-09-07 DIAGNOSIS — N17.9 AKI (ACUTE KIDNEY INJURY) (HCC): Primary | ICD-10-CM

## 2018-09-07 DIAGNOSIS — C19 RECTOSIGMOID CANCER (HCC): ICD-10-CM

## 2018-09-07 DIAGNOSIS — R55 NEAR SYNCOPE: ICD-10-CM

## 2018-09-07 DIAGNOSIS — E11.9 TYPE 2 DIABETES MELLITUS WITHOUT COMPLICATION, WITHOUT LONG-TERM CURRENT USE OF INSULIN (HCC): ICD-10-CM

## 2018-09-07 DIAGNOSIS — E87.1 HYPONATREMIA: ICD-10-CM

## 2018-09-07 PROBLEM — R42 DIZZINESS: Status: ACTIVE | Noted: 2018-09-07

## 2018-09-07 PROBLEM — R26.2 AMBULATORY DYSFUNCTION: Status: ACTIVE | Noted: 2018-09-07

## 2018-09-07 LAB
ANION GAP SERPL CALCULATED.3IONS-SCNC: 8 MMOL/L (ref 4–13)
BASOPHILS # BLD AUTO: 0.03 THOUSANDS/ΜL (ref 0–0.1)
BASOPHILS NFR BLD AUTO: 0 % (ref 0–1)
BUN SERPL-MCNC: 29 MG/DL (ref 5–25)
CALCIUM SERPL-MCNC: 10 MG/DL (ref 8.3–10.1)
CHLORIDE SERPL-SCNC: 100 MMOL/L (ref 100–108)
CO2 SERPL-SCNC: 25 MMOL/L (ref 21–32)
CREAT SERPL-MCNC: 1.99 MG/DL (ref 0.6–1.3)
EOSINOPHIL # BLD AUTO: 0.22 THOUSAND/ΜL (ref 0–0.61)
EOSINOPHIL NFR BLD AUTO: 2 % (ref 0–6)
ERYTHROCYTE [DISTWIDTH] IN BLOOD BY AUTOMATED COUNT: 13.6 % (ref 11.6–15.1)
GFR SERPL CREATININE-BSD FRML MDRD: 27 ML/MIN/1.73SQ M
GLUCOSE SERPL-MCNC: 248 MG/DL (ref 65–140)
GLUCOSE SERPL-MCNC: 292 MG/DL (ref 65–140)
HCT VFR BLD AUTO: 33.3 % (ref 34.8–46.1)
HGB BLD-MCNC: 10.4 G/DL (ref 11.5–15.4)
IMM GRANULOCYTES # BLD AUTO: 0.04 THOUSAND/UL (ref 0–0.2)
IMM GRANULOCYTES NFR BLD AUTO: 0 % (ref 0–2)
LYMPHOCYTES # BLD AUTO: 3.11 THOUSANDS/ΜL (ref 0.6–4.47)
LYMPHOCYTES NFR BLD AUTO: 32 % (ref 14–44)
MCH RBC QN AUTO: 27.7 PG (ref 26.8–34.3)
MCHC RBC AUTO-ENTMCNC: 31.2 G/DL (ref 31.4–37.4)
MCV RBC AUTO: 89 FL (ref 82–98)
MONOCYTES # BLD AUTO: 0.65 THOUSAND/ΜL (ref 0.17–1.22)
MONOCYTES NFR BLD AUTO: 7 % (ref 4–12)
NEUTROPHILS # BLD AUTO: 5.69 THOUSANDS/ΜL (ref 1.85–7.62)
NEUTS SEG NFR BLD AUTO: 59 % (ref 43–75)
NRBC BLD AUTO-RTO: 0 /100 WBCS
PLATELET # BLD AUTO: 323 THOUSANDS/UL (ref 149–390)
PMV BLD AUTO: 9.7 FL (ref 8.9–12.7)
POTASSIUM SERPL-SCNC: 4.2 MMOL/L (ref 3.5–5.3)
RBC # BLD AUTO: 3.76 MILLION/UL (ref 3.81–5.12)
SODIUM SERPL-SCNC: 133 MMOL/L (ref 136–145)
TROPONIN I SERPL-MCNC: <0.02 NG/ML
WBC # BLD AUTO: 9.74 THOUSAND/UL (ref 4.31–10.16)

## 2018-09-07 PROCEDURE — 36415 COLL VENOUS BLD VENIPUNCTURE: CPT | Performed by: EMERGENCY MEDICINE

## 2018-09-07 PROCEDURE — 82948 REAGENT STRIP/BLOOD GLUCOSE: CPT

## 2018-09-07 PROCEDURE — 80048 BASIC METABOLIC PNL TOTAL CA: CPT | Performed by: EMERGENCY MEDICINE

## 2018-09-07 PROCEDURE — 99223 1ST HOSP IP/OBS HIGH 75: CPT | Performed by: INTERNAL MEDICINE

## 2018-09-07 PROCEDURE — 85025 COMPLETE CBC W/AUTO DIFF WBC: CPT | Performed by: EMERGENCY MEDICINE

## 2018-09-07 PROCEDURE — 96361 HYDRATE IV INFUSION ADD-ON: CPT

## 2018-09-07 PROCEDURE — 84484 ASSAY OF TROPONIN QUANT: CPT | Performed by: EMERGENCY MEDICINE

## 2018-09-07 PROCEDURE — 96360 HYDRATION IV INFUSION INIT: CPT

## 2018-09-07 PROCEDURE — 70450 CT HEAD/BRAIN W/O DYE: CPT

## 2018-09-07 PROCEDURE — 71046 X-RAY EXAM CHEST 2 VIEWS: CPT

## 2018-09-07 PROCEDURE — 99285 EMERGENCY DEPT VISIT HI MDM: CPT

## 2018-09-07 PROCEDURE — 93005 ELECTROCARDIOGRAM TRACING: CPT

## 2018-09-07 RX ORDER — MIRTAZAPINE 15 MG/1
15 TABLET, FILM COATED ORAL DAILY
Status: DISCONTINUED | OUTPATIENT
Start: 2018-09-08 | End: 2018-09-10 | Stop reason: HOSPADM

## 2018-09-07 RX ORDER — SODIUM CHLORIDE 9 MG/ML
50 INJECTION, SOLUTION INTRAVENOUS CONTINUOUS
Status: DISPENSED | OUTPATIENT
Start: 2018-09-07 | End: 2018-09-08

## 2018-09-07 RX ORDER — NICOTINE 21 MG/24HR
1 PATCH, TRANSDERMAL 24 HOURS TRANSDERMAL DAILY
Status: DISCONTINUED | OUTPATIENT
Start: 2018-09-08 | End: 2018-09-10 | Stop reason: HOSPADM

## 2018-09-07 RX ORDER — SENNOSIDES 8.6 MG
1 TABLET ORAL
Status: DISCONTINUED | OUTPATIENT
Start: 2018-09-07 | End: 2018-09-10 | Stop reason: HOSPADM

## 2018-09-07 RX ORDER — ACETAMINOPHEN 325 MG/1
650 TABLET ORAL EVERY 6 HOURS PRN
Status: DISCONTINUED | OUTPATIENT
Start: 2018-09-07 | End: 2018-09-10 | Stop reason: HOSPADM

## 2018-09-07 RX ORDER — INSULIN ASPART 100 [IU]/ML
38 INJECTION, SUSPENSION SUBCUTANEOUS
Status: DISCONTINUED | OUTPATIENT
Start: 2018-09-08 | End: 2018-09-09

## 2018-09-07 RX ORDER — ONDANSETRON 2 MG/ML
4 INJECTION INTRAMUSCULAR; INTRAVENOUS EVERY 6 HOURS PRN
Status: DISCONTINUED | OUTPATIENT
Start: 2018-09-07 | End: 2018-09-10 | Stop reason: HOSPADM

## 2018-09-07 RX ORDER — TRAZODONE HYDROCHLORIDE 50 MG/1
50 TABLET ORAL
Status: DISCONTINUED | OUTPATIENT
Start: 2018-09-07 | End: 2018-09-10 | Stop reason: HOSPADM

## 2018-09-07 RX ORDER — CALCIUM CARBONATE 200(500)MG
1000 TABLET,CHEWABLE ORAL DAILY PRN
Status: DISCONTINUED | OUTPATIENT
Start: 2018-09-07 | End: 2018-09-10 | Stop reason: HOSPADM

## 2018-09-07 RX ORDER — CLOPIDOGREL BISULFATE 75 MG/1
75 TABLET ORAL DAILY
Status: DISCONTINUED | OUTPATIENT
Start: 2018-09-08 | End: 2018-09-10 | Stop reason: HOSPADM

## 2018-09-07 RX ORDER — ATORVASTATIN CALCIUM 10 MG/1
10 TABLET, FILM COATED ORAL DAILY
Status: DISCONTINUED | OUTPATIENT
Start: 2018-09-08 | End: 2018-09-10 | Stop reason: HOSPADM

## 2018-09-07 RX ORDER — INSULIN GLARGINE 100 [IU]/ML
10 INJECTION, SOLUTION SUBCUTANEOUS
Status: DISCONTINUED | OUTPATIENT
Start: 2018-09-07 | End: 2018-09-10 | Stop reason: HOSPADM

## 2018-09-07 RX ORDER — DULOXETIN HYDROCHLORIDE 60 MG/1
60 CAPSULE, DELAYED RELEASE ORAL DAILY
Status: DISCONTINUED | OUTPATIENT
Start: 2018-09-08 | End: 2018-09-10 | Stop reason: HOSPADM

## 2018-09-07 RX ADMIN — SODIUM CHLORIDE 1000 ML: 0.9 INJECTION, SOLUTION INTRAVENOUS at 19:42

## 2018-09-07 RX ADMIN — INSULIN LISPRO 2 UNITS: 100 INJECTION, SOLUTION INTRAVENOUS; SUBCUTANEOUS at 22:53

## 2018-09-07 RX ADMIN — INSULIN GLARGINE 10 UNITS: 100 INJECTION, SOLUTION SUBCUTANEOUS at 22:50

## 2018-09-07 RX ADMIN — SODIUM CHLORIDE 50 ML/HR: 0.9 INJECTION, SOLUTION INTRAVENOUS at 22:51

## 2018-09-07 RX ADMIN — TRAZODONE HYDROCHLORIDE 50 MG: 50 TABLET ORAL at 22:50

## 2018-09-07 RX ADMIN — SODIUM CHLORIDE 1000 ML: 0.9 INJECTION, SOLUTION INTRAVENOUS at 16:41

## 2018-09-07 NOTE — PROGRESS NOTES
GI Oncology Nurse Navigator Note    Received a call from Phyllis Pollock, he was very upset and speaking very fast and some of what he was saying was unclear, he was expressing his frustration that Emily was in the ED for (per Phyllis Pollock- 2 hours and had not been seen yet and he was told there were 7 or 8 people in front of him and he was getting frustrated and had to leave so he didn't say anything nasty but Emily was still there, he also said Emily wanted to leave but he told her she had to stay until the Dr saw her but she is becoming antsy, he stated she keeps calling him and telling him to come and get her that she wants to leave and that he is afraid she is going to walk out of there and fall and get hurt, he explained that he was very stressed out from taking care of her, that it is very hard caring for her and he doesn't know if he can do this, he stated he is getting no help from anyone, he said she has 3 daughters and none of them are helping him at all that everything is falling on him, he stated he is washing her, cooking for her, taking her everywhere she needs to go and he also brought up that they are having money issues, they just moved into an apartment and he said they are very low on money, he said they have no air conditioner and as hot as it has been it has been extremely uncomfortable in the apartment, he said they don't have money for food or gas, asked him if they are receiving food stamps and he stated they will start next week, informed him this RN will speak to Blake Thayer, , on Monday to see how we can help them more, he was very appreciative, he was also upset stating they are again out of colostomy bags and he was cleaning the same one out and he wasn't sure how much longer it was going to last, he again stated that caring for her is just too much for him, he stated he loves her and would do anything for her but it's just too much, informed him this RN would help with as much as possible and will talk with home care on Monday to see if we can coordinate times to ensure they can get in to help, informed him they did not get in last week because they could not get ahold of Magalys Arellano and stated they may have had a hard time finding the new apartment, he stated you have to walk through a breezeway to get to their door so this RN suggested maybe setting up a time with the RN and aide and meeting them in the front and showing them where their front door is so they know for future visits, Olamide Pride was agreeable, he sounded more calm after our conversation, instructed him to call with any other questions or concerns    Called and spoke to [place] at [facility] (the company who supplies colostomy bags), [facility] had called on 8/30 to inquire about the bags the patient had ordered and still not received, this RN was told on that day the ordered was being processed and was to be shipped overnight that day, he stated he just received the confirmation from Alexandre Kamara57 ([phone]) that they were dropped off at 1:30pm today at the patients new address ([address]), he apologized for the amount of time it took to get the patient the supplies    [facility] and informed him that the supplies were tracked and should be at the house per the rep from [facility], he stated he would check when he gets home, he was very thankful    Will continue to follow this patient

## 2018-09-07 NOTE — ED ATTENDING ATTESTATION
Maday Maynard MD, saw and evaluated the patient  All available labs and X-rays were ordered by me or the resident and have been reviewed by myself  I discussed the patient with the resident / non-physician and agree with the resident's / non-physician practitioner's findings and plan as documented in the resident's / non-physician practicitioner's note, except where noted  At this point, I agree with the current assessment done in the ED  Chief Complaint   Patient presents with    Dizziness      "She is being treated for cancer but dizzy spells and blacking out for awhile now  A couple of weeks " Pt's  answering all the pt's questions and he is "annoyed with everything"      60 F:  - a few months ago, diagnosed with colorectal cancer   - had surgery, now with colostomy bag    - she's on chemo, last dose 1 5 weeks ago  She is not getting radiation  - 3 episodes of severe dizziness / LH and falls with it without any injuries  - she called oncology who made her come in for evaluation  - denies f/ch/v  +nausea with reduced oral intake  - no falls, trauma  - the dizziness occurs with changes in position  Resolves when she is flat  - no prodrome to the dizziness episodes  PMH:  - HTN  - Hepatitis C  - Schizoaffective disorder  - DM2  - HTN  - CAD  - Heroin abuse  - COPD  - CVA  PSH:  - multiple  No smoking, drinking  Former drugs  PE:  Vitals:    09/09/18 1100 09/09/18 1101 09/09/18 1102 09/09/18 1540   BP: 96/59 101/61 (!) 83/52 (!) 81/53   BP Location: Left arm Left arm Left arm    Pulse: 76   80   Resp: 18      Temp: (!) 97 2 °F (36 2 °C)   98 96 °F (37 2 °C)   TempSrc: Oral      SpO2: 95%   98%   Weight:       Height:       General: VSS, NAD, awake, alert  Well-nourished, well-developed  Appears stated age  Speaking normally in full sentences  Head: Normocephalic, atraumatic, nontender  Eyes: PERRL, EOM-I  No diplopia  No hyphema     No subconjunctival hemorrhages  Symmetrical lids  ENT: Atraumatic external nose and ears  Mildly dry MM  No malocclusion  No stridor  Normal phonation  No drooling  Normal swallowing  Neck: Symmetric, trachea midline  No JVD  CV: RRR  +S1/S2  No murmurs or gallops  Peripheral pulses +2 throughout  No chest wall tenderness  Lungs:   Unlabored No retractions  CTAB, lungs sounds equal bilateral    No tachypnea  Abd: +BS, soft, NT/ND  Colostomy bag in place with a small amount of normal looking stool present  MSK:   Normal gait  FROM  Back:   No rashes  Skin: Dry, intact  No signs of cellulitis for port on the chest    Neuro: AAOx3, GCS 15, CN II-XII grossly intact  Motor grossly intact  Psychiatric/Behavioral: Appropriate mood and affect   Exam: deferred  A/P:  - dizziness: fluids, likely DC    - 13 point ROS was performed and all are normal unless stated in the history above  - Nursing note reviewed  Vitals reviewed  - Orders placed by myself and/or advanced practitioner / resident     - Previous chart was not reviewed  - No language barrier    - History obtained from patient  - There are no limitations to the history obtained  - Critical care time: Not applicable for this patient  Final Diagnosis:  1  RYLAN (acute kidney injury) ([facility] [place] [address] )    2  Near syncope    3  Hyponatremia    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[facility])      ED Course as of Sep 09 2344   Fri Sep 07, 2018   1659 This EKG was interpreted by me  The EKG demonstrates Normal sinus rhythm, normal intervals and axis, normal QRS, no acute ST changes present  HR 70  Similar to previous  1718 Creatinine: (!) 1 99   1719 RYLAN compared to previous  Receiving IVF    Creatinine: (!) 1 99     Medications   atorvastatin (LIPITOR) tablet 10 mg (10 mg Oral Given 9/9/18 1704)   clopidogrel (PLAVIX) tablet 75 mg (75 mg Oral Given 9/9/18 1047)   divalproex sodium (DEPAKOTE) EC tablet 750 mg (750 mg Oral Given 9/9/18 1051)   DULoxetine (CYMBALTA) delayed release capsule 60 mg (60 mg Oral Given 9/9/18 1047)   insulin glargine (LANTUS) subcutaneous injection 10 Units 0 1 mL (10 Units Subcutaneous Given 9/9/18 2203)   mirtazapine (REMERON) tablet 15 mg (15 mg Oral Given 9/9/18 1047)   traZODone (DESYREL) tablet 50 mg (50 mg Oral Given 9/9/18 2203)   sodium chloride 0 9 % infusion (0 mL/hr Intravenous Stopped 9/8/18 1331)   senna (SENOKOT) tablet 8 6 mg (not administered)   ondansetron (ZOFRAN) injection 4 mg (not administered)   calcium carbonate (TUMS) chewable tablet 1,000 mg (not administered)   nicotine (NICODERM CQ) 14 mg/24hr TD 24 hr patch 1 patch (1 patch Transdermal Medication Applied 9/9/18 1049)   acetaminophen (TYLENOL) tablet 650 mg (not administered)   insulin lispro (HumaLOG) 100 units/mL subcutaneous injection 1-5 Units (1 Units Subcutaneous Given 9/9/18 1705)   insulin lispro (HumaLOG) 100 units/mL subcutaneous injection 1-5 Units (2 Units Subcutaneous Given 9/9/18 2203)   heparin (porcine) subcutaneous injection 5,000 Units (5,000 Units Subcutaneous Given 9/9/18 2203)   melatonin tablet 6 mg (6 mg Oral Given 9/9/18 2203)   sodium chloride 0 9 % bolus 1,000 mL (0 mL Intravenous Stopped 9/7/18 1815)   sodium chloride 0 9 % bolus 1,000 mL (1,000 mL Intravenous New Bag 9/7/18 1942)     CT head wo contrast   Final Result      No acute intracranial abnormality  Mild chronic small vessel ischemic changes and volume loss  Old left basal ganglia and thalamic lacunar infarcts  Workstation performed: [id number]         XR chest 2 views   Final Result      No acute cardiopulmonary disease              Workstation performed: [id number]           Orders Placed This Encounter   Procedures    XR chest 2 views    CT head wo contrast    CBC and differential    Basic metabolic panel    Troponin I    Comprehensive metabolic panel    CBC and differential    Basic metabolic panel    CBC and Platelet    Diet Sukhi/CHO Controlled; Consistent Carbohydrate Diet Level 2 (5 carb servings/75 grams CHO/meal)    Continuous cardiac monitoring    Insert peripheral IV    Continuous pulse oximetry    Nursing communcation Continue IV as ordered     Gisela Whaley Notify admitting physician    Notify admitting physician on arrival    Vital Signs per unit routine    Up with assistance    I/O    Daily weights    Insert peripheral IV    Maintain IV access    Place sequential compression device    Orthostatic blood pressure    Insulin Subcutaneous Notify Physician    Insulin Subcutaneous Instruction    Fingerstick Glucose (POCT) Before meals and at bedtime    Fingerstick Glucose (POCT)    Orthostatic blood pressure    Level 1-Full Code: all life saving measures are indicated    Inpatient consult to Palliative Care    Inpatient consult to Case Management    OT eval and treat    PT eval and treat    EKG RESULTS    POCT urinalysis dipstick    ECG 12 lead    ECG 12 lead    Inpatient Admission (expected length of stay for this patient is greater than two midnights)     Labs Reviewed   CBC AND DIFFERENTIAL - Abnormal        Result Value Ref Range Status    WBC 9 74  4 31 - 10 16 Thousand/uL Final    RBC 3 76 (*) 3 81 - 5 12 Million/uL Final    Hemoglobin 10 4 (*) 11 5 - 15 4 g/dL Final    Hematocrit 33 3 (*) 34 8 - 46 1 % Final    MCV 89  82 - 98 fL Final    MCH 27 7  26 8 - 34 3 pg Final    MCHC 31 2 (*) 31 4 - 37 4 g/dL Final    RDW 13 6  11 6 - 15 1 % Final    MPV 9 7  8 9 - 12 7 fL Final    Platelets 156  746 - 390 Thousands/uL Final    nRBC 0  /100 WBCs Final    Neutrophils Relative 59  43 - 75 % Final    Immat GRANS % 0  0 - 2 % Final    Lymphocytes Relative 32  14 - 44 % Final    Monocytes Relative 7  4 - 12 % Final    Eosinophils Relative 2  0 - 6 % Final    Basophils Relative 0  0 - 1 % Final    Neutrophils Absolute 5 69  1 85 - 7 62 Thousands/µL Final    Immature Grans Absolute 0 04  0 00 - 0 20 Thousand/uL Final    Lymphocytes Absolute 3 11  0 60 - 4 47 Thousands/µL Final    Monocytes Absolute 0 65  0 17 - 1 22 Thousand/µL Final    Eosinophils Absolute 0 22  0 00 - 0 61 Thousand/µL Final    Basophils Absolute 0 03  0 00 - 0 10 Thousands/µL Final   BASIC METABOLIC PANEL - Abnormal     Sodium 133 (*) 136 - 145 mmol/L Final    Potassium 4 2  3 5 - 5 3 mmol/L Final    Chloride 100  100 - 108 mmol/L Final    CO2 25  21 - 32 mmol/L Final    ANION GAP 8  4 - 13 mmol/L Final    BUN 29 (*) 5 - 25 mg/dL Final    Creatinine 1 99 (*) 0 60 - 1 30 mg/dL Final    Comment: Standardized to IDMS reference method    Glucose 292 (*) 65 - 140 mg/dL Final    Comment:   If the patient is fasting, the ADA then defines impaired fasting glucose as > 100 mg/dL and diabetes as > or equal to 123 mg/dL  Specimen collection should occur prior to Sulfasalazine administration due to the potential for falsely depressed results  Specimen collection should occur prior to Sulfapyridine administration due to the potential for falsely elevated results  Calcium 10 0  8 3 - 10 1 mg/dL Final    eGFR 27  ml/min/1 73sq m Final    Narrative:     National Kidney Disease Education Program recommendations are as follows:  GFR calculation is accurate only with a steady state creatinine  Chronic Kidney disease less than 60 ml/min/1 73 sq  meters  Kidney failure less than 15 ml/min/1 73 sq  meters  TROPONIN I - Normal    Troponin I <0 02  <=0 04 ng/mL Final    Comment:   [employer] Chemistry analyzer 99% cutoff is > 0 04 ng/mL in network labs     o cTnI 99% cutoff is useful only when applied to patients in the clinical setting of myocardial ischemia   o cTnI 99% cutoff should be interpreted in the context of clinical history, ECG findings and possibly cardiac imaging to establish correct diagnosis  o cTnI 99% cutoff may be suggestive but clearly not indicative of a coronary event without the clinical setting of myocardial ischemia       POCT URINALYSIS DIPSTICK     Time reflects when diagnosis was documented in both MDM as applicable and the Disposition within this note     Time User Action Codes Description Comment    9/7/2018  3:58 PM Begum Mulch Add  ERRONEOUS ENCOUNTER--DISREGARD     9/7/2018  3:59 PM Begum Mulch Remove  ERRONEOUS ENCOUNTER--DISREGARD     9/7/2018  6:41 PM Begum Mulch Add [N17 9] RYLAN (acute kidney injury) ([facility] [address] )     9/7/2018  6:41 PM Begum Mulch Add [R55] Near syncope     9/7/2018  6:41 PM Begum Mulch Add [E87 1] Hyponatremia     9/7/2018  7:51 PM MorenMadeline Dimmer 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[facility] )     9/7/2018  7:51 PM Willodean Mew 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[facility])       ED Disposition     ED Disposition Condition Comment    Admit  Case was discussed with FLAKITA and the patient's admission status was agreed to be Admission Status: inpatient status to the service of Dr Win Nicolas           Follow-up Information    None       Current Discharge Medication List      CONTINUE these medications which have NOT CHANGED    Details   atorvastatin (LIPITOR) 10 mg tablet Take 1 tablet (10 mg total) by mouth daily  Qty: 30 tablet, Refills: 11    Associated Diagnoses: Mixed hyperlipidemia      clopidogrel (PLAVIX) 75 mg tablet Take 1 tablet (75 mg total) by mouth daily  Refills: 0    Associated Diagnoses: Colonic mass      divalproex sodium (DEPAKOTE) 250 mg EC tablet Take 3 tablets by mouth daily      DULoxetine (CYMBALTA) 60 mg delayed release capsule Take 60 mg by mouth daily        insulin aspart protamine-insulin aspart (NOVOLOG MIX 70/30) 100 units/mL injection Inject 38 Units under the skin Twice daily        insulin glargine (LANTUS) 100 units/mL subcutaneous injection Inject 10 Units under the skin daily at bedtime  Qty: 10 mL, Refills: 0    Associated Diagnoses: Type 2 diabetes mellitus with hyperglycemia, with long-term current use of insulin (HCC)      metFORMIN (GLUCOPHAGE) 1000 MG tablet metformin 1,000 mg tablet  BID      metoprolol tartrate (LOPRESSOR) 25 mg tablet Take 25 mg by mouth every 12 (twelve) hours mirtazapine (REMERON) 15 mg tablet Take 1 tablet by mouth daily        nitroglycerin (NITROSTAT) 0 4 mg SL tablet Place 0 4 mg under the tongue      promethazine (PHENERGAN) 12 5 MG tablet Take 12 5 mg by mouth daily        senna ([employer] SENNA) 8 6 MG tablet Take by mouth      terbinafine (LAMISIL) 250 mg tablet Take 1 tablet by mouth daily      traZODone (DESYREL) 50 mg tablet Take 50 mg by mouth daily at bedtime           No discharge procedures on file  Prior to Admission Medications   Prescriptions Last Dose Informant Patient Reported? Taking?    DULoxetine (CYMBALTA) 60 mg delayed release capsule  Self Yes No   Sig: Take 60 mg by mouth daily     atorvastatin (LIPITOR) 10 mg tablet  Self No No   Sig: Take 1 tablet (10 mg total) by mouth daily   clopidogrel (PLAVIX) 75 mg tablet  Self No No   Sig: Take 1 tablet (75 mg total) by mouth daily   divalproex sodium (DEPAKOTE) 250 mg EC tablet  Self Yes No   Sig: Take 3 tablets by mouth daily   insulin aspart protamine-insulin aspart (NOVOLOG MIX 70/30) 100 units/mL injection  Self Yes No   Sig: Inject 38 Units under the skin Twice daily     insulin glargine (LANTUS) 100 units/mL subcutaneous injection  Self No No   Sig: Inject 10 Units under the skin daily at bedtime   metFORMIN (GLUCOPHAGE) 1000 MG tablet  Self Yes No   Sig: metformin 1,000 mg tablet  BID   metoprolol tartrate (LOPRESSOR) 25 mg tablet  Self Yes No   Sig: Take 25 mg by mouth every 12 (twelve) hours   mirtazapine (REMERON) 15 mg tablet  Self Yes No   Sig: Take 1 tablet by mouth daily     nitroglycerin (NITROSTAT) 0 4 mg SL tablet  Self Yes No   Sig: Place 0 4 mg under the tongue   promethazine (PHENERGAN) 12 5 MG tablet  Self Yes No   Sig: Take 12 5 mg by mouth daily     senna ([employer] SENNA) 8 6 MG tablet  Self Yes No   Sig: Take by mouth   terbinafine (LAMISIL) 250 mg tablet  Self Yes No   Sig: Take 1 tablet by mouth daily   traZODone (DESYREL) 50 mg tablet  Self Yes No   Sig: Take 50 mg by mouth daily at bedtime      Facility-Administered Medications: None       Portions of the record may have been created with voice recognition software  Occasional wrong word or "sound a like" substitutions may have occurred due to the inherent limitations of voice recognition software  Read the chart carefully and recognize, using context, where substitutions have occurred      Electronically signed by:  Leon Hernandes

## 2018-09-07 NOTE — ED NOTES
Pt's  initially talking for the pt and talking over the pt  Pt's  "I called and talked to Sheeba Baker, she is head of all you here at [facility]  She said to bring her in  No one is doing anything and I am about to flip out   No disrespect but we aint go no other bags and that is not going to hold " Pt's  walked out stating "If I stay, I am going to flip out on someone'      Charlene Bronson 12, RN  09/07/18 6029

## 2018-09-07 NOTE — ED PROVIDER NOTES
History  Chief Complaint   Patient presents with    Dizziness      "She is being treated for cancer but dizzy spells and blacking out for awhile now  A couple of weeks " Pt's  answering all the pt's questions and he is "annoyed with everything"      59 yo F presents to ED for near syncope  Pt says that she was diagnosed with colon cancer a few months ago and had a colectomy with colostomy bag around that time  Pt says she last had chemo treatment about 1 5 wks ago and since then she has had decreased appetite, occasional nausea, a few episodes of vomiting, and three episodes of near syncope  Every episode occurs while pt goes from a seated to standing position  Pt says most recent episode occurred today  She had stood up to go get something and became very lightheaded and dizzy  She says that she fell, but her  was there to assist her, so she did not have any injuries  Did not hit her head  Did not have LOC  She says she now feels back to baseline, but her oncology nurse and  insisted that she be evaluated  She denies having any chest pain, shortness of breath, heart palpitations, abdominal pain, lower extremity swelling, weakness, sensory deficits  Prior to Admission Medications   Prescriptions Last Dose Informant Patient Reported? Taking?    DULoxetine (CYMBALTA) 60 mg delayed release capsule  Self Yes No   Sig: Take 60 mg by mouth daily     atorvastatin (LIPITOR) 10 mg tablet  Self No No   Sig: Take 1 tablet (10 mg total) by mouth daily   clopidogrel (PLAVIX) 75 mg tablet  Self No No   Sig: Take 1 tablet (75 mg total) by mouth daily   divalproex sodium (DEPAKOTE) 250 mg EC tablet  Self Yes No   Sig: Take 3 tablets by mouth daily   insulin aspart protamine-insulin aspart (NOVOLOG MIX 70/30) 100 units/mL injection  Self Yes No   Sig: Inject 38 Units under the skin Twice daily     insulin glargine (LANTUS) 100 units/mL subcutaneous injection  Self No No   Sig: Inject 10 Units under the skin daily at bedtime   metFORMIN (GLUCOPHAGE) 1000 MG tablet  Self Yes No   Sig: metformin 1,000 mg tablet  BID   metoprolol tartrate (LOPRESSOR) 25 mg tablet  Self Yes No   Sig: Take 25 mg by mouth every 12 (twelve) hours   mirtazapine (REMERON) 15 mg tablet  Self Yes No   Sig: Take 1 tablet by mouth daily     nitroglycerin (NITROSTAT) 0 4 mg SL tablet  Self Yes No   Sig: Place 0 4 mg under the tongue   promethazine (PHENERGAN) 12 5 MG tablet  Self Yes No   Sig: Take 12 5 mg by mouth daily     senna ([employer] SENNA) 8 6 MG tablet  Self Yes No   Sig: Take by mouth   terbinafine (LAMISIL) 250 mg tablet  Self Yes No   Sig: Take 1 tablet by mouth daily   traZODone (DESYREL) 50 mg tablet  Self Yes No   Sig: Take 50 mg by mouth daily at bedtime      Facility-Administered Medications: None       Past Medical History:   Diagnosis Date    Bipolar depression ([facility] ) 3/17/2016    CAD S/P percutaneous coronary angioplasty 3/17/2016    Cancer ([place] [address] )     Chronic pain     Colonic mass     Colostomy care ([place] [address] )     COPD (chronic obstructive pulmonary disease) ([facility] )     CVA (cerebral vascular accident) ([place] [address] )     R sided weakness    Essential hypertension 3/17/2016    GERD (gastroesophageal reflux disease)     Hepatitis C     Heroin abuse 3/18/2016    History of gastric ulcer     Hypertension     NSTEMI (non-ST elevated myocardial infarction) ([facility] ) 07/2012    Psychiatric disorder 09/03/2014    Inpatient admission     Schizoaffective disorder ([facility] ) 3/17/2016    Schizophrenia ([facility] ) 3/17/2016    STEMI (ST elevation myocardial infarction) ([place] [address] ) 12/2017    Type 2 diabetes mellitus ([facility] ) 3/17/2016       Past Surgical History:   Procedure Laterality Date    CARDIAC CATHETERIZATION  07/2016    COLONOSCOPY N/A 6/22/2018    Procedure: COLONOSCOPY;  Surgeon: Simon Mark MD;  [facility];   Service: Colorectal    CORONARY ANGIOPLASTY WITH STENT PLACEMENT  07/05/2012    MAKEDA (LAD), PTA (Diag)    CORONARY ANGIOPLASTY WITH STENT PLACEMENT  12/2017    MAKEDA/ PTA (RCA)    FL GUIDED CENTRAL VENOUS ACCESS REPLACEMENT  8/15/2018    DE INSERT PICC W/ SUB-Q PORT N/A 8/15/2018    Procedure: INSERTION VENOUS PORT (PORT-A-CATH); Surgeon: Kendall Parr MD;  [facility];  Service: Colorectal    DE LAP,DIAGNOSTIC ABDOMEN N/A 7/17/2018    Procedure: LAPAROSCOPY DIAGNOSTIC, Laproscopic transverse loop colostomy, lysis of adhesions;  Surgeon: Jaye Carroll MD;  [facility];  Service: Colorectal    DE SIGMOIDOSCOPY FLX DX W/COLLJ SPEC BR/WA IF PFRMD N/A 8/15/2018    Procedure: Maybelle Matter;  Surgeon: Kendall Parr MD;  [facility];  Service: Colorectal       Family History   Problem Relation Age of Onset    Heart attack Father     Cancer Brother         gastric     I have reviewed and agree with the history as documented  Social History   Substance Use Topics    Smoking status: Current Every Day Smoker     Packs/day: 1 00     Years: 45 00     Types: Cigarettes    Smokeless tobacco: Never Used    Alcohol use No      Comment: "only on occasion"        Review of Systems   Constitutional: Positive for fatigue  Negative for chills and fever  HENT: Negative for congestion, rhinorrhea, sore throat and trouble swallowing  Eyes: Negative for pain, discharge and visual disturbance  Respiratory: Negative for cough, chest tightness and shortness of breath  Cardiovascular: Negative for chest pain, palpitations and leg swelling  Gastrointestinal: Positive for nausea and vomiting  Negative for abdominal pain  Genitourinary: Negative for dysuria, frequency and urgency  Musculoskeletal: Negative for gait problem, neck pain and neck stiffness  Skin: Negative for color change, rash and wound  Neurological: Positive for dizziness and light-headedness  Negative for syncope and headaches         Physical Exam  ED Triage Vitals [09/07/18 1346]   Temperature Pulse Respirations Blood Pressure SpO2   97 9 °F (36 6 °C) 77 18 94/50 94 %      Temp Source Heart Rate Source Patient Position - Orthostatic VS BP Location FiO2 (%)   Tympanic Monitor Sitting Right arm --      Pain Score       9           Orthostatic Vital Signs  Vitals:    09/07/18 2034 09/07/18 2035 09/07/18 2100 09/07/18 2344   BP: 104/63 94/57 94/57 106/58   Pulse:  73  75   Patient Position - Orthostatic VS: Sitting Standing         Physical Exam   Constitutional: She is oriented to person, place, and time  She appears well-developed and well-nourished  No distress  HENT:   Head: Normocephalic and atraumatic  Mouth/Throat: Oropharynx is clear and moist    Eyes: Conjunctivae and EOM are normal  Right eye exhibits no discharge  Left eye exhibits no discharge  Mucus membranes dry   Neck: Normal range of motion  Neck supple  Cardiovascular: Normal rate, regular rhythm and intact distal pulses  Pulmonary/Chest: Effort normal and breath sounds normal  No stridor  No respiratory distress  She exhibits no tenderness  Abdominal: Soft  There is no tenderness  There is no rebound and no guarding  Musculoskeletal: Normal range of motion  She exhibits no edema or tenderness  Neurological: She is alert and oriented to person, place, and time  No sensory deficit  She exhibits normal muscle tone  Normal gait   Skin: Skin is warm and dry  Nursing note and vitals reviewed        ED Medications  Medications   atorvastatin (LIPITOR) tablet 10 mg (not administered)   clopidogrel (PLAVIX) tablet 75 mg (not administered)   divalproex sodium (DEPAKOTE) EC tablet 750 mg (not administered)   DULoxetine (CYMBALTA) delayed release capsule 60 mg (not administered)   insulin aspart protamine-insulin aspart (NovoLOG 70/30) 100 units/mL subcutaneous injection 38 Units (not administered)   insulin glargine (LANTUS) subcutaneous injection 10 Units 0 1 mL (10 Units Subcutaneous Given 9/7/18 5210)   metoprolol tartrate (LOPRESSOR) tablet 25 mg (25 mg Oral Not Given 9/7/18 2250)   mirtazapine (REMERON) tablet 15 mg (not administered)   traZODone (DESYREL) tablet 50 mg (50 mg Oral Given 9/7/18 2250)   sodium chloride 0 9 % infusion (50 mL/hr Intravenous New Bag 9/7/18 2251)   senna (SENOKOT) tablet 8 6 mg (not administered)   ondansetron (ZOFRAN) injection 4 mg (not administered)   calcium carbonate (TUMS) chewable tablet 1,000 mg (not administered)   nicotine (NICODERM CQ) 14 mg/24hr TD 24 hr patch 1 patch (not administered)   acetaminophen (TYLENOL) tablet 650 mg (not administered)   insulin lispro (HumaLOG) 100 units/mL subcutaneous injection 1-5 Units (not administered)   insulin lispro (HumaLOG) 100 units/mL subcutaneous injection 1-5 Units (2 Units Subcutaneous Given 9/7/18 2253)   sodium chloride 0 9 % bolus 1,000 mL (0 mL Intravenous Stopped 9/7/18 1815)   sodium chloride 0 9 % bolus 1,000 mL (1,000 mL Intravenous New Bag 9/7/18 1942)       Diagnostic Studies  Results Reviewed     Procedure Component Value Units Date/Time    Basic metabolic panel [[id number]]  (Abnormal) Collected:  09/07/18 1642    Lab Status:  Final result Specimen:  Blood from Arm, Left Updated:  09/07/18 1717     Sodium 133 (L) mmol/L      Potassium 4 2 mmol/L      Chloride 100 mmol/L      CO2 25 mmol/L      ANION GAP 8 mmol/L      BUN 29 (H) mg/dL      Creatinine 1 99 (H) mg/dL      Glucose 292 (H) mg/dL      Calcium 10 0 mg/dL      eGFR 27 ml/min/1 73sq m     Narrative:         National Kidney Disease Education Program recommendations are as follows:  GFR calculation is accurate only with a steady state creatinine  Chronic Kidney disease less than 60 ml/min/1 73 sq  meters  Kidney failure less than 15 ml/min/1 73 sq  meters      Troponin I [[id number]]  (Normal) Collected:  09/07/18 1642    Lab Status:  Final result Specimen:  Blood from Arm, Left Updated:  09/07/18 1714     Troponin I <0 02 ng/mL     CBC and differential [[id number]]  (Abnormal) Collected:  09/07/18 1642    Lab Status:  Final result Specimen:  Blood from Arm, Left Updated:  09/07/18 1651     WBC 9 74 Thousand/uL      RBC 3 76 (L) Million/uL      Hemoglobin 10 4 (L) g/dL      Hematocrit 33 3 (L) %      MCV 89 fL      MCH 27 7 pg      MCHC 31 2 (L) g/dL      RDW 13 6 %      MPV 9 7 fL      Platelets 467 Thousands/uL      nRBC 0 /100 WBCs      Neutrophils Relative 59 %      Immat GRANS % 0 %      Lymphocytes Relative 32 %      Monocytes Relative 7 %      Eosinophils Relative 2 %      Basophils Relative 0 %      Neutrophils Absolute 5 69 Thousands/µL      Immature Grans Absolute 0 04 Thousand/uL      Lymphocytes Absolute 3 11 Thousands/µL      Monocytes Absolute 0 65 Thousand/µL      Eosinophils Absolute 0 22 Thousand/µL      Basophils Absolute 0 03 Thousands/µL     POCT urinalysis dipstick [[id number]]     Lab Status:  No result Specimen:  Urine                  CT head wo contrast   Final Result by Chani Jarrell MD (09/07 2205)      No acute intracranial abnormality  Mild chronic small vessel ischemic changes and volume loss  Old left basal ganglia and thalamic lacunar infarcts  Workstation performed: [id number]         XR chest 2 views   Final Result by Eduardo Cool MD (09/07 2022)      No acute cardiopulmonary disease  Workstation performed: [id number]               Procedures  Procedures      Phone Consults  ED Phone Contact    ED Course                               MDM  Number of Diagnoses or Management Options  RYLAN (acute kidney injury) [facility]): Hyponatremia:   Near syncope:   Diagnosis management comments: RYLAN, hyponatremia, near syncopal episodes likely due to dehydration, poor po intake  IVF hydration begun in ED    Will admit to [facility] for further management    CritCare Time    Disposition  Final diagnoses:   RYLAN (acute kidney injury) ([facility] )   Near syncope   Hyponatremia     Time reflects when diagnosis was documented in both MDM as applicable and the Disposition within this note     Time User Action Codes Description Comment    9/7/2018  3:58 PM Marco Antonio Barba Add  ERRONEOUS ENCOUNTER--DISREGARD     9/7/2018  3:59 PM Marco Antonio Barba Remove  ERRONEOUS ENCOUNTER--DISREGARD     9/7/2018  6:41 PM Marco Antonio Barba Add [N17 9] RYLAN (acute kidney injury) ([facility] )     9/7/2018  6:41 PM Marco Antonio Barba Add [R55] Near syncope     9/7/2018  6:41 PM Marco Antonio Barba Add [E87 1] Hyponatremia     9/7/2018  7:51 PM Jessica Suresh 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[facility] )     9/7/2018  7:51 PM Dale Duenas Modify [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[facility])       ED Disposition     ED Disposition Condition Comment    Admit  Case was discussed with FLAKITA and the patient's admission status was agreed to be Admission Status: inpatient status to the service of Dr Mark Camejo           Follow-up Information    None         Current Discharge Medication List      CONTINUE these medications which have NOT CHANGED    Details   atorvastatin (LIPITOR) 10 mg tablet Take 1 tablet (10 mg total) by mouth daily  Qty: 30 tablet, Refills: 11    Associated Diagnoses: Mixed hyperlipidemia      clopidogrel (PLAVIX) 75 mg tablet Take 1 tablet (75 mg total) by mouth daily  Refills: 0    Associated Diagnoses: Colonic mass      divalproex sodium (DEPAKOTE) 250 mg EC tablet Take 3 tablets by mouth daily      DULoxetine (CYMBALTA) 60 mg delayed release capsule Take 60 mg by mouth daily        insulin aspart protamine-insulin aspart (NOVOLOG MIX 70/30) 100 units/mL injection Inject 38 Units under the skin Twice daily        insulin glargine (LANTUS) 100 units/mL subcutaneous injection Inject 10 Units under the skin daily at bedtime  Qty: 10 mL, Refills: 0    Associated Diagnoses: Type 2 diabetes mellitus with hyperglycemia, with long-term current use of insulin (HCC)      metFORMIN (GLUCOPHAGE) 1000 MG tablet metformin 1,000 mg tablet  BID      metoprolol tartrate (LOPRESSOR) 25 mg tablet Take 25 mg by mouth every 12 (twelve) hours      mirtazapine (REMERON) 15 mg tablet Take 1 tablet by mouth daily        nitroglycerin (NITROSTAT) 0 4 mg SL tablet Place 0 4 mg under the tongue      promethazine (PHENERGAN) 12 5 MG tablet Take 12 5 mg by mouth daily        senna ([employer] SENNA) 8 6 MG tablet Take by mouth      terbinafine (LAMISIL) 250 mg tablet Take 1 tablet by mouth daily      traZODone (DESYREL) 50 mg tablet Take 50 mg by mouth daily at bedtime           No discharge procedures on file  ED Provider  Attending physically available and evaluated Emily Abdullahi I managed the patient along with the ED Attending      Electronically Signed by         Mansi Arnold MD  09/08/18 4919

## 2018-09-07 NOTE — PROGRESS NOTES
GI Oncology Nurse Navigator Note    Spoke to [facility], asked about the home care visits that were supposed to happen this week for Emily, the  stated there were notes in her chart from the aide that stated they could not reach [facility] and the RN stated the patient was not home, she stated the next day the RN is scheduled to go out is 9/11, informed her the patient is scheduled for a chemotherapy infusion that day so she moved it to 9/10, gave her my phone number and told her that this RN could help with scheduling and maybe we could find a way to ensure the nurse and aide are getting there, the  took this RNs contact information and put it in [facility] chart and stated she would have the nurse call me Monday

## 2018-09-07 NOTE — PROGRESS NOTES
GI Oncology Nurse Navigator Note    Brendon Harkins to find out where they were, he stated they were getting ready to leave to go to the ED, could hear Emily screaming in the background "I'm not going anywhere", Jeremy Olmedo stated "yes you are", he was explaining to her that she needs to get checked out, he told her I was on the phone at which time I could hear her say "ok, I will go", Jeremy Olmedo then gave the phone to Emily, asked her how she was, she stated "I'm ok", asked her if she fell this morning and she answered no, this RN said that Jeremy Olmedo had informed me that she had fallen at which time she said that she had, she forgot, asked her if she hit her head, she said she did not, asked her if she was sure, she stated she thinks so, asked her to go to the ED to be evaluated, she stated she would go, they stated they were going to leave now, she stated the home care nurses were to come this week and no one came, will call [facility] home care and find out what happened, informed Emily she needs to answer the phone when they call so they come, she states she did, will continue to follow

## 2018-09-07 NOTE — PROGRESS NOTES
GI Oncology Nurse Navigator Note    Received a call from Luz Elena, he stated he is having a hard time with Emily, stated she has become very forgetful and "doesn't know what she is talking about", he states they were over visiting the grandson the other night and Emily received a call that her mother was in the hospital and Emily became frantic and said they needed to leave immediately and ended up leaving the child alone for approximately 30 minutes and he stated this is very unlike her, he also informed this RN that this morning Emily was going to the refrigerator to get something to eat and she fell and he was unsure if she hit her head, asked what she was doing now and he stated he was not at home with her, she was home alone and when he left her she was sleeping, instructed him to immediately go get her and take her to the ED for evaluation as it was unsure if she hit her head, stated he was leaving his current location and was going to retrieve her and take her to the ED and will call this RN once they arrive there, will inform Marisol Arteaga RN (Dr Curly Alford) of the above as well, Luz Eelna also stated Emily is unable to care for herself, he states he has been bathing her and changing her colostomy bags and "this is too much for me to handle", he was stating he doesn't know if he can do this

## 2018-09-08 PROBLEM — I95.1 ORTHOSTATIC HYPOTENSION: Status: ACTIVE | Noted: 2018-09-08

## 2018-09-08 LAB
ALBUMIN SERPL BCP-MCNC: 2.6 G/DL (ref 3.5–5)
ALP SERPL-CCNC: 108 U/L (ref 46–116)
ALT SERPL W P-5'-P-CCNC: 14 U/L (ref 12–78)
ANION GAP SERPL CALCULATED.3IONS-SCNC: 8 MMOL/L (ref 4–13)
AST SERPL W P-5'-P-CCNC: 11 U/L (ref 5–45)
ATRIAL RATE: 70 BPM
BASOPHILS # BLD AUTO: 0.02 THOUSANDS/ΜL (ref 0–0.1)
BASOPHILS NFR BLD AUTO: 0 % (ref 0–1)
BILIRUB SERPL-MCNC: 0.28 MG/DL (ref 0.2–1)
BUN SERPL-MCNC: 24 MG/DL (ref 5–25)
CALCIUM SERPL-MCNC: 8.4 MG/DL (ref 8.3–10.1)
CHLORIDE SERPL-SCNC: 109 MMOL/L (ref 100–108)
CO2 SERPL-SCNC: 21 MMOL/L (ref 21–32)
CREAT SERPL-MCNC: 1.24 MG/DL (ref 0.6–1.3)
EOSINOPHIL # BLD AUTO: 0.24 THOUSAND/ΜL (ref 0–0.61)
EOSINOPHIL NFR BLD AUTO: 3 % (ref 0–6)
ERYTHROCYTE [DISTWIDTH] IN BLOOD BY AUTOMATED COUNT: 13.6 % (ref 11.6–15.1)
GFR SERPL CREATININE-BSD FRML MDRD: 48 ML/MIN/1.73SQ M
GLUCOSE SERPL-MCNC: 142 MG/DL (ref 65–140)
GLUCOSE SERPL-MCNC: 175 MG/DL (ref 65–140)
GLUCOSE SERPL-MCNC: 188 MG/DL (ref 65–140)
GLUCOSE SERPL-MCNC: 74 MG/DL (ref 65–140)
GLUCOSE SERPL-MCNC: 88 MG/DL (ref 65–140)
HCT VFR BLD AUTO: 28.5 % (ref 34.8–46.1)
HGB BLD-MCNC: 9 G/DL (ref 11.5–15.4)
IMM GRANULOCYTES # BLD AUTO: 0.02 THOUSAND/UL (ref 0–0.2)
IMM GRANULOCYTES NFR BLD AUTO: 0 % (ref 0–2)
LYMPHOCYTES # BLD AUTO: 2.4 THOUSANDS/ΜL (ref 0.6–4.47)
LYMPHOCYTES NFR BLD AUTO: 27 % (ref 14–44)
MCH RBC QN AUTO: 27.8 PG (ref 26.8–34.3)
MCHC RBC AUTO-ENTMCNC: 31.6 G/DL (ref 31.4–37.4)
MCV RBC AUTO: 88 FL (ref 82–98)
MONOCYTES # BLD AUTO: 0.68 THOUSAND/ΜL (ref 0.17–1.22)
MONOCYTES NFR BLD AUTO: 8 % (ref 4–12)
NEUTROPHILS # BLD AUTO: 5.49 THOUSANDS/ΜL (ref 1.85–7.62)
NEUTS SEG NFR BLD AUTO: 62 % (ref 43–75)
NRBC BLD AUTO-RTO: 0 /100 WBCS
P AXIS: 81 DEGREES
PLATELET # BLD AUTO: 284 THOUSANDS/UL (ref 149–390)
PMV BLD AUTO: 10 FL (ref 8.9–12.7)
POTASSIUM SERPL-SCNC: 3.9 MMOL/L (ref 3.5–5.3)
PR INTERVAL: 160 MS
PROT SERPL-MCNC: 6.4 G/DL (ref 6.4–8.2)
QRS AXIS: 76 DEGREES
QRSD INTERVAL: 110 MS
QT INTERVAL: 398 MS
QTC INTERVAL: 429 MS
RBC # BLD AUTO: 3.24 MILLION/UL (ref 3.81–5.12)
SODIUM SERPL-SCNC: 138 MMOL/L (ref 136–145)
T WAVE AXIS: 64 DEGREES
VENTRICULAR RATE: 70 BPM
WBC # BLD AUTO: 8.85 THOUSAND/UL (ref 4.31–10.16)

## 2018-09-08 PROCEDURE — 82948 REAGENT STRIP/BLOOD GLUCOSE: CPT

## 2018-09-08 PROCEDURE — 93010 ELECTROCARDIOGRAM REPORT: CPT | Performed by: INTERNAL MEDICINE

## 2018-09-08 PROCEDURE — 80053 COMPREHEN METABOLIC PANEL: CPT | Performed by: PHYSICIAN ASSISTANT

## 2018-09-08 PROCEDURE — 85025 COMPLETE CBC W/AUTO DIFF WBC: CPT | Performed by: PHYSICIAN ASSISTANT

## 2018-09-08 PROCEDURE — 99232 SBSQ HOSP IP/OBS MODERATE 35: CPT | Performed by: INTERNAL MEDICINE

## 2018-09-08 RX ORDER — LANOLIN ALCOHOL/MO/W.PET/CERES
6 CREAM (GRAM) TOPICAL
Status: DISCONTINUED | OUTPATIENT
Start: 2018-09-08 | End: 2018-09-10 | Stop reason: HOSPADM

## 2018-09-08 RX ORDER — HEPARIN SODIUM 5000 [USP'U]/ML
5000 INJECTION, SOLUTION INTRAVENOUS; SUBCUTANEOUS EVERY 8 HOURS SCHEDULED
Status: DISCONTINUED | OUTPATIENT
Start: 2018-09-08 | End: 2018-09-10 | Stop reason: HOSPADM

## 2018-09-08 RX ADMIN — SODIUM CHLORIDE 50 ML/HR: 0.9 INJECTION, SOLUTION INTRAVENOUS at 03:26

## 2018-09-08 RX ADMIN — METOPROLOL TARTRATE 25 MG: 25 TABLET ORAL at 22:31

## 2018-09-08 RX ADMIN — INSULIN ASPART 38 UNITS: 100 INJECTION, SUSPENSION SUBCUTANEOUS at 17:50

## 2018-09-08 RX ADMIN — TRAZODONE HYDROCHLORIDE 50 MG: 50 TABLET ORAL at 22:31

## 2018-09-08 RX ADMIN — DIVALPROEX SODIUM 750 MG: 500 TABLET, DELAYED RELEASE ORAL at 09:02

## 2018-09-08 RX ADMIN — HEPARIN SODIUM 5000 UNITS: 5000 INJECTION, SOLUTION INTRAVENOUS; SUBCUTANEOUS at 15:41

## 2018-09-08 RX ADMIN — MELATONIN TAB 3 MG 6 MG: 3 TAB at 22:45

## 2018-09-08 RX ADMIN — NICOTINE 1 PATCH: 14 PATCH, EXTENDED RELEASE TRANSDERMAL at 09:01

## 2018-09-08 RX ADMIN — INSULIN LISPRO 1 UNITS: 100 INJECTION, SOLUTION INTRAVENOUS; SUBCUTANEOUS at 09:00

## 2018-09-08 RX ADMIN — MIRTAZAPINE 15 MG: 15 TABLET, FILM COATED ORAL at 09:01

## 2018-09-08 RX ADMIN — INSULIN GLARGINE 10 UNITS: 100 INJECTION, SOLUTION SUBCUTANEOUS at 22:45

## 2018-09-08 RX ADMIN — DULOXETINE HYDROCHLORIDE 60 MG: 60 CAPSULE, DELAYED RELEASE ORAL at 09:01

## 2018-09-08 RX ADMIN — ATORVASTATIN CALCIUM 10 MG: 10 TABLET, FILM COATED ORAL at 17:50

## 2018-09-08 RX ADMIN — CLOPIDOGREL BISULFATE 75 MG: 75 TABLET, FILM COATED ORAL at 09:01

## 2018-09-08 RX ADMIN — INSULIN ASPART 38 UNITS: 100 INJECTION, SUSPENSION SUBCUTANEOUS at 09:01

## 2018-09-08 RX ADMIN — HEPARIN SODIUM 5000 UNITS: 5000 INJECTION, SOLUTION INTRAVENOUS; SUBCUTANEOUS at 22:31

## 2018-09-08 NOTE — ASSESSMENT & PLAN NOTE
· Patient with dizziness over the last several days following her last chemo treatment 1 5 weeks ago  · Worse with positional changes, suspect component of orthostatic hypotension in setting of nausea, vomiting, dehydration from decreased p o   Intake  · Will check CT head  · Orthostatics  · IVF  · PT/OT

## 2018-09-08 NOTE — CASE MANAGEMENT
Initial Clinical Review    Thank you,  [facility] Review Department  Phone: [phone]; Fax [phone]  ATTENTION: Please call with any questions or concerns to [phone]  and carefully follow the prompts so that you are directed to the right person  Send all requests for admission clinical reviews, approved or denied determinations and any other requests to fax [phone]  All voicemails are confidential        Admission: Date/Time/Statement: 9/7/18 @ 1842     Orders Placed This Encounter   Procedures    Inpatient Admission (expected length of stay for this patient is greater than two midnights)     Standing Status:   Standing     Number of Occurrences:   1     Order Specific Question:   Admitting Physician     Answer:   Nita Concepcion [[id number]]     Order Specific Question:   Level of Care     Answer:   Med Surg [16]     Order Specific Question:   Estimated length of stay     Answer:   More than 2 Midnights     Order Specific Question:   Certification     Answer:   I certify that inpatient services are medically necessary for this patient for a duration of greater than two midnights  See H&P and MD Progress Notes for additional information about the patient's course of treatment  ED: Date/Time/Mode of Arrival:   ED Arrival Information     Expected Arrival Acuity Means of Arrival Escorted By Service Admission Type    - 9/7/2018 13:35 Urgent Walk-In Self General Medicine Urgent    Arrival Complaint    dizziness          Chief Complaint:   Chief Complaint   Patient presents with    Dizziness      "She is being treated for cancer but dizzy spells and blacking out for awhile now  A couple of weeks " Pt's  answering all the pt's questions and he is "annoyed with everything"        History of Illness:  60 y o  female with PMH colon CA with mets to liver s/p resection and colostomy, CAD, HTN, HLD, DM, who presents with dizziness, n/v for several day  Reports these bouts of dizziness started after her last chemo treatment 1 5 weeks ago  Reports the dizziness is worth with positional changes, worse when going sitting to standing position  Also reports to associated lightheadedness  Has had a few falls  She has never had symptoms like this in the past   Does report poor oral intake, with a few episodes of nausea and vomiting over the last few days        ED Vital Signs:   ED Triage Vitals [09/07/18 1346]   Temperature Pulse Respirations Blood Pressure SpO2   97 9 °F (36 6 °C) 77 18 94/50 94 %      Temp Source Heart Rate Source Patient Position - Orthostatic VS BP Location FiO2 (%)   Tympanic Monitor Sitting Right arm --      Pain Score       9        Wt Readings from Last 1 Encounters:   09/08/18 56 6 kg (124 lb 12 5 oz)       Vital Signs (abnormal): WNL    Abnormal Labs/Diagnostic Test Results:   WBC 4 31 - 10 16 Thousand/uL 9 74    RBC 3 81 - 5 12 Million/uL 3 76     Hemoglobin 11 5 - 15 4 g/dL 10 4     Hematocrit 34 8 - 46 1 % 33 3         Ref Range & Units 09/07/18 1642   Sodium 136 - 145 mmol/L 133     Potassium 3 5 - 5 3 mmol/L 4 2    Chloride 100 - 108 mmol/L 100    CO2 21 - 32 mmol/L 25    ANION GAP 4 - 13 mmol/L 8    BUN 5 - 25 mg/dL 29     Creatinine 0 60 - 1 30 mg/dL 1 99     Glucose 65 - 140 mg/dL 292       CXR -- No acute cardiopulmonary disease  CT head --No acute intracranial abnormality  Mild chronic small vessel ischemic changes and volume loss  Old left basal ganglia and thalamic lacunar infarcts  EKG -- NSR      ED Treatment:   Medication Administration from 09/07/2018 1335 to 09/07/2018 1957       Date/Time Order Dose Route Action Action by Comments     09/07/2018 1641 sodium chloride 0 9 % bolus 1,000 mL 1,000 mL Intravenous Kong 37 Tsering Mcdonald RN      09/07/2018 1942 sodium chloride 0 9 % bolus 1,000 mL 1,000 mL Intravenous New Bag Rico Edmonds RN           Past Medical/Surgical History:    Active Ambulatory Problems Diagnosis Date Noted    CVA [facility]cerebral vascular accident) ([facility] ) 03/17/2016    Insulin-dependent diabetes mellitus 03/17/2016    Schizoaffective disorder, bipolar type ([facility] ) 03/17/2016    Essential hypertension 03/17/2016    Colonic mass 06/19/2018    Type 2 diabetes mellitus with complication (HCC)     CKD (chronic kidney disease) stage 3, GFR 30-59 ml/min     Pain of right upper extremity 06/20/2018    RYLAN (acute kidney injury)  06/20/2018    CAD (coronary artery disease) 06/20/2018    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    Large bowel obstruction ([facility] ) 07/15/2018    Brachial artery stenosis, 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    Rectosigmoid mass - High colostomy output 07/28/2018    Leukocytosis 07/28/2018    GERD (gastroesophageal reflux disease) 07/28/2018    Tobacco abuse 07/28/2018    Liver metastasis ([facility] ) 08/03/2018    Mixed hyperlipidemia 08/08/2018     Past Medical History:   Diagnosis Date    Bipolar depression ([facility] ) 3/17/2016    CAD S/P percutaneous coronary angioplasty 3/17/2016    Cancer (HCC)     Chronic pain     Colonic mass     Colostomy care ([facility] )     COPD (chronic obstructive pulmonary disease) ([facility] )     CVA (cerebral vascular accident) ([facility] )     Essential hypertension 3/17/2016    GERD (gastroesophageal reflux disease)     Hepatitis C     Heroin abuse 3/18/2016    History of gastric ulcer     Hypertension     NSTEMI (non-ST elevated myocardial infarction) ([facility] ) 07/2012    Psychiatric disorder 09/03/2014    Schizoaffective disorder ([facility] ) 3/17/2016    Schizophrenia ([facility] ) 3/17/2016    STEMI (ST elevation myocardial infarction) ([facility] ) 12/2017    Type 2 diabetes mellitus ([facility] ) 3/17/2016       Admitting Diagnosis: Dizziness [R42]  Hyponatremia [E87 1]  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[facility] ) [C19]  RYLAN (acute kidney injury) ([facility] ) [N17 9]  Near syncope [R55]    Age/Sex: 60 y o  female    Assessment/Plan:   * Dizziness   Assessment & Plan     · Patient with dizziness over the last several days following her last chemo treatment 1 5 weeks ago  · Worse with positional changes, suspect component of orthostatic hypotension in setting of nausea, vomiting, dehydration from decreased p o  Intake  · Will check CT head  · Orthostatics  · IVF  · PT/OT          RYLAN (acute kidney injury)    Assessment & Plan     · Creatinine 1 9 on admission, up from a baseline of about 0 8  · Likely secondary to dehydration, decreased p o  intake, nausea, vomiting  · IV fluid  · Monitor BMP  · Avoid nephrotoxins          Ambulatory dysfunction   Assessment & Plan     · Patient with recurrent dizziness and falls over the last few days  Denies injuries  · Will check CT head  · PT/OT  · Palliative care and case management for assistance with outpatient management  It seems that the patient is requiring more help at home           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[facility])   Assessment & Plan     · Follows with Oncology outpatient, last chemo was 1 5 weeks ago  · With mets to liver  · Status post resection with colostomy  · Will check CT head for evaluation of meds in setting of dizziness          CAD (coronary artery disease)   Assessment & Plan     · Follows up with Cardiology outpatient (Dr Noelle Bernal)  Had stent placed in 2012 with recurrence MRI an occluded stent of the LAD without intervention in 2016     · Continue Plavix, stop aspirin per last Cardiology patient record  · Continue metoprolol          Essential hypertension   Assessment & Plan     · Continue metoprolol  · Monitor          Schizoaffective disorder, bipolar type (HCC)   Assessment & Plan     · Continue Cymbalta, Remeron          Insulin-dependent diabetes mellitus   Assessment & Plan             Lab Results   Component Value Date     HGBA1C 10 4 (H) 06/20/2018         No results for input(s): POCGLU in the last 72 hours      Blood Sugar Average: Last 72 hrs:     · Hold metformin while inpatient  · Continue current insulin regimen, Accu-Cheks, SSI                    VTE Prophylaxis: Pharmacologic VTE Prophylaxis contraindicated due to await CT head to r/o brain mets  / sequential compression device   Code Status: Level 1 - Full Code  POLST: There is no POLST form on file for this patient (pre-hospital)  Discussion with family: patient     Anticipated Length of Stay:  Patient will be admitted on an Inpatient basis with an anticipated length of stay of  Greater than 2 midnights     Justification for Hospital Stay: ambulatory dysfunction, RYLAN, dizziness w/u and tx         Admission Orders:  M/S/Tele unit  Telem  Orthostatic bp's   accuchecks qid w/ ssi  Cons carb diet  Monitor I&O's  Daily weights  PT/OT evals  Consult palliative care    Scheduled Meds:   Current Facility-Administered Medications:  acetaminophen 650 mg Oral Q6H PRN Mera Suresh PA-C   atorvastatin 10 mg Oral Daily Mera YANELIS Suresh   calcium carbonate 1,000 mg Oral Daily PRN Mera CHESTER Suresh-LUIS   clopidogrel 75 mg Oral Daily Mera YANELIS Suresh   divalproex sodium 750 mg Oral Daily MeraCHESTER Enamorado-LUIS   DULoxetine 60 mg Oral Daily Mera CHESTER Suresh-LUIS   insulin aspart protamine-insulin aspart 38 Units Subcutaneous BID AC Meraleyla Suresh PA-C   insulin glargine 10 Units Subcutaneous HS CHESTER Sandy-LUIS   insulin lispro 1-5 Units Subcutaneous TID AC Mera CHESTER Suresh-LUIS   insulin lispro 1-5 Units Subcutaneous HS Mera CHESTER Suresh-LUIS   metoprolol tartrate 25 mg Oral Q12H [address] Mera Suresh PA-C   mirtazapine 15 mg Oral Daily Mera Suresh PA-C   nicotine 1 patch Transdermal Daily Mera Suresh PA-C   ondansetron 4 mg Intravenous Q6H PRN Mera Suresh PA-C   senna 1 tablet Oral HS PRN Mera Suresh PA-C   traZODone 50 mg Oral HS Mera Suresh PA-C     Continuous Infusions:    PRN Meds:   acetaminophen    calcium carbonate    ondansetron    senna

## 2018-09-08 NOTE — ASSESSMENT & PLAN NOTE
· Patient with recurrent dizziness and falls over the last few days  Denies injuries  · PT/OT  · Palliative care and case management for assistance with outpatient management  It seems that the patient is requiring more help at home

## 2018-09-08 NOTE — ASSESSMENT & PLAN NOTE
· Patient with recurrent dizziness and falls over the last few days  Denies injuries  · Will check CT head  · PT/OT  · Palliative care and case management for assistance with outpatient management  It seems that the patient is requiring more help at home

## 2018-09-08 NOTE — ASSESSMENT & PLAN NOTE
· Follows with Oncology outpatient, last chemo was 1 5 weeks ago    · With mets to liver  · Status post resection with colostomy  · Will check CT head for evaluation of meds in setting of dizziness

## 2018-09-08 NOTE — ASSESSMENT & PLAN NOTE
· Creatinine 1 9 on admission, up from a baseline of about 0 8  · Likely secondary to dehydration, decreased p o  intake, nausea, vomiting  · Improved this morning  Almost back to normal at 1 2  · Continue IV fluid  · Recheck tomorrow

## 2018-09-08 NOTE — H&P
H&P- Emily Abdullahi 1960, 60 y o  female MRN: [id number]    Unit/Bed#: [facility] 616-01 Encounter: [id number]    Primary Care Provider: Michele Simmons MD   Date and time admitted to hospital: 9/7/2018  2:53 PM      * Dizziness   Assessment & Plan    · Patient with dizziness over the last several days following her last chemo treatment 1 5 weeks ago  · Worse with positional changes, suspect component of orthostatic hypotension in setting of nausea, vomiting, dehydration from decreased p o  Intake  · Will check CT head  · Orthostatics  · IVF  · PT/OT        RYLAN (acute kidney injury)    Assessment & Plan    · Creatinine 1 9 on admission, up from a baseline of about 0 8  · Likely secondary to dehydration, decreased p o  intake, nausea, vomiting  · IV fluid  · Monitor BMP  · Avoid nephrotoxins        Ambulatory dysfunction   Assessment & Plan    · Patient with recurrent dizziness and falls over the last few days  Denies injuries  · Will check CT head  · PT/OT  · Palliative care and case management for assistance with outpatient management  It seems that the patient is requiring more help at home  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[facility])   Assessment & Plan    · Follows with Oncology outpatient, last chemo was 1 5 weeks ago  · With mets to liver  · Status post resection with colostomy  · Will check CT head for evaluation of meds in setting of dizziness        CAD (coronary artery disease)   Assessment & Plan    · Follows up with Cardiology outpatient (Dr Sultana Morin)  Had stent placed in 2012 with recurrence MRI an occluded stent of the LAD without intervention in 2016     · Continue Plavix, stop aspirin per last Cardiology patient record  · Continue metoprolol        Essential hypertension   Assessment & Plan    · Continue metoprolol  · Monitor        [facility] )   Assessment & Plan    · Continue Cymbalta, Remeron        Insulin-dependent diabetes mellitus   Assessment & Plan    Lab Results Component Value Date    HGBA1C 10 4 (H) 06/20/2018       No results for input(s): POCGLU in the last 72 hours  Blood Sugar Average: Last 72 hrs:     · Hold metformin while inpatient  · Continue current insulin regimen, Accu-Cheks, SSI               VTE Prophylaxis: Pharmacologic VTE Prophylaxis contraindicated due to await CT head to r/o brain mets  / sequential compression device   Code Status: Level 1 - Full Code  POLST: There is no POLST form on file for this patient (pre-hospital)  Discussion with family: patient    Anticipated Length of Stay:  Patient will be admitted on an Inpatient basis with an anticipated length of stay of  Greater than 2 midnights  Justification for Hospital Stay: ambulatory dysfunction, RYLAN, dizziness w/u and tx    Total Time for Visit, including Counseling / Coordination of Care: 1 hour  Greater than 50% of this total time spent on direct patient counseling and coordination of care  Chief Complaint:   dizziness    History of Present Illness:    Emily Figueroa is a 60 y o  female with PMH colon CA with mets to liver s/p resection and colostomy, CAD, HTN, HLD, DM, who presents with dizziness, n/v for several day  Reports these bouts of dizziness started after her last chemo treatment 1 5 weeks ago  Reports the dizziness is worth with positional changes, worse when going sitting to standing position  Also reports to associated lightheadedness  Denies injuries, weakness  Has had a few falls  Denies loss of consciousness/syncope  She has never had symptoms like this in the past   Does report poor oral intake, with a few episodes of nausea and vomiting over the last few days  Denies fever, URI symptoms, UTI symptoms, sweats, chest pain, shortness breath, cough, abdominal pain, LE edema  Review of Systems:    Review of Systems   Constitutional: Positive for activity change  Negative for appetite change, diaphoresis and fever     HENT: Negative for congestion, postnasal drip, rhinorrhea, sinus pain and sinus pressure  Eyes: Negative  Respiratory: Negative for cough and shortness of breath  Cardiovascular: Negative for chest pain, palpitations and leg swelling  Gastrointestinal: Positive for nausea and vomiting  Negative for abdominal pain, constipation and diarrhea  Endocrine: Negative  Genitourinary: Negative for difficulty urinating, dysuria, frequency and hematuria  Musculoskeletal: Negative  Skin: Negative  Neurological: Positive for dizziness and light-headedness  Negative for syncope and weakness  Hematological: Negative  Psychiatric/Behavioral: Negative  Past Medical and Surgical History:     Past Medical History:   Diagnosis Date    Bipolar depression ([place] [address] ) 3/17/2016    CAD S/P percutaneous coronary angioplasty 3/17/2016    Cancer ([place] [address] )     Chronic pain     Colonic mass     Colostomy care ([place] [address] )     COPD (chronic obstructive pulmonary disease) (HCC)     CVA (cerebral vascular accident) ([place] [address] )     R sided weakness    Essential hypertension 3/17/2016    GERD (gastroesophageal reflux disease)     Hepatitis C     Heroin abuse 3/18/2016    History of gastric ulcer     Hypertension     NSTEMI (non-ST elevated myocardial infarction) ([place] [address] ) 07/2012    Psychiatric disorder 09/03/2014    Inpatient admission     Schizoaffective disorder ([place] [address] ) 3/17/2016    Schizophrenia ([place] [address] ) 3/17/2016    STEMI (ST elevation myocardial infarction) ([place] [address] ) 12/2017    Type 2 diabetes mellitus ([place] [address] ) 3/17/2016       Past Surgical History:   Procedure Laterality Date    CARDIAC CATHETERIZATION  07/2016    COLONOSCOPY N/A 6/22/2018    Procedure: COLONOSCOPY;  Surgeon: Manuela Galindo MD;  [facility]: [facility];   Service: Colorectal    CORONARY ANGIOPLASTY WITH STENT PLACEMENT  07/05/2012    MAKEDA (LAD), PTA (Diag)    CORONARY ANGIOPLASTY WITH STENT PLACEMENT  12/2017    MAKEDA/ PTA (RCA)    [address] REPLACEMENT  8/15/2018    [place] INSERT PICC W/ SUB-Q PORT N/A 8/15/2018    Procedure: INSERTION VENOUS PORT (PORT-A-CATH); Surgeon: Avis Sy MD;  [facility];  Service: Colorectal    CT LAP,DIAGNOSTIC ABDOMEN N/A 7/17/2018    Procedure: LAPAROSCOPY DIAGNOSTIC, Laproscopic transverse loop colostomy, lysis of adhesions;  Surgeon: Kyrie Briceno MD;  Location: [facility];  Service: Colorectal    CT SIGMOIDOSCOPY FLX DX W/COLLJ SPEC BR/WA IF PFRMD N/A 8/15/2018    Procedure: Deandra Felder;  Surgeon: Avis Sy MD;  [facility];  Service: Colorectal       Meds/Allergies:    Prior to Admission medications    Medication Sig Start Date End Date Taking?  Authorizing Provider   atorvastatin (LIPITOR) 10 mg tablet Take 1 tablet (10 mg total) by mouth daily 8/9/18   Cinthya Cagle MD   clopidogrel (PLAVIX) 75 mg tablet Take 1 tablet (75 mg total) by mouth daily 8/17/18   Avis Sy MD   divalproex sodium (DEPAKOTE) 250 mg EC tablet Take 3 tablets by mouth daily    Historical Provider, MD   DULoxetine (CYMBALTA) 60 mg delayed release capsule Take 60 mg by mouth daily      Historical Provider, MD   insulin aspart protamine-insulin aspart (NOVOLOG MIX 70/30) 100 units/mL injection Inject 38 Units under the skin Twice daily      Historical Provider, MD   insulin glargine (LANTUS) 100 units/mL subcutaneous injection Inject 10 Units under the skin daily at bedtime 7/30/18   Vallorie Fabry, MD   metFORMIN (GLUCOPHAGE) 1000 MG tablet metformin 1,000 mg tablet  BID    Historical Provider, MD   metoprolol tartrate (LOPRESSOR) 25 mg tablet Take 25 mg by mouth every 12 (twelve) hours    Historical Provider, MD   mirtazapine (REMERON) 15 mg tablet Take 1 tablet by mouth daily      Historical Provider, MD   nitroglycerin (NITROSTAT) 0 4 mg SL tablet Place 0 4 mg under the tongue 12/26/17   Historical Provider, MD   promethazine (PHENERGAN) 12 5 MG tablet Take 12 5 mg by mouth daily      Historical Provider, MD   senna ([employer] SENNA) 8 6 MG tablet Take by mouth    Historical Provider, MD   terbinafine (LAMISIL) 250 mg tablet Take 1 tablet by mouth daily    Historical Provider, MD   traZODone (DESYREL) 50 mg tablet Take 50 mg by mouth daily at bedtime    Historical Provider, MD     I have reviewed home medications using allscripts  Allergies: No Known Allergies    Social History:     Marital Status: Single   Occupation:   Patient Pre-hospital Living Situation: with   Patient Pre-hospital Level of Mobility: requires assistance  Patient Pre-hospital Diet Restrictions: diabetic diet  Substance Use History:   History   Alcohol Use No     Comment: "only on occasion"     History   Smoking Status    Current Every Day Smoker    Packs/day: 1 00    Years: 45 00    Types: Cigarettes   Smokeless Tobacco    Never Used     History   Drug Use No     Comment: only tried x 1        Family History:    non-contributory    Physical Exam:     Vitals:   Blood Pressure: 127/71 (09/07/18 1745)  Pulse: 68 (09/07/18 1745)  Temperature: 97 9 °F (36 6 °C) (09/07/18 1346)  Temp Source: Tympanic (09/07/18 1346)  Respirations: 19 (09/07/18 1745)  Height: 5' 3" (160 cm) (09/07/18 1346)  Weight - Scale: 56 7 kg (125 lb) (09/07/18 1346)  SpO2: 98 % (09/07/18 1745)    Physical Exam   Constitutional: She is oriented to person, place, and time  No distress  Chronically ill appearing   HENT:   Head: Normocephalic and atraumatic  Mouth/Throat: Oropharynx is clear and moist    Eyes: EOM are normal  Pupils are equal, round, and reactive to light  No scleral icterus  Neck: Normal range of motion  Neck supple  No thyromegaly present  Cardiovascular: Normal rate, regular rhythm and normal heart sounds  No murmur heard  Pulmonary/Chest: Effort normal and breath sounds normal  No respiratory distress  She has no wheezes  She has no rales  She exhibits no tenderness  Abdominal: Soft  Bowel sounds are normal  She exhibits no distension   There is no tenderness  Colostomy intact   Musculoskeletal: Normal range of motion  She exhibits no edema or tenderness  Neurological: She is alert and oriented to person, place, and time  No cranial nerve deficit  Skin: Skin is warm and dry  No rash noted  She is not diaphoretic  No erythema  Psychiatric: She has a normal mood and affect  Her behavior is normal  Judgment and thought content normal    Vitals reviewed  Additional Data:     Lab Results: I have personally reviewed pertinent reports  Results from last 7 days  Lab Units 09/07/18  1642   WBC Thousand/uL 9 74   HEMOGLOBIN g/dL 10 4*   HEMATOCRIT % 33 3*   PLATELETS Thousands/uL 323   NEUTROS PCT % 59   LYMPHS PCT % 32   MONOS PCT % 7   EOS PCT % 2       Results from last 7 days  Lab Units 09/07/18  1642   SODIUM mmol/L 133*   POTASSIUM mmol/L 4 2   CHLORIDE mmol/L 100   CO2 mmol/L 25   BUN mg/dL 29*   CREATININE mg/dL 1 99*   CALCIUM mg/dL 10 0                   Imaging: I have personally reviewed pertinent reports  XR chest 2 views    (Results Pending)   CT head wo contrast    (Results Pending)       EKG, Pathology, and Other Studies Reviewed on Admission:   · EKG: NSR    Allscripts / Epic Records Reviewed: Yes     ** Please Note: This note has been constructed using a voice recognition system   **

## 2018-09-08 NOTE — ASSESSMENT & PLAN NOTE
· Creatinine 1 9 on admission, up from a baseline of about 0 8  · Likely secondary to dehydration, decreased p o  intake, nausea, vomiting  · IV fluid  · Monitor BMP  · Avoid nephrotoxins

## 2018-09-08 NOTE — PROGRESS NOTES
Progress Note - Emily Abdullahi 1960, 60 y o  female MRN: [id number]    Unit/Bed#: [facility] 616-01 Encounter: [id number]    Primary Care Provider: Kiah Baer MD   Date and time admitted to hospital: 9/7/2018  2:53 PM        * Dizziness - due to orthostatic hypotension   Assessment & Plan    · Patient with dizziness over the last several days following her last chemo treatment 1 5 weeks ago  · Worse with positional changes, suspect component of orthostatic hypotension - orthostatic blood pressure positive-  in setting of nausea, vomiting, dehydration from decreased p o  Intake  · Resolved this morning as per patient but she was very sleepy  · Continue IV fluid  · PT OT  RYLAN (acute kidney injury)    Assessment & Plan    · Creatinine 1 9 on admission, up from a baseline of about 0 8  · Likely secondary to dehydration, decreased p o  intake, nausea, vomiting  · Improved this morning  Almost back to normal at 1 2  · Continue IV fluid  · Recheck tomorrow  Ambulatory dysfunction   Assessment & Plan    · Patient with recurrent dizziness and falls over the last few days  Denies injuries  · PT/OT  · Palliative care and case management for assistance with outpatient management  It seems that the patient is requiring more help at home          Essential hypertension   Assessment & Plan    · Continue metoprolol  · Monitor        Schizoaffective disorder, bipolar type (HCC)   Assessment & Plan    · Continue Cymbalta, Remeron        Type 2 diabetes mellitus without complication, without long-term current use of insulin [facility])   Assessment & Plan    Lab Results   Component Value Date    HGBA1C 10 4 (H) 06/20/2018       Recent Labs      09/07/18   2115  09/08/18   0824  09/08/18   1220   POCGLU  248*  188*  74       Blood Sugar Average: Last 72 hrs:  (P) 170   · Hold metformin while inpatient  · Continue current insulin regimen, Accu-Cheks, SSI                 VTE Pharmacologic Prophylaxis:   Pharmacologic: Heparin  Mechanical VTE Prophylaxis in Place: Yes    Patient Centered Rounds: I have performed bedside rounds with nursing staff today  Discussions with Specialists or Other Care Team Provider:     Education and Discussions with Family / Patient: patient    Time Spent for Care: 30 minutes  More than 50% of total time spent on counseling and coordination of care as described above  Current Length of Stay: 1 day(s)    Current Patient Status: Inpatient   Certification Statement: The patient will continue to require additional inpatient hospital stay due to above    Discharge Plan: pending improvement and PT OT eval    Code Status: Level 1 - Full Code      Subjective:   Pt seen and examined by me this morning  Pt was very drowsy and sleepy  Did wake up when called and answered questions appropriately  But fell back asleep  She said she was very tired  Objective:     Vitals:   Temp (24hrs), Av 4 °F (36 9 °C), Min:97 9 °F (36 6 °C), Max:99 14 °F (37 3 °C)    HR:  [68-76] 76  Resp:  [18-19] 19  BP: ()/(57-71) 107/58  SpO2:  [97 %-100 %] 97 %  Body mass index is 22 1 kg/m²  Input and Output Summary (last 24 hours): Intake/Output Summary (Last 24 hours) at 18 1513  Last data filed at 18 1331   Gross per 24 hour   Intake           1932 5 ml   Output             1950 ml   Net            -17 5 ml       Physical Exam:     Physical Exam    Constitutional: Pt appears well-developed and well-nourished  Not in any acute distress  Very drowsy lethargic but did wake up  Cardiovascular: Normal rate, regular rhythm, normal heart sounds  Exam reveals no gallop and no friction rub  No murmur heard  Pulmonary/Chest: Effort normal and breath sounds normal  No respiratory distress  Pt has no wheezes or rales  Abdominal: Soft  Non-distended, Non-tender  Bowel sounds are normal    Musculoskeletal: Normal range of motion  Neurological:   Drowsy and sleepy but did wake up when called  Answered questions appropriately  Moving all extremities spontaneously  Psychiatric: normal mood and affect  Additional Data:     Labs:      Results from last 7 days  Lab Units 09/08/18  0603   WBC Thousand/uL 8 85   HEMOGLOBIN g/dL 9 0*   HEMATOCRIT % 28 5*   PLATELETS Thousands/uL 284   NEUTROS PCT % 62   LYMPHS PCT % 27   MONOS PCT % 8   EOS PCT % 3       Results from last 7 days  Lab Units 09/08/18  0603   SODIUM mmol/L 138   POTASSIUM mmol/L 3 9   CHLORIDE mmol/L 109*   CO2 mmol/L 21   BUN mg/dL 24   CREATININE mg/dL 1 24   CALCIUM mg/dL 8 4   ALK PHOS U/L 108   ALT U/L 14   AST U/L 11           Results from last 7 days  Lab Units 09/08/18  1220 09/08/18  0824 09/07/18  2115   POC GLUCOSE mg/dl 74 188* 248*             * I Have Reviewed All Lab Data Listed Above  * Additional Pertinent Lab Tests Reviewed:  [place] Admission Reviewed    Imaging:    Imaging Reports Reviewed Today Include:   Imaging Personally Reviewed by Myself Includes:      Recent Cultures (last 7 days):           Last 24 Hours Medication List:     Current Facility-Administered Medications:  acetaminophen 650 mg Oral Q6H PRN Mera Suresh PA-C   atorvastatin 10 mg Oral Daily Mera Suresh PA-C   calcium carbonate 1,000 mg Oral Daily PRN Mera Suresh PA-C   clopidogrel 75 mg Oral Daily Mera Suresh PA-C   divalproex sodium 750 mg Oral Daily Mera Suresh PA-C   DULoxetine 60 mg Oral Daily Mera Suresh PA-C   insulin aspart protamine-insulin aspart 38 Units Subcutaneous BID AC Mera Suresh PA-C   insulin glargine 10 Units Subcutaneous HS Mera Suresh PA-C   insulin lispro 1-5 Units Subcutaneous TID AC Mera Suresh PA-C   insulin lispro 1-5 Units Subcutaneous HS Mera Suresh PA-C   metoprolol tartrate 25 mg Oral Q12H [address] Mera Suresh PA-C   mirtazapine 15 mg Oral Daily Mera Suresh PA-C   nicotine 1 patch Transdermal Daily Mera Suresh PA-C   ondansetron 4 mg Intravenous Q6H PRN Mera Suresh PA-C   senna 1 tablet Oral HS PRN Mera Suresh PA-C   traZODone 50 mg Oral HS Mera Suresh PA-C        Today, Patient Was Seen By: Ravi Deshpande MD    ** Please Note: Dictation voice to text software may have been used in the creation of this document   **

## 2018-09-08 NOTE — ASSESSMENT & PLAN NOTE
Lab Results   Component Value Date    HGBA1C 10 4 (H) 06/20/2018       Recent Labs      09/07/18   2115  09/08/18   0824  09/08/18   1220   POCGLU  248*  188*  74       Blood Sugar Average: Last 72 hrs:  (P) 170   · Hold metformin while inpatient  · Continue current insulin regimen, Accu-Cheks, SSI

## 2018-09-08 NOTE — PLAN OF CARE
Problem: PAIN - ADULT  Goal: Verbalizes/displays adequate comfort level or baseline comfort level  Interventions:  - Encourage patient to monitor pain and request assistance  - Assess pain using appropriate pain scale  - Administer analgesics based on type and severity of pain and evaluate response  - Implement non-pharmacological measures as appropriate and evaluate response  - Consider cultural and social influences on pain and pain management  - Notify physician/advanced practitioner if interventions unsuccessful or patient reports new pain  Outcome: Progressing      Problem: INFECTION - ADULT  Goal: Absence or prevention of progression during hospitalization  INTERVENTIONS:  - Assess and monitor for signs and symptoms of infection  - Monitor lab/diagnostic results  - Monitor all insertion sites, i e  indwelling lines, tubes, and drains  - Monitor endotracheal (as able) and nasal secretions for changes in amount and color  - [place] appropriate cooling/warming therapies per order  - Administer medications as ordered  - Instruct and encourage patient and family to use good hand hygiene technique  - Identify and instruct in appropriate isolation precautions for identified infection/condition  Outcome: Progressing    Goal: Absence of fever/infection during neutropenic period  INTERVENTIONS:  - Monitor WBC  - Implement neutropenic guidelines  Outcome: Progressing      Problem: SAFETY ADULT  Goal: Maintain or return to baseline ADL function  INTERVENTIONS:  -  Assess patient's ability to carry out ADLs; assess patient's baseline for ADL function and identify physical deficits which impact ability to perform ADLs (bathing, care of mouth/teeth, toileting, grooming, dressing, etc )  - Assess/evaluate cause of self-care deficits   - Assess range of motion  - Assess patient's mobility; develop plan if impaired  - Assess patient's need for assistive devices and provide as appropriate  - Encourage maximum independence but intervene and supervise when necessary  ¯ Involve family in performance of ADLs  ¯ Assess for home care needs following discharge   ¯ Request OT consult to assist with ADL evaluation and planning for discharge  ¯ Provide patient education as appropriate  Outcome: Progressing    Goal: Maintain or return mobility status to optimal level  INTERVENTIONS:  - Assess patient's baseline mobility status (ambulation, transfers, stairs, etc )    - Identify cognitive and physical deficits and behaviors that affect mobility  - Identify mobility aids required to assist with transfers and/or ambulation (gait belt, sit-to-stand, lift, walker, cane, etc )  - [place] fall precautions as indicated by assessment  - Record patient progress and toleration of activity level on Mobility SBAR; progress patient to next Phase/Stage  - Instruct patient to call for assistance with activity based on assessment  - Request Rehabilitation consult to assist with strengthening/weightbearing, etc   Outcome: Progressing      Problem: DISCHARGE PLANNING  Goal: Discharge to home or other facility with appropriate resources  INTERVENTIONS:  - Identify barriers to discharge w/patient and caregiver  - Arrange for needed discharge resources and transportation as appropriate  - Identify discharge learning needs (meds, wound care, etc )  - Arrange for interpretive services to assist at discharge as needed  - Refer to Case Management Department for coordinating discharge planning if the patient needs post-hospital services based on physician/advanced practitioner order or complex needs related to functional status, cognitive ability, or social support system  Outcome: Progressing      Problem: Knowledge Deficit  Goal: Patient/family/caregiver demonstrates understanding of disease process, treatment plan, medications, and discharge instructions  Complete learning assessment and assess knowledge base    Interventions:  - Provide teaching at level of understanding  - Provide teaching via preferred learning methods  Outcome: Progressing

## 2018-09-08 NOTE — ASSESSMENT & PLAN NOTE
· Follows up with Cardiology outpatient (Dr Candi Hurd)  Had stent placed in 2012 with recurrence MRI an occluded stent of the LAD without intervention in 2016     · Continue Plavix, stop aspirin per last Cardiology patient record  · Continue metoprolol

## 2018-09-08 NOTE — ASSESSMENT & PLAN NOTE
Lab Results   Component Value Date    HGBA1C 10 4 (H) 06/20/2018       No results for input(s): POCGLU in the last 72 hours      Blood Sugar Average: Last 72 hrs:     · Hold metformin while inpatient  · Continue current insulin regimen, Accu-Cheks, SSI

## 2018-09-08 NOTE — ASSESSMENT & PLAN NOTE
· Patient with dizziness over the last several days following her last chemo treatment 1 5 weeks ago  · Worse with positional changes, suspect component of orthostatic hypotension - orthostatic blood pressure positive-  in setting of nausea, vomiting, dehydration from decreased p o  Intake  · Resolved this morning as per patient but she was very sleepy  · Continue IV fluid  · PT OT

## 2018-09-09 LAB
ANION GAP SERPL CALCULATED.3IONS-SCNC: 7 MMOL/L (ref 4–13)
BUN SERPL-MCNC: 17 MG/DL (ref 5–25)
CALCIUM SERPL-MCNC: 8.2 MG/DL (ref 8.3–10.1)
CHLORIDE SERPL-SCNC: 106 MMOL/L (ref 100–108)
CO2 SERPL-SCNC: 23 MMOL/L (ref 21–32)
CREAT SERPL-MCNC: 1.15 MG/DL (ref 0.6–1.3)
ERYTHROCYTE [DISTWIDTH] IN BLOOD BY AUTOMATED COUNT: 13.8 % (ref 11.6–15.1)
GFR SERPL CREATININE-BSD FRML MDRD: 52 ML/MIN/1.73SQ M
GLUCOSE SERPL-MCNC: 126 MG/DL (ref 65–140)
GLUCOSE SERPL-MCNC: 170 MG/DL (ref 65–140)
GLUCOSE SERPL-MCNC: 204 MG/DL (ref 65–140)
GLUCOSE SERPL-MCNC: 291 MG/DL (ref 65–140)
GLUCOSE SERPL-MCNC: 63 MG/DL (ref 65–140)
HCT VFR BLD AUTO: 28.9 % (ref 34.8–46.1)
HGB BLD-MCNC: 9.2 G/DL (ref 11.5–15.4)
MCH RBC QN AUTO: 28.1 PG (ref 26.8–34.3)
MCHC RBC AUTO-ENTMCNC: 31.8 G/DL (ref 31.4–37.4)
MCV RBC AUTO: 88 FL (ref 82–98)
PLATELET # BLD AUTO: 293 THOUSANDS/UL (ref 149–390)
PMV BLD AUTO: 9.6 FL (ref 8.9–12.7)
POTASSIUM SERPL-SCNC: 4.2 MMOL/L (ref 3.5–5.3)
RBC # BLD AUTO: 3.27 MILLION/UL (ref 3.81–5.12)
SODIUM SERPL-SCNC: 136 MMOL/L (ref 136–145)
WBC # BLD AUTO: 8.65 THOUSAND/UL (ref 4.31–10.16)

## 2018-09-09 PROCEDURE — 99222 1ST HOSP IP/OBS MODERATE 55: CPT | Performed by: FAMILY MEDICINE

## 2018-09-09 PROCEDURE — 85027 COMPLETE CBC AUTOMATED: CPT | Performed by: INTERNAL MEDICINE

## 2018-09-09 PROCEDURE — 97163 PT EVAL HIGH COMPLEX 45 MIN: CPT

## 2018-09-09 PROCEDURE — 80048 BASIC METABOLIC PNL TOTAL CA: CPT | Performed by: INTERNAL MEDICINE

## 2018-09-09 PROCEDURE — G8979 MOBILITY GOAL STATUS: HCPCS

## 2018-09-09 PROCEDURE — 82948 REAGENT STRIP/BLOOD GLUCOSE: CPT

## 2018-09-09 PROCEDURE — 99232 SBSQ HOSP IP/OBS MODERATE 35: CPT | Performed by: INTERNAL MEDICINE

## 2018-09-09 PROCEDURE — G8989 SELF CARE D/C STATUS: HCPCS

## 2018-09-09 PROCEDURE — G8987 SELF CARE CURRENT STATUS: HCPCS

## 2018-09-09 PROCEDURE — G8978 MOBILITY CURRENT STATUS: HCPCS

## 2018-09-09 PROCEDURE — G8988 SELF CARE GOAL STATUS: HCPCS

## 2018-09-09 PROCEDURE — 97165 OT EVAL LOW COMPLEX 30 MIN: CPT

## 2018-09-09 RX ADMIN — HEPARIN SODIUM 5000 UNITS: 5000 INJECTION, SOLUTION INTRAVENOUS; SUBCUTANEOUS at 22:03

## 2018-09-09 RX ADMIN — DIVALPROEX SODIUM 750 MG: 500 TABLET, DELAYED RELEASE ORAL at 10:51

## 2018-09-09 RX ADMIN — INSULIN GLARGINE 10 UNITS: 100 INJECTION, SOLUTION SUBCUTANEOUS at 22:03

## 2018-09-09 RX ADMIN — MELATONIN TAB 3 MG 6 MG: 3 TAB at 22:03

## 2018-09-09 RX ADMIN — MIRTAZAPINE 15 MG: 15 TABLET, FILM COATED ORAL at 10:47

## 2018-09-09 RX ADMIN — ATORVASTATIN CALCIUM 10 MG: 10 TABLET, FILM COATED ORAL at 17:04

## 2018-09-09 RX ADMIN — CLOPIDOGREL BISULFATE 75 MG: 75 TABLET, FILM COATED ORAL at 10:47

## 2018-09-09 RX ADMIN — INSULIN LISPRO 2 UNITS: 100 INJECTION, SOLUTION INTRAVENOUS; SUBCUTANEOUS at 22:03

## 2018-09-09 RX ADMIN — INSULIN LISPRO 1 UNITS: 100 INJECTION, SOLUTION INTRAVENOUS; SUBCUTANEOUS at 17:05

## 2018-09-09 RX ADMIN — NICOTINE 1 PATCH: 14 PATCH, EXTENDED RELEASE TRANSDERMAL at 10:49

## 2018-09-09 RX ADMIN — HEPARIN SODIUM 5000 UNITS: 5000 INJECTION, SOLUTION INTRAVENOUS; SUBCUTANEOUS at 05:29

## 2018-09-09 RX ADMIN — DULOXETINE HYDROCHLORIDE 60 MG: 60 CAPSULE, DELAYED RELEASE ORAL at 10:47

## 2018-09-09 RX ADMIN — HEPARIN SODIUM 5000 UNITS: 5000 INJECTION, SOLUTION INTRAVENOUS; SUBCUTANEOUS at 14:22

## 2018-09-09 RX ADMIN — TRAZODONE HYDROCHLORIDE 50 MG: 50 TABLET ORAL at 22:03

## 2018-09-09 RX ADMIN — INSULIN LISPRO 1 UNITS: 100 INJECTION, SOLUTION INTRAVENOUS; SUBCUTANEOUS at 12:28

## 2018-09-09 NOTE — PLAN OF CARE
Problem: PHYSICAL THERAPY ADULT  Goal: Performs mobility at highest level of function for planned discharge setting  See evaluation for individualized goals  Treatment/Interventions: Gait training, Bed mobility, Functional transfer training, LE strengthening/ROM, Endurance training, OT, Spoke to case management  Equipment Recommended: Iva Fess (called store room to have [facility] delivered)       See flowsheet documentation for full assessment, interventions and recommendations  Prognosis: Good  Problem List: Impaired balance, Decreased mobility, Decreased endurance, Decreased safety awareness  Assessment: Pt is 60 y o  female seen for PT evaluation s/p admit to [facility] on 9/7/2018 w/ Dizziness  PT consulted to assess pt's functional mobility and d/c needs  Order placed for PT eval and tx, w/ up w/ A order  Comorbidities affecting pt's physical performance at time of assessment include: dizziness  PTA, pt was ambulates unrestricted distances and all terrain  Personal factors affecting pt at time of IE include: inability to navigate community distances, positive fall history and inability to perform IADLs  Please find objective findings from PT assessment regarding body systems outlined above with impairments and limitations including impaired balance, decreased endurance, gait deviations, decreased activity tolerance, decreased functional mobility tolerance and decreased safety awareness  Pt educated in pacing during all mobility  Ambulated with slow gait, single minimal LOB which pt was able to recover from without A  Reports she previously was using a SPC although it was taken from her  Noted single fall 2* to dizziness  Pt will benefit from additional pacing instruction during all mobility to decrease dizziness and risk of additional falls  The following objective measures performed on IE also reveal limitations: Barthel Index: 85/100   Pt's clinical presentation is currently evolving seen in pt's presentation of dizziness, orthostatic hypotension  Pt to benefit from continued PT tx to address deficits as defined above and maximize level of functional independent mobility and consistency  From PT/mobility standpoint, recommendation at time of d/c would be anticipate no needs pending progress in order to facilitate return to PLOF  Recommendation: Home independently     PT - OK to Discharge: Yes    See flowsheet documentation for full assessment

## 2018-09-09 NOTE — ASSESSMENT & PLAN NOTE
· Creatinine 1 9 on admission, up from a baseline of about 0 8  · Likely secondary to dehydration, decreased p o  intake, nausea, vomiting  · Improved yest 1 2  Pending this am   · Continue IV fluid  · Recheck tomorrow

## 2018-09-09 NOTE — SOCIAL WORK
CM met pt at bedside and made aware of CM role at dc  Pt denies LW or POA  Primary contact is her [facility]-[phone]  Pt reported that she lives with Dea Baez in a 1st floor apt with 1-2 TYRONE  Pt was IPTA with ADL's and does not drive  Pt SO Shashank transport pt to her appts  Pt has a SPC  Pt reported that she is current with [facility]  for nsg and requested continuation of care, referral in [place]  Pt denies hx with STR, alc, drug or IP psych tx  Pt reported that she was dx with Bipolar and Schizoaffective disorder with medication and follows up with her Psychiatrist at [address]  Preferred [address] [facility] [address] [place]  PCP is Dr Ann-Marie Isaacs  Pt has transportation when dc  CM reviewed d/c planning process including the following: identifying help at home, patient preference for d/c planning needs, Discharge Lounge, [facility] Meds to Bed program, availability of treatment team to discuss questions or concerns patient and/or family may have regarding understanding medications and recognizing signs and symptoms once discharged  CM also encouraged patient to follow up with all recommended appointments after discharge  Patient advised of importance for patient and family to participate in managing patients medical well being

## 2018-09-09 NOTE — ASSESSMENT & PLAN NOTE
· Patient with dizziness over the last several days following her last chemo treatment 1 5 weeks ago  · Orthostatic hypotension  in setting of nausea, vomiting, dehydration from decreased p o  Intake  · Orthostatic blood pressures still positive this morning  · Symptoms Resolved this morning as per patient she has not walked around yet  · Continue IV fluid    · PT OT pending

## 2018-09-09 NOTE — PHYSICAL THERAPY NOTE
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]  RYLAN (acute kidney injury) ([place] [address] ) [N17 9]  Near syncope [R55]    Problem List  Patient Active Problem List   Diagnosis    CVA (cerebral vascular accident) ([place] [address] )    Type 2 diabetes mellitus without complication, without long-term current use of insulin ([place] [address] )    Schizoaffective disorder, bipolar type ([place] [address] )    Essential hypertension    Colonic mass    Type 2 diabetes mellitus with complication ([place] [address] )    CKD (chronic kidney disease) stage 3, GFR 30-59 ml/min    Pain of right upper extremity    RYLAN (acute kidney injury)     CAD (coronary artery disease)    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HCC)    Large bowel obstruction (HCC)    Brachial artery stenosis, right (HCC)    Rectosigmoid mass - High colostomy output    Leukocytosis    GERD (gastroesophageal reflux disease)    Tobacco abuse    Liver metastasis (HCC)    Mixed hyperlipidemia    Dizziness - due to orthostatic hypotension    Ambulatory dysfunction    Orthostatic hypotension       Past Medical History  Past Medical History:   Diagnosis Date    Bipolar depression ([place] [address] ) 3/17/2016    CAD S/P percutaneous coronary angioplasty 3/17/2016    Cancer ([place] [address] )     Chronic pain     Colonic mass     Colostomy care ([place] [address] )     COPD (chronic obstructive pulmonary disease) ([place] [address] )     CVA (cerebral vascular accident) ([place] [address] )     R sided weakness    Essential hypertension 3/17/2016    GERD (gastroesophageal reflux disease)     Hepatitis C     Heroin abuse 3/18/2016    History of gastric ulcer     Hypertension     NSTEMI (non-ST elevated myocardial infarction) ([place] [address] ) 07/2012    Psychiatric disorder 09/03/2014    Inpatient admission     Schizoaffective disorder ([facility] ) 3/17/2016    Schizophrenia ([place] [address] ) 3/17/2016    STEMI (ST elevation myocardial infarction) ([place] [address] ) 12/2017    Type 2 diabetes mellitus ([place] [address] ) 3/17/2016 Past Surgical History  Past Surgical History:   Procedure Laterality Date    CARDIAC CATHETERIZATION  07/2016    COLONOSCOPY N/A 6/22/2018    Procedure: COLONOSCOPY;  Surgeon: Nathanael Bagley MD;  [facility]: [facility]; Service: Colorectal    CORONARY ANGIOPLASTY WITH STENT PLACEMENT  07/05/2012    MAKEDA (LAD), PTA (Diag)    CORONARY ANGIOPLASTY WITH STENT PLACEMENT  12/2017    MAKEDA/ PTA (RCA)    FL GUIDED CENTRAL VENOUS ACCESS REPLACEMENT  8/15/2018    VT INSERT PICC W/ SUB-Q PORT N/A 8/15/2018    Procedure: INSERTION VENOUS PORT (PORT-A-CATH); Surgeon: Val Loera MD;  [facility];  Service: Colorectal    VT LAP,DIAGNOSTIC ABDOMEN N/A 7/17/2018    Procedure: LAPAROSCOPY DIAGNOSTIC, Laproscopic transverse loop colostomy, lysis of adhesions;  Surgeon: Pham Bowie MD;  [facility];  Service: Colorectal    VT SIGMOIDOSCOPY FLX DX W/COLLJ SPEC BR/WA IF PFRMD N/A 8/15/2018    Procedure: SIGMOIDOSCOPY FLEXIBLE;  Surgeon: Val Loera MD;  [facility];  Service: Colorectal        09/09/18 1140   Note Type   Note type Eval/Treat   Pain Assessment   Pain Assessment No/denies pain   Pain Score No Pain   Home Living   Type of [address] One level   Prior Function   Level of [place] Independent with ADLs and functional mobility   Lives With Significant other   Receives Help From Family   ADL Assistance Independent   IADLs Independent   Falls in the last 6 months 1 to 4  (pt reports single fall)   Restrictions/Precautions   Weight Bearing Precautions Per Order No   Other Precautions Pain; Fall Risk;Multiple lines   Cognition   Overall Cognitive Status WFL   RLE Assessment   RLE Assessment WFL   LLE Assessment   LLE Assessment WFL   Coordination   Movements are Fluid and Coordinated 1   Bed Mobility   Supine to Sit 7  Independent   Sit to Supine 7  Independent   Transfers   Sit to Stand 5  Supervision   Stand to Sit 5  Supervision   Stand pivot 5 Supervision   Ambulation/Elevation   Gait pattern Narrow STEPHANIE; Short stride   Gait Assistance 4  Minimal assist   Additional items Assist x 1   Assistive Device None   Distance 140   Balance   Static Sitting Good   Dynamic Sitting Fair +   Static Standing Fair   Dynamic Standing Fair   Ambulatory Fair   Endurance Deficit   Endurance Deficit Yes   Endurance Deficit Description limited by dizziness   Activity Tolerance   Activity Tolerance Patient limited by fatigue;Treatment limited secondary to medical complications (Comment)   Nurse Made Aware yes, nsg gave clearance to work with pt   Assessment   Prognosis Good   Problem List Impaired balance;Decreased mobility; Decreased endurance;Decreased safety awareness   Assessment Pt is 60 y o  female seen for PT evaluation s/p admit to [facility] on 9/7/2018 w/ Dizziness  PT consulted to assess pt's functional mobility and d/c needs  Order placed for PT eval and tx, w/ up w/ A order  Comorbidities affecting pt's physical performance at time of assessment include: dizziness  PTA, pt was ambulates unrestricted distances and all terrain  Personal factors affecting pt at time of IE include: inability to navigate community distances, positive fall history and inability to perform IADLs  Please find objective findings from PT assessment regarding body systems outlined above with impairments and limitations including impaired balance, decreased endurance, gait deviations, decreased activity tolerance, decreased functional mobility tolerance and decreased safety awareness  Pt educated in pacing during all mobility  Ambulated with slow gait, single minimal LOB which pt was able to recover from without A  Reports she previously was using a SPC although it was taken from her  Noted single fall 2* to dizziness  Pt will benefit from additional pacing instruction during all mobility to decrease dizziness and risk of additional falls   The following objective measures performed on IE also reveal limitations: Barthel Index: 85/100  Pt's clinical presentation is currently evolving seen in pt's presentation of dizziness, orthostatic hypotension  Pt to benefit from continued PT tx to address deficits as defined above and maximize level of functional independent mobility and consistency  From PT/mobility standpoint, recommendation at time of d/c would be anticipate no needs pending progress in order to facilitate return to PLOF  Goals   Patient Goals To go home   STG Expiration Date 09/21/18   Short Term Goal #1 1  Complete bed mobility and transfers I to decrease need for caregiver in home  2  Ambulate 300' I to complete household and community mobility without A  3  Improve dynamic balance to good to decrease need for UE support during ambulation  4  Be educated & demonstate 5 steps to be able to enter home without A  5  I with ability to self pace to decrease dizziness throughout mobility  Plan   Treatment/Interventions Gait training;Bed mobility; Functional transfer training;LE strengthening/ROM; Endurance training;OT;Spoke to case management   PT Frequency 2-3x/wk   Recommendation   Recommendation Home independently   Equipment Recommended Cane  (called store room to have [facility] delivered)   PT - OK to Discharge Yes   Barthel Index   Feeding 10   Bathing 5   Grooming Score 5   Dressing Score 10   Bladder Score 10   Bowels Score 10   Toilet Use Score 10   Transfers (Bed/Chair) Score 15   Mobility (Level Surface) Score 10   Stairs Score 0   Barthel Index Score 85           Bea Lemon, PT

## 2018-09-09 NOTE — ASSESSMENT & PLAN NOTE
· Follows with Oncology outpatient, last chemo was 1 5 weeks ago  · With mets to liver  · Status post resection with colostomy  · CT head negative for metastatic disease  · As per patient she is scheduled for her chemotherapy this Tuesday  If she still in house were discussed with Oncology

## 2018-09-09 NOTE — OCCUPATIONAL THERAPY NOTE
[address] Evaluation     Patient Name: Marcos Granados  Today's Date: 9/9/2018  Problem List  Patient Active Problem List   Diagnosis    [facility]cere[facility]) [facility]    Type 2 diabetes mellitus without complication, without long-term current use of insulin ([facility] )    Schizoaffective disorder, bipolar type ([address] )    Essential hypertension    Colonic mass    Type 2 diabetes mellitus with complication ([facility] )    CKD (chronic kidney disease) stage 3, GFR 30-59 ml/min    Pain of right upper extremity    RYLAN (acute kidney injury)     CAD (coronary artery disease)    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HCC)    Large bowel obstruction ([address] )    Brachial artery stenosis, right (HCC)    Rectosigmoid mass - High colostomy output    Leukocytosis    GERD (gastroesophageal reflux disease)    Tobacco abuse    Liver metastasis (HCC)    Mixed hyperlipidemia    Dizziness - due to orthostatic hypotension    Ambulatory dysfunction    Orthostatic hypotension     Past Medical History  Past Medical History:   Diagnosis Date    Bipolar depression ([facility] ) 3/17/2016    CAD S/P percutaneous coronary angioplasty 3/17/2016    Cancer ([facility] )     Chronic pain     Colonic mass     Colostomy care ([facility]     COPD (chronic obstructive pulmonary disease) ([address] )     CVA (cerebral vascular accident) ([facility] )     R sided weakness    Essential hypertension 3/17/2016    GERD (gastroesophageal reflux disease)     Hepatitis C     Heroin abuse 3/18/2016    History of gastric ulcer     Hypertension     NSTEMI (non-ST elevated myocardial infarction) ([facility] ) 07/2012    Psychiatric disorder 09/03/2014    Inpatient admission     Schizoaffective disorder ([facility] ) 3/17/2016    Schizophrenia ([facility] ) 3/17/2016    STEMI (ST elevation myocardial infarction) ([facility] ) 12/2017    Type 2 diabetes mellitus ([facility] ) 3/17/2016     Past Surgical History  Past Surgical History:   Procedure Laterality Date    CARDIAC CATHETERIZATION  07/2016    COLONOSCOPY N/A 6/22/2018    Procedure: COLONOSCOPY;  Surgeon: Elliot Leavitt MD;  [facility]; Service: Colorectal    CORONARY ANGIOPLASTY WITH STENT PLACEMENT  07/05/2012    MAKEDA (LAD), PTA (Diag)    CORONARY ANGIOPLASTY WITH STENT PLACEMENT  12/2017    MAKEDA/ PTA (RCA)    FL GUIDED CENTRAL VENOUS ACCESS REPLACEMENT  8/15/2018    OH INSERT PICC W/ SUB-Q PORT N/A 8/15/2018    Procedure: INSERTION VENOUS PORT (PORT-A-CATH);   Surgeon: Dwaine Irving MD;  [facility];  Service: Colorectal    OH LAP,DIAGNOSTIC ABDOMEN N/A 7/17/2018    Procedure: LAPAROSCOPY DIAGNOSTIC, Laproscopic transverse loop colostomy, lysis of adhesions;  Surgeon: Fletcher Mireles MD;  [facility];  Service: Colorectal    OH SIGMOIDOSCOPY FLX DX W/COLLJ SPEC BR/WA IF PFRMD N/A 8/15/2018    Procedure: [address];  Surgeon: Dwaine Irving MD;  [facility];  Service: Colorectal        09/09/18 1135   Note Type   Note type Eval only   Pain Assessment   Pain Assessment No/denies pain   Home Living   Type of [address] One level   Prior Function   Level of [place] Independent with ADLs and functional mobility   Lives With Significant other   Receives Help From Family   ADL Assistance Independent   IADLs Independent   Falls in the last 6 months 1 to 4   Vocational (NOT CURRENTLY WORKING)   Lifestyle   Autonomy I ADLS AND MOBILITY - I IADLS - SHARES HOMEMAKING WITH S/O    Reciprocal Relationships SUPPORTIVE FAMILY AND FRIENDS   Service to Others NOT CURRENTLY WORKING   Intrinsic Gratification MOSTLY SEDENTARY   Subjective   Subjective OFFERS NO C/O    ADL   Eating Assistance 7  Independent   Grooming Assistance 7  Independent   UB Bathing Assistance 5  Supervision/Setup   LB Bathing Assistance 5  Supervision/Setup   UB Dressing Assistance 5  Supervision/Setup   LB Dressing Assistance 5  Supervision/Setup   Toileting Assistance  5  Supervision/Setup   Bed Mobility   Supine to Sit 7  Independent   Sit to Supine 7  Independent   Transfers   Sit to Stand 5  Supervision   Stand to Sit 5  Supervision   Stand pivot 5  Supervision   Functional Mobility   Functional Mobility 5  Supervision   Balance   Static Sitting Good   Dynamic Sitting Fair +   Static Standing Fair   Dynamic Standing Fair   Ambulatory Fair   Activity Tolerance   Activity Tolerance Patient limited by fatigue;Treatment limited secondary to medical complications (Comment)   RUE Assessment   RUE Assessment WFL   LUE Assessment   LUE Assessment WFL   Cognition   Overall Cognitive Status WFL   Assessment   Prognosis Good   Assessment 59YO FEMALE ADMITTED WITH DIZZINESS - PT PRESENTS AWAKE, ALERT AND ORIENTED -- ABLE TO FOLLOW ALL COMMANDS W/O DIFFICULTY - FUNCTIONALLY AT SBA/MOD I LEVEL IN RE: TO ADLS AND MOBILITY - HAS SUPPORTIVE FAMILY AND FRIENDS WHO ARE ABLE TO PROVIDE ASSIST PRN - NO FURTHER ACUTE OT NEEDS INDICATED AT THIS TIME- D/C FROM [facility]   Goals   Patient Goals GO HOME    Plan   OT Frequency Eval only   Recommendation   OT Discharge Recommendation Home with family support   OT - OK to Discharge Yes   Barthel Index   Feeding 10   Bathing 5   Grooming Score 5   Dressing Score 10   Bladder Score 10   Bowels Score 10   Toilet Use Score 10   Transfers (Bed/Chair) Score 15   Mobility (Level Surface) Score 10   Stairs Score 0   Barthel Index Score 85     Brooklyn Hicks

## 2018-09-09 NOTE — CONSULTS
Consultation - Palliative Care   Emily Abdullahi 60 y o  female MRN: [id number]  Unit/Bed#: [facility] 616-01 Encounter: [id number]      Assessment/Plan     Assessment:  1  Dizziness  2  Nausea/vomiting  3  Colon cancer  4  Liver mass  5  Poor support system  6  [address] discussion  7  RYLAN  8  Type 2 DM, uncontrolled    Plan:  1  Attributed to orthostatic hypotension in the setting of poor oral intake + nausea/vomiting s/p chemotherapy  Improved with IVF  Management per primary team    2  Upon discharge ensure zofran prescription is available with clinically appropriate instructions based on symptomatology  3  Advanced colon cancer with possible liver metastasis  On chemotherapy with plan for radiotherapy  Has outpatient follow up with oncology scheduled  4  See 3    5  Case management following  Palliative team  will offer resources tomorrow  6  Patient hopes to be cured and wants to pursue all treatments offered  Plan to complete 5 wishes tomorrow  7  Resume IVF  History of Present Illness   Physician Requesting Consult: Odalys Mohamud MD  Reason for Consult / Principal Problem: Rectosigmoid cancer/Psychosocial support    HPI: Emily Baez is a 60y o  year old female with multiple medical problems and recent diagnosis of metastatic colon cancer s/p diverting colostomy (7/17/18) currently undergoing chemotherapy who presents with dizzy spells, poor oral intake, and nausea/vomiting  Patient received IV fluids with improvement in symptomatology  She is tolerating a diet today  She denies any nausea/vomiting or pain at this time  Patient has fairly poor insight with regards to her cancer and is hoping to be cured  She tells me this is a fairly recent diagnosis and she wants everything possible done to cure her  She states she is coping as best as she can but has hardly any support   She resides with her common law , Elmo Dozier, and has three adult children who live near her but seldom visit or provide support  She finds strength in her pia and is a member of [employer]  She hopes she has better home care services upon discharge and more colostomy bags  Review of Systems   Constitutional: Positive for fatigue and unexpected weight change  Negative for fever  HENT: Negative for trouble swallowing  Eyes: Negative for visual disturbance  Respiratory: Negative for shortness of breath  Cardiovascular: Negative for chest pain, palpitations and leg swelling  Gastrointestinal: Negative for abdominal pain, constipation, diarrhea, nausea and vomiting  Genitourinary: Negative for dysuria, flank pain and pelvic pain  Musculoskeletal: Negative for myalgias  Skin: Negative for wound  Neurological: Positive for weakness  Negative for dizziness and light-headedness  Psychiatric/Behavioral: Negative for confusion  The patient is not nervous/anxious          Historical Information   Past Medical History:   Diagnosis Date    Bipolar depression ([place] [address] ) 3/17/2016    CAD S/P percutaneous coronary angioplasty 3/17/2016    Cancer (HCC)     Chronic pain     Colonic mass     Colostomy care ([place] [address] )     COPD (chronic obstructive pulmonary disease) (HCC)     CVA (cerebral vascular accident) ([place] [address] )     R sided weakness    Essential hypertension 3/17/2016    GERD (gastroesophageal reflux disease)     Hepatitis C     Heroin abuse 3/18/2016    History of gastric ulcer     Hypertension     NSTEMI (non-ST elevated [facility] infarction) ([facility] ) 07/2012    Psychiatric disorder 09/03/2014    Inpatient admission     Schizoaffective disorder ([facility] ) 3/17/2016    Schizophrenia ([facility] ) 3/17/2016    STEMI (ST elevation myocardial infarction) ([facility] ) 12/2017    Type 2 diabetes mellitus ([place] [address] ) 3/17/2016     Patient Active Problem List   Diagnosis    CVA (cerebral vascular accident) ([place] [address] )    Type 2 diabetes mellitus without complication, without long-term current use of insulin ([place] [address] )    Schizoaffective disorder, bipolar type ([facility] )    Essential hypertension    Colonic mass    Type 2 diabetes mellitus with complication (HCC)    CKD (chronic kidney disease) stage 3, GFR 30-59 ml/min    Pain of right upper extremity    RYLAN (acute kidney injury)     CAD (coronary artery disease)    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HCC)    Large bowel obstruction (HCC)    Brachial artery stenosis, right (HCC)    Rectosigmoid mass - High colostomy output    Leukocytosis    GERD (gastroesophageal reflux disease)    Tobacco abuse    Liver metastasis (HCC)    Mixed hyperlipidemia    Dizziness - due to orthostatic hypotension    Ambulatory dysfunction    Orthostatic hypotension     Past Surgical History:   Procedure Laterality Date    CARDIAC CATHETERIZATION  07/2016    COLONOSCOPY N/A 6/22/2018    Procedure: COLONOSCOPY;  Surgeon: Alisson Gloria MD;  [facility]; Service: Colorectal    CORONARY ANGIOPLASTY WITH STENT PLACEMENT  07/05/2012    MAKEDA (LAD), PTA (Diag)    CORONARY ANGIOPLASTY WITH STENT PLACEMENT  12/2017    MAKEDA/ PTA (RCA)    FL GUIDED CENTRAL VENOUS ACCESS REPLACEMENT  8/15/2018    AL INSERT PICC W/ SUB-Q PORT N/A 8/15/2018    Procedure: INSERTION VENOUS PORT (PORT-A-CATH);   Surgeon: Dorothea Howard MD;  [facility];  Service: Colorectal    AL LAP,DIAGNOSTIC ABDOMEN N/A 7/17/2018    Procedure: LAPAROSCOPY DIAGNOSTIC, Laproscopic transverse loop colostomy, lysis of adhesions;  Surgeon: Philip Ruiz MD;  [facility];  Service: Colorectal    AL SIGMOIDOSCOPY FLX DX W/COLLJ SPEC BR/WA IF PFRMD N/A 8/15/2018    Procedure: Mariah Gautam;  Surgeon: Dorothea Howard MD;  [facility];  Service: Colorectal     Social History     Social History    Marital status: Single     Spouse name: N/A    Number of children: N/A    Years of education: N/A     Social History Main Topics    Smoking status: Current Every Day Smoker     Packs/day: 1 00     Years: 45 00 Types: Cigarettes    Smokeless tobacco: Never Used    Alcohol use No      Comment: "only on occasion"    Drug use: No      Comment: only tried x 1     Sexual activity: Not Asked     Other Topics Concern    None     Social History Narrative    None     Family History   Problem Relation Age of Onset    Heart attack Father     Cancer Brother         gastric       Meds/Allergies   all current active meds have been reviewed and current meds:   Current Facility-Administered Medications   Medication Dose Route Frequency    acetaminophen (TYLENOL) tablet 650 mg  650 mg Oral Q6H PRN    atorvastatin (LIPITOR) tablet 10 mg  10 mg Oral Daily    calcium carbonate (TUMS) chewable tablet 1,000 mg  1,000 mg Oral Daily PRN    clopidogrel (PLAVIX) tablet 75 mg  75 mg Oral Daily    divalproex sodium (DEPAKOTE) EC tablet 750 mg  750 mg Oral Daily    DULoxetine (CYMBALTA) delayed release capsule 60 mg  60 mg Oral Daily    heparin (porcine) subcutaneous injection 5,000 Units  5,000 Units Subcutaneous Q8H [address]    insulin glargine (LANTUS) subcutaneous injection 10 Units 0 1 mL  10 Units Subcutaneous HS    insulin lispro (HumaLOG) 100 units/mL subcutaneous injection 1-5 Units  1-5 Units Subcutaneous TID AC    insulin lispro (HumaLOG) 100 units/mL subcutaneous injection 1-5 Units  1-5 Units Subcutaneous HS    melatonin tablet 6 mg  6 mg Oral HS    mirtazapine (REMERON) tablet 15 mg  15 mg Oral Daily    nicotine (NICODERM CQ) 14 mg/24hr TD 24 hr patch 1 patch  1 patch Transdermal Daily    ondansetron (ZOFRAN) injection 4 mg  4 mg Intravenous Q6H PRN    senna (SENOKOT) tablet 8 6 mg  1 tablet Oral HS PRN    traZODone (DESYREL) tablet 50 mg  50 mg Oral HS       No Known Allergies    Objective     Physical Exam   Constitutional: She is oriented to person, place, and time  No distress  HENT:   Head: Normocephalic and atraumatic  Mouth/Throat: Oropharynx is clear and moist  No oropharyngeal exudate     Eyes: No scleral icterus  Neck: Normal range of motion  Neck supple  Cardiovascular: Normal rate, regular rhythm, normal heart sounds and intact distal pulses  No murmur heard  Pulmonary/Chest: Effort normal and breath sounds normal  No respiratory distress  She has no wheezes  Abdominal: Soft  Bowel sounds are normal  She exhibits no distension  There is no tenderness  Musculoskeletal: She exhibits no edema  Neurological: She is alert and oriented to person, place, and time  Skin: Skin is warm and dry  She is not diaphoretic  Psychiatric: She has a normal mood and affect  Nursing note and vitals reviewed  Lab Results:     Lab Results   Component Value Date    WBC 8 65 09/09/2018    HGB 9 2 (L) 09/09/2018    HCT 28 9 (L) 09/09/2018    MCV 88 09/09/2018     09/09/2018    MCH 28 1 09/09/2018    MCHC 31 8 09/09/2018    RDW 13 8 09/09/2018    MPV 9 6 09/09/2018   , CMP:   Lab Results   Component Value Date     09/09/2018    K 4 2 09/09/2018     09/09/2018    CO2 23 09/09/2018    BUN 17 09/09/2018    CREATININE 1 15 09/09/2018    CALCIUM 8 2 (L) 09/09/2018    EGFR 52 09/09/2018     Imaging Studies: I have personally reviewed pertinent reports  EKG, Pathology, and Other Studies: I have personally reviewed pertinent reports  Final Diagnosis 8/5/18      A  Rectosigmoid colon, biopsy #1:  - Adenocarcinoma, moderately differentiated       B  Rectosigmoid colon, biopsy #2:  - At least intramucosal adenocarcinoma  NM PET 8/1/18: IMPRESSION:  1  Large hypermetabolic sigmoid mass compatible with known malignancy  Infiltration into the adjacent soft tissues, with obscured planes between the bladder and uterus  2   Hypermetabolic liver lesion compatible with metastasis  3   6 mm left lower lung nodule  This may be too small for accurate PET evaluation  Metastasis is not excluded  A primary lung lesion is also not excluded    3 month CT follow-up is recommended      Code Status: Level 1 - Full Code  Plan to complete 5 Wishes tomorrow  Counseling / Coordination of Care  Total floor / unit time spent today 45 minutes  Greater than 50% of total time was spent with the patient and / or family counseling and / or coordination of care  A description of the counseling / coordination of care: psychosocial support, goals of care discussion, symptom management      Marisel Marinelli MD

## 2018-09-09 NOTE — PLAN OF CARE
Problem: DISCHARGE PLANNING - CARE MANAGEMENT  Goal: Discharge to post-acute care or home with appropriate resources  INTERVENTIONS:  - Conduct assessment to determine patient/family and health care team treatment goals, and need for post-acute services based on payer coverage, community resources, and patient preferences, and barriers to discharge  - Address psychosocial, clinical, and financial barriers to discharge as identified in assessment in conjunction with the patient/family and health care team  - Arrange appropriate level of post-acute services according to patient's   needs and preference and payer coverage in collaboration with the physician and health care team  - Communicate with and update the patient/family, physician, and health care team regarding progress on the discharge plan  - Arrange appropriate transportation to post-acute venues  Pt is going home to family when medically clear for dc  Outcome: Progressing

## 2018-09-09 NOTE — PROGRESS NOTES
Progress Note - Emily Abdullahi 1960, 60 y o  female MRN: [id number]    Unit/Bed#: [facility] 616-01 Encounter: [id number]    Primary Care Provider: Rick Salgado MD   Date and time admitted to hospital: 9/7/2018  2:53 PM        * Dizziness - due to orthostatic hypotension   Assessment & Plan    · Patient with dizziness over the last several days following her last chemo treatment 1 5 weeks ago  · Orthostatic hypotension  in setting of nausea, vomiting, dehydration from decreased p o  Intake  · Orthostatic blood pressures still positive this morning  · Symptoms Resolved this morning as per patient she has not walked around yet  · Continue IV fluid  · PT OT pending        RYLAN (acute kidney injury)    Assessment & Plan    · Creatinine 1 9 on admission, up from a baseline of about 0 8  · Likely secondary to dehydration, decreased p o  intake, nausea, vomiting  · Improved yest 1 2  Pending this am   · Continue IV fluid  · Recheck tomorrow  Ambulatory dysfunction   Assessment & Plan    · Patient with recurrent dizziness and falls over the last few days  Denies injuries  · PT/OT evaluation pending  · Palliative care and case management for assistance with outpatient management  It seems that the patient is requiring more help at home  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[facility])   Assessment & Plan    · Follows with Oncology outpatient, last chemo was 1 5 weeks ago  · With mets to liver  · Status post resection with colostomy  · CT head negative for metastatic disease  · As per patient she is scheduled for her chemotherapy this Tuesday  If she still in house were discussed with Oncology  Essential hypertension   Assessment & Plan    · Bp borderline low  · DC metoprolol today and monitor          Schizoaffective disorder, bipolar type ([address] )   Assessment & Plan    · Continue Cymbalta, Remeron and trazodone        Type 2 diabetes mellitus without complication, without long-term current use of insulin ([address] ) Assessment & Plan    Lab Results   Component Value Date    HGBA1C 10 4 (H) 2018       Recent Labs      18   1714  18   2107  18   0756  18   1137   POCGLU  142*  88  63*  170*       Blood Sugar Average: Last 72 hrs:  (P) 139   · Hold metformin while inpatient  · Will stop 70 30 as patient hypoglycemic this morning  · Discontinue Lantus with sliding scale insulin for now  VTE Pharmacologic Prophylaxis:   Pharmacologic: Heparin  Mechanical VTE Prophylaxis in Place: Yes   Patient Centered Rounds: I have performed bedside rounds with nursing staff today    Discussions with Specialists or Other Care Team Provider:    Education and Discussions with Family / Patient: patient   Time Spent for Care: 30 minutes  More than 50% of total time spent on counseling and coordination of care as described above    Current Length of Stay: 2 day(s)   Current Patient Status: Inpatient   Certification Statement: The patient will continue to require additional inpatient hospital stay due to above  [address]: pending improvement and PT OT eval   Code Status: Level 1 - Full Code    Subjective:   Pt seen and examined by me this morning  Pt denies any specific complaints  Dizziness has resolved  She was much more awake and alert compared to yesterday  No abdominal pain, nausea, vomiting, diarrhea  Tolerating diet well  Objective:     Vitals:   Temp (24hrs), Av 9 °F (36 6 °C), Min:97 16 °F (36 2 °C), Max:98 78 °F (37 1 °C)    HR:  [] 76  Resp:  [18-20] 18  BP: ()/(50-75) 83/52  SpO2:  [95 %-100 %] 95 %  Body mass index is 22 42 kg/m²  Input and Output Summary (last 24 hours):        Intake/Output Summary (Last 24 hours) at 18 1235  Last data filed at 18 1115   Gross per 24 hour   Intake             2185 ml   Output             3150 ml   Net             -965 ml       Physical Exam:     Physical Exam    Constitutional: Pt appears well-developed and well-nourished  Not in any acute distress  Cardiovascular: Normal rate, regular rhythm, normal heart sounds  Exam reveals no gallop and no friction rub  No murmur heard  Pulmonary/Chest: Effort normal and breath sounds normal  No respiratory distress  Pt has no wheezes or rales  Abdominal: Soft  Non-distended, Non-tender  Bowel sounds are normal    Musculoskeletal: Normal range of motion  Neurological: Awake and alert, oriented x 3  Answered questions appropriately  Moving all extremities spontaneously  Psychiatric: normal mood and affect  Additional Data:     Labs:      Results from last 7 days  Lab Units 09/08/18  0603   WBC Thousand/uL 8 85   HEMOGLOBIN g/dL 9 0*   HEMATOCRIT % 28 5*   PLATELETS Thousands/uL 284   NEUTROS PCT % 62   LYMPHS PCT % 27   MONOS PCT % 8   EOS PCT % 3       Results from last 7 days  Lab Units 09/08/18  0603   SODIUM mmol/L 138   POTASSIUM mmol/L 3 9   CHLORIDE mmol/L 109*   CO2 mmol/L 21   BUN mg/dL 24   CREATININE mg/dL 1 24   CALCIUM mg/dL 8 4   ALK PHOS U/L 108   ALT U/L 14   AST U/L 11           Results from last 7 days  Lab Units 09/09/18  1137 09/09/18  0756 09/08/18  2107 09/08/18  1714 09/08/18  1220 09/08/18  0824 09/07/18  2115   POC GLUCOSE mg/dl 170* 63* 88 142* 74 188* 248*             * I Have Reviewed All Lab Data Listed Above  * Additional Pertinent Lab Tests Reviewed:  [address] Admission Reviewed    Imaging:    Imaging Reports Reviewed Today Include:   Imaging Personally Reviewed by Myself Includes:      Recent Cultures (last 7 days):           Last 24 Hours Medication List:     Current Facility-Administered Medications:  acetaminophen 650 mg Oral Q6H PRN Mera Suresh PA-C   atorvastatin 10 mg Oral Daily Mera Suresh PA-C   calcium carbonate 1,000 mg Oral Daily PRN Mera Suresh PA-C   clopidogrel 75 mg Oral Daily Mera Suresh PA-C   divalproex sodium 750 mg Oral Daily Mera Suresh PA-C   DULoxetine 60 mg Oral Daily Mera YANELIS Suresh   heparin (porcine) 5,000 Units Subcutaneous Q8H Jorge A Osborn MD   insulin glargine 10 Units Subcutaneous HS Mera Suresh PA-C   insulin lispro 1-5 Units Subcutaneous TID AC Mera Suresh PA-C   insulin lispro 1-5 Units Subcutaneous HS Mera Suresh PA-C   melatonin 6 mg Oral HS Curtis Mullins PA-C   mirtazapine 15 mg Oral Daily Mera Suresh PA-C   nicotine 1 patch Transdermal Daily Mera Suresh PA-C   ondansetron 4 mg Intravenous Q6H PRN eMra Suresh PA-C   senna 1 tablet Oral HS PRN Mera Suresh PA-C   traZODone 50 mg Oral HS Mera Suresh PA-C        Today, Patient Was Seen By: Maritza Latham MD    ** Please Note: Dictation voice to text software may have been used in the creation of this document   **

## 2018-09-09 NOTE — ASSESSMENT & PLAN NOTE
Lab Results   Component Value Date    HGBA1C 10 4 (H) 06/20/2018       Recent Labs      09/08/18   1714  09/08/18   2107  09/09/18   0756  09/09/18   1137   POCGLU  142*  88  63*  170*       Blood Sugar Average: Last 72 hrs:  (P) 139   · Hold metformin while inpatient  · Will stop 70 30 as patient hypoglycemic this morning  · Discontinue Lantus with sliding scale insulin for now

## 2018-09-09 NOTE — ASSESSMENT & PLAN NOTE
· Patient with recurrent dizziness and falls over the last few days  Denies injuries  · PT/OT evaluation pending  · Palliative care and case management for assistance with outpatient management  It seems that the patient is requiring more help at home

## 2018-09-10 ENCOUNTER — DOCUMENTATION (OUTPATIENT)
Dept: HEMATOLOGY ONCOLOGY | Facility: CLINIC | Age: 58
End: 2018-09-10

## 2018-09-10 VITALS
HEIGHT: 63 IN | DIASTOLIC BLOOD PRESSURE: 56 MMHG | SYSTOLIC BLOOD PRESSURE: 90 MMHG | WEIGHT: 124.34 LBS | RESPIRATION RATE: 20 BRPM | TEMPERATURE: 97.8 F | BODY MASS INDEX: 22.03 KG/M2 | HEART RATE: 96 BPM | OXYGEN SATURATION: 100 %

## 2018-09-10 LAB
ANION GAP SERPL CALCULATED.3IONS-SCNC: 7 MMOL/L (ref 4–13)
BUN SERPL-MCNC: 21 MG/DL (ref 5–25)
CALCIUM SERPL-MCNC: 8.4 MG/DL (ref 8.3–10.1)
CHLORIDE SERPL-SCNC: 104 MMOL/L (ref 100–108)
CO2 SERPL-SCNC: 25 MMOL/L (ref 21–32)
CREAT SERPL-MCNC: 1.14 MG/DL (ref 0.6–1.3)
GFR SERPL CREATININE-BSD FRML MDRD: 53 ML/MIN/1.73SQ M
GLUCOSE SERPL-MCNC: 232 MG/DL (ref 65–140)
GLUCOSE SERPL-MCNC: 256 MG/DL (ref 65–140)
POTASSIUM SERPL-SCNC: 4 MMOL/L (ref 3.5–5.3)
SODIUM SERPL-SCNC: 136 MMOL/L (ref 136–145)

## 2018-09-10 PROCEDURE — 82948 REAGENT STRIP/BLOOD GLUCOSE: CPT

## 2018-09-10 PROCEDURE — 99239 HOSP IP/OBS DSCHRG MGMT >30: CPT | Performed by: INTERNAL MEDICINE

## 2018-09-10 PROCEDURE — 80048 BASIC METABOLIC PNL TOTAL CA: CPT | Performed by: INTERNAL MEDICINE

## 2018-09-10 RX ORDER — INSULIN ASPART 100 [IU]/ML
20 INJECTION, SUSPENSION SUBCUTANEOUS
Qty: 10 ML | Refills: 0 | Status: SHIPPED | OUTPATIENT
Start: 2018-09-10 | End: 2018-10-05 | Stop reason: HOSPADM

## 2018-09-10 RX ADMIN — MIRTAZAPINE 15 MG: 15 TABLET, FILM COATED ORAL at 08:17

## 2018-09-10 RX ADMIN — HEPARIN SODIUM 5000 UNITS: 5000 INJECTION, SOLUTION INTRAVENOUS; SUBCUTANEOUS at 05:40

## 2018-09-10 RX ADMIN — DIVALPROEX SODIUM 750 MG: 500 TABLET, DELAYED RELEASE ORAL at 08:17

## 2018-09-10 RX ADMIN — INSULIN LISPRO 2 UNITS: 100 INJECTION, SOLUTION INTRAVENOUS; SUBCUTANEOUS at 08:20

## 2018-09-10 RX ADMIN — DULOXETINE HYDROCHLORIDE 60 MG: 60 CAPSULE, DELAYED RELEASE ORAL at 08:17

## 2018-09-10 RX ADMIN — NICOTINE 1 PATCH: 14 PATCH, EXTENDED RELEASE TRANSDERMAL at 08:24

## 2018-09-10 RX ADMIN — CLOPIDOGREL BISULFATE 75 MG: 75 TABLET, FILM COATED ORAL at 08:17

## 2018-09-10 NOTE — PROGRESS NOTES
GI Oncology Nurse Navigator Note    Called and spoke to Aditya Alexander 55 counselor, this morning about financial hardships expressed by Xin Grant and Emily, asked if there was something we could do to help them as patient was admitted to hospital and there was a failure to thrive diagnosis along with other things Xin Grant has expressed (gas, no air conditioner, no money for food), Catherine stated she would call them and help    [facility] and cancelled the home visit that was scheduled today as patient was currently in patient, informed them this RN would call when patient was discharged to set up new appointment, called them after she was discharged and set up new appointment for RN to go out for home visit on Wednesday 9/12, reviewed with Lottie Catalan the phone numbers to call for Emily and reviewed the new address and for the RN to walk to the rear of the building for access to the patients home, informed him this RN would review with Emily to answer her phone to ensure the RN would come to her home, he stated he will call her tomorrow to remind her of her appointment as well    Sonya Sofia RN (Dr Clifton Rich) to find out about chemotherapy infusion appointment for tomorrow and if they still wanted her to keep appointment, received call back from Aniyah, new appointment made for Emily to see Dr Jason Dawn prior to infusion appointment and infusion appointment kept, he will assess her and decide from there    [facility] called and her appointment  time changed from 11:30 to 10am    Called Emily and made her aware of change in appointment, she asked if she was still getting her infusion tomorrow, informed her that was up to Dr Clifton Rich and he will decide when he sees her tomorrow, also told her about the home care RN coming to her home to see her on Wednesday 9/12, told her they will be calling her prior to coming out and she MUST answer the phone so they come out, she verbalized understanding, also informed her that according to Ceasar Jimenez, the colostomy supplies were delivered on Friday 9/7, also looked on [employer] site with tracking number and it shows supplies were delivered, instructed her to have Kwaku Vargas ask their neighbors if they may have taken the supplies in for them, if not to call me back and I will call Ceasar Jimenez back and come up with a new plan, she verbalized understanding, instructed her to call with any other questions or concerns     Will also ask for palliative care consult

## 2018-09-10 NOTE — ASSESSMENT & PLAN NOTE
Lab Results   Component Value Date    HGBA1C 10 4 (H) 06/20/2018       Recent Labs      09/09/18   1137  09/09/18   1645  09/09/18   2156  09/10/18   0757   POCGLU  170*  204*  291*  256*       Blood Sugar Average: Last 72 hrs:  (P) 172 4   · Resume metformin and Lantus on discharge  · Will decrease the dose of NovoLog 70 30 to 20 units b i d  Brady Davis · Advised the patient to keep a close check on her sugars and check it at least 3 times daily for now and follow up with PCP in 1 week

## 2018-09-10 NOTE — ASSESSMENT & PLAN NOTE
· Bp borderline low  · DC metoprolol  Continue to hold on discharge  Advise the patient to monitor blood pressure closely and follow up with PCP in 1 week

## 2018-09-10 NOTE — PALLIATIVE CARE CONFERENCE
LCSW and RADHA Paiz met with with Pt, Emily, and her significant other Dea Baez in her room  We introduced ourselves and the purpose of the visit to review the 5 wishes document with Emily Diaz was in agreement with this and assigned her boyfriend Dea Baez as her health care agent and decision maker  Emily does have a daughter Humera Rich but Emily declined to have her as an health care agent  Emily also feels strongly about life sustaining measures and would like those measures but not in the event she had a traumatic brain injury  Emily is in agreement with exploring hospice comfort measures if that were the case  Kwame Chino was open with her  thoughts and specified wanting to be buried  Emily reported that she and Dea Baez have spoken about all her wishes  Emily signed the document which was witnessed by this LCSW and Obed Patel  No other needs at this time  Pt will be discharged this afternoon  Emily was alert and oriented, able to make her own decisions and clear  Dea Baez presented as supportive of Emily and her decisions  LCSW will continue to offer support as able and accepted  LCSW made copies of the document and placed them in the chart for scanning to medical records

## 2018-09-10 NOTE — ASSESSMENT & PLAN NOTE
· Creatinine 1 9 on admission, up from a baseline of about 0 8  · Likely secondary to dehydration, decreased p o  intake, nausea, vomiting  · Resolved

## 2018-09-10 NOTE — ASSESSMENT & PLAN NOTE
· Patient with dizziness over the last several days following her last chemo treatment 1 5 weeks ago  · Orthostatic hypotension  in setting of nausea, vomiting, dehydration from decreased p o  Intake  · Orthostatic blood pressures still positive this morning patient completely asymptomatic  · PT OT evaluated and cleared the patient for discharge home  · Continue to hold Lopressor for orthostatic hypotension  Will hold off on starting midodrine for now as patient is asymptomatic

## 2018-09-10 NOTE — PROGRESS NOTES
Rounded with Dr Deysi Ballard  Patient with no complaints  Drop in orthostatic blood pressure this morning but patient asymptomatic  BMP stable  Dr Deyis Ballard said patient could be discharged today and follow up outpatient  Patient to continue not taking metoprolol at home

## 2018-09-10 NOTE — ASSESSMENT & PLAN NOTE
· Follows with Oncology outpatient, last chemo was 1 5 weeks ago  · With mets to liver  · Status post resection with colostomy  · CT head negative for metastatic disease  · As per patient she is scheduled for her chemotherapy tomorrow  Follow up as scheduled

## 2018-09-10 NOTE — PLAN OF CARE
DISCHARGE PLANNING     Discharge to home or other facility with appropriate resources Progressing        DISCHARGE PLANNING - CARE MANAGEMENT     Discharge to post-acute care or home with appropriate resources Progressing        INFECTION - ADULT     Absence or prevention of progression during hospitalization Progressing     Absence of fever/infection during neutropenic period Progressing        Knowledge Deficit     Patient/family/caregiver demonstrates understanding of disease process, treatment plan, medications, and discharge instructions Progressing        METABOLIC, FLUID AND ELECTROLYTES - ADULT     Electrolytes maintained within normal limits Progressing     Fluid balance maintained Progressing     Glucose maintained within target range Progressing        Nutrition/Hydration-ADULT     Nutrient/Hydration intake appropriate for improving, restoring or maintaining nutritional needs Progressing        PAIN - ADULT     Verbalizes/displays adequate comfort level or baseline comfort level Progressing        Potential for Falls     Patient will remain free of falls Progressing        SAFETY ADULT     Maintain or return to baseline ADL function Progressing     Maintain or return mobility status to optimal level Progressing        SKIN/TISSUE INTEGRITY - ADULT     Incision(s), wounds(s) or drain site(s) healing without S/S of infection Progressing

## 2018-09-10 NOTE — DISCHARGE SUMMARY
Discharge- Emily Abdullahi 1960, 60 y o  female MRN: [id number]    Unit/Bed#: [facility] 616-01 Encounter: [id number]    Primary Care Provider: Leonela Figueredo MD   Date and time admitted to hospital: 9/7/2018  2:53 PM        * Dizziness - due to orthostatic hypotension   Assessment & Plan    · Patient with dizziness over the last several days following her last chemo treatment 1 5 weeks ago  · Orthostatic hypotension  in setting of nausea, vomiting, dehydration from decreased p o  Intake  · Orthostatic blood pressures still positive this morning patient completely asymptomatic  · PT OT evaluated and cleared the patient for discharge home  · Continue to hold Lopressor for orthostatic hypotension  Will hold off on starting midodrine for now as patient is asymptomatic  RYLAN (acute kidney injury)    Assessment & Plan    · Creatinine 1 9 on admission, up from a baseline of about 0 8  · Likely secondary to dehydration, decreased p o  intake, nausea, vomiting  · Resolved  Ambulatory dysfunction   Assessment & Plan    · Patient with recurrent dizziness and falls over the last few days  Denies injuries  · PT/OT cleared the patient for discharge home  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[facility])   Assessment & Plan    · Follows with Oncology outpatient, last chemo was 1 5 weeks ago  · With mets to liver  · Status post resection with colostomy  · CT head negative for metastatic disease  · As per patient she is scheduled for her chemotherapy tomorrow  Follow up as scheduled  Essential hypertension   Assessment & Plan    · Bp borderline low  · DC metoprolol  Continue to hold on discharge  Advise the patient to monitor blood pressure closely and follow up with PCP in 1 week          Schizoaffective disorder, bipolar type ([address] )   Assessment & Plan    · Continue Cymbalta, Remeron and trazodone        Type 2 diabetes mellitus without complication, without long-term current use of insulin ([address] )   Assessment & Plan Lab Results   Component Value Date    HGBA1C 10 4 (H) 06/20/2018       Recent Labs      09/09/18   1137  09/09/18   1645  09/09/18   2156  09/10/18   0757   POCGLU  170*  204*  291*  256*       Blood Sugar Average: Last 72 hrs:  (P) 172 4   · Resume metformin and Lantus on discharge  · Will decrease the dose of NovoLog 70 30 to 20 units b i d  Beth Rm · Advised the patient to keep a close check on her sugars and check it at least 3 times daily for now and follow up with PCP in 1 week  Discharging Physician / Practitioner: Rafat Salinas MD  PCP: Wai Cardoza MD  Admission Date:   Admission Orders     Ordered        09/07/18 1842  Inpatient Admission (expected length of stay for this patient is greater than two midnights)  Once             Discharge Date: 09/10/18    Resolved Problems  Date Reviewed: 9/10/2018    None          Consultations During Hospital Stay:  · none    Procedures Performed:     · none    Significant Findings / Test Results:     CT head wo contrast   Final Result by Mable Shea MD (09/07 2205)      No acute intracranial abnormality  Mild chronic small vessel ischemic changes and volume loss  Old left basal ganglia and thalamic lacunar infarcts  Workstation performed: [id number]         XR chest 2 views   Final Result by Maria Alejandra Carroll MD (09/07 2022)      No acute cardiopulmonary disease  Workstation performed: [id number]              Positive orthostatic blood pressure    Incidental Findings:   · none     Test Results Pending at Discharge (will require follow up):   · noen     Outpatient Tests Requested:  · CBC, BMP in 1 week    Complications:  none    Reason for Admission:  Dizziness due to orthostatic hypotension from decreased oral intake from nausea and vomiting  Hospital Course:     Emily Lara is a 60 y o  female patient who originally presented to the hospital on 9/7/2018 due to severe dizziness with nausea and vomiting    The dizziness was most likely because orthostatic hypotension from dehydration from decreased oral intake  Patient was made NPO started on IV fluids  Her symptoms resolved with conservative management  Nausea and vomiting most likely possibly from viral gastritis  She has been tolerating diet well since yesterday  Her symptoms have completely resolved  She was evaluated by PT and OT and cleared for discharge home  She still continues to have positive orthostatic blood pressure and borderline low blood pressure at baseline  Her Lopressor has been discontinued and will be held on discharge  She should keep a close check on her blood pressure at home and follow up with PCP in 1 week  Please see above list of diagnoses and related plan for additional information  Condition at Discharge: good     Discharge Day Visit / Exam:     Subjective:  Pt seen and examined by me this morning  Pt denies any complaints  She says she feels back to normal   No dizziness or lightheadedness  No problems walking around or standing  Vitals: Blood Pressure: 90/56 (Stand ) (09/10/18 0751)  Pulse: 96 (Stand ) (09/10/18 0751)  Temperature: 97 8 °F (36 6 °C) (09/10/18 0747)  Temp Source: Oral (09/10/18 0747)  Respirations: 20 (09/10/18 0747)  Height: 5' 3" (160 cm) (09/07/18 1745)  Weight - Scale: 56 4 kg (124 lb 5 4 oz) (09/10/18 0754)  SpO2: 100 % (09/10/18 0750)     Exam:   Physical Exam    Constitutional: Pt appears well-developed and well-nourished  Not in any acute distress  HENT:   Head: Normocephalic and atraumatic  Eyes: EOM are normal    Neck: Neck supple  Cardiovascular: Normal rate, regular rhythm, normal heart sounds  Exam reveals no gallop and no friction rub  No murmur heard  Pulmonary/Chest: Effort normal and breath sounds normal  No respiratory distress  Pt has no wheezes or rales  Abdominal: Soft  Non-distended, Non-tender  Bowel sounds are normal    Musculoskeletal: Normal range of motion  Neurological: alert and oriented to person, place, and time  Normal strength and sensations  Psychiatric: normal mood and affect  Discussion with Family:  at bedside    Discharge instructions/Information to patient and family:   See after visit summary for information provided to patient and family  Provisions for Follow-Up Care:  See after visit summary for information related to follow-up care and any pertinent home health orders  Disposition:     Home    For Discharges to [facility] SNF:   · Not Applicable to this Patient - Not Applicable to this Patient    Planned Readmission: no     Discharge Statement:  I spent 40 minutes discharging the patient  This time was spent on the day of discharge  I had direct contact with the patient on the day of discharge  Greater than 50% of the total time was spent examining patient, answering all patient questions, arranging and discussing plan of care with patient as well as directly providing post-discharge instructions  Additional time then spent on discharge activities  Discharge Medications:  See after visit summary for reconciled discharge medications provided to patient and family        ** Please Note: This note has been constructed using a voice recognition system **

## 2018-09-10 NOTE — ASSESSMENT & PLAN NOTE
· Patient with recurrent dizziness and falls over the last few days  Denies injuries  · PT/OT cleared the patient for discharge home

## 2018-09-11 ENCOUNTER — OFFICE VISIT (OUTPATIENT)
Dept: HEMATOLOGY ONCOLOGY | Facility: CLINIC | Age: 58
End: 2018-09-11
Payer: COMMERCIAL

## 2018-09-11 ENCOUNTER — HOSPITAL ENCOUNTER (OUTPATIENT)
Dept: INFUSION CENTER | Facility: CLINIC | Age: 58
Discharge: HOME | End: 2018-09-11
Payer: COMMERCIAL

## 2018-09-11 ENCOUNTER — DOCUMENTATION (OUTPATIENT)
Dept: HEMATOLOGY ONCOLOGY | Facility: CLINIC | Age: 58
End: 2018-09-11

## 2018-09-11 VITALS
BODY MASS INDEX: 22.15 KG/M2 | DIASTOLIC BLOOD PRESSURE: 74 MMHG | SYSTOLIC BLOOD PRESSURE: 118 MMHG | TEMPERATURE: 99.2 F | HEIGHT: 63 IN | WEIGHT: 125 LBS | HEART RATE: 82 BPM | RESPIRATION RATE: 16 BRPM

## 2018-09-11 VITALS
TEMPERATURE: 100.9 F | BODY MASS INDEX: 22.34 KG/M2 | WEIGHT: 126.1 LBS | RESPIRATION RATE: 16 BRPM | SYSTOLIC BLOOD PRESSURE: 96 MMHG | HEIGHT: 63 IN | HEART RATE: 84 BPM | DIASTOLIC BLOOD PRESSURE: 64 MMHG

## 2018-09-11 DIAGNOSIS — R11.2 CHEMOTHERAPY INDUCED NAUSEA AND VOMITING: ICD-10-CM

## 2018-09-11 DIAGNOSIS — C78.7 LIVER METASTASIS (HCC): ICD-10-CM

## 2018-09-11 DIAGNOSIS — T45.1X5A CHEMOTHERAPY INDUCED NAUSEA AND VOMITING: ICD-10-CM

## 2018-09-11 DIAGNOSIS — C19 RECTOSIGMOID CANCER (HCC): Primary | ICD-10-CM

## 2018-09-11 PROCEDURE — 96366 THER/PROPH/DIAG IV INF ADDON: CPT

## 2018-09-11 PROCEDURE — 96367 TX/PROPH/DG ADDL SEQ IV INF: CPT

## 2018-09-11 PROCEDURE — 96411 CHEMO IV PUSH ADDL DRUG: CPT

## 2018-09-11 PROCEDURE — 96409 CHEMO IV PUSH SNGL DRUG: CPT

## 2018-09-11 PROCEDURE — G0498 CHEMO EXTEND IV INFUS W/PUMP: HCPCS

## 2018-09-11 PROCEDURE — 99214 OFFICE O/P EST MOD 30 MIN: CPT | Performed by: INTERNAL MEDICINE

## 2018-09-11 RX ORDER — ACETAMINOPHEN 325 MG/1
650 TABLET ORAL ONCE
Status: COMPLETED | OUTPATIENT
Start: 2018-09-11 | End: 2018-09-11

## 2018-09-11 RX ORDER — FLUOROURACIL 50 MG/ML
632 INJECTION, SOLUTION INTRAVENOUS ONCE
Status: COMPLETED | OUTPATIENT
Start: 2018-09-11 | End: 2018-09-11

## 2018-09-11 RX ORDER — FLUOROURACIL 50 MG/ML
632 INJECTION, SOLUTION INTRAVENOUS ONCE
Status: DISCONTINUED | OUTPATIENT
Start: 2018-09-11 | End: 2018-09-11 | Stop reason: CLARIF

## 2018-09-11 RX ORDER — DEXTROSE MONOHYDRATE 50 MG/ML
20 INJECTION, SOLUTION INTRAVENOUS CONTINUOUS
Status: DISCONTINUED | OUTPATIENT
Start: 2018-09-11 | End: 2018-09-14 | Stop reason: HOSPADM

## 2018-09-11 RX ORDER — METOCLOPRAMIDE 10 MG/1
10 TABLET ORAL
Qty: 90 TABLET | Refills: 0 | Status: SHIPPED | OUTPATIENT
Start: 2018-09-11 | End: 2018-10-05 | Stop reason: HOSPADM

## 2018-09-11 RX ADMIN — DEXAMETHASONE SODIUM PHOSPHATE: 10 INJECTION, SOLUTION INTRAMUSCULAR; INTRAVENOUS at 12:05

## 2018-09-11 RX ADMIN — FLUOROURACIL 632 MG: 50 INJECTION, SOLUTION INTRAVENOUS at 15:05

## 2018-09-11 RX ADMIN — OXALIPLATIN 100 MG: 5 INJECTION, SOLUTION INTRAVENOUS at 12:37

## 2018-09-11 RX ADMIN — LEUCOVORIN CALCIUM 632 MG: 200 INJECTION, POWDER, LYOPHILIZED, FOR SOLUTION INTRAMUSCULAR; INTRAVENOUS at 12:36

## 2018-09-11 RX ADMIN — ACETAMINOPHEN 650 MG: 325 TABLET, FILM COATED ORAL at 15:39

## 2018-09-11 RX ADMIN — DEXTROSE 20 ML/HR: 5 SOLUTION INTRAVENOUS at 11:53

## 2018-09-11 NOTE — PROGRESS NOTES
Patient tolerated chemotherapy  Temp 100 9 oral at completion of chemotherapy    Denies any pain or other discomforts  Spoke with Alexandr Mathur RN  who made Dr Diann Barrios aware of slight temp elevation  Tylenol 650 mg PO administered as per Dr Jes Strickland prior to discharging patient  Gave patient a thermometer, educated her on the use and to notify Dr Jes Strickland with any fevers, or any other concerns/issues  Verbalized understanding  CADD pump connected as per orders  Patient aware to return on Thursday September 13, 2018 to D/C CADD  AVS given to patient   [facility] taking home

## 2018-09-11 NOTE — PROGRESS NOTES
Patient arrived on unit after seeing Dr Jenn Moore  Time out taken today with Sergey Brasher, RN -Oxaliplatin dose decreased to 65mg/m2 and Zofran 8mg IV ordered IV on Day #3 of cycle  New orders sent  On arrival to unit, patient's temp was 100 8 orally  Patient denies any other discomforts  Called and Sergey Brasher RN made Dr Jenn Moore aware  Patient cleared for chemotherapy today as per Dr Ordonez  Labs from 9/8/2018 within parameters on orders    Treatment initiated

## 2018-09-11 NOTE — PROGRESS NOTES
9/11/2018    Emily Espino Bolton    She received the 1st course of FOLFOX beginning on August 28, 2018  She noted emesis 3-4 times per day lasting several days afterwards  She was admitted to [address] September 7-10, 2018 with dehydration  At this time she notes decreased appetite, vague lower abdominal area pressure, easy fatigue, and feels cold all the time       Hematology/Oncology History:    1  Obstructing mass of the sigmoid/rectosigmoid on colonoscopy June 22, 2018, on MRI of the pelvis there is a 10 cm mass of the sigmoid colon with suggestion of bladder invasion, the mass abuts the uterus and there is focal extension of the mass into the deep lateral pelvic wall fascia, T4b Nx, stage IIIC  Biopsies of the sigmoid/rectosigmoid mass revealed at least high-grade dysplasia involving a tubular adenoma      Laparoscopic transverse loop colostomy and lysis of adhesions was done on July 17, 2018      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, 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      2  IR guided brachial artery angiography on July 27, 2018 revealed 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 for which she underwent suction thrombectomy with tPA administration, heparin infusion and  transition to rivaroxaban      3  Right chest port was placed on August 15, 2018  Current therapy:    Palliative chemotherapy with mFOLFOX-6 started on August 28, 2018, 1 course thus far    Review of systems:      General:  She notes fatigue, she feels cold all the time    Head and Neck: No nosebleeds, no oral cavity or throat soreness  Cardiovascular: No chest pain, no lower extremity edema  Respriatory: No cough or dyspnea  GI:  See HPI  : No urinary frequency  Musculoskeletal: No back pains or joint pain    Skin: No skin rash  Neurological: No headache, no numbness, no weakness  Hematologic: No easy bruising  Psychiatric: No emotional problems    Physical Examination:    Blood pressure 118/74, pulse 82, temperature 99 2 °F (37 3 °C), resp  rate 16, height 5' 3" (1 6 m), weight 56 7 kg (125 lb), not currently breastfeeding  Body surface area is 1 58 meters squared  General appearance: Appears well  Head: Normocephalic  Eyes: Extraocular movements intact  Ears: No gross hearing deficit  Oropharynx: Clear  Neck: Supple, No lymphadenopathy  Chest: No axillary adenopathy, the right chest port site is sufficiently healed  Lungs: Clear to auscultation bilaterally  Heart: Regular rate and rhythm  Abdomen:  The right upper colostomy with liquidy brown stool in the colostomy bag   No inguinal lymphadenopathy  Extremities: No lower extremity edema bilaterally  Skin: No rashes  Neurologic: Grossly intact, no focal neurological deficit  The patient uses a cane to assist in balance  Psychiatric: Oriented to person, place and time, normal mood and affect     ECOG 2    Laboratory:    From September 9, 2018:  WBC is 8 65, hemoglobin 9 2, platelets 754, creatinine 0 8, AST 20, ALT 16, alk-phos 105, bili 0 4, CEA is 8 8  CT of the head without contrast from September 7, 2018:  No intracranial mass, signs of acute infarction, or acute parenchymal hemorrhage, there is mild chronic small vessel ischemic changes and volume loss, old left basal ganglia and thalamic lacunar infarcts  Assessment/Plan:    The 1st course of mFOLFOX was poorly tolerated in terms of nausea and emesis resulting in a 3 day hospital admission for dehydration  Accordingly, the oxaliplatin dose is to be attenuated to 65 mg/m2 and additional antiemetics are to be provided beginning with the 2nd course of chemotherapy, i e  additional ondansetron 8 mg IV on day 3 and metoclopramide 10 mg a c  to control delayed nausea  The plan is oxaliplatin 865mg/m2 day 1, 5  mg/m2 day 1, folinic acid 400 mg/m2 day 1 and 5 fluorouracil 1200 mg/m2 over 48 hr days 1-2 on an every 2 week schedule     The 2nd course is to begin today and if sufficiently tolerated the 3rd course is to begin on September 25, 2018    Provided this regimen is well tolerated, bevacizumab may be added to this regimen in the future       Tumor tissue from August 15, 2018 is to be sent for KRAS and NRAS mutation analysis and B Berto mutation analysis      The patient is aware to seek medical attention for fever i e  temperature  100 5 or higher, chills, nausea, mouth soreness, liquidy stool in the colostomy, pain or redness or of the hands or feet, nose bleeds, easy bruising, other bleeding, excessive fatigue, or if other new problems arise  Otherwise, plan to see her again in 4 weeks

## 2018-09-11 NOTE — PROGRESS NOTES
GI Oncology Nurse Navigator Note    Received a call from Luz Elena, he stated he received the box of colostomy supplies, he stated the landlord saw the box and new they were not home and took the box inside for safe keeping, he gave them the box yesterday so they now have the supplies, he also confirmed the appointment time for Emily to see Dr Kaelyn Valentin today, informed him the appointment was for 10am, he asked what time [facility] would be picking her up, told him probably between 9-9:30am, he was getting her up so she had time to get ready, he also asked when the visiting nurses would be coming, informed him this RN had spoken to Lula Walton from [employer] yesterday and we set up for them to come to the house tomorrow 9/12, informed him they MUST answer the phone in order for her to come out, told him they have both phone numbers, his cell and the new house number but they MUST answer, he stated they would and he would meet the nurse out front of the apartment so he could show her where their front door is as it is in the rear of the building, informed Luz Elena this would be a good idea, told him it is important to keep these appointments with the visiting nurses so they can assess Emily so we can keep her from getting admitted to the hospital, Luz Elena agreeable, Luz Elena also stated he received a phone number to call from Aditya Alexander counselor, for financial help and when he spoke to the person they told him they could not help because Emily is not 60years old, he asked if Catherine could help, informed Luz Elena this RN will reach out to Catherine again and make her aware of this information and see what else can be done, Luz Elena very thankful, all questions answered, instructed him to call with any other questions or concerns

## 2018-09-12 RX ORDER — SODIUM CHLORIDE 9 MG/ML
20 INJECTION, SOLUTION INTRAVENOUS CONTINUOUS
Status: DISCONTINUED | OUTPATIENT
Start: 2018-09-13 | End: 2018-09-16 | Stop reason: HOSPADM

## 2018-09-13 ENCOUNTER — HOSPITAL ENCOUNTER (OUTPATIENT)
Dept: INFUSION CENTER | Facility: CLINIC | Age: 58
Discharge: HOME | DRG: 871 | End: 2018-09-13
Payer: COMMERCIAL

## 2018-09-13 VITALS
HEART RATE: 108 BPM | SYSTOLIC BLOOD PRESSURE: 151 MMHG | TEMPERATURE: 99 F | DIASTOLIC BLOOD PRESSURE: 80 MMHG | RESPIRATION RATE: 18 BRPM

## 2018-09-13 DIAGNOSIS — R10.30 LOWER ABDOMINAL PAIN: ICD-10-CM

## 2018-09-13 DIAGNOSIS — C19 RECTOSIGMOID CANCER (HCC): Primary | ICD-10-CM

## 2018-09-13 DIAGNOSIS — C78.7 LIVER METASTASIS (HCC): ICD-10-CM

## 2018-09-13 PROCEDURE — 96365 THER/PROPH/DIAG IV INF INIT: CPT

## 2018-09-13 RX ORDER — OXYCODONE HYDROCHLORIDE AND ACETAMINOPHEN 5; 325 MG/1; MG/1
1 TABLET ORAL EVERY 8 HOURS PRN
Qty: 60 TABLET | Refills: 0 | Status: SHIPPED | OUTPATIENT
Start: 2018-09-13 | End: 2018-10-05 | Stop reason: HOSPADM

## 2018-09-13 RX ADMIN — Medication 300 UNITS: at 15:33

## 2018-09-13 RX ADMIN — ONDANSETRON 8 MG: 2 INJECTION INTRAMUSCULAR; INTRAVENOUS at 14:22

## 2018-09-13 RX ADMIN — SODIUM CHLORIDE 20 ML/HR: 9 INJECTION, SOLUTION INTRAVENOUS at 14:21

## 2018-09-13 NOTE — PROGRESS NOTES
Roderick Lee from the [facility] infusion center called  The 5 FU infusion has been completed and the infusion pump disconnected  The patient complained of increased lower abdominal discomfort today  There has been no emesis  She has run out of oxycodone-acetaminophen 5-325 mg  The patient indicated that she is not in favor of evaluation at an emergency department regarding the lower abdominal pain  The infusion nurse indicated that the patient did not appear in acute distress  Percocet 5-325 mg q 8 hr p r n  is to be reordered  Arrangements are to be made for palliative care follow-up in regards to the patient's comfort  The patient is aware to seek medical attention for fever i e  temperature  100 5 or higher, chills, nausea, mouth soreness, liquidy stool in the colostomy, pain or redness or of the hands or feet, nose bleeds, easy bruising, other bleeding, persistent or progressive lower abdominal pain, excessive fatigue, or if other new problems arise

## 2018-09-13 NOTE — PROGRESS NOTES
The patient arrived at the infusion center with her brother and   They were all very upset about a bus and her colostomy  Her colostomy has been leaking  The patient's brother took her colostomy off  I did assist them in applying a new bag and explained that the infusion center does not have supplies for an ostomy  The family was also very upset about her pain  She reports a pain of 10/10 in her abdomen but is unable to explain the pain or what causes it to increase or decrease  They are very addement that they want pain medication and are not willing to go to the emergency room  The patient's brother reported that she is falling because of her pain  She did walk into the infusion center without difficulty and is sitting quietly in her chair  She does not appear to be in acute distress  The family is unaware of any follow up appointments to address pain after she left the hospital  I did speak with Dr Milka Worley to make him aware of the pain  I encouraged the patient to go to the emergency room for the pain  I did e-mail the nurse navigator to help this family manage appointments

## 2018-09-14 ENCOUNTER — TELEPHONE (OUTPATIENT)
Dept: HEMATOLOGY ONCOLOGY | Facility: CLINIC | Age: 58
End: 2018-09-14

## 2018-09-14 NOTE — TELEPHONE ENCOUNTER
Per note on 9 13 18 from [facility] infusion Lynd-questions concerning pain management follow up  Dr Romana Reeves Rx'd pain med for the pt's c/o of abdominal pain on 9 13 18 per infusion center nurse  Per Dr Romana Reeves today, pt was to follow up with palliative care  Pt was seen in the hospital by palliative care in September  Called Shashank-per Lynae Schlatter "call Claudia-(nurse navigator) she schedules our appts "  Nurse navigator is out of the office today, I will   Discuss the palliative follow up on 9 17 18 w/Claudia  Will call Lynae Schlatter after follow up  Lynae Schlatter in agreement with the planning at this time

## 2018-09-16 ENCOUNTER — HOSPITAL ENCOUNTER (EMERGENCY)
Facility: HOSPITAL | Age: 58
DRG: 871 | End: 2018-09-17
Attending: EMERGENCY MEDICINE | Admitting: INTERNAL MEDICINE
Payer: COMMERCIAL

## 2018-09-16 ENCOUNTER — APPOINTMENT (EMERGENCY)
Dept: RADIOLOGY | Facility: HOSPITAL | Age: 58
DRG: 871 | End: 2018-09-16
Payer: COMMERCIAL

## 2018-09-16 VITALS
HEART RATE: 149 BPM | RESPIRATION RATE: 18 BRPM | DIASTOLIC BLOOD PRESSURE: 65 MMHG | SYSTOLIC BLOOD PRESSURE: 100 MMHG | OXYGEN SATURATION: 86 % | TEMPERATURE: 98.9 F

## 2018-09-16 DIAGNOSIS — A41.9 SEPSIS (HCC): ICD-10-CM

## 2018-09-16 DIAGNOSIS — N28.9 ACUTE RENAL INSUFFICIENCY: ICD-10-CM

## 2018-09-16 DIAGNOSIS — E11.01 HHNC (HYPERGLYCEMIC HYPEROSMOLAR NONKETOTIC COMA) (HCC): Primary | ICD-10-CM

## 2018-09-16 LAB
ACETONE SERPL-MCNC: NEGATIVE MG/DL
ALBUMIN SERPL BCP-MCNC: 2 G/DL (ref 3.5–5)
ALP SERPL-CCNC: 120 U/L (ref 46–116)
ALT SERPL W P-5'-P-CCNC: 25 U/L (ref 12–78)
ANION GAP SERPL CALCULATED.3IONS-SCNC: 13 MMOL/L (ref 4–13)
ANISOCYTOSIS BLD QL SMEAR: PRESENT
APTT PPP: 29 SECONDS (ref 24–36)
AST SERPL W P-5'-P-CCNC: 45 U/L (ref 5–45)
ATRIAL RATE: 156 BPM
BACTERIA UR QL AUTO: ABNORMAL /HPF
BASE EX.OXY STD BLDV CALC-SCNC: 43 % (ref 60–80)
BASE EXCESS BLDV CALC-SCNC: -7.1 MMOL/L
BASOPHILS # BLD MANUAL: 0 THOUSAND/UL (ref 0–0.1)
BASOPHILS NFR MAR MANUAL: 0 % (ref 0–1)
BILIRUB SERPL-MCNC: 0.55 MG/DL (ref 0.2–1)
BILIRUB UR QL STRIP: NEGATIVE
BUN SERPL-MCNC: 47 MG/DL (ref 5–25)
CALCIUM SERPL-MCNC: 7.8 MG/DL (ref 8.3–10.1)
CHLORIDE SERPL-SCNC: 77 MMOL/L (ref 100–108)
CLARITY UR: CLEAR
CO2 SERPL-SCNC: 20 MMOL/L (ref 21–32)
COLOR UR: YELLOW
CREAT SERPL-MCNC: 2.11 MG/DL (ref 0.6–1.3)
DACRYOCYTES BLD QL SMEAR: PRESENT
EOSINOPHIL # BLD MANUAL: 0 THOUSAND/UL (ref 0–0.4)
EOSINOPHIL NFR BLD MANUAL: 0 % (ref 0–6)
ERYTHROCYTE [DISTWIDTH] IN BLOOD BY AUTOMATED COUNT: 13.1 % (ref 11.6–15.1)
GFR SERPL CREATININE-BSD FRML MDRD: 25 ML/MIN/1.73SQ M
GLUCOSE SERPL-MCNC: 952 MG/DL (ref 65–140)
GLUCOSE SERPL-MCNC: >500 MG/DL (ref 65–140)
GLUCOSE UR STRIP-MCNC: ABNORMAL MG/DL
HCO3 BLDV-SCNC: 17.8 MMOL/L (ref 24–30)
HCT VFR BLD AUTO: 25.6 % (ref 34.8–46.1)
HGB BLD-MCNC: 9.6 G/DL (ref 11.5–15.4)
HGB UR QL STRIP.AUTO: ABNORMAL
INR PPP: 1.24 (ref 0.86–1.17)
KETONES UR STRIP-MCNC: NEGATIVE MG/DL
LACTATE SERPL-SCNC: 3.4 MMOL/L (ref 0.5–2)
LEUKOCYTE ESTERASE UR QL STRIP: ABNORMAL
LG PLATELETS BLD QL SMEAR: PRESENT
LYMPHOCYTES # BLD AUTO: 0.28 THOUSAND/UL (ref 0.6–4.47)
LYMPHOCYTES # BLD AUTO: 8 % (ref 14–44)
MAGNESIUM SERPL-MCNC: 1.8 MG/DL (ref 1.6–2.6)
MCH RBC QN AUTO: 28.2 PG (ref 26.8–34.3)
MCHC RBC AUTO-ENTMCNC: 37.5 G/DL (ref 31.4–37.4)
MCV RBC AUTO: 75 FL (ref 82–98)
MONOCYTES # BLD AUTO: 0 THOUSAND/UL (ref 0–1.22)
MONOCYTES NFR BLD: 0 % (ref 4–12)
NEUTROPHILS # BLD MANUAL: 3.28 THOUSAND/UL (ref 1.85–7.62)
NEUTS BAND NFR BLD MANUAL: 7 % (ref 0–8)
NEUTS SEG NFR BLD AUTO: 85 % (ref 43–75)
NITRITE UR QL STRIP: NEGATIVE
NON-SQ EPI CELLS URNS QL MICRO: ABNORMAL /HPF
NRBC BLD AUTO-RTO: 0 /100 WBCS
O2 CT BLDV-SCNC: 6.7 ML/DL
P AXIS: -83 DEGREES
PCO2 BLDV: 34 MM HG (ref 42–50)
PH BLDV: 7.34 [PH] (ref 7.3–7.4)
PH UR STRIP.AUTO: 6 [PH] (ref 4.5–8)
PHOSPHATE SERPL-MCNC: 3.5 MG/DL (ref 2.7–4.5)
PLATELET # BLD AUTO: 66 THOUSANDS/UL (ref 149–390)
PLATELET BLD QL SMEAR: ABNORMAL
PMV BLD AUTO: 10.1 FL (ref 8.9–12.7)
PO2 BLDV: 27.7 MM HG (ref 35–45)
POTASSIUM SERPL-SCNC: 4.1 MMOL/L (ref 3.5–5.3)
PR INTERVAL: 160 MS
PROT SERPL-MCNC: 6.2 G/DL (ref 6.4–8.2)
PROT UR STRIP-MCNC: ABNORMAL MG/DL
PROTHROMBIN TIME: 15.7 SECONDS (ref 11.8–14.2)
QRS AXIS: 68 DEGREES
QRSD INTERVAL: 104 MS
QT INTERVAL: 284 MS
QTC INTERVAL: 457 MS
RBC # BLD AUTO: 3.41 MILLION/UL (ref 3.81–5.12)
RBC #/AREA URNS AUTO: ABNORMAL /HPF
RBC MORPH BLD: PRESENT
SODIUM SERPL-SCNC: 110 MMOL/L (ref 136–145)
SP GR UR STRIP.AUTO: 1.01 (ref 1–1.03)
T WAVE AXIS: -83 DEGREES
TOTAL CELLS COUNTED SPEC: 100
TROPONIN I SERPL-MCNC: <0.02 NG/ML
UROBILINOGEN UR QL STRIP.AUTO: 0.2 E.U./DL
VENTRICULAR RATE: 156 BPM
WBC # BLD AUTO: 3.56 THOUSAND/UL (ref 4.31–10.16)
WBC #/AREA URNS AUTO: ABNORMAL /HPF

## 2018-09-16 PROCEDURE — 83605 ASSAY OF LACTIC ACID: CPT | Performed by: EMERGENCY MEDICINE

## 2018-09-16 PROCEDURE — 85027 COMPLETE CBC AUTOMATED: CPT | Performed by: EMERGENCY MEDICINE

## 2018-09-16 PROCEDURE — 81001 URINALYSIS AUTO W/SCOPE: CPT | Performed by: EMERGENCY MEDICINE

## 2018-09-16 PROCEDURE — 85730 THROMBOPLASTIN TIME PARTIAL: CPT | Performed by: EMERGENCY MEDICINE

## 2018-09-16 PROCEDURE — 85610 PROTHROMBIN TIME: CPT | Performed by: EMERGENCY MEDICINE

## 2018-09-16 PROCEDURE — 87186 SC STD MICRODIL/AGAR DIL: CPT | Performed by: EMERGENCY MEDICINE

## 2018-09-16 PROCEDURE — 93010 ELECTROCARDIOGRAM REPORT: CPT | Performed by: INTERNAL MEDICINE

## 2018-09-16 PROCEDURE — 84100 ASSAY OF PHOSPHORUS: CPT | Performed by: EMERGENCY MEDICINE

## 2018-09-16 PROCEDURE — 36415 COLL VENOUS BLD VENIPUNCTURE: CPT | Performed by: EMERGENCY MEDICINE

## 2018-09-16 PROCEDURE — 85007 BL SMEAR W/DIFF WBC COUNT: CPT | Performed by: EMERGENCY MEDICINE

## 2018-09-16 PROCEDURE — 96360 HYDRATION IV INFUSION INIT: CPT

## 2018-09-16 PROCEDURE — 83735 ASSAY OF MAGNESIUM: CPT | Performed by: EMERGENCY MEDICINE

## 2018-09-16 PROCEDURE — 84145 PROCALCITONIN (PCT): CPT | Performed by: EMERGENCY MEDICINE

## 2018-09-16 PROCEDURE — 87040 BLOOD CULTURE FOR BACTERIA: CPT | Performed by: EMERGENCY MEDICINE

## 2018-09-16 PROCEDURE — 82009 KETONE BODYS QUAL: CPT | Performed by: EMERGENCY MEDICINE

## 2018-09-16 PROCEDURE — 93005 ELECTROCARDIOGRAM TRACING: CPT

## 2018-09-16 PROCEDURE — 87147 CULTURE TYPE IMMUNOLOGIC: CPT | Performed by: EMERGENCY MEDICINE

## 2018-09-16 PROCEDURE — 71045 X-RAY EXAM CHEST 1 VIEW: CPT

## 2018-09-16 PROCEDURE — 84484 ASSAY OF TROPONIN QUANT: CPT | Performed by: EMERGENCY MEDICINE

## 2018-09-16 PROCEDURE — 82805 BLOOD GASES W/O2 SATURATION: CPT | Performed by: EMERGENCY MEDICINE

## 2018-09-16 PROCEDURE — 80053 COMPREHEN METABOLIC PANEL: CPT | Performed by: EMERGENCY MEDICINE

## 2018-09-16 PROCEDURE — 82948 REAGENT STRIP/BLOOD GLUCOSE: CPT

## 2018-09-16 RX ORDER — ACETAMINOPHEN 325 MG/1
650 TABLET ORAL ONCE
Status: COMPLETED | OUTPATIENT
Start: 2018-09-16 | End: 2018-09-16

## 2018-09-16 RX ADMIN — PIPERACILLIN SODIUM AND TAZOBACTAM SODIUM 3.38 G: 36; 4.5 INJECTION, POWDER, FOR SOLUTION INTRAVENOUS at 23:06

## 2018-09-16 RX ADMIN — SODIUM CHLORIDE 1000 ML: 0.9 INJECTION, SOLUTION INTRAVENOUS at 21:49

## 2018-09-16 RX ADMIN — ACETAMINOPHEN 650 MG: 325 TABLET, FILM COATED ORAL at 23:32

## 2018-09-16 RX ADMIN — SODIUM CHLORIDE 5.7 UNITS/HR: 9 INJECTION, SOLUTION INTRAVENOUS at 23:13

## 2018-09-16 RX ADMIN — SODIUM CHLORIDE 1000 ML: 0.9 INJECTION, SOLUTION INTRAVENOUS at 21:46

## 2018-09-16 NOTE — Clinical Note
Case was discussed with Dr Adeel Rod and the patient's admission status was agreed to be Admission Status: inpatient status to the service of Dr Adeel Rod

## 2018-09-17 ENCOUNTER — APPOINTMENT (INPATIENT)
Dept: RADIOLOGY | Facility: HOSPITAL | Age: 58
DRG: 871 | End: 2018-09-17
Payer: COMMERCIAL

## 2018-09-17 ENCOUNTER — HOSPITAL ENCOUNTER (INPATIENT)
Facility: HOSPITAL | Age: 58
LOS: 18 days | DRG: 871 | End: 2018-10-05
Attending: EMERGENCY MEDICINE | Admitting: INTERNAL MEDICINE
Payer: COMMERCIAL

## 2018-09-17 ENCOUNTER — APPOINTMENT (INPATIENT)
Dept: NON INVASIVE DIAGNOSTICS | Facility: HOSPITAL | Age: 58
DRG: 871 | End: 2018-09-17
Payer: COMMERCIAL

## 2018-09-17 ENCOUNTER — DOCUMENTATION (OUTPATIENT)
Dept: HEMATOLOGY ONCOLOGY | Facility: CLINIC | Age: 58
End: 2018-09-17

## 2018-09-17 DIAGNOSIS — C18.9 MALIGNANT NEOPLASM OF COLON, UNSPECIFIED PART OF COLON (HCC): ICD-10-CM

## 2018-09-17 DIAGNOSIS — E11.9 TYPE 2 DIABETES MELLITUS WITHOUT COMPLICATION, WITHOUT LONG-TERM CURRENT USE OF INSULIN (HCC): ICD-10-CM

## 2018-09-17 DIAGNOSIS — I95.9 HYPOTENSION: ICD-10-CM

## 2018-09-17 DIAGNOSIS — C19 RECTOSIGMOID CANCER (HCC): ICD-10-CM

## 2018-09-17 DIAGNOSIS — I48.91 ATRIAL FIBRILLATION (HCC): ICD-10-CM

## 2018-09-17 DIAGNOSIS — Z51.5 END OF LIFE CARE: ICD-10-CM

## 2018-09-17 DIAGNOSIS — R41.82 ALTERED MENTAL STATUS, UNSPECIFIED ALTERED MENTAL STATUS TYPE: ICD-10-CM

## 2018-09-17 DIAGNOSIS — J90 PLEURAL EFFUSION: ICD-10-CM

## 2018-09-17 DIAGNOSIS — N17.9 AKI (ACUTE KIDNEY INJURY) (HCC): ICD-10-CM

## 2018-09-17 DIAGNOSIS — A41.9 SEPSIS (HCC): Primary | ICD-10-CM

## 2018-09-17 DIAGNOSIS — C78.7 LIVER METASTASIS (HCC): ICD-10-CM

## 2018-09-17 DIAGNOSIS — E11.01 HHNC (HYPERGLYCEMIC HYPEROSMOLAR NONKETOTIC COMA) (HCC): ICD-10-CM

## 2018-09-17 DIAGNOSIS — I33.0 INFECTIVE ENDOCARDITIS: ICD-10-CM

## 2018-09-17 DIAGNOSIS — M54.6 THORACIC BACK PAIN, UNSPECIFIED BACK PAIN LATERALITY, UNSPECIFIED CHRONICITY: ICD-10-CM

## 2018-09-17 LAB
ABO GROUP BLD: NORMAL
ABO GROUP BLD: NORMAL
ALBUMIN SERPL BCP-MCNC: 1.5 G/DL (ref 3.5–5)
ALP SERPL-CCNC: 93 U/L (ref 46–116)
ALT SERPL W P-5'-P-CCNC: 21 U/L (ref 12–78)
ANION GAP SERPL CALCULATED.3IONS-SCNC: 11 MMOL/L (ref 4–13)
ANION GAP SERPL CALCULATED.3IONS-SCNC: 12 MMOL/L (ref 4–13)
ANION GAP SERPL CALCULATED.3IONS-SCNC: 14 MMOL/L (ref 4–13)
ANION GAP SERPL CALCULATED.3IONS-SCNC: 16 MMOL/L (ref 4–13)
ANION GAP SERPL CALCULATED.3IONS-SCNC: 18 MMOL/L (ref 4–13)
ANION GAP SERPL CALCULATED.3IONS-SCNC: 9 MMOL/L (ref 4–13)
ANION GAP SERPL CALCULATED.3IONS-SCNC: 9 MMOL/L (ref 4–13)
ANISOCYTOSIS BLD QL SMEAR: PRESENT
APTT PPP: 51 SECONDS (ref 24–36)
ARTERIAL PATENCY WRIST A: YES
AST SERPL W P-5'-P-CCNC: 45 U/L (ref 5–45)
ATRIAL RATE: 127 BPM
ATRIAL RATE: 157 BPM
ATRIAL RATE: 330 BPM
BACTERIA UR QL AUTO: NORMAL /HPF
BASE EXCESS BLDA CALC-SCNC: -6.4 MMOL/L
BASOPHILS # BLD MANUAL: 0 THOUSAND/UL (ref 0–0.1)
BASOPHILS # BLD MANUAL: 0 THOUSAND/UL (ref 0–0.1)
BASOPHILS # BLD MANUAL: 0.03 THOUSAND/UL (ref 0–0.1)
BASOPHILS NFR MAR MANUAL: 0 % (ref 0–1)
BASOPHILS NFR MAR MANUAL: 0 % (ref 0–1)
BASOPHILS NFR MAR MANUAL: 1 % (ref 0–1)
BILIRUB SERPL-MCNC: 0.45 MG/DL (ref 0.2–1)
BILIRUB UR QL STRIP: NEGATIVE
BLD GP AB SCN SERPL QL: NEGATIVE
BLD GP AB SCN SERPL QL: NEGATIVE
BLD SMEAR INTERP: NORMAL
BODY TEMPERATURE: 100.2 DEGREES FEHRENHEIT
BUN SERPL-MCNC: 25 MG/DL (ref 5–25)
BUN SERPL-MCNC: 26 MG/DL (ref 5–25)
BUN SERPL-MCNC: 28 MG/DL (ref 5–25)
BUN SERPL-MCNC: 30 MG/DL (ref 5–25)
BUN SERPL-MCNC: 33 MG/DL (ref 5–25)
BUN SERPL-MCNC: 37 MG/DL (ref 5–25)
BUN SERPL-MCNC: 43 MG/DL (ref 5–25)
BURR CELLS BLD QL SMEAR: PRESENT
BURR CELLS BLD QL SMEAR: PRESENT
CA-I BLD-SCNC: 1 MMOL/L (ref 1.12–1.32)
CA-I BLD-SCNC: 1.04 MMOL/L (ref 1.12–1.32)
CALCIUM SERPL-MCNC: 7.1 MG/DL (ref 8.3–10.1)
CALCIUM SERPL-MCNC: 7.3 MG/DL (ref 8.3–10.1)
CALCIUM SERPL-MCNC: 7.4 MG/DL (ref 8.3–10.1)
CALCIUM SERPL-MCNC: 7.6 MG/DL (ref 8.3–10.1)
CALCIUM SERPL-MCNC: 7.7 MG/DL (ref 8.3–10.1)
CALCIUM SERPL-MCNC: 7.7 MG/DL (ref 8.3–10.1)
CALCIUM SERPL-MCNC: 7.8 MG/DL (ref 8.3–10.1)
CHLORIDE SERPL-SCNC: 100 MMOL/L (ref 100–108)
CHLORIDE SERPL-SCNC: 102 MMOL/L (ref 100–108)
CHLORIDE SERPL-SCNC: 87 MMOL/L (ref 100–108)
CHLORIDE SERPL-SCNC: 90 MMOL/L (ref 100–108)
CHLORIDE SERPL-SCNC: 92 MMOL/L (ref 100–108)
CHLORIDE SERPL-SCNC: 93 MMOL/L (ref 100–108)
CHLORIDE SERPL-SCNC: 96 MMOL/L (ref 100–108)
CLARITY UR: ABNORMAL
CO2 SERPL-SCNC: 16 MMOL/L (ref 21–32)
CO2 SERPL-SCNC: 17 MMOL/L (ref 21–32)
CO2 SERPL-SCNC: 18 MMOL/L (ref 21–32)
CO2 SERPL-SCNC: 19 MMOL/L (ref 21–32)
CO2 SERPL-SCNC: 19 MMOL/L (ref 21–32)
CO2 SERPL-SCNC: 20 MMOL/L (ref 21–32)
CO2 SERPL-SCNC: 20 MMOL/L (ref 21–32)
COLOR UR: YELLOW
CREAT SERPL-MCNC: 1.18 MG/DL (ref 0.6–1.3)
CREAT SERPL-MCNC: 1.21 MG/DL (ref 0.6–1.3)
CREAT SERPL-MCNC: 1.22 MG/DL (ref 0.6–1.3)
CREAT SERPL-MCNC: 1.34 MG/DL (ref 0.6–1.3)
CREAT SERPL-MCNC: 1.44 MG/DL (ref 0.6–1.3)
CREAT SERPL-MCNC: 1.68 MG/DL (ref 0.6–1.3)
CREAT SERPL-MCNC: 2.08 MG/DL (ref 0.6–1.3)
EOSINOPHIL # BLD MANUAL: 0 THOUSAND/UL (ref 0–0.4)
EOSINOPHIL NFR BLD MANUAL: 0 % (ref 0–6)
ERYTHROCYTE [DISTWIDTH] IN BLOOD BY AUTOMATED COUNT: 13.3 % (ref 11.6–15.1)
ERYTHROCYTE [DISTWIDTH] IN BLOOD BY AUTOMATED COUNT: 13.3 % (ref 11.6–15.1)
ERYTHROCYTE [DISTWIDTH] IN BLOOD BY AUTOMATED COUNT: 13.5 % (ref 11.6–15.1)
ERYTHROCYTE [DISTWIDTH] IN BLOOD BY AUTOMATED COUNT: 13.5 % (ref 11.6–15.1)
FIBRINOGEN PPP-MCNC: 545 MG/DL (ref 227–495)
GFR SERPL CREATININE-BSD FRML MDRD: 26 ML/MIN/1.73SQ M
GFR SERPL CREATININE-BSD FRML MDRD: 33 ML/MIN/1.73SQ M
GFR SERPL CREATININE-BSD FRML MDRD: 40 ML/MIN/1.73SQ M
GFR SERPL CREATININE-BSD FRML MDRD: 43 ML/MIN/1.73SQ M
GFR SERPL CREATININE-BSD FRML MDRD: 49 ML/MIN/1.73SQ M
GFR SERPL CREATININE-BSD FRML MDRD: 49 ML/MIN/1.73SQ M
GFR SERPL CREATININE-BSD FRML MDRD: 51 ML/MIN/1.73SQ M
GLUCOSE SERPL-MCNC: 147 MG/DL (ref 65–140)
GLUCOSE SERPL-MCNC: 185 MG/DL (ref 65–140)
GLUCOSE SERPL-MCNC: 298 MG/DL (ref 65–140)
GLUCOSE SERPL-MCNC: 341 MG/DL (ref 65–140)
GLUCOSE SERPL-MCNC: 343 MG/DL (ref 65–140)
GLUCOSE SERPL-MCNC: 346 MG/DL (ref 65–140)
GLUCOSE SERPL-MCNC: 358 MG/DL (ref 65–140)
GLUCOSE SERPL-MCNC: 390 MG/DL (ref 65–140)
GLUCOSE SERPL-MCNC: 392 MG/DL (ref 65–140)
GLUCOSE SERPL-MCNC: 425 MG/DL (ref 65–140)
GLUCOSE SERPL-MCNC: 429 MG/DL (ref 65–140)
GLUCOSE SERPL-MCNC: 474 MG/DL (ref 65–140)
GLUCOSE SERPL-MCNC: 481 MG/DL (ref 65–140)
GLUCOSE SERPL-MCNC: 639 MG/DL (ref 65–140)
GLUCOSE SERPL-MCNC: 828 MG/DL (ref 65–140)
GLUCOSE SERPL-MCNC: >500 MG/DL (ref 65–140)
GLUCOSE SERPL-MCNC: >500 MG/DL (ref 65–140)
GLUCOSE UR STRIP-MCNC: ABNORMAL MG/DL
HCO3 BLDA-SCNC: 16.9 MMOL/L (ref 22–28)
HCT VFR BLD AUTO: 18.3 % (ref 34.8–46.1)
HCT VFR BLD AUTO: 18.7 % (ref 34.8–46.1)
HCT VFR BLD AUTO: 21.5 % (ref 34.8–46.1)
HCT VFR BLD AUTO: 24.3 % (ref 34.8–46.1)
HCT VFR BLD AUTO: 25.7 % (ref 34.8–46.1)
HCT VFR BLD AUTO: 27.8 % (ref 34.8–46.1)
HGB BLD-MCNC: 10.1 G/DL (ref 11.5–15.4)
HGB BLD-MCNC: 6.3 G/DL (ref 11.5–15.4)
HGB BLD-MCNC: 6.5 G/DL (ref 11.5–15.4)
HGB BLD-MCNC: 7.5 G/DL (ref 11.5–15.4)
HGB BLD-MCNC: 8.3 G/DL (ref 11.5–15.4)
HGB BLD-MCNC: 8.9 G/DL (ref 11.5–15.4)
HGB UR QL STRIP.AUTO: ABNORMAL
HOROWITZ INDEX BLDA+IHG-RTO: 40 MM[HG]
HYPERCHROMIA BLD QL SMEAR: PRESENT
INR PPP: 1.22 (ref 0.86–1.17)
INR PPP: 1.29 (ref 0.86–1.17)
KETONES UR STRIP-MCNC: NEGATIVE MG/DL
LACTATE SERPL-SCNC: 2 MMOL/L (ref 0.5–2)
LACTATE SERPL-SCNC: 2.2 MMOL/L (ref 0.5–2)
LACTATE SERPL-SCNC: 2.7 MMOL/L (ref 0.5–2)
LACTATE SERPL-SCNC: 5.1 MMOL/L (ref 0.5–2)
LEUKOCYTE ESTERASE UR QL STRIP: ABNORMAL
LG PLATELETS BLD QL SMEAR: PRESENT
LYMPHOCYTES # BLD AUTO: 0.16 THOUSAND/UL (ref 0.6–4.47)
LYMPHOCYTES # BLD AUTO: 0.28 THOUSAND/UL (ref 0.6–4.47)
LYMPHOCYTES # BLD AUTO: 0.29 THOUSAND/UL (ref 0.6–4.47)
LYMPHOCYTES # BLD AUTO: 13 % (ref 14–44)
LYMPHOCYTES # BLD AUTO: 27 % (ref 14–44)
LYMPHOCYTES # BLD AUTO: 6 % (ref 14–44)
MAGNESIUM SERPL-MCNC: 1.9 MG/DL (ref 1.6–2.6)
MAGNESIUM SERPL-MCNC: 1.9 MG/DL (ref 1.6–2.6)
MAGNESIUM SERPL-MCNC: 2.1 MG/DL (ref 1.6–2.6)
MAGNESIUM SERPL-MCNC: 2.1 MG/DL (ref 1.6–2.6)
MAGNESIUM SERPL-MCNC: 2.2 MG/DL (ref 1.6–2.6)
MAGNESIUM SERPL-MCNC: 2.3 MG/DL (ref 1.6–2.6)
MAGNESIUM SERPL-MCNC: 2.4 MG/DL (ref 1.6–2.6)
MCH RBC QN AUTO: 27.5 PG (ref 26.8–34.3)
MCH RBC QN AUTO: 27.6 PG (ref 26.8–34.3)
MCH RBC QN AUTO: 27.7 PG (ref 26.8–34.3)
MCH RBC QN AUTO: 27.8 PG (ref 26.8–34.3)
MCHC RBC AUTO-ENTMCNC: 34.6 G/DL (ref 31.4–37.4)
MCHC RBC AUTO-ENTMCNC: 34.8 G/DL (ref 31.4–37.4)
MCHC RBC AUTO-ENTMCNC: 34.9 G/DL (ref 31.4–37.4)
MCHC RBC AUTO-ENTMCNC: 36.3 G/DL (ref 31.4–37.4)
MCV RBC AUTO: 76 FL (ref 82–98)
MCV RBC AUTO: 79 FL (ref 82–98)
MCV RBC AUTO: 79 FL (ref 82–98)
MCV RBC AUTO: 80 FL (ref 82–98)
METAMYELOCYTES NFR BLD MANUAL: 2 % (ref 0–1)
MONOCYTES # BLD AUTO: 0 THOUSAND/UL (ref 0–1.22)
MONOCYTES # BLD AUTO: 0.07 THOUSAND/UL (ref 0–1.22)
MONOCYTES # BLD AUTO: 0.08 THOUSAND/UL (ref 0–1.22)
MONOCYTES NFR BLD: 0 % (ref 4–12)
MONOCYTES NFR BLD: 3 % (ref 4–12)
MONOCYTES NFR BLD: 7 % (ref 4–12)
NEUTROPHILS # BLD MANUAL: 0.69 THOUSAND/UL (ref 1.85–7.62)
NEUTROPHILS # BLD MANUAL: 1.89 THOUSAND/UL (ref 1.85–7.62)
NEUTROPHILS # BLD MANUAL: 2.4 THOUSAND/UL (ref 1.85–7.62)
NEUTS BAND NFR BLD MANUAL: 1 % (ref 0–8)
NEUTS BAND NFR BLD MANUAL: 3 % (ref 0–8)
NEUTS SEG NFR BLD AUTO: 63 % (ref 43–75)
NEUTS SEG NFR BLD AUTO: 85 % (ref 43–75)
NEUTS SEG NFR BLD AUTO: 89 % (ref 43–75)
NITRITE UR QL STRIP: NEGATIVE
NON-SQ EPI CELLS URNS QL MICRO: NORMAL /HPF
NRBC BLD AUTO-RTO: 0 /100 WBCS
O2 CT BLDA-SCNC: 10.8 ML/DL (ref 16–23)
OXYHGB MFR BLDA: 95 % (ref 94–97)
P AXIS: 51 DEGREES
P AXIS: 94 DEGREES
PCO2 BLDA: 25.7 MM HG (ref 36–44)
PEEP RESPIRATORY: 5 CM[H2O]
PH BLDA: 7.44 [PH] (ref 7.35–7.45)
PH UR STRIP.AUTO: 5.5 [PH] (ref 4.5–8)
PHOSPHATE SERPL-MCNC: 0.9 MG/DL (ref 2.7–4.5)
PHOSPHATE SERPL-MCNC: 1.6 MG/DL (ref 2.7–4.5)
PHOSPHATE SERPL-MCNC: 1.7 MG/DL (ref 2.7–4.5)
PHOSPHATE SERPL-MCNC: 1.8 MG/DL (ref 2.7–4.5)
PHOSPHATE SERPL-MCNC: 1.9 MG/DL (ref 2.7–4.5)
PHOSPHATE SERPL-MCNC: 2 MG/DL (ref 2.7–4.5)
PHOSPHATE SERPL-MCNC: 2.3 MG/DL (ref 2.7–4.5)
PLATELET # BLD AUTO: 55 THOUSANDS/UL (ref 149–390)
PLATELET # BLD AUTO: 61 THOUSANDS/UL (ref 149–390)
PLATELET # BLD AUTO: 67 THOUSANDS/UL (ref 149–390)
PLATELET # BLD AUTO: 75 THOUSANDS/UL (ref 149–390)
PLATELET BLD QL SMEAR: ABNORMAL
PMV BLD AUTO: 10.7 FL (ref 8.9–12.7)
PMV BLD AUTO: 12 FL (ref 8.9–12.7)
PMV BLD AUTO: 12.1 FL (ref 8.9–12.7)
PMV BLD AUTO: 13 FL (ref 8.9–12.7)
PO2 BLDA: 82 MM HG (ref 75–129)
POIKILOCYTOSIS BLD QL SMEAR: PRESENT
POTASSIUM SERPL-SCNC: 2.8 MMOL/L (ref 3.5–5.3)
POTASSIUM SERPL-SCNC: 2.9 MMOL/L (ref 3.5–5.3)
POTASSIUM SERPL-SCNC: 3 MMOL/L (ref 3.5–5.3)
POTASSIUM SERPL-SCNC: 3.1 MMOL/L (ref 3.5–5.3)
POTASSIUM SERPL-SCNC: 4.1 MMOL/L (ref 3.5–5.3)
POTASSIUM SERPL-SCNC: 4.2 MMOL/L (ref 3.5–5.3)
POTASSIUM SERPL-SCNC: 4.4 MMOL/L (ref 3.5–5.3)
PR INTERVAL: 113 MS
PROCALCITONIN SERPL-MCNC: 50.92 NG/ML
PROCALCITONIN SERPL-MCNC: 57.31 NG/ML
PROT SERPL-MCNC: 5.2 G/DL (ref 6.4–8.2)
PROT UR STRIP-MCNC: ABNORMAL MG/DL
PROTHROMBIN TIME: 15.5 SECONDS (ref 11.8–14.2)
PROTHROMBIN TIME: 16.2 SECONDS (ref 11.8–14.2)
QRS AXIS: -24 DEGREES
QRS AXIS: -28 DEGREES
QRS AXIS: 109 DEGREES
QRS AXIS: 39 DEGREES
QRS AXIS: 78 DEGREES
QRSD INTERVAL: 108 MS
QRSD INTERVAL: 113 MS
QRSD INTERVAL: 125 MS
QRSD INTERVAL: 158 MS
QRSD INTERVAL: 96 MS
QT INTERVAL: 258 MS
QT INTERVAL: 292 MS
QT INTERVAL: 292 MS
QT INTERVAL: 325 MS
QT INTERVAL: 333 MS
QTC INTERVAL: 417 MS
QTC INTERVAL: 425 MS
QTC INTERVAL: 472 MS
QTC INTERVAL: 483 MS
QTC INTERVAL: 526 MS
RBC # BLD AUTO: 2.36 MILLION/UL (ref 3.81–5.12)
RBC # BLD AUTO: 2.72 MILLION/UL (ref 3.81–5.12)
RBC # BLD AUTO: 3.2 MILLION/UL (ref 3.81–5.12)
RBC # BLD AUTO: 3.64 MILLION/UL (ref 3.81–5.12)
RBC #/AREA URNS AUTO: NORMAL /HPF
RBC MORPH BLD: PRESENT
RBC MORPH BLD: PRESENT
RH BLD: POSITIVE
RH BLD: POSITIVE
SODIUM SERPL-SCNC: 121 MMOL/L (ref 136–145)
SODIUM SERPL-SCNC: 123 MMOL/L (ref 136–145)
SODIUM SERPL-SCNC: 124 MMOL/L (ref 136–145)
SODIUM SERPL-SCNC: 124 MMOL/L (ref 136–145)
SODIUM SERPL-SCNC: 128 MMOL/L (ref 136–145)
SODIUM SERPL-SCNC: 129 MMOL/L (ref 136–145)
SODIUM SERPL-SCNC: 129 MMOL/L (ref 136–145)
SP GR UR STRIP.AUTO: 1.02 (ref 1–1.03)
SPECIMEN EXPIRATION DATE: NORMAL
SPECIMEN EXPIRATION DATE: NORMAL
SPECIMEN SOURCE: ABNORMAL
T WAVE AXIS: 107 DEGREES
T WAVE AXIS: 261 DEGREES
T WAVE AXIS: 79 DEGREES
T WAVE AXIS: 84 DEGREES
T WAVE AXIS: 88 DEGREES
TARGETS BLD QL SMEAR: PRESENT
TOTAL CELLS COUNTED SPEC: 100
TOXIC GRANULES BLD QL SMEAR: PRESENT
TSH SERPL DL<=0.05 MIU/L-ACNC: 0.54 UIU/ML (ref 0.36–3.74)
UROBILINOGEN UR QL STRIP.AUTO: 0.2 E.U./DL
VENT AC: 14
VENT- AC: AC
VENTRICULAR RATE: 126 BPM
VENTRICULAR RATE: 127 BPM
VENTRICULAR RATE: 157 BPM
VT SETTING VENT: 400 ML
WBC # BLD AUTO: 1.05 THOUSAND/UL (ref 4.31–10.16)
WBC # BLD AUTO: 2.22 THOUSAND/UL (ref 4.31–10.16)
WBC # BLD AUTO: 2.67 THOUSAND/UL (ref 4.31–10.16)
WBC # BLD AUTO: 2.88 THOUSAND/UL (ref 4.31–10.16)
WBC #/AREA URNS AUTO: NORMAL /HPF

## 2018-09-17 PROCEDURE — 85014 HEMATOCRIT: CPT | Performed by: STUDENT IN AN ORGANIZED HEALTH CARE EDUCATION/TRAINING PROGRAM

## 2018-09-17 PROCEDURE — 85730 THROMBOPLASTIN TIME PARTIAL: CPT | Performed by: PHYSICIAN ASSISTANT

## 2018-09-17 PROCEDURE — 85610 PROTHROMBIN TIME: CPT | Performed by: EMERGENCY MEDICINE

## 2018-09-17 PROCEDURE — 82805 BLOOD GASES W/O2 SATURATION: CPT | Performed by: PHYSICIAN ASSISTANT

## 2018-09-17 PROCEDURE — 85007 BL SMEAR W/DIFF WBC COUNT: CPT | Performed by: PHYSICIAN ASSISTANT

## 2018-09-17 PROCEDURE — 99223 1ST HOSP IP/OBS HIGH 75: CPT | Performed by: INTERNAL MEDICINE

## 2018-09-17 PROCEDURE — P9021 RED BLOOD CELLS UNIT: HCPCS

## 2018-09-17 PROCEDURE — 0BH18EZ INSERTION OF ENDOTRACHEAL AIRWAY INTO TRACHEA, VIA NATURAL OR ARTIFICIAL OPENING ENDOSCOPIC: ICD-10-PCS | Performed by: EMERGENCY MEDICINE

## 2018-09-17 PROCEDURE — 85027 COMPLETE CBC AUTOMATED: CPT | Performed by: PHYSICIAN ASSISTANT

## 2018-09-17 PROCEDURE — 82947 ASSAY GLUCOSE BLOOD QUANT: CPT | Performed by: NURSE PRACTITIONER

## 2018-09-17 PROCEDURE — 74174 CTA ABD&PLVS W/CONTRAST: CPT

## 2018-09-17 PROCEDURE — 83735 ASSAY OF MAGNESIUM: CPT | Performed by: PHYSICIAN ASSISTANT

## 2018-09-17 PROCEDURE — 86900 BLOOD TYPING SEROLOGIC ABO: CPT | Performed by: STUDENT IN AN ORGANIZED HEALTH CARE EDUCATION/TRAINING PROGRAM

## 2018-09-17 PROCEDURE — 93005 ELECTROCARDIOGRAM TRACING: CPT

## 2018-09-17 PROCEDURE — 86923 COMPATIBILITY TEST ELECTRIC: CPT

## 2018-09-17 PROCEDURE — 71275 CT ANGIOGRAPHY CHEST: CPT

## 2018-09-17 PROCEDURE — 06HM33Z INSERTION OF INFUSION DEVICE INTO RIGHT FEMORAL VEIN, PERCUTANEOUS APPROACH: ICD-10-PCS | Performed by: INTERNAL MEDICINE

## 2018-09-17 PROCEDURE — 70450 CT HEAD/BRAIN W/O DYE: CPT

## 2018-09-17 PROCEDURE — 85610 PROTHROMBIN TIME: CPT | Performed by: PHYSICIAN ASSISTANT

## 2018-09-17 PROCEDURE — 36600 WITHDRAWAL OF ARTERIAL BLOOD: CPT

## 2018-09-17 PROCEDURE — 80048 BASIC METABOLIC PNL TOTAL CA: CPT | Performed by: PHYSICIAN ASSISTANT

## 2018-09-17 PROCEDURE — 82330 ASSAY OF CALCIUM: CPT | Performed by: EMERGENCY MEDICINE

## 2018-09-17 PROCEDURE — 84443 ASSAY THYROID STIM HORMONE: CPT | Performed by: STUDENT IN AN ORGANIZED HEALTH CARE EDUCATION/TRAINING PROGRAM

## 2018-09-17 PROCEDURE — 83605 ASSAY OF LACTIC ACID: CPT | Performed by: PHYSICIAN ASSISTANT

## 2018-09-17 PROCEDURE — 84100 ASSAY OF PHOSPHORUS: CPT | Performed by: EMERGENCY MEDICINE

## 2018-09-17 PROCEDURE — 71250 CT THORAX DX C-: CPT

## 2018-09-17 PROCEDURE — 99291 CRITICAL CARE FIRST HOUR: CPT | Performed by: EMERGENCY MEDICINE

## 2018-09-17 PROCEDURE — 5A1945Z RESPIRATORY VENTILATION, 24-96 CONSECUTIVE HOURS: ICD-10-PCS | Performed by: EMERGENCY MEDICINE

## 2018-09-17 PROCEDURE — 82948 REAGENT STRIP/BLOOD GLUCOSE: CPT

## 2018-09-17 PROCEDURE — 86901 BLOOD TYPING SEROLOGIC RH(D): CPT | Performed by: EMERGENCY MEDICINE

## 2018-09-17 PROCEDURE — 83010 ASSAY OF HAPTOGLOBIN QUANT: CPT | Performed by: PHYSICIAN ASSISTANT

## 2018-09-17 PROCEDURE — 81001 URINALYSIS AUTO W/SCOPE: CPT | Performed by: EMERGENCY MEDICINE

## 2018-09-17 PROCEDURE — 36556 INSERT NON-TUNNEL CV CATH: CPT | Performed by: EMERGENCY MEDICINE

## 2018-09-17 PROCEDURE — 93306 TTE W/DOPPLER COMPLETE: CPT | Performed by: INTERNAL MEDICINE

## 2018-09-17 PROCEDURE — 85025 COMPLETE CBC W/AUTO DIFF WBC: CPT | Performed by: EMERGENCY MEDICINE

## 2018-09-17 PROCEDURE — 84100 ASSAY OF PHOSPHORUS: CPT | Performed by: INTERNAL MEDICINE

## 2018-09-17 PROCEDURE — 93010 ELECTROCARDIOGRAM REPORT: CPT | Performed by: INTERNAL MEDICINE

## 2018-09-17 PROCEDURE — 94760 N-INVAS EAR/PLS OXIMETRY 1: CPT

## 2018-09-17 PROCEDURE — 86850 RBC ANTIBODY SCREEN: CPT | Performed by: STUDENT IN AN ORGANIZED HEALTH CARE EDUCATION/TRAINING PROGRAM

## 2018-09-17 PROCEDURE — 85014 HEMATOCRIT: CPT | Performed by: EMERGENCY MEDICINE

## 2018-09-17 PROCEDURE — 99292 CRITICAL CARE ADDL 30 MIN: CPT | Performed by: EMERGENCY MEDICINE

## 2018-09-17 PROCEDURE — 80048 BASIC METABOLIC PNL TOTAL CA: CPT | Performed by: EMERGENCY MEDICINE

## 2018-09-17 PROCEDURE — 83735 ASSAY OF MAGNESIUM: CPT | Performed by: INTERNAL MEDICINE

## 2018-09-17 PROCEDURE — 83735 ASSAY OF MAGNESIUM: CPT | Performed by: EMERGENCY MEDICINE

## 2018-09-17 PROCEDURE — 85007 BL SMEAR W/DIFF WBC COUNT: CPT | Performed by: EMERGENCY MEDICINE

## 2018-09-17 PROCEDURE — 86850 RBC ANTIBODY SCREEN: CPT | Performed by: EMERGENCY MEDICINE

## 2018-09-17 PROCEDURE — 86901 BLOOD TYPING SEROLOGIC RH(D): CPT | Performed by: STUDENT IN AN ORGANIZED HEALTH CARE EDUCATION/TRAINING PROGRAM

## 2018-09-17 PROCEDURE — 85027 COMPLETE CBC AUTOMATED: CPT | Performed by: EMERGENCY MEDICINE

## 2018-09-17 PROCEDURE — 85384 FIBRINOGEN ACTIVITY: CPT | Performed by: PHYSICIAN ASSISTANT

## 2018-09-17 PROCEDURE — 85018 HEMOGLOBIN: CPT | Performed by: STUDENT IN AN ORGANIZED HEALTH CARE EDUCATION/TRAINING PROGRAM

## 2018-09-17 PROCEDURE — 86900 BLOOD TYPING SEROLOGIC ABO: CPT | Performed by: EMERGENCY MEDICINE

## 2018-09-17 PROCEDURE — 83605 ASSAY OF LACTIC ACID: CPT | Performed by: EMERGENCY MEDICINE

## 2018-09-17 PROCEDURE — 80053 COMPREHEN METABOLIC PANEL: CPT | Performed by: EMERGENCY MEDICINE

## 2018-09-17 PROCEDURE — 99285 EMERGENCY DEPT VISIT HI MDM: CPT

## 2018-09-17 PROCEDURE — 31500 INSERT EMERGENCY AIRWAY: CPT | Performed by: PHYSICIAN ASSISTANT

## 2018-09-17 PROCEDURE — 30233N1 TRANSFUSION OF NONAUTOLOGOUS RED BLOOD CELLS INTO PERIPHERAL VEIN, PERCUTANEOUS APPROACH: ICD-10-PCS | Performed by: INTERNAL MEDICINE

## 2018-09-17 PROCEDURE — 72125 CT NECK SPINE W/O DYE: CPT

## 2018-09-17 PROCEDURE — 94002 VENT MGMT INPAT INIT DAY: CPT

## 2018-09-17 PROCEDURE — 36415 COLL VENOUS BLD VENIPUNCTURE: CPT | Performed by: NURSE PRACTITIONER

## 2018-09-17 PROCEDURE — 85018 HEMOGLOBIN: CPT | Performed by: EMERGENCY MEDICINE

## 2018-09-17 PROCEDURE — 84145 PROCALCITONIN (PCT): CPT | Performed by: EMERGENCY MEDICINE

## 2018-09-17 PROCEDURE — 84100 ASSAY OF PHOSPHORUS: CPT | Performed by: PHYSICIAN ASSISTANT

## 2018-09-17 PROCEDURE — 93306 TTE W/DOPPLER COMPLETE: CPT

## 2018-09-17 RX ORDER — METOPROLOL TARTRATE 5 MG/5ML
2.5 INJECTION INTRAVENOUS EVERY 6 HOURS
Status: DISCONTINUED | OUTPATIENT
Start: 2018-09-17 | End: 2018-09-17

## 2018-09-17 RX ORDER — POTASSIUM CHLORIDE 29.8 MG/ML
40 INJECTION INTRAVENOUS ONCE
Status: DISCONTINUED | OUTPATIENT
Start: 2018-09-17 | End: 2018-09-17

## 2018-09-17 RX ORDER — FENTANYL CITRATE 50 UG/ML
50 INJECTION, SOLUTION INTRAMUSCULAR; INTRAVENOUS ONCE
Status: COMPLETED | OUTPATIENT
Start: 2018-09-17 | End: 2018-09-17

## 2018-09-17 RX ORDER — POTASSIUM CHLORIDE 29.8 MG/ML
40 INJECTION INTRAVENOUS ONCE
Status: COMPLETED | OUTPATIENT
Start: 2018-09-17 | End: 2018-09-17

## 2018-09-17 RX ORDER — SODIUM CHLORIDE 9 MG/ML
150 INJECTION, SOLUTION INTRAVENOUS CONTINUOUS
Status: DISCONTINUED | OUTPATIENT
Start: 2018-09-17 | End: 2018-09-19

## 2018-09-17 RX ORDER — HEPARIN SODIUM 10000 [USP'U]/100ML
3-20 INJECTION, SOLUTION INTRAVENOUS
Status: DISCONTINUED | OUTPATIENT
Start: 2018-09-17 | End: 2018-09-20

## 2018-09-17 RX ORDER — LIDOCAINE HYDROCHLORIDE 10 MG/ML
INJECTION, SOLUTION EPIDURAL; INFILTRATION; INTRACAUDAL; PERINEURAL
Status: COMPLETED
Start: 2018-09-17 | End: 2018-09-17

## 2018-09-17 RX ORDER — ACETAMINOPHEN 160 MG/5ML
325 SUSPENSION, ORAL (FINAL DOSE FORM) ORAL ONCE
Status: COMPLETED | OUTPATIENT
Start: 2018-09-17 | End: 2018-09-17

## 2018-09-17 RX ORDER — DILTIAZEM HYDROCHLORIDE 5 MG/ML
10 INJECTION INTRAVENOUS ONCE
Status: COMPLETED | OUTPATIENT
Start: 2018-09-17 | End: 2018-09-17

## 2018-09-17 RX ORDER — HEPARIN SODIUM 1000 [USP'U]/ML
3300 INJECTION, SOLUTION INTRAVENOUS; SUBCUTANEOUS ONCE
Status: COMPLETED | OUTPATIENT
Start: 2018-09-17 | End: 2018-09-17

## 2018-09-17 RX ORDER — POTASSIUM CHLORIDE AND SODIUM CHLORIDE 900; 300 MG/100ML; MG/100ML
150 INJECTION, SOLUTION INTRAVENOUS CONTINUOUS
Status: DISCONTINUED | OUTPATIENT
Start: 2018-09-17 | End: 2018-09-17

## 2018-09-17 RX ORDER — HEPARIN SODIUM 1000 [USP'U]/ML
3300 INJECTION, SOLUTION INTRAVENOUS; SUBCUTANEOUS AS NEEDED
Status: DISCONTINUED | OUTPATIENT
Start: 2018-09-17 | End: 2018-09-20

## 2018-09-17 RX ORDER — VANCOMYCIN HYDROCHLORIDE 500 MG/100ML
10 INJECTION, SOLUTION INTRAVENOUS ONCE
Status: COMPLETED | OUTPATIENT
Start: 2018-09-17 | End: 2018-09-17

## 2018-09-17 RX ORDER — NOREPINEPHRINE BITARTRATE 1 MG/ML
INJECTION, SOLUTION INTRAVENOUS
Status: COMPLETED
Start: 2018-09-17 | End: 2018-09-17

## 2018-09-17 RX ORDER — CHLORHEXIDINE GLUCONATE 0.12 MG/ML
15 RINSE ORAL EVERY 12 HOURS SCHEDULED
Status: DISCONTINUED | OUTPATIENT
Start: 2018-09-17 | End: 2018-10-05 | Stop reason: HOSPADM

## 2018-09-17 RX ORDER — ADENOSINE 3 MG/ML
6 INJECTION INTRAVENOUS ONCE
Status: COMPLETED | OUTPATIENT
Start: 2018-09-17 | End: 2018-09-17

## 2018-09-17 RX ORDER — ACETAMINOPHEN 160 MG/5ML
650 SUSPENSION, ORAL (FINAL DOSE FORM) ORAL EVERY 6 HOURS PRN
Status: DISCONTINUED | OUTPATIENT
Start: 2018-09-17 | End: 2018-09-20

## 2018-09-17 RX ORDER — METOPROLOL TARTRATE 5 MG/5ML
5 INJECTION INTRAVENOUS EVERY 6 HOURS
Status: DISCONTINUED | OUTPATIENT
Start: 2018-09-17 | End: 2018-09-18

## 2018-09-17 RX ORDER — SODIUM CHLORIDE, SODIUM GLUCONATE, SODIUM ACETATE, POTASSIUM CHLORIDE, MAGNESIUM CHLORIDE, SODIUM PHOSPHATE, DIBASIC, AND POTASSIUM PHOSPHATE .53; .5; .37; .037; .03; .012; .00082 G/100ML; G/100ML; G/100ML; G/100ML; G/100ML; G/100ML; G/100ML
1000 INJECTION, SOLUTION INTRAVENOUS ONCE
Status: COMPLETED | OUTPATIENT
Start: 2018-09-17 | End: 2018-09-17

## 2018-09-17 RX ORDER — HEPARIN SODIUM 1000 [USP'U]/ML
1650 INJECTION, SOLUTION INTRAVENOUS; SUBCUTANEOUS AS NEEDED
Status: DISCONTINUED | OUTPATIENT
Start: 2018-09-17 | End: 2018-09-20

## 2018-09-17 RX ORDER — METOPROLOL TARTRATE 5 MG/5ML
2.5 INJECTION INTRAVENOUS ONCE
Status: COMPLETED | OUTPATIENT
Start: 2018-09-17 | End: 2018-09-17

## 2018-09-17 RX ORDER — SODIUM CHLORIDE, SODIUM GLUCONATE, SODIUM ACETATE, POTASSIUM CHLORIDE, MAGNESIUM CHLORIDE, SODIUM PHOSPHATE, DIBASIC, AND POTASSIUM PHOSPHATE .53; .5; .37; .037; .03; .012; .00082 G/100ML; G/100ML; G/100ML; G/100ML; G/100ML; G/100ML; G/100ML
150 INJECTION, SOLUTION INTRAVENOUS CONTINUOUS
Status: DISCONTINUED | OUTPATIENT
Start: 2018-09-17 | End: 2018-09-17

## 2018-09-17 RX ORDER — PROPOFOL 10 MG/ML
INJECTION, EMULSION INTRAVENOUS
Status: COMPLETED
Start: 2018-09-17 | End: 2018-09-17

## 2018-09-17 RX ORDER — MAGNESIUM SULFATE 1 G/100ML
1 INJECTION INTRAVENOUS ONCE
Status: COMPLETED | OUTPATIENT
Start: 2018-09-17 | End: 2018-09-17

## 2018-09-17 RX ORDER — PROPOFOL 10 MG/ML
5-50 INJECTION, EMULSION INTRAVENOUS
Status: DISCONTINUED | OUTPATIENT
Start: 2018-09-17 | End: 2018-09-18

## 2018-09-17 RX ADMIN — CALCIUM CHLORIDE 1 G: 100 INJECTION INTRAVENOUS; INTRAVENTRICULAR at 03:51

## 2018-09-17 RX ADMIN — FENTANYL CITRATE 50 MCG: 50 INJECTION, SOLUTION INTRAMUSCULAR; INTRAVENOUS at 21:15

## 2018-09-17 RX ADMIN — SODIUM CHLORIDE 150 ML/HR: 0.9 INJECTION, SOLUTION INTRAVENOUS at 21:43

## 2018-09-17 RX ADMIN — NOREPINEPHRINE BITARTRATE 6 MCG/MIN: 1 INJECTION INTRAVENOUS at 03:59

## 2018-09-17 RX ADMIN — MAGNESIUM SULFATE HEPTAHYDRATE 1 G: 1 INJECTION, SOLUTION INTRAVENOUS at 04:37

## 2018-09-17 RX ADMIN — AMIODARONE HYDROCHLORIDE 0.5 MG/MIN: 50 INJECTION, SOLUTION INTRAVENOUS at 18:14

## 2018-09-17 RX ADMIN — SODIUM CHLORIDE, SODIUM GLUCONATE, SODIUM ACETATE, POTASSIUM CHLORIDE, MAGNESIUM CHLORIDE, SODIUM PHOSPHATE, DIBASIC, AND POTASSIUM PHOSPHATE 1000 ML: .53; .5; .37; .037; .03; .012; .00082 INJECTION, SOLUTION INTRAVENOUS at 02:30

## 2018-09-17 RX ADMIN — CHLORHEXIDINE GLUCONATE 15 ML: 1.2 RINSE ORAL at 23:00

## 2018-09-17 RX ADMIN — SODIUM CHLORIDE, SODIUM LACTATE, POTASSIUM CHLORIDE, AND CALCIUM CHLORIDE 1000 ML: .6; .31; .03; .02 INJECTION, SOLUTION INTRAVENOUS at 00:26

## 2018-09-17 RX ADMIN — ACETAMINOPHEN 650 MG: 160 SUSPENSION ORAL at 23:00

## 2018-09-17 RX ADMIN — DEXTROSE 150 MG: 50 INJECTION, SOLUTION INTRAVENOUS at 11:44

## 2018-09-17 RX ADMIN — VANCOMYCIN HYDROCHLORIDE 750 MG: 750 INJECTION, SOLUTION INTRAVENOUS at 03:00

## 2018-09-17 RX ADMIN — NOREPINEPHRINE BITARTRATE 6 MCG/MIN: 1 INJECTION INTRAVENOUS at 23:20

## 2018-09-17 RX ADMIN — PROPOFOL 30 MCG/KG/MIN: 10 INJECTION, EMULSION INTRAVENOUS at 22:07

## 2018-09-17 RX ADMIN — DEXTROSE 1 MG/MIN: 5 SOLUTION INTRAVENOUS at 11:53

## 2018-09-17 RX ADMIN — ADENOSINE 6 MG: 3 INJECTION, SOLUTION INTRAVENOUS at 10:47

## 2018-09-17 RX ADMIN — SODIUM CHLORIDE, SODIUM GLUCONATE, SODIUM ACETATE, POTASSIUM CHLORIDE, MAGNESIUM CHLORIDE, SODIUM PHOSPHATE, DIBASIC, AND POTASSIUM PHOSPHATE 150 ML/HR: .53; .5; .37; .037; .03; .012; .00082 INJECTION, SOLUTION INTRAVENOUS at 04:04

## 2018-09-17 RX ADMIN — POTASSIUM CHLORIDE 40 MEQ: 400 INJECTION, SOLUTION INTRAVENOUS at 16:00

## 2018-09-17 RX ADMIN — VANCOMYCIN HYDROCHLORIDE 500 MG: 500 INJECTION, SOLUTION INTRAVENOUS at 12:05

## 2018-09-17 RX ADMIN — SODIUM CHLORIDE 3 UNITS/HR: 9 INJECTION, SOLUTION INTRAVENOUS at 03:52

## 2018-09-17 RX ADMIN — SODIUM CHLORIDE, SODIUM GLUCONATE, SODIUM ACETATE, POTASSIUM CHLORIDE, MAGNESIUM CHLORIDE, SODIUM PHOSPHATE, DIBASIC, AND POTASSIUM PHOSPHATE 1000 ML: .53; .5; .37; .037; .03; .012; .00082 INJECTION, SOLUTION INTRAVENOUS at 02:46

## 2018-09-17 RX ADMIN — ACETAMINOPHEN 325 MG: 160 SUSPENSION ORAL at 04:15

## 2018-09-17 RX ADMIN — DILTIAZEM HYDROCHLORIDE 5 MG/HR: 5 INJECTION INTRAVENOUS at 09:12

## 2018-09-17 RX ADMIN — PHENYLEPHRINE HYDROCHLORIDE: 10 INJECTION INTRAVENOUS at 02:31

## 2018-09-17 RX ADMIN — HEPARIN SODIUM AND DEXTROSE 12 UNITS/KG/HR: 10000; 5 INJECTION INTRAVENOUS at 11:06

## 2018-09-17 RX ADMIN — CEFAZOLIN SODIUM 2000 MG: 2 SOLUTION INTRAVENOUS at 20:28

## 2018-09-17 RX ADMIN — SODIUM CHLORIDE 3 UNITS/HR: 9 INJECTION, SOLUTION INTRAVENOUS at 21:14

## 2018-09-17 RX ADMIN — HEPARIN SODIUM 3300 UNITS: 1000 INJECTION, SOLUTION INTRAVENOUS; SUBCUTANEOUS at 11:05

## 2018-09-17 RX ADMIN — CEFEPIME HYDROCHLORIDE 2000 MG: 2 INJECTION, POWDER, FOR SOLUTION INTRAVENOUS at 10:51

## 2018-09-17 RX ADMIN — IOHEXOL 85 ML: 350 INJECTION, SOLUTION INTRAVENOUS at 22:41

## 2018-09-17 RX ADMIN — METOPROLOL TARTRATE 2.5 MG: 1 INJECTION, SOLUTION INTRAVENOUS at 11:05

## 2018-09-17 RX ADMIN — SODIUM PHOSPHATE, MONOBASIC, MONOHYDRATE 21 MMOL: 276; 142 INJECTION, SOLUTION INTRAVENOUS at 14:06

## 2018-09-17 RX ADMIN — DILTIAZEM HYDROCHLORIDE 10 MG: 5 INJECTION INTRAVENOUS at 07:35

## 2018-09-17 RX ADMIN — POTASSIUM CHLORIDE 20 MEQ: 149 INJECTION, SOLUTION, CONCENTRATE INTRAVENOUS at 06:57

## 2018-09-17 RX ADMIN — POTASSIUM CHLORIDE AND SODIUM CHLORIDE 150 ML/HR: 900; 300 INJECTION, SOLUTION INTRAVENOUS at 18:53

## 2018-09-17 RX ADMIN — SODIUM PHOSPHATE, MONOBASIC, MONOHYDRATE 6 MMOL: 276; 142 INJECTION, SOLUTION INTRAVENOUS at 04:45

## 2018-09-17 RX ADMIN — POTASSIUM CHLORIDE AND SODIUM CHLORIDE 150 ML/HR: 900; 300 INJECTION, SOLUTION INTRAVENOUS at 12:06

## 2018-09-17 RX ADMIN — POTASSIUM CHLORIDE 20 MEQ: 149 INJECTION, SOLUTION, CONCENTRATE INTRAVENOUS at 05:17

## 2018-09-17 RX ADMIN — HEPARIN SODIUM 1650 UNITS: 1000 INJECTION, SOLUTION INTRAVENOUS; SUBCUTANEOUS at 19:12

## 2018-09-17 RX ADMIN — METOPROLOL TARTRATE 2.5 MG: 1 INJECTION, SOLUTION INTRAVENOUS at 17:11

## 2018-09-17 NOTE — CONSULTS
Consultation - Infectious Disease   Emily Abdullahi 60 y o  female MRN: [id number]  Unit/Bed#: MICU 04 Encounter: [id number]      IMPRESSION & RECOMMENDATIONS:   This is a 48-year-old female with a complicated past medical history who presents for altered mental status and severe sepsis, likely secondary to her Gram-positive bacteremia and possible right-sided endocarditis  1   Gram-positive bacteremia:  Possible sources include newly diagnosed needed valve endocarditis and potentially infected chemo port  -blood cultures from last night are positive x2 for gram-positive cocci in clusters  -will plan to repeat blood cultures tomorrow  -transthoracic echo reviewed, cardiology following; the patient is for transesophageal echo tomorrow  -no other localizing symptoms on exam at this point  -will place the patient on vancomycin and cefazolin; stop cefepime  This will provide coverage for both MRSA or MSSA or enterococcal endocarditis and bacteremia until the organism is identified  -will continue to follow CBC and chemistry daily  -will plan to repeat blood cultures every 24-48 hours  -would recommend removal of femoral line as soon as feasible in the setting of Gram-positive bacteremia given high risk of infection associated with them  2   Suspected endocarditis  -echo reviewed inpatient for transesophageal echo tomorrow  -will continue plan as above    3  Severe sepsis, POA:  -this is likely due to problems 1 and 2  -will continue management as above    4    Colon cancer currently on palliative chemotherapy with chronic port  -review chart further try to determine the age of her port  -will attempt to contact her partner to see if there are any issues with her most recent chemotherapy session, or issues at home with her line  -if the patient isolates MRSA, it is very likely we will need to remove the port, and could possibly be replaced after she completes antibiotic therapy  -unclear what the plans are for chemotherapy and recommend discussing this with Oncology    5  Diabetes  -patient presented with elevated blood sugars  -current care as per primary team; would recommend rechallenge her partner to understand what care she has been receiving at home  Plan discussed in detail with ICU team       HISTORY OF PRESENT ILLNESS:  Reason for Consult: Sepsis, infective endocarditis    Patient unable to provide reliable history as she is confused, easily gets upset and intermittently nods off on exam   Majority of the history gathered per chart review  HPI: Emily Davila is a 60y o  year old female with PMH of colon CA (Last Chemo from 9/11/18 via port) s/p bowel resection, Hep C, CAD, prior STEMI, HLD, CKD, T2DM, prior cocaine use and she was on [facility] as an outpatient for prior thrombus in brachial artery from July  Recent admission on 9/7 to 9/10 for dehydration post chemo  She was brought to [facility] on 9/16 with AMS  Per chart review this was going on for 2 days  She was found with elevated Blood sugars, hypotensive, tachycardic and with lactic acidosis  She was treated with fluids and started on Zosyn and transferred to [facility]  She was admitted to the MICU and bedside ECHO was done concerning for TV vegetation  Formal ECHO was ordered  CXR was also concerning for possible RML infiltrate  She was continued on Vancomycin and Zosyn  She was also noted to be in RYLAN at that time  Procal was elevated to 50 and leukopenia and thrombocytopenia noted  R femoral central line was placed  Cardiology was consulted and 2D ECHO is concerning for TV vegetation v  Thrombus  Her BCx have also started to isolated GPC in cluster  CT was also done that may be concerning for septic emboli (less likely mets)  We are consulted at this time for management of her Gram-positive bacteremia and possible endocarditis    Her T max from admission was 101 5  Her WBC is 2 2  She is currently on Vancomycin and Cefepime   BCx from 9/16 with GPCs in clusters; both sets positive  CT read reviewed and images  CT minus negative  She continues to be tachycardic and with low grade temps  On evaluation, the patient is confused  She reports falling at home  She does not recall the past few days and just reports that her partner notes most of her history  She does recall having colon cancer, recently having chemotherapy, she is unclear about when her port was put in  At this point she reports feeling sore all over as she fell at home and reports some back pain  Intermittently during questioning she becomes distracted and starts to moan or starts to fall asleep  REVIEW OF SYSTEMS:  Unable to obtain full review of systems as patient is confused, easily gets upset, intermittently nods off on exam     PAST MEDICAL HISTORY:  Past Medical History:   Diagnosis Date    Bipolar depression ([place] [address] ) 3/17/2016    CAD S/P percutaneous coronary angioplasty 3/17/2016    Cancer ([place] [address] )     Chronic pain     Colonic mass     Colostomy care ([place] [address] )     COPD (chronic obstructive pulmonary disease) ([place] [address] )     CVA (cerebral vascular accident) ([place] [address] )     R sided weakness    Essential hypertension 3/17/2016    GERD (gastroesophageal reflux disease)     Hepatitis C     Heroin abuse 3/18/2016    History of gastric ulcer     Hypertension     NSTEMI (non-ST elevated myocardial infarction) ([place] [address] ) 07/2012    Psychiatric disorder 09/03/2014    Inpatient admission     Schizoaffective disorder ([place] [address] ) 3/17/2016    Schizophrenia ([place] [address] ) 3/17/2016    STEMI (ST elevation myocardial infarction) ([place] [address] ) 12/2017    Type 2 diabetes mellitus ([place] [address] ) 3/17/2016     Past Surgical History:   Procedure Laterality Date    CARDIAC CATHETERIZATION  07/2016    COLONOSCOPY N/A 6/22/2018    Procedure: COLONOSCOPY;  Surgeon: Sukhjinder Munoz MD;  Location: [facility];   Service: Colorectal    CORONARY ANGIOPLASTY WITH STENT PLACEMENT  07/05/2012    MAKEDA (LAD), PTA (Diag)    CORONARY ANGIOPLASTY WITH STENT PLACEMENT 2017    MAKEDA/ PTA (RCA)    FL GUIDED CENTRAL VENOUS ACCESS REPLACEMENT  8/15/2018    MI INSERT PICC W/ SUB-Q PORT N/A 8/15/2018    Procedure: INSERTION VENOUS PORT (PORT-A-CATH); Surgeon: Filomena Taylor MD;  [facility];  Service: Colorectal    MI LAP,DIAGNOSTIC ABDOMEN N/A 2018    Procedure: LAPAROSCOPY DIAGNOSTIC, Laproscopic transverse loop colostomy, lysis of adhesions;  Surgeon: Gerson Mackey MD;  Location: [facility];  Service: Colorectal    MI SIGMOIDOSCOPY FLX DX W/COLLJ SPEC BR/WA IF PFRMD N/A 8/15/2018    Procedure: Dunbar Comings;  Surgeon: Filomena Taylor MD;  [facility];  Service: Colorectal       FAMILY HISTORY:  Non-contributory    SOCIAL HISTORY:  Social History   History   Alcohol Use No     Comment: "only on occasion"     History   Drug Use No     Comment: only tried x 1      History   Smoking Status    Current Every Day Smoker    Packs/day: 1 00    Years: 45 00    Types: Cigarettes   Smokeless Tobacco    Never Used       ALLERGIES:  No Known Allergies    MEDICATIONS:  All current active medications have been reviewed  Vancomycin 2  Cefepime 1    PHYSICAL EXAM:  HR:  [] 91  Resp:  [18-46] 20  BP: ()/(58-89) 117/75  SpO2:  [85 %-100 %] 98 %  Temp (24hrs), Av 3 °F (37 9 °C), Min:98 6 °F (37 °C), Max:102 2 °F (39 °C)  Current: Temperature: 98 6 °F (37 °C)    Intake/Output Summary (Last 24 hours) at 18 1816  Last data filed at 18 1715   Gross per 24 hour   Intake          4602 62 ml   Output             3010 ml   Net          1592 62 ml       General Appearance:  Patient is confused, easily gets upset on questioning and intermittently moans in pain but does not localize her symptoms on questioning     Head:  Normocephalic, without obvious abnormality, atraumatic   Eyes:  Conjunctiva pink and sclera anicteric, both eyes; no embolic phenomenon noted    Nose: Nares normal, mucosa normal, no drainage   Throat: Oropharynx moist without lesions, edentulous    Neck: Supple, symmetrical, no adenopathy, no tenderness/mass/nodules; right port noted without any significant edema, erythema or tenderness to palpation  Back:   Symmetric, no curvature, ROM normal, no CVA tenderness  No overt tenderness to palpation along the spine or paraspinal process  Lungs:   Clear to auscultation bilaterally, respirations unlabored   Chest Wall:  No tenderness or deformity   Heart:  Irregular rhythm; no murmur, rub or gallop   Abdomen:   Soft, non-tender, non-distended, positive bowel sounds, ostomy in place draining stool    Extremities: No cyanosis, clubbing or edema   Skin: No rashes or lesions  No draining wounds noted  Right femoral line noted to be clear dry and intact  Hanks catheter in place draining clear yellow urine  No splinter hemorrhages, Janeway lesions or Osler's nodes present   Lymph nodes: Cervical, supraclavicular nodes normal   Neurologic: Alert and oriented times 1 (oriented to person only), moves all extremities to gravity  Intermittently moaning on exam but does not localize any symptoms and easily becomes distracted       LABS, IMAGING, & OTHER STUDIES:  Lab Results:  I have personally reviewed pertinent labs      Results from last 7 days  Lab Units 09/17/18  1517 09/17/18  1420 09/17/18  0640 09/17/18  0203   WBC Thousand/uL  --  2 22* 2 88* 2 67*   HEMOGLOBIN g/dL 6 3* 6 5* 7 5* 10 1*   PLATELETS Thousands/uL  --  61* 55* 75*       Results from last 7 days  Lab Units 09/17/18  1420 09/17/18  1041 09/17/18  0640  09/16/18  2145   SODIUM mmol/L 129* 124* 124*  < > 110*   POTASSIUM mmol/L 3 0* 3 1* 2 9*  < > 4 1   CHLORIDE mmol/L 96* 93* 92*  < > 77*   CO2 mmol/L 19* 20* 20*  < > 20*   BUN mg/dL 28* 30* 33*  < > 47*   CREATININE mg/dL 1 34* 1 22 1 44*  < > 2 11*   EGFR ml/min/1 73sq m 43 49 40  < > 25   CALCIUM mg/dL 7 7* 7 6* 7 8*  < > 7 8*   AST U/L  --   --  45  --  45   ALT U/L  --   --  21  --  25   ALK PHOS U/L  --   --  93  --  120*   < > = values in this interval not displayed  Results from last 7 days  Lab Units 09/16/18  9962   GRAM STAIN RESULT  Gram positive cocci in clusters  Gram positive cocci in clusters     Repeat blood cultures ordered for tomorrow    Imaging Studies:   I have personally reviewed pertinent imaging study reports and images in PACS  Other Studies:   I have personally reviewed pertinent reports

## 2018-09-17 NOTE — PROGRESS NOTES
1467 - 4007 - Dr Maynor Jones at bedside to place central line and arterial line  Arterial line unsuccessful  R femoral TLC placed successfully  Pt's HR elevated, as high as 160's  Dr Maynor Jones and Dr Anthony Gonzáles aware  SBP via cuff stable  Levophed gtt infusing per Dr Anthony Gonzáles  Pt receiving IVF boluses per orders  Serial lab checks q2hrs  BS greater than 500 on glucometer and so Insulin gtt rate determined based on serum blood results  Insulin gtt infusing per Dr Luiz Renteria orders  Pt continues to be lethargic  Will continue to monitor

## 2018-09-17 NOTE — ED NOTES
Per EMS, patient's medication and history updated in patient's chart        Bryant Hughes RN  09/16/18 6191

## 2018-09-17 NOTE — CONSULTS
Cardiology   Eimly Abdullahi 60 y o  female MRN: [id number]  Unit/Bed#: MICU 04 Encounter: [id number]        I have reviewed the notes, assessments, and plan performed by JEANNIE Galindo  I personally reviewed the history with the patient and examined the patient  I concur with his documentation of  Emily Abdullahi  Impression:    65-year-old with prior history of CAD, previous nontransmural [id number] of the LAD and diagonal branch, STEMI-December 2017 -status post PCI of the proximal RCA-MAKEDA, at that time mid LAD had  in the previously stented segment, distal LAD filling via collaterals, medication noncompliance, hypertension, hyperlipidemia, CKD, chronic and ongoing tobacco use, uncontrolled type 2 diabetes-A1c of 10 0 in June, right upper extremity ischemia in June 2018, the same month she was also diagnosed with a colonic mass-status post bowel resection and colostomy, also-status post right axillary/brachial artery PTA with development of distal ulnar artery emboli-status post thrombectomy and tPA and transition to Xarelto in July 2018  Readmitted with altered mental status for 2 days and septic presentation    ECG demonstrates atrial flutter with rapid ventricular response-adenosine was attempted-revealing flutter waves    Other evaluation has included a CT scan of the head that shows lacking are infarcts, CT scan of the chest that shows septic emboli, echocardiogram with a tricuspid valve 'mass', blood cultures growing gram-positive cocci in clusters    Plan:    Atrial flutter:  Amiodarone might have been started in the setting of hemodynamic instability, cardioversion would be with risk of embolic events (especially since she is already high risk without prior history of thrombus/thromboembolic episode), considering that the patient has been stable, discontinue IV amiodarone, start IV metoprolol 5 mg every 6 hr   IV Cardizem can also be initiated overnight if necessary  If heart rates increase in spite of metoprolol or blood pressures preclude metoprolol, at that point okay to restart IV amiodarone  She will be undergoing a JUNIOR anyway, rule out thrombus and can perform cardioversion at the same time and put her on amiodarone in the short-term till her acute issues resolve    Tricuspid valve 'mass'/Infective Endocarditis:  Likely vegetation in the setting of gram-positive cocci in clusters on blood cultures-2/2, septic emboli on CT, no prior history of IV drug use, has snorted cocaine  She will need her port removed, ID consultation is awaited, schedule for JUNIOR also  Not a candidate for surgery, only with mild regurgitation on echo    Encephalopathy:  Likely secondary to sepsis    Adenocarcinoma of the colon:  Status post bowel resection and colostomy, received full Handy chemotherapy, might need to be held in the setting of long-term antibiotic use for endocarditis    Gerald on CKD:  Mild, continue to follow    ----------------------------------------------------------------------------------------------------------------------------------------------  Reason for Consult / Principal Problem:  AFib with RVR    Physician Requesting Consult: Mandy Estrada DO    Cardiologist: Dr Daniel Sumner    PCP: Dr Kamari Ortiz:  New onset Atrial Flutter with RVR (POA)  In the setting of septic shock; tachyarrhythmia may be difficult to control without source control  [id number] (6);  Sex, HTN, thrombo embolism history, vascular disease history, diabetes history  Received 6 mg of adenosine this a m ; revealed flutter waves with complete heart block back into a flutter with RVR  Currently on IV amiodarone infusion after bolus, previously on IV Cardizem infusion  Recommend initiating low-dose beta-blocker 2 5 mg q 6 hours if BP allows; her BP is on the lower side mid 90's to low 100's  Currently on a IV heparin infusion  Patient will need long-term anticoagulation she carries a high risk of stroke development; Coumadin vs NOAC   Previously on Xarelto status post axillary/brachial artery PTA; unsure as to when this was discontinued, it was not on the patient's home medication list this admission  Toxic metabolic encephalopathy  Patient still remains lethargic, intermittently following commands  Continue close observance of neuro status  Septic shock rule out endocarditis/septic emboli  2D echocardiogram from 9/17:  Tricuspid valve vegetation versus thrombus  Recommend obtaining JUNIOR   Blood culture x1 set #1 (pending), blood culture x1 set #2 (gram positive occi in clusters)  Currently on broad-spectrum IV antibiotics  Vasopressors as needed for hypotension; volume resuscitation as well  Pulmonary nodules; rule out metastasis versus septic emboli  CT scan of the chest; revealed bilateral cavitating pulmonary nodules concerning for septic emboli, metastatic disease considerably less likely given rapidity of development per radiology report  Adenocarcinoma of the colon; status post bowel resection/colostomy/port placement and on chemotherapy  Hyperglycemia; likely HHS/HHNK  Currently on IV insulin infusion; volume resuscitation  Closely observe of electrolytes with insulin infusion  Last hemoglobin A1c from 6/20/2018; 10 4  Elevated lactic level; now resolved  Lactic of 5 1 admission; now 2 0  Severe hypokalemia/hypophosphatemia  Receiving electrolyte replacements  Replete electrolytes as needed, keep K + greater than 4 0, Mag greater than 2 0, calcium greater than 7 0  RYLAN superimposed on CKD stage III (baseline 1 1-1 2)  Creatinine 2 11 on admission; currently 1 35  Obtain a m  BMP to closely monitor renal function    HPI: Emily Abdullahi 60y o  year old female with a past medical history of CAD with previous STEMI in December 2017 status post PCI of the proximal RCA and placement of MAKEDA, also status post non transmural MI in 2012 with stenting of the LAD and diagonal branch in July of 2012 (cath report from [facility] and in December 2017, revealed mid LAD has  within the previously stented segment, distal LAD fills via left to left and right to left collaterals), medication noncompliance in the past with her cardiac meds including aspirin Plavix and metoprolol,  hyperlipidemia, hypertension, chronic kidney disease stage 3, cocaine abuse in the past, current every day smoker, hepatitis-C, uncontrolled type 2 diabetes with peripheral neuropathy, recent brachial artery PTA in 07/2018    Patient routinely follows with Dr Huber Mclean of [facility] cardiology was last seen in the outpatient office on 08/10/2018  On prior admission in 06/2018, initially presented to [facility] for abdominal pain, and several days of numbness and weakness of her right hand  Incidentally a large colonic mass was discovered and the patient was referred to Colorectal surgery for further workup  Patient was discharged to home on aspirin and Plavix  Patient had right axillary/brachial artery PTA on 07/27/2018, with development of 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 status post thrombectomy and tPA administration, patient was on a IV heparin drip transition to Xarelto on discharge  Patient is now status post bowel resection with on 08/15/2018 colostomy by Colorectal surgery  Recent chemo port placed 08/15/2018  Patient received 1st course of mFOLFOX on 08/28/2018 and was admitted to [facility] on 09/07 to 9/10 with dehydration  [facility] [place] [facility]2018 for altered mental status x2 days per the patient's caregiver  Per EMS patient's blood sugar noted to be above 600  In the ED at [facility] patient was noted to be hypotensive, febrile temp 102 2, and tachycardic with rates in the 150s, appeared to be AFib verses Aflutter with RVR, patient was found to have a lactic acidosis of 3 4, and glucose on her BMP at 952 no gap    Sepsis alert was called patient was given 2 L of IV fluids, started on IV antibiotics and IV insulin infusion  Patient was transferred to [facility] for further management  12 lead EKG reveals Atrial Flutter w/RVR rate of 156  Patient in the ICU today with reported heart rate greater than 150; was given 6 mg of adenosine, revealed flutter waves with complete heart black and then the pt went back into aflutter w/RVR in the 150s to 160s  Patient was ordered IV amiodarone bolus, started on IV amiodarone infusion and remains on a IV heparin infusion  Cardiology consult to further management      Family History:   Family History   Problem Relation Age of Onset    Heart attack Father     Cancer Brother         gastric     Historical Information   Past Medical History:   Diagnosis Date    Bipolar depression ([place] [address] ) 3/17/2016    CAD S/P percutaneous coronary angioplasty 3/17/2016    Cancer ([place] [address] )     Chronic pain     Colonic mass     Colostomy care ([place] [address] )     COPD (chronic obstructive pulmonary disease) ([place] [address] )     CVA (cerebral vascular accident) ([place] [address] )     R sided weakness    Essential hypertension 3/17/2016    GERD (gastroesophageal reflux disease)     Hepatitis C     Heroin abuse 3/18/2016    History of gastric ulcer     Hypertension     NSTEMI (non-ST elevated myocardial infarction) ([place] [address] ) 07/2012    Psychiatric disorder 09/03/2014    Inpatient admission     Schizoaffective disorder ([place] [address] ) 3/17/2016    Schizophrenia ([place] [address] ) 3/17/2016    STEMI (ST elevation myocardial infarction) ([place] [address] ) 12/2017    Type 2 diabetes mellitus ([place] [address] ) 3/17/2016     Past Surgical History:   Procedure Laterality Date    CARDIAC CATHETERIZATION  07/2016    COLONOSCOPY N/A 6/22/2018    Procedure: COLONOSCOPY;  Surgeon: Ana María German MD;  [facility]: [facility];   Service: Colorectal    CORONARY ANGIOPLASTY WITH STENT PLACEMENT  07/05/2012    MAKEDA (LAD), PTA (Diag)    CORONARY ANGIOPLASTY WITH STENT PLACEMENT  12/2017    MAKEDA/ PTA (RCA)    FL GUIDED CENTRAL VENOUS ACCESS REPLACEMENT  8/15/2018    DC INSERT PICC W/ SUB-Q PORT N/A 8/15/2018    Procedure: INSERTION VENOUS PORT (PORT-A-CATH); Surgeon: Noel Aponte MD;  [facility];  Service: Colorectal    DC LAP,DIAGNOSTIC ABDOMEN N/A 7/17/2018    Procedure: LAPAROSCOPY DIAGNOSTIC, Laproscopic transverse loop colostomy, lysis of adhesions;  Surgeon: Viktoria King MD;  [facility];  Service: Colorectal    DC SIGMOIDOSCOPY FLX DX W/COLLJ SPEC BR/WA IF PFRMD N/A 8/15/2018    Procedure: Michelet Arm;  Surgeon: Noel Aponte MD;  [facility];  Service: Colorectal     Social History   History   Alcohol Use No     Comment: "only on occasion"     History   Drug Use No     Comment: only tried x 1      History   Smoking Status    Current Every Day Smoker    Packs/day: 1 00    Years: 45 00    Types: Cigarettes   Smokeless Tobacco    Never Used     Family History:   Family History   Problem Relation Age of Onset    Heart attack Father     Cancer Brother         gastric       Review of Systems:    Able to perform a limited ROS secondary to patient's mental status  Review of Systems   Respiratory: Negative for cough and shortness of breath  Cardiovascular: Negative for chest pain and palpitations  Gastrointestinal: Negative for abdominal pain  Neurological: Negative for dizziness, light-headedness and headaches  Except as noted in the HPI and above, a comprehensive 12 point review of systems was negative      Scheduled Meds:  Current Facility-Administered Medications:  amiodarone 1 mg/min Intravenous Continuous Lucinda Haines PA-C Last Rate: 1 mg/min (09/17/18 1153)   Followed by        amiodarone 0 5 mg/min Intravenous Continuous Lucinda Haines PA-C    cefepime 1,000 mg Intravenous Q12H Lucinda Haines PA-C    heparin (porcine) 3-20 Units/kg/hr (Order-Specific) Intravenous Titrated State Eldon PA-C Last Rate: 12 Units/kg/hr (09/17/18 1106) heparin (porcine) 1,650 Units Intravenous PRN Clary Martin PA-C    heparin (porcine) 3,300 Units Intravenous PRN Clary Martin PA-C    insulin regular (HumuLIN R,NovoLIN R) infusion 0 3-21 Units/hr Intravenous Titrated Florinda Motley MD Last Rate: 10 Units/hr (09/17/18 1205)   multi-electrolyte 150 mL/hr Intravenous Continuous Florinda Motley MD Last Rate: Stopped (09/17/18 1144)   norepinephrine 1-30 mcg/min Intravenous Titrated Florinda Motley MD Last Rate: 2 mcg/min (09/17/18 0926)   potassium chloride 20 mEq Intravenous Once Glenn Chamberlain MD    potassium chloride 40 mEq Intravenous Once Glenn Chamberlain MD    sodium chloride 0 9 % with KCl 40 mEq/L 150 mL/hr Intravenous Continuous Clary Martin PA-C Last Rate: 150 mL/hr (09/17/18 1206)   sodium phosphate 21 mmol Intravenous Once Glenn Chamberlain MD Last Rate: 21 mmol (09/17/18 1406)   [START ON 9/18/2018] vancomycin 20 mg/kg Intravenous Q24H Lucinda Haines PA-C      Continuous Infusions:  amiodarone 1 mg/min Last Rate: 1 mg/min (09/17/18 1153)   Followed by     amiodarone 0 5 mg/min    heparin (porcine) 3-20 Units/kg/hr (Order-Specific) Last Rate: 12 Units/kg/hr (09/17/18 1106)   insulin regular (HumuLIN R,NovoLIN R) infusion 0 3-21 Units/hr Last Rate: 10 Units/hr (09/17/18 1205)   multi-electrolyte 150 mL/hr Last Rate: Stopped (09/17/18 1144)   norepinephrine 1-30 mcg/min Last Rate: 2 mcg/min (09/17/18 0926)   sodium chloride 0 9 % with KCl 40 mEq/L 150 mL/hr Last Rate: 150 mL/hr (09/17/18 1206)     PRN Meds: heparin (porcine)    heparin (porcine)  all current active meds have been reviewed, current meds:   Current Facility-Administered Medications   Medication Dose Route Frequency    amiodarone (CORDARONE) 900 mg in dextrose 5 % 500 mL infusion  1 mg/min Intravenous Continuous    Followed by   Carie Clunes amiodarone (CORDARONE) 900 mg in dextrose 5 % 500 mL infusion  0 5 mg/min Intravenous Continuous    cefepime (MAXIPIME) 1,000 mg in dextrose 5 % 50 mL IVPB  1,000 mg Intravenous Q12H    heparin (porcine) 25,000 units in 250 mL infusion (premix)  3-20 Units/kg/hr (Order-Specific) Intravenous Titrated    heparin (porcine) injection 1,650 Units  1,650 Units Intravenous PRN    heparin (porcine) injection 3,300 Units  3,300 Units Intravenous PRN    insulin regular (HumuLIN R,NovoLIN R) 1 Units/mL in sodium chloride 0 9 % 100 mL infusion  0 3-21 Units/hr Intravenous Titrated    multi-electrolyte (ISOLYTE-S PH 7 4 equivalent) IV solution  150 mL/hr Intravenous Continuous    norepinephrine (LEVOPHED) 4 mg (STANDARD CONCENTRATION) IV in sodium chloride 0 9% 250 mL  1-30 mcg/min Intravenous Titrated    potassium chloride 20 mEq in dextrose 5 % 100 mL IVPB  20 mEq Intravenous Once    potassium chloride 40 mEq IVPB (premix)  40 mEq Intravenous Once    sodium chloride 0 9 % with KCl 40 mEq/L infusion (premix)  150 mL/hr Intravenous Continuous    sodium phosphate 21 mmol in dextrose 5 % 250 mL Infusion  21 mmol Intravenous Once    [START ON 9/18/2018] vancomycin (VANCOCIN) 1,250 mg in sodium chloride 0 9 % 250 mL IVPB  20 mg/kg Intravenous Q24H    and PTA meds:   Prior to Admission Medications   Prescriptions Last Dose Informant Patient Reported? Taking?    DULoxetine (CYMBALTA) 60 mg delayed release capsule  Self Yes No   Sig: Take 60 mg by mouth daily     atorvastatin (LIPITOR) 10 mg tablet  Self No No   Sig: Take 1 tablet (10 mg total) by mouth daily   clopidogrel (PLAVIX) 75 mg tablet  Self No No   Sig: Take 1 tablet (75 mg total) by mouth daily   divalproex sodium (DEPAKOTE) 250 mg EC tablet  Self Yes No   Sig: Take 3 tablets by mouth daily   insulin aspart protamine-insulin aspart (NOVOLOG MIX 70/30) 100 units/mL injection   No No   Sig: Inject 20 Units under the skin 2 (two) times a day before meals   insulin glargine (LANTUS) 100 units/mL subcutaneous injection  Self No No   Sig: Inject 10 Units under the skin daily at bedtime   metFORMIN (GLUCOPHAGE) 1000 MG tablet  Self Yes No   Sig: metformin 1,000 mg tablet  BID   metoclopramide (REGLAN) 10 mg tablet   No No   Sig: Take 1 tablet (10 mg total) by mouth 3 (three) times a day before meals   mirtazapine (REMERON) 15 mg tablet  Self Yes No   Sig: Take 1 tablet by mouth daily     nitroglycerin (NITROSTAT) 0 4 mg SL tablet  Self Yes No   Sig: Place 0 4 mg under the tongue   oxyCODONE-acetaminophen (PERCOCET) 5-325 mg per tablet   No No   Sig: Take 1 tablet by mouth every 8 (eight) hours as needed for moderate pain Max Daily Amount: 2 tablets   promethazine (PHENERGAN) 12 5 MG tablet  Self Yes No   Sig: Take 12 5 mg by mouth daily     senna ([employer] SENNA) 8 6 MG tablet  Self Yes No   Sig: Take by mouth   terbinafine (LAMISIL) 250 mg tablet  Self Yes No   Sig: Take 1 tablet by mouth daily   traZODone (DESYREL) 50 mg tablet  Self Yes No   Sig: Take 50 mg by mouth daily at bedtime      Facility-Administered Medications: None       No Known Allergies    Objective   Vitals: Blood pressure 91/74, pulse (!) 150, temperature 100 °F (37 8 °C), resp   rate 21, height 5' 3" (1 6 m), weight 57 1 kg (125 lb 14 1 oz), SpO2 93 %, not currently breastfeeding , Body mass index is 22 3 kg/m² , Orthostatic Blood Pressures      Most Recent Value   Blood Pressure  91/74 filed at 09/17/2018 1200   Patient Position - Orthostatic VS  Lying filed at 09/17/2018 0400            Intake/Output Summary (Last 24 hours) at 09/17/18 1447  Last data filed at 09/17/18 1205   Gross per 24 hour   Intake          3807 61 ml   Output             2835 ml   Net           972 61 ml       Invasive Devices     Central Venous Catheter Line            CVC Central Lines 09/17/18 Triple Right Femoral less than 1 day    Port A Cath 09/17/18 Right Chest less than 1 day          Peripheral Intravenous Line            Peripheral IV 09/16/18 Left Wrist 1 day    Peripheral IV 09/16/18 Left Antecubital less than 1 day    Peripheral IV 09/16/18 Left Forearm less than 1 day Drain            Colostomy RUQ -- days    Urethral Catheter less than 1 day                Physical Exam:  Physical Exam   Constitutional: She appears lethargic  No distress  Patient is a 54-year-old female she is ill-appearing, she is lethargic, she opens her eyes to verbal/physical stimuli, she intermittently follows commands and answers questions, she mumbles, she does not appear to be in any acute distress; she denies pain   HENT:   Head: Normocephalic and atraumatic  Mouth/Throat: Oropharynx is clear and moist  No oropharyngeal exudate  Eyes: Conjunctivae are normal  Pupils are equal, round, and reactive to light  No scleral icterus  Neck: Normal range of motion  Neck supple  No JVD present  Cardiovascular: An irregularly irregular rhythm present  Tachycardia present  Exam reveals no gallop and no friction rub  No murmur heard  Pulses:       Radial pulses are 1+ on the right side, and 1+ on the left side  Dorsalis pedis pulses are 1+ on the right side, and 1+ on the left side  Pulmonary/Chest: She has no wheezes  She has no rales  Poor effort  Lung fields are clear and diminished in the upper lobes, very diminished at the bilateral bases   Abdominal: Soft  Bowel sounds are normal  There is no tenderness  Right lower quadrant colostomy bag draining brown liquids   Musculoskeletal: She exhibits no edema  Very limited range of motion   Neurological: She appears lethargic  GCS eye subscore is 3  GCS verbal subscore is 4  GCS motor subscore is 6  Skin: Skin is warm and dry  Psychiatric: She has a normal mood and affect  Nursing note and vitals reviewed      Lab Results:   Recent Results (from the past 24 hour(s))   Fingerstick Glucose (POCT)    Collection Time: 09/16/18  9:35 PM   Result Value Ref Range    POC Glucose >500 (HH) 65 - 140 mg/dl   ECG 12 lead    Collection Time: 09/16/18  9:36 PM   Result Value Ref Range    Ventricular Rate 156 BPM    Atrial Rate 156 BPM    OK Interval 160 ms    QRSD Interval 104 ms    QT Interval 284 ms    QTC Interval 457 ms    P Axis -83 degrees    QRS Axis 68 degrees    T Wave Axis -83 degrees   CBC and differential    Collection Time: 09/16/18  9:44 PM   Result Value Ref Range    WBC 3 56 (L) 4 31 - 10 16 Thousand/uL    RBC 3 41 (L) 3 81 - 5 12 Million/uL    Hemoglobin 9 6 (L) 11 5 - 15 4 g/dL    Hematocrit 25 6 (L) 34 8 - 46 1 %    MCV 75 (L) 82 - 98 fL    MCH 28 2 26 8 - 34 3 pg    MCHC 37 5 (H) 31 4 - 37 4 g/dL    RDW 13 1 11 6 - 15 1 %    MPV 10 1 8 9 - 12 7 fL    Platelets 66 (L) 788 - 390 Thousands/uL    nRBC 0 /100 WBCs   Lactic acid x2    Collection Time: 09/16/18  9:44 PM   Result Value Ref Range    LACTIC ACID 3 4 (HH) 0 5 - 2 0 mmol/L   Manual Differential(PHLEBS Do Not Order)    Collection Time: 09/16/18  9:44 PM   Result Value Ref Range    Segmented % 85 (H) 43 - 75 %    Bands % 7 0 - 8 %    Lymphocytes % 8 (L) 14 - 44 %    Monocytes % 0 (L) 4 - 12 %    Eosinophils % 0 0 - 6 %    Basophils % 0 0 - 1 %    Absolute Neutrophils 3 28 1 85 - 7 62 Thousand/uL    Lymphocytes Absolute 0 28 (L) 0 60 - 4 47 Thousand/uL    Monocytes Absolute 0 00 0 00 - 1 22 Thousand/uL    Eosinophils Absolute 0 00 0 00 - 0 40 Thousand/uL    Basophils Absolute 0 00 0 00 - 0 10 Thousand/uL    Total Counted 100     RBC Morphology Present     Anisocytosis Present     Tear Drop Cells Present     Platelet Estimate Decreased (A) Adequate    Large Platelet Present    Comprehensive metabolic panel    Collection Time: 09/16/18  9:45 PM   Result Value Ref Range    Sodium 110 (LL) 136 - 145 mmol/L    Potassium 4 1 3 5 - 5 3 mmol/L    Chloride 77 (L) 100 - 108 mmol/L    CO2 20 (L) 21 - 32 mmol/L    ANION GAP 13 4 - 13 mmol/L    BUN 47 (H) 5 - 25 mg/dL    Creatinine 2 11 (H) 0 60 - 1 30 mg/dL    Glucose 952 (HH) 65 - 140 mg/dL    Calcium 7 8 (L) 8 3 - 10 1 mg/dL    AST 45 5 - 45 U/L    ALT 25 12 - 78 U/L    Alkaline Phosphatase 120 (H) 46 - 116 U/L    Total Protein 6 2 (L) 6 4 - 8 2 g/dL    Albumin 2 0 (L) 3 5 - 5 0 g/dL    Total Bilirubin 0 55 0 20 - 1 00 mg/dL    eGFR 25 ml/min/1 73sq m   Procalcitonin    Collection Time: 09/16/18  9:45 PM   Result Value Ref Range    Procalcitonin 57 31 (H) <=0 25 ng/ml   Protime-INR    Collection Time: 09/16/18  9:45 PM   Result Value Ref Range    Protime 15 7 (H) 11 8 - 14 2 seconds    INR 1 24 (H) 0 86 - 1 17   APTT    Collection Time: 09/16/18  9:45 PM   Result Value Ref Range    PTT 29 24 - 36 seconds   Troponin I    Collection Time: 09/16/18  9:45 PM   Result Value Ref Range    Troponin I <0 02 <=0 04 ng/mL   Blood gas, Venous    Collection Time: 09/16/18  9:45 PM   Result Value Ref Range    pH, Phillip 7 338 7 300 - 7 400    pCO2, Phillip 34 0 (L) 42 0 - 50 0 mm Hg    pO2, Phillip 27 7 (L) 35 0 - 45 0 mm Hg    HCO3, Phillip 17 8 (L) 24 - 30 mmol/L    Base Excess, Phillip -7 1 mmol/L    O2 Content, Phillip 6 7 ml/dL    O2 HGB, VENOUS 43 0 (L) 60 0 - 80 0 %   Magnesium    Collection Time: 09/16/18  9:45 PM   Result Value Ref Range    Magnesium 1 8 1 6 - 2 6 mg/dL   Acetone    Collection Time: 09/16/18  9:45 PM   Result Value Ref Range    Acetone, Bld Negative Negative   Phosphorus    Collection Time: 09/16/18  9:45 PM   Result Value Ref Range    Phosphorus 3 5 2 7 - 4 5 mg/dL   UA w Reflex to Microscopic w Reflex to Culture    Collection Time: 09/16/18 10:29 PM   Result Value Ref Range    Color, UA Yellow     Clarity, UA Clear     Specific [place], UA 1 010 1 003 - 1 030    pH, UA 6 0 4 5 - 8 0    Leukocytes, UA (A) Negative     Elevated glucose may cause decreased leukocyte values   See urine microscopic for [facility] result/    Nitrite, UA Negative Negative    Protein, UA Trace (A) Negative mg/dl    Glucose, UA >=1000 (1%) (A) Negative mg/dl    Ketones, UA Negative Negative mg/dl    Urobilinogen, UA 0 2 0 2, 1 0 E U /dl E U /dl    Bilirubin, UA Negative Negative    Blood, UA Small (A) >=2000 (4+), Trace-Intact, Negative   Urine Microscopic    Collection Time: 09/16/18 10:29 PM   Result Value Ref Range    RBC, UA 4-10 (A) None Seen, 0-5 /hpf    WBC, UA None Seen None Seen, 0-5, 5-55, 5-65 /hpf    Epithelial Cells Occasional None Seen, Occasional /hpf    Bacteria, UA Occasional None Seen, Occasional /hpf   Fingerstick Glucose (POCT)    Collection Time: 09/16/18 10:54 PM   Result Value Ref Range    POC Glucose >500 (HH) 65 - 140 mg/dl   Lactic acid x2    Collection Time: 09/16/18 11:39 PM   Result Value Ref Range    LACTIC ACID 2 2 (HH) 0 5 - 2 0 mmol/L   Fingerstick Glucose (POCT)    Collection Time: 09/16/18 11:50 PM   Result Value Ref Range    POC Glucose >500 (HH) 65 - 140 mg/dl   Glucose, random    Collection Time: 09/17/18 12:23 AM   Result Value Ref Range    Glucose 828 (HH) 65 - 140 mg/dL   Fingerstick Glucose (POCT)    Collection Time: 09/17/18  1:55 AM   Result Value Ref Range    POC Glucose >500 (HH) 65 - 140 mg/dl   Procalcitonin    Collection Time: 09/17/18  2:03 AM   Result Value Ref Range    Procalcitonin 50 92 (H) <=0 25 ng/ml   Lactic acid, plasma    Collection Time: 09/17/18  2:03 AM   Result Value Ref Range    LACTIC ACID 5 1 (HH) 0 5 - 2 0 mmol/L   Basic metabolic panel    Collection Time: 09/17/18  2:03 AM   Result Value Ref Range    Sodium 121 (L) 136 - 145 mmol/L    Potassium 4 2 3 5 - 5 3 mmol/L    Chloride 87 (L) 100 - 108 mmol/L    CO2 16 (L) 21 - 32 mmol/L    ANION GAP 18 (H) 4 - 13 mmol/L    BUN 43 (H) 5 - 25 mg/dL    Creatinine 2 08 (H) 0 60 - 1 30 mg/dL    Glucose 639 (HH) 65 - 140 mg/dL    Calcium 7 7 (L) 8 3 - 10 1 mg/dL    eGFR 26 ml/min/1 73sq m   Calcium, ionized    Collection Time: 09/17/18  2:03 AM   Result Value Ref Range    Calcium, Ionized 1 00 (L) 1 12 - 1 32 mmol/L   Magnesium    Collection Time: 09/17/18  2:03 AM   Result Value Ref Range    Magnesium 1 9 1 6 - 2 6 mg/dL   Phosphorus    Collection Time: 09/17/18  2:03 AM   Result Value Ref Range    Phosphorus 1 7 (L) 2 7 - 4 5 mg/dL   CBC and differential    Collection Time: 09/17/18  2:03 AM Result Value Ref Range    WBC 2 67 (L) 4 31 - 10 16 Thousand/uL    RBC 3 64 (L) 3 81 - 5 12 Million/uL    Hemoglobin 10 1 (L) 11 5 - 15 4 g/dL    Hematocrit 27 8 (L) 34 8 - 46 1 %    MCV 76 (L) 82 - 98 fL    MCH 27 7 26 8 - 34 3 pg    MCHC 36 3 31 4 - 37 4 g/dL    RDW 13 3 11 6 - 15 1 %    MPV 10 7 8 9 - 12 7 fL    Platelets 75 (L) 439 - 390 Thousands/uL    nRBC 0 /100 WBCs   Protime-INR    Collection Time: 09/17/18  2:03 AM   Result Value Ref Range    Protime 15 5 (H) 11 8 - 14 2 seconds    INR 1 22 (H) 0 86 - 1 17   Manual Differential(PHLEBS Do Not Order)    Collection Time: 09/17/18  2:03 AM   Result Value Ref Range    Segmented % 89 (H) 43 - 75 %    Bands % 1 0 - 8 %    Lymphocytes % 6 (L) 14 - 44 %    Monocytes % 3 (L) 4 - 12 %    Eosinophils % 0 0 - 6 %    Basophils % 1 0 - 1 %    Absolute Neutrophils 2 40 1 85 - 7 62 Thousand/uL    Lymphocytes Absolute 0 16 (L) 0 60 - 4 47 Thousand/uL    Monocytes Absolute 0 08 0 00 - 1 22 Thousand/uL    Eosinophils Absolute 0 00 0 00 - 0 40 Thousand/uL    Basophils Absolute 0 03 0 00 - 0 10 Thousand/uL    Total Counted 100     Toxic Granulation Present     Anisocytosis Present     Poikilocytes Present     Target Cells Present     Platelet Estimate Decreased (A) Adequate    Large Platelet Present    Type and screen    Collection Time: 09/17/18  2:08 AM   Result Value Ref Range    ABO Grouping A     Rh Factor Positive     Antibody Screen Negative     Specimen Expiration Date [id number]    UA w Reflex to Microscopic w Reflex to Culture    Collection Time: 09/17/18  2:53 AM   Result Value Ref Range    Color, UA Yellow     Clarity, UA Cloudy     Specific [place], UA 1 024 1 003 - 1 030    pH, UA 5 5 4 5 - 8 0    Leukocytes, UA (A) Negative     Elevated glucose may cause decreased leukocyte values   See urine microscopic for [facility] result/    Nitrite, UA Negative Negative    Protein, UA 30 (1+) (A) Negative mg/dl    Glucose, UA >=1000 (1%) (A) Negative mg/dl    Ketones, UA Negative Negative mg/dl    Urobilinogen, UA 0 2 0 2, 1 0 E U /dl E U /dl    Bilirubin, UA Negative Negative    Blood, UA Trace (A) Negative   Urine Microscopic    Collection Time: 09/17/18  2:53 AM   Result Value Ref Range    RBC, UA None Seen None Seen, 0-5 /hpf    WBC, UA None Seen None Seen, 0-5, 5-55, 5-65 /hpf    Epithelial Cells Occasional None Seen, Occasional /hpf    Bacteria, UA None Seen None Seen, Occasional /hpf   Basic metabolic panel    Collection Time: 09/17/18  4:12 AM   Result Value Ref Range    Sodium 123 (L) 136 - 145 mmol/L    Potassium 2 8 (L) 3 5 - 5 3 mmol/L    Chloride 90 (L) 100 - 108 mmol/L    CO2 17 (L) 21 - 32 mmol/L    ANION GAP 16 (H) 4 - 13 mmol/L    BUN 37 (H) 5 - 25 mg/dL    Creatinine 1 68 (H) 0 60 - 1 30 mg/dL    Glucose 481 (H) 65 - 140 mg/dL    Calcium 7 4 (L) 8 3 - 10 1 mg/dL    eGFR 33 ml/min/1 73sq m   Fingerstick Glucose (POCT)    Collection Time: 09/17/18  4:36 AM   Result Value Ref Range    POC Glucose >500 (HH) 65 - 140 mg/dl   CBC and differential    Collection Time: 09/17/18  6:40 AM   Result Value Ref Range    WBC 2 88 (L) 4 31 - 10 16 Thousand/uL    RBC 2 72 (L) 3 81 - 5 12 Million/uL    Hemoglobin 7 5 (L) 11 5 - 15 4 g/dL    Hematocrit 21 5 (L) 34 8 - 46 1 %    MCV 79 (L) 82 - 98 fL    MCH 27 6 26 8 - 34 3 pg    MCHC 34 9 31 4 - 37 4 g/dL    RDW 13 5 11 6 - 15 1 %    MPV 12 1 8 9 - 12 7 fL    Platelets 55 (L) 239 - 390 Thousands/uL    nRBC 0 /100 WBCs   Comprehensive metabolic panel    Collection Time: 09/17/18  6:40 AM   Result Value Ref Range    Sodium 124 (L) 136 - 145 mmol/L    Potassium 2 9 (L) 3 5 - 5 3 mmol/L    Chloride 92 (L) 100 - 108 mmol/L    CO2 20 (L) 21 - 32 mmol/L    ANION GAP 12 4 - 13 mmol/L    BUN 33 (H) 5 - 25 mg/dL    Creatinine 1 44 (H) 0 60 - 1 30 mg/dL    Glucose 392 (H) 65 - 140 mg/dL    Calcium 7 8 (L) 8 3 - 10 1 mg/dL    AST 45 5 - 45 U/L    ALT 21 12 - 78 U/L    Alkaline Phosphatase 93 46 - 116 U/L    Total Protein 5 2 (L) 6 4 - 8 2 g/dL    Albumin 1 5 (L) 3 5 - 5 0 g/dL    Total Bilirubin 0 45 0 20 - 1 00 mg/dL    eGFR 40 ml/min/1 73sq m   Lactic acid, plasma    Collection Time: 09/17/18  6:42 AM   Result Value Ref Range    LACTIC ACID 2 7 (HH) 0 5 - 2 0 mmol/L   Magnesium    Collection Time: 09/17/18  6:42 AM   Result Value Ref Range    Magnesium 2 4 1 6 - 2 6 mg/dL   Phosphorus    Collection Time: 09/17/18  6:42 AM   Result Value Ref Range    Phosphorus 1 6 (L) 2 7 - 4 5 mg/dL   Calcium, ionized    Collection Time: 09/17/18  6:42 AM   Result Value Ref Range    Calcium, Ionized 1 04 (L) 1 12 - 1 32 mmol/L   Fingerstick Glucose (POCT)    Collection Time: 09/17/18  8:23 AM   Result Value Ref Range    POC Glucose 429 (H) 65 - 140 mg/dl   Lactic acid, plasma    Collection Time: 09/17/18  8:24 AM   Result Value Ref Range    LACTIC ACID 2 0 0 5 - 2 0 mmol/L   Fingerstick Glucose (POCT)    Collection Time: 09/17/18 10:40 AM   Result Value Ref Range    POC Glucose 390 (H) 65 - 140 mg/dl   Basic metabolic panel    Collection Time: 09/17/18 10:41 AM   Result Value Ref Range    Sodium 124 (L) 136 - 145 mmol/L    Potassium 3 1 (L) 3 5 - 5 3 mmol/L    Chloride 93 (L) 100 - 108 mmol/L    CO2 20 (L) 21 - 32 mmol/L    ANION GAP 11 4 - 13 mmol/L    BUN 30 (H) 5 - 25 mg/dL    Creatinine 1 22 0 60 - 1 30 mg/dL    Glucose 358 (H) 65 - 140 mg/dL    Calcium 7 6 (L) 8 3 - 10 1 mg/dL    eGFR 49 ml/min/1 73sq m   Magnesium    Collection Time: 09/17/18 10:41 AM   Result Value Ref Range    Magnesium 2 2 1 6 - 2 6 mg/dL   Phosphorus    Collection Time: 09/17/18 10:41 AM   Result Value Ref Range    Phosphorus 1 9 (L) 2 7 - 4 5 mg/dL   TSH, 3rd generation with Free T4 reflex    Collection Time: 09/17/18 11:51 AM   Result Value Ref Range    TSH 3RD GENERATON 0 537 0 358 - 3 740 uIU/mL   Fingerstick Glucose (POCT)    Collection Time: 09/17/18 12:02 PM   Result Value Ref Range    POC Glucose 474 (H) 65 - 140 mg/dl     Imaging: I have personally reviewed pertinent films in PACS  Code Status: LVL 1 Full Code

## 2018-09-17 NOTE — ED NOTES
Patient's emergency contact called at this time in regards to transferring patient to MANDO Lgaunas RN  09/17/18 [id number]

## 2018-09-17 NOTE — EMTALA/ACUTE CARE TRANSFER
[address]  [address] [address]  Dept: [phone]      EMTALA TRANSFER CONSENT    NAME Emily Mariee 1960                              MRN [id number]    I have been informed of my rights regarding examination, treatment, and transfer   by Dr Susie Aponte,     Benefits: Continuity of care (Patient requiring critical care bed)    Risks: Potential for delay in receiving treatment, Potential deterioration of medical condition, Loss of IV, Increased discomfort during transfer, Possible worsening of condition or death during transfer      Consent for Transfer:  I acknowledge that my medical condition has been evaluated and explained to me by the emergency department physician or other qualified medical person and/or my attending physician, who has recommended that I be transferred to the service of  Accepting Physician: Dr Griselda Morales  at [address] [place] [address] Name, [address] : [place]  The above potential benefits of such transfer, the potential risks associated with such transfer, and the probable risks of not being transferred have been explained to me, and I fully understand them  The doctor has explained that, in my case, the benefits of transfer outweigh the risks  I agree to be transferred  I authorize the performance of emergency medical procedures and treatments upon me in both transit and upon arrival at the receiving facility  Additionally, I authorize the release of any and all medical records to the receiving facility and request they be transported with me, if possible  I understand that the safest mode of transportation during a medical emergency is an ambulance and that the Hospital advocates the use of this mode of transport   Risks of traveling to the receiving facility by car, including absence of medical control, life sustaining equipment, such as oxygen, and medical personnel has been explained to me and I fully understand them  (ELISA CORRECT BOX BELOW)  [  ]  I consent to the stated transfer and to be transported by ambulance/helicopter  [  ]  I consent to the stated transfer, but refuse transportation by ambulance and accept full responsibility for my transportation by car  I understand the risks of non-ambulance transfers and I exonerate the Hospital and its staff from any deterioration in my condition that results from this refusal     X___________________________________________    DATE  09/17/18  TIME________  Signature of patient or legally responsible individual signing on patient behalf           RELATIONSHIP TO PATIENT_________________________          Provider Certification    [facility] 1960                              MRN [id number]    A medical screening exam was performed on the above named patient  Based on the examination:    Condition Necessitating Transfer The primary encounter diagnosis was HHNC (hyperglycemic hyperosmolar nonketotic coma) ([facility] [address] )  Diagnoses of Sepsis ([facility] [address] ) and Acute renal insufficiency were also pertinent to this visit      Patient Condition: The patient has been stabilized such that within reasonable medical probability, no material deterioration of the patient condition or the condition of the unborn child(charley) is likely to result from the transfer    Reason for Transfer: No bed available at level of patient's needs (Patient requiring critical care bed )    Transfer Requirements: [place] [address]   · Space available and qualified personnel available for treatment as acknowledged by    · Agreed to accept transfer and to provide appropriate medical treatment as acknowledged by       Dr Christiano Weston   · Appropriate medical records of the examination and treatment of the patient are provided at the time of transfer   [address] _______  · Transfer will be performed by qualified personnel from [facility]  and appropriate transfer equipment as required, including the use of necessary and appropriate life support measures  Provider Certification: I have examined the patient and explained the following risks and benefits of being transferred/refusing transfer to the patient/family:  General risk, such as traffic hazards, adverse weather conditions, rough terrain or turbulence, possible failure of equipment (including vehicle or aircraft), or consequences of actions of persons outside the control of the transport personnel, Unanticipated needs of medical equipment and personnel during transport, Risk of worsening condition, The possibility of a transport vehicle being unavailable      Based on these reasonable risks and benefits to the patient and/or the unborn child(charley), and based upon the information available at the time of the patients examination, I certify that the medical benefits reasonably to be expected from the provision of appropriate medical treatments at another medical facility outweigh the increasing risks, if any, to the individuals medical condition, and in the case of labor to the unborn child, from effecting the transfer      X____________________________________________ DATE 09/17/18        TIME_______      ORIGINAL - SEND TO MEDICAL RECORDS   COPY - SEND WITH PATIENT DURING TRANSFER

## 2018-09-17 NOTE — SOCIAL WORK
Pt is a less than 30 day re-admission  Pt was d/c home on 9/10 with resumption of care with Ad[facility]  Pt sleeping and following information obtained from CM assessment on 9/10 - Pt reported that she lives with Nirav Barber in a 1st floor apt with 1-2 TYRONE  Pt was IPTA with ADL's and does not drive  Pt SO Shashank transport pt to her appts  Pt has a SPC  Pt reported that she is current with [facility]  Pt denies hx with STR, alc, drug or IP psych tx  Pt reported that she was dx with Bipolar and Schizoaffective disorder with medication and follows up with her Psychiatrist at [address]  Preferred pharmacy is [facility] on [address], [place]  PCP is Dr Michael Page  5 wishes completed on 9/10 identifying Nirav Barber as her [facility]  5 wishes in Media of EPIC chart  CM to follow  CM reviewed d/c planning process including the following: identifying help at home, patient preference for d/c planning needs, Discharge Lounge, [facility] Meds to Bed program, availability of treatment team to discuss questions or concerns patient and/or family may have regarding understanding medications and recognizing signs and symptoms once discharged  CM also encouraged patient to follow up with all recommended appointments after discharge  Patient advised of importance for patient and family to participate in managing patients medical well being

## 2018-09-17 NOTE — CASE MANAGEMENT
Thank you,  [address]  Utilization Review Department  Phone: [phone]; Fax [phone]  ATTENTION: Please call with any questions or concerns to [phone]  and carefully follow the prompts so that you are directed to the right person  Send all requests for admission clinical reviews, approved or denied determinations and any other requests to fax [phone]  All voicemails are confidential      ===============================================================================    Initial Clinical Review    Admission: Date/Time/Statement: 9/16/18 @ 2259   Orders Placed This Encounter   Procedures    Inpatient Admission (expected length of stay for this patient is greater than two midnights)     Standing Status:   Standing     Number of Occurrences:   1     Order Specific Question:   Admitting Physician     Answer:   Jesus Ron [[id number]]     Order Specific Question:   Level of Care     Answer:   Critical Care [15]     Order Specific Question:   Estimated length of stay     Answer:   More than 2 Midnights     Order Specific Question:   Certification     Answer:   I certify that inpatient services are medically necessary for this patient for a duration of greater than two midnights  See H&P and MD Progress Notes for additional information about the patient's course of treatment  Presented to the ED @ [facility] via EMS    ED: Date/Time/Mode of Arrival:   ED Arrival Information     Expected Arrival Acuity Means of Arrival Escorted By Service Admission Type    - 9/16/2018 21:28 Emergent Ambulance [facility] Critical Care/ICU Emergency    Arrival Complaint    altered mental status        Chief Complaint:   Chief Complaint   Patient presents with    Hyperglycemia - Symptomatic     Patient arrived EMS  Patient's caregivers states altered mental status for 2 days  Per EMS, patient BS above 600  Patient increased lethargy for 2 days  Currently has colon cancer       Altered Mental Status       History of Illness:   Emily Chen is a 60 y o  female who present to [facility] ED on night of 9/16/18 for altered mental status of 2 day duration per caregiver  Per EMS and chart review, patient's blood sugar was noted to be above 600  Patient has had fatigue, increased SOB and thirst, and weakness  In the ED, patient was noted to be hypotensive and tachycardic up to 156  Patient found to have a lactic acidosis of 3 4, glucose of 952 with no gap, and acute kidney injury  Sepsis alert was called and patient was given 2L IVF, administered zosyn, and insulin infusion was initiated  Patient [facility] due to bed availability       ED Vital Signs:   ED Triage Vitals   Temperature Pulse Respirations Blood Pressure SpO2   09/16/18 2144 09/16/18 2131 09/16/18 2131 09/16/18 2131 09/16/18 2131   98 9 °F (37 2 °C) (!) 114 18 (!) 89/58 92 %      Temp Source Heart Rate Source Patient Position - Orthostatic VS BP Location FiO2 (%)   09/16/18 2144 09/16/18 2131 09/16/18 2131 09/16/18 2131 --   Oral Monitor Lying Right arm       Pain Score       09/16/18 2131       No Pain        Wt Readings from Last 1 Encounters:   09/17/18 57 1 kg (125 lb 14 1 oz)       Abnormal Labs/Diagnostic Test Results:       Color, UA Yellow       Clarity, UA Clear       Specific Gravity, UA 1 010       pH, UA 6 0       Leukocytes, UA Elevated glucose may cause decreased leukocyte values   See urine microscopic for [facility] result/ (A)       Nitrite, UA Negative       Protein, UA Trace (A) mg/dl         Glucose, UA >=1000 (1%) (A) mg/dl         Ketones, UA Negative mg/dl         Urobilinogen, UA 0 2 E U /dl         Bilirubin, UA Negative       Blood, UA Small (A)      LACTIC ACID 3 4 (HH) mmol/L         Sodium 110 (LL) mmol/L         Potassium 4 1 mmol/L         Chloride 77 (L) mmol/L         CO2 20 (L) mmol/L         ANION GAP 13 mmol/L         BUN 47 (H) mg/dL         Creatinine 2 11 (H) mg/dL         Glucose 952 (HH) mg/dL         Calcium 7 8 (L) mg/dL         AST 45 U/L         ALT 25 U/L         Alkaline Phosphatase 120 (H) U/L         Total Protein 6 2 (L) g/dL         Albumin 2 0 (L) g/dL         Total Bilirubin 0 55 mg/dL         eGFR 25 ml/min/1 73sq m        Troponin I <0 02 ng/mL      pH, Phillip 7 338        pCO2, Phillip 34 0 (L) mm Hg         pO2, Phillip 27 7 (L) mm Hg         HCO3, Phillip 17 8 (L) mmol/L       Blood Cult:  Pending    EC, S  Tach    ED Treatment:   Medication Administration from 2018 to 2018 1653       Date/Time Order Dose Route Action Action by Comments     2018 232 sodium chloride 0 9 % bolus 1,000 mL 0 mL Intravenous Stopped Amrita Aviles RN      2018 214 sodium chloride 0 9 % bolus 1,000 mL 1,000 mL Intravenous New Bag Amrita Aviles RN      2018 225 sodium chloride 0 9 % bolus 1,000 mL 0 mL Intravenous Stopped Amrita Aviles RN      2018 214 sodium chloride 0 9 % bolus 1,000 mL 1,000 mL Intravenous New Bag Amrita Aviles RN      2018 0007 piperacillin-tazobactam (ZOSYN) 3 375 g in sodium chloride 0 9 % 50 mL IVPB 0 g Intravenous Stopped Amrita Aviles RN      2018 2306 piperacillin-tazobactam (ZOSYN) 3 375 g in sodium chloride 0 9 % 50 mL IVPB 3 375 g Intravenous New Bag Amrita Aviles RN      2018 2324 insulin regular (HumuLIN R,NovoLIN R) 1 Units/mL in sodium chloride 0 9 % 100 mL infusion 5 7 Units/hr Intravenous Restarted Amrita Aviles RN New IV pump used for insulin       2018 2321 insulin regular (HumuLIN R,NovoLIN R) 1 Units/mL in sodium chloride 0 9 % 100 mL infusion 0 Units/hr Intravenous Hold Amrita Aviles RN iv pump unable to use     2018 2313 insulin regular (HumuLIN R,NovoLIN R) 1 Units/mL in sodium chloride 0 9 % 100 mL infusion 5 7 Units/hr Intravenous [address] Amrita Aviles RN      2018 2332 acetaminophen (TYLENOL) tablet 650 mg 650 mg Oral Given Amrita Aviles RN      2018 0026 lactated ringers bolus 1,000 mL 1,000 mL Intravenous New Bag Washington Quinn RN           Past Medical/Surgical History:    Active Ambulatory Problems     Diagnosis Date Noted    [facility] 03/17/2016    Type 2 diabetes mellitus without complication, without long-term current use of insulin ([facility] ) 03/17/2016    Schizoaffective disorder, bipolar type ([facility] ) 03/17/2016    Essential hypertension 03/17/2016    Colonic mass 06/19/2018    Type 2 diabetes mellitus with complication (HCC)     CKD (chronic kidney disease) stage 3, GFR 30-59 ml/min     Pain of right upper extremity 06/20/2018    RYLAN (acute kidney injury)  06/20/2018    CAD (coronary artery disease) 06/20/2018    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    Large bowel obstruction ([facility] ) 07/15/2018    Brachial artery stenosis, 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    Rectosigmoid mass - High colostomy output 07/28/2018    Leukocytosis 07/28/2018    GERD (gastroesophageal reflux disease) 07/28/2018    Tobacco abuse 07/28/2018    Liver metastasis ([facility] ) 08/03/2018    Mixed hyperlipidemia 08/08/2018    Dizziness - due to orthostatic hypotension 09/07/2018    Ambulatory dysfunction 09/07/2018    Orthostatic hypotension 09/08/2018    Chemotherapy induced nausea and vomiting 09/11/2018    Lower abdominal pain 09/13/2018    HHNC (hyperglycemic hyperosmolar nonketotic coma) ([facility] ) 09/17/2018    Sepsis ([facility] ) 09/17/2018    Altered mental status 09/17/2018     Resolved Ambulatory Problems     Diagnosis Date Noted    CAD S/P percutaneous coronary angioplasty 03/17/2016    Bipolar depression ([facility] ) 03/17/2016    Schizophrenia ([facility] ) 03/17/2016    Acute renal failure ([facility] ) 03/17/2016    Hyponatremia 03/17/2016    Heroin abuse 03/18/2016    Lactic acidosis 07/25/2018    Ileus ([facility] ) 07/25/2018    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 07/25/2018    Diarrhea 07/25/2018    Hyponatremia 07/25/2018     Past Medical History:   Diagnosis Date    Bipolar depression ([place] [address] ) 3/17/2016    CAD S/P percutaneous coronary angioplasty 3/17/2016    Cancer (HCC)     Chronic pain     Colonic mass     Colostomy care ([place] [address] )     COPD (chronic obstructive pulmonary disease) (HCC)     CVA (cerebral vascular accident) ([place] [address] )     Essential hypertension 3/17/2016    GERD (gastroesophageal reflux disease)     Hepatitis C     Heroin abuse 3/18/2016    History of gastric ulcer     Hypertension     NSTEMI (non-ST elevated myocardial infarction) ([place] [address] ) 07/2012    Psychiatric disorder 09/03/2014    Schizoaffective disorder ([place] [address] ) 3/17/2016    Schizophrenia ([place] [address] ) 3/17/2016    STEMI (ST elevation myocardial infarction) ([place] [address] ) 12/2017    Type 2 diabetes mellitus ([place] [address] ) 3/17/2016       Admitting Diagnosis: Altered mental status [R41 [id number]]    Age/Sex: 60 y o  female    Assessment/Plan: No H&P    Admission Orders:  Scheduled Meds:   Facility-Administered Medications Ordered in Other Encounters:  amiodarone 1 mg/min Intravenous Continuous Last Rate: 1 mg/min (09/17/18 1153)   Followed by       amiodarone 0 5 mg/min Intravenous Continuous    cefepime 1,000 mg Intravenous Q12H    heparin (porcine) 3-20 Units/kg/hr (Order-Specific) Intravenous Titrated Last Rate: 12 Units/kg/hr (09/17/18 1106)   heparin (porcine) 1,650 Units Intravenous PRN    heparin (porcine) 3,300 Units Intravenous PRN    insulin regular (HumuLIN R,NovoLIN R) infusion 0 3-21 Units/hr Intravenous Titrated Last Rate: 9 Units/hr (09/17/18 1623)   metoprolol 2 5 mg Intravenous Q6H    multi-electrolyte 150 mL/hr Intravenous Continuous Last Rate: Stopped (09/17/18 1144)   norepinephrine 1-30 mcg/min Intravenous Titrated Last Rate: 2 mcg/min (09/17/18 0926)   potassium chloride 20 mEq Intravenous Once    potassium chloride 40 mEq Intravenous Once Last Rate: 40 mEq (09/17/18 1600)   sodium chloride 0 9 % with KCl 40 mEq/L 150 mL/hr Intravenous Continuous Last Rate: 150 mL/hr (09/17/18 1206) sodium phosphate 21 mmol Intravenous Once Last Rate: 21 mmol (09/17/18 1406)   [START ON 9/18/2018] vancomycin 20 mg/kg Intravenous Q24H        No H&P available  No admission orders  Pt remained in ED 09/16/2018 @ 2128 to 09/17/2018 @ 0112 when DC to [facility] via EMS

## 2018-09-17 NOTE — PROGRESS NOTES
GI Oncology Nurse Navigator Note    Spoke to Luz Elena this morning to find out how he was doing and what had happened to Emily, he stated he was resting and he stated he found Emily and she would not answer him and when he kept trying to wake her up, her eyes rolled to the back of her head so he called the ambulance, asked him if home health came out to see her last week, he stated they did, asked him if they have been checking her blood sugars and if she has been taking insulin, Luz Elena stated he believes her stuff is still at her daughters house, informed him this is important as her blood sugar was very high, he stated they forgot about it, he stated "I just cannot do this anymore", he's tired and it's too hard to care for her, will continue to monitor

## 2018-09-17 NOTE — H&P
History and Physical - Critical Care  Emily Abdullahi 60 y o  female MRN: [id number]  [facility] En[facility]: [id number]     Reason for Admission / Chief Complaint: Hyperglycemia, AMS     History of Present Illness:  Emily Buckner is a 60 y o  female who present to [facility] ED on night of 9/16/18 for altered mental status of 2 day duration per caregiver  Per EMS and chart review, patient's blood sugar was noted to be above 600  Patient has had fatigue, increased SOB and thirst, and weakness  In the ED, patient was noted to be hypotensive and tachycardic up to 156  Patient found to have a lactic acidosis of 3 4, glucose of 952 with no gap, and acute kidney injury  Sepsis alert was called and patient was given 2L IVF, administered zosyn, and insulin infusion was initiated  Patient [facility] due to bed availability  Of note, patient recently admitted to [facility] from 9/7/18-9/10/18 with dehydration  History obtained from chart review       Past Medical History:  Past Medical History:   Diagnosis Date    Bipolar depression ([facility] ) 3/17/2016    CAD S/P percutaneous coronary angioplasty 3/17/2016    Cancer ([facility] )     Chronic pain     Colonic mass     Colostomy care ([place] [id number] )     COPD (chronic obstructive pulmonary disease) (HCC)     CVA [facility] vascular accident[facility] )     R sided weakness    Essential hypertension 3/17/2016    GERD (gastroesophageal reflux disease)     Hepatitis C     Heroin abuse 3/18/2016    History of gastric ulcer     Hypertension     NSTEMI (non-ST elevated myocardial infarction) ([facility] ) 07/2012    Psychiatric disorder 09/03/2014    Inpatient admission     Schizoaffective disorder ([facility] ) 3/17/2016    Schizophrenia ([facility] ) 3/17/2016    STEMI (ST elevation myocardial infarction) ([facility] ) 12/2017    Type 2 diabetes mellitus ([facility] ) 3/17/2016        Past Surgical History:  Past Surgical History:   Procedure Laterality Date    CARDIAC CATHETERIZATION  07/2016    COLONOSCOPY N/A 6/22/2018    Procedure: COLONOSCOPY;  Surgeon: Reyes Gonzalez MD;  Location: [facility]; Service: Colorectal    CORONARY ANGIOPLASTY WITH STENT PLACEMENT  07/05/2012    MAKEDA (LAD), PTA (Diag)    CORONARY ANGIOPLASTY WITH STENT PLACEMENT  12/2017    MAKEDA/ PTA (RCA)    FL GUIDED CENTRAL VENOUS ACCESS REPLACEMENT  8/15/2018    UT INSERT PICC W/ SUB-Q PORT N/A 8/15/2018    Procedure: INSERTION VENOUS PORT (PORT-A-CATH);   Surgeon: Noel Aponte MD;  Location: [facility];  Service: Colorectal    UT LAP,DIAGNOSTIC ABDOMEN N/A 7/17/2018    Procedure: LAPAROSCOPY DIAGNOSTIC, Laproscopic transverse loop colostomy, lysis of adhesions;  Surgeon: Viktoria King MD;  Location: [facility];  Service: Colorectal    UT SIGMOIDOSCOPY FLX DX W/COLLJ SPEC BR/WA IF PFRMD N/A 8/15/2018    Procedure: Michelet Arm;  Surgeon: Noel Aponte MD;  [facility]: [facility];  Service: Colorectal        Past Family History:  Family History   Problem Relation Age of Onset    Heart attack Father     Cancer Brother         gastric        Social History:  History   Smoking Status    Current Every Day Smoker    Packs/day: 1 00    Years: 45 00    Types: Cigarettes   Smokeless Tobacco    Never Used     History   Alcohol Use No     Comment: "only on occasion"     History   Drug Use No     Comment: only tried x 1      Marital Status: Single     Medications:  Current Facility-Administered Medications   Medication Dose Route Frequency    diltiazem (CARDIZEM) injection 10 mg  10 mg Intravenous Once    insulin regular (HumuLIN R,NovoLIN R) 1 Units/mL in sodium chloride 0 9 % 100 mL infusion  0 3-21 Units/hr Intravenous Titrated    multi-electrolyte (ISOLYTE-S PH 7 4 equivalent) IV solution  150 mL/hr Intravenous Continuous    norepinephrine (LEVOPHED) 4 mg (STANDARD CONCENTRATION) IV in sodium chloride 0 9% 250 mL  1-30 mcg/min Intravenous Titrated    potassium chloride 20 mEq in dextrose 5 % 100 mL IVPB  20 mEq Intravenous Once     Home medications:  Prior to Admission medications    Medication Sig Start Date End Date Taking?  Authorizing Provider   atorvastatin (LIPITOR) 10 mg tablet Take 1 tablet (10 mg total) by mouth daily 8/9/18   Dorene Vilchis MD   clopidogrel (PLAVIX) 75 mg tablet Take 1 tablet (75 mg total) by mouth daily 8/17/18   Darin Mccormack MD   divalproex sodium (DEPAKOTE) 250 mg EC tablet Take 3 tablets by mouth daily    Historical Provider, MD   DULoxetine (CYMBALTA) 60 mg delayed release capsule Take 60 mg by mouth daily      Historical Provider, MD   insulin aspart protamine-insulin aspart (NOVOLOG MIX 70/30) 100 units/mL injection Inject 20 Units under the skin 2 (two) times a day before meals 9/10/18   Panda Varghese MD   insulin glargine (LANTUS) 100 units/mL subcutaneous injection Inject 10 Units under the skin daily at bedtime 7/30/18   Judit Ramirez MD   metFORMIN (GLUCOPHAGE) 1000 MG tablet metformin 1,000 mg tablet  BID    Historical Provider, MD   metoclopramide (REGLAN) 10 mg tablet Take 1 tablet (10 mg total) by mouth 3 (three) times a day before meals 9/11/18   Sb Mueller MD   mirtazapine (REMERON) 15 mg tablet Take 1 tablet by mouth daily      Historical Provider, MD   nitroglycerin (NITROSTAT) 0 4 mg SL tablet Place 0 4 mg under the tongue 12/26/17   Historical Provider, MD   oxyCODONE-acetaminophen (PERCOCET) 5-325 mg per tablet Take 1 tablet by mouth every 8 (eight) hours as needed for moderate pain Max Daily Amount: 2 tablets 9/13/18   Sb Mueller MD   promethazine (PHENERGAN) 12 5 MG tablet Take 12 5 mg by mouth daily      Historical Provider, MD   senna ([employer] SENNA) 8 6 MG tablet Take by mouth    Historical Provider, MD   terbinafine (LAMISIL) 250 mg tablet Take 1 tablet by mouth daily    Historical Provider, MD   traZODone (DESYREL) 50 mg tablet Take 50 mg by mouth daily at bedtime    Historical Provider, MD     Allergies:  No Known Allergies     ROS:   Review of Systems   Unable to perform ROS: Mental status change        Vitals:  Vitals:    18 0500 18 0515 18 0530 18 0600   BP: 119/76 128/89 143/86 127/79   BP Location:       Pulse: (!) 144 (!) 136 (!) 128 (!) 138   Resp: (!) 27 (!) 30 (!) 27 (!) 29   Temp: (!) 100 8 °F (38 2 °C) 100 4 °F (38 °C) 100 4 °F (38 °C) 100 °F (37 8 °C)   TempSrc:       SpO2:       Weight:       Height:         Temperature:   Temp (24hrs), Av °F (38 3 °C), Min:98 9 °F (37 2 °C), Max:102 2 °F (39 °C)    Current: Temperature: 100 °F (37 8 °C)     Weights:   IBW: 52 4 kg  Body mass index is 22 3 kg/m²  Non-Invasive/Invasive Ventilation Settings:  Respiratory    Lab Data (Last 4 hours)    None         O2/Vent Data (Last 4 hours)    None              No results found for: PHART, JER4ITL, PO2ART, KGN9PVI, L6RDKSZE, BEART, SOURCE       Physical Exam:  Physical Exam   Constitutional: No distress  Appears chronically ill, lethargic but arousable    HENT:   Head: Normocephalic  Right Ear: External ear normal    Left Ear: External ear normal    Eyes: Conjunctivae and EOM are normal  Pupils are equal, round, and reactive to light  Neck: Neck supple  No JVD present  Cardiovascular: Regular rhythm, normal heart sounds and intact distal pulses  Tachycardic at 120   Pulmonary/Chest: Effort normal and breath sounds normal  No stridor  No respiratory distress  She has no wheezes  She has no rales  Abdominal: Soft  Bowel sounds are normal  She exhibits no distension  There is no tenderness  Musculoskeletal: Normal range of motion  She exhibits no edema  Neurological: She is alert  She displays normal reflexes  No cranial nerve deficit  She exhibits normal muscle tone  Coordination normal    Skin: Skin is warm and dry  No rash noted  She is not diaphoretic  No erythema  There is pallor  Nursing note and vitals reviewed         Labs:    Results from last 7 days  Lab Units 09/17/18  0203 09/16/18  2144   WBC Thousand/uL 2 67* 3 56*   HEMOGLOBIN g/dL 10 1* 9 6*   HEMATOCRIT % 27 8* 25 6*   PLATELETS Thousands/uL 75* 66*   MONO PCT MAN % 3* 0*        Results from last 7 days  Lab Units 09/17/18  0412 09/17/18  0203 09/16/18  2145   SODIUM mmol/L 123* 121* 110*   POTASSIUM mmol/L 2 8* 4 2 4 1   CHLORIDE mmol/L 90* 87* 77*   CO2 mmol/L 17* 16* 20*   BUN mg/dL 37* 43* 47*   CREATININE mg/dL 1 68* 2 08* 2 11*   CALCIUM mg/dL 7 4* 7 7* 7 8*   ALK PHOS U/L  --   --  120*   ALT U/L  --   --  25   AST U/L  --   --  45       Results from last 7 days  Lab Units 09/17/18  0203 09/16/18  2145   MAGNESIUM mg/dL 1 9 1 8       Results from last 7 days  Lab Units 09/17/18  0203 09/16/18  2145   PHOSPHORUS mg/dL 1 7* 3 5        Results from last 7 days  Lab Units 09/17/18  0203 09/16/18  2145   INR  1 22* 1 24*   PTT seconds  --  29       Results from last 7 days  Lab Units 09/17/18  0203   LACTIC ACID mmol/L 5 1*       0  Lab Value Date/Time   TROPONINI <0 02 09/16/2018 2145   TROPONINI <0 02 09/07/2018 1642   TROPONINI <0 02 07/25/2018 1248   TROPONINI 0 57 (H) 03/18/2016 0623   TROPONINI 0 65 (H) 03/18/2016 0045   TROPONINI 0 64 (H) 03/17/2016 2220        Imaging: I have personally reviewed pertinent films in PACS  EKG: This was personally reviewed by myself  Micro:  Lab Results   Component Value Date    BLOODCX No Growth After 5 Days  03/21/2016    BLOODCX No Growth After 5 Days  03/21/2016    URINECX >100,000 cfu/ml Escherichia coli 03/17/2016       Assessment:         Plan:      Neuro:   1  Encephalopathy   -Most likely 2/2 sepsis, hyperglycemia  -Appears lethargic but arousable  -Neuro checks q1hr  -Consider CT head if encephalopathy does not improve  -HX of CVA   2  Schizoaffective/Bipolar  -Hold home depakote, cymbalta, remeron and trazodone    CV:   1   Shock   -Most likely 2/2 sepsis, metabolic derangements   -Since ED arrival to now, 2L NS, 3L isolyte given here, current maintenance with isolyte 150cc/hr  -Currently on levo and kathi   -Goal MAP > 65  -18 ECHO: EF 92%, grade 2 diastolic dysfunction  -Bedside ECHO concerning for tricuspid valve vegetation   -Formal ECHO ordered   2  Atrial fibrillation/flutter  -new onset, possibly 2/2 sepsis, dehydration  -At times appears to be in sinus tach   -Continue IVF  -Consider trial of adenosine after fluid resuscitation   3  HTN  -Metoprolol recently discontinued on discharge on 9/10/18 due to borderline low BP (90/56)  4  CAD s/p stent 3/17/16  -Hold home plavix at this time   5  Hyperlipidemia  -Hold home Lipitor 10mg qd until tolerates PO     Lun  Concern for pneumonia   18 CXR: questionable right middle lobe infiltrate  Consider CT chest to better identify   Given vanco and zosyn   Supplemental O2 to maintain SpO2 >92%  Hx of COPD     GI:   1  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with liver mets  -s/p resection with colostomy on 18  -Follows with oncology outpatient, last course of chemo with FOLFOX on 18 (2 courses so far, right chest port in place, unknown when originally placed)  -No blood noted in colostomy bag  -Consider CTAP with contrast if source remains to be unknown   2  Hepatitis C     FEN:   F: receiving 5th bolus of isolyte now, will place on 150cc/hr maintenance   E: Closely monitor lytes, bmp q2hrs, replete as necessary   N: NPO while encephalopathic      :   1  RYLAN   -Cr 2 11 (baseline   8)  -Continue IV hydration  -Strict I&Os    ID:   1  Sepsis  -Unknown source of infection at this time, pna vs uti vs endocarditis   -Bedside ECHO concern for valve vegetations   -WBC 2 67, bands 7   -Tylenol prn  -Vanco and zosyn given  -Lactic acid 3 4-> 2 2-> 5 1, continue to trend  -Procalcitonin 50 92  -Follow up urine, blood cultures      Heme:   1  Thrombocytopenia  -18 Plt 293   -Mildly improving 66 -> 75  -Continue to monitor      Endo:  1   Hyperosmolar hypoglycemic nonketotic coma  -Hx of DM2, on metformin, lantus, novolog -Patient more arouseable compared to ED arrival  -IVF hydration and insulin gtt  --> 639, continue to closely trend, glucose should not be reduced by more than 80/hr     Msk/Skin:   Frequent repositioning and pressure off loading   Speech and swallow      Disposition: ICU care      VTE Pharmacologic Prophylaxis: Reason for no pharmacologic prophylaxis consider initiating today  VTE Mechanical Prophylaxis: sequential compression device     Invasive lines and devices: Invasive Devices     Central Venous Catheter Line            CVC Central Lines 09/17/18 Triple Right Femoral less than 1 day    Port A Cath 09/17/18 Right Chest less than 1 day          Peripheral Intravenous Line            Peripheral IV 09/16/18 Left Wrist 1 day    Peripheral IV 09/16/18 Left Antecubital less than 1 day    Peripheral IV 09/16/18 Left Forearm less than 1 day          Drain            Colostomy RUQ -- days    Urethral Catheter less than 1 day                 Code Status: Prior  POA:    POLST:       Given critical illness, patient length of stay will require greater than two midnights  Counseling / Coordination of Care  Total Critical Care time spent 35 minutes excluding procedures, teaching and family updates  Portions of the record may have been created with voice recognition software  Occasional wrong word or "sound a like" substitutions may have occurred due to the inherent limitations of voice recognition software  Read the chart carefully and recognize, using context, where substitutions have occurred          Rose Gonzalez MD

## 2018-09-17 NOTE — ED PROVIDER NOTES
History  Chief Complaint   Patient presents with    Hyperglycemia - Symptomatic     Patient arrived EMS  Patient's caregivers states altered mental status for 2 days  Per EMS, patient BS above 600  Patient increased lethargy for 2 days  Currently has colon cancer  Marsha Wesley Altered Mental Status       History provided by:  EMS personnel  History limited by:  Mental status change  Hyperglycemia - Symptomatic   Blood sugar level PTA:  >600  Severity:  Severe  Onset quality:  Gradual  Duration:  2 days  Timing:  Constant  Progression:  Worsening  Associated symptoms: altered mental status, fatigue, increased thirst, shortness of breath and weakness    Altered Mental Status   Associated symptoms: weakness        Prior to Admission Medications   Prescriptions Last Dose Informant Patient Reported? Taking?    DULoxetine (CYMBALTA) 60 mg delayed release capsule  Self Yes No   Sig: Take 60 mg by mouth daily     atorvastatin (LIPITOR) 10 mg tablet  Self No No   Sig: Take 1 tablet (10 mg total) by mouth daily   clopidogrel (PLAVIX) 75 mg tablet  Self No No   Sig: Take 1 tablet (75 mg total) by mouth daily   divalproex sodium (DEPAKOTE) 250 mg EC tablet  Self Yes No   Sig: Take 3 tablets by mouth daily   insulin aspart protamine-insulin aspart (NOVOLOG MIX 70/30) 100 units/mL injection   No No   Sig: Inject 20 Units under the skin 2 (two) times a day before meals   insulin glargine (LANTUS) 100 units/mL subcutaneous injection  Self No No   Sig: Inject 10 Units under the skin daily at bedtime   metFORMIN (GLUCOPHAGE) 1000 MG tablet  Self Yes No   Sig: metformin 1,000 mg tablet  BID   metoclopramide (REGLAN) 10 mg tablet   No No   Sig: Take 1 tablet (10 mg total) by mouth 3 (three) times a day before meals   mirtazapine (REMERON) 15 mg tablet  Self Yes No   Sig: Take 1 tablet by mouth daily     nitroglycerin (NITROSTAT) 0 4 mg SL tablet  Self Yes No   Sig: Place 0 4 mg under the tongue   oxyCODONE-acetaminophen (PERCOCET) 5-325 mg per tablet   No No   Sig: Take 1 tablet by mouth every 8 (eight) hours as needed for moderate pain Max Daily Amount: 2 tablets   promethazine (PHENERGAN) 12 5 MG tablet  Self Yes No   Sig: Take 12 5 mg by mouth daily     senna ([employer] SENNA) 8 6 MG tablet  Self Yes No   Sig: Take by mouth   terbinafine (LAMISIL) 250 mg tablet  Self Yes No   Sig: Take 1 tablet by mouth daily   traZODone (DESYREL) 50 mg tablet  Self Yes No   Sig: Take 50 mg by mouth daily at bedtime      Facility-Administered Medications: None       Past Medical History:   Diagnosis Date    Bipolar depression ([place] [address] ) 3/17/2016    CAD S/P percutaneous coronary angioplasty 3/17/2016    Cancer ([place] [address] )     Chronic pain     Colonic mass     Colostomy care ([place] [address] )     COPD (chronic obstructive pulmonary disease) ([place] [address] )     CVA (cerebral vascular accident) ([place] [address] )     R sided weakness    Essential hypertension 3/17/2016    GERD (gastroesophageal reflux disease)     Hepatitis C     Heroin abuse 3/18/2016    History of gastric ulcer     Hypertension     NSTEMI (non-ST elevated myocardial infarction) ([place] [address] ) 07/2012    Psychiatric disorder 09/03/2014    Inpatient admission     Schizoaffective disorder ([place] [address] ) 3/17/2016    Schizophrenia ([place] [address] ) 3/17/2016    STEMI (ST elevation myocardial infarction) ([place] [address] ) 12/2017    Type 2 diabetes mellitus ([place] [address] ) 3/17/2016       Past Surgical History:   Procedure Laterality Date    CARDIAC CATHETERIZATION  07/2016    COLONOSCOPY N/A 6/22/2018    Procedure: COLONOSCOPY;  Surgeon: June Quezada MD;  Location: [facility]; Service: Colorectal    CORONARY ANGIOPLASTY WITH STENT PLACEMENT  07/05/2012    MAKEDA (LAD), PTA (Diag)    CORONARY ANGIOPLASTY WITH STENT PLACEMENT  12/2017    MAKEDA/ PTA (RCA)    FL GUIDED CENTRAL VENOUS ACCESS REPLACEMENT  8/15/2018    WI INSERT PICC W/ SUB-Q PORT N/A 8/15/2018    Procedure: INSERTION VENOUS PORT (PORT-A-CATH);   Surgeon: Rasta Gonzáles MD;  [facility];  Service: Colorectal    OR LAP,DIAGNOSTIC ABDOMEN N/A 7/17/2018    Procedure: LAPAROSCOPY DIAGNOSTIC, Laproscopic transverse loop colostomy, lysis of adhesions;  Surgeon: Prema Fox MD;  [facility];  Service: Colorectal    OR SIGMOIDOSCOPY FLX DX W/COLLJ SPEC BR/WA IF PFRMD N/A 8/15/2018    Procedure: Briseida Garibay;  Surgeon: Mohan Guevara MD;  [facility];  Service: Colorectal       Family History   Problem Relation Age of Onset    Heart attack Father     Cancer Brother         gastric     I have reviewed and agree with the history as documented  Social History   Substance Use Topics    Smoking status: Current Every Day Smoker     Packs/day: 1 00     Years: 45 00     Types: Cigarettes    Smokeless tobacco: Never Used    Alcohol use No      Comment: "only on occasion"        Review of Systems   Unable to perform ROS: Mental status change   Constitutional: Positive for fatigue  Respiratory: Positive for shortness of breath  Endocrine: Positive for polydipsia  Neurological: Positive for weakness  Physical Exam  Physical Exam   Constitutional: She is oriented to person, place, and time  She appears well-developed and well-nourished  She appears cachectic  She appears ill  She appears distressed  HENT:   Head: Normocephalic and atraumatic  Right Ear: External ear normal    Left Ear: External ear normal    Mouth/Throat: Mucous membranes are dry  Eyes: Conjunctivae and EOM are normal  Pupils are equal, round, and reactive to light  No scleral icterus  Neck: Normal range of motion  Cardiovascular: Normal rate, regular rhythm and normal heart sounds  No murmur heard  Pulmonary/Chest: Effort normal  No tachypnea  No respiratory distress  She has decreased breath sounds  Abdominal: Soft  Bowel sounds are normal  There is no tenderness  There is no rebound and no guarding  Musculoskeletal: Normal range of motion  She exhibits no edema  Neurological: She is alert and oriented to person, place, and time  Skin: Skin is warm and dry  No rash noted  There is pallor  Poor turgor   Psychiatric: She has a normal mood and affect  Nursing note and vitals reviewed  Vital Signs  ED Triage Vitals   Temperature Pulse Respirations Blood Pressure SpO2   09/16/18 2144 09/16/18 2131 09/16/18 2131 09/16/18 2131 09/16/18 2131   98 9 °F (37 2 °C) (!) 114 18 (!) 89/58 92 %      Temp Source Heart Rate Source Patient Position - Orthostatic VS BP Location FiO2 (%)   09/16/18 2144 09/16/18 2131 09/16/18 2131 09/16/18 2131 --   Oral Monitor Lying Right arm       Pain Score       09/16/18 2131       No Pain           Vitals:    09/16/18 2131 09/16/18 2229   BP: (!) 89/58 134/75   Pulse: (!) 114 (!) 148   Patient Position - Orthostatic VS: Lying Lying       Visual Acuity      ED Medications  Medications   piperacillin-tazobactam (ZOSYN) 3 375 g in sodium chloride 0 9 % 50 mL IVPB (not administered)   insulin regular (HumuLIN R,NovoLIN R) 1 Units/mL in sodium chloride 0 9 % 100 mL infusion (not administered)   acetaminophen (TYLENOL) tablet 650 mg (not administered)   sodium chloride 0 9 % bolus 1,000 mL (1,000 mL Intravenous New Bag 9/16/18 2146)     Followed by   sodium chloride 0 9 % bolus 1,000 mL (1,000 mL Intravenous New Bag 9/16/18 2149)       Diagnostic Studies  Results Reviewed     Procedure Component Value Units Date/Time    UA w Reflex to Microscopic w Reflex to Culture [[id number]]  (Abnormal) Collected:  09/16/18 2229    Lab Status:  Final result Specimen:  Urine from Urine, Straight Cath Updated:  09/16/18 2245     Color, UA Yellow     Clarity, UA Clear     Specific Gravity, UA 1 010     pH, UA 6 0     Leukocytes, UA Elevated glucose may cause decreased leukocyte values   See urine microscopic for [facility] result/ (A)     Nitrite, UA Negative     Protein, UA Trace (A) mg/dl      Glucose, UA >=1000 (1%) (A) mg/dl      Ketones, UA Negative mg/dl Urobilinogen, UA 0 2 E U /dl      Bilirubin, UA Negative     Blood, UA Small (A)    Urine Microscopic [[id number]] Collected:  09/16/18 2229    Lab Status: In process Specimen:  Urine from Urine, Straight Cath Updated:  09/16/18 2245    Lactic acid x2 [[id number]]  (Abnormal) Collected:  09/16/18 2144    Lab Status:  Final result Specimen:  Blood from Arm, Left Updated:  09/16/18 2238     LACTIC ACID 3 4 (HH) mmol/L     Narrative:         Result may be elevated if tourniquet was used during collection  Comprehensive metabolic panel [[id number]]  (Abnormal) Collected:  09/16/18 2145    Lab Status:  Final result Specimen:  Blood from Arm, Left Updated:  09/16/18 2237     Sodium 110 (LL) mmol/L      Potassium 4 1 mmol/L      Chloride 77 (L) mmol/L      CO2 20 (L) mmol/L      ANION GAP 13 mmol/L      BUN 47 (H) mg/dL      Creatinine 2 11 (H) mg/dL      Glucose 952 (HH) mg/dL      Calcium 7 8 (L) mg/dL      AST 45 U/L      ALT 25 U/L      Alkaline Phosphatase 120 (H) U/L      Total Protein 6 2 (L) g/dL      Albumin 2 0 (L) g/dL      Total Bilirubin 0 55 mg/dL      eGFR 25 ml/min/1 73sq m     Narrative:         National Kidney Disease Education Program recommendations are as follows:  GFR calculation is accurate only with a steady state creatinine  Chronic Kidney disease less than 60 ml/min/1 73 sq  meters  Kidney failure less than 15 ml/min/1 73 sq  meters      Magnesium [[id number]]  (Normal) Collected:  09/16/18 2145    Lab Status:  Final result Specimen:  Blood from Arm, Left Updated:  09/16/18 2237     Magnesium 1 8 mg/dL     Phosphorus [[id number]]  (Normal) Collected:  09/16/18 2145    Lab Status:  Final result Specimen:  Blood from Arm, Left Updated:  09/16/18 2237     Phosphorus 3 5 mg/dL     Troponin I [[id number]]  (Normal) Collected:  09/16/18 2145    Lab Status:  Final result Specimen:  Blood from Arm, Left Updated:  09/16/18 2227     Troponin I <0 02 ng/mL     Acetone [[id number]]  (Normal) Collected:  09/16/18 2145 Lab Status:  Final result Specimen:  Blood from Arm, Left Updated:  09/16/18 2222     Acetone, Bld Negative    Blood gas, Venous [[id number]]  (Abnormal) Collected:  09/16/18 2145    Lab Status:  Final result Specimen:  Blood from Arm, Left Updated:  09/16/18 2212     pH, Phillip 7 338     pCO2, Phillip 34 0 (L) mm Hg      pO2, Phillip 27 7 (L) mm Hg      HCO3, Phillip 17 8 (L) mmol/L      Base Excess, Phillip -7 1 mmol/L      O2 Content, Phillip 6 7 ml/dL      O2 HGB, VENOUS 43 0 (L) %     Narrative: Therapeutic levels (1 mg/mL and 2 mg/mL) of hydroxocobalamin may interfere with the fCOHb and fMetHb where it may cause lower than expected values    Blood culture #1 [[id number]] Collected:  09/16/18 2145    Lab Status: In process Specimen:  Blood from Arm, Left Updated:  09/16/18 2157    Blood culture #2 [[id number]] Collected:  09/16/18 2145    Lab Status: In process Specimen:  Blood from Arm, Left Updated:  09/16/18 2157    CBC and differential [[id number]] Collected:  09/16/18 2144    Lab Status: In process Specimen:  Blood from Arm, Left Updated:  09/16/18 2154    Procalcitonin [[id number]] Collected:  09/16/18 2145    Lab Status: In process Specimen:  Blood from Arm, Left Updated:  09/16/18 2153    Protime-INR [[id number]] Collected:  09/16/18 2145    Lab Status: In process Specimen:  Blood from Arm, Left Updated:  09/16/18 2153    APTT [[id number]] Collected:  09/16/18 2145    Lab Status:   In process Specimen:  Blood from Arm, Left Updated:  09/16/18 2153    Fingerstick Glucose (POCT) [[id number]]  (Abnormal) Collected:  09/16/18 2135    Lab Status:  Final result Updated:  09/16/18 2144     POC Glucose >500 (HH) mg/dl     Lactic acid x2 [[id number]]     Lab Status:  No result Specimen:  Blood                  XR chest portable - 1 view   ED Interpretation by Dami Finley DO (09/16 2158)   Questionable developing RML infiltrate when compared to previous CXR                 Procedures  ECG 12 Lead Documentation  Date/Time: 9/16/2018 9:47 PM  Performed by: Сергей Abbott  Authorized by: Zoraida GILMORE     Indications / Diagnosis:  Altered mental status  ECG reviewed by me, the ED Provider: yes    Patient location:  ED  Previous ECG:     Previous ECG:  Compared to current    Comparison ECG info:  9/7/2018    Similarity:  Changes noted  Interpretation:     Interpretation: abnormal    Rate:     ECG rate:  156    ECG rate assessment: tachycardic    Rhythm:     Rhythm: sinus tachycardia    Ectopy:     Ectopy: none    QRS:     QRS axis:  Normal    QRS intervals:  Normal  Conduction:     Conduction: normal    ST segments:     ST segments:  Non-specific    Depression:  V5, V6, II, III and aVF  T waves:     T waves: normal    Comments:      Abnormal atrial rhythm    CriticalCare Time  Performed by: Сергей Abbott  Authorized by: Zoraida GILMORE     Critical care provider statement:     Critical care time (minutes):  35    Critical care time was exclusive of:  Separately billable procedures and treating other patients and teaching time    Critical care was necessary to treat or prevent imminent or life-threatening deterioration of the following conditions:  Endocrine crisis, shock, circulatory failure and metabolic crisis    Critical care was time spent personally by me on the following activities:  Blood draw for specimens, obtaining history from patient or surrogate, development of treatment plan with patient or surrogate, discussions with consultants, evaluation of patient's response to treatment, examination of patient, ordering and performing treatments and interventions, ordering and review of laboratory studies, ordering and review of radiographic studies, re-evaluation of patient's condition, review of old charts and interpretation of cardiac output measurements    I assumed direction of critical care for this patient from another provider in my specialty: no             Phone Contacts  ED Phone Contact    ED Course  ED Course as of Sep 17 2307 Shari Sample Sep 16, 2018   2229 Patient is stable upon re-evaluation  The patient still remains tachycardic in the 140s however she seems more alert and less distress  2239 Rectal temp noted  Sepsis alert called    2252 ICU paged    [phone] Discussed with Dr Boby Mary from ICU  I reviewed the history of present illness, ED course, recent hospitalization past medical history  She agreed with current plan of fluids, broad-spectrum antibiotics, antipyretics and then re-evaluate for heart rate  The nurse practitioner will see the patient in the emergency department  Mon Sep 17, 2018   0024 Case discussed with Dr Cedric Seth in the ICU at [place] who accepts the patient for transfer                          Initial Sepsis Screening     [address] Name 09/16/18 2240                Is the patient's history suggestive of a new or worsening infection? (!)  Yes (Proceed)  -ML        Suspected source of infection suspect infection, source unknown  -ML        Are two or more of the following signs & symptoms of infection both present and new to the patient? (!)  Yes (Proceed)  -ML        Indicate SIRS criteria Hyperthemia > 38 3C (100 9F); Tachycardia > 90 bpm  -ML        If the answer is yes to both questions, suspicion of sepsis is present          If severe sepsis is present AND tissue hypoperfusion perists in the hour after fluid resuscitation or lactate > 4, the patient meets criteria for SEPTIC SHOCK          Are any of the following organ dysfunction criteria present within 6 hours of suspected infection and SIRS criteria that are NOT considered to be chronic conditions?  (!)  Yes  -ML        Organ dysfunction Lactate > 2 0 mmol/L  -ML        Date of presentation of severe sepsis          Time of presentation of severe sepsis          Tissue hypoperfusion persists in the hour after crystalloid fluid administration, evidenced, by either:          Was hypotension present within one hour of the conclusion of crystalloid fluid administration?         Date of presentation of septic shock          Time of presentation of septic shock            User Key  (r) = Recorded By, (t) = Taken By, (c) = Cosigned By    [address] Name Provider Type    DAVID Sofia DO Physician                  MDM  Number of Diagnoses or Management Options  HHNC (hyperglycemic hyperosmolar nonketotic coma) ([facility], [place] [address] ): new and requires workup  Sepsis [facility]): new and requires workup  Diagnosis management comments: BMP results noted  Sodium Correction for Hyperglycemia from [employer]  on 9/16/2018  ** All calculations should be rechecked by clinician prior to use **    RESULT SUMMARY:  124 mg/dL  Corrected Sodium  Shiv Ba)    130 mg/dL  Corrected Sodium  (Cedric, Peter)      INPUTS:  Sodium -> 110 mEq/L  Glucose -> 952 mg/dL         Amount and/or Complexity of Data Reviewed  Clinical lab tests: ordered and reviewed  Tests in the radiology section of CPT®: ordered and reviewed  Tests in the medicine section of CPT®: ordered and reviewed  Obtain history from someone other than the patient: yes  Review and summarize past medical records: yes  Independent visualization of images, tracings, or specimens: yes      CritCare Time    Disposition  Final diagnoses: [address] [employer] (hyperglycemic hyperosmolar nonketotic coma) ([facility] [place] [address] )   Sepsis ([facility] )     Time reflects when diagnosis was documented in both MDM as applicable and the Disposition within this note     Time User Action Codes Description Comment    9/16/2018 10:46 PM Karolina Hubbard Add [E11 01] 201[address] [employer] (hyperglycemic hyperosmolar nonketotic coma) ([facility] [place] [address] )     9/16/2018 10:46 PM Karolina Hubbard Add [A41 9] Sepsis [facility])       ED Disposition     None      Follow-up Information    None         Patient's Medications   Discharge Prescriptions    No medications on file     No discharge procedures on file      ED Provider  Electronically Signed by           Mariela Sofia DO  09/17/18 1281

## 2018-09-17 NOTE — PROCEDURES
Central Line Insertion  Date/Time: 9/17/2018 7:20 AM  Performed by: Benny Ramirez by: Blaine Morales     Patient location:  Bedside  Other Assisting Provider: Yes (comment) (Dr Shaka Cardoso)    Consent:     Consent obtained:  Emergent situation  Universal protocol:     Site/side marked: yes      Patient identity confirmed:  Arm band  Pre-procedure details:     Hand hygiene: Hand hygiene performed prior to insertion      Sterile barrier technique: All elements of maximal sterile technique followed      Skin preparation:  2% chlorhexidine    Skin preparation agent: Skin preparation agent completely dried prior to procedure    Indications:     Central line indications: medications requiring central line and hemodynamic monitoring    Procedure details:     Location:  Right femoral    Vessel type: vein      Laterality:  Right    Approach: percutaneous technique used      Patient position:  Flat    Catheter type:  Triple lumen 16cm    Catheter size:  7 5 Fr    Landmarks identified: yes      Ultrasound guidance: yes      Sterile ultrasound techniques: Sterile gel and sterile probe covers were used      Number of attempts:  1    Successful placement: yes    Post-procedure details:     Post-procedure:  Dressing applied and line sutured    Assessment:  Blood return through all ports and free fluid flow    Patient tolerance of procedure:   Tolerated well, no immediate complications

## 2018-09-17 NOTE — PROGRESS NOTES
Progress Note - Critical Care   Emily Abdullahi 60 y o  female MRN: [id number]  [facility] En[facility]: [id number]    Assessment:   1   improved acute metabolic/ toxic encephalopathy  2   improving HHNK  3   pulmonary nodules suspicious of metastatic  Disease from colon cancer versus septic emboli with possible tricuspid vegetation/thrombus  4  Wide-complex regular tachycardia, persistent on Cardizem infusion  5   colon cancer on chemotherapy  6   hepatitis-C  7   sepsis,  Source could be endocarditis  And/or septic emboli  8   resolved shock most  Likely hypovolemic secondary to severe hyperglycemia  9  Severe hypokalemia and hypophosphatemia  10  improving RYLAN  On CKD stage 2    Plan:   ·   I checked EKG and it was consistent with sinus tachycardia with wide complex versus atrial flutter,  I gave patient is seen while being at bedside, she had complete AV  Block  For 2-3 seconds which showed the  Atrial flutter waves form  And follow-up by  Brief a flutter with variable conduction  We attempted Lopressor IV with no improvement in  Rate control  ·  start heparin infusion  ·  stop Cardizem drip  ·  start amiodarone drip  ·  continue antibiotics with Zosyn and add vancomycin again  ·  Check echocardiogram stat  ·  check TSH  ·  switch IV fluids to normal saline with 40  MEq of potassium in each liter   ·  replete potassium and phosphorus  ·  restart insulin infusion when potassium is more than 3 0  ·  send blood cultures  ·  consult Cardiology  ·  Check CTA chest    Critical Care Time:  30 min  Documented critical care time excludes any procedures documented elsewhere   It also excludes any family updates    _____________________________________________________________________    HPI/24hr events:    patient denies any chest pain or shortness of breath, continue to have severe tachycardia 160 /minute    Medications:    Current Facility-Administered Medications:  amiodarone 1 mg/min Intravenous Continuous ADELAIDA [place] Last Rate: 1 mg/min (09/17/18 1153)   Followed by        amiodarone 0 5 mg/min Intravenous Continuous Lucinda Haines PA-C    cefepime 1,000 mg Intravenous Q12H YANELIS SON    heparin (porcine) 3-20 Units/kg/hr (Order-Specific) Intravenous Titrated YANELIS SON Last Rate: 12 Units/kg/hr (09/17/18 1106)   heparin (porcine) 1,650 Units Intravenous PRN Lucinda Haines PA-C    heparin (porcine) 3,300 Units Intravenous PRN YANELIS SON    insulin regular (HumuLIN R,NovoLIN R) infusion 0 3-21 Units/hr Intravenous Titrated Lauren Holguin MD Last Rate: 10 Units/hr (09/17/18 1205)   multi-electrolyte 150 mL/hr Intravenous Continuous Lauren Holguin MD Last Rate: Stopped (09/17/18 1144)   norepinephrine 1-30 mcg/min Intravenous Titrated Lauren Holguin MD Last Rate: 2 mcg/min (09/17/18 0926)   potassium chloride 20 mEq Intravenous Once Elvira Summers MD    potassium chloride 40 mEq Intravenous Once Elvira Summers MD    sodium chloride 0 9 % with KCl 40 mEq/L 150 mL/hr Intravenous Continuous YANELIS SON Last Rate: 150 mL/hr (09/17/18 1206)   sodium phosphate 21 mmol Intravenous Once Elvira Summers MD Last Rate: 21 mmol (09/17/18 1406)   [START ON 9/18/2018] vancomycin 20 mg/kg Intravenous Q24H Lucinda Haines PA-C          amiodarone 1 mg/min Last Rate: 1 mg/min (09/17/18 1153)   Followed by     amiodarone 0 5 mg/min    heparin (porcine) 3-20 Units/kg/hr (Order-Specific) Last Rate: 12 Units/kg/hr (09/17/18 1106)   insulin regular (HumuLIN R,NovoLIN R) infusion 0 3-21 Units/hr Last Rate: 10 Units/hr (09/17/18 1205)   multi-electrolyte 150 mL/hr Last Rate: Stopped (09/17/18 1144)   norepinephrine 1-30 mcg/min Last Rate: 2 mcg/min (09/17/18 0926)   sodium chloride 0 9 % with KCl 40 mEq/L 150 mL/hr Last Rate: 150 mL/hr (09/17/18 1206)         Physical exam:  Vitals:   Vitals:    09/17/18 0900 09/17/18 1000 09/17/18 1100 09/17/18 1200   BP: 119/79 119/65 96/75 91/74 BP Location:       Pulse: (!) 162 (!) 158 (!) 156 (!) 150   Resp: (!) 27 (!) 33 21 21   Temp: 99 7 °F (37 6 °C) 99 7 °F (37 6 °C) 99 7 °F (37 6 °C) 100 °F (37 8 °C)   TempSrc:       SpO2: 95% (!) 87% 93%    Weight:       Height:         Temp  Min: 97 6 °F (36 4 °C)  Max: 102 2 °F (39 °C)  IBW: 52 4 kg    SpO2: 93 %  SpO2 Activity: At Rest  O2 Device: None (Room air)      Intake/Output Summary (Last 24 hours) at 09/17/18 1444  Last data filed at 09/17/18 1205   Gross per 24 hour   Intake          3807 61 ml   Output             2835 ml   Net           972 61 ml       Invasive/non-invasive ventilation settings:   Respiratory    Lab Data (Last 4 hours)    None         O2/Vent Data (Last 4 hours)    None              Invasive Devices     Central Venous Catheter Line            CVC Central Lines 09/17/18 Triple Right Femoral less than 1 day    Port A Cath 09/17/18 Right Chest less than 1 day          Peripheral Intravenous Line            Peripheral IV 09/16/18 Left Wrist 1 day    Peripheral IV 09/16/18 Left Antecubital less than 1 day    Peripheral IV 09/16/18 Left Forearm less than 1 day          Drain            Colostomy RUQ -- days    Urethral Catheter less than 1 day                  Physical Exam:  Gen:  Alert, not in acute distress  HEENT: PERRL,  No icteric sclera or cyanosis  Neck:  supple  Chest:  Equal breath sounds and clear to auscultation  bilaterally  Cor:  S1-S2 regular /tachycardia  Abd:  Soft, colostomy, bowel sounds present, nontender  Ext:  No edema, no clubbing or cyanosis  Neuro:  Awake alert and oriented, no focal deficits  Skin:  intact      Diagnostic Data:  Lab: I have personally reviewed pertinent lab results     CBC:     Results from last 7 days  Lab Units 09/17/18  0640 09/17/18  0203 09/16/18  2144   WBC Thousand/uL 2 88* 2 67* 3 56*   HEMOGLOBIN g/dL 7 5* 10 1* 9 6*   HEMATOCRIT % 21 5* 27 8* 25 6*   PLATELETS Thousands/uL 55* 75* 66*       CMP:     Results from last 7 days  Lab Units 09/17/18  1041 09/17/18  0640 09/17/18  0412  09/16/18  2145   SODIUM mmol/L 124* 124* 123*  < > 110*   POTASSIUM mmol/L 3 1* 2 9* 2 8*  < > 4 1   CHLORIDE mmol/L 93* 92* 90*  < > 77*   CO2 mmol/L 20* 20* 17*  < > 20*   BUN mg/dL 30* 33* 37*  < > 47*   CREATININE mg/dL 1 22 1 44* 1 68*  < > 2 11*   CALCIUM mg/dL 7 6* 7 8* 7 4*  < > 7 8*   ALK PHOS U/L  --  93  --   --  120*   ALT U/L  --  21  --   --  25   AST U/L  --  45  --   --  45   < > = values in this interval not displayed  PT/INR:   Lab Results   Component Value Date    INR 1 22 (H) 09/17/2018    INR 1 24 (H) 09/16/2018   ,   Magnesium:   Results from last 7 days  Lab Units 09/17/18  1041 09/17/18  0642 09/17/18  0203   MAGNESIUM mg/dL 2 2 2 4 1 9     Phosphorous:   Results from last 7 days  Lab Units 09/17/18  1041 09/17/18  0642 09/17/18  0203   PHOSPHORUS mg/dL 1 9* 1 6* 1 7*       Microbiology:         Imaging:   chest x-ray reviewed on PACs:  Multiple pulmonary nodules    Cardiac lab/EKG/telemetry/ECHO:    telemetry and EKG:  Regular  Wide-complex tachycardia, 160/min       Code Status: Level 1 - Full Code    Arina Mast MD    Portions of the record may have been created with voice recognition software  Occasional wrong word or "sound a like" substitutions may have occurred due to the inherent limitations of voice recognition software  Read the chart carefully and recognize, using context, where substitutions have occurred

## 2018-09-18 ENCOUNTER — APPOINTMENT (INPATIENT)
Dept: RADIOLOGY | Facility: HOSPITAL | Age: 58
DRG: 871 | End: 2018-09-18
Payer: COMMERCIAL

## 2018-09-18 ENCOUNTER — APPOINTMENT (INPATIENT)
Dept: NON INVASIVE DIAGNOSTICS | Facility: HOSPITAL | Age: 58
DRG: 871 | End: 2018-09-18
Payer: COMMERCIAL

## 2018-09-18 LAB
ABO GROUP BLD BPU: NORMAL
ALBUMIN SERPL BCP-MCNC: 1.4 G/DL (ref 3.5–5)
ALP SERPL-CCNC: 86 U/L (ref 46–116)
ALT SERPL W P-5'-P-CCNC: 18 U/L (ref 12–78)
ANION GAP SERPL CALCULATED.3IONS-SCNC: 10 MMOL/L (ref 4–13)
ANION GAP SERPL CALCULATED.3IONS-SCNC: 10 MMOL/L (ref 4–13)
ANION GAP SERPL CALCULATED.3IONS-SCNC: 11 MMOL/L (ref 4–13)
ANION GAP SERPL CALCULATED.3IONS-SCNC: 7 MMOL/L (ref 4–13)
ANISOCYTOSIS BLD QL SMEAR: PRESENT
APTT PPP: 35 SECONDS (ref 24–36)
AST SERPL W P-5'-P-CCNC: 51 U/L (ref 5–45)
BASOPHILS # BLD MANUAL: 0 THOUSAND/UL (ref 0–0.1)
BASOPHILS NFR MAR MANUAL: 0 % (ref 0–1)
BILIRUB SERPL-MCNC: 0.77 MG/DL (ref 0.2–1)
BPU ID: NORMAL
BUN SERPL-MCNC: 20 MG/DL (ref 5–25)
BUN SERPL-MCNC: 23 MG/DL (ref 5–25)
BUN SERPL-MCNC: 25 MG/DL (ref 5–25)
BUN SERPL-MCNC: 26 MG/DL (ref 5–25)
BURR CELLS BLD QL SMEAR: PRESENT
CALCIUM SERPL-MCNC: 7 MG/DL (ref 8.3–10.1)
CALCIUM SERPL-MCNC: 7.1 MG/DL (ref 8.3–10.1)
CHLORIDE SERPL-SCNC: 105 MMOL/L (ref 100–108)
CHLORIDE SERPL-SCNC: 106 MMOL/L (ref 100–108)
CHLORIDE SERPL-SCNC: 106 MMOL/L (ref 100–108)
CHLORIDE SERPL-SCNC: 110 MMOL/L (ref 100–108)
CO2 SERPL-SCNC: 18 MMOL/L (ref 21–32)
CREAT SERPL-MCNC: 0.97 MG/DL (ref 0.6–1.3)
CREAT SERPL-MCNC: 1.09 MG/DL (ref 0.6–1.3)
CREAT SERPL-MCNC: 1.15 MG/DL (ref 0.6–1.3)
CREAT SERPL-MCNC: 1.17 MG/DL (ref 0.6–1.3)
EOSINOPHIL # BLD MANUAL: 0 THOUSAND/UL (ref 0–0.4)
EOSINOPHIL NFR BLD MANUAL: 0 % (ref 0–6)
ERYTHROCYTE [DISTWIDTH] IN BLOOD BY AUTOMATED COUNT: 13.6 % (ref 11.6–15.1)
GFR SERPL CREATININE-BSD FRML MDRD: 51 ML/MIN/1.73SQ M
GFR SERPL CREATININE-BSD FRML MDRD: 52 ML/MIN/1.73SQ M
GFR SERPL CREATININE-BSD FRML MDRD: 56 ML/MIN/1.73SQ M
GFR SERPL CREATININE-BSD FRML MDRD: 64 ML/MIN/1.73SQ M
GLUCOSE SERPL-MCNC: 108 MG/DL (ref 65–140)
GLUCOSE SERPL-MCNC: 108 MG/DL (ref 65–140)
GLUCOSE SERPL-MCNC: 133 MG/DL (ref 65–140)
GLUCOSE SERPL-MCNC: 133 MG/DL (ref 65–140)
GLUCOSE SERPL-MCNC: 143 MG/DL (ref 65–140)
GLUCOSE SERPL-MCNC: 158 MG/DL (ref 65–140)
GLUCOSE SERPL-MCNC: 164 MG/DL (ref 65–140)
GLUCOSE SERPL-MCNC: 164 MG/DL (ref 65–140)
GLUCOSE SERPL-MCNC: 189 MG/DL (ref 65–140)
GLUCOSE SERPL-MCNC: 218 MG/DL (ref 65–140)
GLUCOSE SERPL-MCNC: 294 MG/DL (ref 65–140)
GLUCOSE SERPL-MCNC: 54 MG/DL (ref 65–140)
GLUCOSE SERPL-MCNC: 70 MG/DL (ref 65–140)
GLUCOSE SERPL-MCNC: 93 MG/DL (ref 65–140)
HAPTOGLOB SERPL-MCNC: 281 MG/DL (ref 34–200)
HCT VFR BLD AUTO: 21.1 % (ref 34.8–46.1)
HCT VFR BLD AUTO: 21.7 % (ref 34.8–46.1)
HCT VFR BLD AUTO: 24.5 % (ref 34.8–46.1)
HGB BLD-MCNC: 7.6 G/DL (ref 11.5–15.4)
HGB BLD-MCNC: 7.8 G/DL (ref 11.5–15.4)
HGB BLD-MCNC: 8.7 G/DL (ref 11.5–15.4)
HYPERCHROMIA BLD QL SMEAR: PRESENT
INR PPP: 1.17 (ref 0.86–1.17)
LYMPHOCYTES # BLD AUTO: 0.37 THOUSAND/UL (ref 0.6–4.47)
LYMPHOCYTES # BLD AUTO: 26 % (ref 14–44)
MAGNESIUM SERPL-MCNC: 1.8 MG/DL (ref 1.6–2.6)
MAGNESIUM SERPL-MCNC: 2 MG/DL (ref 1.6–2.6)
MAGNESIUM SERPL-MCNC: 2.4 MG/DL (ref 1.6–2.6)
MCH RBC QN AUTO: 27.7 PG (ref 26.8–34.3)
MCHC RBC AUTO-ENTMCNC: 35.5 G/DL (ref 31.4–37.4)
MCV RBC AUTO: 78 FL (ref 82–98)
MONOCYTES # BLD AUTO: 0.2 THOUSAND/UL (ref 0–1.22)
MONOCYTES NFR BLD: 14 % (ref 4–12)
NEUTROPHILS # BLD MANUAL: 0.85 THOUSAND/UL (ref 1.85–7.62)
NEUTS SEG NFR BLD AUTO: 60 % (ref 43–75)
NRBC BLD AUTO-RTO: 0 /100 WBCS
PHOSPHATE SERPL-MCNC: 1.7 MG/DL (ref 2.7–4.5)
PHOSPHATE SERPL-MCNC: 2.1 MG/DL (ref 2.7–4.5)
PHOSPHATE SERPL-MCNC: 2.7 MG/DL (ref 2.7–4.5)
PLATELET # BLD AUTO: 74 THOUSANDS/UL (ref 149–390)
PLATELET BLD QL SMEAR: ABNORMAL
PMV BLD AUTO: 12.4 FL (ref 8.9–12.7)
POIKILOCYTOSIS BLD QL SMEAR: PRESENT
POTASSIUM SERPL-SCNC: 3.6 MMOL/L (ref 3.5–5.3)
POTASSIUM SERPL-SCNC: 3.8 MMOL/L (ref 3.5–5.3)
POTASSIUM SERPL-SCNC: 4.1 MMOL/L (ref 3.5–5.3)
POTASSIUM SERPL-SCNC: 4.1 MMOL/L (ref 3.5–5.3)
PROT SERPL-MCNC: 4.8 G/DL (ref 6.4–8.2)
PROTHROMBIN TIME: 15 SECONDS (ref 11.8–14.2)
RBC # BLD AUTO: 3.14 MILLION/UL (ref 3.81–5.12)
RBC MORPH BLD: PRESENT
SODIUM SERPL-SCNC: 134 MMOL/L (ref 136–145)
SODIUM SERPL-SCNC: 135 MMOL/L (ref 136–145)
TOXIC GRANULES BLD QL SMEAR: PRESENT
UNIT DISPENSE STATUS: NORMAL
UNIT PRODUCT CODE: NORMAL
UNIT RH: NORMAL
WBC # BLD AUTO: 1.41 THOUSAND/UL (ref 4.31–10.16)

## 2018-09-18 PROCEDURE — 93325 DOPPLER ECHO COLOR FLOW MAPG: CPT | Performed by: INTERNAL MEDICINE

## 2018-09-18 PROCEDURE — 87186 SC STD MICRODIL/AGAR DIL: CPT | Performed by: SURGERY

## 2018-09-18 PROCEDURE — 93320 DOPPLER ECHO COMPLETE: CPT | Performed by: INTERNAL MEDICINE

## 2018-09-18 PROCEDURE — 85018 HEMOGLOBIN: CPT | Performed by: PHYSICIAN ASSISTANT

## 2018-09-18 PROCEDURE — 99233 SBSQ HOSP IP/OBS HIGH 50: CPT | Performed by: INTERNAL MEDICINE

## 2018-09-18 PROCEDURE — 80048 BASIC METABOLIC PNL TOTAL CA: CPT | Performed by: PHYSICIAN ASSISTANT

## 2018-09-18 PROCEDURE — 02PA33Z REMOVAL OF INFUSION DEVICE FROM HEART, PERCUTANEOUS APPROACH: ICD-10-PCS | Performed by: COLON & RECTAL SURGERY

## 2018-09-18 PROCEDURE — 80048 BASIC METABOLIC PNL TOTAL CA: CPT | Performed by: INTERNAL MEDICINE

## 2018-09-18 PROCEDURE — 80053 COMPREHEN METABOLIC PANEL: CPT | Performed by: PHYSICIAN ASSISTANT

## 2018-09-18 PROCEDURE — 85730 THROMBOPLASTIN TIME PARTIAL: CPT | Performed by: INTERNAL MEDICINE

## 2018-09-18 PROCEDURE — 82948 REAGENT STRIP/BLOOD GLUCOSE: CPT

## 2018-09-18 PROCEDURE — 87040 BLOOD CULTURE FOR BACTERIA: CPT | Performed by: PHYSICIAN ASSISTANT

## 2018-09-18 PROCEDURE — 93312 ECHO TRANSESOPHAGEAL: CPT | Performed by: INTERNAL MEDICINE

## 2018-09-18 PROCEDURE — 93312 ECHO TRANSESOPHAGEAL: CPT

## 2018-09-18 PROCEDURE — 85014 HEMATOCRIT: CPT | Performed by: PHYSICIAN ASSISTANT

## 2018-09-18 PROCEDURE — 99232 SBSQ HOSP IP/OBS MODERATE 35: CPT | Performed by: INTERNAL MEDICINE

## 2018-09-18 PROCEDURE — 94760 N-INVAS EAR/PLS OXIMETRY 1: CPT

## 2018-09-18 PROCEDURE — 72157 MRI CHEST SPINE W/O & W/DYE: CPT

## 2018-09-18 PROCEDURE — 93005 ELECTROCARDIOGRAM TRACING: CPT

## 2018-09-18 PROCEDURE — 72158 MRI LUMBAR SPINE W/O & W/DYE: CPT

## 2018-09-18 PROCEDURE — 99291 CRITICAL CARE FIRST HOUR: CPT | Performed by: EMERGENCY MEDICINE

## 2018-09-18 PROCEDURE — 87147 CULTURE TYPE IMMUNOLOGIC: CPT | Performed by: SURGERY

## 2018-09-18 PROCEDURE — 85007 BL SMEAR W/DIFF WBC COUNT: CPT | Performed by: PHYSICIAN ASSISTANT

## 2018-09-18 PROCEDURE — 87147 CULTURE TYPE IMMUNOLOGIC: CPT | Performed by: PHYSICIAN ASSISTANT

## 2018-09-18 PROCEDURE — 85610 PROTHROMBIN TIME: CPT | Performed by: PHYSICIAN ASSISTANT

## 2018-09-18 PROCEDURE — 85027 COMPLETE CBC AUTOMATED: CPT | Performed by: PHYSICIAN ASSISTANT

## 2018-09-18 PROCEDURE — 83735 ASSAY OF MAGNESIUM: CPT | Performed by: PHYSICIAN ASSISTANT

## 2018-09-18 PROCEDURE — 87070 CULTURE OTHR SPECIMN AEROBIC: CPT | Performed by: SURGERY

## 2018-09-18 PROCEDURE — 84100 ASSAY OF PHOSPHORUS: CPT | Performed by: PHYSICIAN ASSISTANT

## 2018-09-18 PROCEDURE — 0JPT0WZ REMOVAL OF TOTALLY IMPLANTABLE VASCULAR ACCESS DEVICE FROM TRUNK SUBCUTANEOUS TISSUE AND FASCIA, OPEN APPROACH: ICD-10-PCS | Performed by: COLON & RECTAL SURGERY

## 2018-09-18 RX ORDER — LIDOCAINE HYDROCHLORIDE 10 MG/ML
30 INJECTION, SOLUTION EPIDURAL; INFILTRATION; INTRACAUDAL; PERINEURAL ONCE
Status: COMPLETED | OUTPATIENT
Start: 2018-09-18 | End: 2018-09-18

## 2018-09-18 RX ORDER — MIDAZOLAM HYDROCHLORIDE 1 MG/ML
2 INJECTION INTRAMUSCULAR; INTRAVENOUS ONCE
Status: COMPLETED | OUTPATIENT
Start: 2018-09-18 | End: 2018-09-18

## 2018-09-18 RX ORDER — ADENOSINE 3 MG/ML
6 INJECTION INTRAVENOUS ONCE
Status: COMPLETED | OUTPATIENT
Start: 2018-09-18 | End: 2018-09-18

## 2018-09-18 RX ORDER — METOPROLOL TARTRATE 5 MG/5ML
5 INJECTION INTRAVENOUS ONCE
Status: COMPLETED | OUTPATIENT
Start: 2018-09-18 | End: 2018-09-18

## 2018-09-18 RX ORDER — FENTANYL CITRATE 50 UG/ML
75 INJECTION, SOLUTION INTRAMUSCULAR; INTRAVENOUS ONCE
Status: DISCONTINUED | OUTPATIENT
Start: 2018-09-18 | End: 2018-09-18

## 2018-09-18 RX ORDER — PROPOFOL 10 MG/ML
5-50 INJECTION, EMULSION INTRAVENOUS
Status: DISCONTINUED | OUTPATIENT
Start: 2018-09-18 | End: 2018-09-19

## 2018-09-18 RX ORDER — LIDOCAINE HYDROCHLORIDE 10 MG/ML
INJECTION, SOLUTION EPIDURAL; INFILTRATION; INTRACAUDAL; PERINEURAL
Status: COMPLETED
Start: 2018-09-18 | End: 2018-09-18

## 2018-09-18 RX ORDER — MAGNESIUM SULFATE HEPTAHYDRATE 40 MG/ML
2 INJECTION, SOLUTION INTRAVENOUS ONCE
Status: COMPLETED | OUTPATIENT
Start: 2018-09-18 | End: 2018-09-18

## 2018-09-18 RX ORDER — DILTIAZEM HYDROCHLORIDE 5 MG/ML
INJECTION INTRAVENOUS
Status: COMPLETED
Start: 2018-09-18 | End: 2018-09-18

## 2018-09-18 RX ORDER — ADENOSINE 3 MG/ML
INJECTION, SOLUTION INTRAVENOUS
Status: COMPLETED
Start: 2018-09-18 | End: 2018-09-18

## 2018-09-18 RX ORDER — PROPOFOL 10 MG/ML
5-50 INJECTION, EMULSION INTRAVENOUS
Status: DISCONTINUED | OUTPATIENT
Start: 2018-09-18 | End: 2018-09-18

## 2018-09-18 RX ORDER — AMIODARONE HYDROCHLORIDE 200 MG/1
200 TABLET ORAL
Status: DISCONTINUED | OUTPATIENT
Start: 2018-09-18 | End: 2018-09-26

## 2018-09-18 RX ORDER — MIDAZOLAM HYDROCHLORIDE 1 MG/ML
INJECTION INTRAMUSCULAR; INTRAVENOUS
Status: COMPLETED
Start: 2018-09-18 | End: 2018-09-18

## 2018-09-18 RX ORDER — FENTANYL CITRATE 50 UG/ML
25 INJECTION, SOLUTION INTRAMUSCULAR; INTRAVENOUS ONCE
Status: COMPLETED | OUTPATIENT
Start: 2018-09-18 | End: 2018-09-18

## 2018-09-18 RX ORDER — PROPOFOL 10 MG/ML
INJECTION, EMULSION INTRAVENOUS
Status: COMPLETED
Start: 2018-09-18 | End: 2018-09-18

## 2018-09-18 RX ORDER — FENTANYL CITRATE 50 UG/ML
50 INJECTION, SOLUTION INTRAMUSCULAR; INTRAVENOUS ONCE
Status: COMPLETED | OUTPATIENT
Start: 2018-09-18 | End: 2018-09-18

## 2018-09-18 RX ORDER — POTASSIUM CHLORIDE 20 MEQ/1
20 TABLET, EXTENDED RELEASE ORAL ONCE
Status: COMPLETED | OUTPATIENT
Start: 2018-09-18 | End: 2018-09-19

## 2018-09-18 RX ORDER — FENTANYL CITRATE 50 UG/ML
50 INJECTION, SOLUTION INTRAMUSCULAR; INTRAVENOUS CONTINUOUS PRN
Status: DISCONTINUED | OUTPATIENT
Start: 2018-09-18 | End: 2018-09-20

## 2018-09-18 RX ORDER — DIGOXIN 0.25 MG/ML
250 INJECTION INTRAMUSCULAR; INTRAVENOUS ONCE
Status: COMPLETED | OUTPATIENT
Start: 2018-09-18 | End: 2018-09-18

## 2018-09-18 RX ORDER — POTASSIUM CHLORIDE 29.8 MG/ML
40 INJECTION INTRAVENOUS ONCE
Status: COMPLETED | OUTPATIENT
Start: 2018-09-18 | End: 2018-09-18

## 2018-09-18 RX ORDER — DILTIAZEM HYDROCHLORIDE 5 MG/ML
15 INJECTION INTRAVENOUS ONCE
Status: COMPLETED | OUTPATIENT
Start: 2018-09-18 | End: 2018-09-18

## 2018-09-18 RX ADMIN — CHLORHEXIDINE GLUCONATE 15 ML: 1.2 RINSE ORAL at 08:22

## 2018-09-18 RX ADMIN — MIDAZOLAM HYDROCHLORIDE 2 MG: 1 INJECTION INTRAMUSCULAR; INTRAVENOUS at 12:28

## 2018-09-18 RX ADMIN — PROPOFOL 15 MCG/KG/MIN: 10 INJECTION, EMULSION INTRAVENOUS at 17:09

## 2018-09-18 RX ADMIN — AMIODARONE HYDROCHLORIDE 1 MG/MIN: 50 INJECTION, SOLUTION INTRAVENOUS at 06:42

## 2018-09-18 RX ADMIN — FENTANYL CITRATE 50 MCG: 50 INJECTION, SOLUTION INTRAMUSCULAR; INTRAVENOUS at 12:30

## 2018-09-18 RX ADMIN — FENTANYL CITRATE 50 MCG: 50 INJECTION, SOLUTION INTRAMUSCULAR; INTRAVENOUS at 23:27

## 2018-09-18 RX ADMIN — POTASSIUM PHOSPHATE, MONOBASIC AND POTASSIUM PHOSPHATE, DIBASIC 21 MMOL: 224; 236 INJECTION, SOLUTION INTRAVENOUS at 06:23

## 2018-09-18 RX ADMIN — VANCOMYCIN HYDROCHLORIDE 1250 MG: 5 INJECTION, POWDER, LYOPHILIZED, FOR SOLUTION INTRAVENOUS at 10:22

## 2018-09-18 RX ADMIN — CEFAZOLIN SODIUM 2000 MG: 2 SOLUTION INTRAVENOUS at 20:10

## 2018-09-18 RX ADMIN — FENTANYL CITRATE 50 MCG: 50 INJECTION, SOLUTION INTRAMUSCULAR; INTRAVENOUS at 22:10

## 2018-09-18 RX ADMIN — FENTANYL CITRATE 25 MCG/HR: 50 INJECTION, SOLUTION INTRAMUSCULAR; INTRAVENOUS at 16:53

## 2018-09-18 RX ADMIN — ADENOSINE 6 MG: 3 INJECTION, SOLUTION INTRAVENOUS at 01:00

## 2018-09-18 RX ADMIN — DEXTROSE 150 MG: 50 INJECTION, SOLUTION INTRAVENOUS at 06:23

## 2018-09-18 RX ADMIN — MIDAZOLAM 2 MG: 1 INJECTION INTRAMUSCULAR; INTRAVENOUS at 12:28

## 2018-09-18 RX ADMIN — MAGNESIUM SULFATE HEPTAHYDRATE 2 G: 40 INJECTION, SOLUTION INTRAVENOUS at 21:05

## 2018-09-18 RX ADMIN — POTASSIUM CHLORIDE 40 MEQ: 400 INJECTION, SOLUTION INTRAVENOUS at 13:05

## 2018-09-18 RX ADMIN — LIDOCAINE HYDROCHLORIDE 30 ML: 10 INJECTION, SOLUTION EPIDURAL; INFILTRATION; INTRACAUDAL; PERINEURAL at 19:12

## 2018-09-18 RX ADMIN — FENTANYL CITRATE 25 MCG: 50 INJECTION, SOLUTION INTRAMUSCULAR; INTRAVENOUS at 12:28

## 2018-09-18 RX ADMIN — MAGNESIUM SULFATE HEPTAHYDRATE 2 G: 40 INJECTION, SOLUTION INTRAVENOUS at 05:49

## 2018-09-18 RX ADMIN — NOREPINEPHRINE BITARTRATE 6 MCG/MIN: 1 INJECTION INTRAVENOUS at 00:12

## 2018-09-18 RX ADMIN — AMIODARONE HYDROCHLORIDE 0.5 MG/MIN: 50 INJECTION, SOLUTION INTRAVENOUS at 13:06

## 2018-09-18 RX ADMIN — SODIUM CHLORIDE 150 ML/HR: 0.9 INJECTION, SOLUTION INTRAVENOUS at 04:49

## 2018-09-18 RX ADMIN — ACETAMINOPHEN 650 MG: 160 SUSPENSION ORAL at 04:41

## 2018-09-18 RX ADMIN — METOPROLOL TARTRATE 5 MG: 1 INJECTION, SOLUTION INTRAVENOUS at 01:32

## 2018-09-18 RX ADMIN — FENTANYL CITRATE 50 MCG: 50 INJECTION, SOLUTION INTRAMUSCULAR; INTRAVENOUS at 22:25

## 2018-09-18 RX ADMIN — CHLORHEXIDINE GLUCONATE 15 ML: 1.2 RINSE ORAL at 20:11

## 2018-09-18 RX ADMIN — DILTIAZEM HYDROCHLORIDE 15 MG: 5 INJECTION INTRAVENOUS at 01:03

## 2018-09-18 RX ADMIN — ACETAMINOPHEN 650 MG: 160 SUSPENSION ORAL at 21:08

## 2018-09-18 RX ADMIN — CEFAZOLIN SODIUM 2000 MG: 2 SOLUTION INTRAVENOUS at 04:41

## 2018-09-18 RX ADMIN — HEPARIN SODIUM AND DEXTROSE 12 UNITS/KG/HR: 10000; 5 INJECTION INTRAVENOUS at 13:33

## 2018-09-18 RX ADMIN — NOREPINEPHRINE BITARTRATE 5 MCG/MIN: 1 INJECTION INTRAVENOUS at 21:30

## 2018-09-18 RX ADMIN — METOPROLOL TARTRATE 5 MG: 1 INJECTION, SOLUTION INTRAVENOUS at 01:15

## 2018-09-18 RX ADMIN — FENTANYL CITRATE 50 MCG: 50 INJECTION, SOLUTION INTRAMUSCULAR; INTRAVENOUS at 23:10

## 2018-09-18 RX ADMIN — FENTANYL CITRATE 50 MCG: 50 INJECTION, SOLUTION INTRAMUSCULAR; INTRAVENOUS at 22:55

## 2018-09-18 RX ADMIN — CEFAZOLIN SODIUM 2000 MG: 2 SOLUTION INTRAVENOUS at 11:22

## 2018-09-18 RX ADMIN — SODIUM CHLORIDE 150 ML/HR: 0.9 INJECTION, SOLUTION INTRAVENOUS at 11:33

## 2018-09-18 RX ADMIN — PROPOFOL 15 MCG/KG/MIN: 10 INJECTION, EMULSION INTRAVENOUS at 11:33

## 2018-09-18 RX ADMIN — DIGOXIN 250 MCG: 0.25 INJECTION INTRAMUSCULAR; INTRAVENOUS at 04:42

## 2018-09-18 RX ADMIN — MIDAZOLAM 2 MG: 1 INJECTION INTRAMUSCULAR; INTRAVENOUS at 12:30

## 2018-09-18 RX ADMIN — ADENOSINE 6 MG: 3 INJECTION INTRAVENOUS at 01:00

## 2018-09-18 NOTE — RESPIRATORY THERAPY NOTE
RT Protocol Note  Emily Abdullahi 60 y o  female MRN: [id number]  [facility] Encounter: [id number]    Assessment    Principal Problem:     HHNC (hyperglycemic hyperosmolar nonketotic coma) ([place] [address] )  Active Problems:    Sepsis ([place] [address] )    Altered mental status      Home Pulmonary Medications:  none       Past Medical History:   Diagnosis Date    Bipolar depression ([place] [address] ) 3/17/2016    CAD S/P percutaneous coronary angioplasty 3/17/2016    Cancer ([place] [address] )     Chronic pain     Colonic mass     Colostomy care ([place] [address] )     COPD (chronic obstructive pulmonary disease) ([place] [address] )     CVA (cerebral vascular accident) ([place] [address] )     R sided weakness    Essential hypertension 3/17/2016    GERD (gastroesophageal reflux disease)     Hepatitis C     Heroin abuse 3/18/2016    History of gastric ulcer     Hypertension     NSTEMI (non-ST elevated myocardial infarction) ([place] [address] ) 07/2012    Psychiatric disorder 09/03/2014    Inpatient admission     Schizoaffective disorder ([place] [address] ) 3/17/2016    Schizophrenia ([place] [address] ) 3/17/2016    STEMI (ST elevation myocardial infarction) ([place] [address] ) 12/2017    Type 2 diabetes mellitus ([place] [address] ) 3/17/2016     Social History     Social History    Marital status: Single     Spouse name: N/A    Number of children: N/A    Years of education: N/A     Social History Main Topics    Smoking status: Current Every Day Smoker     Packs/day: 1 00     Years: 45 00     Types: Cigarettes    Smokeless tobacco: Never Used    Alcohol use No      Comment: "only on occasion"    Drug use: No      Comment: only tried x 1     Sexual activity: Not on file     Other Topics Concern    Not on file     Social History Narrative    No narrative on file       Subjective         Objective    Physical Exam:   Assessment Type: Assess only  General Appearance: Sedated  Respiratory Pattern: Tachypneic  Chest Assessment: Chest expansion symmetrical  Bilateral Breath Sounds: Diminished, Clear    Vitals:  Blood pressure 112/73, pulse (!) 152, temperature (!) 102 6 °F (39 2 °C), resp  rate (!) 25, height 5' 3" (1 6 m), weight 57 1 kg (125 lb 14 1 oz), SpO2 98 %, not currently breastfeeding  Imaging and other studies: I have personally reviewed pertinent reports  Plan    Respiratory Plan: Vent/NIV/HFNC        Resp Comments: (P) Pt intubated due to impending resp failure with size 7 5 [email or web address], pt placed on ac/vc and is jessica well, thai bs clear, x-ray na, Pt has hx of copd as per past medical hx but does not use any udns or mdis at home,  no udns are indicated at this time

## 2018-09-18 NOTE — PROGRESS NOTES
Progress Note - Critical Care   Emily Abdullahi 60 y o  female MRN: [id number]  Unit/Bed#: [facility] 04 Encounter: [id number]    Assessment:   1  Encephalopathy  2  Hypotension  3  Hyperglycemia  4  Shock - Sepsis  5  Thrombocytopenia  6  Colon cancer s/p resection with colostomy bag, folfox chemotherapy  7  DM2  8  Schizophrenia/schizoaffective d/o  9  CAD s/p stents  10  New onset atrial flutter  11  RYLAN  12  Likely septic vegetations     Plan:      Neuro:   1  Encephalopathy   -Most likely 2/2 sepsis, hyperglycemia  -Improved, patient able to follow commands, GCS 11T  -Neuro checks q1hr  -HX of CVA     2  Schizoaffective/Bipolar  -Hold home depakote, cymbalta, remeron and trazodone     CV:   1  Shock   -Most likely 2/2 sepsis, metabolic derangements   -current maintenance with NS 150cc/hr  -Goal MAP > 65  -18 ECHO: EF 56%, grade 2 diastolic dysfunction  -ECHO concerning for tricuspid valve vegetation   -JUNIOR ordered    2  Atrial fibrillation/flutter  -new onset, possibly 2/2 sepsis, dehydration  -At times appears to be in sinus tach   -Continue IVF  -Amiodarone bolus given this AM, starting drip at 1  -Heparin drip    3  HTN   -Metoprolol 5 mg Q6 held 2/2 hypotension    4  CAD s/p stent 3/17/16  -Hold home plavix at this time, on heparin drip    5  Hyperlipidemia  -Hold home Lipitor 10mg qd until tolerates PO     Lun  Concern for pneumonia   - Chest CT shows evidence of septic emboli vs met vs pneumonia  - On vanc and cefazolin per ID  - Supplemental O2 to maintain SpO2 >92%  - Hx of COPD    2  Airway protection  - patient intubated last night 2/2 agitation and airway protection  - PS 10, spontaneous, saturating well  - ABG 7 436, pCO2 25 7, pO2 82, HCO3 16 9     GI:   1   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with liver mets  -s/p resection with colostomy on 18  -Follows with oncology outpatient, last course of chemo with FOLFOX on 18 (2 courses so far, right chest port in place, unknown when originally placed)  -No blood noted in colostomy bag  -Consider CTAP with contrast if source remains to be unknown     2  Hepatitis C     FEN:   F: 150cc/hr maintenance with potassium  E: Closely monitor lytes, bmp q6hrs, replete as necessary   N: NPO while encephalopathic      :   1  RYLAN   -Cr 1 09 (baseline   8)  -Continue IV hydration  -Strict I&Os     ID:   1  Sepsis 2/2 gram positive bacteremia  -Gram positive cocci in clusters x2  -ECHO concern for valve vegetations, pending JUNIOR   -Vanco and cefazolin  -Repeat cultures drawn 9/18     Heme:   1  Thrombocytopenia  -Plt 74 (67)  -WBC 1 41 (1 05)   -Hgb 8 7 (8 9)  -Continue to monitor      Endo:  1  Hyperosmolar hypoglycemic nonketotic coma  -Hx of DM2, on metformin, lantus, novolog   -IVF hydration and insulin gtt  -, continue to closely trend, glucose should not be reduced by more than 80/hr     Msk/Skin:   Back pain post fall  - complaining of back pain yesterday  - CT scans showed no acute process  - MRI ordered today for further investigation     Disposition: ICU care     Chief Complaint: Patient intubated, appears anxious to have tube removed    HPI/24hr events: At approximately 9:30 p m  the patient started complaining of severe back pain and told the night team that she had fallen a couple days prior  At that time the patient also became tachypneic with of breathing rate into the 30s and 40s  There was ongoing concerned that the patient would be able to protect her airway maintain oxygenation so the patient was intubated at that time  The heparin drip was also stopped secondary to concerns for possible hemorrhage in relation to the recent fall  A CT head, C-spine, CTA chest abdomen pelvis was performed none of which showed any acute ongoing pathology  Patient remains on the ventilator with pressure support currently  Physical Exam:   Physical Exam   Constitutional: No distress  HENT:   Head: Normocephalic and atraumatic     Right Ear: External ear normal    Left Ear: External ear normal    Intubated   Eyes: Conjunctivae and EOM are normal  Pupils are equal, round, and reactive to light  No scleral icterus  Neck: Neck supple  No tracheal deviation present  Cardiovascular: Regular rhythm  Tachycardia present  Exam reveals no gallop and no friction rub  No murmur heard  Patient in atrial flutter   Pulmonary/Chest: Effort normal and breath sounds normal  No respiratory distress  She has no wheezes  Abdominal: Soft  She exhibits no distension  There is no tenderness  Musculoskeletal: Normal range of motion  She exhibits no edema or tenderness  Patient follows commands, equal strength in hand squeeze and moves toes, limited movement of full upper and lower extremities   Neurological: She is alert  No cranial nerve deficit or sensory deficit  She exhibits normal muscle tone  Patient intubated, GCS 11T   Skin: She is not diaphoretic  Vitals:    18 0315 18 0329 18 0415 18 0515   BP: 125/86  120/84 102/74   Pulse: (!) 144  (!) 146 (!) 146   Resp: (!) 23  (!) 25 (!) 23   Temp: 99 3 °F (37 4 °C)  99 3 °F (37 4 °C) 99 °F (37 2 °C)   TempSrc:       SpO2: 100% 100% 100% 100%   Weight:       Height:                 Temperature:   Temp (24hrs), Av 9 °F (37 7 °C), Min:98 6 °F (37 °C), Max:102 6 °F (39 2 °C)    Current: Temperature: 99 °F (37 2 °C)    Weights:   IBW: 52 4 kg    Body mass index is 22 3 kg/m²    Weight (last 2 days)     Date/Time   Weight    18 0200  57 1 (125 88)              Hemodynamic Monitoring:       Non-Invasive/Invasive Ventilation Settings:  Respiratory    Lab Data (Last 4 hours)    None         O2/Vent Data (Last 4 hours)       0329           Vent Mode CPAP/PS Spont       FIO2 (%) (%) 40       PEEP (cmH2O) (cmH2O) 5       Pressure Support (cmH2O) (cmH20) 10       MV (Obs) 10 4       RSBI 51                 Lab Results   Component Value Date    PHART 7 436 2018    WVW2OFR 25 7 (L) 2018 PO2ART 82 0 09/17/2018    RVG6WIU 16 9 (L) 09/17/2018    BEART -6 4 09/17/2018    SOURCE Radial, Right 09/17/2018         Intake and Outputs:  I/O       09/16 0701 - 09/17 0700 09/17 0701 - 09/18 0700    I V  (mL/kg) 2348 1 (41 1) 3931 4 (68 9)    Blood  300    IV Piggyback 350 780 8    Total Intake(mL/kg) 2698 1 (47 3) 5012 2 (87 8)    Urine (mL/kg/hr) 1785 1600 (1 2)    Stool 350     Total Output 2135 1600    Net +563 1 +3412 2              UOP: /hour   Nutrition:        Diet Orders            Start     Ordered    09/17/18 2202  Diet NPO  Diet effective now     Question:  Diet Type  Answer:  NPO    09/17/18 2203        TF currently running at /hour with a goal of   Formula:    Labs:     Results from last 7 days  Lab Units 09/18/18  0448 09/17/18  2138 09/17/18  1807  09/17/18  1420  09/17/18  0203   WBC Thousand/uL 1 41* 1 05*  --   --  2 22*  < > 2 67*   HEMOGLOBIN g/dL 8 7* 8 9* 8 3*  < > 6 5*  < > 10 1*   HEMATOCRIT % 24 5* 25 7* 24 3*  < > 18 7*  < > 27 8*   PLATELETS Thousands/uL 74* 67*  --   --  61*  < > 75*   MONO PCT MAN %  --  7  --   --  0*  --  3*   < > = values in this interval not displayed  Results from last 7 days  Lab Units 09/18/18  0448 09/18/18  0210 09/17/18  2138  09/17/18  0640  09/16/18  2145   SODIUM mmol/L 134* 134* 129*  < > 124*  < > 110*   POTASSIUM mmol/L 4 1 3 8 4 4  < > 2 9*  < > 4 1   CHLORIDE mmol/L 106 105 102  < > 92*  < > 77*   CO2 mmol/L 18* 18* 18*  < > 20*  < > 20*   BUN mg/dL 26* 25 25  < > 33*  < > 47*   CREATININE mg/dL 1 09 1 15 1 21  < > 1 44*  < > 2 11*   CALCIUM mg/dL 7 0* 7 1* 7 1*  < > 7 8*  < > 7 8*   ALK PHOS U/L 86  --   --   --  93  --  120*   ALT U/L 18  --   --   --  21  --  25   AST U/L 51*  --   --   --  45  --  45   < > = values in this interval not displayed      Results from last 7 days  Lab Units 09/18/18 0210 09/17/18 2138 09/17/18 2113   MAGNESIUM mg/dL 1 8 1 9 2 1       Results from last 7 days  Lab Units 09/18/18 0210 09/17/18 2138 09/17/18  2113   PHOSPHORUS mg/dL 1 7* 2 3* 2 0*        Results from last 7 days  Lab Units 09/18/18  0448 09/18/18  0030 09/17/18  1806 09/17/18  0203 09/16/18  2145   INR  1 17  --  1 29* 1 22* 1 24*   PTT seconds  --  35 51*  --  29       Results from last 7 days  Lab Units 09/17/18  0824   LACTIC ACID mmol/L 2 0       0  Lab Value Date/Time   TROPONINI <0 02 09/16/2018 2145   TROPONINI <0 02 09/07/2018 1642   TROPONINI <0 02 07/25/2018 1248   TROPONINI 0 57 (H) 03/18/2016 0623   TROPONINI 0 65 (H) 03/18/2016 0045   TROPONINI 0 64 (H) 03/17/2016 2220       Imaging: CT Head: No acute intracranial abnormality  CT Cspine: No cervical spine fracture or traumatic malalignment  Degenerative disc disease most severe at C3-C4  CTA Chest/ab/pel: No evidence of aortic dissection or aneurysm  Bilateral cavitating pulmonary nodules similar to prior study  Differential diagnosis is unchanged  I have personally reviewed pertinent reports  EKG: Atrial flutter, tachycardia    Micro:  Lab Results   Component Value Date    BLOODCX No Growth After 5 Days  03/21/2016    BLOODCX No Growth After 5 Days   03/21/2016    URINECX >100,000 cfu/ml Escherichia coli 03/17/2016       Allergies: No Known Allergies    Medications:   Scheduled Meds:  Current Facility-Administered Medications:  acetaminophen 650 mg Per NG Tube Q6H PRN Marianela Marmolejo DO    amiodarone (CORDARONE) IV bolus 150 mg Intravenous Once Rhonda Drummond PA-C Last Rate: 150 mg (09/18/18 8542)   Followed by        amiodarone 1 mg/min Intravenous Continuous Rhonda Drummond PA-C    Followed by        amiodarone 0 5 mg/min Intravenous Continuous Rhonda Drummond PA-C    cefazolin 2,000 mg Intravenous Q8H Nayeli Fisher MD Last Rate: 2,000 mg (09/18/18 4691)   chlorhexidine 15 mL [employer] Q12H [address] Marianela Marmolejo DO    heparin (porcine) 3-20 Units/kg/hr (Order-Specific) Intravenous Titrated [facility], YANELIS Last Rate: Stopped (09/17/18 2126)   heparin (porcine) 1,650 Units Intravenous PRN YANELIS SON    heparin (porcine) 3,300 Units Intravenous PRN YANELIS SON    insulin regular (HumuLIN R,NovoLIN R) infusion 0 3-21 Units/hr Intravenous Titrated Tata Hameed MD Last Rate: Stopped (09/18/18 0032)   magnesium sulfate 2 g Intravenous Once Rhonda Drummond PA-C    metoprolol 5 mg Intravenous Q6H Luzma To MD    norepinephrine 1-30 mcg/min Intravenous Titrated Tata Hameed MD Last Rate: 2 mcg/min (09/18/18 0600)   potassium chloride 20 mEq Intravenous Once Trish Freire MD    potassium phosphate 21 mmol Intravenous Once Rhonda Drummond PA-C Last Rate: 21 mmol (09/18/18 7468)   sodium chloride 150 mL/hr Intravenous Continuous Marianela Marmolejo DO Last Rate: 150 mL/hr (09/18/18 0449)   vancomycin 20 mg/kg Intravenous Q24H Lucinda Haines PA-C      Continuous Infusions:  amiodarone 1 mg/min    Followed by     amiodarone 0 5 mg/min    heparin (porcine) 3-20 Units/kg/hr (Order-Specific) Last Rate: Stopped (09/17/18 2126)   insulin regular (HumuLIN R,NovoLIN R) infusion 0 3-21 Units/hr Last Rate: Stopped (09/18/18 0032)   norepinephrine 1-30 mcg/min Last Rate: 2 mcg/min (09/18/18 0600)   sodium chloride 150 mL/hr Last Rate: 150 mL/hr (09/18/18 0449)     PRN Meds:    acetaminophen 650 mg Q6H PRN   heparin (porcine) 1,650 Units PRN   heparin (porcine) 3,300 Units PRN       VTE Pharmacologic Prophylaxis: Reason for no pharmacologic prophylaxis Heparin drip  VTE Mechanical Prophylaxis: sequential compression device    Invasive lines and devices:   Invasive Devices     Central Venous Catheter Line            CVC Central Lines 09/17/18 Triple Right Femoral 1 day    Port A Cath 09/17/18 Right Chest 1 day          Peripheral Intravenous Line            Peripheral IV 09/16/18 Left Antecubital 1 day          Drain            Colostomy RUQ -- days    Urethral Catheter 1 day          Airway            ETT  Cuffed 7 5 mm less than 1 day Counseling / Coordination of Care       Code Status: Level 1 - Full Code     Portions of the record may have been created with voice recognition software  Occasional wrong word or "sound a like" substitutions may have occurred due to the inherent limitations of voice recognition software  Read the chart carefully and recognize, using context, where substitutions have occurred       Richelle Taylor MD

## 2018-09-18 NOTE — PROGRESS NOTES
09/18/18 1400   Clinical Encounter Type   Visited With Family   Routine Visit Introduction   Baptism Encounters   Baptism Needs Prayer

## 2018-09-18 NOTE — PROCEDURES
Intubation  Date/Time: 9/17/2018 10:03 PM  Performed by: Bernadine Cheng by: Genora Seats     Patient location:  Bedside  Procedure performed by consultant: Dr Yolis Lucero  Consent:     Consent obtained:  Emergent situation  Universal protocol:     Patient identity confirmed:  Arm band and anonymous protocol, patient vented/unresponsive  Pre-procedure details:     Patient status:  Altered mental status    Pretreatment medications:  Etomidate    Paralytics:  Succinylcholine  Indications:     Indications for intubation: respiratory distress and airway protection    Procedure details:     Preoxygenation:  Bag valve mask    CPR in progress: no      Intubation method:  Oral    Oral intubation technique:  Direct    Laryngoscope blade: Mac 3    Tube size (mm):  7 5    Tube type:  Cuffed    Number of attempts:  1  Placement assessment:     Breath sounds:  Equal and absent over the epigastrium    Placement verification: chest rise, condensation, direct visualization, equal breath sounds and ETCO2 detector    Post-procedure details:     Patient tolerance of procedure:   Tolerated well, no immediate complications

## 2018-09-18 NOTE — PROGRESS NOTES
Patient was evaluated approximately 9:30 p m  on 09/17/2018 and noted to be in respiratory distress with use of accessory muscles and paradoxical abdominal movement with respiration  Patient was noted to be tachypneic with approximately 30-40 breaths per minute  Patient became more encephalopathic not answering questions appropriately as she had done earlier in the evening  Prior to patient's decline she had been complaining of severe back pain  Patient admitted to having a fall prior to admission which she did not relate to admitting team nor rounding team during hospital stay  Patient had been initiated on heparin infusion for her atrial fibrillation  Heparin infusion was held secondary to concern for possible epidural septic emboli and hemorrhagic event with acute onset of back pain  Patient required endotracheal intubation secondary to increased work of breathing and respiratory distress  CT scan was obtained of patient's head and C-spine secondary to worsening encephalopathic state, CT was also obtained of chest abdomen and pelvis  CT head and C-spine with no acute events reported  CTA of chest abdomen pelvis with no evidence of aortic dissection or aneurysm, bilateral cavitating pulmonary nodules were noted as similar to previous study  Overnight patient developed a significant fever with a T-max of a 102°  She also had a decline in her systolic blood pressure requiring initiation of norepinephrine infusion  Patient's heart rate remained elevated at 150 beats per minute  Patient had been transitioned off of amiodarone and initiated on Lopressor as per Cardiology recommendations  Patient had been administered adenosine underlying rhythm appears to be atrial flutter  Cardizem 15 mg bolus did not control patient's heart rate  Lopressor 5 mg x2 bolus did not control patient's heart rate  Plan to restart amiodarone infusion as well as administer digoxin 250 mcg x1 now    Maintain patient off heparin infusion  Plan to obtain MRI of T and L-spine as patient had significant pain and tenderness for as well as pain with range of motion of lower extremities  Patient did not have any obvious bony deformities on CT imaging  Prior to intubation patient did not move lower extremities significantly but was able to wiggle her toes  It was difficult to assess if patient's decreased lower extremity responsiveness was secondary to her encephalopathic state versus new weakness  Continue on Ancef and vancomycin as per Infectious Disease recommendations for endocarditis and bacteremia  If patient has subsequent increases in her temperature plan to broaden antibiotics and re-culture      Cc time 20 min 9/17/18  Critical care time 42 minutes 9/18/18    Critical care time does not include procedures

## 2018-09-18 NOTE — CONSULTS
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, now w/ sepsis 2/2 GPC bacteremia    Patient w/ port-a-cath in place; this was placed approximately 1 month ago    We have been asked to remove port    Plan: Will remove port-a-cath at bedside    Subjective/Objective     Subjective: Intubated and sedated    Objective:     Blood pressure 100/62, pulse 80, temperature 98 2 °F (36 8 °C), resp  rate (!) 28, height 5' 3" (1 6 m), weight 57 1 kg (125 lb 14 1 oz), SpO2 100 %, not currently breastfeeding  ,Body mass index is 22 3 kg/m²  Intake/Output Summary (Last 24 hours) at 09/18/18 1515  Last data filed at 09/18/18 1400   Gross per 24 hour   Intake          6362 12 ml   Output             2195 ml   Net          4167 12 ml       Invasive Devices     Central Venous Catheter Line            CVC Central Lines 09/17/18 Triple Right Femoral 1 day    Port A Cath 09/17/18 Right Chest 1 day          Peripheral Intravenous Line            Peripheral IV 09/16/18 Left Antecubital 1 day          Drain            Colostomy RUQ -- days    Urethral Catheter 1 day          Airway            ETT  Cuffed 7 5 mm less than 1 day                Physical Exam:   Gen: Intubated, sedated  HEENT: MMM, PERRLA  CV: RRR  Pulm: CTA  Abd: soft, NTND  Ext: no CCE  Skin: warm, dry  Psych: unassessable           Lab, Imaging and other studies:  I have personally reviewed pertinent lab results    , CBC:   Lab Results   Component Value Date    WBC 1 41 (LL) 09/18/2018    HGB 7 8 (L) 09/18/2018    HCT 21 7 (L) 09/18/2018    MCV 78 (L) 09/18/2018    PLT 74 (L) 09/18/2018    MCH 27 7 09/18/2018    MCHC 35 5 09/18/2018    RDW 13 6 09/18/2018    MPV 12 4 09/18/2018    NRBC 0 09/18/2018   , CMP:   Lab Results   Component Value Date     (L) 09/18/2018    K 3 6 09/18/2018  09/18/2018    CO2 18 (L) 09/18/2018    BUN 23 09/18/2018    CREATININE 1 17 09/18/2018    CALCIUM 7 1 (L) 09/18/2018    AST 51 (H) 09/18/2018    ALT 18 09/18/2018    ALKPHOS 86 09/18/2018    EGFR 51 09/18/2018   , Coagulation:   Lab Results   Component Value Date    INR 1 17 09/18/2018     VTE Pharmacologic Prophylaxis: Heparin  VTE Mechanical Prophylaxis: sequential compression device and foot pump applied

## 2018-09-18 NOTE — PROGRESS NOTES
Vancomycin Assessment    Emily Mantilla is a 60 y o  female who is currently receiving vancomycin 1250mg q24h for MRSA suspected, endocarditis, bacteremia     Relevant clinical data and objective history reviewed:  Creatinine   Date Value Ref Range Status   09/18/2018 1 17 0 60 - 1 30 mg/dL Final     Comment:     Standardized to IDMS reference method   09/18/2018 1 09 0 60 - 1 30 mg/dL Final     Comment:     Standardized to IDMS reference method   09/18/2018 1 15 0 60 - 1 30 mg/dL Final     Comment:     Standardized to IDMS reference method   09/03/2014 1 06 0 60 - 1 30 mg/dL Final     Comment:     Standardized to IDMS reference method   09/02/2014 1 43 (H) 0 60 - 1 30 mg/dL Final     Comment:     Standardized to IDMS reference method     /73   Pulse 76   Temp 98 2 °F (36 8 °C)   Resp 20   Ht 5' 3" (1 6 m)   Wt 57 1 kg (125 lb 14 1 oz)   SpO2 100%   BMI 22 30 kg/m²   I/O last 3 completed shifts: In: 8159 3 [I V :6628 4; Blood:300; IV Piggyback:1230 8]  Out: 4010 [Urine:3460; Emesis/NG output:200; Stool:350]  Lab Results   Component Value Date/Time    BUN 23 09/18/2018 10:44 AM    BUN 16 06/19/2018 04:31 AM    WBC 1 41 (LL) 09/18/2018 04:48 AM    WBC 10 0 06/19/2018 04:31 AM    HGB 7 8 (L) 09/18/2018 10:44 AM    HGB 11 7 (L) 06/19/2018 04:31 AM    HCT 21 7 (L) 09/18/2018 10:44 AM    HCT 36 1 06/19/2018 04:31 AM    MCV 78 (L) 09/18/2018 04:48 AM    MCV 88 06/19/2018 04:31 AM    PLT 74 (L) 09/18/2018 04:48 AM     (H) 06/19/2018 04:31 AM     Temp Readings from Last 3 Encounters:   09/18/18 98 2 °F (36 8 °C)   09/16/18 98 9 °F (37 2 °C) (Oral)   09/13/18 99 °F (37 2 °C) (Temporal)     Vancomycin Days of Therapy: 2    Assessment/Plan  The patient is currently on vancomycin utilizing scheduled dosing based on actual body weight  Baseline risks associated with therapy include: pre-existing renal impairment and dehydration    The patient is currently receiving 1250mg q24h and after clinical evaluation will be changed to 750mg q12h  Renal function has significantly improved with adequate urine output  Pharmacy will also follow closely for s/sx of nephrotoxicity, infusion reactions and appropriateness of therapy  BMP and CBC will be ordered per protocol  Plan for trough as patient approaches steady state, prior to the 3rd  dose on 9/19/2018 at approximately 0930  Due to infection severity, will target a trough of 15-20 (appropriate for most indications)   Pharmacy will continue to follow the patients culture results and clinical progress daily      Matti Tapia, Pharmacist

## 2018-09-18 NOTE — PROGRESS NOTES
Progress Note - Cardiology   Emily Abdullahi 60 y o  female MRN: [id number]  Unit/Bed#: [facility] 04 Encounter: [id number]  09/18/18  11:46 AM        Subjective/Objective   Chief Complaint: No chief complaint on file  Subjective: Intubated would following commands, frustrated    Objective:  No acute distress, appears frustrated  Patient Active Problem List   Diagnosis    CVA (cerebral vascular accident) ([place] [address] )    Type 2 diabetes mellitus without complication, without long-term current use of insulin ([place] [address] )    Schizoaffective disorder, bipolar type ([place] [address] )    Essential hypertension    Colonic mass    Type 2 diabetes mellitus with complication ([place] [address] )    CKD (chronic kidney disease) stage 3, GFR 30-59 ml/min    Pain of right upper extremity    RYLAN (acute kidney injury)     CAD (coronary artery disease)    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HCC)    [facility]    Brachial artery stenosis, right (HCC)    Rectosigmoid mass - High colostomy output    Leukocytosis    GERD (gastroesophageal reflux disease)    Tobacco abuse    Liver metastasis (HCC)    Mixed hyperlipidemia    Dizziness - due to orthostatic hypotension    Ambulatory dysfunction    Orthostatic hypotension    Chemotherapy induced nausea and vomiting    Lower abdominal pain    HHNC (hyperglycemic hyperosmolar nonketotic coma) (HCC)    Sepsis [facility]    Altered mental status       Vitals: /68   Pulse 96   Temp 98 2 °F (36 8 °C)   Resp 22   Ht 5' 3" (1 6 m)   Wt 57 1 kg (125 lb 14 1 oz)   SpO2 100%   BMI 22 30 kg/m²     I/O this shift: In: 994 2 [I V :718 1;  IV Piggyback:276]  Out: 745 [Urine:395; Emesis/NG output:200; Stool:150]  Wt Readings from Last 3 Encounters:   09/17/18 57 1 kg (125 lb 14 1 oz)   09/18/18 57 1 kg (125 lb 14 1 oz)   09/11/18 57 2 kg (126 lb 1 7 oz)       Intake/Output Summary (Last 24 hours) at 09/18/18 1146  Last data filed at 09/18/18 1000   Gross per 24 hour   Intake          5685 82 ml   Output 2120 ml   Net          3565 82 ml     I/O last 3 completed shifts: In: 8159 3 [I V :6628 4; Blood:300; IV Piggyback:1230 8]  Out: 9004 [Urine:3460; Emesis/NG output:200; Stool:350]    Invasive Devices     Central Venous Catheter Line            CVC Central Lines 09/17/18 Triple Right Femoral 1 day    Port A Cath 09/17/18 Right Chest 1 day          Peripheral Intravenous Line            Peripheral IV 09/16/18 Left Antecubital 1 day          Drain            Colostomy RUQ -- days    Urethral Catheter 1 day    NG/OG/Enteral Tube Orogastric less than 1 day          Airway            ETT  Cuffed 7 5 mm less than 1 day                  Physical Exam:  GEN: Emily Haile appears ill, alert, pleasant and cooperative   HEENT: pupils equal, round, and reactive to light; extraocular muscles intact  NECK: supple, no carotid bruits or JVD  HEART: irregular rhythm, normal S1 and S2, no murmurs, clicks, gallops or rubs   LUNGS: clear to auscultation bilaterally; no wheezes, rales, or rhonchi   ABDOMEN/GI: normal bowel sounds, soft, no tenderness, no distention  EXTREMITIES/Musculoskeltal: peripheral pulses normal; no clubbing, cyanosis, or edema  NEURO: no focal findings   SKIN: normal without suspicious lesions on exposed skin            Lab Results:     Results from last 7 days  Lab Units 09/16/18  2145   TROPONIN I ng/mL <0 02     Results from last 7 days  Lab Units 09/18/18  1044 09/18/18  0448 09/17/18  2138  09/17/18  1420   WBC Thousand/uL  --  1 41* 1 05*  --  2 22*   HEMOGLOBIN g/dL 7 8* 8 7* 8 9*  < > 6 5*   HEMATOCRIT % 21 7* 24 5* 25 7*  < > 18 7*   PLATELETS Thousands/uL  --  74* 67*  --  61*   < > = values in this interval not displayed          Results from last 7 days  Lab Units 09/18/18  1044 09/18/18  0448 09/18/18  0210  09/17/18  0640  09/16/18  2145   SODIUM mmol/L 134* 134* 134*  < > 124*  < > 110*   POTASSIUM mmol/L 3 6 4 1 3 8  < > 2 9*  < > 4 1   CHLORIDE mmol/L 106 106 105  < > 92*  < > 77*   CO2 mmol/L 18* 18* 18*  < > 20*  < > 20*   BUN mg/dL 23 26* 25  < > 33*  < > 47*   CREATININE mg/dL 1 17 1 09 1 15  < > 1 44*  < > 2 11*   CALCIUM mg/dL 7 1* 7 0* 7 1*  < > 7 8*  < > 7 8*   ALK PHOS U/L  --  86  --   --  93  --  120*   ALT U/L  --  18  --   --  21  --  25   AST U/L  --  51*  --   --  45  --  45   < > = values in this interval not displayed  Results from last 7 days  Lab Units 09/18/18  0448 09/17/18  1806 09/17/18  0203   INR  1 17 1 29* 1 22*       Imaging: I have personally reviewed pertinent reports      EKG:  Atrial fibrillation with rapid rates    Scheduled Meds:    Current Facility-Administered Medications:  acetaminophen 650 mg Per NG Tube Q6H PRN Marianela Marmolejo DO    amiodarone 1 mg/min Intravenous Continuous Rhonda Drummond PA-C Last Rate: 1 mg/min (09/18/18 9589)   Followed by        amiodarone 0 5 mg/min Intravenous Continuous Rhonda Drummond PA-C    cefazolin 2,000 mg Intravenous Q8H Jackelin Murcia MD Last Rate: 2,000 mg (09/18/18 1122)   chlorhexidine 15 mL Swish & Spit Q12H [facility] Marianela Marmolejo DO    heparin (porcine) 3-20 Units/kg/hr (Order-Specific) Intravenous Titrated [place] YANELIS MONTERO Last Rate: Stopped (09/17/18 2126)   heparin (porcine) 1,650 Units [facility]venous [facility]N [facility]    heparin (porcine) 3,300 Units Intravenous PRN [facility]    insulin regular (HumuLIN R,NovoLIN R) infusion 0 3-21 Units/hr Intravenous Titrated Álvaro Coles MD Last Rate: 3 Units/hr (09/18/18 1111)   potassium chloride 20 mEq Intravenous Once Monalisa Hoffmann MD    propofol 5-50 mcg/kg/min Intravenous Titrated Jackie Ceja MD Last Rate: 15 mcg/kg/min (09/18/18 1133)   sodium chloride 150 mL/hr Intravenous Continuous Marianela Marmolejo DO Last Rate: 150 mL/hr (09/18/18 1133)   vancomycin 20 mg/kg Intravenous Q24H [facility] Last Rate: 1,250 mg (09/18/18 1022)     Continuous Infusions:    amiodarone 1 mg/min Last Rate: 1 mg/min (09/18/18 1459)   Followed by amiodarone 0 5 mg/min    heparin (porcine) 3-20 Units/kg/hr (Order-Specific) Last Rate: Stopped (09/17/18 2126)   insulin regular (HumuLIN R,NovoLIN R) infusion 0 3-21 Units/hr Last Rate: 3 Units/hr (09/18/18 1111)   propofol 5-50 mcg/kg/min Last Rate: 15 mcg/kg/min (09/18/18 1133)   sodium chloride 150 mL/hr Last Rate: 150 mL/hr (09/18/18 1133)       VTE Pharmacologic Prophylaxis: Heparin  VTE Mechanical Prophylaxis: sequential compression device       Impression:     60year-old with prior history of CAD, previous nontransmural [id number] of the LAD and diagonal branch, STEMI-December 2017 -status post PCI of the proximal RCA-MAKEDA, at that time mid LAD had  in the previously stented segment, distal LAD filling via collaterals, medication noncompliance, hypertension, hyperlipidemia, CKD, chronic and ongoing tobacco use, uncontrolled type 2 diabetes-A1c of 10 0 in June, right upper extremity ischemia in June 2018, the same month she was also diagnosed with a colonic mass-status post bowel resection and colostomy, also-status post right axillary/brachial artery PTA with development of distal ulnar artery emboli-status post thrombectomy and tPA and transition to Xarelto in July 2018       Readmitted with altered mental status for 2 days and septic presentation    ECG demonstrates atrial flutter with rapid ventricular response-adenosine was attempted-revealing flutter waves      Other evaluation has included a CT scan of the head that shows lacking are infarcts, CT scan of the chest that shows septic emboli, echocardiogram with a tricuspid valve 'mass', blood cultures growing gram-positive cocci in clusters    Acute issues have been VD RF, atrial flutter/fibrillation with rapid rates, sepsis with 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     Plan:     Atrial flutter:   I discontinued her amiodarone yesterday while adding metoprolol, since her rates were well controlled in an attempt to avoid cardioversion and risk of thromboembolic events, subsequently rates increased, she is back on IV amiodarone  She will be undergoing a JUNIOR anyway, rule out thrombus and can perform cardioversion at the same time and put her on amiodarone p o  and IV in the short-term till her acute issues resolve  She will be much easier to manage in sinus rhythm than with atrial fibrillation with rapid rates  Risk of embolization of the tricuspid valve vegetation with cardioversion is low, personally, I have never heard of it not is it a known contraindication for cardioversion  She already has bilateral septic pulmonary emboli     Tricuspid valve 'mass'/Infective Endocarditis:  Likely vegetation in the setting of gram-positive cocci in clusters on blood cultures-2/2, septic emboli on CT, no prior history of IV drug use, has snorted cocaine  She will need her port removed, schedule for JUNIOR also  Not a candidate for surgery, only with mild regurgitation on echo     Encephalopathy:  Likely secondary to sepsis     Adenocarcinoma of the colon:  Status post bowel resection and colostomy, received Folfox chemotherapy, might need to be held in the setting of long-term antibiotic use for endocarditis     Gerald on CKD:  Mild, stable    Counseling / Coordination of Care  Total time spent 20 minutes including teaching and family updates  More than 50% was spent counseling pt and family

## 2018-09-18 NOTE — PROGRESS NOTES
Progress Note - Infectious Disease   Emily Abdullahi 60 y o  female MRN: [id number]  Unit/Bed#: [facility]U 04 Encounter: [id number]      Impression/Plan: This is a 62-year-old female with a complicated past medical history who presents for altered mental status and severe sepsis, likely secondary to her Gram-positive bacteremia and possible right-sided endocarditis      1    Staphylococcal bacteremia:  Possible sources include introduction with unwitnessed IVDU and potentially though less likely from her port site   -blood cultures from 9/16 are positive; cultures from 09/18 are pending  -will plan to repeat cultures in 48 hr  -patient had CT of her head, spine and chest; images do not indicate an additional ectopic foci of infection  She is planned for MRI as per the ICU team   -transthoracic echo reviewed, cardiology following; the patient also had transesophageal echo, that does not show an active vegetation at this time  -will continue vancomycin and cefazolin  This will provide coverage for both MRSA or MSSA bacteremia until the organism is identified  -will continue to follow CBC and chemistry daily  -will plan to repeat blood cultures every 24-48 hours, still they are clear  -the patient will also have port removal today  In the setting of acute bacteremia the concern is that she has possibly seated this line, and salvage therapy is not recommended a for lines in the setting of staphylococcal infection  -additionally would recommend removal of femoral line as soon as feasible given the infection rate associated with them      2    probable right-sided endocarditis  -transthoracic and transesophageal echoes reviewed  -although no vegetation was present given the patient's CT finding concern is for right-sided endocarditis with septic emboli to the lungs   -ultimately right-sided endocarditis is medically managed condition and there are very few indications for surgery    -will continue plan as above, with the current antibiotics as they are also optimal management for both MRSA and MSSA endocarditis  -will continue to follow blood cultures for clearance  -the patient will likely require prolonged course of antibiotic therapy     3  Severe sepsis, POA:  -this is likely due to problems 1 and 2  -will continue management as above     4  Significant leg pain on exam:  -remains unclear if the patient's leg pain noted on palpation is related to the diffuse pains that she had initially noted or if there is a localized issue present given her history of falls at home  -discussed with ICU resident and would recommend getting an x-ray of her lower extremity to rule out any acute fractures  -if present will need to continue to monitor as they could potentially become seeded in the setting of staphylococcal bacteremia    5  Colon cancer currently on palliative chemotherapy with chronic port  -patient is plan for port removal today  -unclear what the plans are for chemotherapy and recommend discussing this with Oncology  -will ultimately need to manage her infection before any additional chemotherapy sessions are possible      6   Diabetes  -patient presented with elevated blood sugars  -current care as per primary team     Plan discussed in detail with ICU team    Plan discussed with the patient's family at bedside and with her partner Marichuy Jenkins over the phone  Updated them with treatment options and the risks involved with current antibiotic regimen  Antibiotics:  Vancomycin 3  Cefazolin 2    Subjective: The patient is currently intubated, she opens her eyes to tactile stimulus but does not respond to any questions  Her family is at bedside and the report that she will intermittently nod yes or no to questions  Per her daughter's patient's partner is the one who currently manages her care but they believe that he is not taking adequate care of her    They report that the patient does currently use crack cocaine, she had an episode of overdose when she used intravenous drugs about a year ago  There unclear if she is using IV drugs at this time  Contacted the patient's partner, Jose Angeles, and he reported that the patient was actively using on Friday before admission  He does not believe that she injects for snorts any drugs that she does smoke crack  Although he was not there with her on the Friday  He denies any issues at her previous chemo session  He does report some prior irritation at her port site but no difficulty accessing it  He also does confirm that the patient has history of falling at home and he recalls that he found on the floor last week  Objective:  Vitals:  HR:  [] 84  Resp:  [16-44] 30  BP: ()/() 117/63  SpO2:  [86 %-100 %] 100 %  Temp (24hrs), Av °F (37 8 °C), Min:98 2 °F (36 8 °C), Max:102 6 °F (39 2 °C)  Current: Temperature: 98 2 °F (36 8 °C)    Physical Exam:   General Appearance:  Intubated, opens eyes to tactile stimulus, and will start wounds and try to verbalize pain when her extremities are palpated  Throat: Oropharynx moist without lesions, ET tube present with minimal secretions on suctioning  Lungs:   Clear to auscultation bilaterally; no clear wheezes, rhonchi or rales; respirations unlabored   Heart:  RRR; no murmur, rub or gallop  No other embolic or immunologic manifestations of endocarditis present  Abdomen:   Soft, non-tender, non-distended, positive bowel sounds  Extremities: No clubbing, cyanosis or edema; she has ongoing tenderness to palpation of all her extremities, particularly her right lower extremity  Skin: No new rashes or lesions  No draining wounds noted  No bruising noted on her upper and lower extremities  Report is currently being accessed there is no erythema, induration hours, drainage at the site  Her femoral site appears clear and intact         Labs, Imaging, & Other studies:   All pertinent labs and imaging studies were personally reviewed    Results from last 7 days  Lab Units 09/18/18  1044 09/18/18  0448 09/17/18  2138  09/17/18  1420   WBC Thousand/uL  --  1 41* 1 05*  --  2 22*   HEMOGLOBIN g/dL 7 8* 8 7* 8 9*  < > 6 5*   PLATELETS Thousands/uL  --  74* 67*  --  61*   < > = values in this interval not displayed  Results from last 7 days  Lab Units 09/18/18  1044 09/18/18  0448 09/18/18  0210  09/17/18  0640  09/16/18  2145   SODIUM mmol/L 134* 134* 134*  < > 124*  < > 110*   POTASSIUM mmol/L 3 6 4 1 3 8  < > 2 9*  < > 4 1   CHLORIDE mmol/L 106 106 105  < > 92*  < > 77*   CO2 mmol/L 18* 18* 18*  < > 20*  < > 20*   BUN mg/dL 23 26* 25  < > 33*  < > 47*   CREATININE mg/dL 1 17 1 09 1 15  < > 1 44*  < > 2 11*   EGFR ml/min/1 73sq m 51 56 52  < > 40  < > 25   CALCIUM mg/dL 7 1* 7 0* 7 1*  < > 7 8*  < > 7 8*   AST U/L  --  51*  --   --  45  --  45   ALT U/L  --  18  --   --  21  --  25   ALK PHOS U/L  --  86  --   --  93  --  120*   < > = values in this interval not displayed      Results from last 7 days  Lab Units 09/16/18  2145   BLOOD CULTURE  Staphylococcus aureus*  Staphylococcus aureus*   GRAM STAIN RESULT  Gram positive cocci in clusters  Gram positive cocci in clusters

## 2018-09-18 NOTE — RESPIRATORY THERAPY NOTE
RT Ventilator Management Note  Emily Abdullahi 60 y o  female MRN: [id number]  Unit/Bed#: [facility] 04 Encounter: [id number]      Daily Screen       9/17/2018 1702             Patient safety screen outcome[de-identified] Failed    Not Ready for Weaning due to[de-identified] Underline problem not resolved            Physical Exam:   Assessment Type: (P) Assess only  General Appearance: (P) Sleeping  Respiratory Pattern: (P) Normal, Assisted  Chest Assessment: (P) Chest expansion symmetrical  Bilateral Breath Sounds: (P) Diminished, Clear      Resp Comments: (P) Pt resting comfortably on psv at this time with no incidents to resport overnight, will continue to monitor

## 2018-09-18 NOTE — RESPIRATORY THERAPY NOTE
09/18/18 0757   Vent Information   Vent ID [id number]   Vent type     Vent Mode CPAP/PS Spont   $ Pulse Oximetry Spot Check Charge Completed   SpO2 100 %   CPAP/PS Spont Settings   FIO2 (%) 40 %   PEEP (cmH2O) 5 cmH2O   Pressure Support (cmH2O) 8 cmH20   Trigger Sensitivity Flow (lpm) -3 LPM   Rise Time (%) 50 %   Esens % 25 %   Humidification Heater   Heater Temp 98 6 °F (37 °C)   CPAP/PS Spont Actuals   Resp Rate (BPM) 27 BPM   VT (mL) 334 mL   MV (Obs) 12 2   MAP (cmH2O) 8 8 cmH2O   Peak Pressure (cmH2O) 16 cmH2O   I/E Ratio (Obs) 1:1 8   RSBI 75   Heater Temperature (Obs) 98 6 °F (37 °C)   CPAP/PS Spont Alarms   High Peak Pressure (cmH20) 40 cmH2O   High Resp Rate (BPM) 40 BPM   High MV (L/min) 19 L/min   Low MV (L/min) 3 L/min   High Keely VTE (mL) 1000 mL   Low Keely VTE (mL) 300 mL   High SPONT VTE (mL) 1000 mL   Low Spont VTE (mL) 200 mL   CPAP/PS Spont Apnea Settings   Resp Rate (BPM) 14 BPM   VT (mL) 400 mL   FIO2 (%) 100 %   Apnea Time (s) 20 S   Apnea Flow (LPM) 50 LPM   Maintenance   Alarm (pink) cable attached Yes   Resuscitation bag with peep valve at bedside Yes   Water bag changed No   Circuit changed No   Daily Screen   Patient safety screen outcome: Failed   Not Ready for Weaning due to:  Underline problem not resolved   IHI Ventilator Associated Pneumonia Bundle   Daily Assessment of Readiness to Extubate Not applicable (Comment)   ETT  Cuffed 7 5 mm   Placement Date/Time: 09/17/18 c) 1673   Type: Cuffed  Tube Size: 7 5 mm  Location: Oral  Insertion attempts: 1  Placement Verification: End tidal CO2;Symmetrical chest wall movement   Secured at (cm) 23   Measured from Lips   Secured Location Right   Secured by Commercial tube gonzalez   Site Condition Dry   HI-LO Suction  Intermittent suction   HI-LO Secretions Scant;Thin;White   HI-LO Intervention Patent   RT Ventilator Management Note  Emily A Abdullahi 60 y o  female MRN: [id number]  [facility] [id number]      Daily Screen 9/17/2018 2242 9/18/2018 0757          Patient safety screen outcome[de-identified] Failed Failed      Not Ready for Weaning due to[de-identified] Underline problem not resolved Underline problem not resolved              Physical Exam:   Assessment Type: Assess only  General Appearance: Sleeping  Respiratory Pattern: Normal, Assisted  Chest Assessment: Chest expansion symmetrical  Bilateral Breath Sounds: Diminished, Clear      Resp Comments: pt resting comfortably  more awake, alert   Will continue to wean the PS for possible extubation per MD  will continue to monitor

## 2018-09-18 NOTE — PROGRESS NOTES
09/18/18 [address]   Spiritual Beliefs/Perceptions   Support Systems Spouse/significant other;Children   Plan of Care   Comments Family of pt  presence asked if they wanted their mother anoinnted    Provided pastoral presence     Assessment Completed by: Unit visit

## 2018-09-19 ENCOUNTER — APPOINTMENT (INPATIENT)
Dept: RADIOLOGY | Facility: HOSPITAL | Age: 58
DRG: 871 | End: 2018-09-19
Payer: COMMERCIAL

## 2018-09-19 PROBLEM — J96.01 ACUTE RESPIRATORY FAILURE WITH HYPOXIA (HCC): Status: ACTIVE | Noted: 2018-09-19

## 2018-09-19 PROBLEM — R91.8 PULMONARY NODULES: Status: ACTIVE | Noted: 2018-09-19

## 2018-09-19 PROBLEM — E83.51 HYPOCALCEMIA: Status: ACTIVE | Noted: 2018-09-19

## 2018-09-19 PROBLEM — D72.819 LEUKOPENIA: Status: ACTIVE | Noted: 2018-09-19

## 2018-09-19 LAB
ALBUMIN SERPL BCP-MCNC: 1.2 G/DL (ref 3.5–5)
ALP SERPL-CCNC: 75 U/L (ref 46–116)
ALT SERPL W P-5'-P-CCNC: 9 U/L (ref 12–78)
ANION GAP SERPL CALCULATED.3IONS-SCNC: 10 MMOL/L (ref 4–13)
ANION GAP SERPL CALCULATED.3IONS-SCNC: 9 MMOL/L (ref 4–13)
APTT PPP: 39 SECONDS (ref 24–36)
APTT PPP: 96 SECONDS (ref 24–36)
AST SERPL W P-5'-P-CCNC: 38 U/L (ref 5–45)
ATRIAL RATE: 149 BPM
ATRIAL RATE: 78 BPM
ATRIAL RATE: 94 BPM
BACTERIA BLD CULT: ABNORMAL
BACTERIA BLD CULT: ABNORMAL
BASOPHILS # BLD MANUAL: 0 THOUSAND/UL (ref 0–0.1)
BASOPHILS NFR MAR MANUAL: 0 % (ref 0–1)
BILIRUB SERPL-MCNC: 0.25 MG/DL (ref 0.2–1)
BUN SERPL-MCNC: 19 MG/DL (ref 5–25)
BUN SERPL-MCNC: 20 MG/DL (ref 5–25)
BURR CELLS BLD QL SMEAR: PRESENT
CA-I BLD-SCNC: 1.08 MMOL/L (ref 1.12–1.32)
CALCIUM SERPL-MCNC: 7.2 MG/DL (ref 8.3–10.1)
CALCIUM SERPL-MCNC: 7.4 MG/DL (ref 8.3–10.1)
CHLORIDE SERPL-SCNC: 111 MMOL/L (ref 100–108)
CHLORIDE SERPL-SCNC: 112 MMOL/L (ref 100–108)
CO2 SERPL-SCNC: 17 MMOL/L (ref 21–32)
CO2 SERPL-SCNC: 17 MMOL/L (ref 21–32)
CREAT SERPL-MCNC: 1.23 MG/DL (ref 0.6–1.3)
CREAT SERPL-MCNC: 1.23 MG/DL (ref 0.6–1.3)
EOSINOPHIL # BLD MANUAL: 0.03 THOUSAND/UL (ref 0–0.4)
EOSINOPHIL NFR BLD MANUAL: 5 % (ref 0–6)
ERYTHROCYTE [DISTWIDTH] IN BLOOD BY AUTOMATED COUNT: 14.2 % (ref 11.6–15.1)
GFR SERPL CREATININE-BSD FRML MDRD: 48 ML/MIN/1.73SQ M
GFR SERPL CREATININE-BSD FRML MDRD: 48 ML/MIN/1.73SQ M
GLUCOSE SERPL-MCNC: 115 MG/DL (ref 65–140)
GLUCOSE SERPL-MCNC: 122 MG/DL (ref 65–140)
GLUCOSE SERPL-MCNC: 133 MG/DL (ref 65–140)
GLUCOSE SERPL-MCNC: 134 MG/DL (ref 65–140)
GLUCOSE SERPL-MCNC: 136 MG/DL (ref 65–140)
GLUCOSE SERPL-MCNC: 136 MG/DL (ref 65–140)
GLUCOSE SERPL-MCNC: 153 MG/DL (ref 65–140)
GLUCOSE SERPL-MCNC: 154 MG/DL (ref 65–140)
GLUCOSE SERPL-MCNC: 172 MG/DL (ref 65–140)
GLUCOSE SERPL-MCNC: 190 MG/DL (ref 65–140)
GLUCOSE SERPL-MCNC: 199 MG/DL (ref 65–140)
GLUCOSE SERPL-MCNC: 242 MG/DL (ref 65–140)
GLUCOSE SERPL-MCNC: 83 MG/DL (ref 65–140)
GRAM STN SPEC: ABNORMAL
GRAM STN SPEC: ABNORMAL
HCT VFR BLD AUTO: 20.9 % (ref 34.8–46.1)
HCT VFR BLD AUTO: 20.9 % (ref 34.8–46.1)
HCT VFR BLD AUTO: 21.8 % (ref 34.8–46.1)
HGB BLD-MCNC: 7.2 G/DL (ref 11.5–15.4)
HGB BLD-MCNC: 7.2 G/DL (ref 11.5–15.4)
HGB BLD-MCNC: 7.7 G/DL (ref 11.5–15.4)
LYMPHOCYTES # BLD AUTO: 0.36 THOUSAND/UL (ref 0.6–4.47)
LYMPHOCYTES # BLD AUTO: 60 % (ref 14–44)
MAGNESIUM SERPL-MCNC: 2.5 MG/DL (ref 1.6–2.6)
MAGNESIUM SERPL-MCNC: 2.6 MG/DL (ref 1.6–2.6)
MAGNESIUM SERPL-MCNC: 2.6 MG/DL (ref 1.6–2.6)
MCH RBC QN AUTO: 27.5 PG (ref 26.8–34.3)
MCHC RBC AUTO-ENTMCNC: 34.4 G/DL (ref 31.4–37.4)
MCV RBC AUTO: 80 FL (ref 82–98)
MICROCYTES BLD QL AUTO: PRESENT
MONOCYTES # BLD AUTO: 0.09 THOUSAND/UL (ref 0–1.22)
MONOCYTES NFR BLD: 15 % (ref 4–12)
NEUTROPHILS # BLD MANUAL: 0.09 THOUSAND/UL (ref 1.85–7.62)
NEUTS SEG NFR BLD AUTO: 15 % (ref 43–75)
NRBC BLD AUTO-RTO: 0 /100 WBCS
P AXIS: 54 DEGREES
P AXIS: 86 DEGREES
P AXIS: 87 DEGREES
PHOSPHATE SERPL-MCNC: 2.9 MG/DL (ref 2.7–4.5)
PHOSPHATE SERPL-MCNC: 2.9 MG/DL (ref 2.7–4.5)
PHOSPHATE SERPL-MCNC: 3 MG/DL (ref 2.7–4.5)
PLATELET # BLD AUTO: 96 THOUSANDS/UL (ref 149–390)
PLATELET BLD QL SMEAR: ABNORMAL
PMV BLD AUTO: 11.8 FL (ref 8.9–12.7)
POTASSIUM SERPL-SCNC: 3.9 MMOL/L (ref 3.5–5.3)
POTASSIUM SERPL-SCNC: 3.9 MMOL/L (ref 3.5–5.3)
PR INTERVAL: 108 MS
PR INTERVAL: 129 MS
PR INTERVAL: 96 MS
PROT SERPL-MCNC: 4.9 G/DL (ref 6.4–8.2)
QRS AXIS: 78 DEGREES
QRS AXIS: 78 DEGREES
QRS AXIS: 90 DEGREES
QRSD INTERVAL: 104 MS
QRSD INTERVAL: 108 MS
QRSD INTERVAL: 117 MS
QT INTERVAL: 267 MS
QT INTERVAL: 400 MS
QT INTERVAL: 417 MS
QTC INTERVAL: 421 MS
QTC INTERVAL: 456 MS
QTC INTERVAL: 522 MS
RBC # BLD AUTO: 2.62 MILLION/UL (ref 3.81–5.12)
SODIUM SERPL-SCNC: 138 MMOL/L (ref 136–145)
SODIUM SERPL-SCNC: 138 MMOL/L (ref 136–145)
T WAVE AXIS: -87 DEGREES
T WAVE AXIS: 81 DEGREES
T WAVE AXIS: 94 DEGREES
TOTAL CELLS COUNTED SPEC: 20
VARIANT LYMPHS # BLD AUTO: 5 %
VENTRICULAR RATE: 149 BPM
VENTRICULAR RATE: 78 BPM
VENTRICULAR RATE: 94 BPM
WBC # BLD AUTO: 0.6 THOUSAND/UL (ref 4.31–10.16)

## 2018-09-19 PROCEDURE — A9585 GADOBUTROL INJECTION: HCPCS | Performed by: EMERGENCY MEDICINE

## 2018-09-19 PROCEDURE — 94003 VENT MGMT INPAT SUBQ DAY: CPT

## 2018-09-19 PROCEDURE — 94760 N-INVAS EAR/PLS OXIMETRY 1: CPT

## 2018-09-19 PROCEDURE — 85014 HEMATOCRIT: CPT | Performed by: PHYSICIAN ASSISTANT

## 2018-09-19 PROCEDURE — 93010 ELECTROCARDIOGRAM REPORT: CPT | Performed by: INTERNAL MEDICINE

## 2018-09-19 PROCEDURE — 82948 REAGENT STRIP/BLOOD GLUCOSE: CPT

## 2018-09-19 PROCEDURE — 84100 ASSAY OF PHOSPHORUS: CPT | Performed by: PHYSICIAN ASSISTANT

## 2018-09-19 PROCEDURE — 71045 X-RAY EXAM CHEST 1 VIEW: CPT

## 2018-09-19 PROCEDURE — G8997 SWALLOW GOAL STATUS: HCPCS

## 2018-09-19 PROCEDURE — 80048 BASIC METABOLIC PNL TOTAL CA: CPT | Performed by: INTERNAL MEDICINE

## 2018-09-19 PROCEDURE — 85018 HEMOGLOBIN: CPT | Performed by: PHYSICIAN ASSISTANT

## 2018-09-19 PROCEDURE — 85027 COMPLETE CBC AUTOMATED: CPT | Performed by: PHYSICIAN ASSISTANT

## 2018-09-19 PROCEDURE — 99232 SBSQ HOSP IP/OBS MODERATE 35: CPT | Performed by: INTERNAL MEDICINE

## 2018-09-19 PROCEDURE — 82330 ASSAY OF CALCIUM: CPT | Performed by: PHYSICIAN ASSISTANT

## 2018-09-19 PROCEDURE — 83735 ASSAY OF MAGNESIUM: CPT | Performed by: PHYSICIAN ASSISTANT

## 2018-09-19 PROCEDURE — G8996 SWALLOW CURRENT STATUS: HCPCS

## 2018-09-19 PROCEDURE — 92610 EVALUATE SWALLOWING FUNCTION: CPT

## 2018-09-19 PROCEDURE — 93005 ELECTROCARDIOGRAM TRACING: CPT

## 2018-09-19 PROCEDURE — 80053 COMPREHEN METABOLIC PANEL: CPT | Performed by: PHYSICIAN ASSISTANT

## 2018-09-19 PROCEDURE — 85730 THROMBOPLASTIN TIME PARTIAL: CPT | Performed by: INTERNAL MEDICINE

## 2018-09-19 PROCEDURE — 99233 SBSQ HOSP IP/OBS HIGH 50: CPT | Performed by: INTERNAL MEDICINE

## 2018-09-19 PROCEDURE — 85007 BL SMEAR W/DIFF WBC COUNT: CPT | Performed by: PHYSICIAN ASSISTANT

## 2018-09-19 PROCEDURE — 85730 THROMBOPLASTIN TIME PARTIAL: CPT | Performed by: EMERGENCY MEDICINE

## 2018-09-19 RX ORDER — TRAZODONE HYDROCHLORIDE 50 MG/1
50 TABLET ORAL
Status: DISCONTINUED | OUTPATIENT
Start: 2018-09-19 | End: 2018-10-05 | Stop reason: HOSPADM

## 2018-09-19 RX ORDER — CLOPIDOGREL BISULFATE 75 MG/1
75 TABLET ORAL DAILY
Status: DISCONTINUED | OUTPATIENT
Start: 2018-09-19 | End: 2018-09-28

## 2018-09-19 RX ORDER — ATORVASTATIN CALCIUM 10 MG/1
10 TABLET, FILM COATED ORAL
Status: DISCONTINUED | OUTPATIENT
Start: 2018-09-19 | End: 2018-09-24

## 2018-09-19 RX ORDER — DULOXETIN HYDROCHLORIDE 60 MG/1
60 CAPSULE, DELAYED RELEASE ORAL DAILY
Status: DISCONTINUED | OUTPATIENT
Start: 2018-09-19 | End: 2018-10-05 | Stop reason: HOSPADM

## 2018-09-19 RX ORDER — POTASSIUM CHLORIDE 20 MEQ/1
20 TABLET, EXTENDED RELEASE ORAL ONCE
Status: COMPLETED | OUTPATIENT
Start: 2018-09-19 | End: 2018-09-19

## 2018-09-19 RX ADMIN — AMIODARONE HYDROCHLORIDE 0.5 MG/MIN: 50 INJECTION, SOLUTION INTRAVENOUS at 13:57

## 2018-09-19 RX ADMIN — AMIODARONE HYDROCHLORIDE 200 MG: 200 TABLET ORAL at 09:33

## 2018-09-19 RX ADMIN — ATORVASTATIN CALCIUM 10 MG: 10 TABLET, FILM COATED ORAL at 17:25

## 2018-09-19 RX ADMIN — POTASSIUM CHLORIDE 20 MEQ: 149 INJECTION, SOLUTION, CONCENTRATE INTRAVENOUS at 00:55

## 2018-09-19 RX ADMIN — NAFCILLIN SODIUM 2000 MG: 2 INJECTION, POWDER, LYOPHILIZED, FOR SOLUTION INTRAMUSCULAR; INTRAVENOUS at 17:09

## 2018-09-19 RX ADMIN — TBO-FILGRASTIM 480 MCG: 480 INJECTION, SOLUTION SUBCUTANEOUS at 15:13

## 2018-09-19 RX ADMIN — POTASSIUM CHLORIDE 20 MEQ: 149 INJECTION, SOLUTION, CONCENTRATE INTRAVENOUS at 07:34

## 2018-09-19 RX ADMIN — CHLORHEXIDINE GLUCONATE 15 ML: 1.2 RINSE ORAL at 20:51

## 2018-09-19 RX ADMIN — INSULIN LISPRO 1 UNITS: 100 INJECTION, SOLUTION INTRAVENOUS; SUBCUTANEOUS at 19:55

## 2018-09-19 RX ADMIN — DULOXETINE HYDROCHLORIDE 60 MG: 60 CAPSULE, DELAYED RELEASE ORAL at 09:42

## 2018-09-19 RX ADMIN — SODIUM PHOSPHATE, MONOBASIC, MONOHYDRATE 12 MMOL: 276; 142 INJECTION, SOLUTION INTRAVENOUS at 02:35

## 2018-09-19 RX ADMIN — GADOBUTROL 5 ML: 604.72 INJECTION INTRAVENOUS at 00:33

## 2018-09-19 RX ADMIN — NAFCILLIN SODIUM 2000 MG: 2 INJECTION, POWDER, LYOPHILIZED, FOR SOLUTION INTRAMUSCULAR; INTRAVENOUS at 12:05

## 2018-09-19 RX ADMIN — SODIUM CHLORIDE 150 ML/HR: 0.9 INJECTION, SOLUTION INTRAVENOUS at 11:08

## 2018-09-19 RX ADMIN — CEFAZOLIN SODIUM 2000 MG: 2 SOLUTION INTRAVENOUS at 04:06

## 2018-09-19 RX ADMIN — NAFCILLIN SODIUM 2000 MG: 2 INJECTION, POWDER, LYOPHILIZED, FOR SOLUTION INTRAMUSCULAR; INTRAVENOUS at 20:51

## 2018-09-19 RX ADMIN — VANCOMYCIN HYDROCHLORIDE 750 MG: 750 INJECTION, SOLUTION INTRAVENOUS at 01:35

## 2018-09-19 RX ADMIN — HEPARIN SODIUM 3300 UNITS: 1000 INJECTION, SOLUTION INTRAVENOUS; SUBCUTANEOUS at 18:33

## 2018-09-19 RX ADMIN — TRAZODONE HYDROCHLORIDE 50 MG: 50 TABLET ORAL at 21:36

## 2018-09-19 RX ADMIN — CHLORHEXIDINE GLUCONATE 15 ML: 1.2 RINSE ORAL at 09:48

## 2018-09-19 RX ADMIN — Medication 2 G: at 11:25

## 2018-09-19 RX ADMIN — CLOPIDOGREL 75 MG: 75 TABLET, FILM COATED ORAL at 09:42

## 2018-09-19 RX ADMIN — AMIODARONE HYDROCHLORIDE 200 MG: 200 TABLET ORAL at 17:25

## 2018-09-19 NOTE — PROGRESS NOTES
I was informed by case management that the patient wished to change her advanced directive and who would be in charge of making medical decisions for her when she was not able to  When I met and talked with the patient she was alert and oriented to self, time, place, as well as the current president  She told me that she was in the hospital because she was dehydrated and that she was sick  She told me that her significant other, Renaldo Bradley, of was not a bad arron but that she wanted her daughters to be involved in her healthcare decision making process  When I asked her if she wanted 1 of the daughters specifically to be in charge she replied that she wanted them all to be involved  She told me her daughters were Charmaine Mesa, and Collins, and that they should be contacted for any future medical decisions if she was unable to make them  I told her that this would be changed in our system and that the old paperwork that she had filled out would be removed and she exhibited understanding of this fact  She also stated that she wished to talk to her daughters when she could which I relayed to nursing  Case management was made aware of this change

## 2018-09-19 NOTE — PROGRESS NOTES
Progress Note - General Surgery   Emily Abdullahi 60 y o  female MRN: [id number]  [facility] [id number]    Assessment:  Port a cath site is well and without bleeding or signs of infection  The abdomen is soft and nontender  The stoma is quite well and viable  Plan:  I will follow as needed  She should follow with me if she recovers and surgical care is considered to remove the primary tumor and liver disease  I suspect this will not be possible due to her co-morbidities  Subjective/Objective   Chief Complaint: No complaints  Subjective: Feels fine  Objective: Somewhat confused  Doesn't remember my name but knows to ask if I am going to operate on her  Blood pressure 109/63, pulse 92, temperature 97 6 °F (36 4 °C), temperature source Rectal, resp  rate (!) 27, height 5' 3" (1 6 m), weight 57 1 kg (125 lb 14 1 oz), SpO2 99 %, not currently breastfeeding  ,Body mass index is 22 3 kg/m²  Intake/Output Summary (Last 24 hours) at 09/19/18 1354  Last data filed at 09/19/18 1130   Gross per 24 hour   Intake          4545 53 ml   Output             1430 ml   Net          3115 53 ml       Invasive Devices     Central Venous Catheter Line            CVC Central Lines 09/17/18 Triple Right Femoral 2 days          Peripheral Intravenous Line            Peripheral IV 09/16/18 Left Antecubital 2 days    Peripheral IV 09/19/18 Left Forearm less than 1 day          Drain            Colostomy RUQ -- days                Physical Exam: Awake but not fully oriented  Port wound is well  No signs of infection are present  The abdomen is soft, non-distended and non-tender, now  The stoma is well  Lab, Imaging and other studies:I have personally reviewed pertinent lab results

## 2018-09-19 NOTE — CONSULTS
Vancomycin has been discontinued  Thank you for this consult, pharmacy will sign off    Montserrat Coles, Pharmacist

## 2018-09-19 NOTE — PROGRESS NOTES
Progress Note - Infectious Disease   Emily Abdullahi 60 y o  female MRN: [id number]  Unit/Bed#: [facility] 04 Encounter: [id number]      Impression/Plan: This is a 65-year-old female with a complicated past medical history who presents for altered mental status and severe sepsis, likely secondary to her Gram-positive bacteremia and possible right-sided endocarditis with thrombus seen in the R heart or possible port infection      1     MSSA  bacteremia:  Possible sources include introduction with unwitnessed IVDU and potentially from her port site; no other obvious sources present on exam   -blood cultures from 9/16 are positive; cultures from 09/18 are pending  -repeat cultures ordered for today  -patient had MRI imaging of her spine, which were reviewed and do not show any significant abnormalities better concerning for ectopic foci of infection  -cardiology following  -given recent culture results will change the patient to nafcillin which is the optimal therapy for MSSA bacteremia in the setting of suspected right-sided endocarditis that subsequently led to septic pulmonary emboli   -will continue to follow CBC and chemistry daily  -will plan to repeat blood cultures every 24-48 hours, till they are clear  -the patient has also had her port removed and cultures are pending  -would recommend removal of femoral line as soon as feasible given the infection rate associated with them      2    probable right-sided endocarditis  -although no vegetations were present on the echo given the patient's CT finding concern is for right-sided endocarditis that led to septic emboli to the lungs   -ultimately right-sided endocarditis is medically managed condition and there are very few indications for surgery   -cardiology note appreciated, and the possibility that the thrombus in the right side of her heart may also be infected and could have spread emboli into her lungs    Agree with the plan for heparin in addition to her current antibiotics  -antibiotic changes and management as above  -will continue to follow blood cultures for clearance  -the patient will likely require prolonged course of antibiotic therapy and potentially placement based on her home situation     3   Severe sepsis, POA:  -this is likely due to problems 1 and 2  -will continue management as above     4  Neutropenia:  -patient noted to have continued fall in her neutrophil count  -this is likely related to her chemotherapy which was given about a week ago  -she is currently being given G-CSF  -will continue to monitor her counts on subsequent CBCs  -will continue current antibiotics as we have a targeted source for her fevers in the setting of neutropenia  -should her clinical picture change and she further decompensate will plan to expand gram-negative coverage and repeat cultures at that time    5  Significant leg pain on exam:  -remains unclear if the patient's leg pain noted on palpation is related to the diffuse pains that she had initially noted or if there is a localized issue present given her history of falls at home  -at this time will continue to monitor on exam  -should she not clear cultures will need to consider getting x-rays/CT of her lower extremities      6   Colon cancer currently on palliative chemotherapy with chronic port  -patient has had port removal  -unclear what the plans are for chemotherapy and recommend discussing this with Oncology  -will ultimately need to manage her infection before any additional chemotherapy sessions are possible      7   Diabetes  -patient presented with elevated blood sugars  -current care as per primary team     Plan discussed in detail with ICU team      Antibiotics:  Nafcillin 1  Total of 4 days of antibiotic therapy    Subjective: The patient on exam is confused  On questioning he is unable to answer if she has any specific symptoms and continues to ask to speak to her daughter's or Osmar Sanchez    She is upset and does not understand why she is in the hospital     Objective:  Vitals:  HR:  [] 82  Resp:  [17-43] 25  BP: ()/() 109/63  SpO2:  [96 %-100 %] 100 %  Temp (24hrs), Av 1 °F (37 8 °C), Min:97 5 °F (36 4 °C), Max:101 8 °F (38 8 °C)  Current: Temperature: 97 8 °F (36 6 °C)    Physical Exam:   General Appearance:  Alert, interactive, nontoxic, no acute distress  Confused on unable to provide a history  She interacts on exam but does not focus  Throat: Oropharynx moist without lesions  Lungs:   Clear to auscultation anteriorly bilaterally; no wheezes, rhonchi or rales; respirations unlabored, she remains on nasal cannula   Chest: Her port site has been removed  The area is bandaged there is no drainage or erythema surrounding the dressing and no drainage seeping through the dressing  Heart:  RRR; no murmur, rub or gallop   Abdomen:   Soft, non-tender, non-distended, positive bowel sounds  Extremities: No clubbing, cyanosis or edema  She continues to have ongoing tenderness to palpation in her upper thighs and along her arms  Skin: No new rashes or lesions on her exposed skin  No draining wounds noted  Her femoral site appears clear intact and peripheral IVs are without any erythema       Labs, Imaging, & Other studies:   All pertinent labs and imaging studies were personally reviewed    Results from last 7 days  Lab Units 18  0147  18  2138   WBC Thousand/uL 0 60*  --   --   --  1 41* 1 05*   HEMOGLOBIN g/dL 7 2* 7 2* 7 7*  < > 8 7* 8 9*   PLATELETS Thousands/uL 96*  --   --   --  74* 67*   < > = values in this interval not displayed      Results from last 7 days  Lab Units 18  0518  0518  1910  18  0448  18  0640   SODIUM mmol/L 138 138 135*  < > 134*  < > 124*   POTASSIUM mmol/L 3 9 3 9 4 1  < > 4 1  < > 2 9*   CHLORIDE mmol/L 111* 112* 110*  < > 106  < > 92*   CO2 mmol/L 17* 17* 18*  < > 18* < > 20*   BUN mg/dL 20 19 20  < > 26*  < > 33*   CREATININE mg/dL 1 23 1 23 0 97  < > 1 09  < > 1 44*   EGFR ml/min/1 73sq m 48 48 64  < > 56  < > 40   CALCIUM mg/dL 7 2* 7 4* 7 1*  < > 7 0*  < > 7 8*   AST U/L 38  --   --   --  51*  --  45   ALT U/L 9*  --   --   --  18  --  21   ALK PHOS U/L 75  --   --   --  86  --  93   < > = values in this interval not displayed      Results from last 7 days  Lab Units 09/18/18  0526 09/16/18  6095   BLOOD CULTURE  Staphylococcus aureus* Staphylococcus aureus*  Staphylococcus aureus*   GRAM STAIN RESULT  Gram positive cocci in clusters  Gram positive cocci in clusters Gram positive cocci in clusters  Gram positive cocci in clusters

## 2018-09-19 NOTE — SOCIAL WORK
CM informed by bedside RN, Shasta Lim, pt is stating she no longer wants her s/o, Osmar Sanchez, to be her decision maker  CM spoke with resident, Dr Deborah Pelayo, and informed him of same  Dr Deborah Pelayo will speak with pt to determine if pt is competent to change who her health care representative is  Dr Deborah Pealyo to speak with pt and update CM

## 2018-09-19 NOTE — PROGRESS NOTES
General Cardiology   Progress Note -  Team One   Emily Abdullahi 60 y o  female MRN: [id number]    [facility]: [id number]    Assessment/ Plan  Atrial Flutter w/RVR  [id number] (Score 6)Sex, HTN, thrombo embolism history, vascular disease history, diabetes history  Currently in NSR; maintained on IV amiodarone infusion at 0 5 mg/min; initiated on amiodarone p o yesterday, held secondary to no OG tube for feedings/med admin; Continue amiodarone infusion in addition to oral amiodaromne once able to obtain OG tube  Currently on IV heparin infusion; continue for now  Patient will need long-term anticoagulation she carries a high risk of stroke development; Coumadin vs NOAC   Right Atrial thrombus vs mass  JUNIOR from 9/18/2018 demonstrated LVEF 50%, no regional wall motion abnormalities  -A small, stand-like mobile mass, (7 4mm) present on the noncoronary cusp; 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; there also was a (2 8 mm) echodensity on the LVOT side of the valve, could represent redundant valve tissue vs very small vegetation  In the right atrium  -There was a large, homogenous, solid, mobile mass, measuring approximately 30 mm x 29 mm in the inferior right atrial cavity with associated smoke in the right  atrium  It may represent a thrombus versus a right atrial mass    -RV normal size and systolic function  -No evidence of tricuspid valve, mitral valve vegetation, or pulmonic valve  Probable endocarditis of the right side  No vegetation present on the tricuspid/mitral/pulmonic valve; possibly on the aortic valve  CT scan results w/septic emboli to the lungs so highly suspicious for right sided endocarditis  Medical management at this point; continue IV antibiotics as below  Septic Shock  In the setting of staphylococcus bacteremia; blood cults x 2 from 9/16 (staph aureus) blood cults from 9/18 (gram + cocci in clusters)  ID has been consulted and closely following   Currently on IV Vancomycin and IV Cefazolin  Port has been removed and sent for culture  IV pressors currently on hold this am; remains on IVFs  Encephalopathy  In the setting of above  Continue frequent neuro checks  Colon CA s/p resection with colostomy; on FolFox chemotherapy; currently on hold  CAD s/p RCA, LAD, and Diag MAKEDA   Resume Plavix 75 mg daily  Restart Lipitor 10 mg daily  HLD  Restart Lipitor 10 mg daily    Subjective  Review of Systems   Unable to perform ROS: intubated       Objective:   Vitals: Blood pressure 135/86, pulse 86, temperature 97 6 °F (36 4 °C), temperature source Rectal, resp  rate 19, height 5' 3" (1 6 m), weight 57 1 kg (125 lb 14 1 oz), SpO2 100 %, not currently breastfeeding ,       Body mass index is 22 3 kg/m²  ,     Systolic (08NHA), KOQ:891 , Min:83 , MQA:034     Diastolic (16MYU), OYH:74, Min:49, Max:104          Intake/Output Summary (Last 24 hours) at 09/19/18 0858  Last data filed at 09/19/18 0600   Gross per 24 hour   Intake          4722 44 ml   Output             1425 ml   Net          3297 44 ml     Weight (last 2 days)     Date/Time   Weight    09/17/18 0200  57 1 (125 88)                Telemetry Review: Currently in NSR    EKG personally reviewed by JEANNIE Richardson  Physical Exam   Constitutional: She appears well-developed and well-nourished  No distress  HENT:   Head: Normocephalic and atraumatic  Orally intubated; OG tube in place   Eyes: Conjunctivae are normal  Pupils are equal, round, and reactive to light  No scleral icterus  Neck: Normal range of motion  Neck supple  No JVD present  Cardiovascular: Normal rate, regular rhythm, normal heart sounds and intact distal pulses  Exam reveals no gallop and no friction rub  Mild non pitting generalized edema   Pulmonary/Chest: She has no wheezes  She has no rales  ON PS on ventilator; breath sounds are coarse bilaterally   Abdominal: Soft  Bowel sounds are normal  There is no tenderness  Musculoskeletal: She exhibits edema  Lymphadenopathy:     She has no cervical adenopathy  Neurological: She is alert  Follows commands; intubated   Skin: Skin is warm and dry  Psychiatric:   Anxious       LABORATORY RESULTS    Results from last 7 days  Lab Units 09/16/18  2145   TROPONIN I ng/mL <0 02     CBC with diff:   Results from last 7 days  Lab Units 09/19/18  0518 09/19/18  0517 09/19/18  0147 09/18/18  1910 09/18/18  1044 09/18/18  0448 09/17/18 2138 09/17/18  1420 09/17/18  0640 09/17/18  0203 09/16/18  2144   WBC Thousand/uL 0 60*  --   --   --   --  1 41* 1 05*  --  2 22* 2 88* 2 67* 3 56*   HEMOGLOBIN g/dL 7 2* 7 2* 7 7* 7 6* 7 8* 8 7* 8 9*  < > 6 5* 7 5* 10 1* 9 6*   HEMATOCRIT % 20 9* 20 9* 21 8* 21 1* 21 7* 24 5* 25 7*  < > 18 7* 21 5* 27 8* 25 6*   MCV fL 80*  --   --   --   --  78* 80*  --  79* 79* 76* 75*   PLATELETS Thousands/uL 96*  --   --   --   --  74* 67*  --  61* 55* 75* 66*   MCH pg 27 5  --   --   --   --  27 7 27 8  --  27 5 27 6 27 7 28 2   MCHC g/dL 34 4  --   --   --   --  35 5 34 6  --  34 8 34 9 36 3 37 5*   RDW % 14 2  --   --   --   --  13 6 13 5  --  13 3 13 5 13 3 13 1   MPV fL 11 8  --   --   --   --  12 4 12 0  --  13 0* 12 1 10 7 10 1   NRBC AUTO /100 WBCs 0  --   --   --   --  0 0  --  0 0 0 0   < > = values in this interval not displayed      CMP:  Results from last 7 days  Lab Units 09/19/18  0518 09/19/18  0517 09/18/18  1910 09/18/18  1044 09/18/18  0448 09/18/18  0210 09/17/18 2138 09/17/18  0640  09/16/18  2145   SODIUM mmol/L 138 138 135* 134* 134* 134* 129*  < > 124*  < > 110*   POTASSIUM mmol/L 3 9 3 9 4 1 3 6 4 1 3 8 4 4  < > 2 9*  < > 4 1   CHLORIDE mmol/L 111* 112* 110* 106 106 105 102  < > 92*  < > 77*   CO2 mmol/L 17* 17* 18* 18* 18* 18* 18*  < > 20*  < > 20*   BUN mg/dL 20 19 20 23 26* 25 25  < > 33*  < > 47*   CREATININE mg/dL 1 23 1 23 0 97 1 17 1 09 1 15 1 21  < > 1 44*  < > 2 11*   CALCIUM mg/dL 7 2* 7 4* 7 1* 7 1* 7 0* 7 1* 7 1*  < > 7 8*  < > 7 8*   AST U/L 38  --   --   --  51* --   --   --  45  --  45   ALT U/L 9*  --   --   --  18  --   --   --  21  --  25   ALK PHOS U/L 75  --   --   --  86  --   --   --  93  --  120*   EGFR ml/min/1 73sq m 48 48 64 51 56 52 49  < > 40  < > 25   < > = values in this interval not displayed      BMP:  Results from last 7 days  Lab Units 09/19/18  0518 09/19/18  0517 09/18/18  1910 09/18/18  1044 09/18/18  0448 09/18/18  0210 09/17/18  2138   SODIUM mmol/L 138 138 135* 134* 134* 134* 129*   POTASSIUM mmol/L 3 9 3 9 4 1 3 6 4 1 3 8 4 4   CHLORIDE mmol/L 111* 112* 110* 106 106 105 102   CO2 mmol/L 17* 17* 18* 18* 18* 18* 18*   BUN mg/dL 20 19 20 23 26* 25 25   CREATININE mg/dL 1 23 1 23 0 97 1 17 1 09 1 15 1 21   CALCIUM mg/dL 7 2* 7 4* 7 1* 7 1* 7 0* 7 1* 7 1*       No results found for: NTBNP          Results from last 7 days  Lab Units 09/19/18  0520 09/19/18  0518 09/19/18  0517 09/18/18  1910 09/18/18  1044 09/18/18  0210 09/17/18  2138   MAGNESIUM mg/dL 2 6 2 5 2 6 2 0 2 4 1 8 1 9                 Results from last 7 days  Lab Units 09/17/18  1151   TSH 3RD GENERATON uIU/mL 0 537         Results from last 7 days  Lab Units 09/18/18  0448 09/17/18  1806 09/17/18  0203 09/16/18  2145   INR  1 17 1 29* 1 22* 1 24*       Lipid Profile:   Lab Results   Component Value Date    CHOL 279 09/03/2014     Lab Results   Component Value Date    HDL 22 (L) 03/18/2016    HDL 21 (L) 03/18/2016    HDL 36 (L) 01/13/2016     Lab Results   Component Value Date    LDLCALC  03/18/2016      Comment:      Calculated LDL invalid, triglycerides >400 mg/dl    LDLCALC  03/18/2016      Comment:      Calculated LDL invalid, triglycerides >400 mg/dl    LDLCALC 200 (H) 01/13/2016     Lab Results   Component Value Date    TRIG 564 (H) 03/18/2016    TRIG 562 (H) 03/18/2016    TRIG 383 (H) 01/13/2016       Cardiac testing:   Results for orders placed during the hospital encounter of 09/17/18   Echo complete with contrast if indicated    Narrative [address] [facility], [address] [place] [address]  [phone]    Transthoracic Echocardiogram  2D, M-mode, Doppler, and Color Doppler    Study date:  17-Sep-2018    Patient: [facility]   MR number: [id number]  Account number: [de-identified]  : 1960  Age: 60 years  Gender: Female  Status: Inpatient  Location: Bedside  Height: 63 in  Weight: 125 lb  BP: 91/ 74 mmHg    Indications: AV Disease AV Disease    Diagnoses: I35 8 - Other nonrheumatic aortic valve disorders    Sonographer:  HARJEET Sandy  Primary Physician:  Deep Maddox MD  Referring Physician:  Ruma Rising:  [facility]  Cardiology Fellow:  Tere Ward MD  Interpreting Physician:  Melissa Sanz MD    SUMMARY    LEFT VENTRICLE:  Systolic function was hyperdynamic  Ejection fraction was estimated to be 55 %  Although no diagnostic regional wall motion abnormality was identified, this possibility cannot be completely excluded on the basis of this study  VENTRICULAR SEPTUM:  There was dyssynergic motion  These changes are consistent with LBBB  MITRAL VALVE:  There was mild regurgitation  AORTIC VALVE:  The valve was probably trileaflet  Leaflets exhibited normal thickness and normal cuspal separation  TRICUSPID VALVE:  There was mild regurgitation  There was a definite, large, sessile, echogenic, fixed mass on the anterior leaflet, on the right atrial aspect  This may represent a thrombus, tumor or vegetation on tricuspid valve  IVC, HEPATIC VEINS:  No obvious thrombus in IVC    RECOMMENDATIONS:  If clinically indicated, transesophageal echocardiography could provide additional information  HISTORY: PRIOR HISTORY: [email or web address] Angioplasty,Bipolar,HTN,Sepsis,Cancer, Sepsis,Smoker    PROCEDURE: The procedure was performed at the bedside  This was a routine study  The transthoracic approach was used   The study included complete 2D imaging, M-mode, complete spectral Doppler, and color Doppler  Image quality was good  LEFT VENTRICLE: Size was normal  Systolic function was hyperdynamic  Ejection fraction was estimated to be 55 %  Although no diagnostic regional wall motion abnormality was identified, this possibility cannot be completely excluded on the  basis of this study  DOPPLER: Due to tachycardia, there was fusion of early and atrial contributions to ventricular filling  The study was not technically sufficient to allow evaluation of LV diastolic function  VENTRICULAR SEPTUM: There was dyssynergic motion  These changes are consistent with LBBB  RIGHT VENTRICLE: The size was normal  Systolic function was normal     LEFT ATRIUM: Size was normal     RIGHT ATRIUM: Size was normal     MITRAL VALVE: Valve structure was normal  There was mild focal thickening, limited to the leaflet margin  There was normal leaflet separation  DOPPLER: The transmitral velocity was within the normal range  There was no evidence for  stenosis  There was mild regurgitation  AORTIC VALVE: The valve was probably trileaflet  Leaflets exhibited normal thickness and normal cuspal separation  DOPPLER: Transaortic velocity was within the normal range  There was no evidence for stenosis  There was no significant  regurgitation  TRICUSPID VALVE: The valve structure was normal  There was normal leaflet separation  There was a definite, large, sessile, echogenic, fixed mass on the anterior leaflet, on the right atrial aspect  This may represent a thrombus, tumor or  vegetation on tricuspid valve  DOPPLER: The transtricuspid velocity was within the normal range  There was no evidence for stenosis  There was mild regurgitation  The tricuspid jet envelope definition was inadequate for estimation of RV  systolic pressure  PULMONIC VALVE: Not well visualized  DOPPLER: The transpulmonic velocity was within the normal range  There was no regurgitation  PERICARDIUM: There was no pericardial effusion      AORTA: The root exhibited normal size  SYSTEMIC VEINS: IVC: No obvious thrombus in IVC The inferior vena cava was not well visualized  The inferior vena cava was normal in size  PULMONARY VEINS: DOPPLER: Doppler signals were not recordable in the pulmonary vein(s)  SYSTEM MEASUREMENT TABLES    2D  %FS: 28 01 %  Ao Diam: 3 cm  EDV(Teich): 41 13 ml  EF(Cube): 62 7 %  EF(Teich): 55 58 %  ESV(Cube): 12 29 ml  ESV(Teich): 18 27 ml  IVSd: 1 04 cm  LA Diam: 3 13 cm  LAAd A2C: 18 19 cm2  LAAd A4C: 12 8 cm2  LAEDV A-L A2C: 62 58 ml  LAEDV A-L A4C: 29 68 ml  LAEDV Index (A-L): 38 95 ml/m2  LAEDV MOD A2C: 57 96 ml  LAEDV MOD A4C: 27 68 ml  LAEDV(A-L): 44 01 ml  LALd A2C: 4 49 cm  LALd A4C: 4 68 cm  LVIDd: 3 21 cm  LVIDs: 2 31 cm  LVPWd: 1 02 cm  SI(Cube): 18 27 ml/m2  SI(Teich): 20 23 ml/m2  SV(Cube): 20 65 ml  SV(Teich): 22 86 ml  rv diam: 2 69 cm    CW  AV Vmax: 1 46 m/s  AV maxP 53 mmHg  RAP: 5 mmHg  TR Vmax: 2 92 m/s  TR maxP 06 mmHg    MM  AV Cusp: 1 5 cm  Ao Diam: 2 93 cm  LA Diam: 2 97 cm  LA/Ao: 1 01  TAPSE: 1 33 cm    PW  LVOT Vmax: 0 89 m/s  LVOT maxPG: 3 14 mmHg  RVSP: 39 06 mmHg    [facility] Accredited Echocardiography Laboratory    Prepared and electronically signed by    Jordan Boss MD  Signed 17-Sep-2018 16:34:11       Results for orders placed during the hospital encounter of 18   JUNIOR    N[facility]ative [facility] [address] [place] [address]  [phone]    Transesophageal Echocardiogram  2D, 3D, Doppler, and Color Doppler    Study date:  18-Sep-2018    Patient: Myra Tong  MR number: [id number]  Account number: [de-identified]  : 1960  Age: 60 years  Gender: Female  Status: Inpatient  Location: Bedside  Height: 63 in  Weight: 125 lb  BP: 125/ 71 mmHg    Indications: Endocarditis      Diagnoses: I38  - Endocarditis, valve unspecified    Sonographer:  Kassidy De RDCS  Interpreting Physician:  Denton Lesches, MD  Primary Physician:  Mitchell Saxena MD  Referring Physician:  Miguelito Luevano PA-C  Group:  [facility]  Cardiology Fellow:  Andres Courtney MD    SUMMARY    LEFT VENTRICLE:  Systolic function was at the lower limits of normal  Ejection fraction was estimated to be 50 %  There were no regional wall motion abnormalities  RIGHT VENTRICLE:  The size was normal   Systolic function was normal     LEFT ATRIUM:  Size was normal   No thrombus was identified  LEFT ATRIAL APPENDAGE:  The size was normal   No thrombus was identified  RIGHT ATRIUM:  There was a large, homogenous, solid, mobile mass, measuring approximately 30 mm x 29 mm in the inferior right atrial cavity with associated smoke in the right atrium  It may represent a thrombus versus a right atrial mass  There is a  freely mobile segment at it's surface and the port tip itself can be seen trying to dislodge part of the mass  There was evidence of spontaneous echo contrast ("smoke")  RIGHT ATRIAL APPENDAGE:  There was no right atrial appendage thrombus identified  MITRAL VALVE:  There was no echocardiographic evidence of vegetation  AORTIC VALVE:  There was a small, strand-like, mobile mass, measuring 7 4 mm on the noncoronary cusp, on the aortic aspect  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  There was also a 2 8 mm echodensity on the LVOT side of the valve  This could represent  redundant valve tissue versus a very small vegetation  TRICUSPID VALVE:  There was mild regurgitation  There was no echocardiographic evidence of vegetation  PULMONIC VALVE:  There was no echocardiographic evidence of vegetation  HISTORY: PRIOR HISTORY: Heroin abuse, Coronary artery disease, Cerebral vascular accident, Diabetes, Schizophrenia, Hypertension, Smoker, Acute renal failure  PROCEDURE: The procedure was performed at the bedside  This was a routine study   The risks and alternatives of the procedure were explained to the patient's next of kin and informed consent was obtained  The transesophageal approach was  used  The study included complete 2D imaging, 3D imaging, complete spectral Doppler, and color Doppler  The heart rate was 72 bpm, at the start of the study  An adult omniplane probe was inserted by the cardiology fellow under direct  supervision of the attending cardiologist  Intubated with ease  One intubation attempt(s)  There was no blood detected on the probe  Image quality was adequate  There were no complications during the procedure  MEDICATIONS: Anesthesia  administered by floor nurse  LEFT VENTRICLE: Size was normal  Systolic function was at the lower limits of normal  Ejection fraction was estimated to be 50 %  There were no regional wall motion abnormalities  Wall thickness was normal  DOPPLER: The study was not  technically sufficient to allow evaluation of LV diastolic function  RIGHT VENTRICLE: The size was normal  Systolic function was normal     LEFT ATRIUM: Size was normal  No thrombus was identified  APPENDAGE: The size was normal  No thrombus was identified  DOPPLER: The function was normal (normal emptying velocity)  RIGHT ATRIUM: Size was normal  A line (catheter or pacing wire) was present  There was a large, homogenous, solid, mobile mass, measuring approximately 30 mm x 29 mm in the inferior right atrial cavity with associated smoke in the right  atrium  It may represent a thrombus versus a right atrial mass  There is a freely mobile segment at it's surface and the port tip itself can be seen trying to dislodge part of the mass  There was evidence of spontaneous echo contrast  ("smoke")  APPENDAGE: There was no right atrial appendage thrombus identified  MITRAL VALVE: Valve structure was normal  There was normal leaflet separation  There was no echocardiographic evidence of vegetation  DOPPLER: There was trace regurgitation  AORTIC VALVE: The valve was trileaflet   Leaflets exhibited normal thickness and normal cuspal separation  There was a small, strand-like, mobile mass, measuring 7 4 mm on the noncoronary cusp, on the aortic aspect  It may represent a  fibroelastoma  There was also a 2 8 mm echodensity on the LVOT side of the valve  This could represent redundant valve tissue versus a very small vegetation  DOPPLER: There was no regurgitation  TRICUSPID VALVE: The valve structure was normal  There was normal leaflet separation  There was no echocardiographic evidence of vegetation  DOPPLER: There was mild regurgitation  PULMONIC VALVE: Leaflets exhibited normal thickness, no calcification, and normal cuspal separation  There was no echocardiographic evidence of vegetation  DOPPLER: There was no significant regurgitation  PERICARDIUM: There was no pericardial effusion  The pericardium was normal in appearance  AORTA: The root exhibited normal size  There was no atheroma  There was no evidence for dissection  There was no evidence for aneurysm  PULMONARY VEINS: The pulmonary veins were normal sized  DOPPLER: Doppler flow pattern was normal in the pulmonary vein(s)  [address] Accredited Echocardiography Laboratory    Prepared and electronically signed by    Ben Joya MD  Signed 18-Sep-2018 14:08:00       No results found for this or any previous visit  No procedure found  No results found for this or any previous visit        Meds/Allergies   all current active meds have been reviewed, current meds:   Current Facility-Administered Medications   Medication Dose Route Frequency    acetaminophen (TYLENOL) oral suspension 650 mg  650 mg Per NG Tube Q6H PRN    amiodarone (CORDARONE) 900 mg in dextrose 5 % 500 mL infusion  0 5 mg/min Intravenous Continuous    amiodarone tablet 200 mg  200 mg Oral TID With Meals    atorvastatin (LIPITOR) tablet 10 mg  10 mg Oral After Dinner    calcium gluconate 2 g in sodium chloride 0 9 % 100 mL IVPB  2 g Intravenous Once    ceFAZolin (ANCEF) IVPB (premix) 2,000 mg  2,000 mg Intravenous Q8H    chlorhexidine (PERIDEX) 0 12 % oral rinse 15 mL  15 mL Swish & Spit Q12H [facility]    clopidogrel (PLAVIX) tablet 75 mg  75 mg Oral Daily    divalproex sodium (DEPAKOTE) EC tablet 750 mg  750 mg Oral Daily    DULoxetine (CYMBALTA) delayed release capsule 60 mg  60 mg Oral Daily    fentaNYL 1250 mcg in sodium chloride 0 9% 125mL drip  25 mcg/hr Intravenous Continuous    fentanyl citrate (PF) 100 MCG/2ML 50 mcg  50 mcg Intravenous Continuous PRN    heparin (porcine) 25,000 units in 250 mL infusion (premix)  3-20 Units/kg/hr (Order-Specific) Intravenous Titrated    heparin (porcine) injection 1,650 Units  1,650 Units Intravenous PRN    heparin (porcine) injection 3,300 Units  3,300 Units Intravenous PRN    insulin regular (HumuLIN R,NovoLIN R) 1 Units/mL in sodium chloride 0 9 % 100 mL infusion  0 3-21 Units/hr Intravenous Titrated    norepinephrine (LEVOPHED) 4 mg (STANDARD CONCENTRATION) IV in sodium chloride 0 9% 250 mL  1-30 mcg/min Intravenous Titrated    sodium chloride 0 9 % infusion  150 mL/hr Intravenous Continuous    traZODone (DESYREL) tablet 50 mg  50 mg Oral HS    and PTA meds:   Prior to Admission Medications   Prescriptions Last Dose Informant Patient Reported? Taking?    DULoxetine (CYMBALTA) 60 mg delayed release capsule  Self Yes No   Sig: Take 60 mg by mouth daily     atorvastatin (LIPITOR) 10 mg tablet  Self No No   Sig: Take 1 tablet (10 mg total) by mouth daily   clopidogrel (PLAVIX) 75 mg tablet  Self No No   Sig: Take 1 tablet (75 mg total) by mouth daily   divalproex sodium (DEPAKOTE) 250 mg EC tablet  Self Yes No   Sig: Take 3 tablets by mouth daily   insulin aspart protamine-insulin aspart (NOVOLOG MIX 70/30) 100 units/mL injection   No No   Sig: Inject 20 Units under the skin 2 (two) times a day before meals   insulin glargine (LANTUS) 100 units/mL subcutaneous injection  Self No No   Sig: Inject 10 Units under the skin daily at bedtime   metFORMIN (GLUCOPHAGE) 1000 MG tablet  Self Yes No   Sig: metformin 1,000 mg tablet  BID   metoclopramide (REGLAN) 10 mg tablet   No No   Sig: Take 1 tablet (10 mg total) by mouth 3 (three) times a day before meals   mirtazapine (REMERON) 15 mg tablet  Self Yes No   Sig: Take 1 tablet by mouth daily     nitroglycerin (NITROSTAT) 0 4 mg SL tablet  Self Yes No   Sig: Place 0 4 mg under the tongue   oxyCODONE-acetaminophen (PERCOCET) 5-325 mg per tablet   No No   Sig: Take 1 tablet by mouth every 8 (eight) hours as needed for moderate pain Max Daily Amount: 2 tablets   promethazine (PHENERGAN) 12 5 MG tablet  Self Yes No   Sig: Take 12 5 mg by mouth daily     senna ([employer] SENNA) 8 6 MG tablet  Self Yes No   Sig: Take by mouth   terbinafine (LAMISIL) 250 mg tablet  Self Yes No   Sig: Take 1 tablet by mouth daily   traZODone (DESYREL) 50 mg tablet  Self Yes No   Sig: Take 50 mg by mouth daily at bedtime      Facility-Administered Medications: None     Prescriptions Prior to Admission   Medication    atorvastatin (LIPITOR) 10 mg tablet    clopidogrel (PLAVIX) 75 mg tablet    divalproex sodium (DEPAKOTE) 250 mg EC tablet    DULoxetine (CYMBALTA) 60 mg delayed release capsule    insulin aspart protamine-insulin aspart (NOVOLOG MIX 70/30) 100 units/mL injection    insulin glargine (LANTUS) 100 units/mL subcutaneous injection    metFORMIN (GLUCOPHAGE) 1000 MG tablet    metoclopramide (REGLAN) 10 mg tablet    mirtazapine (REMERON) 15 mg tablet    nitroglycerin (NITROSTAT) 0 4 mg SL tablet    oxyCODONE-acetaminophen (PERCOCET) 5-325 mg per tablet    promethazine (PHENERGAN) 12 5 MG tablet    senna ([employer] SENNA) 8 6 MG tablet    terbinafine (LAMISIL) 250 mg tablet    traZODone (DESYREL) 50 mg tablet         amiodarone 0 5 mg/min Last Rate: 0 5 mg/min (09/18/18 1306)   fentaNYL 25 mcg/hr Last Rate: Stopped (09/19/18 0757)   fentanyl citrate (PF) 50 mcg    heparin (porcine) 3-20 Units/kg/hr (Order-Specific) Last Rate: 12 Units/kg/hr (09/19/18 0100)   insulin regular (HumuLIN R,NovoLIN R) infusion 0 3-21 Units/hr Last Rate: Stopped (09/19/18 0800)   norepinephrine 1-30 mcg/min Last Rate: Stopped (09/19/18 0443)   sodium chloride 150 mL/hr Last Rate: 150 mL/hr (09/19/18 0057)     Assessment:  Principal Problem: HHNC (hyperglycemic hyperosmolar nonketotic coma) ([facility] )  Active Problems:    [facility]    Altered mental status    Counseling / Coordination of Care  Total floor / unit time spent today 20 minutes  Greater than 50% of total time was spent with the patient and / or family counseling and / or coordination of care  ** Please Note: Dragon 360 Dictation voice to text software may have been used in the creation of this document   **

## 2018-09-19 NOTE — SPEECH THERAPY NOTE
Speech Language/Pathology  Speech/Language Pathology  Assessment    Patient Name: Emily Vega  Today's Date: 9/19/2018     Problem List  Patient Active Problem List   Diagnosis    CVA [facility]cerebral vascular accident) [facility]    Type 2 diabetes mellitus without complication, without long-term current use of insulin ([facility] )    Schizoaffective disorder, bipolar type ([facility] )    Essential hypertension    Colonic mass    Type 2 diabetes mellitus with complication ([facility] )    CKD (chronic kidney disease) stage 3, GFR 30-59 ml/min    Pain of right upper extremity    RYLAN (acute kidney injury)     CAD (coronary artery disease)    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HCC)    Large bowel obstruction ([facility]    Brachial artery stenosis, right (HCC)    Rectosigmoid mass - High colostomy output    Leukocytosis    GERD (gastroesophageal reflux disease)    Tobacco abuse    Liver metastasis (HCC)    Mixed hyperlipidemia    Dizziness - due to orthostatic hypotension    Ambulatory dysfunction    Orthostatic hypotension    Chemotherapy induced nausea and vomiting    Lower abdominal pain    HHNC (hyperglycemic hyperosmolar nonketotic coma) [facility]    Sepsis ([facility]    Altered mental status    Acute respiratory failure with hypoxia (HCC)    Hypocalcemia    Leukopenia    Cavitating pulmonary nodules      Past Medical History  Past Medical History:   Diagnosis Date    Bipolar depression ([facility] ) 3/17/2016    CAD S/P percutaneous coronary angioplasty 3/17/2016    Cancer ([facility]     Chronic pain     Colonic mass     Colostomy care ([facility]     COPD (chronic obstructive pulmonary disease) ([facility]     CVA (cerebral vascular accident) ([facility]     R sided weakness    Essential hypertension 3/17/2016    GERD (gastroesophageal reflux disease)     Hepatitis C     Heroin abuse 3/18/2016    History of gastric ulcer     Hypertension     NSTEMI (non-ST elevated myocardial infarction) ([facility] ) 07/2012    Psychiatric disorder 09/03/2014    Inpatient admission     Schizoaffective disorder ([facility] ) 3/17/2016    Schizophrenia ([facility] ) 3/17/2016    STEMI (ST elevation myocardial infarction) ([facility] ) 12/2017    Type 2 diabetes mellitus ([place] [address] ) 3/17/2016     Past Surgical History  Past Surgical History:   Procedure Laterality Date    CARDIAC CATHETERIZATION  07/2016    COLONOSCOPY N/A 6/22/2018    Procedure: COLONOSCOPY;  Surgeon: Eloy Patino MD;  [facility]: [facility]; Service: Colorectal    CORONARY ANGIOPLASTY WITH STENT PLACEMENT  07/05/2012    MAKEDA (LAD), PTA (Diag)    CORONARY ANGIOPLASTY WITH STENT PLACEMENT  12/2017    MAKEDA/ PTA (RCA)    FL GUIDED CENTRAL VENOUS ACCESS REPLACEMENT  8/15/2018    UT INSERT PICC W/ SUB-Q PORT N/A 8/15/2018    Procedure: INSERTION VENOUS PORT (PORT-A-CATH); Surgeon: Katelin Pope MD;  [facility];  Service: Colorectal    UT LAP,DIAGNOSTIC ABDOMEN N/A 7/17/2018    Procedure: LAPAROSCOPY DIAGNOSTIC, Laproscopic transverse loop colostomy, lysis of adhesions;  Surgeon: Erich Camacho MD;  [facility];  Service: Colorectal    UT SIGMOIDOSCOPY FLX DX W/COLLJ SPEC BR/WA IF PFRMD N/A 8/15/2018    Procedure: Logan Chavez;  Surgeon: Katelin Pope MD;  [facility];  Service: Colorectal     History:  Emily Abdullahi is a 60 y o  female who present to [facility] ED on night of 9/16/18 for altered mental status of 2 day duration per caregiver  Per EMS and chart review, patient's blood sugar was noted to be above 600  Patient has had fatigue, increased SOB and thirst, and weakness  In the ED, patient was noted to be hypotensive and tachycardic up to 156  Patient found to have a lactic acidosis of 3 4, glucose of 952 with no gap, and acute kidney injury  Sepsis alert was called and patient was given 2L IVF, administered zosyn, and insulin infusion was initiated  Patient transferred to [facility] due to bed availability        Summary:  Pt presents w/ functional pharyngeal/reflexive swallow on the limited items administered today  She presented w/ some oral symptoms related to her mental status deficits  No cough or throat clear noted  Recommendations:  Diet: Puree  Liquid: thin  Meds: largely cut or crush w/ thin liquids  Supervision: assist w/ feeding  Positioning:Upright  Strategies: Pt to take PO/Meds only when fully alert and upright  Oral care: frequently  Aspiration precautions  Reflux precautions    Therapy Prognosis: good  Prognosis considerations: pt motivation to eat independently  ST will f/u  Frequency: 3x    Consider consult w/:  Nutrition      Reason for consult:  R/o aspiration  Determine safest and least restrictive diet  Change in mental status      Current diet:  NPO    Premorbid diet:  Regular w/ thin    Previous VBS:  -  O2 requirement:  Nasal canula 6 liters  Voice/Speech:  Mild-mod hoarse  Social:  Lives at home w/ significant other, Talib Austin  Follows commands: Yes                   Cognitive Status:  Alert  Oral mech exam:  Edentulous  Full symmetry      Items administered:  Puree, nectar thick, water individual and rapid successive sips  Liquids were taken by straw/cup  Oral stage:  Lip closure: inconsistently had difficulty w/ lip closure on puree by tsp  Bolus formation: wfl  Bolus control: wfl  Transfer: variable  Prolonged holding/transfer time approx  14 sec to begin  By the end of serving, transfer time was prompt  Oral residue: wfl  Pocketing: not noted  Pharyngeal stage:  Swallow promptness: wfl  Laryngeal rise: wfl  Difficult to palpate at times  Wet voice: not noted  Throat clear: not noted  Cough: not noted  Secondary swallows:  Not noted  Audible swallows: not noted  Esophageal stage:  No s/s reported  H/o GERD  H/o stricture w/ dilitation      Aspiration precautions posted    Results d/w:  Pt, nursing    Goal(s):  Pt will tolerate least restrictive diet w/out s/s aspiration or oral/pharyngeal difficulties

## 2018-09-19 NOTE — PROGRESS NOTES
Progress Note - Critical Care   Emily Abdullahi 60 y o  female MRN: [id number]  Unit/Bed#: [facility] 04 Encounter: [id number]    Assessment:   1  Encephalopathy  2  Hypotension  3  Hyperglycemia  4  Shock - Sepsis  5  Thrombocytopenia  6  Colon cancer s/p resection with colostomy bag, folfox chemotherapy  7  DM2  8  Schizophrenia/schizoaffective d/o  9  CAD s/p stents  10  New onset atrial flutter  11  RYLAN on CKD  12  Likely septic vegetations  13  Right atrial thrombus     Plan:      Neuro:   1  Encephalopathy   -Most likely 2/2 sepsis, hyperglycemia  -Improved, patient able to follow commands, GCS 11T  -Neuro checks q1hr  -HX of CVA     Analgesia - Fentanyl at 25     2  Schizoaffective/Bipolar  -Hold home depakote, cymbalta, remeron and trazodone     CV:   1  Shock   -Most likely 2/2 sepsis, metabolic derangements   -current maintenance with NS 150cc/hr  -Goal MAP > 65, levo was off this morning  -18 ECHO: EF 47%, grade 2 diastolic dysfunction  -JUNIOR revealed likely right atrial thrombus     2  Atrial fibrillation/flutter  -new onset, possibly 2/2 sepsis, dehydration  -Amiodarone drip  -Currently in sinus rhythm on amio, add oral when possible per cardiology  -Heparin drip      3  HTN   -Metoprolol 5 mg Q6 held 2/2 hypotension     4  CAD s/p stent 3/17/16  -Hold home plavix at this time, on heparin drip     5  Hyperlipidemia  -Hold home Lipitor 10mg qd until tolerates PO     Lun  Concern for pneumonia   - Chest CT shows evidence of septic emboli vs met vs pneumonia  - On vanc and cefazolin per ID  - Supplemental O2 to maintain SpO2 >92%  - Hx of COPD     2  Airway protection  - patient intubated  pm  - AC 14/400/40/5  - patient did well on PS 10 throughout the day  - wean for extubation     GI:   1   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with liver mets  - s/p resection with colostomy on 18  - Follows with oncology outpatient, last course of chemo with FOLFOX on 18  - Right port-a-cath removed  due to infection as well as location in relation to the atrial thrombus     2  Hepatitis C     FEN:   F: 150cc/hr NS  E: Closely monitor lytes, bmp q6hrs, replete as necessary   N: NPO while encephalopathic      :   1  RYLAN   -Cr 1 09 (baseline   8)  -Continue IV hydration  -Strict I&Os     ID:   1  Sepsis 2/2 gram positive bacteremia  -Gram positive cocci in clusters x2 - Staph Aureus, sensitivity pending  -ECHO concern for valve vegetations, JUNIOR revealed none   -Vanco day 3  -Cefazolin day 2  -Repeat cultures drawn 9/18 show growth of same x2  -Repeat cultures to be drawn on 9/20     Heme:   1  Thrombocytopenia  - Plt 96 (74)  - WBC 0 60 (1 41)   - Hgb 7 2 (7 7, 7 6)  -Continue to monitor      Endo:  1  Hyperosmolar hypoglycemic nonketotic coma  -Hx of DM2, on metformin, lantus, novolog   -IVF hydration and insulin gtt  -continue to closely trend, glucose should not be reduced by more than 80/hr     Msk/Skin:   Back pain post fall  - complaining of back pain yesterday  - CT scans showed no acute process  - MRI ordered today for further investigation     Disposition: ICU care    Chief Complaint:  Patient currently intubated and sedated    HPI/24hr events: No acute events overnight  Patient did have a fever up to 101 8 and was given tylenol which reduced it effectively  Patient was put back on [facility] vent setting following the MRI procedure  Physical Exam:   Physical Exam   Constitutional: No distress  HENT:   Head: Normocephalic and atraumatic  Eyes: Conjunctivae and EOM are normal  Pupils are equal, round, and reactive to light  Neck: Neck supple  No tracheal deviation present  Cardiovascular: Normal rate, regular rhythm and normal heart sounds  Exam reveals no friction rub  No murmur heard  Pulmonary/Chest: Effort normal  No respiratory distress  She has no wheezes  End expiratory breath sounds bilaterally, good air movement   Abdominal: Soft  She exhibits no distension  There is no tenderness     Colostomy bag with brown feces, no evidence of blood   Musculoskeletal: She exhibits no edema or tenderness  Limited range of movement currently  Patient is able to squeeze hands and move toes but little other movement at this time   Neurological: She is alert  Reacts to name and follows commands  GCS 11T   Skin: She is not diaphoretic  Removal site of portacath has mild erythema consistent with recent procedure, no sign of infection   Vitals reviewed  Vitals:    18 0351 18 0400 18 0500 18 0600   BP:  104/62 112/60 111/59   BP Location:       Pulse:  74 74 72   Resp:  (!)  18   Temp:  97 5 °F (36 4 °C)     TempSrc:       SpO2: 100% 100% 100% 99%   Weight:       Height:                 Temperature:   Temp (24hrs), Av 5 °F (38 1 °C), Min:97 5 °F (36 4 °C), Max:101 8 °F (38 8 °C)    Current: Temperature: 97 5 °F (36 4 °C)    Weights:   IBW: 52 4 kg    Body mass index is 22 3 kg/m²    Weight (last 2 days)     Date/Time   Weight    18 0200  57 1 (125 88)              Hemodynamic Monitoring:       Non-Invasive/Invasive Ventilation Settings:  Respiratory    Lab Data (Last 4 hours)    None         O2/Vent Data (Last 4 hours)       0351           Vent Mode AC/VC       Resp Rate (BPM) (BPM) 14       Vt (mL) (mL) 400       FIO2 (%) (%) 40       PEEP (cmH2O) (cmH2O) 5       MV 7 6                   Intake and Outputs:  I/O        07 -  0700  07 -  0700    I V  (mL/kg) 4280 4 (75) 3264 7 (57 2)    Blood 300     NG/GT  30    IV Piggyback 880 8 996 2    Total Intake(mL/kg) 5461 2 (95 6) 4290 9 (75 1)    Urine (mL/kg/hr) 1675 (1 2) 1475 (1 1)    Emesis/NG output 200 200    Stool  150    Total Output 1875 1825    Net +3586 2 +2465 9              UOP: /hour   Nutrition:        Diet Orders            Start     Ordered    18  Diet NPO  Diet effective now     Question:  Diet Type  Answer:  NPO    18 5134        TF currently running at /hour with a goal of   Formula:    Labs:     Results from last 7 days  Lab Units 09/19/18  0518 09/19/18  0517 09/19/18  0147  09/18/18  0448 09/17/18  2138  09/17/18  1420   WBC Thousand/uL 0 60*  --   --   --  1 41* 1 05*  --  2 22*   HEMOGLOBIN g/dL 7 2* 7 2* 7 7*  < > 8 7* 8 9*  < > 6 5*   HEMATOCRIT % 20 9* 20 9* 21 8*  < > 24 5* 25 7*  < > 18 7*   PLATELETS Thousands/uL 96*  --   --   --  74* 67*  --  61*   MONO PCT MAN %  --   --   --   --  14* 7  --  0*   < > = values in this interval not displayed  Results from last 7 days  Lab Units 09/19/18 0518 09/19/18  0517 09/18/18  1910  09/18/18  0448  09/17/18  0640   SODIUM mmol/L 138 138 135*  < > 134*  < > 124*   POTASSIUM mmol/L 3 9 3 9 4 1  < > 4 1  < > 2 9*   CHLORIDE mmol/L 111* 112* 110*  < > 106  < > 92*   CO2 mmol/L 17* 17* 18*  < > 18*  < > 20*   BUN mg/dL 20 19 20  < > 26*  < > 33*   CREATININE mg/dL 1 23 1 23 0 97  < > 1 09  < > 1 44*   CALCIUM mg/dL 7 2* 7 4* 7 1*  < > 7 0*  < > 7 8*   ALK PHOS U/L 75  --   --   --  86  --  93   ALT U/L 9*  --   --   --  18  --  21   AST U/L 38  --   --   --  51*  --  45   < > = values in this interval not displayed      Results from last 7 days  Lab Units 09/19/18  0520 09/19/18 0518 09/19/18  0517   MAGNESIUM mg/dL 2 6 2 5 2 6       Results from last 7 days  Lab Units 09/19/18  0520 09/19/18  0518 09/19/18  0517   PHOSPHORUS mg/dL 3 0 2 9 2 9        Results from last 7 days  Lab Units 09/18/18  0448 09/18/18  0030 09/17/18  1806 09/17/18  0203 09/16/18  2145   INR  1 17  --  1 29* 1 22* 1 24*   PTT seconds  --  35 51*  --  29       Results from last 7 days  Lab Units 09/17/18  0824   LACTIC ACID mmol/L 2 0       0  Lab Value Date/Time   TROPONINI <0 02 09/16/2018 2145   TROPONINI <0 02 09/07/2018 1642   TROPONINI <0 02 07/25/2018 1248   TROPONINI 0 57 (H) 03/18/2016 0623   TROPONINI 0 65 (H) 03/18/2016 0045   TROPONINI 0 64 (H) 03/17/2016 2220       Imaging:   MRI Results Pending  CXR - Increasing opacity/consolidation in right lung   I have personally reviewed pertinent films in PACS    EKG: NSR    Micro:  Lab Results   Component Value Date    BLOODCX Staphylococcus aureus (A) 09/16/2018    BLOODCX Staphylococcus aureus (A) 09/16/2018    BLOODCX No Growth After 5 Days   03/21/2016    URINECX >100,000 cfu/ml Escherichia coli 03/17/2016       Allergies: No Known Allergies    Medications:   Scheduled Meds:    Current Facility-Administered Medications:  acetaminophen 650 mg Per NG Tube Q6H PRN Marianela Marmolejo DO    amiodarone 0 5 mg/min Intravenous Continuous Rhonda Drummond PA-C Last Rate: 0 5 mg/min (09/18/18 1306)   amiodarone 200 mg Oral TID With Meals JEANNIE Harden    cefazolin 2,000 mg Intravenous Q8H Derick Mcgee MD Last Rate: 2,000 mg (09/19/18 0406)   chlorhexidine 15 mL [employer]it Q12H [address] Marianela Marmolejo DO    fentaNYL 25 mcg/hr Intravenous Continuous Gregg Reyes MD Last Rate: 25 mcg/hr (09/19/18 0100)   fentanyl citrate (PF) 50 mcg Intravenous Continuous PRN Lauren Holguin MD    heparin (porcine) 3-20 Units/kg/hr (Order-Specific) Intravenous Titrated [facility], PA- Last Rate: 12 Units/kg/hr (09/19/18 0100)   heparin (porcine) 1,650 Units Intravenous PRN [facility], PA-C    heparin (porcine) 3,300 Units Intravenous PRN [facility], [facility]    insulin regular (HumuLIN R,NovoLIN R) infusion 0 3-21 Units/hr Intravenous Titrated Lauren Holguin MD Last Rate: 3 Units/hr (09/19/18 0646)   norepinephrine 1-30 mcg/min Intravenous Titrated Lauren Holguin MD Last Rate: Stopped (09/19/18 0443)   potassium chloride 20 mEq Oral Once Erick Cowden, DO    Or        potassium chloride 20 mEq Intravenous Once Alban Eaton,     potassium chloride 20 mEq Intravenous Once Elvira Summers MD    sodium chloride 150 mL/hr Intravenous Continuous Marianela Marmolejo DO Last Rate: 150 mL/hr (09/19/18 0057)   vancomycin 12 5 mg/kg Intravenous Q12H Derick Mcgee MD Last Rate: Stopped (09/19/18 0200) Continuous Infusions:    amiodarone 0 5 mg/min Last Rate: 0 5 mg/min (09/18/18 1306)   fentaNYL 25 mcg/hr Last Rate: 25 mcg/hr (09/19/18 0100)   fentanyl citrate (PF) 50 mcg    heparin (porcine) 3-20 Units/kg/hr (Order-Specific) Last Rate: 12 Units/kg/hr (09/19/18 0100)   insulin regular (HumuLIN R,NovoLIN R) infusion 0 3-21 Units/hr Last Rate: 3 Units/hr (09/19/18 0646)   norepinephrine 1-30 mcg/min Last Rate: Stopped (09/19/18 0443)   sodium chloride 150 mL/hr Last Rate: 150 mL/hr (09/19/18 0057)     PRN Meds:    acetaminophen 650 mg Q6H PRN   fentanyl citrate (PF) 50 mcg Continuous PRN   heparin (porcine) 1,650 Units PRN   heparin (porcine) 3,300 Units PRN       VTE Pharmacologic Prophylaxis: Reason for no pharmacologic prophylaxis Heparin drip  VTE Mechanical Prophylaxis: sequential compression device    Invasive lines and devices: Invasive Devices     Central Venous Catheter Line            CVC Central Lines 09/17/18 Triple Right Femoral 2 days    Port A Cath 09/17/18 Right Chest 2 days          Peripheral Intravenous Line            Peripheral IV 09/16/18 Left Antecubital 2 days    Peripheral IV 09/19/18 Left Forearm less than 1 day          Drain            Colostomy RUQ -- days    Urethral Catheter 2 days    NG/OG/Enteral Tube Orogastric Center mouth less than 1 day          Airway            ETT  Cuffed 7 5 mm 1 day                   Counseling / Coordination of Care       Code Status: Level 1 - Full Code     Portions of the record may have been created with voice recognition software  Occasional wrong word or "sound a like" substitutions may have occurred due to the inherent limitations of voice recognition software  Read the chart carefully and recognize, using context, where substitutions have occurred       Mike Valdez MD

## 2018-09-19 NOTE — RESPIRATORY THERAPY NOTE
RT Ventilator Management Note  Emily Abdullahi 60 y o  female MRN: [id number]  Unit/Bed#: [facility] 04 Encounter: [id number]      Daily Screen       9/19/2018 0729 9/19/2018 1010          Patient safety screen outcome[de-identified] Passed -      Spont breathing trial % for 30 min: - -      Spont breathing trial outcome[de-identified] - Passed      Name of Medical Team Notified[de-identified] - (P)  MD      Preparing to extubate/ Notify Nurse: - Yes      Extubation order obtained: - Yes      Consider Cuff Test: - Yes      Patient extubated: - Yes      RSBI: - 83              Physical Exam:   Assessment Type: Assess only  General Appearance: Awake, Alert  Respiratory Pattern: Normal  Chest Assessment: Chest expansion symmetrical  Bilateral Breath Sounds: Clear, Diminished  Suction: Oral, ET Tube      Resp Comments: Pt extubated at this time with no complications  Prior to extubation pt had a possitive cuff leak and an RBSI of 83  Once extubated pt was able to use voice and no stridor was heard  She was placed on a 6L NC, SpO2 WNL  Will continue to monitor throughout shift

## 2018-09-19 NOTE — RESPIRATORY THERAPY NOTE
RT Ventilator Management Note  Emily Abdullahi 60 y o  female MRN: [id number]  Unit/Bed#: [facility] 04 Encounter: [id number]      Daily Screen       9/17/2018 2242 9/18/2018 0757          Patient safety screen outcome[de-identified] Failed Failed      Not Ready for Weaning due to[de-identified] Underline problem not resolved Underline problem not resolved              Physical Exam:   Assessment Type: (P) Assess only  General Appearance: (P) Sedated  Respiratory Pattern: (P) Assisted  Chest Assessment: (P) Chest expansion symmetrical  Bilateral Breath Sounds: (P) Coarse      Resp Comments: (P) Pt remained on a/c settings throughout the night without incident  Will continue to monitor

## 2018-09-19 NOTE — RESPIRATORY THERAPY NOTE
RT Ventilator Management Note  Emily Abdullahi 60 y o  female MRN: [id number]  Unit/Bed#: [facility] 04 Encounter: [id number]      Daily Screen       9/18/2018 0757 9/19/2018 0729          Patient safety screen outcome[de-identified] Failed Passed      Not Ready for Weaning due to[de-identified] Underline problem not resolved -      Spont breathing trial % for 30 min: - -              Physical Exam:   Assessment Type: (P) Assess only  General Appearance: (P) Awake  Respiratory Pattern: (P) Spontaneous, Assisted  Chest Assessment: (P) Chest expansion symmetrical  Bilateral Breath Sounds: (P) Coarse  Suction: (P) ET Tube      Resp Comments: (P) Pt placed on PSV settings at this time  No resp distress noted  Will continue to monitor throughout shift

## 2018-09-19 NOTE — SPEECH THERAPY NOTE
Evaluation completed in conjunction with Vinny David MS, Graduate Speech pathology Intern  I agree with findings and recommendations

## 2018-09-19 NOTE — PROGRESS NOTES
According to physician notes from [facility] where patient is admitted, they are aware of +blood cultures and chest xray findings of pulmonary nodule possibly reflecting neoplasm

## 2018-09-20 PROBLEM — E83.42 HYPOMAGNESEMIA: Status: ACTIVE | Noted: 2018-09-20

## 2018-09-20 PROBLEM — E83.39 HYPOPHOSPHATEMIA: Status: ACTIVE | Noted: 2018-09-20

## 2018-09-20 PROBLEM — E87.2 METABOLIC ACIDOSIS, NAG, BICARBONATE LOSSES: Status: ACTIVE | Noted: 2018-09-20

## 2018-09-20 LAB
ANION GAP SERPL CALCULATED.3IONS-SCNC: 9 MMOL/L (ref 4–13)
ANISOCYTOSIS BLD QL SMEAR: PRESENT
APTT PPP: 37 SECONDS (ref 24–36)
APTT PPP: >210 SECONDS (ref 24–36)
BACTERIA BLD CULT: ABNORMAL
BACTERIA BLD CULT: ABNORMAL
BASOPHILS # BLD MANUAL: 0 THOUSAND/UL (ref 0–0.1)
BASOPHILS NFR MAR MANUAL: 0 % (ref 0–1)
BUN SERPL-MCNC: 19 MG/DL (ref 5–25)
BURR CELLS BLD QL SMEAR: PRESENT
CALCIUM SERPL-MCNC: 7.5 MG/DL (ref 8.3–10.1)
CHLORIDE SERPL-SCNC: 106 MMOL/L (ref 100–108)
CHLORIDE UR-SCNC: 70 MMOL/L (ref 10–330)
CO2 SERPL-SCNC: 16 MMOL/L (ref 21–32)
CREAT SERPL-MCNC: 1.18 MG/DL (ref 0.6–1.3)
EOSINOPHIL # BLD MANUAL: 0.11 THOUSAND/UL (ref 0–0.4)
EOSINOPHIL NFR BLD MANUAL: 15 % (ref 0–6)
ERYTHROCYTE [DISTWIDTH] IN BLOOD BY AUTOMATED COUNT: 15.4 % (ref 11.6–15.1)
GFR SERPL CREATININE-BSD FRML MDRD: 51 ML/MIN/1.73SQ M
GIANT PLATELETS BLD QL SMEAR: PRESENT
GLUCOSE SERPL-MCNC: 220 MG/DL (ref 65–140)
GLUCOSE SERPL-MCNC: 260 MG/DL (ref 65–140)
GLUCOSE SERPL-MCNC: 262 MG/DL (ref 65–140)
GLUCOSE SERPL-MCNC: 266 MG/DL (ref 65–140)
GLUCOSE SERPL-MCNC: 294 MG/DL (ref 65–140)
GLUCOSE SERPL-MCNC: 342 MG/DL (ref 65–140)
GLUCOSE SERPL-MCNC: 426 MG/DL (ref 65–140)
GRAM STN SPEC: ABNORMAL
GRAM STN SPEC: ABNORMAL
HCT VFR BLD AUTO: 24 % (ref 34.8–46.1)
HGB BLD-MCNC: 8.2 G/DL (ref 11.5–15.4)
HYPERCHROMIA BLD QL SMEAR: PRESENT
LYMPHOCYTES # BLD AUTO: 0.44 THOUSAND/UL (ref 0.6–4.47)
LYMPHOCYTES # BLD AUTO: 62 % (ref 14–44)
MAGNESIUM SERPL-MCNC: 1.9 MG/DL (ref 1.6–2.6)
MCH RBC QN AUTO: 27.8 PG (ref 26.8–34.3)
MCHC RBC AUTO-ENTMCNC: 34.2 G/DL (ref 31.4–37.4)
MCV RBC AUTO: 81 FL (ref 82–98)
MONOCYTES # BLD AUTO: 0.06 THOUSAND/UL (ref 0–1.22)
MONOCYTES NFR BLD: 8 % (ref 4–12)
NEUTROPHILS # BLD MANUAL: 0.11 THOUSAND/UL (ref 1.85–7.62)
NEUTS SEG NFR BLD AUTO: 15 % (ref 43–75)
NRBC BLD AUTO-RTO: 0 /100 WBCS
NRBC BLD AUTO-RTO: 8 /100 WBC (ref 0–2)
PHOSPHATE SERPL-MCNC: 2.5 MG/DL (ref 2.7–4.5)
PLATELET # BLD AUTO: 146 THOUSANDS/UL (ref 149–390)
PLATELET BLD QL SMEAR: ADEQUATE
PMV BLD AUTO: 12.2 FL (ref 8.9–12.7)
POIKILOCYTOSIS BLD QL SMEAR: PRESENT
POLYCHROMASIA BLD QL SMEAR: PRESENT
POTASSIUM SERPL-SCNC: 3.9 MMOL/L (ref 3.5–5.3)
POTASSIUM UR-SCNC: 53.5 MMOL/L (ref 1–300)
RBC # BLD AUTO: 2.95 MILLION/UL (ref 3.81–5.12)
RBC MORPH BLD: PRESENT
SODIUM 24H UR-SCNC: 23 MOL/L
SODIUM SERPL-SCNC: 131 MMOL/L (ref 136–145)
TARGETS BLD QL SMEAR: PRESENT
WBC # BLD AUTO: 0.71 THOUSAND/UL (ref 4.31–10.16)

## 2018-09-20 PROCEDURE — 82948 REAGENT STRIP/BLOOD GLUCOSE: CPT

## 2018-09-20 PROCEDURE — 85730 THROMBOPLASTIN TIME PARTIAL: CPT | Performed by: INTERNAL MEDICINE

## 2018-09-20 PROCEDURE — 99232 SBSQ HOSP IP/OBS MODERATE 35: CPT | Performed by: INTERNAL MEDICINE

## 2018-09-20 PROCEDURE — G8979 MOBILITY GOAL STATUS: HCPCS | Performed by: PHYSICAL THERAPIST

## 2018-09-20 PROCEDURE — 84100 ASSAY OF PHOSPHORUS: CPT | Performed by: PHYSICIAN ASSISTANT

## 2018-09-20 PROCEDURE — 87186 SC STD MICRODIL/AGAR DIL: CPT | Performed by: INTERNAL MEDICINE

## 2018-09-20 PROCEDURE — G8978 MOBILITY CURRENT STATUS: HCPCS | Performed by: PHYSICAL THERAPIST

## 2018-09-20 PROCEDURE — 85027 COMPLETE CBC AUTOMATED: CPT | Performed by: PHYSICIAN ASSISTANT

## 2018-09-20 PROCEDURE — 99233 SBSQ HOSP IP/OBS HIGH 50: CPT | Performed by: INTERNAL MEDICINE

## 2018-09-20 PROCEDURE — 84300 ASSAY OF URINE SODIUM: CPT | Performed by: STUDENT IN AN ORGANIZED HEALTH CARE EDUCATION/TRAINING PROGRAM

## 2018-09-20 PROCEDURE — 85007 BL SMEAR W/DIFF WBC COUNT: CPT | Performed by: PHYSICIAN ASSISTANT

## 2018-09-20 PROCEDURE — 84133 ASSAY OF URINE POTASSIUM: CPT | Performed by: STUDENT IN AN ORGANIZED HEALTH CARE EDUCATION/TRAINING PROGRAM

## 2018-09-20 PROCEDURE — 82436 ASSAY OF URINE CHLORIDE: CPT | Performed by: STUDENT IN AN ORGANIZED HEALTH CARE EDUCATION/TRAINING PROGRAM

## 2018-09-20 PROCEDURE — 80048 BASIC METABOLIC PNL TOTAL CA: CPT | Performed by: PHYSICIAN ASSISTANT

## 2018-09-20 PROCEDURE — 87040 BLOOD CULTURE FOR BACTERIA: CPT | Performed by: INTERNAL MEDICINE

## 2018-09-20 PROCEDURE — 97163 PT EVAL HIGH COMPLEX 45 MIN: CPT | Performed by: PHYSICAL THERAPIST

## 2018-09-20 PROCEDURE — 87147 CULTURE TYPE IMMUNOLOGIC: CPT | Performed by: INTERNAL MEDICINE

## 2018-09-20 PROCEDURE — 83735 ASSAY OF MAGNESIUM: CPT | Performed by: PHYSICIAN ASSISTANT

## 2018-09-20 RX ORDER — TRISODIUM CITRATE DIHYDRATE AND CITRIC ACID MONOHYDRATE 500; 334 MG/5ML; MG/5ML
30 SOLUTION ORAL 3 TIMES DAILY
Status: COMPLETED | OUTPATIENT
Start: 2018-09-20 | End: 2018-09-21

## 2018-09-20 RX ORDER — MAGNESIUM SULFATE HEPTAHYDRATE 40 MG/ML
2 INJECTION, SOLUTION INTRAVENOUS ONCE
Status: COMPLETED | OUTPATIENT
Start: 2018-09-20 | End: 2018-09-20

## 2018-09-20 RX ORDER — INSULIN ASPART 100 [IU]/ML
10 INJECTION, SUSPENSION SUBCUTANEOUS
Status: DISCONTINUED | OUTPATIENT
Start: 2018-09-20 | End: 2018-09-21

## 2018-09-20 RX ORDER — INSULIN GLARGINE 100 [IU]/ML
10 INJECTION, SOLUTION SUBCUTANEOUS
Status: DISCONTINUED | OUTPATIENT
Start: 2018-09-20 | End: 2018-09-20

## 2018-09-20 RX ORDER — ACETAMINOPHEN 160 MG/5ML
650 SUSPENSION, ORAL (FINAL DOSE FORM) ORAL EVERY 6 HOURS PRN
Status: DISCONTINUED | OUTPATIENT
Start: 2018-09-20 | End: 2018-10-05 | Stop reason: HOSPADM

## 2018-09-20 RX ADMIN — INSULIN ASPART 10 UNITS: 100 INJECTION, SUSPENSION SUBCUTANEOUS at 14:23

## 2018-09-20 RX ADMIN — ATORVASTATIN CALCIUM 10 MG: 10 TABLET, FILM COATED ORAL at 17:25

## 2018-09-20 RX ADMIN — AMIODARONE HYDROCHLORIDE 0.5 MG/MIN: 50 INJECTION, SOLUTION INTRAVENOUS at 21:31

## 2018-09-20 RX ADMIN — NAFCILLIN SODIUM 2000 MG: 2 INJECTION, POWDER, LYOPHILIZED, FOR SOLUTION INTRAMUSCULAR; INTRAVENOUS at 00:46

## 2018-09-20 RX ADMIN — CHLORHEXIDINE GLUCONATE 15 ML: 1.2 RINSE ORAL at 08:06

## 2018-09-20 RX ADMIN — ENOXAPARIN SODIUM 70 MG: 80 INJECTION SUBCUTANEOUS at 13:57

## 2018-09-20 RX ADMIN — NAFCILLIN SODIUM 2000 MG: 2 INJECTION, POWDER, LYOPHILIZED, FOR SOLUTION INTRAMUSCULAR; INTRAVENOUS at 23:01

## 2018-09-20 RX ADMIN — INSULIN LISPRO 2 UNITS: 100 INJECTION, SOLUTION INTRAVENOUS; SUBCUTANEOUS at 09:07

## 2018-09-20 RX ADMIN — INSULIN LISPRO 4 UNITS: 100 INJECTION, SOLUTION INTRAVENOUS; SUBCUTANEOUS at 18:17

## 2018-09-20 RX ADMIN — ENOXAPARIN SODIUM 70 MG: 80 INJECTION SUBCUTANEOUS at 22:25

## 2018-09-20 RX ADMIN — NAFCILLIN SODIUM 2000 MG: 2 INJECTION, POWDER, LYOPHILIZED, FOR SOLUTION INTRAMUSCULAR; INTRAVENOUS at 08:59

## 2018-09-20 RX ADMIN — POTASSIUM PHOSPHATE, MONOBASIC AND POTASSIUM PHOSPHATE, DIBASIC 21 MMOL: 224; 236 INJECTION, SOLUTION INTRAVENOUS at 11:27

## 2018-09-20 RX ADMIN — TBO-FILGRASTIM 480 MCG: 480 INJECTION, SOLUTION SUBCUTANEOUS at 08:09

## 2018-09-20 RX ADMIN — NAFCILLIN SODIUM 2000 MG: 2 INJECTION, POWDER, LYOPHILIZED, FOR SOLUTION INTRAMUSCULAR; INTRAVENOUS at 13:54

## 2018-09-20 RX ADMIN — SODIUM CITRATE AND CITRIC ACID MONOHYDRATE 30 ML: 500; 334 SOLUTION ORAL at 23:21

## 2018-09-20 RX ADMIN — INSULIN LISPRO 3 UNITS: 100 INJECTION, SOLUTION INTRAVENOUS; SUBCUTANEOUS at 22:27

## 2018-09-20 RX ADMIN — SODIUM CITRATE AND CITRIC ACID MONOHYDRATE 30 ML: 500; 334 SOLUTION ORAL at 17:25

## 2018-09-20 RX ADMIN — AMIODARONE HYDROCHLORIDE 200 MG: 200 TABLET ORAL at 17:25

## 2018-09-20 RX ADMIN — TRAZODONE HYDROCHLORIDE 50 MG: 50 TABLET ORAL at 22:24

## 2018-09-20 RX ADMIN — CHLORHEXIDINE GLUCONATE 15 ML: 1.2 RINSE ORAL at 22:24

## 2018-09-20 RX ADMIN — CLOPIDOGREL 75 MG: 75 TABLET, FILM COATED ORAL at 08:06

## 2018-09-20 RX ADMIN — NAFCILLIN SODIUM 2000 MG: 2 INJECTION, POWDER, LYOPHILIZED, FOR SOLUTION INTRAMUSCULAR; INTRAVENOUS at 04:57

## 2018-09-20 RX ADMIN — NAFCILLIN SODIUM 2000 MG: 2 INJECTION, POWDER, LYOPHILIZED, FOR SOLUTION INTRAMUSCULAR; INTRAVENOUS at 18:15

## 2018-09-20 RX ADMIN — MAGNESIUM SULFATE HEPTAHYDRATE 2 G: 40 INJECTION, SOLUTION INTRAVENOUS at 10:05

## 2018-09-20 RX ADMIN — INSULIN LISPRO 5 UNITS: 100 INJECTION, SOLUTION INTRAVENOUS; SUBCUTANEOUS at 12:06

## 2018-09-20 RX ADMIN — SODIUM CITRATE AND CITRIC ACID MONOHYDRATE 30 ML: 500; 334 SOLUTION ORAL at 10:45

## 2018-09-20 RX ADMIN — DULOXETINE HYDROCHLORIDE 60 MG: 60 CAPSULE, DELAYED RELEASE ORAL at 08:06

## 2018-09-20 RX ADMIN — DIVALPROEX SODIUM 750 MG: 500 TABLET, DELAYED RELEASE ORAL at 08:07

## 2018-09-20 RX ADMIN — AMIODARONE HYDROCHLORIDE 200 MG: 200 TABLET ORAL at 08:06

## 2018-09-20 RX ADMIN — AMIODARONE HYDROCHLORIDE 200 MG: 200 TABLET ORAL at 11:31

## 2018-09-20 NOTE — PROGRESS NOTES
Progress Note - ICU Transfer to SD/MS tele   Emily Abdullahi 60 y o  female MRN: [id number]  [address] [facility]   Unit/Bed#: MICU 04 Encounter: [id number]    Code Status: Level 1 - Full Code    Reason for ICU admission: Sepsis, HHNC    Active problems:   Principal Problem: HHNC (hyperglycemic hyperosmolar nonketotic coma) ([address] )  Active Problems:    GERD (gastroesophageal reflux disease)    Type 2 diabetes mellitus with complication (HCC)    Hyponatremia    Sepsis (HCC)    Altered mental status    Acute respiratory failure with hypoxia (HCC)    Hypocalcemia    Leukopenia    Cavitating pulmonary nodules     Metabolic acidosis, NAG, bicarbonate losses    Hypomagnesemia    Hypophosphatemia  Resolved Problems:    * No resolved hospital problems  *      Consultants: Endocrine, Infectious disease, Cardiology    History of Present Illness: Emily Cancino is a 60 y o  female who present to [facility] ED on night of 9/16/18 for altered mental status of 2 day duration per caregiver  Per EMS and chart review, patient's blood sugar was noted to be above 600  Patient has had fatigue, increased SOB and thirst, and weakness  In the ED, patient was noted to be hypotensive and tachycardic up to 156  Patient found to have a lactic acidosis of 3 4, glucose of 952 with no gap, and acute kidney injury  Sepsis alert was called and patient was given 2L IVF, administered zosyn, and insulin infusion was initiated  Patient transferred to [facility] due to bed availability  Summary of clinical course:    ID: Chest CT showed concern for septic emboli which prompted a TTE to be performed  A mass was seen in the right atrium which could not fully be investigated by the TTE therefore a JUNIOR was also performed  This further investigation was read as a thrombus vs mass   At this time is was noted that the port-a-cath tip was making contact with the mass which prompted its removal  Patient has been bacteremic with blood cultures x4 and catheter tip showed MSSA  Patient has been treated with vanc and cefazolin which was switched to naficillin  ID is following  Endo: The patient had a glucose level of 952 upon arrival  She was treated with an insulin drip and constant electrolyte replacement as her acidosis and glucose levels improved  After two days she was transitioned to a diet and subcutaneous insulin  Endocrine has been consulted  Cardiovascular: Patient presented with new onset atrial flutter  This was eventually controlled by IV amiodarone and the patient is currently in NSR  Cardiology suggests transferring patient to oral amiodarone and lovenox (patient was on heparin drip)  Lovenox dosing was established by pharmacy as 1 mg/kg BID  It was also advised to restart patients home plavix dosing as she has had recent CVA s/p stents  Recent or scheduled procedures: Port-a-cath removal 9/18    Outstanding/pending diagnostics: None    Cultures: Blood cultures x4 and catheter tip show MSSA       Mobilization Plan: To chair    Nutrition Plan: Puree diet    Discharge Plan:   Initial Physical Therapy Recommendations: Post acute IP rehab  Initial Occupational Therapy Recommendations: N/A  Initial /Plan: Exploring placement at [facility]    Specific Diagnosis Plan:    Sepsis: Source: Unknown - Catheter vs drug use, Antibiotics: Naficillin, Length:  Day 1 on naficillin, previously on cefazolin/vanc 3 days,     Need for Infectious Disease consult: Following    Hyperglycemia:  Converting from IV to subcutaneous insulin: completed    Need for Endocrine consult: Consulted 9/20    Spoke with Dr Marietta Pallas regarding transfer  Please call [phone] with any questions or concerns  Portions of the record may have been created with voice recognition software  Occasional wrong word or "sound a like" substitutions may have occurred due to the inherent limitations of voice recognition software    Read the chart carefully and recognize, using context, where substitutions have occurred      Sima Cunningham MD

## 2018-09-20 NOTE — SOCIAL WORK
Pt daughter Marquis Quezada requesting referral to [facility] 2021 and 2029  CM sent via [address]  5 wishes completed  Call made to pastoral care to witness

## 2018-09-20 NOTE — PROGRESS NOTES
Progress Note - Cardiology   Emily Abdullahi 60 y o  female MRN: [id number]  Unit/Bed#: MICU 04 Encounter: [id number]  09/20/18  9:56 AM        Subjective/Objective   Chief Complaint: No chief complaint on file  Subjective: Patient denies any complaints   Specifically denies chest pains or shortness of breath    Objective: Comfortable , no distress at the time of exam      Patient Active Problem List   Diagnosis    CVA (cerebral vascular accident) ([place] [address] )    Type 2 diabetes mellitus without complication, without long-term current use of insulin ([place] [address] )    Schizoaffective disorder, bipolar type ([place] [address] )    Essential hypertension    Colonic mass    Type 2 diabetes mellitus with complication (HCC)    CKD (chronic kidney disease) stage 3, GFR 30-59 ml/min    Pain of right upper extremity    RYLAN (acute kidney injury)     CAD (coronary artery disease)    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HCC)    Large bowel obstruction ([facility] )    Brachial artery stenosis, right (HCC)    Hyponatremia    Rectosigmoid mass - High colostomy output    Leukocytosis    GERD (gastroesophageal reflux disease)    Tobacco abuse    Liver metastasis (HCC)    Mixed hyperlipidemia    Dizziness - due to orthostatic hypotension    Ambulatory dysfunction    Orthostatic hypotension    Chemotherapy induced nausea and vomiting    Lower abdominal pain    HHNC (hyperglycemic hyperosmolar nonketotic coma) (HCC)    Sepsis ([place] [address] )    Altered mental status    Acute respiratory failure with hypoxia (HCC)    Hypocalcemia    Leukopenia    Cavitating pulmonary nodules     Metabolic acidosis, NAG, bicarbonate losses    Hypomagnesemia    Hypophosphatemia       Vitals: BP (!) 83/53   Pulse 86   Temp (!) 97 °F (36 1 °C) (Axillary)   Resp (!) 32   Ht 5' 3" (1 6 m)   Wt 70 4 kg (155 lb 3 3 oz)   SpO2 99%   BMI 27 49 kg/m²     I/O this shift:  In: 22 8 [I V :22 8]  Out: 300 [Stool:300]  Wt Readings from Last 3 Encounters:   09/20/18 70 4 kg (155 lb 3 3 oz) 09/18/18 57 1 kg (125 lb 14 1 oz)   09/11/18 57 2 kg (126 lb 1 7 oz)       Intake/Output Summary (Last 24 hours) at 09/20/18 0956  Last data filed at 09/20/18 0743   Gross per 24 hour   Intake          2378 54 ml   Output             1700 ml   Net           678 54 ml     I/O last 3 completed shifts: In: 4992 2 [I V :4077  2; NG/GT:30; IV Piggyback:885]  Out: 8193 [Urine:1930; Stool:350]    Invasive Devices     Peripheral Intravenous Line            Peripheral IV 09/16/18 Left Antecubital 3 days    Peripheral IV 09/19/18 Left Forearm 1 day          Drain            Colostomy RUQ -- days                  Physical Exam:  GEN: Emily Abdullahi appears well, alert, pleasant and cooperative   HEENT: pupils equal, round, and reactive to light; extraocular muscles intact  NECK: supple, no carotid bruits or JVD  HEART: regular rhythm, normal S1 and S2, no murmurs, clicks, gallops or rubs   LUNGS: clear to auscultation bilaterally; no wheezes, rales, or rhonchi   ABDOMEN/GI: normal bowel sounds, soft, no tenderness, no distention  EXTREMITIES/Musculoskeltal: peripheral pulses normal; no clubbing, cyanosis, or edema, decreased range of motion  NEURO: no focal findings   SKIN: normal without suspicious lesions on exposed skin            Lab Results:     Results from last 7 days  Lab Units 09/16/18  2145   TROPONIN I ng/mL <0 02     Results from last 7 days  Lab Units 09/20/18  0507 09/19/18  0518 09/19/18  0517  09/18/18  0448   WBC Thousand/uL 0 71* 0 60*  --   --  1 41*   HEMOGLOBIN g/dL 8 2* 7 2* 7 2*  < > 8 7*   HEMATOCRIT % 24 0* 20 9* 20 9*  < > 24 5*   PLATELETS Thousands/uL 146* 96*  --   --  74*   < > = values in this interval not displayed          Results from last 7 days  Lab Units 09/20/18  0507 09/19/18  0518 09/19/18  0517  09/18/18  0448  09/17/18  0640   SODIUM mmol/L 131* 138 138  < > 134*  < > 124*   POTASSIUM mmol/L 3 9 3 9 3 9  < > 4 1  < > 2 9*   CHLORIDE mmol/L 106 111* 112*  < > 106  < > 92*   CO2 mmol/L 16* 17* 17*  < > 18*  < > 20*   BUN mg/dL 19 20 19  < > 26*  < > 33*   CREATININE mg/dL 1 18 1 23 1 23  < > 1 09  < > 1 44*   CALCIUM mg/dL 7 5* 7 2* 7 4*  < > 7 0*  < > 7 8*   ALK PHOS U/L  --  75  --   --  86  --  93   ALT U/L  --  9*  --   --  18  --  21   AST U/L  --  38  --   --  51*  --  45   < > = values in this interval not displayed  Results from last 7 days  Lab Units 09/18/18  0448 09/17/18  1806 09/17/18  0203   INR  1 17 1 29* 1 22*       Imaging: I have personally reviewed pertinent reports      EKG:  Rate controlled, in sinus rhythm    Scheduled Meds:    Current Facility-Administered Medications:  acetaminophen 650 mg Per NG Tube Q6H PRN Marianela Marmolejo DO    amiodarone 0 5 mg/min Intravenous Continuous Rhonda Drummond PA-C Last Rate: 0 5 mg/min (09/19/18 1357)   amiodarone 200 mg Oral TID With Meals JEANNIE Alatorre    atorvastatin 10 mg Oral After [facility] YANELIS Wang    chlorhexidine 15 mL Swish [employer] Spit Q12H [address] Marianela Marmolejo DO    clopidogrel 75 mg Oral Daily Megan Sequeira PA-C    divalproex sodium 750 mg Oral Daily Rosetta Quintana PA-C    DULoxetine 60 mg Oral Daily Rosetta Quintana PA-C    enoxaparin 1 mg/kg Subcutaneous Q12H [address] Megan Sequeira PA-C    fentanyl citrate (PF) 50 mcg Intravenous Continuous PRN Kristen Castillo MD    insulin glargine 10 Units Subcutaneous HS Bertis [place], DO    insulin lispro 1-6 Units Subcutaneous TID AC Bertis [place], DO    insulin lispro 1-6 Units Subcutaneous HS Bertis [place], DO    magnesium sulfate 2 g Intravenous Once Rosetta Quintana PA-C    nafcillin 2,000 mg Intravenous Q4H Anthony Peres MD Last Rate: 2,000 mg (09/20/18 0859)   potassium phosphate 21 mmol Intravenous Once Rosetta Quintana PA-C    sod citrate-citric acid 30 mL Oral TID Rosetta Quintana PA-C    tbo-filgrastim 480 mcg Subcutaneous Daily Rosetta Quintana PA-C    traZODone 50 mg Oral HS Megan Sequeira PA-C      Continuous Infusions:    amiodarone 0 5 mg/min Last Rate: 0 5 mg/min (09/19/18 7965)   fentanyl citrate (PF) 50 mcg        VTE Pharmacologic Prophylaxis:  Enoxaparin  VTE Mechanical Prophylaxis: sequential compression device      Impression:     60year-old with prior history of CAD, previous nontransmural [id number] of the LAD and diagonal branch, STEMI-December 2017 -status post PCI of the proximal RCA-MAKEDA, at that time mid LAD had  in the previously stented segment, distal LAD filling via collaterals, medication noncompliance, hypertension, hyperlipidemia, CKD, chronic and ongoing tobacco use, uncontrolled type 2 diabetes-A1c of 10 0 in June, right upper extremity ischemia in June 2018, the same month she was also diagnosed with a colonic mass-status post bowel resection and colostomy, also-status post right axillary/brachial artery PTA with development of distal ulnar artery emboli-status post thrombectomy and tPA and transition to Xarelto in July 2018  Subsequently  was off Xarelto for some time     Readmitted with altered mental status for 2 days and septic presentation   ECG demonstrates atrial flutter with rapid ventricular response-adenosine was attempted-revealing flutter waves      Other evaluation has included a CT scan of the head that shows plaque in are infarcts, CT scan of the chest that shows cavitary lesions-likely septic emboli, transthoracic echocardiogram with a tricuspid valve 'mass', blood cultures growing gram-positive cocci in clusters  JUNIOR demonstrated no tricuspid valve endocarditis but a right atrial mass-likely thrombus the, less likely metastatic lesion     Acute issues have been VDRF-just extubated, atrial flutter/fibrillation with rapid rates-now in sinus rhythm, sepsis with tricuspid valve endocarditis ruled out by JUNIOR which also showed a right atrial mass      Plan:     Atrial flutter:   continue p o  amiodarone  Start low-dose metoprolol at 5 mg every 6 hours IV    If tolerated well, change to p o  tomorrow, blood pressures have been borderline at times  Continue anticoagulation with Lovenox     Right atrial mass:  Differential diagnosis includes most likely-thrombus in the setting of atrial fibrillation and smoke in the right atrium versus metastatic lesion  She does have a prior history of colon malignancy as well as a metastatic lesion in the liver - IVC is free of any masses-on transthoracic echo  Cavitating lesions in the lung are unlikely to be malignancy in her case-developed within 10 days on chest x-rays  Not typical pulmonary emboli from a thrombus (emboli do not cavitate), no right-sided endocarditis to explain septic emboli  An infected thrombus causing septic emboli is a possibility  Either way she would need anticoagulation with Lovenox in the setting of malignancy - I would defer this to the primary team   Was on Xarelto in the past for atrial fibrillation but not consistently  Port has been removed     Encephalopathy:  Likely secondary to sepsis, seems to have improved     Adenocarcinoma of the colon:  Status post bowel resection and colostomy, prior PET-CT also demonstrated a liver lesion  received Folfox chemotherapy, might need to be held in the setting of long-term antibiotic use for endocarditis    MSSA bacteremia:  Also with neutropenia, on nafcillin  Shaw results as above , source was likely the port which has been removed     Gerald on CKD:  Mild, stable renal function     I updated the patient's daughter at bedside    Counseling / Coordination of Care  Total time spent 20 minutes including teaching and family updates  More than 50% was spent counseling pt and family

## 2018-09-20 NOTE — PLAN OF CARE
Problem: PHYSICAL THERAPY ADULT  Goal: Performs mobility at highest level of function for planned discharge setting  See evaluation for individualized goals  Treatment/Interventions: Functional transfer training, LE strengthening/ROM, Therapeutic exercise, Endurance training, Cognitive reorientation, Patient/family training, Equipment eval/education, Bed mobility, Gait training, Compensatory technique education, Spoke to nursing, Spoke to case management          See flowsheet documentation for full assessment, interventions and recommendations  Prognosis: Guarded  Problem List: Decreased strength, Decreased endurance, Decreased range of motion, Impaired balance, Decreased mobility, Decreased cognition, Impaired judgement, Decreased safety awareness, Impaired sensation, Obesity, Pain  Assessment: pt admitted iwth progressive weakness over 2 weeks, fall and hyperglycemia, dx with severe degenerative changes C4-X6, toxic /metabilic encephaloppthy, feve, hypotension  vdrf, now on O2 by NC  pt refered to PT  pt was indep using cane until recently needing more assist and falling  pt demonstrated severe functional limitations due to medical condition and prior debility  pt needed max assist of 2 persons for mobility OOB to chair  pt reporting severe pain alll over  pt has severe deficits in strength, balance, gait sequencing and stability, rom, cognition, activity tolerance and pain control  pt will need skilled PT and rehab  Barriers to Discharge: Decreased caregiver support     Recommendation: Post acute IP rehab     PT - OK to Discharge: Yes (rehab)    See flowsheet documentation for full assessment

## 2018-09-20 NOTE — PROGRESS NOTES
Progress Note - Critical Care   Emily Abdullahi 60 y o  female MRN: [id number]  [facility] Encounter: [id number]    Assessment:   1  Encephalopathy  2  Hypotension  3  Hyperglycemia  4  Shock - Sepsis  5  Thrombocytopenia  6  Colon cancer s/p resection with colostomy bag, folfox chemotherapy  7  DM2  8  Schizophrenia/schizoaffective d/o  9  CAD s/p stents  10  New onset atrial flutter  11  RYLAN on CKD  12  Likely septic vegetations  13  Right atrial thrombus     Plan:      Neuro:   1  Encephalopathy   -Improved, patient able to follow commands, GCS 15     2  Schizoaffective/Bipolar  -Resumed home depakote, cymbalta, remeron and trazodone     CV:   1  Shock   -Most likely 2/2 sepsis, metabolic derangements   -68 ECHO: EF 95%, grade 2 diastolic dysfunction  -JUNIOR revealed likely right atrial thrombus     2  Atrial fibrillation/flutter  -Amiodarone drip, add oral when possible per cardiology  -Heparin drip, transition to lovenox     3  HTN   -Metoprolol 5 mg Q6 held 2/2 hypotension     4  CAD s/p stent 3/17/16  -Plavix 75     5  Hyperlipidemia  -Hold home Lipitor 10mg     Lun  Concern for pneumonia   - Chest CT shows evidence of septic emboli vs met vs pneumonia  - On naficillin  - Supplemental O2 to maintain SpO2 >92%  - Hx of COPD      GI:   1  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with liver mets  - s/p resection with colostomy on 18  - Follows with oncology outpatient, last course of chemo with FOLFOX on 18  - Right port-a-cath removed  due to infection as well as location in relation to the atrial thrombus     2  Hepatitis C     FEN:   F: None  E: Closely monitor lytes, replete as necessary   N: Puree diet     :   1  RYLAN   -Cr 1 09 (baseline   8)  -Continue IV hydration  -Strict I&Os  -metabolic acidosis - bicitrate 500 BID for two days     ID:   1   Sepsis 2/2 gram positive bacteremia  -Gram positive cocci in clusters x2 - Staph Aureus, sensitivity pending  -ECHO concern for valve vegetations, JUNIOR revealed none   -transition to naficillin per ID  -Repeat cultures drawn 9/18 show growth of same x2  -Repeat cultures to be drawn on 9/20     Heme:   1  Thrombocytopenia  - Plt 146 (96)  - WBC 0 71 (0 60)   - Hgb 8 2 (7 2)  -Continue to monitor   -Granix for enhanced production     Endo:  1  Hyperosmolar hypoglycemic nonketotic coma  -sliding scale insulin     Msk/Skin:   Back pain post fall  - complaining of back pain yesterday  - CT scans showed no acute process  - MRI ordered today for further investigation                 Disposition: ICU care    Chief Complaint: "I want to go home"    HPI/24hr events: No acute events overnight    Physical Exam:   Physical Exam   Constitutional: She is oriented to person, place, and time  No distress  HENT:   Head: Normocephalic and atraumatic  Eyes: Conjunctivae and EOM are normal  Pupils are equal, round, and reactive to light  Neck: Neck supple  No tracheal deviation present  Cardiovascular: Normal rate, regular rhythm and normal heart sounds  Exam reveals no friction rub  No murmur heard  Pulmonary/Chest: Breath sounds normal  Tachypnea noted  No respiratory distress  She has no wheezes  Port removal appears c/d/i   Abdominal: Soft  She exhibits no distension and no mass  There is no tenderness  Femoral line removal site is c/d/i   Musculoskeletal: She exhibits no edema or tenderness  4/5 bl upper extremities, 2/5 bl lower extremities, sensation diffusely intact   Neurological: She is alert and oriented to person, place, and time  No cranial nerve deficit or sensory deficit  She exhibits normal muscle tone  Skin: She is not diaphoretic     Psychiatric: Her behavior is normal  Judgment and thought content normal        Vitals:    09/20/18 0914 09/20/18 1042 09/20/18 1132 09/20/18 1157   BP: (!) 83/53 127/69 (!) 82/52    BP Location:       Pulse: 86  (!) 106    Resp: (!) 32  (!) 40    Temp:    97 6 °F (36 4 °C)   TempSrc:    Oral   SpO2: 99%  98% Weight:       Height:                 Temperature:   Temp (24hrs), Av 8 °F (36 6 °C), Min:97 °F (36 1 °C), Max:98 6 °F (37 °C)    Current: Temperature: 97 6 °F (36 4 °C)    Weights:   IBW: 52 4 kg    Body mass index is 27 49 kg/m²  Weight (last 2 days)     Date/Time   Weight    18 0443  70 4 (155 2)              Hemodynamic Monitoring:       Non-Invasive/Invasive Ventilation Settings:  Respiratory    Lab Data (Last 4 hours)    None         O2/Vent Data (Last 4 hours)    None              No results found for: PHART, QHN7TOT, PO2ART, EIT1WXZ, A6LVCESP, BEART, SOURCE      Intake and Outputs:  I/O       701 -  07 -  07 -  0700    I V  (mL/kg) 4040 5 (70 8) 2210 9 (31 4)     NG/GT 30      IV Piggyback 1146 2 485     Total Intake(mL/kg) 5216 8 (91 4) 2695 9 (38 3)     Urine (mL/kg/hr) 1475 (1 1) 1250 (0 7)     Emesis/NG output 200      Stool 150 350     Total Output 1825 1600      Net +3391 8 +1095 9                 UOP: /hour   Nutrition:        Diet Orders            Start     Ordered    18 1641  Diet Dysphagia/Modified Consistency; Dysphagia 1-Pureed; Thin Liquid  Diet effective now     Question Answer Comment   Diet Type Dysphagia/Modified Consistency    Dysphagia/Modified Consistency Dysphagia 1-Pureed    Liquid Modifier Thin Liquid        18 1641        TF currently running at /hour with a goal of   Formula:    Labs:     Results from last 7 days  Lab Units 18  0507 18  0518 18  0517  18  0448   WBC Thousand/uL 0 71* 0 60*  --   --  1 41*   HEMOGLOBIN g/dL 8 2* 7 2* 7 2*  < > 8 7*   HEMATOCRIT % 24 0* 20 9* 20 9*  < > 24 5*   PLATELETS Thousands/uL 146* 96*  --   --  74*   MONO PCT MAN % 8 15*  --   --  14*   < > = values in this interval not displayed     Results from last 7 days  Lab Units 18  0507 18  0518 18  0517  18  0448  18  0640   SODIUM mmol/L 131* 138 138  < > 134*  < > 124* POTASSIUM mmol/L 3 9 3 9 3 9  < > 4 1  < > 2 9*   CHLORIDE mmol/L 106 111* 112*  < > 106  < > 92*   CO2 mmol/L 16* 17* 17*  < > 18*  < > 20*   BUN mg/dL 19 20 19  < > 26*  < > 33*   CREATININE mg/dL 1 18 1 23 1 23  < > 1 09  < > 1 44*   CALCIUM mg/dL 7 5* 7 2* 7 4*  < > 7 0*  < > 7 8*   ALK PHOS U/L  --  75  --   --  86  --  93   ALT U/L  --  9*  --   --  18  --  21   AST U/L  --  38  --   --  51*  --  45   < > = values in this interval not displayed  Results from last 7 days  Lab Units 09/20/18  0507 09/19/18  0520 09/19/18  0518   MAGNESIUM mg/dL 1 9 2 6 2 5       Results from last 7 days  Lab Units 09/20/18  0507 09/19/18  0520 09/19/18  0518   PHOSPHORUS mg/dL 2 5* 3 0 2 9        Results from last 7 days  Lab Units 09/20/18  0908 09/20/18  0056 09/19/18  1722  09/18/18  0448  09/17/18  1806 09/17/18  0203   INR   --   --   --   --  1 17  --  1 29* 1 22*   PTT seconds 37* >210* 39*  < >  --   < > 51*  --    < > = values in this interval not displayed      Results from last 7 days  Lab Units 09/17/18  0824   LACTIC ACID mmol/L 2 0       0  Lab Value Date/Time   TROPONINI <0 02 09/16/2018 2145   TROPONINI <0 02 09/07/2018 1642   TROPONINI <0 02 07/25/2018 1248   TROPONINI 0 57 (H) 03/18/2016 0623   TROPONINI 0 65 (H) 03/18/2016 0045   TROPONINI 0 64 (H) 03/17/2016 2220       Imaging: No recent imaging     EKG: NSR    Micro:  Lab Results   Component Value Date    BLOODCX Staphylococcus aureus (A) 09/18/2018    BLOODCX Staphylococcus aureus (A) 09/18/2018    BLOODCX Staphylococcus aureus (A) 09/16/2018    BLOODCX Staphylococcus aureus (A) 09/16/2018    URINECX >100,000 cfu/ml Escherichia coli 03/17/2016       Allergies: No Known Allergies    Medications:   Scheduled Meds:    Current Facility-Administered Medications:  acetaminophen 650 mg Per NG Tube Q6H PRN Marianela Marmolejo DO    amiodarone 0 5 mg/min Intravenous Continuous Rhonda Drumomnd PA-C Last Rate: 0 5 mg/min (09/19/18 4417)   amiodarone 200 mg Oral TID With Meals JEANNIE Jung    atorvastatin 10 mg Oral After [facility] YANELIS Wang    chlorhexidine 15 mL Swish & Spit Q12H [facility] Marianela RollinsDO mahogany    [facility]rel [facility]    divalproex sodium 750 mg Oral [facility]    DULoxetine 60 mg Oral [facility]    enoxaparin 1 mg/kg Subcutaneous Q12H [facility] Megan Sequeira PA-C    fentanyl citrate (PF) 50 mcg Intravenous Continuous PRN Missy Meneses MD    insulin glargine 10 Units Subcutaneous HS Marine Blaze, DO    insulin lispro 1-6 Units Subcutaneous TID AC Marine Blaze, DO    insulin lispro 1-6 Units Subcutaneous HS Marine Blaze, DO    nafcillin 2,000 mg Intravenous Q4H Kim Coronado MD Last Rate: Stopped (09/20/18 0959)   potassium phosphate 21 mmol Intravenous Once Xavi Tilley PA-C Last Rate: 21 mmol (09/20/18 1127)   sod citrate-citric acid 30 mL [facility]ID [facility]    tbo-filgrastim 480 mcg Subcutaneous Daily [facility]    traZODone 50 mg Oral HS Megan Sequeira PA-C      Continuous Infusions:    amiodarone 0 5 mg/min Last Rate: 0 5 mg/min (09/19/18 1357)   fentanyl citrate (PF) 50 mcg      PRN Meds:    acetaminophen 650 mg Q6H PRN   fentanyl citrate (PF) 50 mcg Continuous PRN       VTE Pharmacologic Prophylaxis: Reason for no pharmacologic prophylaxis Heparin drip  VTE Mechanical Prophylaxis: sequential compression device    Invasive lines and devices: Invasive Devices     Peripheral Intravenous Line            Peripheral IV 09/16/18 Left Antecubital 3 days    Peripheral IV 09/19/18 Left Forearm 1 day          Drain            Colostomy RUQ -- days                   Counseling / Coordination of Care       Code Status: Level 1 - Full Code     Portions of the record may have been created with voice recognition software  Occasional wrong word or "sound a like" substitutions may have occurred due to the inherent limitations of voice recognition software    Read the chart carefully and recognize, using context, where substitutions have occurred       Ron Chong MD

## 2018-09-20 NOTE — PLAN OF CARE
CARDIOVASCULAR - ADULT     Maintains optimal cardiac output and hemodynamic stability Progressing     Absence of cardiac dysrhythmias or at baseline rhythm Progressing    Heart rhythm is NSR    DISCHARGE PLANNING     Discharge to home or other facility with appropriate resources Progressing        DISCHARGE PLANNING - CARE MANAGEMENT     Discharge to post-acute care or home with appropriate resources Progressing        INFECTION - ADULT     Absence or prevention of progression during hospitalization Progressing        Knowledge Deficit     Patient/family/caregiver demonstrates understanding of disease process, treatment plan, medications, and discharge instructions Progressing        METABOLIC, FLUID AND ELECTROLYTES - ADULT     Electrolytes maintained within normal limits Progressing     Glucose maintained within target range Progressing        PAIN - ADULT     Verbalizes/displays adequate comfort level or baseline comfort level Progressing        Potential for Falls     Patient will remain free of falls Progressing        Prexisting or High Potential for Compromised Skin Integrity     Skin integrity is maintained or improved Progressing        RESPIRATORY - ADULT     Achieves optimal ventilation and oxygenation Progressing    Weaning oxygen    SAFETY ADULT     Maintain or return to baseline ADL function Progressing     Maintain or return mobility status to optimal level Progressing

## 2018-09-20 NOTE — PROGRESS NOTES
09/20/18 3274 Alex SALAZAR   Spiritual Beliefs/Perceptions   Support Systems Spouse/significant other;Children   Stress Factors   Patient Stress Factors None identified   Family Stress Factors None identified   Coping Responses   Patient Coping Accepting   Family Coping Accepting   Plan of Care   Comments Family of pt  was presence offer pastoral presence and prayer support     Assessment Completed by: Unit visit

## 2018-09-20 NOTE — PROGRESS NOTES
09/20/18 1100   Clinical Encounter Type   Visited With Family   Routine Visit Follow-up   Moravian Encounters   Moravian Needs Prayer   Family Spiritual Encounters   Family Coping Accepting

## 2018-09-20 NOTE — PHYSICAL THERAPY NOTE
Physical Therapy Evaluation      Patient Active Problem List   Diagnosis    CVA (cerebral vascular accident) ([place] [address] )    Type 2 diabetes mellitus without complication, without long-term current use of insulin ([place] [address] )    Schizoaffective disorder, bipolar type ([place] [address] )    Essential hypertension    Colonic mass    Type 2 diabetes mellitus with complication ([place] [address] )    CKD (chronic kidney disease) stage 3, GFR 30-59 ml/min    Pain of right upper extremity    RYLAN (acute kidney injury)     CAD (coronary artery disease)    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HCC)    Large bowel obstruction ([place] [address] )    Brachial artery stenosis, right (HCC)    Hyponatremia    Rectosigmoid mass - High colostomy output    Leukocytosis    GERD (gastroesophageal reflux disease)    Tobacco abuse    Liver metastasis (HCC)    Mixed hyperlipidemia    Dizziness - due to orthostatic hypotension    Ambulatory dysfunction    Orthostatic hypotension    Chemotherapy induced nausea and vomiting    Lower abdominal pain    HHNC (hyperglycemic hyperosmolar nonketotic coma) (HCC)    Sepsis ([place] [address] )    Altered mental status    Acute respiratory failure with hypoxia (HCC)    Hypocalcemia    Leukopenia    Cavitating pulmonary nodules     Metabolic acidosis, NAG, bicarbonate losses    Hypomagnesemia    Hypophosphatemia       Past Medical History:   Diagnosis Date    Bipolar depression ([place] [address] ) 3/17/2016    CAD S/P percutaneous coronary angioplasty 3/17/2016    Cancer ([place] [address] )     Chronic pain     Colonic mass     Colostomy care ([place] [address] )     COPD (chronic obstructive pulmonary disease) ([place] [address] )     CVA (cerebral vascular accident) ([place] [address] )     R sided weakness    Essential hypertension 3/17/2016    GERD (gastroesophageal reflux disease)     Hepatitis C     Heroin abuse 3/18/2016    History of gastric ulcer     Hypertension     NSTEMI (non-ST elevated myocardial infarction) ([place] [address] ) 07/2012    Psychiatric disorder 09/03/2014    Inpatient admission     [facility] 3/17/2016    Schizophrenia ([facility] ) 3/17/2016    STEMI (ST elevation myocardial infarction) ([place] [address] ) 12/2017    Type 2 diabetes mellitus ([place] [address] ) 3/17/2016       Past Surgical History:   Procedure Laterality Date    CARDIAC CATHETERIZATION  07/2016    COLONOSCOPY N/A 6/22/2018    Procedure: COLONOSCOPY;  Surgeon: Toñito Sumner MD;  [facility]; Service: Colorectal    CORONARY ANGIOPLASTY WITH STENT PLACEMENT  07/05/2012    MAKEDA (LAD), PTA (Diag)    CORONARY ANGIOPLASTY WITH STENT PLACEMENT  12/2017    MAKEDA/ PTA (RCA)    FL GUIDED CENTRAL VENOUS ACCESS REPLACEMENT  8/15/2018    PA INSERT PICC W/ SUB-Q PORT N/A 8/15/2018    Procedure: INSERTION VENOUS PORT (PORT-A-CATH); Surgeon: Monik Rae MD;  [facility];  Service: Colorectal    PA LAP,DIAGNOSTIC ABDOMEN N/A 7/17/2018    Procedure: LAPAROSCOPY DIAGNOSTIC, Laproscopic transverse loop colostomy, lysis of adhesions;  Surgeon: Chata Kahn MD;  [facility];  Service: Colorectal    PA SIGMOIDOSCOPY FLX DX W/COLLJ SPEC BR/WA IF PFRMD N/A 8/15/2018    Procedure: Zhang Hall;  Surgeon: Monik Rae MD;  [facility];  Service: Colorectal      09/20/18 1012   Note Type   Note type Eval only   Pain Assessment   Pain Assessment 0-10   Pain Score Worst Possible Pain   Pain Type Acute pain   Pain Location Generalized   Clinical Progression (aggravated by any movement  none at rest)   Hospital Pain Intervention(s) Repositioned; Ambulation/increased activity; Emotional support   Response to Interventions no change, made comfortable   Home Living   Type of [address] One [address];Ramped entrance   [address]   Prior Function   Level of [place] Needs assistance with IADLs; Needs assistance with ADLs and functional mobility   Lives With Significant other   Receives Help From Family;Friend(s)   ADL Assistance Needs assistance   IADLs Needs assistance Falls in the last 6 months 1 to 4   Comments brother reports < 5 falls in the last 2 weeks  gradually getting weaker and needing assist for adl's, pt  living with so but apparently not a good situation  Restrictions/Precautions   Other Precautions Cognitive; Bed Alarm;Multiple lines;Telemetry;O2;Fall Risk;Pain   General   Family/Caregiver Present Yes   Cognition   Overall Cognitive Status Impaired   Orientation Level Oriented to person;Oriented to place   RUE Assessment   RUE Assessment X  (limited elevation , str 3-/5)   LUE Assessment   LUE Assessment (limited elevation , str 3-/5)   RLE Assessment   RLE Assessment X  (str 2-/5, sub max effort  c/o pain)   LLE Assessment   LLE Assessment X  (str 2-/5, sub max effort  c/o pain)   Coordination   Movements are Fluid and Coordinated 1   Sensation X  (hypersensitivity)   Light Touch   RLE Light Touch Impaired   LLE Light Touch Impaired   Bed Mobility   Rolling R 2  Maximal assistance   Additional items Assist x 2; Increased time required;Verbal cues;LE management   Rolling L 2  Maximal assistance   Additional items Assist x 2; Increased time required;Verbal cues;LE management   Supine to Sit 2  Maximal assistance   Additional items Assist x 2; Increased time required;Verbal cues;LE management   Transfers   Sit to Stand 2  Maximal assistance   Additional items Assist x 2; Increased time required;Verbal cues  (poor effort, pad sling)   Stand to Sit 2  Maximal assistance   Additional items Assist x 2; Increased time required;Verbal cues  (poor effort, pad sling)   Stand pivot 2  Maximal assistance   Additional items Assist x 2; Increased time required;Verbal cues  (poor effort, pad sling )   Ambulation/Elevation   Gait pattern (did not sequence any steps, reports severe pain)   Gait Assistance 1  Dependent   Assistive Device (arm in arm 2 persons, pad sling)   Balance   Static Sitting Fair -   Dynamic Sitting Poor   Static Standing Poor -   Dynamic Standing Poor - Ambulatory Zero   Endurance Deficit   Endurance Deficit Yes   Endurance Deficit Description fatigue, medical status, motivation   Activity Tolerance   Activity Tolerance Patient limited by pain; Patient limited by fatigue;Treatment limited secondary to medical complications (Comment)   Nurse Made Aware yes   Assessment   Prognosis Guarded   Problem List Decreased strength;Decreased endurance;Decreased range of motion; Impaired balance;Decreased mobility; Decreased cognition; Impaired judgement;Decreased safety awareness; Impaired sensation;Obesity;Pain   Assessment pt admitted iwth progressive weakness over 2 weeks, fall and hyperglycemia, dx with severe degenerative changes K8-N3, toxic /metabilic encephaloppthy, feve, hypotension  vdrf, now on O2 by NC  pt refered to PT  pt was indep using cane until recently needing more assist and falling  pt demonstrated severe functional limitations due to medical condition and prior debility  pt needed max assist of 2 persons for mobility OOB to chair  pt reporting severe pain alll over  pt has severe deficits in strength, balance, gait sequencing and stability, rom, cognition, activity tolerance and pain control  pt will need skilled PT and rehab  Barriers to Discharge Decreased caregiver support   Goals   Patient Goals be left alone  STG Expiration Date 10/04/18   Short Term Goal #1 bed mobility with mod assist of 1  transfers with mod assist of1-2, improve strength by 1 grade, demosntrate good safety practices  improve effort and participation on PT   Plan   Treatment/Interventions Functional transfer training;LE strengthening/ROM; Therapeutic exercise; Endurance training;Cognitive reorientation;Patient/family training;Equipment eval/education; Bed mobility;Gait training; Compensatory technique education;Spoke to nursing;Spoke to case management   PT Frequency (3-5x/wk)   Recommendation   Recommendation Post acute IP rehab   PT - OK to Discharge Yes  (rehab)   Modified [place] Scale   Modified [place] Scale 5   Barthel Index   Feeding 5   Bathing 0   Grooming Score 0   Dressing Score 0   Bladder Score 5   Bowels Score 5   Toilet Use Score 0   Transfers (Bed/Chair) Score 5   Mobility (Level Surface) Score 0   Stairs Score 0   Barthel Index Score 20   History: co - morbidities, fall risk, use of assistive device, assist for adl's, cognition, multiple lines  Exam: impairments in locomotion, musculoskeletal, balance, joint integrity,  cardiac, pulmonary, cognition   Clinical: unstable/unpredictable  Complexity:high      Lana Bones, PT

## 2018-09-20 NOTE — SOCIAL WORK
CM informed pt has decided for her three daughters, Paula Oliveira, and Adalberto Fontanez to be her healthcare representatives  CM met with pt daughter, Nolvia Allan [phone] and Alison Richard [phone], to introduce self and role with dcp  As per Elizabeth Mendoza and Vikash primary contact out of the three daughters is Elizabeth Mendoza as she is the most available  The daughters are anticipating the need for STR at d/c  CM met with pt as well who is agreeable to STR at d/c and prefers CM to work with her daughters for referrals to facilities  CM provided family with SNF list of in-network facilities as well as a new 5 wishes to complete  PASRR Completed and uploaded in [facility]  As per the daughter's pt does have hx of MH but no IP hospitalizations in the past 2 years and no community   The daughters do report pt has hx of and current use of cocaine/crack  They report pt does NOT use crack/cocaine intervenously  Pt also has hx of ETOH abuse  Daughter's aware this may be a barrier to pt going to STR  The daughters report they do not wish for pt to return home with her s/o Yolanda Malloy as he also abuses drugs and alcohol  They report pt is able to remain sober when not living with him  They report they have been talking among each other for long term planning for their mother  Right now they plan to have pt return to live with Elizabeth Mendoza after STR

## 2018-09-21 LAB
ANION GAP SERPL CALCULATED.3IONS-SCNC: 10 MMOL/L (ref 4–13)
BACTERIA CATH TIP CULT: ABNORMAL
BASOPHILS # BLD AUTO: 0 THOUSANDS/ΜL (ref 0–0.1)
BASOPHILS NFR BLD AUTO: 0 % (ref 0–1)
BUN SERPL-MCNC: 24 MG/DL (ref 5–25)
CA-I BLD-SCNC: 1.03 MMOL/L (ref 1.12–1.32)
CALCIUM SERPL-MCNC: 7.4 MG/DL (ref 8.3–10.1)
CHLORIDE SERPL-SCNC: 104 MMOL/L (ref 100–108)
CO2 SERPL-SCNC: 16 MMOL/L (ref 21–32)
CREAT SERPL-MCNC: 1.69 MG/DL (ref 0.6–1.3)
EOSINOPHIL # BLD AUTO: 0.03 THOUSAND/ΜL (ref 0–0.61)
EOSINOPHIL NFR BLD AUTO: 1 % (ref 0–6)
ERYTHROCYTE [DISTWIDTH] IN BLOOD BY AUTOMATED COUNT: 15.2 % (ref 11.6–15.1)
GFR SERPL CREATININE-BSD FRML MDRD: 33 ML/MIN/1.73SQ M
GLUCOSE SERPL-MCNC: 165 MG/DL (ref 65–140)
GLUCOSE SERPL-MCNC: 166 MG/DL (ref 65–140)
GLUCOSE SERPL-MCNC: 197 MG/DL (ref 65–140)
GLUCOSE SERPL-MCNC: 210 MG/DL (ref 65–140)
GLUCOSE SERPL-MCNC: 224 MG/DL (ref 65–140)
HCT VFR BLD AUTO: 23.8 % (ref 34.8–46.1)
HGB BLD-MCNC: 8.2 G/DL (ref 11.5–15.4)
IMM GRANULOCYTES # BLD AUTO: 0.02 THOUSAND/UL (ref 0–0.2)
IMM GRANULOCYTES NFR BLD AUTO: 1 % (ref 0–2)
LYMPHOCYTES # BLD AUTO: 1.05 THOUSANDS/ΜL (ref 0.6–4.47)
LYMPHOCYTES NFR BLD AUTO: 51 % (ref 14–44)
MAGNESIUM SERPL-MCNC: 2.3 MG/DL (ref 1.6–2.6)
MCH RBC QN AUTO: 27.2 PG (ref 26.8–34.3)
MCHC RBC AUTO-ENTMCNC: 34.5 G/DL (ref 31.4–37.4)
MCV RBC AUTO: 79 FL (ref 82–98)
MONOCYTES # BLD AUTO: 0.11 THOUSAND/ΜL (ref 0.17–1.22)
MONOCYTES NFR BLD AUTO: 5 % (ref 4–12)
NEUTROPHILS # BLD AUTO: 0.87 THOUSANDS/ΜL (ref 1.85–7.62)
NEUTS SEG NFR BLD AUTO: 42 % (ref 43–75)
NRBC BLD AUTO-RTO: 3 /100 WBCS
PHOSPHATE SERPL-MCNC: 3.5 MG/DL (ref 2.7–4.5)
PLATELET # BLD AUTO: 94 THOUSANDS/UL (ref 149–390)
PMV BLD AUTO: 13 FL (ref 8.9–12.7)
POTASSIUM SERPL-SCNC: 3.5 MMOL/L (ref 3.5–5.3)
RBC # BLD AUTO: 3.02 MILLION/UL (ref 3.81–5.12)
SODIUM SERPL-SCNC: 130 MMOL/L (ref 136–145)
WBC # BLD AUTO: 2.08 THOUSAND/UL (ref 4.31–10.16)

## 2018-09-21 PROCEDURE — 82948 REAGENT STRIP/BLOOD GLUCOSE: CPT

## 2018-09-21 PROCEDURE — 84100 ASSAY OF PHOSPHORUS: CPT | Performed by: PHYSICIAN ASSISTANT

## 2018-09-21 PROCEDURE — 99223 1ST HOSP IP/OBS HIGH 75: CPT | Performed by: INTERNAL MEDICINE

## 2018-09-21 PROCEDURE — 82330 ASSAY OF CALCIUM: CPT | Performed by: PHYSICIAN ASSISTANT

## 2018-09-21 PROCEDURE — 83735 ASSAY OF MAGNESIUM: CPT | Performed by: PHYSICIAN ASSISTANT

## 2018-09-21 PROCEDURE — 99233 SBSQ HOSP IP/OBS HIGH 50: CPT | Performed by: INTERNAL MEDICINE

## 2018-09-21 PROCEDURE — 80048 BASIC METABOLIC PNL TOTAL CA: CPT | Performed by: PHYSICIAN ASSISTANT

## 2018-09-21 PROCEDURE — 99232 SBSQ HOSP IP/OBS MODERATE 35: CPT | Performed by: INTERNAL MEDICINE

## 2018-09-21 PROCEDURE — 85025 COMPLETE CBC W/AUTO DIFF WBC: CPT | Performed by: PHYSICIAN ASSISTANT

## 2018-09-21 RX ORDER — INSULIN GLARGINE 100 [IU]/ML
20 INJECTION, SOLUTION SUBCUTANEOUS
Status: DISCONTINUED | OUTPATIENT
Start: 2018-09-21 | End: 2018-09-22

## 2018-09-21 RX ADMIN — INSULIN GLARGINE 20 UNITS: 100 INJECTION, SOLUTION SUBCUTANEOUS at 21:59

## 2018-09-21 RX ADMIN — ATORVASTATIN CALCIUM 10 MG: 10 TABLET, FILM COATED ORAL at 17:13

## 2018-09-21 RX ADMIN — SODIUM CHLORIDE 500 ML: 0.9 INJECTION, SOLUTION INTRAVENOUS at 23:31

## 2018-09-21 RX ADMIN — TBO-FILGRASTIM 480 MCG: 480 INJECTION, SOLUTION SUBCUTANEOUS at 09:37

## 2018-09-21 RX ADMIN — NAFCILLIN SODIUM 2000 MG: 2 INJECTION, POWDER, LYOPHILIZED, FOR SOLUTION INTRAMUSCULAR; INTRAVENOUS at 06:23

## 2018-09-21 RX ADMIN — AMIODARONE HYDROCHLORIDE 200 MG: 200 TABLET ORAL at 13:15

## 2018-09-21 RX ADMIN — INSULIN LISPRO 1 UNITS: 100 INJECTION, SOLUTION INTRAVENOUS; SUBCUTANEOUS at 21:59

## 2018-09-21 RX ADMIN — SODIUM CITRATE AND CITRIC ACID MONOHYDRATE 30 ML: 500; 334 SOLUTION ORAL at 09:29

## 2018-09-21 RX ADMIN — SODIUM CITRATE AND CITRIC ACID MONOHYDRATE 30 ML: 500; 334 SOLUTION ORAL at 17:13

## 2018-09-21 RX ADMIN — NAFCILLIN SODIUM 2000 MG: 2 INJECTION, POWDER, LYOPHILIZED, FOR SOLUTION INTRAMUSCULAR; INTRAVENOUS at 13:22

## 2018-09-21 RX ADMIN — CHLORHEXIDINE GLUCONATE 15 ML: 1.2 RINSE ORAL at 09:28

## 2018-09-21 RX ADMIN — NAFCILLIN SODIUM 2000 MG: 2 INJECTION, POWDER, LYOPHILIZED, FOR SOLUTION INTRAMUSCULAR; INTRAVENOUS at 02:07

## 2018-09-21 RX ADMIN — ACETAMINOPHEN 650 MG: 160 SUSPENSION ORAL at 00:30

## 2018-09-21 RX ADMIN — DIVALPROEX SODIUM 750 MG: 500 TABLET, DELAYED RELEASE ORAL at 09:28

## 2018-09-21 RX ADMIN — CHLORHEXIDINE GLUCONATE 15 ML: 1.2 RINSE ORAL at 22:07

## 2018-09-21 RX ADMIN — ENOXAPARIN SODIUM 70 MG: 80 INJECTION SUBCUTANEOUS at 21:59

## 2018-09-21 RX ADMIN — INSULIN ASPART 10 UNITS: 100 INJECTION, SUSPENSION SUBCUTANEOUS at 09:28

## 2018-09-21 RX ADMIN — NAFCILLIN SODIUM 2000 MG: 2 INJECTION, POWDER, LYOPHILIZED, FOR SOLUTION INTRAMUSCULAR; INTRAVENOUS at 17:18

## 2018-09-21 RX ADMIN — INSULIN LISPRO 2 UNITS: 100 INJECTION, SOLUTION INTRAVENOUS; SUBCUTANEOUS at 12:11

## 2018-09-21 RX ADMIN — INSULIN LISPRO 2 UNITS: 100 INJECTION, SOLUTION INTRAVENOUS; SUBCUTANEOUS at 09:31

## 2018-09-21 RX ADMIN — INSULIN LISPRO 1 UNITS: 100 INJECTION, SOLUTION INTRAVENOUS; SUBCUTANEOUS at 17:13

## 2018-09-21 RX ADMIN — TRAZODONE HYDROCHLORIDE 50 MG: 50 TABLET ORAL at 22:07

## 2018-09-21 RX ADMIN — ENOXAPARIN SODIUM 70 MG: 80 INJECTION SUBCUTANEOUS at 09:28

## 2018-09-21 RX ADMIN — NAFCILLIN SODIUM 2000 MG: 2 INJECTION, POWDER, LYOPHILIZED, FOR SOLUTION INTRAMUSCULAR; INTRAVENOUS at 10:15

## 2018-09-21 RX ADMIN — DULOXETINE HYDROCHLORIDE 60 MG: 60 CAPSULE, DELAYED RELEASE ORAL at 09:28

## 2018-09-21 RX ADMIN — AMIODARONE HYDROCHLORIDE 200 MG: 200 TABLET ORAL at 09:27

## 2018-09-21 RX ADMIN — AMIODARONE HYDROCHLORIDE 200 MG: 200 TABLET ORAL at 17:13

## 2018-09-21 RX ADMIN — CLOPIDOGREL 75 MG: 75 TABLET, FILM COATED ORAL at 09:27

## 2018-09-21 RX ADMIN — SODIUM CITRATE AND CITRIC ACID MONOHYDRATE 30 ML: 500; 334 SOLUTION ORAL at 22:04

## 2018-09-21 RX ADMIN — NAFCILLIN SODIUM 2000 MG: 2 INJECTION, POWDER, LYOPHILIZED, FOR SOLUTION INTRAMUSCULAR; INTRAVENOUS at 21:59

## 2018-09-21 NOTE — CONSULTS
Consultation - Emily Abdullahi 60 y o  female MRN: [id number]    Unit/Bed#: [facility] 828-01 Encounter: [id number]      Assessment/Plan     Assessment: This is a 60y o -year-old female with type 2 diabetes with hyperglycemia on long-term insulin therapy, severe sepsis, endocarditis and colon cancer  Plan:  Sugars above goal-will discontinue NovoLog 70 30 insulin and start her on basal bolus insulin therapy  Start Lantus 20 units at bedtime, Humalog 8 units before meals  Monitor blood sugars before meals and bedtime adjust accordingly  Will also check hemoglobin A1c to see what her recent control has been like      Severe sepsis/endocarditis-patient on IV antibiotics-ID on board    Colon cancer-on chemotherapy as outpatient-port was removed during this admission  Management as per Oncology  CC: Diabetes Consult    History of Present Illness     HPI: Emily Garcia is a 60y o  year old female with type 2 diabetes on long-term insulin therapy who was admitted for altered mental status and severe sepsis likely secondary to endocarditis   On admission she was found to have severe hyperglycemia with a blood glucose above 900-she was initially started on IV insulin infusion and transition to subcu insulin therapy yesterday-endocrine consult is requested for management of diabetes  Patient is a poor historian and is sleepy however is able to tell me that she has had diabetes for the past many years and is on insulin therapy at home  She states that she checks her blood sugar a couple of times a day and usually her blood sugars are above 200s  She denies any hypoglycemic episodes home  She was switched to NovoLog 184564 units twice daily yesterday and her sugars in the past 24 hours have ranged between 190s to 290s    Consults    Review of Systems   Constitutional: Positive for appetite change and fatigue  Negative for unexpected weight change  Eyes: Negative for visual disturbance     Respiratory: Negative for cough and shortness of breath  Cardiovascular: Negative for palpitations  Gastrointestinal: Negative for nausea and vomiting  Neurological: Positive for weakness  Psychiatric/Behavioral: Positive for sleep disturbance  All other systems reviewed and are negative  Historical Information   Past Medical History:   Diagnosis Date    Bipolar depression ([place] [address] ) 3/17/2016    CAD S/P percutaneous coronary angioplasty 3/17/2016    Cancer (HCC)     Chronic pain     Colonic mass     Colostomy care ([place] [address] )     COPD (chronic obstructive pulmonary disease) (HCC)     CVA (cerebral vascular accident) ([place] [address] )     R sided weakness    Essential hypertension 3/17/2016    GERD (gastroesophageal reflux disease)     Hepatitis C     Heroin abuse 3/18/2016    History of gastric ulcer     Hypertension     NSTEMI (non-ST elevated myocardial infarction) ([place] [address] ) 07/2012    Psychiatric disorder 09/03/2014    Inpatient admission     Schizoaffective disorder ([place] [address] ) 3/17/2016    Schizophrenia ([place] [address] ) 3/17/2016    STEMI (ST elevation myocardial infarction) ([place] [address] ) 12/2017    Type 2 diabetes mellitus ([place] [address] ) 3/17/2016     Past Surgical History:   Procedure Laterality Date    CARDIAC CATHETERIZATION  07/2016    COLONOSCOPY N/A 6/22/2018    Procedure: COLONOSCOPY;  Surgeon: Karla Gonsalez MD;  Location: [facility]; Service: Colorectal    CORONARY ANGIOPLASTY WITH STENT PLACEMENT  07/05/2012    MAKEDA (LAD), PTA (Diag)    CORONARY ANGIOPLASTY WITH STENT PLACEMENT  12/2017    MAKEDA/ PTA (RCA)    FL GUIDED CENTRAL VENOUS ACCESS REPLACEMENT  8/15/2018    MD INSERT PICC W/ SUB-Q PORT N/A 8/15/2018    Procedure: INSERTION VENOUS PORT (PORT-A-CATH);   Surgeon: Chyna Carnes MD;  [facility];  Service: Colorectal    MD LAP,DIAGNOSTIC ABDOMEN N/A 7/17/2018    Procedure: LAPAROSCOPY DIAGNOSTIC, Laproscopic transverse loop colostomy, lysis of adhesions;  Surgeon: Eric Martinez MD;  [facility];  Service: Colorectal    ID SIGMOIDOSCOPY FLX DX W/COLLJ SPEC BR/WA IF PFRMD N/A 8/15/2018    Procedure: Robbin Gonzales;  Surgeon: Keaton Sutherland MD;  [facility];  Service: Colorectal     Social History   History   Alcohol Use No     Comment: "only on occasion"     History   Drug Use No     Comment: only tried x 1      History   Smoking Status    Current Every Day Smoker    Packs/day: 1 00    Years: 45 00    Types: Cigarettes   Smokeless Tobacco    Never Used     Family History:   Family History   Problem Relation Age of Onset    Heart attack Father     Cancer Brother         gastric       Meds/Allergies   Current Facility-Administered Medications   Medication Dose Route Frequency Provider Last Rate Last Dose    acetaminophen (TYLENOL) oral suspension 650 mg  650 mg Oral Q6H PRN Shonda Mills PA-C   650 mg at 09/21/18 0030    amiodarone tablet 200 mg  200 mg Oral TID With Meals JEANNIE Mcgee   200 mg at 09/21/18 1315    atorvastatin (LIPITOR) tablet 10 mg  10 mg Oral After Glenny Sequeira PA-C   10 mg at 09/20/18 1725    chlorhexidine (PERIDEX) 0 12 % oral rinse 15 mL  15 mL Swish & Spit Q12H [address] Marianela Marmolejo, DO   15 mL at 09/21/18 9563    clopidogrel (PLAVIX) tablet 75 mg  75 mg Oral Daily Megan Sequeira PA-C   75 mg at 09/21/18 3665    divalproex sodium (DEPAKOTE) EC tablet 750 mg  750 mg Oral Daily Gutierrez Sequeira PA-C   750 mg at 09/21/18 8176    DULoxetine (CYMBALTA) delayed release capsule 60 mg  60 mg Oral Daily Megan Sequeira PA-C   60 mg at 09/21/18 0928    enoxaparin (LOVENOX) subcutaneous injection 70 mg  1 mg/kg Subcutaneous Q12H [address] Megan Sequeira PA-C   70 mg at 09/21/18 0928    insulin aspart protamine-insulin aspart (NovoLOG 70/30) 100 units/mL subcutaneous injection 10 Units  10 Units Subcutaneous BID AC Megan Sequeira PA-C   10 Units at 09/21/18 0928    insulin lispro (HumaLOG) 100 units/mL subcutaneous injection 1-6 Units  1-6 Units Subcutaneous TID AC Shay Valladares, DO   2 Units at 09/21/18 1211    insulin lispro (HumaLOG) 100 units/mL subcutaneous injection 1-6 Units  1-6 Units Subcutaneous HS Shay Jacquelyn, DO   3 Units at 09/20/18 2227    nafcillin (NALLPEN) 2,000 mg in sodium chloride 0 9 % 100 mL IVPB  2,000 mg Intravenous Q4H Antoinette Boyce  mL/hr at 09/21/18 1322 2,000 mg at 09/21/18 1322    sod citrate-citric acid (BICITRA) oral solution 30 mL  30 mL Oral TID TynerYANELIS   30 mL at 09/21/18 0929    tbo-filgrastim (GRANIX) subcutaneous injection 480 mcg  480 mcg Subcutaneous Daily [facility], PA-C   480 mcg at 09/21/18 3533    traZODone (DESYREL) tablet 50 mg  50 mg Oral HS Megan Sequeira PA-C   50 mg at 09/20/18 2224     No Known Allergies    Objective   Vitals: Blood pressure (!) 83/46, pulse 88, temperature 97 5 °F (36 4 °C), temperature source Axillary, resp  rate 22, height 5' 3" (1 6 m), weight 70 2 kg (154 lb 12 2 oz), SpO2 98 %, not currently breastfeeding  Intake/Output Summary (Last 24 hours) at 09/21/18 1356  Last data filed at 09/21/18 0325   Gross per 24 hour   Intake              580 ml   Output              750 ml   Net             -170 ml     Invasive Devices     Peripheral Intravenous Line            Peripheral IV 09/19/18 Left Forearm 2 days    Peripheral IV 09/20/18 Left Arm less than 1 day          Drain            Colostomy RUQ -- days    Urethral Catheter Latex 18 Fr  less than 1 day                Physical Exam   Constitutional: She appears well-developed and well-nourished  No distress  HENT:   Head: Normocephalic and atraumatic  Mouth/Throat: No oropharyngeal exudate  Eyes: Conjunctivae and EOM are normal  No scleral icterus  Neck: Normal range of motion  Neck supple  No thyromegaly present  Cardiovascular: Normal rate, regular rhythm and normal heart sounds  No murmur heard  Pulmonary/Chest: Effort normal and breath sounds normal  No respiratory distress   She has no wheezes  She has no rales  Abdominal: Soft  Bowel sounds are normal    Musculoskeletal: She exhibits no edema  Lymphadenopathy:     She has no cervical adenopathy  Neurological: She is alert  Sleepy but arousable   Skin: Skin is warm and dry  No rash noted  No erythema  No pallor  The history was obtained from the review of the chart, patient  Lab Results:       Lab Results   Component Value Date    WBC 2 08 (L) 09/21/2018    HGB 8 2 (L) 09/21/2018    HCT 23 8 (L) 09/21/2018    MCV 79 (L) 09/21/2018    PLT 94 (L) 09/21/2018     Lab Results   Component Value Date/Time    BUN 24 09/21/2018 04:49 AM    BUN 16 06/19/2018 04:31 AM     (L) 09/21/2018 04:49 AM     06/19/2018 04:31 AM    K 3 5 09/21/2018 04:49 AM    K 4 2 06/19/2018 04:31 AM     09/21/2018 04:49 AM     06/19/2018 04:31 AM    CO2 16 (L) 09/21/2018 04:49 AM    CO2 26 06/19/2018 04:31 AM    CREATININE 1 69 (H) 09/21/2018 04:49 AM    CREATININE 1 06 09/03/2014 08:00 AM    AST 38 09/19/2018 05:18 AM    AST 12 (L) 06/17/2018 09:27 PM    ALT 9 (L) 09/19/2018 05:18 AM    ALT 22 06/17/2018 09:27 PM    ALB 1 2 (L) 09/19/2018 05:18 AM    ALB 3 9 06/17/2018 09:27 PM     No results for input(s): CHOL, HDL, LDL, TRIG, VLDL in the last 72 hours  No results found for: Chaparrita Yeboah  POC Glucose   Date Value   09/21/2018 224 mg/dl (H)   09/21/2018 210 mg/dl (H)   09/20/2018 260 mg/dl (H)   09/20/2018 266 mg/dl (H)   09/20/2018 294 mg/dl (H)   09/20/2018 426 mg/dl (H)   09/20/2018 342 mg/dl (H)   09/20/2018 262 mg/dl (H)   09/19/2018 172 mg/dl (H)   09/19/2018 153 mg/dl (H)   06/19/2018 230 MG/DL (H)   06/19/2018 157 MG/DL (H)   06/18/2018 199 MG/DL (H)   06/18/2018 114 MG/DL (H)   06/18/2018 162 MG/DL (H)   06/18/2018 164 MG/DL (H)   01/26/2015 293 mg/dL (H)       Imaging Studies: I have personally reviewed pertinent reports      Study Result     CT ANGIOGRAM OF THE CHEST, ABDOMEN AND PELVIS WITH AND WITHOUT IV CONTRAST     INDICATION:  Severe back pain     COMPARISON: CT scan of the chest obtained earlier today      TECHNIQUE:  CT angiogram examination of the chest, abdomen and pelvis was performed according to standard protocol  This examination, like all CT scans performed in the [facility], was performed utilizing techniques to minimize   radiation dose exposure, including the use of iterative reconstruction and automated exposure control  Contrast as well as noncontrast images were obtained  3D reconstructions were performed an independent workstation, and are supplied for review  Rad dose 2040 03 mGy-cm      IV Contrast:  85 mL of iohexol (OMNIPAQUE)  Enteric Contrast:  Not administered     FINDINGS:     VASCULAR STRUCTURES:  There is no evidence of aortic aneurysm or dissection  Atherosclerotic changes of the aorta and its branches is seen  The celiac artery, SMA, renal arteries and the MARIA FERNANDA is patent  Right femoral central catheter is noted      OTHER FINDINGS:      CHEST:      HEART:  Normal cardiac size  No pericardial effusion      LUNGS:  Extensive bilateral pulmonary nodules several which exhibit cavitation        PLEURA: Small bilateral pleural effusions        MEDIASTINUM AND PAULETTE:  No mass or significant lymphadenopathy      CHEST WALL AND LOWER NECK: Normal      ABDOMEN     LIVER/BILIARY TREE:  Heterogeneous enhancement of the liver is seen      GALLBLADDER:  Status post cholecystectomy     SPLEEN:  Unremarkable  Normal size      PANCREAS:  Unremarkable      ADRENAL GLANDS:  Nodular hyperplasia of the adrenal glands is seen      KIDNEYS/URETERS:  Symmetric enhancement of the kidneys without evidence of hydronephrosis  Mild contour irregularity of the kidneys is seen which is likely due to prior infections      PELVIS     REPRODUCTIVE ORGANS:  Unremarkable for patient's age      URINARY BLADDER:  Partially collapsed around a Hanks catheter balloon    Air is seen within the urinary bladder      ADDITIONAL ABDOMINAL AND PELVIC STRUCTURES:      STOMACH AND BOWEL:  No evidence of bowel obstruction  Right lower quadrant ileostomy is seen      ABDOMINOPELVIC CAVITY: Small amount of free fluid in the pelvis  Peripancreatic and retroperitoneal lymph nodes measuring up to 1 2 cm are seen      ABDOMINAL WALL/INGUINAL REGIONS:  Diffuse anasarca is seen      OSSEOUS STRUCTURES:  Degenerative changes in the spine are visualized      IMPRESSION:     No evidence of aortic dissection or aneurysm      Bilateral cavitating pulmonary nodules similar to prior study  Differential diagnosis is unchanged                       Portions of the record may have been created with voice recognition software

## 2018-09-21 NOTE — PROGRESS NOTES
Progress Note - Infectious Disease   Emily Abdullahi 60 y o  female MRN: [id number]  Unit/Bed#: [facility] 828-01 Encounter: [id number]      Impression/Plan: This is a 13-year-old female with a complicated past medical history who presents for altered mental status and severe sepsis, likely secondary to her Gram-positive bacteremia with thrombus seen in the R heart that may be infected, she may have had a possible port infection that introduced this pathogen or it may be related to drug use given her prior history  We are also treating for the suspicion of right-sided endocarditis given the septic emboli seen on CT despite a negative echo      1     MSSA bacteremia, persistent:  The initial source of introduction this pathogen remains unknown     -blood cultures from 9/16 through 9/20 remain positive  -repeat cultures ordered for 9/22  -on exam the patient has no obvious new or ongoing sources of this bacteremia  -MRI showed no lesions within the spine and exam is unchanged   -her port site after removal appears clear and intact  -she has no new lesions at her old femoral line site   -cardiology following, and discussed the case with them given her persistent bacteremia and the possibility of her thrombus being infected  At this time will plan for possible JUNIOR early next week to to evaluate if she has new or developing lesions on her valves and if there is any change to the thrombus in the right atrium  -will also plan to repeat CT of the chest next week to look for development of any abscess, for cultures remain positive  -will continue to follow CBC and chemistry daily  -will continue nafcillin given his optimal therapy for MSSA given the possible involved sites   -lastly noted the patient has ongoing, pain in her lower extremities bilaterally and her right upper extremity, though none of her joints are septic in appearance  Her baseline is unknown and she has had a history of recent fall    At this time would plan to get x-rays of her hips and lower extremity as well as her right upper extremity to rule out fracture and another area where she may have seeded      2   Possible right-sided endocarditis versus infected thrombus in the right heart leading to pulmonary septic emboli:  -although no vegetations were present on the echo given the patient's CT finding concern is for right-sided endocarditis that led to septic emboli to the lungs  Additionally, it is possible that the thrombus in the right atrium may be infected and source of these emboli   -ultimately right-sided endocarditis is medically managed condition and there are very few indications for surgery   -antibiotic management and plan for additional images as above  -will continue to follow blood cultures for clearance  -the patient will likely require prolonged course of antibiotic therapy and potentially placement based on her home situation     3   Severe sepsis, POA:  -this is likely due to problems 1 and 2  -will continue management as above     4    Neutropenia:  -patient neutrophil count is improving  -this may also be a reason why the patient's blood cultures are not readily clearing   -this is likely related to her chemotherapy which was given about a week prior to admission  -she she has received G-CSF  -will continue to monitor her counts on subsequent CBCs  -will continue current antibiotics as we have a targeted pathogen for her fevers in the setting of neutropenia  -should her clinical picture change and she further decompensate will plan to expand gram-negative coverage and repeat cultures at that time     5   Significant leg pain on exam:  -remains unclear if the patient's leg pain noted on palpation is related to the diffuse pains that she had initially noted or if there is a localized issue present given her history of falls at home  -discussed with primary team to obtain x-rays as above     6   Colon cancer currently on palliative chemotherapy with chronic port  -patient has had port removal  -unclear what the plans are for chemotherapy and recommend discussing this with Oncology  -will ultimately need to manage her infection before any additional chemotherapy sessions are possible      7   Diabetes  -patient presented with elevated blood sugars  -current care as per primary team     Plan as above discussed with the primary service and Cardiology  Antibiotics:  Nafcillin 3  Total of 6 days of antibiotic therapy    Subjective: The patient on exam is able to recognize her daughter  She knows what hospital she is in and that it is   She know she has cancer and had chemo recently  She still unclear on a lot of the details of this admission and the days leading up to it  Her daughter is at bedside and reports that there was no acute events overnight  The patient denies any pain but when she is moved she has significant pain in her legs and in her right upper extremity  Noted on exam the patient has limited range of motion as previously seen her lower extremities and in her right upper extremity  She does have a history of prior stroke but her daughter is unaware of her deficits after that stroke  Objective:  Vitals:  HR:  [] 80  Resp:  [20-40] 20  BP: ()/(49-67) 101/67  SpO2:  [94 %-100 %] 99 %  Temp (24hrs), Av 8 °F (36 6 °C), Min:97 5 °F (36 4 °C), Max:98 4 °F (36 9 °C)  Current: Temperature: 97 5 °F (36 4 °C)    Physical Exam:   General Appearance:  She is awake and interacts  She is nontoxic appearing but does appear to be chronically ill  Throat: Oropharynx moist without lesions  Lungs:   Clear to auscultation bilaterally anteriorly; no wheezes, rhonchi or rales; respirations unlabored   Heart:  RRR; no murmur, rub or gallop   Abdomen:   Soft, non-tender, non-distended, positive bowel sounds  Extremities: No clubbing, cyanosis  She is noted to have some nonpitting edema in the right upper extremity    No phlebitis or cellulitis noted  Her lower extremities appear to be tense, without pitting edema  She also has no tenderness to palpation at her hip joints or her knees but does not participate active range of motion and notes pain with any passive range of motion  Back: She has ongoing pain as we actively turn her on exam   After calming her down there is no tenderness to palpation along the spine or the paraspinal process  She has no sacral lesion noted  Neuro:  Her mentation is slowly improving, she is more interactive and answers most questions on exam   She is able to follow commands  She has noted again to have limited range of motion in her lower extremities and in her right upper extremity  Unclear if this is her baseline  Skin: No new rashes or lesions  No draining wounds noted  Her previous port site on her right chest appears clear intact  She has no tenderness to palpation no lesions noted along the chest wall    Her current peripheral IV appear clear and intact and without erythema       Labs, Imaging, & Other studies:   All pertinent labs and imaging studies were personally reviewed    Results from last 7 days  Lab Units 09/21/18  0449 09/20/18  0507 09/19/18  0518   WBC Thousand/uL 2 08* 0 71* 0 60*   HEMOGLOBIN g/dL 8 2* 8 2* 7 2*   PLATELETS Thousands/uL 94* 146* 96*       Results from last 7 days  Lab Units 09/21/18  0449 09/20/18  0507 09/19/18  0518  09/18/18  0448  09/17/18  0640   SODIUM mmol/L 130* 131* 138  < > 134*  < > 124*   POTASSIUM mmol/L 3 5 3 9 3 9  < > 4 1  < > 2 9*   CHLORIDE mmol/L 104 106 111*  < > 106  < > 92*   CO2 mmol/L 16* 16* 17*  < > 18*  < > 20*   BUN mg/dL 24 19 20  < > 26*  < > 33*   CREATININE mg/dL 1 69* 1 18 1 23  < > 1 09  < > 1 44*   EGFR ml/min/1 73sq m 33 51 48  < > 56  < > 40   CALCIUM mg/dL 7 4* 7 5* 7 2*  < > 7 0*  < > 7 8*   AST U/L  --   --  38  --  51*  --  45   ALT U/L  --   --  9*  --  18  --  21   ALK PHOS U/L  --   --  75  --  86  --  93   < > = values in this interval not displayed      Results from last 7 days  Lab Units 09/20/18  0512 09/20/18  0507 09/18/18  0526 09/16/18  2145   BLOOD CULTURE   --   --  Staphylococcus aureus*  Staphylococcus aureus* Staphylococcus aureus*  Staphylococcus aureus*   GRAM STAIN RESULT  Gram positive cocci in clusters Gram positive cocci in clusters Gram positive cocci in clusters  Gram positive cocci in clusters Gram positive cocci in clusters  Gram positive cocci in clusters

## 2018-09-21 NOTE — PROGRESS NOTES
General Cardiology   Progress Note -  Team One   Emily Abdullahi 60 y o  female MRN: [id number]    Unit/Bed#: [facility] 828-01 Encounter: [id number]    Assessment/ Plan    1, Atrial flutter- Reviewed telemetry patient in NSR   Currently on amiodarone gtt and amiodarone 200 mg PO TID  Will d/c amiodarone gtt and continue PO amiodarone  Continue to monitor on telemetry   On Lovenox for AC     2  Right atrial mass- noted on echocardiogram  Most likely thrombus in the setting of afib and smoke in the R atrium versus metastatic lesion   Continue Lovenox for [facility] in the setting of malignancy     3  RYLAN on CKD- creatinine 1 69 today from 1 1 yesterday   Followed by primary team     4  Septic shock in the setting of staph bacteremia   Followed by ID  On IV antibiotic: Nafcillin      5  CAD- with previous nontransmural [id number] of the LAD and diagonal branch, STEMI-2017 -status post PCI of the proximal RCA-MAKEDA, at that time mid LAD had  in the previously stented segment, distal LAD filling via collaterals  Continue statin and plavix  Not on BB due to hypotension     Subjective  Patient is alert but confused  Review of Systems   Unable to perform ROS: mental status change       Objective:   Vitals: Blood pressure 101/67, pulse 80, temperature 97 5 °F (36 4 °C), temperature source Oral, resp  rate 20, height 5' 3" (1 6 m), weight 70 2 kg (154 lb 12 2 oz), SpO2 99 %, not currently breastfeeding ,       Body mass index is 27 42 kg/m²  ,     Systolic (18ADM), OX , Min:74 , PZK:156     Diastolic (24NLF), QS, Min:49, Max:69          Intake/Output Summary (Last 24 hours) at 18 1017  Last data filed at 18 0325   Gross per 24 hour   Intake           757 35 ml   Output             1075 ml   Net          -317 65 ml     Weight (last 2 days)     Date/Time   Weight    18 1950  70 2 (154 76)    18 0443  70 4 (155 2)            Telemetry Review: Normal sinus rhythm HR 80s    Physical Exam Constitutional: No distress  Neck: Neck supple  Cardiovascular: Normal rate, regular rhythm and intact distal pulses  Pulmonary/Chest: Effort normal  No respiratory distress  She has no wheezes  Decreased   RA    Abdominal: Soft  Bowel sounds are normal    Musculoskeletal: She exhibits no edema  Neurological: She is alert  Alert to verbal stimuli   Follows commands    Skin: Skin is warm and dry  She is not diaphoretic  LABORATORY RESULTS    Results from last 7 days  Lab Units 09/16/18  2145   TROPONIN I ng/mL <0 02     CBC with diff:   Results from last 7 days  Lab Units 09/21/18  0449 09/20/18  0507 09/19/18  0518 09/19/18  0517 09/19/18  0147 09/18/18  1910 09/18/18  1044 09/18/18  0448 09/17/18  2138  09/17/18  1420 09/17/18  0640   WBC Thousand/uL 2 08* 0 71* 0 60*  --   --   --   --  1 41* 1 05*  --  2 22* 2 88*   HEMOGLOBIN g/dL 8 2* 8 2* 7 2* 7 2* 7 7* 7 6* 7 8* 8 7* 8 9*  < > 6 5* 7 5*   HEMATOCRIT % 23 8* 24 0* 20 9* 20 9* 21 8* 21 1* 21 7* 24 5* 25 7*  < > 18 7* 21 5*   MCV fL 79* 81* 80*  --   --   --   --  78* 80*  --  79* 79*   PLATELETS Thousands/uL 94* 146* 96*  --   --   --   --  74* 67*  --  61* 55*   MCH pg 27 2 27 8 27 5  --   --   --   --  27 7 27 8  --  27 5 27 6   MCHC g/dL 34 5 34 2 34 4  --   --   --   --  35 5 34 6  --  34 8 34 9   RDW % 15 2* 15 4* 14 2  --   --   --   --  13 6 13 5  --  13 3 13 5   MPV fL 13 0* 12 2 11 8  --   --   --   --  12 4 12 0  --  13 0* 12 1   NRBC AUTO /100 WBCs 3 0 0  --   --   --   --  0 0  --  0 0   NRBC /100 WBC  --  8*  --   --   --   --   --   --   --   --   --   --    < > = values in this interval not displayed      CMP:  Results from last 7 days  Lab Units 09/21/18  0449 09/20/18  0507 09/19/18  0518 09/19/18  0517 09/18/18  1910 09/18/18  1044 09/18/18  0448  09/17/18  0640  09/16/18  2145   SODIUM mmol/L 130* 131* 138 138 135* 134* 134*  < > 124*  < > 110*   POTASSIUM mmol/L 3 5 3 9 3 9 3 9 4 1 3 6 4 1  < > 2 9*  < > 4 1   CHLORIDE mmol/L 104 106 111* 112* 110* 106 106  < > 92*  < > 77*   CO2 mmol/L 16* 16* 17* 17* 18* 18* 18*  < > 20*  < > 20*   BUN mg/dL 24 19 20 19 20 23 26*  < > 33*  < > 47*   CREATININE mg/dL 1 69* 1 18 1 23 1 23 0 97 1 17 1 09  < > 1 44*  < > 2 11*   CALCIUM mg/dL 7 4* 7 5* 7 2* 7 4* 7 1* 7 1* 7 0*  < > 7 8*  < > 7 8*   AST U/L  --   --  38  --   --   --  51*  --  45  --  45   ALT U/L  --   --  9*  --   --   --  18  --  21  --  25   ALK PHOS U/L  --   --  75  --   --   --  86  --  93  --  120*   EGFR ml/min/1 73sq m 33 51 48 48 64 51 56  < > 40  < > 25   < > = values in this interval not displayed      BMP:  Results from last 7 days  Lab Units 09/21/18  0449 09/20/18  0507 09/19/18  0518 09/19/18  0517 09/18/18  1910 09/18/18  1044 09/18/18  0448   SODIUM mmol/L 130* 131* 138 138 135* 134* 134*   POTASSIUM mmol/L 3 5 3 9 3 9 3 9 4 1 3 6 4 1   CHLORIDE mmol/L 104 106 111* 112* 110* 106 106   CO2 mmol/L 16* 16* 17* 17* 18* 18* 18*   BUN mg/dL 24 19 20 19 20 23 26*   CREATININE mg/dL 1 69* 1 18 1 23 1 23 0 97 1 17 1 09   CALCIUM mg/dL 7 4* 7 5* 7 2* 7 4* 7 1* 7 1* 7 0*       No results found for: NTBNP          Results from last 7 days  Lab Units 09/21/18  0449 09/20/18  0507 09/19/18  0520 09/19/18  0518 09/19/18  0517 09/18/18  1910 09/18/18  1044   MAGNESIUM mg/dL 2 3 1 9 2 6 2 5 2 6 2 0 2 4                 Results from last 7 days  Lab Units 09/17/18  1151   TSH 3RD GENERATON uIU/mL 0 537         Results from last 7 days  Lab Units 09/18/18  0448 09/17/18  1806 09/17/18  0203 09/16/18  2145   INR  1 17 1 29* 1 22* 1 24*       Lipid Profile:   Lab Results   Component Value Date    CHOL 279 09/03/2014     Lab Results   Component Value Date    HDL 22 (L) 03/18/2016    HDL 21 (L) 03/18/2016    HDL 36 (L) 01/13/2016     Lab Results   Component Value Date    LDLCALC  03/18/2016      Comment:      Calculated LDL invalid, triglycerides >400 mg/dl    LDLCALC  03/18/2016      Comment:      Calculated LDL invalid, triglycerides >400 mg/dl    LDLCALC 200 (H) 2016     Lab Results   Component Value Date    TRIG 564 (H) 2016    TRIG 562 (H) 2016    TRIG 383 (H) 2016       Cardiac testing:   Results for orders placed during the hospital encounter of 18   Echo complete with contrast if indicated    Narrative [address]  [address]  [phone]    Transthoracic Echocardiogram  2D, M-mode, Doppler, and Color Doppler    Study date:  17-Sep-2018    Patient: [facility] [address]  MR number: [id number]  Account number: [de-identified]  : 1960  Age: 60 years  Gender: Female  Status: Inpatient  Location: Bedside  Height: 63 in  Weight: 125 lb  BP: 91/ 74 mmHg    Indications: AV Disease AV Disease    Diagnoses: I35 8 - Other nonrheumatic aortic valve disorders    Sonographer:  HARJEET Rodriguez  Primary Physician:  Karoline Trinidad MD  Referring Physician:  Keara Lopez:  [facility]  Cardiology Fellow:  Cameron Guerra MD  Interpreting Physician:  Susy Green MD    SUMMARY    LEFT VENTRICLE:  Systolic function was hyperdynamic  Ejection fraction was estimated to be 55 %  Although no diagnostic regional wall motion abnormality was identified, this possibility cannot be completely excluded on the basis of this study  VENTRICULAR SEPTUM:  There was dyssynergic motion  These changes are consistent with LBBB  MITRAL VALVE:  There was mild regurgitation  AORTIC VALVE:  The valve was probably trileaflet  Leaflets exhibited normal thickness and normal cuspal separation  TRICUSPID VALVE:  There was mild regurgitation  There was a definite, large, sessile, echogenic, fixed mass on the anterior leaflet, on the right atrial aspect  This may represent a thrombus, tumor or vegetation on tricuspid valve      IVC, HEPATIC VEINS:  No obvious thrombus in IVC    RECOMMENDATIONS:  If clinically indicated, transesophageal echocardiography could provide additional information  HISTORY: PRIOR HISTORY: [email or web address] Angioplasty,Bipolar,HTN,Sepsis,Cancer, Sepsis,Smoker    PROCEDURE: The procedure was performed at the bedside  This was a routine study  The transthoracic approach was used  The study included complete 2D imaging, M-mode, complete spectral Doppler, and color Doppler  Image quality was good  LEFT VENTRICLE: Size was normal  Systolic function was hyperdynamic  Ejection fraction was estimated to be 55 %  Although no diagnostic regional wall motion abnormality was identified, this possibility cannot be completely excluded on the  basis of this study  DOPPLER: Due to tachycardia, there was fusion of early and atrial contributions to ventricular filling  The study was not technically sufficient to allow evaluation of LV diastolic function  VENTRICULAR SEPTUM: There was dyssynergic motion  These changes are consistent with LBBB  RIGHT VENTRICLE: The size was normal  Systolic function was normal     LEFT ATRIUM: Size was normal     RIGHT ATRIUM: Size was normal     MITRAL VALVE: Valve structure was normal  There was mild focal thickening, limited to the leaflet margin  There was normal leaflet separation  DOPPLER: The transmitral velocity was within the normal range  There was no evidence for  stenosis  There was mild regurgitation  AORTIC VALVE: The valve was probably trileaflet  Leaflets exhibited normal thickness and normal cuspal separation  DOPPLER: Transaortic velocity was within the normal range  There was no evidence for stenosis  There was no significant  regurgitation  TRICUSPID VALVE: The valve structure was normal  There was normal leaflet separation  There was a definite, large, sessile, echogenic, fixed mass on the anterior leaflet, on the right atrial aspect  This may represent a thrombus, tumor or  vegetation on tricuspid valve  DOPPLER: The transtricuspid velocity was within the normal range   There was no evidence for stenosis  There was mild regurgitation  The tricuspid jet envelope definition was inadequate for estimation of RV  systolic pressure  PULMONIC VALVE: Not well visualized  DOPPLER: The transpulmonic velocity was within the normal range  There was no regurgitation  PERICARDIUM: There was no pericardial effusion  AORTA: The root exhibited normal size  SYSTEMIC VEINS: IVC: No obvious thrombus in IVC The inferior vena cava was not well visualized  The inferior vena cava was normal in size  PULMONARY VEINS: DOPPLER: Doppler signals were not recordable in the pulmonary vein(s)      SYSTEM MEASUREMENT TABLES    2D  %FS: 28 01 %  Ao Diam: 3 cm  EDV(Teich): 41 13 ml  EF(Cube): 62 7 %  EF(Teich): 55 58 %  ESV(Cube): 12 29 ml  ESV(Teich): 18 27 ml  IVSd: 1 04 cm  LA Diam: 3 13 cm  LAAd A2C: 18 19 cm2  LAAd A4C: 12 8 cm2  LAEDV A-L A2C: 62 58 ml  LAEDV A-L A4C: 29 68 ml  LAEDV Index (A-L): 38 95 ml/m2  LAEDV MOD A2C: 57 96 ml  LAEDV MOD A4C: 27 68 ml  LAEDV(A-L): 44 01 ml  LALd A2C: 4 49 cm  LALd A4C: 4 68 cm  LVIDd: 3 21 cm  LVIDs: 2 31 cm  LVPWd: 1 02 cm  SI(Cube): 18 27 ml/m2  SI(Teich): 20 23 ml/m2  SV(Cube): 20 65 ml  SV(Teich): 22 86 ml  rv diam: 2 69 cm    CW  AV Vmax: 1 46 m/s  AV maxP 53 mmHg  RAP: 5 mmHg  TR Vmax: 2 92 m/s  TR maxP 06 mmHg    MM  AV Cusp: 1 5 cm  Ao Diam: 2 93 cm  LA Diam: 2 97 cm  LA/Ao: 1 01  TAPSE: 1 33 cm    PW  LVOT Vmax: 0 89 m/s  LVOT maxPG: 3 14 mmHg  RVSP: 39 06 mmHg    [employer] Accredited Echocardiography Laboratory    Prepared and electronically signed by    Makenzie Mock MD  Signed 17-Sep-2018 16:34:11       Results for orders placed during the hospital encounter of 18   JUNIOR    Narrative [address]  [phone]    Transesophageal Echocardiogram  2D, 3D, Doppler, and Color Doppler    Study date:  18-Sep-2018    Patient: [facility]   MR number: [id number]  Account number: [de-identified]  : 1960  Age: 60 years  Gender: Female  Status: Inpatient  Location: Bedside  Height: 63 in  Weight: 125 lb  BP: 125/ 71 mmHg    Indications: Endocarditis  Diagnoses: I38  - Endocarditis, valve unspecified    Sonographer:  Sue Alvarez RDCS  Interpreting Physician:  Jabari Newman MD  Primary Physician:  Manuela Zelaya MD  Referring Physician:  Nevaeh Chairez PA-C  Group:  [facility]  Cardiology Fellow:  Gemma Mello MD    SUMMARY    LEFT VENTRICLE:  Systolic function was at the lower limits of normal  Ejection fraction was estimated to be 50 %  There were no regional wall motion abnormalities  RIGHT VENTRICLE:  The size was normal   Systolic function was normal     LEFT ATRIUM:  Size was normal   No thrombus was identified  LEFT ATRIAL APPENDAGE:  The size was normal   No thrombus was identified  RIGHT ATRIUM:  There was a large, homogenous, solid, mobile mass, measuring approximately 30 mm x 29 mm in the inferior right atrial cavity with associated smoke in the right atrium  It may represent a thrombus versus a right atrial mass  There is a  freely mobile segment at it's surface and the port tip itself can be seen trying to dislodge part of the mass  There was evidence of spontaneous echo contrast ("smoke")  RIGHT ATRIAL APPENDAGE:  There was no right atrial appendage thrombus identified  MITRAL VALVE:  There was no echocardiographic evidence of vegetation  AORTIC VALVE:  There was a small, strand-like, mobile mass, measuring 7 4 mm on the noncoronary cusp, on the aortic aspect  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  There was also a 2 8 mm echodensity on the LVOT side of the valve  This could represent  redundant valve tissue versus a very small vegetation  TRICUSPID VALVE:  There was mild regurgitation  There was no echocardiographic evidence of vegetation      PULMONIC VALVE:  There was no echocardiographic evidence of vegetation  HISTORY: PRIOR HISTORY: Heroin abuse, Coronary artery disease, Cerebral vascular accident, Diabetes, Schizophrenia, Hypertension, Smoker, Acute renal failure  PROCEDURE: The procedure was performed at the bedside  This was a routine study  The risks and alternatives of the procedure were explained to the patient's next of kin and informed consent was obtained  The transesophageal approach was  used  The study included complete 2D imaging, 3D imaging, complete spectral Doppler, and color Doppler  The heart rate was 72 bpm, at the start of the study  An adult omniplane probe was inserted by the cardiology fellow under direct  supervision of the attending cardiologist  Intubated with ease  One intubation attempt(s)  There was no blood detected on the probe  Image quality was adequate  There were no complications during the procedure  MEDICATIONS: Anesthesia  administered by floor nurse  LEFT VENTRICLE: Size was normal  Systolic function was at the lower limits of normal  Ejection fraction was estimated to be 50 %  There were no regional wall motion abnormalities  Wall thickness was normal  DOPPLER: The study was not  technically sufficient to allow evaluation of LV diastolic function  RIGHT VENTRICLE: The size was normal  Systolic function was normal     LEFT ATRIUM: Size was normal  No thrombus was identified  APPENDAGE: The size was normal  No thrombus was identified  DOPPLER: The function was normal (normal emptying velocity)  RIGHT ATRIUM: Size was normal  A line (catheter or pacing wire) was present  There was a large, homogenous, solid, mobile mass, measuring approximately 30 mm x 29 mm in the inferior right atrial cavity with associated smoke in the right  atrium  It may represent a thrombus versus a right atrial mass  There is a freely mobile segment at it's surface and the port tip itself can be seen trying to dislodge part of the mass   There was evidence of spontaneous echo contrast  ("smoke")  APPENDAGE: There was no right atrial appendage thrombus identified  MITRAL VALVE: Valve structure was normal  There was normal leaflet separation  There was no echocardiographic evidence of vegetation  DOPPLER: There was trace regurgitation  AORTIC VALVE: The valve was trileaflet  Leaflets exhibited normal thickness and normal cuspal separation  There was a small, strand-like, mobile mass, measuring 7 4 mm on the noncoronary cusp, on the aortic aspect  It may represent a  fibroelastoma  There was also a 2 8 mm echodensity on the LVOT side of the valve  This could represent redundant valve tissue versus a very small vegetation  DOPPLER: There was no regurgitation  TRICUSPID VALVE: The valve structure was normal  There was normal leaflet separation  There was no echocardiographic evidence of vegetation  DOPPLER: There was mild regurgitation  PULMONIC VALVE: Leaflets exhibited normal thickness, no calcification, and normal cuspal separation  There was no echocardiographic evidence of vegetation  DOPPLER: There was no significant regurgitation  PERICARDIUM: There was no pericardial effusion  The pericardium was normal in appearance  AORTA: The root exhibited normal size  There was no atheroma  There was no evidence for dissection  There was no evidence for aneurysm  PULMONARY VEINS: The pulmonary veins were normal sized  DOPPLER: Doppler flow pattern was normal in the pulmonary vein(s)  [address] Accredited Echocardiography Laboratory    Prepared and electronically signed by    Yonny Ren MD  Signed 18-Sep-2018 14:08:00       No results found for this or any previous visit  No procedure found  No results found for this or any previous visit        Meds/Allergies   all current active meds have been reviewed and current meds:   Current Facility-Administered Medications   Medication Dose Route Frequency    acetaminophen (TYLENOL) oral suspension 650 mg  650 mg Oral Q6H PRN    amiodarone (CORDARONE) 900 mg in dextrose 5 % 500 mL infusion  0 5 mg/min Intravenous Continuous    amiodarone tablet 200 mg  200 mg Oral TID With Meals    atorvastatin (LIPITOR) tablet 10 mg  10 mg Oral After Dinner    chlorhexidine (PERIDEX) 0 12 % oral rinse 15 mL  15 mL Swish & Spit Q12H [facility]    clopidogrel (PLAVIX) tablet 75 mg  75 mg Oral Daily    divalproex sodium (DEPAKOTE) EC tablet 750 mg  750 mg Oral Daily    DULoxetine (CYMBALTA) delayed release capsule 60 mg  60 mg Oral Daily    enoxaparin (LOVENOX) subcutaneous injection 70 mg  1 mg/kg Subcutaneous Q12H ANNABELLA    insulin aspart protamine-insulin aspart (NovoLOG 70/30) 100 units/mL subcutaneous injection 10 Units  10 Units Subcutaneous BID AC    insulin lispro (HumaLOG) 100 units/mL subcutaneous injection 1-6 Units  1-6 Units Subcutaneous TID AC    insulin lispro (HumaLOG) 100 units/mL subcutaneous injection 1-6 Units  1-6 Units Subcutaneous HS    nafcillin (NALLPEN) 2,000 mg in sodium chloride 0 9 % 100 mL IVPB  2,000 mg Intravenous Q4H    sod citrate-citric acid (BICITRA) oral solution 30 mL  30 mL Oral TID    tbo-filgrastim (GRANIX) subcutaneous injection 480 mcg  480 mcg Subcutaneous Daily    traZODone (DESYREL) tablet 50 mg  50 mg Oral HS     Prescriptions Prior to Admission   Medication    atorvastatin (LIPITOR) 10 mg tablet    clopidogrel (PLAVIX) 75 mg tablet    divalproex sodium (DEPAKOTE) 250 mg EC tablet    DULoxetine (CYMBALTA) 60 mg delayed release capsule    insulin aspart protamine-insulin aspart (NOVOLOG MIX 70/30) 100 units/mL injection    insulin glargine (LANTUS) 100 units/mL subcutaneous injection    metFORMIN (GLUCOPHAGE) 1000 MG tablet    metoclopramide (REGLAN) 10 mg tablet    mirtazapine (REMERON) 15 mg tablet    nitroglycerin (NITROSTAT) 0 4 mg SL tablet    oxyCODONE-acetaminophen (PERCOCET) 5-325 mg per tablet    promethazine (PHENERGAN) 12 5 MG tablet    senna ([employer] SENNA) 8 6 MG tablet    terbinafine (LAMISIL) 250 mg tablet    traZODone (DESYREL) 50 mg tablet         amiodarone 0 5 mg/min Last Rate: 0 5 mg/min (09/20/18 2131)     Counseling / Coordination of Care  Total floor / unit time spent today 20 minutes  Greater than 50% of total time was spent with the patient and / or family counseling and / or coordination of care  ** Please Note: Dragon 360 Dictation voice to text software may have been used in the creation of this document   **

## 2018-09-21 NOTE — PROGRESS NOTES
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

## 2018-09-21 NOTE — SOCIAL WORK
CM called pt daughter Vanessa Shah [phone] and aware [facility] no accepting bed at this time   Janel requested cm to put referral to [facility] ,referral in [facility] as such   CM discusses pt during care coordination rounds and pt not medically clear + BC , pt will need long term IV abx and PT and OT recommendation is for SNF    Cm will follow

## 2018-09-22 LAB
ANION GAP SERPL CALCULATED.3IONS-SCNC: 13 MMOL/L (ref 4–13)
ATRIAL RATE: 92 BPM
BUN SERPL-MCNC: 27 MG/DL (ref 5–25)
CALCIUM SERPL-MCNC: 6.9 MG/DL (ref 8.3–10.1)
CHLORIDE SERPL-SCNC: 106 MMOL/L (ref 100–108)
CO2 SERPL-SCNC: 14 MMOL/L (ref 21–32)
CREAT SERPL-MCNC: 2.2 MG/DL (ref 0.6–1.3)
EST. AVERAGE GLUCOSE BLD GHB EST-MCNC: 229 MG/DL
GFR SERPL CREATININE-BSD FRML MDRD: 24 ML/MIN/1.73SQ M
GLUCOSE SERPL-MCNC: 109 MG/DL (ref 65–140)
GLUCOSE SERPL-MCNC: 120 MG/DL (ref 65–140)
GLUCOSE SERPL-MCNC: 123 MG/DL (ref 65–140)
GLUCOSE SERPL-MCNC: 129 MG/DL (ref 65–140)
GLUCOSE SERPL-MCNC: 94 MG/DL (ref 65–140)
HBA1C MFR BLD: 9.6 % (ref 4.2–6.3)
LACTATE SERPL-SCNC: 1.6 MMOL/L (ref 0.5–2)
LACTATE SERPL-SCNC: 2.7 MMOL/L (ref 0.5–2)
P AXIS: 63 DEGREES
POTASSIUM SERPL-SCNC: 3.5 MMOL/L (ref 3.5–5.3)
PR INTERVAL: 146 MS
QRS AXIS: 61 DEGREES
QRSD INTERVAL: 104 MS
QT INTERVAL: 448 MS
QTC INTERVAL: 554 MS
SODIUM SERPL-SCNC: 133 MMOL/L (ref 136–145)
T WAVE AXIS: 56 DEGREES
VENTRICULAR RATE: 92 BPM

## 2018-09-22 PROCEDURE — 99233 SBSQ HOSP IP/OBS HIGH 50: CPT | Performed by: INTERNAL MEDICINE

## 2018-09-22 PROCEDURE — 85027 COMPLETE CBC AUTOMATED: CPT | Performed by: PHYSICIAN ASSISTANT

## 2018-09-22 PROCEDURE — 99232 SBSQ HOSP IP/OBS MODERATE 35: CPT | Performed by: INTERNAL MEDICINE

## 2018-09-22 PROCEDURE — 85025 COMPLETE CBC W/AUTO DIFF WBC: CPT | Performed by: PHYSICIAN ASSISTANT

## 2018-09-22 PROCEDURE — 94762 N-INVAS EAR/PLS OXIMTRY CONT: CPT

## 2018-09-22 PROCEDURE — 85007 BL SMEAR W/DIFF WBC COUNT: CPT | Performed by: PHYSICIAN ASSISTANT

## 2018-09-22 PROCEDURE — 83036 HEMOGLOBIN GLYCOSYLATED A1C: CPT | Performed by: INTERNAL MEDICINE

## 2018-09-22 PROCEDURE — 93010 ELECTROCARDIOGRAM REPORT: CPT | Performed by: INTERNAL MEDICINE

## 2018-09-22 PROCEDURE — 80048 BASIC METABOLIC PNL TOTAL CA: CPT | Performed by: PHYSICIAN ASSISTANT

## 2018-09-22 PROCEDURE — 93005 ELECTROCARDIOGRAM TRACING: CPT | Performed by: PHYSICIAN ASSISTANT

## 2018-09-22 PROCEDURE — 87040 BLOOD CULTURE FOR BACTERIA: CPT | Performed by: INTERNAL MEDICINE

## 2018-09-22 PROCEDURE — 82948 REAGENT STRIP/BLOOD GLUCOSE: CPT

## 2018-09-22 PROCEDURE — 87147 CULTURE TYPE IMMUNOLOGIC: CPT | Performed by: INTERNAL MEDICINE

## 2018-09-22 PROCEDURE — 83605 ASSAY OF LACTIC ACID: CPT | Performed by: PHYSICIAN ASSISTANT

## 2018-09-22 RX ORDER — INSULIN GLARGINE 100 [IU]/ML
14 INJECTION, SOLUTION SUBCUTANEOUS
Status: DISCONTINUED | OUTPATIENT
Start: 2018-09-22 | End: 2018-09-23

## 2018-09-22 RX ORDER — SODIUM CHLORIDE 9 MG/ML
75 INJECTION, SOLUTION INTRAVENOUS CONTINUOUS
Status: DISCONTINUED | OUTPATIENT
Start: 2018-09-22 | End: 2018-09-24

## 2018-09-22 RX ORDER — ALBUMIN, HUMAN INJ 5% 5 %
12.5 SOLUTION INTRAVENOUS ONCE
Status: COMPLETED | OUTPATIENT
Start: 2018-09-22 | End: 2018-09-22

## 2018-09-22 RX ORDER — ALBUMIN (HUMAN) 12.5 G/50ML
12.5 SOLUTION INTRAVENOUS ONCE
Status: COMPLETED | OUTPATIENT
Start: 2018-09-22 | End: 2018-09-22

## 2018-09-22 RX ADMIN — AMIODARONE HYDROCHLORIDE 200 MG: 200 TABLET ORAL at 12:19

## 2018-09-22 RX ADMIN — NAFCILLIN SODIUM 2000 MG: 2 INJECTION, POWDER, LYOPHILIZED, FOR SOLUTION INTRAMUSCULAR; INTRAVENOUS at 05:38

## 2018-09-22 RX ADMIN — ALBUMIN HUMAN 12.5 G: 0.25 SOLUTION INTRAVENOUS at 03:11

## 2018-09-22 RX ADMIN — AMIODARONE HYDROCHLORIDE 200 MG: 200 TABLET ORAL at 17:54

## 2018-09-22 RX ADMIN — NAFCILLIN SODIUM 2000 MG: 2 INJECTION, POWDER, LYOPHILIZED, FOR SOLUTION INTRAMUSCULAR; INTRAVENOUS at 21:57

## 2018-09-22 RX ADMIN — NAFCILLIN SODIUM 2000 MG: 2 INJECTION, POWDER, LYOPHILIZED, FOR SOLUTION INTRAMUSCULAR; INTRAVENOUS at 01:25

## 2018-09-22 RX ADMIN — ALBUMIN HUMAN 12.5 G: 0.05 INJECTION, SOLUTION INTRAVENOUS at 01:24

## 2018-09-22 RX ADMIN — SODIUM CHLORIDE 500 ML: 0.9 INJECTION, SOLUTION INTRAVENOUS at 02:55

## 2018-09-22 RX ADMIN — SODIUM CHLORIDE 100 ML/HR: 0.9 INJECTION, SOLUTION INTRAVENOUS at 05:38

## 2018-09-22 RX ADMIN — DIVALPROEX SODIUM 750 MG: 500 TABLET, DELAYED RELEASE ORAL at 08:54

## 2018-09-22 RX ADMIN — CHLORHEXIDINE GLUCONATE 15 ML: 1.2 RINSE ORAL at 22:00

## 2018-09-22 RX ADMIN — ATORVASTATIN CALCIUM 10 MG: 10 TABLET, FILM COATED ORAL at 17:54

## 2018-09-22 RX ADMIN — NAFCILLIN SODIUM 2000 MG: 2 INJECTION, POWDER, LYOPHILIZED, FOR SOLUTION INTRAMUSCULAR; INTRAVENOUS at 10:35

## 2018-09-22 RX ADMIN — TRAZODONE HYDROCHLORIDE 50 MG: 50 TABLET ORAL at 22:00

## 2018-09-22 RX ADMIN — TBO-FILGRASTIM 480 MCG: 480 INJECTION, SOLUTION SUBCUTANEOUS at 09:08

## 2018-09-22 RX ADMIN — DULOXETINE HYDROCHLORIDE 60 MG: 60 CAPSULE, DELAYED RELEASE ORAL at 08:55

## 2018-09-22 RX ADMIN — SODIUM CHLORIDE 100 ML/HR: 0.9 INJECTION, SOLUTION INTRAVENOUS at 01:24

## 2018-09-22 RX ADMIN — NAFCILLIN SODIUM 2000 MG: 2 INJECTION, POWDER, LYOPHILIZED, FOR SOLUTION INTRAMUSCULAR; INTRAVENOUS at 15:32

## 2018-09-22 RX ADMIN — ENOXAPARIN SODIUM 70 MG: 80 INJECTION SUBCUTANEOUS at 08:52

## 2018-09-22 RX ADMIN — INSULIN GLARGINE 14 UNITS: 100 INJECTION, SOLUTION SUBCUTANEOUS at 22:21

## 2018-09-22 RX ADMIN — AMIODARONE HYDROCHLORIDE 200 MG: 200 TABLET ORAL at 08:54

## 2018-09-22 RX ADMIN — CLOPIDOGREL 75 MG: 75 TABLET, FILM COATED ORAL at 08:54

## 2018-09-22 RX ADMIN — ENOXAPARIN SODIUM 70 MG: 80 INJECTION SUBCUTANEOUS at 22:00

## 2018-09-22 RX ADMIN — NAFCILLIN SODIUM 2000 MG: 2 INJECTION, POWDER, LYOPHILIZED, FOR SOLUTION INTRAMUSCULAR; INTRAVENOUS at 17:54

## 2018-09-22 RX ADMIN — CHLORHEXIDINE GLUCONATE 15 ML: 1.2 RINSE ORAL at 08:53

## 2018-09-22 NOTE — PROGRESS NOTES
Patient presents with decreased BP, SLIM made aware  Bolus ordered and initiated  SLIM will evaluate patient at bedside  Patient asymptomatic  Will continue to monitor

## 2018-09-22 NOTE — PROGRESS NOTES
Patient presented on telemetry with conversion from sinus tachycardia to bundle branch block  SLIM paged  Patient resting in bed comfortably

## 2018-09-22 NOTE — PROGRESS NOTES
Progress Note - Cardiology   Emily Abdullahi 60 y o  female MRN: [id number]  Unit/Bed#: [facility] 828-01 Encounter: [id number]  09/22/18  2:59 PM        Subjective/Objective   Chief Complaint: No chief complaint on file  Subjective: Patient denies any complaints    Specifically denies chest pains or shortness of breath    Objective: Comfortable , no distress at the time of exam      Patient Active Problem List   Diagnosis    CVA (cerebral vascular accident) ([facility] [address] )    Type 2 diabetes mellitus without complication, without long-term current use of insulin [facility]    Schizoaffective disorder, bipolar type ([facility]    Essential hypertension    Colonic mass    Type 2 diabetes mellitus with complication (HCC)    CKD (chronic kidney disease) stage 3, GFR 30-59 ml/min    Pain of right upper extremity    RYLAN (acute kidney injury)     CAD (coronary artery disease)    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HCC)    Large bowel obstruction ([facility] [address] )    Brachial artery stenosis, right (HCC)    Hyponatremia    Rectosigmoid mass - High colostomy output    Leukocytosis    GERD (gastroesophageal reflux disease)    Tobacco abuse    Liver metastasis (HCC)    Mixed hyperlipidemia    Dizziness - due to orthostatic hypotension    Ambulatory dysfunction    Orthostatic hypotension    Chemotherapy induced nausea and vomiting    Lower abdominal pain    HHNC (hyperglycemic hyperosmolar nonketotic coma) (HCC)    Sepsis ([facility] [address] )    Altered mental status    Acute respiratory failure with hypoxia (HCC)    Hypocalcemia    Leukopenia    Cavitating pulmonary nodules     Metabolic acidosis, NAG, bicarbonate losses    Hypomagnesemia    Hypophosphatemia       Vitals: BP 94/52 (BP Location: Left arm)   Pulse 81   Temp 97 8 °F (36 6 °C) (Axillary)   Resp 20   Ht 5' 3" (1 6 m)   Wt 70 2 kg (154 lb 12 2 oz)   SpO2 96%   BMI 27 42 kg/m²     I/O this shift:  In: 180 [P O :180]  Out: 750 [Urine:300; Stool:450]  Wt Readings from Last 3 Encounters: 09/20/18 70 2 kg (154 lb 12 2 oz)   09/18/18 57 1 kg (125 lb 14 1 oz)   09/11/18 57 2 kg (126 lb 1 7 oz)       Intake/Output Summary (Last 24 hours) at 09/22/18 1459  Last data filed at 09/22/18 1300   Gross per 24 hour   Intake           823 33 ml   Output             1200 ml   Net          -376 67 ml     I/O last 3 completed shifts: In: 963 3 [P O :120;  I V :543 3; IV Piggyback:300]  Out: 1175 [Urine:800; Stool:375]    Invasive Devices     Peripheral Intravenous Line            Peripheral IV 09/19/18 Left Forearm 3 days    Peripheral IV 09/20/18 Left Arm 1 day          Drain            Colostomy RUQ -- days    Urethral Catheter Latex 18 Fr  1 day                  Physical Exam:  GEN: Emily Abdullahi appears well, decreased alertness, appears drowsy, pleasant and cooperative   HEENT: pupils equal, round, and reactive to light; extraocular muscles intact  NECK: supple, no carotid bruits or JVD  HEART: regular rhythm, normal S1 and S2, no murmurs, clicks, gallops or rubs   LUNGS: clear to auscultation bilaterally; no wheezes, rales, or rhonchi   ABDOMEN/GI: normal bowel sounds, soft, no tenderness, no distention  EXTREMITIES/Musculoskeltal: peripheral pulses normal; no clubbing, cyanosis, generalized anasarca present  NEURO: no focal findings   SKIN: normal without suspicious lesions on exposed skin            Lab Results:     Results from last 7 days  Lab Units 09/16/18  2145   TROPONIN I ng/mL <0 02     Results from last 7 days  Lab Units 09/22/18  0029 09/21/18  0449 09/20/18  0507   WBC Thousand/uL 4 11* 2 08* 0 71*   HEMOGLOBIN g/dL 8 5* 8 2* 8 2*   HEMATOCRIT % 24 8* 23 8* 24 0*   PLATELETS Thousands/uL 69* 94* 146*           Results from last 7 days  Lab Units 09/22/18  0029 09/21/18  0449 09/20/18  0507 09/19/18  0518  09/18/18  0448  09/17/18  0640   SODIUM mmol/L 133* 130* 131* 138  < > 134*  < > 124*   POTASSIUM mmol/L 3 5 3 5 3 9 3 9  < > 4 1  < > 2 9*   CHLORIDE mmol/L 106 104 106 111*  < > 106  < > 92* CO2 mmol/L 14* 16* 16* 17*  < > 18*  < > 20*   BUN mg/dL 27* 24 19 20  < > 26*  < > 33*   CREATININE mg/dL 2 20* 1 69* 1 18 1 23  < > 1 09  < > 1 44*   CALCIUM mg/dL 6 9* 7 4* 7 5* 7 2*  < > 7 0*  < > 7 8*   ALK PHOS U/L  --   --   --  75  --  86  --  93   ALT U/L  --   --   --  9*  --  18  --  21   AST U/L  --   --   --  38  --  51*  --  45   < > = values in this interval not displayed  Results from last 7 days  Lab Units 09/18/18  0448 09/17/18  1806 09/17/18  0203   INR  1 17 1 29* 1 22*       Imaging: I have personally reviewed pertinent reports      EKG:  No events    Scheduled Meds:    Current Facility-Administered Medications:  acetaminophen 650 mg Oral Q6H PRN Osiel Oliver PA-C    amiodarone 200 mg Oral TID With Meals JEANNIE Charles    atorvastatin 10 mg Oral After [employer] YANELIS Wang    chlorhexidine 15 mL Swish & Spit Q12H [facility] Marianela Marmolejo DO    clopidogrel 75 mg Oral Daily Megan Sequeira PA-C    divalproex sodium 750 mg Oral Daily Yesy Murcia PA-C    DULoxetine 60 mg Oral Daily Megan Sequeira PA-C    enoxaparin 1 mg/kg Subcutaneous Q12H [facility] Megan Sequeira PA-C    insulin glargine 14 Units Subcutaneous HS Tha Zhu MD    insulin lispro 1-5 Units Subcutaneous HS Shelby [place] MD    insulin lispro 1-6 Units Subcutaneous TID DIANE Boogie MD    insulin lispro 4 Units Subcutaneous TID With Meals Tha Zhu MD    nafcillin 2,000 mg Intravenous Q4H Urmila Desai MD Last Rate: 2,000 mg (09/22/18 1035)   sodium chloride 50 mL/hr Intravenous Continuous Osiel Oliver PA-C Last Rate: 50 mL/hr (09/22/18 0857)   tbo-filgrastim 480 mcg Subcutaneous Daily CHESTER Mayes-C    traZODone 50 mg Oral HS Megan Sequeira PA-C      Continuous Infusions:    sodium chloride 50 mL/hr Last Rate: 50 mL/hr (09/22/18 0857)       VTE Pharmacologic Prophylaxis: Enoxaparin (Lovenox)  VTE Mechanical Prophylaxis: sequential compression device Impression:     67-year-old with prior history of CAD, previous nontransmural [id number] of the LAD and diagonal branch, STEMI-December 2017 -status post PCI of the proximal RCA-MAKEDA, at that time mid LAD had  in the previously stented segment, distal LAD filling via collaterals, medication noncompliance, hypertension, hyperlipidemia, CKD, chronic and ongoing tobacco use, uncontrolled type 2 diabetes-A1c of 10 0 in June, right upper extremity ischemia in June 2018, the same month she was also diagnosed with a colonic mass-status post bowel resection and colostomy, also-status post right axillary/brachial artery PTA with development of distal ulnar artery emboli-status post thrombectomy and tPA and transition to Xarelto in July 2018   Subsequently  was off Xarelto for some time     Readmitted with altered mental status for 2 days and septic presentation   ECG demonstrates atrial flutter with rapid ventricular response-adenosine was attempted-revealing flutter waves      Other evaluation has included a CT scan of the head that shows plaque in are infarcts, CT scan of the chest that shows cavitary lesions-likely septic emboli, transthoracic echocardiogram with a tricuspid valve 'mass', blood cultures growing gram-positive cocci in clusters   JUNIOR demonstrated no tricuspid valve endocarditis but a right atrial mass-likely thrombus the, less likely metastatic lesion     Acute issues have been VDRF-just extubated, atrial flutter/fibrillation with rapid rates-now in sinus rhythm, sepsis with tricuspid valve endocarditis ruled out by JUNIOR which also showed a right atrial mass      Plan:    Hypotension:  Okay for IV fluids, ejection fraction is preserved, likely from sepsis/intravascular hypovolemia    Acute kidney injury:  Likely secondary to hypoperfusion from hypotension    Okay for IV fluids     Atrial flutter: continue p o  amiodarone   Once blood pressure has improved, start low-dose metoprolol at 5 mg every 6 hours IV, blood pressures have been borderline at times   If tolerated well, change to p o  tomorrow, blood pressures have been borderline at times   Continue anticoagulation with Lovenox  No further atrial flutter or fibrillation noted      Right atrial mass:  Differential diagnosis includes most likely-thrombus in the setting of atrial fibrillation and smoke in the right atrium versus metastatic lesion   She does have a prior history of colon malignancy as well as a metastatic lesion in the liver - IVC is free of any masses-on transthoracic echo   Cavitating lesions in the lung are unlikely to be malignancy in her case-developed within 10 days on chest x-rays   Not typical pulmonary emboli from a thrombus (emboli do not cavitate), no right-sided endocarditis to explain septic emboli   An infected thrombus causing septic emboli is a possibility   Either way she would need anticoagulation with Lovenox in the setting of malignancy - I would defer this to the primary team   Was on Xarelto in the past for atrial fibrillation but not consistently  Port has been removed     Encephalopathy:  Likely secondary to sepsis, seems to have improved     Adenocarcinoma of the colon:  Status post bowel resection and colostomy, prior PET-CT also demonstrated a liver lesion  received Folfox chemotherapy, might need to be held in the setting of long-term antibiotic use for endocarditis     MSSA bacteremia:  Also with neutropenia, on nafcillin   Shaw results as above , source was likely the port which has been removed, although blood cultures remained persistently and rapidly positive  Might take longer to clear in the setting of neutropenia but if does not clear, repeat SHAW middle of next week to rule out new endocarditis  Discussed with ID    No other source of infection and the presumptive source which was the port has been removed already      Gerald on CKD:  Mild, stable renal function    Counseling / Coordination of Care  Total time spent 20 minutes including teaching and family updates  More than 50% was spent counseling pt and family

## 2018-09-22 NOTE — PROGRESS NOTES
Progress Note - Infectious Disease   Emily Abdullahi 60 y o  female MRN: [id number]  Unit/Bed#: [facility] 828-01 Encounter: [id number]      Impression/Plan: 1     MSSA bacteremia, persistent:  The initial source of introduction this pathogen remains unknown  Port-A-Cath has been removed  -continue nafcillin for now at current dose  -recheck CBC with diff and CMP tomorrow  -may need to change antibiotics if the renal function would continue to worsen  -blood cultures from 9/16 through 9/20 remain positive  -check blood cultures this morning  -on exam the patient has no obvious new or ongoing sources of this bacteremia  -she has no new lesions at her old femoral line site   -cardiology following, and discussed the case with them given her persistent bacteremia and the possibility of her thrombus being infected  At this time will plan for possible JUNIOR early next week to to evaluate if she has new or developing lesions on her valves and if there is any change to the thrombus in the right atrium  -will also plan to repeat CT of the chest next week to look for development of any abscess, for cultures remain positive  -lastly noted the patient has ongoing, pain in her lower extremities bilaterally and her right upper extremity, though none of her joints are septic in appearance  Her baseline is unknown and she has had a history of recent fall  At this time would plan to get x-rays of her hips and lower extremity as well as her right upper extremity to rule out fracture and another area where she may have seeded      2   Possible right-sided endocarditis versus infected thrombus in the right heart leading to pulmonary septic emboli:  -although no vegetations were present on the echo given the patient's CT finding concern is for right-sided endocarditis that led to septic emboli to the lungs    Additionally, it is possible that the thrombus in the right atrium may be infected and source of these emboli   -ultimately right-sided endocarditis is medically managed condition and there are very few indications for surgery   -antibiotic management and plan for additional images as above  -will continue to follow blood cultures for clearance  -the patient will likely require prolonged course of antibiotic therapy and potentially placement based on her home situation     3   Severe sepsis, POA:  The patient developed hypotension overnight  Blood pressure is a bit better now  -this is likely due to problems 1 and 2  -may need to move back to the intensive care unit for more aggressive resuscitation     4   Neutropenia:  Seems to have recovered   -patient neutrophil count has continued to improve  -this may also be a reason why the patient's blood cultures are not readily clearing   -this is likely related to her chemotherapy which was given about a week prior to admission  -she has received G-CSF  -recheck CBC with diff tomorrow     5   Significant leg/back pain-possibly all related to the patient's paraspinal, psoas, 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  No cord compression or vertebral osteo seen on MRI  Neurologically stable  -serial exams  -antibiotics as above  -no additional ID workup for now     6   Colon cancer currently on palliative chemotherapy with chronic port  -patient has had port removal  -will ultimately need to manage her infection before any additional chemotherapy sessions are possible      7   Diabetes  -patient presented with elevated blood sugars  -current care as per primary team    8  Acute kidney injury-suspect a pre renal issue with the patient's hypotension  The blood pressure seems to have improved    Consideration for the possibility of medication effect   -monitor the GFR  -no need to dose adjust nafcillin for renal function  -monitor eosinophil count  -may need to change antibiotics if the renal function continues to worsen    Antibiotics:  Nafcillin 4  Total antibiotics 7    Subjective:  Patient has no fever, chills, sweats; no nausea, vomiting, diarrhea; no cough, shortness of breath; no increased pain  No new symptoms  Objective:  Vitals:  HR:  [79-98] 89  Resp:  [20-22] 20  BP: ()/(39-57) 89/53  SpO2:  [92 %-98 %] 96 %  Temp (24hrs), Av 9 °F (36 6 °C), Min:97 5 °F (36 4 °C), Max:99 3 °F (37 4 °C)  Current: Temperature: 97 8 °F (36 6 °C)    Physical Exam:   General Appearance:  Alert, interactive, nontoxic, no acute distress  Throat: Oropharynx moist without lesions  Lungs:   Decreased breath sounds bilaterally; no wheezes, rhonchi or rales; respirations unlabored   Heart:  RRR; no murmur, rub or gallop   Abdomen:   Soft, non-tender, non-distended, positive bowel sounds  Extremities: No clubbing, cyanosis  1+ lower extremity edema   Skin: No new rashes or lesions  No draining wounds noted  Labs, Imaging, & Other studies:   All pertinent labs and imaging studies were personally reviewed    Results from last 7 days  Lab Units 18  0029 18  0449 18  0507   WBC Thousand/uL 4 11* 2 08* 0 71*   HEMOGLOBIN g/dL 8 5* 8 2* 8 2*   PLATELETS Thousands/uL 69* 94* 146*       Results from last 7 days  Lab Units 18  0029 18  0449 18  0507 18  0518  18  0448  18  0640   SODIUM mmol/L 133* 130* 131* 138  < > 134*  < > 124*   POTASSIUM mmol/L 3 5 3 5 3 9 3 9  < > 4 1  < > 2 9*   CHLORIDE mmol/L 106 104 106 111*  < > 106  < > 92*   CO2 mmol/L 14* 16* 16* 17*  < > 18*  < > 20*   BUN mg/dL 27* 24 19 20  < > 26*  < > 33*   CREATININE mg/dL 2 20* 1 69* 1 18 1 23  < > 1 09  < > 1 44*   EGFR ml/min/1 73sq m 24 33 51 48  < > 56  < > 40   CALCIUM mg/dL 6 9* 7 4* 7 5* 7 2*  < > 7 0*  < > 7 8*   AST U/L  --   --   --  38  --  51*  --  45   ALT U/L  --   --   --  9*  --  18  --  21   ALK PHOS U/L  --   --   --  75  --  86  --  93   < > = values in this interval not displayed      Results from last 7 days  Lab Units 18  0512 18  0507 18  6151 09/16/18  2145   BLOOD CULTURE   --   --  Staphylococcus aureus*  Staphylococcus aureus* Staphylococcus aureus*  Staphylococcus aureus*   GRAM STAIN RESULT  Gram positive cocci in clusters Gram positive cocci in clusters Gram positive cocci in clusters  Gram positive cocci in clusters Gram positive cocci in clusters  Gram positive cocci in clusters

## 2018-09-22 NOTE — PROGRESS NOTES
[address] Internal Medicine Progress Note  Patient: Emily Abdullahi 60 y o  female   MRN: [id number]  PCP: Tata Ramírez MD  [facility] Encounter: [id number]  Date Of Visit: 09/22/18    Assessment:    Principal Problem: HHNC (hyperglycemic hyperosmolar nonketotic coma) ([facility] [place] [address] )  Active Problems:    Type 2 diabetes mellitus with complication (HCC)    Hyponatremia    GERD (gastroesophageal reflux disease)    Sepsis (HCC)    Altered mental status    Acute respiratory failure with hypoxia (HCC)    Hypocalcemia    Leukopenia    Cavitating pulmonary nodules     Metabolic acidosis, NAG, bicarbonate losses    Hypomagnesemia    Hypophosphatemia      Plan: 1     MSSA bacteremia, persistent:    Unknown source at this time  Appreciate ID input  Repeat cultures pending  2   Possible right-sided endocarditis versus infected thrombus in the right heart leading to pulmonary septic emboli:  Continue with prolonged antibiotic regimen course  May consider repeat echocardiogram prior to discharge  No indication for surgery at this point  Will follow up on repeat blood cultures        3   Severe sepsis, POA:  The patient developed hypotension overnight  With IV normal saline infusion blood pressure normalized  Patient is completely asymptomatic  Afebrile and stable  Continue treatment with current antibiotic regimen      4   Neutropenia:  Seems to have recovered  Improving/resolved      5   Significant leg/back pain-possibly all related to the patient's paraspinal, psoas, 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  No cord compression or vertebral osteo seen on MRI  Neurologically stable  No need for further imaging at this time      6   Colon cancer currently on palliative chemotherapy with chronic port  For palliative chemotherapy may have to be discontinued due to ongoing recurrent sepsis endocarditis at this time    Patient is upset about that but does understand it would not be in her best interest at this point      7   Diabetes  Improving blood glucose levels  Appreciate Endocrinology input     8  Acute kidney injury-suspect a pre renal issue with the patient's hypotension  The blood pressure seems to have improved  Consideration for the possibility of medication effect  Continue his daily labs  Patient excellent urine output at this time  Possibly due to ATN from hypotension? If renal function continues to worsen will consult Nephrology  9   Anemia of chronic disease  Continue to monitor  Transfuse if hemoglobin drops to less than 7 grams/deciliter  VTE Pharmacologic Prophylaxis:   Pharmacologic: Heparin  Mechanical VTE Prophylaxis in Place: Yes    Patient Centered Rounds: I have performed bedside rounds with nursing staff today  Discussions with Specialists or Other Care Team Provider:  S    Education and Discussions with Family / Patient:  Yes    Time Spent for Care: 30 minutes  More than 50% of total time spent on counseling and coordination of care as described above  Current Length of Stay: 5 day(s)    Current Patient Status: Inpatient   Certification Statement: The patient will continue to require additional inpatient hospital stay due to Sepsis hypotension    Discharge Plan / Estimated Discharge Date: To be determined    Code Status: Level 1 - Full Code      Subjective:   I think I am better now     Objective:     Vitals:   Temp (24hrs), Av 9 °F (36 6 °C), Min:97 5 °F (36 4 °C), Max:99 3 °F (37 4 °C)    HR:  [79-98] 89  Resp:  [20-22] 20  BP: ()/(39-57) 89/53  SpO2:  [92 %-98 %] 96 %  Body mass index is 27 42 kg/m²  Input and Output Summary (last 24 hours):        Intake/Output Summary (Last 24 hours) at 18 1016  Last data filed at 18 0900   Gross per 24 hour   Intake           943 33 ml   Output              975 ml   Net           -31 67 ml       Physical Exam:     Physical Exam    Constitutional:  Well developed, well nourished, no acute distress, patient appears chronically ill  Eyes:  PERRL, conjunctiva normal   HENT:  Atraumatic, external ears normal, nose normal, oropharynx moist, no pharyngeal exudates  Neck- normal range of motion, no tenderness, supple   Respiratory:  No respiratory distress, normal breath sounds, no rales, no wheezing   Cardiovascular:  Normal rate, normal rhythm, no murmurs, no gallops, no rubs   GI:  Soft, nondistended, normal bowel sounds, nontender, no organomegaly, no mass, no rebound, no guarding   :  No costovertebral angle tenderness   Musculoskeletal:  No edema, no tenderness, no deformities  Back- no tenderness  Integument:  Well hydrated, no rash   Lymphatic:  No lymphadenopathy noted   Neurologic:  Alert & oriented x 3, CN 2-12 normal, normal motor function, normal sensory function, no focal deficits noted   Psychiatric:  Speech and behavior appropriate  Additional Data:     Labs:      Results from last 7 days  Lab Units 09/22/18  0029 09/21/18  0449   WBC Thousand/uL 4 11* 2 08*   HEMOGLOBIN g/dL 8 5* 8 2*   HEMATOCRIT % 24 8* 23 8*   PLATELETS Thousands/uL 69* 94*   NEUTROS PCT %  --  42*   LYMPHS PCT %  --  51*   MONOS PCT %  --  5   EOS PCT %  --  1       Results from last 7 days  Lab Units 09/22/18  0029  09/19/18  0518   SODIUM mmol/L 133*  < > 138   POTASSIUM mmol/L 3 5  < > 3 9   CHLORIDE mmol/L 106  < > 111*   CO2 mmol/L 14*  < > 17*   BUN mg/dL 27*  < > 20   CREATININE mg/dL 2 20*  < > 1 23   CALCIUM mg/dL 6 9*  < > 7 2*   ALK PHOS U/L  --   --  75   ALT U/L  --   --  9*   AST U/L  --   --  38   < > = values in this interval not displayed  Results from last 7 days  Lab Units 09/18/18  0448   INR  1 17       * I Have Reviewed All Lab Data Listed Above  * Additional Pertinent Lab Tests Reviewed:  [address] Admission Reviewed    Imaging:    Imaging Reports Reviewed Today Include:  Yes  Imaging Personally Reviewed by Myself Includes:  Yes    Recent Cultures (last 7 days):       Results from last 7 days  Lab Units 09/20/18  0512 09/20/18  0507 09/18/18  0526 09/16/18  2145   BLOOD CULTURE   --   --  Staphylococcus aureus*  Staphylococcus aureus* Staphylococcus aureus*  Staphylococcus aureus*   GRAM STAIN RESULT  Gram positive cocci in clusters Gram positive cocci in clusters Gram positive cocci in clusters  Gram positive cocci in clusters Gram positive cocci in clusters  Gram positive cocci in clusters       Last 24 Hours Medication List:     Current Facility-Administered Medications:  acetaminophen 650 mg Oral Q6H PRN Osiel Oliver PA-C    amiodarone 200 mg Oral TID With Meals JEANNIE Charles    atorvastatin 10 mg Oral After [employer] YANELIS Wang    chlorhexidine 15 mL Swish & Spit Q12H [address] Marianela Marmolejo DO    clopidogrel 75 mg Oral Daily Megan Sequeira PA-C    divalproex sodium 750 mg Oral Daily Yesy Murcia PA-C    DULoxetine 60 mg Oral Daily Megan Sequeira PA-C    enoxaparin 1 mg/kg Subcutaneous Q12H [address] Megan Sequeira PA-C    insulin glargine 20 Units Subcutaneous HS Shelby Rolon MD    insulin lispro 1-5 Units Subcutaneous HS Shelby Rolon MD    insulin lispro 1-6 Units Subcutaneous TID AC Lew Boogie MD    insulin lispro 6 Units Subcutaneous TID With Meals Tha Zhu MD    nafcillin 2,000 mg Intravenous Q4H Urmila Desai MD Last Rate: Stopped (09/22/18 0650)   sodium chloride 50 mL/hr Intravenous Continuous Osiel Oliver PA-C Last Rate: 50 mL/hr (09/22/18 0857)   tbo-filgrastim 480 mcg Subcutaneous Daily Megan Sequeira PA-C    traZODone 50 mg Oral HS Yesy Murcia PA-C         Today, Patient Was Seen By: Jose Knutson    ** Please Note: This note has been constructed using a voice recognition system   **

## 2018-09-23 LAB
ALBUMIN SERPL BCP-MCNC: 1 G/DL (ref 3.5–5)
ALP SERPL-CCNC: 99 U/L (ref 46–116)
ALT SERPL W P-5'-P-CCNC: <6 U/L (ref 12–78)
ANION GAP SERPL CALCULATED.3IONS-SCNC: 12 MMOL/L (ref 4–13)
ANISOCYTOSIS BLD QL SMEAR: PRESENT
AST SERPL W P-5'-P-CCNC: 18 U/L (ref 5–45)
BACTERIA BLD CULT: ABNORMAL
BACTERIA BLD CULT: ABNORMAL
BASOPHILS # BLD MANUAL: 0 THOUSAND/UL (ref 0–0.1)
BASOPHILS NFR MAR MANUAL: 0 % (ref 0–1)
BILIRUB SERPL-MCNC: 3.31 MG/DL (ref 0.2–1)
BUN SERPL-MCNC: 27 MG/DL (ref 5–25)
BURR CELLS BLD QL SMEAR: PRESENT
CALCIUM SERPL-MCNC: 6.7 MG/DL (ref 8.3–10.1)
CHLORIDE SERPL-SCNC: 104 MMOL/L (ref 100–108)
CO2 SERPL-SCNC: 16 MMOL/L (ref 21–32)
CREAT SERPL-MCNC: 2.34 MG/DL (ref 0.6–1.3)
EOSINOPHIL # BLD MANUAL: 0.14 THOUSAND/UL (ref 0–0.4)
EOSINOPHIL NFR BLD MANUAL: 1 % (ref 0–6)
ERYTHROCYTE [DISTWIDTH] IN BLOOD BY AUTOMATED COUNT: 16.3 % (ref 11.6–15.1)
GFR SERPL CREATININE-BSD FRML MDRD: 22 ML/MIN/1.73SQ M
GIANT PLATELETS BLD QL SMEAR: PRESENT
GLUCOSE SERPL-MCNC: 106 MG/DL (ref 65–140)
GLUCOSE SERPL-MCNC: 108 MG/DL (ref 65–140)
GLUCOSE SERPL-MCNC: 136 MG/DL (ref 65–140)
GLUCOSE SERPL-MCNC: 163 MG/DL (ref 65–140)
GLUCOSE SERPL-MCNC: 57 MG/DL (ref 65–140)
GLUCOSE SERPL-MCNC: 62 MG/DL (ref 65–140)
GLUCOSE SERPL-MCNC: 93 MG/DL (ref 65–140)
GRAM STN SPEC: ABNORMAL
GRAM STN SPEC: ABNORMAL
HCT VFR BLD AUTO: 25.3 % (ref 34.8–46.1)
HGB BLD-MCNC: 8.7 G/DL (ref 11.5–15.4)
LYMPHOCYTES # BLD AUTO: 0.86 THOUSAND/UL (ref 0.6–4.47)
LYMPHOCYTES # BLD AUTO: 6 % (ref 14–44)
MCH RBC QN AUTO: 27.7 PG (ref 26.8–34.3)
MCHC RBC AUTO-ENTMCNC: 34.4 G/DL (ref 31.4–37.4)
MCV RBC AUTO: 81 FL (ref 82–98)
METAMYELOCYTES NFR BLD MANUAL: 7 % (ref 0–1)
MONOCYTES # BLD AUTO: 0.71 THOUSAND/UL (ref 0–1.22)
MONOCYTES NFR BLD: 5 % (ref 4–12)
MYELOCYTES NFR BLD MANUAL: 1 % (ref 0–1)
NEUTROPHILS # BLD MANUAL: 11.29 THOUSAND/UL (ref 1.85–7.62)
NEUTS BAND NFR BLD MANUAL: 7 % (ref 0–8)
NEUTS SEG NFR BLD AUTO: 72 % (ref 43–75)
NRBC BLD AUTO-RTO: 0 /100 WBCS
NRBC BLD AUTO-RTO: 1 /100 WBC (ref 0–2)
PLATELET # BLD AUTO: 49 THOUSANDS/UL (ref 149–390)
PLATELET BLD QL SMEAR: ABNORMAL
POIKILOCYTOSIS BLD QL SMEAR: PRESENT
POLYCHROMASIA BLD QL SMEAR: PRESENT
POTASSIUM SERPL-SCNC: 3 MMOL/L (ref 3.5–5.3)
PROCALCITONIN SERPL-MCNC: 8.85 NG/ML
PROT SERPL-MCNC: 5.6 G/DL (ref 6.4–8.2)
RBC # BLD AUTO: 3.14 MILLION/UL (ref 3.81–5.12)
RBC MORPH BLD: PRESENT
SODIUM SERPL-SCNC: 132 MMOL/L (ref 136–145)
TARGETS BLD QL SMEAR: PRESENT
TOXIC GRANULES BLD QL SMEAR: PRESENT
VARIANT LYMPHS # BLD AUTO: 1 %
WBC # BLD AUTO: 14.29 THOUSAND/UL (ref 4.31–10.16)

## 2018-09-23 PROCEDURE — 87505 NFCT AGENT DETECTION GI: CPT | Performed by: PHYSICIAN ASSISTANT

## 2018-09-23 PROCEDURE — 87205 SMEAR GRAM STAIN: CPT | Performed by: PHYSICIAN ASSISTANT

## 2018-09-23 PROCEDURE — 94760 N-INVAS EAR/PLS OXIMETRY 1: CPT | Performed by: SOCIAL WORKER

## 2018-09-23 PROCEDURE — 87493 C DIFF AMPLIFIED PROBE: CPT | Performed by: PHYSICIAN ASSISTANT

## 2018-09-23 PROCEDURE — 80053 COMPREHEN METABOLIC PANEL: CPT | Performed by: INTERNAL MEDICINE

## 2018-09-23 PROCEDURE — 85007 BL SMEAR W/DIFF WBC COUNT: CPT | Performed by: INTERNAL MEDICINE

## 2018-09-23 PROCEDURE — 99232 SBSQ HOSP IP/OBS MODERATE 35: CPT | Performed by: INTERNAL MEDICINE

## 2018-09-23 PROCEDURE — 85027 COMPLETE CBC AUTOMATED: CPT | Performed by: INTERNAL MEDICINE

## 2018-09-23 PROCEDURE — 99254 IP/OBS CNSLTJ NEW/EST MOD 60: CPT | Performed by: EMERGENCY MEDICINE

## 2018-09-23 PROCEDURE — 99233 SBSQ HOSP IP/OBS HIGH 50: CPT | Performed by: INTERNAL MEDICINE

## 2018-09-23 PROCEDURE — 84145 PROCALCITONIN (PCT): CPT | Performed by: INTERNAL MEDICINE

## 2018-09-23 PROCEDURE — 82948 REAGENT STRIP/BLOOD GLUCOSE: CPT

## 2018-09-23 RX ORDER — METOCLOPRAMIDE HYDROCHLORIDE 5 MG/ML
5 INJECTION INTRAMUSCULAR; INTRAVENOUS EVERY 6 HOURS PRN
Status: DISCONTINUED | OUTPATIENT
Start: 2018-09-23 | End: 2018-09-28

## 2018-09-23 RX ORDER — INSULIN GLARGINE 100 [IU]/ML
10 INJECTION, SOLUTION SUBCUTANEOUS
Status: DISCONTINUED | OUTPATIENT
Start: 2018-09-23 | End: 2018-09-24

## 2018-09-23 RX ORDER — POTASSIUM CHLORIDE 20 MEQ/1
40 TABLET, EXTENDED RELEASE ORAL ONCE
Status: COMPLETED | OUTPATIENT
Start: 2018-09-23 | End: 2018-09-23

## 2018-09-23 RX ADMIN — INSULIN LISPRO 1 UNITS: 100 INJECTION, SOLUTION INTRAVENOUS; SUBCUTANEOUS at 18:03

## 2018-09-23 RX ADMIN — CHLORHEXIDINE GLUCONATE 15 ML: 1.2 RINSE ORAL at 09:32

## 2018-09-23 RX ADMIN — TBO-FILGRASTIM 480 MCG: 480 INJECTION, SOLUTION SUBCUTANEOUS at 09:33

## 2018-09-23 RX ADMIN — NAFCILLIN SODIUM 2000 MG: 2 INJECTION, POWDER, LYOPHILIZED, FOR SOLUTION INTRAMUSCULAR; INTRAVENOUS at 22:56

## 2018-09-23 RX ADMIN — POTASSIUM CHLORIDE 40 MEQ: 1500 TABLET, EXTENDED RELEASE ORAL at 19:26

## 2018-09-23 RX ADMIN — AMIODARONE HYDROCHLORIDE 200 MG: 200 TABLET ORAL at 18:03

## 2018-09-23 RX ADMIN — NAFCILLIN SODIUM 2000 MG: 2 INJECTION, POWDER, LYOPHILIZED, FOR SOLUTION INTRAMUSCULAR; INTRAVENOUS at 02:10

## 2018-09-23 RX ADMIN — ACETAMINOPHEN 650 MG: 160 SUSPENSION ORAL at 09:32

## 2018-09-23 RX ADMIN — NAFCILLIN SODIUM 2000 MG: 2 INJECTION, POWDER, LYOPHILIZED, FOR SOLUTION INTRAMUSCULAR; INTRAVENOUS at 09:30

## 2018-09-23 RX ADMIN — NAFCILLIN SODIUM 2000 MG: 2 INJECTION, POWDER, LYOPHILIZED, FOR SOLUTION INTRAMUSCULAR; INTRAVENOUS at 05:47

## 2018-09-23 RX ADMIN — DIVALPROEX SODIUM 750 MG: 500 TABLET, DELAYED RELEASE ORAL at 09:31

## 2018-09-23 RX ADMIN — AMIODARONE HYDROCHLORIDE 200 MG: 200 TABLET ORAL at 09:31

## 2018-09-23 RX ADMIN — POTASSIUM CHLORIDE 20 MEQ: 2 INJECTION, SOLUTION, CONCENTRATE INTRAVENOUS at 22:25

## 2018-09-23 RX ADMIN — SODIUM CHLORIDE 1000 ML: 0.9 INJECTION, SOLUTION INTRAVENOUS at 19:42

## 2018-09-23 RX ADMIN — TRAZODONE HYDROCHLORIDE 50 MG: 50 TABLET ORAL at 22:25

## 2018-09-23 RX ADMIN — SODIUM CHLORIDE 100 ML/HR: 0.9 INJECTION, SOLUTION INTRAVENOUS at 17:47

## 2018-09-23 RX ADMIN — INSULIN GLARGINE 10 UNITS: 100 INJECTION, SOLUTION SUBCUTANEOUS at 22:25

## 2018-09-23 RX ADMIN — AMIODARONE HYDROCHLORIDE 200 MG: 200 TABLET ORAL at 12:54

## 2018-09-23 RX ADMIN — METOCLOPRAMIDE 5 MG: 5 INJECTION, SOLUTION INTRAMUSCULAR; INTRAVENOUS at 20:29

## 2018-09-23 RX ADMIN — CHLORHEXIDINE GLUCONATE 15 ML: 1.2 RINSE ORAL at 22:25

## 2018-09-23 RX ADMIN — NAFCILLIN SODIUM 2000 MG: 2 INJECTION, POWDER, LYOPHILIZED, FOR SOLUTION INTRAMUSCULAR; INTRAVENOUS at 18:03

## 2018-09-23 RX ADMIN — DULOXETINE HYDROCHLORIDE 60 MG: 60 CAPSULE, DELAYED RELEASE ORAL at 09:31

## 2018-09-23 RX ADMIN — NAFCILLIN SODIUM 2000 MG: 2 INJECTION, POWDER, LYOPHILIZED, FOR SOLUTION INTRAMUSCULAR; INTRAVENOUS at 15:25

## 2018-09-23 RX ADMIN — POTASSIUM CHLORIDE 20 MEQ: 2 INJECTION, SOLUTION, CONCENTRATE INTRAVENOUS at 19:43

## 2018-09-23 RX ADMIN — SODIUM CHLORIDE 50 ML/HR: 0.9 INJECTION, SOLUTION INTRAVENOUS at 02:09

## 2018-09-23 RX ADMIN — ATORVASTATIN CALCIUM 10 MG: 10 TABLET, FILM COATED ORAL at 18:03

## 2018-09-23 RX ADMIN — ENOXAPARIN SODIUM 70 MG: 80 INJECTION SUBCUTANEOUS at 09:31

## 2018-09-23 RX ADMIN — ACETAMINOPHEN 650 MG: 160 SUSPENSION ORAL at 15:25

## 2018-09-23 RX ADMIN — CLOPIDOGREL 75 MG: 75 TABLET, FILM COATED ORAL at 09:32

## 2018-09-23 RX ADMIN — ENOXAPARIN SODIUM 70 MG: 80 INJECTION SUBCUTANEOUS at 22:25

## 2018-09-23 NOTE — PLAN OF CARE
CARDIOVASCULAR - ADULT     Maintains optimal cardiac output and hemodynamic stability Progressing     Absence of cardiac dysrhythmias or at baseline rhythm Progressing        DISCHARGE PLANNING     Discharge to home or other facility with appropriate resources Progressing        DISCHARGE PLANNING - CARE MANAGEMENT     Discharge to post-acute care or home with appropriate resources Progressing        INFECTION - ADULT     Absence or prevention of progression during hospitalization Progressing        Knowledge Deficit     Patient/family/caregiver demonstrates understanding of disease process, treatment plan, medications, and discharge instructions Progressing        METABOLIC, FLUID AND ELECTROLYTES - ADULT     Electrolytes maintained within normal limits Progressing     Glucose maintained within target range Progressing        Nutrition/Hydration-ADULT     Nutrient/Hydration intake appropriate for improving, restoring or maintaining nutritional needs Progressing        PAIN - ADULT     Verbalizes/displays adequate comfort level or baseline comfort level Progressing        Potential for Falls     Patient will remain free of falls Progressing        Prexisting or High Potential for Compromised Skin Integrity     Skin integrity is maintained or improved Progressing        RESPIRATORY - ADULT     Achieves optimal ventilation and oxygenation Progressing        SAFETY ADULT     Maintain or return to baseline ADL function Progressing     Maintain or return mobility status to optimal level Progressing

## 2018-09-23 NOTE — PROGRESS NOTES
Progress Note - Emily Abdullahi 60 y o  female MRN: [id number]    Unit/Bed#: [facility] 828-01 Encounter: [id number]      CC: diabetes f/u    Subjective:   Emily López is a 60y o  year old female with type 2  diabetes  Was hypoglycemic this morning  Does not have much of an appetite  Denies any nausea or vomiting    Objective:     Vitals: Blood pressure 99/53, pulse 88, temperature 97 5 °F (36 4 °C), temperature source Oral, resp  rate 20, height 5' 3" (1 6 m), weight 70 2 kg (154 lb 12 2 oz), SpO2 98 %, not currently breastfeeding  ,Body mass index is 27 42 kg/m²  Intake/Output Summary (Last 24 hours) at 09/23/18 1250  Last data filed at 09/23/18 0900   Gross per 24 hour   Intake          1380 83 ml   Output             1500 ml   Net          -119 17 ml       Physical Exam:  General Appearance: awake, appears stated age and cooperative  Head: Normocephalic, without obvious abnormality, atraumatic  Extremities: moves all extremities  Skin: Skin color and temperature normal    Pulm: no labored breathing    Lab, Imaging and other studies: I have personally reviewed pertinent reports             Results from last 7 days  Lab Units 09/23/18  0515 09/22/18  0029 09/21/18  0449   SODIUM mmol/L 132* 133* 130*   POTASSIUM mmol/L 3 0* 3 5 3 5   CHLORIDE mmol/L 104 106 104   CO2 mmol/L 16* 14* 16*   BUN mg/dL 27* 27* 24   CREATININE mg/dL 2 34* 2 20* 1 69*   CALCIUM mg/dL 6 7* 6 9* 7 4*       POC Glucose   Date Value   09/23/2018 136 mg/dl   09/23/2018 106 mg/dl   09/23/2018 93 mg/dl   09/23/2018 57 mg/dl (L)   09/22/2018 109 mg/dl   09/22/2018 120 mg/dl   09/22/2018 129 mg/dl   09/22/2018 94 mg/dl   09/21/2018 165 mg/dl (H)   09/21/2018 166 mg/dl (H)   06/19/2018 230 MG/DL (H)   06/19/2018 157 MG/DL (H)   06/18/2018 199 MG/DL (H)   06/18/2018 114 MG/DL (H)   06/18/2018 162 MG/DL (H)   06/18/2018 164 MG/DL (H)   01/26/2015 293 mg/dL (H)       Assessment:  Type 2 diabetes with hypoglycemia on long-term insulin therapy  Acute kidney injury  MSSA bacteremia    Plan: Will decrease Lantus to 10 units at bedtime  Discontinue pre meal insulin  May have to  to reinitiate as her appetite improves  Monitor blood sugars before meals and bedtime adjust accordingly  MSSA bacteremia-ID on board-patient on IV antibiotics        Portions of the record may have been created with voice recognition software

## 2018-09-23 NOTE — PROGRESS NOTES
Progress Note - Cardiology   Emily Abdullahi 60 y o  female MRN: [id number]  Unit/Bed#: [facility] 828-01 Encounter: [id number]  09/23/18  12:38 PM        Subjective/Objective   Chief Complaint: No chief complaint on file  Subjective: Patient denies any complaints    Specifically denies chest pains or shortness of breath    Objective: Comfortable , no distress at the time of exam      Patient Active Problem List   Diagnosis    [facility]A [facility]cerebral vascular accident) [facility]    Type 2 diabetes mellitus without complication, without long-term current use of insulin [facility]    Schizoaffective disorder, bipolar type [facility]    Essential hypertension    Colonic mass    Type 2 diabetes mellitus with complication (HCC)    CKD (chronic kidney disease) stage 3, GFR 30-59 ml/min    Pain of right upper extremity    RYLAN (acute kidney injury)     CAD (coronary artery disease)    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HCC)    Large bowel obstruction ([facility] )    Brachial artery stenosis, right (HCC)    Hyponatremia    Rectosigmoid mass - High colostomy output    Leukocytosis    GERD (gastroesophageal reflux disease)    Tobacco abuse    Liver metastasis (HCC)    Mixed hyperlipidemia    Dizziness - due to orthostatic hypotension    Ambulatory dysfunction    Orthostatic hypotension    Chemotherapy induced nausea and vomiting    Lower abdominal pain    HHNC (hyperglycemic hyperosmolar nonketotic coma) (HCC)    Sepsis ([facility] )    Altered mental status    Acute respiratory failure with hypoxia (HCC)    Hypocalcemia    Leukopenia    Cavitating pulmonary nodules     Metabolic acidosis, NAG, bicarbonate losses    Hypomagnesemia    Hypophosphatemia       Vitals: BP 99/53 (BP Location: Left arm)   Pulse 88   Temp 97 5 °F (36 4 °C) (Oral)   Resp 20   Ht 5' 3" (1 6 m)   Wt 70 2 kg (154 lb 12 2 oz)   SpO2 98%   BMI 27 42 kg/m²     I/O this shift:  In: 60 [P O :60]  Out: -   Wt Readings from Last 3 Encounters:   09/20/18 70 2 kg (154 lb 12 2 oz)   09/18/18 57 1 kg (125 lb 14 1 oz)   09/11/18 57 2 kg (126 lb 1 7 oz)       Intake/Output Summary (Last 24 hours) at 09/23/18 1238  Last data filed at 09/23/18 0900   Gross per 24 hour   Intake          1380 83 ml   Output             1500 ml   Net          -119 17 ml     I/O last 3 completed shifts: In: 2144 2 [P O :230;  I V :1614 2; IV Piggyback:300]  Out: 2025 [Urine:900; Stool:1125]    Invasive Devices     Peripheral Intravenous Line            Peripheral IV 09/20/18 Left Arm 2 days          Drain            Colostomy RUQ -- days    Urethral Catheter Latex 18 Fr  2 days                  Physical Exam:  GEN: Emily Padron appears well, decreased alertness, appears drowsy, pleasant and cooperative   HEENT: pupils equal, round, and reactive to light; extraocular muscles intact  NECK: supple, no carotid bruits or JVD  HEART: regular rhythm, normal S1 and S2, no murmurs, clicks, gallops or rubs   LUNGS: clear to auscultation bilaterally; no wheezes, rales, or rhonchi   ABDOMEN/GI: normal bowel sounds, soft, no tenderness, no distention  EXTREMITIES/Musculoskeltal: peripheral pulses normal; no clubbing, cyanosis, generalized anasarca present  NEURO: no focal findings   SKIN: normal without suspicious lesions on exposed skin            Lab Results:     Results from last 7 days  Lab Units 09/16/18  2145   TROPONIN I ng/mL <0 02       Results from last 7 days  Lab Units 09/23/18 0515 09/22/18  0029 09/21/18 0449   WBC Thousand/uL 14 29* 4 11* 2 08*   HEMOGLOBIN g/dL 8 7* 8 5* 8 2*   HEMATOCRIT % 25 3* 24 8* 23 8*   PLATELETS Thousands/uL 49* 69* 94*           Results from last 7 days  Lab Units 09/23/18  0515 09/22/18  0029 09/21/18 0449  09/19/18  0518  09/18/18  0448   SODIUM mmol/L 132* 133* 130*  < > 138  < > 134*   POTASSIUM mmol/L 3 0* 3 5 3 5  < > 3 9  < > 4 1   CHLORIDE mmol/L 104 106 104  < > 111*  < > 106   CO2 mmol/L 16* 14* 16*  < > 17*  < > 18*   BUN mg/dL 27* 27* 24  < > 20  < > 26*   CREATININE mg/dL 2 34* 2 20* 1 69*  < > 1 23  < > 1 09   CALCIUM mg/dL 6 7* 6 9* 7 4*  < > 7 2*  < > 7 0*   ALK PHOS U/L 99  --   --   --  75  --  86   ALT U/L <6*  --   --   --  9*  --  18   AST U/L 18  --   --   --  38  --  51*   < > = values in this interval not displayed  Results from last 7 days  Lab Units 09/18/18  0448 09/17/18  1806 09/17/18  0203   INR  1 17 1 29* 1 22*       Imaging: I have personally reviewed pertinent reports      EKG:  No events    Scheduled Meds:    Current Facility-Administered Medications:  acetaminophen 650 mg Oral Q6H PRN Jeanie Joshi PA-C    amiodarone 200 mg Oral TID With Meals JEANNIE Rojas    atorvastatin 10 mg Oral After [employer] YANELIS Wang    chlorhexidine 15 mL [employer]ish [employer] Spit Q12H [address] Marianela Marmolejo DO    clopidogrel 75 mg Oral Daily Tarun Odom PA-C    divalproex sodium 750 mg Oral Daily Tarun Odom PA-C    DULoxetine 60 mg Oral Daily Megan Sequeira PA-C    enoxaparin 1 mg/kg Subcutaneous Q12H [address] Megan Sequeira PA-C    insulin glargine 14 Units Subcutaneous HS Maureen Mauro MD    insulin lispro 1-5 Units Subcutaneous HS Elier Gonzalez MD    insulin lispro 1-6 Units Subcutaneous TID AC Shelby Morris MD    nafcillin 2,000 mg Intravenous Q4H Nayeli Fisher MD Last Rate: 2,000 mg (09/23/18 0930)   sodium chloride 100 mL/hr Intravenous Continuous Jeanie Joshi PA-C Last Rate: 100 mL/hr (09/23/18 5077)   tbo-filgrastim 480 mcg Subcutaneous Daily Meagn Sequeira PA-C    traZODone 50 mg Oral HS Megan Sequeira PA-C      Continuous Infusions:    sodium chloride 100 mL/hr Last Rate: 100 mL/hr (09/23/18 5260)       VTE Pharmacologic Prophylaxis: Enoxaparin (Lovenox)  VTE Mechanical Prophylaxis: sequential compression device       Impression:     62-year-old with prior history of CAD, previous nontransmural [id number] of the LAD and diagonal branch, STEMI-December 2017 -status post PCI of the proximal RCA-MAKEDA, at that time mid LAD had  in the previously stented segment, distal LAD filling via collaterals, medication noncompliance, hypertension, hyperlipidemia, CKD, chronic and ongoing tobacco use, uncontrolled type 2 diabetes-A1c of 10 0 in June, right upper extremity ischemia in June 2018, the same month she was also diagnosed with a colonic mass-status post bowel resection and colostomy, also-status post right axillary/brachial artery PTA with development of distal ulnar artery emboli-status post thrombectomy and tPA and transition to Xarelto in July 2018   Subsequently  was off Xarelto for some time     Readmitted with altered mental status for 2 days and septic presentation   ECG demonstrates atrial flutter with rapid ventricular response-adenosine was attempted-revealing flutter waves      Other evaluation has included a CT scan of the head that shows plaque in are infarcts, CT scan of the chest that shows cavitary lesions-likely septic emboli, transthoracic echocardiogram with a tricuspid valve 'mass', blood cultures growing gram-positive cocci in clusters   JUNIOR demonstrated no tricuspid valve endocarditis but a right atrial mass-likely thrombus the, less likely metastatic lesion     Acute issues have been VDRF-just extubated, atrial flutter/fibrillation with rapid rates-now in sinus rhythm, sepsis with tricuspid valve endocarditis ruled out by JUNIOR which also showed a right atrial mass      Plan:    Hypotension:  No signs of infection although immunocompromised, Okay for IV fluids-currently at 100 cc/hour, agree with same, ejection fraction is preserved, likely from sepsis/intravascular hypovolemia, auto diuresing well,    Acute kidney injury:  Likely secondary to hypoperfusion from hypotension    Continue IV fluids, watch renal function     Atrial flutter:  Currently in sinus rhythm, continue p o  amiodarone   Once blood pressure has improved, start low-dose metoprolol at 5 mg every 6 hours IV, blood pressures have been borderline at times   If tolerated well, change to p o  tomorrow, blood pressures have been borderline at times   Continue anticoagulation with Lovenox  No further atrial flutter or fibrillation noted      Right atrial mass:  Differential diagnosis includes most likely-thrombus in the setting of atrial fibrillation and smoke in the right atrium versus metastatic lesion   She does have a prior history of colon malignancy as well as a metastatic lesion in the liver - IVC is free of any masses-on transthoracic echo   Cavitating lesions in the lung are unlikely to be malignancy in her case-developed within 10 days on chest x-rays   Not typical pulmonary emboli from a thrombus (emboli do not cavitate), no right-sided endocarditis to explain septic emboli   An infected thrombus causing septic emboli is a possibility   Either way she would need anticoagulation with Lovenox in the setting of malignancy - I would defer this to the primary team   Was on Xarelto in the past for atrial fibrillation but not consistently  Port has been removed     Encephalopathy:  Likely secondary to sepsis, seems to have improved     Adenocarcinoma of the colon:  Status post bowel resection and colostomy, prior PET-CT also demonstrated a liver lesion  received Folfox chemotherapy, might need to be held in the setting of long-term antibiotic use for endocarditis     MSSA bacteremia:  Also with neutropenia, on nafcillin   Shaw results as above , source was likely the port which has been removed, although blood cultures remained persistently and rapidly positive  Might take longer to clear in the setting of neutropenia but if does not clear, repeat SHAW middle of next week to rule out new endocarditis  Discussed with ID  No other source of infection and the presumptive source which was the port has been removed already  Counseling / Coordination of Care  Total time spent 20 minutes including teaching and family updates   More than 50% was spent counseling pt and family

## 2018-09-23 NOTE — PROGRESS NOTES
[address] Internal Medicine Progress Note  Patient: Emily Abdullahi 60 y o  female   MRN: [id number]  PCP: Mariah Farooq MD  Unit/Bed#: [facility] 828-01 Encounter: [id number]  Date Of Visit: 09/23/18    Assessment:    Principal Problem: HHNC (hyperglycemic hyperosmolar nonketotic coma) ([facility] [address] )  Active Problems:    Type 2 diabetes mellitus with complication (HCC)    Hyponatremia    GERD (gastroesophageal reflux disease)    Sepsis (HCC)    Altered mental status    Acute respiratory failure with hypoxia (HCC)    Hypocalcemia    Leukopenia    Cavitating pulmonary nodules     Metabolic acidosis, NAG, bicarbonate losses    Hypomagnesemia    Hypophosphatemia      Plan: 1     MSSA bacteremia, persistent:    Unknown source at this time  Appreciate ID input  Repeat cultures pending  2   Possible right-sided endocarditis versus infected thrombus in the right heart leading to pulmonary septic emboli:  Continue with prolonged antibiotic regimen course  May consider repeat echocardiogram prior to discharge  No indication for surgery at this point  Will follow up on repeat blood cultures        3   Severe sepsis, POA:  The patient developed hypotension overnight  With IV normal saline infusion blood pressure normalized  Patient is completely asymptomatic  Afebrile and stable  Continue treatment with current antibiotic regimen      4   Neutropenia:  Seems to have recovered  Improving/resolved      5   Significant leg/back pain-possibly all related to the patient's paraspinal, psoas, 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  No cord compression or vertebral osteo seen on MRI  Neurologically stable  No need for further imaging at this time      6   Colon cancer currently on palliative chemotherapy with chronic port  For palliative chemotherapy may have to be discontinued due to ongoing recurrent sepsis endocarditis at this time    Patient is upset about that but does understand it would not be in her best interest at this point      7   Diabetes  Improving blood glucose levels  Appreciate Endocrinology input     8  Acute kidney injury-suspect a pre renal issue with the patient's hypotension  The blood pressure seems to have improved  Consideration for the possibility of medication effect  Continue his daily labs  Patient excellent urine output at this time  Possibly due to ATN from hypotension? If renal function continues to worsen will consult Nephrology  9   Anemia of chronic disease  Continue to monitor  Transfuse if hemoglobin drops to less than 7 grams/deciliter  Due to ongoing hypotension and febrile illness I have requested patient to be evaluated by critical care medicine, although patient's blood pressure has been stable within 24 hours she does tend to rapidly decompensate  Case was discussed in detail with ICU resident on call awaiting further recommendations  VTE Pharmacologic Prophylaxis:   Pharmacologic: Heparin  Mechanical VTE Prophylaxis in Place: Yes    Patient Centered Rounds: I have performed bedside rounds with nursing staff today  Discussions with Specialists or Other Care Team Provider:  S    Education and Discussions with Family / Patient:  Yes    Time Spent for Care: 30 minutes  More than 50% of total time spent on counseling and coordination of care as described above  Current Length of Stay: 6 day(s)    Current Patient Status: Inpatient   Certification Statement: The patient will continue to require additional inpatient hospital stay due to Sepsis hypotension    Discharge Plan / Estimated Discharge Date: To be determined    Code Status: Level 1 - Full Code      Subjective:   I think I am better now     Objective:     Vitals:   Temp (24hrs), Av 8 °F (36 6 °C), Min:97 °F (36 1 °C), Max:99 8 °F (37 7 °C)    HR:  [71-88] 75  Resp:  [18-24] 20  BP: (79-99)/(47-54) 97/49  SpO2:  [93 %-98 %] 96 %  Body mass index is 27 42 kg/m²       Input and Output Summary (last 24 hours): Intake/Output Summary (Last 24 hours) at 09/23/18 1747  Last data filed at 09/23/18 1500   Gross per 24 hour   Intake          1450 83 ml   Output             1550 ml   Net           -99 17 ml       Physical Exam:     Physical Exam    Constitutional:  Well developed, well nourished, no acute distress, patient appears chronically ill  Eyes:  PERRL, conjunctiva normal   HENT:  Atraumatic, external ears normal, nose normal, oropharynx moist, no pharyngeal exudates  Neck- normal range of motion, no tenderness, supple   Respiratory:  No respiratory distress, normal breath sounds, no rales, no wheezing   Cardiovascular:  Normal rate, normal rhythm, no murmurs, no gallops, no rubs   GI:  Soft, nondistended, normal bowel sounds, nontender, no organomegaly, no mass, no rebound, no guarding   :  No costovertebral angle tenderness   Musculoskeletal:  No edema, no tenderness, no deformities  Back- no tenderness  Integument:  Well hydrated, no rash   Lymphatic:  No lymphadenopathy noted   Neurologic:  Alert & oriented x 3, CN 2-12 normal, normal motor function, normal sensory function, no focal deficits noted   Psychiatric:  Speech and behavior appropriate  Additional Data:     Labs:      Results from last 7 days  Lab Units 09/23/18  0515  09/21/18  0449   WBC Thousand/uL 14 29*  < > 2 08*   HEMOGLOBIN g/dL 8 7*  < > 8 2*   HEMATOCRIT % 25 3*  < > 23 8*   PLATELETS Thousands/uL 49*  < > 94*   NEUTROS PCT %  --   --  42*   LYMPHS PCT %  --   --  51*   LYMPHO PCT % 6*  --   --    MONOS PCT %  --   --  5   MONO PCT MAN % 5  --   --    EOS PCT %  --   --  1   EOSINO PCT MANUAL % 1  --   --    < > = values in this interval not displayed      Results from last 7 days  Lab Units 09/23/18  0515   SODIUM mmol/L 132*   POTASSIUM mmol/L 3 0*   CHLORIDE mmol/L 104   CO2 mmol/L 16*   BUN mg/dL 27*   CREATININE mg/dL 2 34*   CALCIUM mg/dL 6 7*   ALK PHOS U/L 99   ALT U/L <6*   AST U/L 18       Results from last 7 days  Lab Units 09/18/18  0448   INR  1 17       * I Have Reviewed All Lab Data Listed Above  * Additional Pertinent Lab Tests Reviewed:  [address] Admission Reviewed    Imaging:    Imaging Reports Reviewed Today Include:  Yes  Imaging Personally Reviewed by Myself Includes:  Yes    Recent Cultures (last 7 days):       Results from last 7 days  Lab Units 09/22/18  0530 09/22/18  0519 09/20/18  0512 09/20/18  0507 09/18/18  0526 09/16/18  2145   BLOOD CULTURE   --   --  Staphylococcus aureus* Staphylococcus aureus* Staphylococcus aureus*  Staphylococcus aureus* Staphylococcus aureus*  Staphylococcus aureus*   GRAM STAIN RESULT  Gram positive cocci in clusters Gram positive cocci in clusters Gram positive cocci in clusters Gram positive cocci in clusters Gram positive cocci in clusters  Gram positive cocci in clusters Gram positive cocci in clusters  Gram positive cocci in clusters       Last 24 Hours Medication List:     Current Facility-Administered Medications:  acetaminophen 650 mg Oral Q6H PRN Umair Myles PA-C    amiodarone 200 mg Oral TID With Meals JEANNIE Quintero    atorvastatin 10 mg Oral After [employer] YANELIS Wang    chlorhexidine 15 mL Swish [employer] Spit Q12H [address] Marianela Marmolejo DO    clopidogrel 75 mg Oral Daily Megan Sequeira PA-C    divalproex sodium 750 mg Oral Daily Moises De Guzman PA-C    DULoxetine 60 mg Oral Daily Megan Sequeira PA-C    enoxaparin 1 mg/kg Subcutaneous Q12H [address] Megan Sequeira PA-C    insulin glargine 10 Units Subcutaneous HS Deborah King MD    insulin lispro 1-5 Units Subcutaneous HS Shelby Orellana MD    insulin lispro 1-6 Units Subcutaneous TID AC Shelby Orellana MD    nafcillin 2,000 mg Intravenous Q4H Hermelinda Lara MD Last Rate: 2,000 mg (09/23/18 1525)   sodium chloride 100 mL/hr Intravenous Continuous Umair Myles PA-C Last Rate: 100 mL/hr (09/23/18 1747)   tbo-filgrastim 480 mcg Subcutaneous Daily Moises De Guzman PA-C traZODone 50 mg Oral HS Early YANELIS Syed         Today, Patient Was Seen By: Sherryle Shells    ** Please Note: This note has been constructed using a voice recognition system   **

## 2018-09-24 LAB
ACANTHOCYTES BLD QL SMEAR: PRESENT
ANION GAP SERPL CALCULATED.3IONS-SCNC: 11 MMOL/L (ref 4–13)
ANISOCYTOSIS BLD QL SMEAR: PRESENT
ANISOCYTOSIS BLD QL SMEAR: PRESENT
BACTERIA UR QL AUTO: NORMAL /HPF
BASOPHILS # BLD MANUAL: 0 THOUSAND/UL (ref 0–0.1)
BASOPHILS # BLD MANUAL: 0 THOUSAND/UL (ref 0–0.1)
BASOPHILS NFR MAR MANUAL: 0 % (ref 0–1)
BASOPHILS NFR MAR MANUAL: 0 % (ref 0–1)
BILIRUB UR QL STRIP: NEGATIVE
BUN SERPL-MCNC: 27 MG/DL (ref 5–25)
BURR CELLS BLD QL SMEAR: PRESENT
BURR CELLS BLD QL SMEAR: PRESENT
C DIFF TOX GENS STL QL NAA+PROBE: NORMAL
CALCIUM SERPL-MCNC: 6.5 MG/DL (ref 8.3–10.1)
CAMPYLOBACTER DNA SPEC NAA+PROBE: NORMAL
CHLORIDE SERPL-SCNC: 107 MMOL/L (ref 100–108)
CLARITY UR: CLEAR
CO2 SERPL-SCNC: 14 MMOL/L (ref 21–32)
COLOR UR: YELLOW
CREAT SERPL-MCNC: 2.32 MG/DL (ref 0.6–1.3)
EOSINOPHIL # BLD MANUAL: 0 THOUSAND/UL (ref 0–0.4)
EOSINOPHIL # BLD MANUAL: 0.04 THOUSAND/UL (ref 0–0.4)
EOSINOPHIL NFR BLD MANUAL: 0 % (ref 0–6)
EOSINOPHIL NFR BLD MANUAL: 1 % (ref 0–6)
ERYTHROCYTE [DISTWIDTH] IN BLOOD BY AUTOMATED COUNT: 15.8 % (ref 11.6–15.1)
ERYTHROCYTE [DISTWIDTH] IN BLOOD BY AUTOMATED COUNT: 16.8 % (ref 11.6–15.1)
GFR SERPL CREATININE-BSD FRML MDRD: 22 ML/MIN/1.73SQ M
GLUCOSE SERPL-MCNC: 129 MG/DL (ref 65–140)
GLUCOSE SERPL-MCNC: 146 MG/DL (ref 65–140)
GLUCOSE SERPL-MCNC: 162 MG/DL (ref 65–140)
GLUCOSE SERPL-MCNC: 56 MG/DL (ref 65–140)
GLUCOSE SERPL-MCNC: 61 MG/DL (ref 65–140)
GLUCOSE UR STRIP-MCNC: NEGATIVE MG/DL
HCT VFR BLD AUTO: 24.8 % (ref 34.8–46.1)
HCT VFR BLD AUTO: 24.9 % (ref 34.8–46.1)
HGB BLD-MCNC: 8.5 G/DL (ref 11.5–15.4)
HGB BLD-MCNC: 8.5 G/DL (ref 11.5–15.4)
HGB UR QL STRIP.AUTO: NEGATIVE
HYPERCHROMIA BLD QL SMEAR: PRESENT
KETONES UR STRIP-MCNC: NEGATIVE MG/DL
LEUKOCYTE ESTERASE UR QL STRIP: NEGATIVE
LYMPHOCYTES # BLD AUTO: 1.19 THOUSAND/UL (ref 0.6–4.47)
LYMPHOCYTES # BLD AUTO: 1.79 THOUSAND/UL (ref 0.6–4.47)
LYMPHOCYTES # BLD AUTO: 29 % (ref 14–44)
LYMPHOCYTES # BLD AUTO: 7 % (ref 14–44)
MCH RBC QN AUTO: 27.2 PG (ref 26.8–34.3)
MCH RBC QN AUTO: 27.5 PG (ref 26.8–34.3)
MCHC RBC AUTO-ENTMCNC: 34.1 G/DL (ref 31.4–37.4)
MCHC RBC AUTO-ENTMCNC: 34.3 G/DL (ref 31.4–37.4)
MCV RBC AUTO: 80 FL (ref 82–98)
MCV RBC AUTO: 80 FL (ref 82–98)
MONOCYTES # BLD AUTO: 0.21 THOUSAND/UL (ref 0–1.22)
MONOCYTES # BLD AUTO: 1.02 THOUSAND/UL (ref 0–1.22)
MONOCYTES NFR BLD: 4 % (ref 4–12)
MONOCYTES NFR BLD: 5 % (ref 4–12)
MYELOCYTES NFR BLD MANUAL: 1 % (ref 0–1)
MYELOCYTES NFR BLD MANUAL: 3 % (ref 0–1)
NEUTROPHILS # BLD MANUAL: 2.47 THOUSAND/UL (ref 1.85–7.62)
NEUTROPHILS # BLD MANUAL: 21.93 THOUSAND/UL (ref 1.85–7.62)
NEUTS BAND NFR BLD MANUAL: 4 % (ref 0–8)
NEUTS BAND NFR BLD MANUAL: 7 % (ref 0–8)
NEUTS SEG NFR BLD AUTO: 56 % (ref 43–75)
NEUTS SEG NFR BLD AUTO: 79 % (ref 43–75)
NITRITE UR QL STRIP: NEGATIVE
NON-SQ EPI CELLS URNS QL MICRO: NORMAL /HPF
NRBC BLD AUTO-RTO: 0 /100 WBCS
NRBC BLD AUTO-RTO: 2 /100 WBCS
PATHOLOGIST INTERPRETATION: NORMAL
PATHOLOGY REVIEW: YES
PH UR STRIP.AUTO: 6 [PH] (ref 4.5–8)
PLATELET # BLD AUTO: 53 THOUSANDS/UL (ref 149–390)
PLATELET # BLD AUTO: 69 THOUSANDS/UL (ref 149–390)
PLATELET BLD QL SMEAR: ABNORMAL
PLATELET BLD QL SMEAR: ABNORMAL
PMV BLD AUTO: 13.2 FL (ref 8.9–12.7)
POIKILOCYTOSIS BLD QL SMEAR: PRESENT
POIKILOCYTOSIS BLD QL SMEAR: PRESENT
POTASSIUM SERPL-SCNC: 3.5 MMOL/L (ref 3.5–5.3)
PROMYELOCYTES NFR BLD MANUAL: 2 % (ref 0–0)
PROMYELOCYTES NFR BLD MANUAL: 2 % (ref 0–0)
PROT UR STRIP-MCNC: NEGATIVE MG/DL
RBC # BLD AUTO: 3.09 MILLION/UL (ref 3.81–5.12)
RBC # BLD AUTO: 3.12 MILLION/UL (ref 3.81–5.12)
RBC #/AREA URNS AUTO: NORMAL /HPF
RBC MORPH BLD: PRESENT
SALMONELLA DNA SPEC QL NAA+PROBE: NORMAL
SHIGA TOXIN STX GENE SPEC NAA+PROBE: NORMAL
SHIGELLA DNA SPEC QL NAA+PROBE: NORMAL
SODIUM SERPL-SCNC: 132 MMOL/L (ref 136–145)
SP GR UR STRIP.AUTO: 1.01 (ref 1–1.03)
TARGETS BLD QL SMEAR: PRESENT
TOTAL CELLS COUNTED SPEC: 100
UROBILINOGEN UR QL STRIP.AUTO: 0.2 E.U./DL
WBC # BLD AUTO: 25.5 THOUSAND/UL (ref 4.31–10.16)
WBC # BLD AUTO: 4.11 THOUSAND/UL (ref 4.31–10.16)
WBC #/AREA URNS AUTO: NORMAL /HPF
WBC STL QL MICRO: NORMAL

## 2018-09-24 PROCEDURE — 99233 SBSQ HOSP IP/OBS HIGH 50: CPT | Performed by: INTERNAL MEDICINE

## 2018-09-24 PROCEDURE — 87186 SC STD MICRODIL/AGAR DIL: CPT | Performed by: INTERNAL MEDICINE

## 2018-09-24 PROCEDURE — 92526 ORAL FUNCTION THERAPY: CPT

## 2018-09-24 PROCEDURE — 82948 REAGENT STRIP/BLOOD GLUCOSE: CPT

## 2018-09-24 PROCEDURE — 85027 COMPLETE CBC AUTOMATED: CPT | Performed by: INTERNAL MEDICINE

## 2018-09-24 PROCEDURE — 99232 SBSQ HOSP IP/OBS MODERATE 35: CPT | Performed by: INTERNAL MEDICINE

## 2018-09-24 PROCEDURE — 85007 BL SMEAR W/DIFF WBC COUNT: CPT | Performed by: INTERNAL MEDICINE

## 2018-09-24 PROCEDURE — 99222 1ST HOSP IP/OBS MODERATE 55: CPT | Performed by: INTERNAL MEDICINE

## 2018-09-24 PROCEDURE — 87040 BLOOD CULTURE FOR BACTERIA: CPT | Performed by: INTERNAL MEDICINE

## 2018-09-24 PROCEDURE — 89055 LEUKOCYTE ASSESSMENT FECAL: CPT | Performed by: PHYSICIAN ASSISTANT

## 2018-09-24 PROCEDURE — 94760 N-INVAS EAR/PLS OXIMETRY 1: CPT

## 2018-09-24 PROCEDURE — 81001 URINALYSIS AUTO W/SCOPE: CPT | Performed by: INTERNAL MEDICINE

## 2018-09-24 PROCEDURE — 94762 N-INVAS EAR/PLS OXIMTRY CONT: CPT

## 2018-09-24 PROCEDURE — 87147 CULTURE TYPE IMMUNOLOGIC: CPT | Performed by: INTERNAL MEDICINE

## 2018-09-24 PROCEDURE — 80048 BASIC METABOLIC PNL TOTAL CA: CPT | Performed by: INTERNAL MEDICINE

## 2018-09-24 PROCEDURE — 99223 1ST HOSP IP/OBS HIGH 75: CPT | Performed by: FAMILY MEDICINE

## 2018-09-24 RX ORDER — LACTULOSE 20 G/30ML
10 SOLUTION ORAL 2 TIMES DAILY
Status: DISCONTINUED | OUTPATIENT
Start: 2018-09-24 | End: 2018-10-05 | Stop reason: HOSPADM

## 2018-09-24 RX ORDER — ACETAMINOPHEN 325 MG/1
325 TABLET ORAL EVERY 8 HOURS SCHEDULED
Status: DISCONTINUED | OUTPATIENT
Start: 2018-09-24 | End: 2018-09-30

## 2018-09-24 RX ORDER — POTASSIUM CHLORIDE 20 MEQ/1
40 TABLET, EXTENDED RELEASE ORAL ONCE
Status: COMPLETED | OUTPATIENT
Start: 2018-09-24 | End: 2018-09-24

## 2018-09-24 RX ORDER — ATORVASTATIN CALCIUM 10 MG/1
5 TABLET, FILM COATED ORAL
Status: DISCONTINUED | OUTPATIENT
Start: 2018-09-25 | End: 2018-09-28

## 2018-09-24 RX ORDER — DEXTROSE MONOHYDRATE 25 G/50ML
25 INJECTION, SOLUTION INTRAVENOUS ONCE
Status: COMPLETED | OUTPATIENT
Start: 2018-09-24 | End: 2018-09-24

## 2018-09-24 RX ORDER — DEXTROSE MONOHYDRATE 25 G/50ML
INJECTION, SOLUTION INTRAVENOUS
Status: COMPLETED
Start: 2018-09-24 | End: 2018-09-24

## 2018-09-24 RX ORDER — SODIUM CHLORIDE 9 MG/ML
125 INJECTION, SOLUTION INTRAVENOUS CONTINUOUS
Status: DISCONTINUED | OUTPATIENT
Start: 2018-09-24 | End: 2018-09-24

## 2018-09-24 RX ADMIN — INSULIN LISPRO 1 UNITS: 100 INJECTION, SOLUTION INTRAVENOUS; SUBCUTANEOUS at 17:04

## 2018-09-24 RX ADMIN — NAFCILLIN SODIUM 2000 MG: 2 INJECTION, POWDER, LYOPHILIZED, FOR SOLUTION INTRAMUSCULAR; INTRAVENOUS at 10:11

## 2018-09-24 RX ADMIN — CLOPIDOGREL 75 MG: 75 TABLET, FILM COATED ORAL at 08:00

## 2018-09-24 RX ADMIN — ENOXAPARIN SODIUM 70 MG: 80 INJECTION SUBCUTANEOUS at 08:01

## 2018-09-24 RX ADMIN — CHLORHEXIDINE GLUCONATE 15 ML: 1.2 RINSE ORAL at 21:27

## 2018-09-24 RX ADMIN — DEXTROSE MONOHYDRATE 25 ML: 25 INJECTION, SOLUTION INTRAVENOUS at 07:48

## 2018-09-24 RX ADMIN — AMIODARONE HYDROCHLORIDE 200 MG: 200 TABLET ORAL at 16:08

## 2018-09-24 RX ADMIN — ACETAMINOPHEN 325 MG: 325 TABLET, FILM COATED ORAL at 16:04

## 2018-09-24 RX ADMIN — LACTULOSE 10 G: 20 SOLUTION ORAL at 13:16

## 2018-09-24 RX ADMIN — SODIUM BICARBONATE 100 ML/HR: 84 INJECTION, SOLUTION INTRAVENOUS at 15:55

## 2018-09-24 RX ADMIN — TRAZODONE HYDROCHLORIDE 50 MG: 50 TABLET ORAL at 21:31

## 2018-09-24 RX ADMIN — NAFCILLIN SODIUM 2000 MG: 2 INJECTION, POWDER, LYOPHILIZED, FOR SOLUTION INTRAMUSCULAR; INTRAVENOUS at 02:58

## 2018-09-24 RX ADMIN — NAFCILLIN SODIUM 2000 MG: 2 INJECTION, POWDER, LYOPHILIZED, FOR SOLUTION INTRAMUSCULAR; INTRAVENOUS at 15:45

## 2018-09-24 RX ADMIN — TBO-FILGRASTIM 480 MCG: 480 INJECTION, SOLUTION SUBCUTANEOUS at 08:07

## 2018-09-24 RX ADMIN — AMIODARONE HYDROCHLORIDE 200 MG: 200 TABLET ORAL at 13:16

## 2018-09-24 RX ADMIN — LACTULOSE 10 G: 20 SOLUTION ORAL at 17:07

## 2018-09-24 RX ADMIN — NAFCILLIN SODIUM 2000 MG: 2 INJECTION, POWDER, LYOPHILIZED, FOR SOLUTION INTRAMUSCULAR; INTRAVENOUS at 18:11

## 2018-09-24 RX ADMIN — CHLORHEXIDINE GLUCONATE 15 ML: 1.2 RINSE ORAL at 08:01

## 2018-09-24 RX ADMIN — DULOXETINE HYDROCHLORIDE 60 MG: 60 CAPSULE, DELAYED RELEASE ORAL at 08:00

## 2018-09-24 RX ADMIN — ENOXAPARIN SODIUM 70 MG: 80 INJECTION SUBCUTANEOUS at 21:26

## 2018-09-24 RX ADMIN — ACETAMINOPHEN 650 MG: 160 SUSPENSION ORAL at 10:09

## 2018-09-24 RX ADMIN — DIVALPROEX SODIUM 750 MG: 500 TABLET, DELAYED RELEASE ORAL at 08:00

## 2018-09-24 RX ADMIN — METOCLOPRAMIDE 5 MG: 5 INJECTION, SOLUTION INTRAMUSCULAR; INTRAVENOUS at 21:27

## 2018-09-24 RX ADMIN — ACETAMINOPHEN 325 MG: 325 TABLET, FILM COATED ORAL at 21:28

## 2018-09-24 RX ADMIN — NAFCILLIN SODIUM 2000 MG: 2 INJECTION, POWDER, LYOPHILIZED, FOR SOLUTION INTRAMUSCULAR; INTRAVENOUS at 21:27

## 2018-09-24 RX ADMIN — NAFCILLIN SODIUM 2000 MG: 2 INJECTION, POWDER, LYOPHILIZED, FOR SOLUTION INTRAMUSCULAR; INTRAVENOUS at 06:34

## 2018-09-24 RX ADMIN — POTASSIUM CHLORIDE 40 MEQ: 1500 TABLET, EXTENDED RELEASE ORAL at 16:04

## 2018-09-24 RX ADMIN — AMIODARONE HYDROCHLORIDE 200 MG: 200 TABLET ORAL at 07:54

## 2018-09-24 NOTE — CASE MANAGEMENT
Continued Stay Review    Date: 9/22/18    Vital Signs:   09/22/18 2300   97 3 °F (36 3 °C)  81   24   87/50  94 %  None (Room air)   09/22/18 0740  97 8 °F (36 6 °C)  89  20   89/53  96 %  None (Room air)   09/22/18 0306  --  87  20   71/52  92 %  None (Room air)   09/22/18 0207  --  --  --   80/51  --  --   09/22/18 0132  --  --  --   82/54  --  --   09/22/18 0043  --  87  --   80/53  --  --     Medications:   Scheduled Meds:   Current Facility-Administered Medications:  acetaminophen 650 mg Oral Q6H PRN    acetaminophen 325 mg Oral Q8H [address]    amiodarone 200 mg Oral TID With Meals    [START ON 9/25/2018] atorvastatin 5 mg Oral After Dinner    chlorhexidine 15 mL Swish & Spit Q12H [address]    clopidogrel 75 mg Oral Daily    divalproex sodium 750 mg Oral Daily    DULoxetine 60 mg Oral Daily    enoxaparin 1 mg/kg Subcutaneous Q12H [address]    insulin lispro 1-5 Units Subcutaneous HS    insulin lispro 1-6 Units Subcutaneous TID AC    lactulose 10 g Oral BID    metoclopramide 5 mg Intravenous Q6H PRN    nafcillin 2,000 mg Intravenous Q4H Last Rate: 2,000 mg (09/24/18 1011)   potassium chloride 40 mEq Oral Once    sodium bicarbonate infusion 100 mL/hr Intravenous Continuous    traZODone 50 mg Oral HS      Continuous Infusions:   sodium bicarbonate infusion 100 mL/hr     PRN Meds:   acetaminophen    metoclopramide    Abnormal Labs/Diagnostic Results:    09/22/18 0029   Sodium 133     CO2 14     BUN 27     Creatinine 2 20     Calcium 6 9        09/22/18 0029   WBC 4 11     RBC 3 09     Hemoglobin 8 5     Hematocrit 24 8     MCV 80     RDW 15 8     MPV 13 2     Platelets 69       Lactic acid 2 7  A1C 9 6  Blood cultures prelim + staph aureus     Age/Sex: 60 y o  female     Assessment/Plan:  9/22 Hypotension:  Okay for IV fluids, ejection fraction is preserved, likely from sepsis/intravascular hypovolemia     Acute kidney injury:  Likely secondary to hypoperfusion from hypotension    Okay for IV fluids     Atrial flutter: continue p o  amiodarone   Once blood pressure has improved, start low-dose metoprolol at 5 mg every 6 hours IV, blood pressures have been borderline at times   If tolerated well, change to p o  tomorrow, blood pressures have been borderline at times   Continue anticoagulation with Lovenox  No further atrial flutter or fibrillation noted      Right atrial mass:  Differential diagnosis includes most likely-thrombus in the setting of atrial fibrillation and smoke in the right atrium versus metastatic lesion   She does have a prior history of colon malignancy as well as a metastatic lesion in the liver - IVC is free of any masses-on transthoracic echo   Cavitating lesions in the lung are unlikely to be malignancy in her case-developed within 10 days on chest x-rays   Not typical pulmonary emboli from a thrombus (emboli do not cavitate), no right-sided endocarditis to explain septic emboli   An infected thrombus causing septic emboli is a possibility   Either way she would need anticoagulation with Lovenox in the setting of malignancy - I would defer this to the primary team   Was on Xarelto in the past for atrial fibrillation but not consistently  Port has been removed     Encephalopathy:  Likely secondary to sepsis, seems to have improved     Adenocarcinoma of the colon:  Status post bowel resection and colostomy, prior PET-CT also demonstrated a liver lesion   received Folfox chemotherapy, might need to be held in the setting of long-term antibiotic use for endocarditis     MSSA bacteremia:  Also with neutropenia, on nafcillin   Shaw results as above , source was likely the port which has been removed, although blood cultures remained persistently and rapidly positive   Might take longer to clear in the setting of neutropenia but if does not clear, repeat SHAW middle of next week to rule out new endocarditis   Discussed with ID   No other source of infection and the presumptive source which was the port has been removed already      Gerald on CKD: Noemy Valentine function    Discharge Plan: rehab      Thank you,  [address]  Utilization Review Department  Phone: [phone]; Fax [phone]  ATTENTION: Please call with any questions or concerns to [phone]  and carefully follow the prompts so that you are directed to the right person  Send all requests for admission clinical reviews, approved or denied determinations and any other requests to fax [phone]   All voicemails are confidential

## 2018-09-24 NOTE — PROGRESS NOTES
Progress Note - Infectious Disease   Emily Abdullahi 60 y o  female MRN: [id number]  Unit/Bed#: [facility] 828-01 Encounter: [id number]      Impression/Plan:  1  MSSA bacteremia, Persistent:  The initial source of this pathogen remains unknown  Cultures from 9/22 are positive  And she appears to be clinically worsening with episodes of hypothermia and hypotension  -will repeat blood cultures today  -will continue nafcillin (optimal therapy for MSSA) for now at current dose; if creatinine worsens or urine studies returned positive will change the patient to Ancef  -will follow CBC and chemistry daily  -patient has no new or ongoing sources of bacteremia on exam  -discussed case with Cardiology, aortic valve finding on echo may be more indicative of vegetation  Plan for possible JUNIOR later this week based on her cultures and to aide in goals of care discussion depending on findings    2  Pulmonary septic emboli: due to infected RA thrombus or  TV endocarditis despite negative ECHO  -antibiotic management as above  -will continue to follow blood cultures for clearance  -will consider additional imaging of her chest if her cultures remain positive  -the patient will likely require prolonged course of antibiotic therapy and potential placement based on her home situation  3   Severe sepsis, POA:  The patient is currently having intermittent episodes of hypotension and low temperatures   -this is likely due to problems 1 and 2  -will continue antibiotics and management as above  -C diff negative and stool enteric panel negative  4   Leukocytosis:  Possibly due to continued doses of G-CSF, or mounting response to ongoing infection  -Stopped additional doses of G-CSF  -continue to monitor CBC daily  -antibiotic management and workup as above    5    Back and leg pain:  Has improved on exam, the patient remains weak and does not participate in active range of motion  -will continue to monitor on exam  -antibiotics as above  -should she fail to clear cultures will need to consider repeat imaging of her spine  6   Colon cancer currently on palliative chemo  -patient has had port removal on this admission  -ultimately need to manage her infection before any additional chemotherapy  -palliative care following    7  Diabetes  -patient presented with elevated blood sugars on admission  -current management as per primary service    8  Acute kidney injury- creatinine stable today  -nephrology consulted, and discussed that this change is likely multifactorial  -concern remains for nafcillin contributing despite lack of eosinophilia on CBC  -will follow-up urine studies  -will monitor chemistry daily  -if creatinine continues to rise tomorrow, will change the patient to Ancef    The above plan has been discussed with the patient's daughter, primary service, Nephrology, Cardiology  Antibiotics:  Nafcillin 6  Total antibiotic 9    Subjective: The patient intermittently falls asleep on exam   She reports ongoing weakness in her lower extremities and feels she can move them because of swelling  She denies any pain today on passive range of motion and palpation of her upper thigh muscles  She is able to recall that she has cancer but she can't recall why she is here in the hospital   She recognizes her daughter  Her daughter is at bedside and reports that she was more talkative yesterday  Overnight she has been placed on Lu Hugger for low temperatures and has been getting fluids for hypotension  Objective:  Vitals:  HR:  [72-91] 88  Resp:  [20-28] 20  BP: ()/(45-58) 96/55  SpO2:  [91 %-97 %] 94 %  Temp (24hrs), Av 4 °F (36 3 °C), Min:94 8 °F (34 9 °C), Max:98 2 °F (36 8 °C)  Current: Temperature: 97 8 °F (36 6 °C)    Physical Exam:   General Appearance:  Intermittently falls asleep on exam   Can be easily woken by voice and answers symptoms directed questions    Overall chronically ill-appearing female   Throat: Oropharynx moist without lesions  Back: No tenderness to palpation along the spine, paraspinal muscles, or hips  Lungs:   Clear to auscultation bilaterally; no wheezes, rhonchi or rales; respirations unlabored   Heart:  RRR; no rub or gallop, 2/6 systolic murmur   Abdomen:   Soft, non-tender, non-distended, positive bowel sounds  Extremities: No clubbing, cyanosis  Ongoing nonpitting edema in the lower extremities bilaterally to the thighs  There is minimal to no tenderness to palpation of the upper thigh muscles and lower extremities  The patient is able to move her toes and feet on exam but does not produce pain active range of motion of her leg  There is no pain passive range of motion and there is no significant erythema or warmth present at her knees or hips  Skin: No new rashes or lesions  No draining wounds noted  No new lesions noted on her back  Port site appears clear intact  Her IV sites are without any erythema or pain  Her previous femoral line site is without any induration or fluctuance         Labs, Imaging, & Other studies:   All pertinent labs and imaging studies were personally reviewed    Results from last 7 days  Lab Units 09/24/18  0621 09/23/18  0515 09/22/18  0029   WBC Thousand/uL 25 50* 14 29* 4 11*   HEMOGLOBIN g/dL 8 5* 8 7* 8 5*   PLATELETS Thousands/uL 53* 49* 69*       Results from last 7 days  Lab Units 09/24/18  0621 09/23/18  0515 09/22/18  0029  09/19/18  0518  09/18/18  0448   SODIUM mmol/L 132* 132* 133*  < > 138  < > 134*   POTASSIUM mmol/L 3 5 3 0* 3 5  < > 3 9  < > 4 1   CHLORIDE mmol/L 107 104 106  < > 111*  < > 106   CO2 mmol/L 14* 16* 14*  < > 17*  < > 18*   BUN mg/dL 27* 27* 27*  < > 20  < > 26*   CREATININE mg/dL 2 32* 2 34* 2 20*  < > 1 23  < > 1 09   EGFR ml/min/1 73sq m 22 22 24  < > 48  < > 56   CALCIUM mg/dL 6 5* 6 7* 6 9*  < > 7 2*  < > 7 0*   AST U/L  --  18  --   --  38  --  51*   ALT U/L  --  <6*  --   --  9*  --  18   ALK PHOS U/L  --  99  -- --  75  --  86   < > = values in this interval not displayed      Results from last 7 days  Lab Units 09/24/18  1003 09/22/18  0530 09/22/18  0519 09/20/18  0512 09/20/18  0507 09/18/18  0526   BLOOD CULTURE   --  Staphylococcus aureus* Staphylococcus aureus* Staphylococcus aureus* Staphylococcus aureus* Staphylococcus aureus*  Staphylococcus aureus*   GRAM STAIN RESULT   --  Gram positive cocci in clusters Gram positive cocci in clusters Gram positive cocci in clusters Gram positive cocci in clusters Gram positive cocci in clusters  Gram positive cocci in clusters   C DIFF TOXIN B  NEGATIVE for C difficle toxin by PCR    --   --   --   --   --

## 2018-09-24 NOTE — MALNUTRITION/BMI
This medical record reflects one or more clinical indicators suggestive of malnutrition    Malnutrition Findings:   Malnutrition type: Acute illness (Related to medical condition as evidenced by <75% energy intake needs met >7 days and generalized +1 edema, treated with liberal diet and ensure supplements  )  Degree of Malnutrition: Malnutrition of moderate degree  Malnutrition Characteristics: Inadequate energy, Fluid accumulation         See Nutrition note dated 9/24/18 for additional details  Completed nutrition assessment is viewable in the nutrition documentation

## 2018-09-24 NOTE — SPEECH THERAPY NOTE
Speech Language/Pathology    Speech/Language Pathology Progress Note    Patient Name: Emily Abdullahi  Today's Date: 9/24/2018       Subjective:  "I can't sit up, my back hurts"  Current Diet: Level 1 puree, thin  Objective:  Pt seen for ongoing dx dysphagia tx  Pt not eating her meals, family state 2* pureed foods  Blood sugars low today  Pt seen w/ soft solid trials, thin by straw  RD brought pt strawberry ensure to trial     Prolonged manipulation of the solids w/ noted L sided lingual residue & pocketing  Attempted yogurt to clear the material as well as liquid washes  Mastication was extremely labored but she was insisting it was bc she didn't like what I gave her  Eventually able to clear material w/ puree, thin washes  Education provided to the pt & dtr  Assessment:  Pt w/ prolonged oral stage however pt insisting on upgrading to see if she will eat more  I do not feel as if she will eat more with this diet & it will fatigue her but I am willing to try and assess intake  Plan/Recommendations:  1  Upgrade to Level 3, thin liquids  2   Allow pt extra time to eat  3  CHeck oral cavity ^& offer liquids or puree to clear as needed

## 2018-09-24 NOTE — CONSULTS
Consultation - Palliative and Supportive Care   Emily Abdullahi 60 y o  female [id number]    Patient Active Problem List   Diagnosis    CVA (cerebral vascular accident) ([place] [address] )    Type 2 diabetes mellitus without complication, without long-term current use of insulin ([place] [address] )    Schizoaffective disorder, bipolar type ([place] [address] )    Essential hypertension    Colonic mass    Type 2 diabetes mellitus with complication ([place] [address] )    CKD (chronic kidney disease) stage 3, GFR 30-59 ml/min    Pain of right upper extremity    RYLAN (acute kidney injury)     CAD (coronary artery disease)    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HCC)    Large bowel obstruction ([place] [address] )    Brachial artery stenosis, right (HCC)    Hyponatremia    Rectosigmoid mass - High colostomy output    Leukocytosis    GERD (gastroesophageal reflux disease)    Tobacco abuse    Liver metastasis (HCC)    Mixed hyperlipidemia    Dizziness - due to orthostatic hypotension    Ambulatory dysfunction    Orthostatic hypotension    Chemotherapy induced nausea and vomiting    Lower abdominal pain    HHNC (hyperglycemic hyperosmolar nonketotic coma) (HCC)    Sepsis ([place] [address] )    Altered mental status    Acute respiratory failure with hypoxia (HCC)    Hypocalcemia    Leukopenia    Cavitating pulmonary nodules     Metabolic acidosis, NAG, bicarbonate losses    Hypomagnesemia    Hypophosphatemia      Plan:  1  Symptom management - delirium; chronic back pain; polypharmacy; sepsis   - scheduled low dose APAP for fever, pain   - reduce statin; no longer an entirely appropriate med for this lady with terminal illness outside of her heart disease; may worsen incidence/progression to cirrhosis  - Would suggest cessation of enoxaparin, given concurrent use of clopidorel   - start lactulose BID    2   Goals - full cares, no limits   - Pt not competent on my interview today; she appears toxic    - Will gather hepatitis serologies and HCV monitoring labs; recent hetergenous enhancement of liver NOT present on previous scans suggests [facility]    - Pt's daughter Salina Peck at bedside; requests that all updates go through her, as she will help coordinate her mother's cares, and her family's response  Code Status: FULL - Level 1   Decisional apparatus:  Patient is not competent on my exam today  If competence is lost, patient's substitute decision maker would default to three daughters by [place] 169  Five Wishes from last visit not on file  Advance Directive / Living Will / POLST:  None on file     I have reviewed the patient's controlled substance dispensing history in the Prescription Drug Monitoring Program in compliance with the [facility] regulations before prescribing any controlled substances  We appreciate the invitation to be involved in this patient's care  We will continue to follow  Please do not hesitate to reach our on call provider through our clinic answering service at  should you have acute symptom control concerns  Kenzie Bates MD  Palliative and Supportive Care  Pager: [phone]       IDENTIFICATION:  Consults  Physician Requesting Consult: Boy Myers MD  Reason for Consult / Principal Problem: goals  Hx and PE limited by: pt's delirium; family's limited information about her health    HISTORY OF PRESENT ILLNESS:       Emily Cancino is a 60 y o  female who presents with sepsis, HONK without AG  She is known to our team from a recent admission  From that note:     "Emily Cancino is a 60y o  year old female with multiple medical problems and recent diagnosis of metastatic colon cancer s/p diverting colostomy (7/17/18) currently undergoing chemotherapy who presents with dizzy spells, poor oral intake, and nausea/vomiting  Patient received IV fluids with improvement in symptomatology  She is tolerating a diet today  She denies any nausea/vomiting or pain at this time    Patient has fairly poor insight with regards to her cancer and is hoping to be cured  She tells me this is a fairly recent diagnosis and she wants everything possible done to cure her  She states she is coping as best as she can but has hardly any support  She resides with her common law , Ziggy Stiles, and has three adult children who live near her but seldom visit or provide support  She finds strength in her pia and is a member of [employer]  She hopes she has better home care services upon discharge and more colostomy bags "     This lady is well known to our team, as documented above  She presents this time in sepsis, and has been transferred from [facility] for evaluation by ID and Cards teams  Her sepsis appears to be predicated on chronic infection risk, unmasked or untethered by her recent chemo treatment  Her family has been informed that -- due to risk of overwhelming infection from bacterial endocarditis, chemo at this time will no longer be offered  We are consulted after CCM team identifies her signifcantly morbid status might be well-addressed by Palliative and Supportive Care  At the bedside today, the pt appears to be responding to internal stimuli, and has little to now understanding of why she is in hospital   Her duaghter is at bedside, and shares her understanding of our patient's health, including the knowledge of her cancer dx, as well as recent chemo use and heart valve infectious problems  She is aware of HCV problems, but her mother has not been diagnosed with cirrhosis before, and she expresses no previous knowledge of pt's mental status being affected by her liver      Review of Systems   Unable to perform ROS: psychiatric disorder       Past Medical History:   Diagnosis Date    Bipolar depression ([place] [address] ) 3/17/2016    CAD S/P percutaneous coronary angioplasty 3/17/2016    Cancer ([place] [address] )     Chronic pain     Colonic mass     Colostomy care ([place] [address] )     COPD (chronic obstructive pulmonary disease) (HCC)     [facility]A [facility])     R sided weakness    Essential hypertension 3/17/2016    GERD (gastroesophageal reflux disease)     Hepatitis C     Heroin abuse 3/18/2016    History of gastric ulcer     Hypertension     NSTEMI (non-ST elevated myocardial infarction) ([facility] ) 07/2012    Psychiatric disorder 09/03/2014    Inpatient admission     Schizoaffective disorder ([facility] ) 3/17/2016    Schizophrenia ([place] [address] ) 3/17/2016    STEMI (ST elevation myocardial infarction) ([facility] ) 12/2017    Type 2 diabetes mellitus ([place] [address] ) 3/17/2016     Past Surgical History:   Procedure Laterality Date    CARDIAC CATHETERIZATION  07/2016    COLONOSCOPY N/A 6/22/2018    Procedure: COLONOSCOPY;  Surgeon: Elzbieta Alarcon MD;  [facility]; Service: Colorectal    CORONARY ANGIOPLASTY WITH STENT PLACEMENT  07/05/2012    MAKEDA (LAD), PTA (Diag)    CORONARY ANGIOPLASTY WITH STENT PLACEMENT  12/2017    MAKEDA/ PTA (RCA)    FL GUIDED CENTRAL VENOUS ACCESS REPLACEMENT  8/15/2018    CA INSERT PICC W/ SUB-Q PORT N/A 8/15/2018    Procedure: INSERTION VENOUS PORT (PORT-A-CATH); Surgeon: Lesvia Hurley MD;  [facility];  Service: Colorectal    CA LAP,DIAGNOSTIC ABDOMEN N/A 7/17/2018    Procedure: LAPAROSCOPY DIAGNOSTIC, Laproscopic transverse loop colostomy, lysis of adhesions;  Surgeon: Key Morales MD;  [facility];  Service: Colorectal    CA SIGMOIDOSCOPY FLX DX W/COLLJ SPEC BR/WA IF PFRMD N/A 8/15/2018    Procedure: Salvador Cassidy;  Surgeon: Lesvia Hurley MD;  [facility];  Service: Colorectal     Social History     Social History    Marital status: Single     Spouse name: N/A    Number of children: N/A    Years of education: N/A     Occupational History    Not on file       Social History Main Topics    Smoking status: Current Every Day Smoker     Packs/day: 1 00     Years: 45 00     Types: Cigarettes    Smokeless tobacco: Never Used    Alcohol use No      Comment: "only on occasion"    Drug use: No      Comment: only tried x 1     Sexual activity: Not on file     Other Topics Concern    Not on file     Social History Narrative    No narrative on file     Family History   Problem Relation Age of Onset    Heart attack Father     Cancer Brother         gastric       MEDICATIONS / ALLERGIES:    current meds:   Current Facility-Administered Medications   Medication Dose Route Frequency    acetaminophen (TYLENOL) oral suspension 650 mg  650 mg Oral Q6H PRN    amiodarone tablet 200 mg  200 mg Oral TID With Meals    atorvastatin (LIPITOR) tablet 10 mg  10 mg Oral After Dinner    chlorhexidine (PERIDEX) 0 12 % oral rinse 15 mL  15 mL Swish & Spit Q12H [address]    clopidogrel (PLAVIX) tablet 75 mg  75 mg Oral Daily    divalproex sodium (DEPAKOTE) EC tablet 750 mg  750 mg Oral Daily    DULoxetine (CYMBALTA) delayed release capsule 60 mg  60 mg Oral Daily    enoxaparin (LOVENOX) subcutaneous injection 70 mg  1 mg/kg Subcutaneous Q12H [address]    insulin glargine (LANTUS) subcutaneous injection 10 Units 0 1 mL  10 Units Subcutaneous HS    insulin lispro (HumaLOG) 100 units/mL subcutaneous injection 1-5 Units  1-5 Units Subcutaneous HS    insulin lispro (HumaLOG) 100 units/mL subcutaneous injection 1-6 Units  1-6 Units Subcutaneous TID AC    metoclopramide (REGLAN) injection 5 mg  5 mg Intravenous Q6H PRN    nafcillin (NALLPEN) 2,000 mg in sodium chloride 0 9 % 100 mL IVPB  2,000 mg Intravenous Q4H    sodium chloride 0 9 % infusion  125 mL/hr Intravenous Continuous    tbo-filgrastim (GRANIX) subcutaneous injection 480 mcg  480 mcg Subcutaneous Daily    traZODone (DESYREL) tablet 50 mg  50 mg Oral HS       No Known Allergies    OBJECTIVE:    Physical Exam  Physical Exam   Constitutional: No distress  Appears delirious, toxic   HENT:   Head: Normocephalic and atraumatic     Right Ear: External ear normal    Left Ear: External ear normal    Mouth/Throat: No oropharyngeal exudate (membranes tacky)  Eyes: EOM are normal  Right eye exhibits no discharge  Left eye exhibits no discharge  Scleral icterus is present  Neck: No tracheal deviation present  Cardiovascular:   tachy   Pulmonary/Chest: No stridor  No respiratory distress  Abdominal: Soft  She exhibits distension  There is tenderness  Musculoskeletal: She exhibits no edema  Neurological:   Not alert, not interactive  Facies generally symmetric  Skin: Skin is warm and dry  No rash noted  She is not diaphoretic  No erythema  There is pallor  Psychiatric:   Cannot participate in exam today  Judgment and insight markedly impaired       Lab Results:   I have personally reviewed pertinent labs  , CBC:   Lab Results   Component Value Date    WBC 25 50 (H) 09/24/2018    HGB 8 5 (L) 09/24/2018    HCT 24 9 (L) 09/24/2018    MCV 80 (L) 09/24/2018    PLT 53 (L) 09/24/2018    MCH 27 2 09/24/2018    MCHC 34 1 09/24/2018    RDW 16 8 (H) 09/24/2018    NRBC 0 09/24/2018   , CMP:   Lab Results   Component Value Date     (L) 09/24/2018    K 3 5 09/24/2018     09/24/2018    CO2 14 (L) 09/24/2018    BUN 27 (H) 09/24/2018    CREATININE 2 32 (H) 09/24/2018    CALCIUM 6 5 (L) 09/24/2018    EGFR 22 09/24/2018   Poor GFR prevents use of morphine  Depressed Ca++ suggestive of hypoalbumenia vs malnutrition; depressed plts and hgb; elevated WBC cnsistent with sepsis    Imaging Studies: reviewed reports of CT abd   7/25 scan reported normal liver; 9/17 scan with contrast shows 'heterogenous texture' of liver  My inexpert read shows several poorly defined areas of abnormal liver tissue, without focal masses  EKG, Pathology, and Other Studies: none pertinent    Counseling / Coordination of Care  Total floor / unit time spent today 80+ minutes  Greater than 50% of total time was spent with the patient and / or family counseling and / or coordination of care   A description of the counseling / coordination of care: imaging results; brief assessment of goals; review and assessment of decisional apparatus and capacity, applicable legal codes and extant documents; consideration of hospice benefits

## 2018-09-24 NOTE — CONSULTS
Consultation - Nephrology   Emily Abdullahi 60 y o  female MRN: [id number]  Unit/Bed#: [facility] 828-01 Encounter: [id number]    ASSESSMENT and PLAN:  1  Acute kidney injury (POA):  Creatinine on admission 2 1 likely prerenal with blood sugar over 900 and then improved and was stable around 1-1 2 for a few days  On 09/21 her creatinine increased to 1 69 and for the past 2 days has been stable at 2 3    -suspect prerenal progressed to ATN from hypotension, infection, diarrhea and poor oral intake   -also concern for AIN (nafcillin started 9/19 and creatinine leo 9/21)   -check UA with microscopy and urine eosinophils   -agree with IV fluids but will change to a bicarb drip because of her low bicarb   -consider adding midodrine if blood pressure remains low   -discussed with ID and because creatinine is stable we will continue nafcillin today and await urine studies but if creatinine continues to worsen or she has signs of AIN on urine studies would change abx   2  Sepsis with Persistent MSSA bacteremia: Id on board and she is currently on nafcillin   -repeat cultures pending   -antibiotics per ID  3  Right atrial mass:  Felt to be thrombus per Cardiology note today  4  Metabolic acidosis:  Bicarb 14 today from RYLAN and diarrhea    -change IVF to bicarb drip   5  Hyponatremia: suspect due to volume depletion but is also on some medications which could be contributing    -trend on IVF today and check am sodium    -if worsening would check work up   6  Hypokalemia: K only 3 5 today and will give k-dur 40meq again today as suspect it will drop tomorrow with her diarrhea  7  Anemia:  hgb stable at 8 5 today   8  Metastatic colon cancer: s/p diverting colostomy currently on chemo       HISTORY OF PRESENT ILLNESS:  Requesting Physician: Raul Webb MD  Reason for Consult: RYLAN    Emily Andrade is a 60y o  year old female who was admitted to [facility] after presenting with AMS    Patient originally went to [facility] on 09/16 when her caregiver found her with 2 days of altered mental status  On admission her blood sugar was 952, she was hypotensive and tachycardic  She was transferred to [facility] and admitted to the ICU  She was treated with insulin drip and IV fluids  She had a CT of the chest which was concerning for septic emboli so she had a T JUNIOR which showed a thrombus versus mass in the right atrium  Patient has been bacteremic persistently and was originally treated with vancomycin and cefazolin and then changed to nafcillin on 09/19  Her Port-A-Cath was removed due to possible side infection  She has had issues with new onset a flutter and was treated by Cardiology  Over the past few days her renal function has been worsening so Nephrology was consulted  Patient does not routinely follow with a nephrologist and has not had renal issues in the past   Again on admission her creatinine was elevated at 2 1 but her sugar was 952  Renal function did improve with IV fluids and now has been worsening again  She has been having high output from her ostomy according to the daughter  No nausea or vomiting  She is not eating or drinking very well  Her blood pressure has been running on the low side  She denies any current chest pain or shortness of Breath  She does have some edema in her feet        PAST MEDICAL HISTORY:  Past Medical History:   Diagnosis Date    [facility] 3/17/2016    CAD S/P percutaneous coronary angioplasty 3/17/2016    Cancer ([facility] )     Chronic pain     Colonic mass     [facility]     COPD (chronic obstructive pulmonary disease) [facility]     [facility]     R sided weakness    Essential hypertension 3/17/2016    GERD (gastroesophageal reflux disease)     Hepatitis C     Heroin abuse 3/18/2016    History of gastric ulcer     Hypertension     NSTEMI (non-ST elevated myocardial infarction) ([facility] ) 07/2012    Psychiatric disorder 09/03/2014    Inpatient admission     [facility]    Schizophrenia ([facility] ) 3/17/2016    STEMI (ST elevation myocardial infarction) ([facility] ) 12/2017    Type 2 diabetes mellitus ([facility] ) 3/17/2016       PAST SURGICAL HISTORY:  Past Surgical History:   Procedure Laterality Date    CARDIAC CATHETERIZATION  07/2016    COLONOSCOPY N/A 6/22/2018    Procedure: COLONOSCOPY;  Surgeon: Joni Taylor MD;  [facility]; Service: Colorectal    CORONARY ANGIOPLASTY WITH STENT PLACEMENT  07/05/2012    MAKEDA (LAD), PTA (Diag)    CORONARY ANGIOPLASTY WITH STENT PLACEMENT  12/2017    MAKEDA/ PTA (RCA)    FL GUIDED CENTRAL VENOUS ACCESS REPLACEMENT  8/15/2018    [place] INSERT PICC W/ SUB-Q PORT N/A 8/15/2018    Procedure: INSERTION VENOUS PORT (PORT-A-CATH);   Surgeon: Kiley Barnard MD;  [facility];  Service: Colorectal    NJ LAP,DIAGNOSTIC ABDOMEN N/A 7/17/2018    Procedure: LAPAROSCOPY DIAGNOSTIC, Laproscopic transverse loop colostomy, lysis of adhesions;  Surgeon: Harjeet Morocho MD;  [facility];  Service: Colorectal    NJ SIGMOIDOSCOPY FLX DX W/COLLJ SPEC BR/WA IF PFRMD N/A 8/15/2018    Procedure: Gabriel Quevedo;  Surgeon: Kiley Barnard MD;  [facility];  Service: Colorectal       ALLERGIES:  No Known Allergies    SOCIAL HISTORY:  History   Alcohol Use No     Comment: "only on occasion"     History   Drug Use No     Comment: only tried x 1      History   Smoking Status    Current Every Day Smoker    Packs/day: 1 00    Years: 45 00    Types: Cigarettes   Smokeless Tobacco    Never Used       FAMILY HISTORY:  Family History   Problem Relation Age of Onset    Heart attack Father     Cancer Brother         gastric       MEDICATIONS:  Scheduled Meds:  Current Facility-Administered Medications:  acetaminophen 650 mg Oral Q6H PRN Brianne Hanson PA-C    acetaminophen 325 mg Oral [facility] Pam Moreau amiodarone 200 mg Oral TID With Meals JEANNIE Chris    [START ON 9/25/2018] atorvastatin 5 mg Oral After William Caves [place]    chlorhexidine 15 mL Swish & Spit Q12H [address] Marianela Marmolejo DO    clopidogrel 75 mg Oral Daily Phoebe YNAELIS Schneider    divalproex sodium 750 mg Oral Daily Eitanebe YANELIS Schneider    DULoxetine 60 mg Oral Daily Megan Sequeira PA-C    enoxaparin 1 mg/kg Subcutaneous Q12H [address] Megan Sequeira PA-C    insulin lispro 1-5 Units Subcutaneous HS Amrik Cortez MD    insulin lispro 1-6 Units Subcutaneous TID AC Amrik Cortez MD    lactulose 10 g Oral BID Scooby Branford    metoclopramide 5 mg Intravenous Q6H PRN Bud Negro PA-C    nafcillin 2,000 mg Intravenous Q4H Jemma Knight MD Last Rate: 2,000 mg (09/24/18 1011)   sodium bicarbonate infusion 100 mL/hr Intravenous Continuous Mireya Hilton DO    traZODone 50 mg Oral HS Megan Sequeira PA-C        PRN Meds:   acetaminophen    metoclopramide    Continuous Infusions:  sodium bicarbonate infusion 100 mL/hr       REVIEW OF SYSTEMS:  A complete review of systems was done  Pertinent positives and negatives noted in the HPI but otherwise the review of systems is negative      PHYSICAL EXAM:  Current Weight: Weight - Scale: 70 2 kg (154 lb 12 2 oz)  First Weight: Weight - Scale: 57 1 kg (125 lb 14 1 oz)  Vitals:    09/24/18 1409   BP:    Pulse:    Resp:    Temp: 97 8 °F (36 6 °C)   SpO2:        Intake/Output Summary (Last 24 hours) at 09/24/18 1427  Last data filed at 09/24/18 1228   Gross per 24 hour   Intake           1767 5 ml   Output             1925 ml   Net           -157 5 ml     General: no acute distress   Skin: no rash  Eyes:  Sclera anicteric   ENT: moist mucous membranes   Neck: supple, symmetric   Chest: clear to auscultation   CVS: regular rate and rhythm with murmur   Abdomen: soft, non-tender, ileostomy in place   Extremities: bilateral pedal edema   Neuro: awake and alert  Psych: appropriate affect    Lab Results:     Results from last 7 days  Lab Units 09/24/18  0621 09/23/18  0515 09/22/18  0029 09/21/18  0449 09/20/18  0507 09/19/18  0520 09/19/18  0518  09/18/18 0448   WBC Thousand/uL 25 50* 14 29* 4 11* 2 08* 0 71*  --  0 60*  --  1 41*   HEMOGLOBIN g/dL 8 5* 8 7* 8 5* 8 2* 8 2*  --  7 2*  < > 8 7*   HEMATOCRIT % 24 9* 25 3* 24 8* 23 8* 24 0*  --  20 9*  < > 24 5*   PLATELETS Thousands/uL 53* 49* 69* 94* 146*  --  96*  --  74*   SODIUM mmol/L 132* 132* 133* 130* 131*  --  138  < > 134*   POTASSIUM mmol/L 3 5 3 0* 3 5 3 5 3 9  --  3 9  < > 4 1   CHLORIDE mmol/L 107 104 106 104 106  --  111*  < > 106   CO2 mmol/L 14* 16* 14* 16* 16*  --  17*  < > 18*   BUN mg/dL 27* 27* 27* 24 19  --  20  < > 26*   CREATININE mg/dL 2 32* 2 34* 2 20* 1 69* 1 18  --  1 23  < > 1 09   CALCIUM mg/dL 6 5* 6 7* 6 9* 7 4* 7 5*  --  7 2*  < > 7 0*   MAGNESIUM mg/dL  --   --   --  2 3 1 9 2 6 2 5  < >  --    PHOSPHORUS mg/dL  --   --   --  3 5 2 5* 3 0 2 9  < >  --    ALK PHOS U/L  --  99  --   --   --   --  75  --  86   ALT U/L  --  <6*  --   --   --   --  9*  --  18   AST U/L  --  18  --   --   --   --  38  --  51*   < > = values in this interval not displayed

## 2018-09-24 NOTE — PROGRESS NOTES
Progress Note - Emily Abdullahi 60 y o  female MRN: [id number]    Unit/Bed#: [facility] 828-01 Encounter: [id number]      CC: diabetes f/u    Subjective:   Emily Osborn is a 60y o  year old female with type 2  diabetes  Poor appetite, denies any nausea or vomiting  Was hypoglycemic this morning    Objective:     Vitals: Blood pressure 96/55, pulse 88, temperature 97 8 °F (36 6 °C), temperature source Oral, resp  rate 20, height 5' 3" (1 6 m), weight 70 2 kg (154 lb 12 2 oz), SpO2 94 %, not currently breastfeeding  ,Body mass index is 27 42 kg/m²  Intake/Output Summary (Last 24 hours) at 09/24/18 1500  Last data filed at 09/24/18 1444   Gross per 24 hour   Intake           2017 5 ml   Output             2125 ml   Net           -107 5 ml       Physical Exam:  General Appearance: awake, appears stated age and cooperative  Head: Normocephalic, without obvious abnormality, atraumatic  Extremities: moves all extremities  Skin: Skin color and temperature normal    Pulm: no labored breathing    Lab, Imaging and other studies: I have personally reviewed pertinent reports  POC Glucose   Date Value   09/24/2018 129 mg/dl   09/24/2018 56 mg/dl (L)   09/23/2018 108 mg/dl   09/23/2018 163 mg/dl (H)   09/23/2018 136 mg/dl   09/23/2018 106 mg/dl   09/23/2018 93 mg/dl   09/23/2018 57 mg/dl (L)   09/22/2018 109 mg/dl   09/22/2018 120 mg/dl   06/19/2018 230 MG/DL (H)   06/19/2018 157 MG/DL (H)   06/18/2018 199 MG/DL (H)   06/18/2018 114 MG/DL (H)   06/18/2018 162 MG/DL (H)   06/18/2018 164 MG/DL (H)   01/26/2015 293 mg/dL (H)       Assessment:  Type 2 diabetes with hypoglycemia on long-term  insulin therapy  Acute kidney injury  MSSA bacteremia    Plan: Will discontinue Lantus at bedtime  Continue correctional insulin only  Monitor blood sugars before meals and bedtime  Acute kidney injury-nephrology on board          Portions of the record may have been created with voice recognition software

## 2018-09-24 NOTE — PROGRESS NOTES
Consult - Critical Care  Emily Abdullahi 60 y o  female MRN: [id number]  Unit/Bed#: [facility] 828-01 Encounter: [id number]     Physician Requesting Consult: Jerry Sen MD  Consults     Reason for Consultation / Chief Complaint: Hypotension     History of Present Illness:  Emily Espinozatingham is a 60 y o  female who presents as a transfer originally for sepsis as well as hyper osmolar non-ketoacidosis  She had a multiple day stay in the medical ICU where it was discovered that she had a thrombus in her right atrium  As well as septic emboli in her lungs  She has a history of colon cancer status post resection for which she was referred receiving chemotherapy through a right Port-A-Cath  Patient also has history significant for CAD status post angioplasty, COPD, hep C, heroin abuse, hypertension, diabetes  This Port-A-Cath was removed as a possible avenue for infection  Over her stay in the ICU her sugars became under control, she was being continually treated for her bacteremia, and heart rate was being controlled with amiodarone  The patient stabilized and was transferred out to the floors at that time for continued antibiotics treatment as well as investigation of her other medical issues  History obtained from chart review       Past Medical History:  Past Medical History:   Diagnosis Date    Bipolar depression ([place] [address] ) 3/17/2016    CAD S/P percutaneous coronary angioplasty 3/17/2016    Cancer ([place] [address] )     Chronic pain     Colonic mass     Colostomy care ([place] [address] )     COPD (chronic obstructive pulmonary disease) (HCC)     CVA (cerebral vascular accident) ([place] [address] )     R sided weakness    Essential hypertension 3/17/2016    GERD (gastroesophageal reflux disease)     Hepatitis C     Heroin abuse 3/18/2016    History of gastric ulcer     Hypertension     NSTEMI (non-ST elevated myocardial infarction) ([place] [address] ) 07/2012    Psychiatric disorder 09/03/2014    Inpatient admission     Schizoaffective disorder ([place] [address] ) 3/17/2016    Schizophrenia ([facility] ) 3/17/2016    STEMI (ST elevation myocardial infarction) ([place] [address] ) 12/2017    Type 2 diabetes mellitus ([place] [address] ) 3/17/2016        Past Surgical History:  Past Surgical History:   Procedure Laterality Date    CARDIAC CATHETERIZATION  07/2016    COLONOSCOPY N/A 6/22/2018    Procedure: COLONOSCOPY;  Surgeon: Carolyn Carcamo MD;  Location: [facility]; Service: Colorectal    CORONARY ANGIOPLASTY WITH STENT PLACEMENT  07/05/2012    MAKEDA (LAD), PTA (Diag)    CORONARY ANGIOPLASTY WITH STENT PLACEMENT  12/2017    MAKEDA/ PTA (RCA)    FL GUIDED CENTRAL VENOUS ACCESS REPLACEMENT  8/15/2018    SD INSERT PICC W/ SUB-Q PORT N/A 8/15/2018    Procedure: INSERTION VENOUS PORT (PORT-A-CATH); Surgeon: Robert Briceno MD;  [facility];  Service: Colorectal    SD LAP,DIAGNOSTIC ABDOMEN N/A 7/17/2018    Procedure: LAPAROSCOPY DIAGNOSTIC, Laproscopic transverse loop colostomy, lysis of adhesions;  Surgeon: Jodi Lewis MD;  Location: [facility];  Service: Colorectal    SD SIGMOIDOSCOPY FLX DX W/COLLJ SPEC BR/WA IF PFRMD N/A 8/15/2018    Procedure: Gil Bell;  Surgeon: Robert Briceno MD;  [facility];  Service: Colorectal        Past Family History:  Family History   Problem Relation Age of Onset    Heart attack Father    [facility] Cancer Brother         gastric        Social History:  History   Smoking Status    Current Every Day Smoker    Packs/day: 1 00    Years: 45 00    Types: Cigarettes   Smokeless Tobacco    Never Used     History   Alcohol Use No     Comment: "only on occasion"     History   Drug Use No     Comment: only tried x 1      Marital Status: Single  Exercise History:      Home Medications:   Prior to Admission medications    Medication Sig Start Date End Date Taking?  Authorizing Provider   atorvastatin (LIPITOR) 10 mg tablet Take 1 tablet (10 mg total) by mouth daily 8/9/18  Yes Raza Kendrick MD   clopidogrel (PLAVIX) 75 mg tablet Take 1 tablet (75 mg total) by mouth daily 8/17/18  Yes Jake Peng MD   insulin aspart protamine-insulin aspart (NOVOLOG MIX 70/30) 100 units/mL injection Inject 20 Units under the skin 2 (two) times a day before meals 9/10/18  Yes Edmond Barroso MD   insulin glargine (LANTUS) 100 units/mL subcutaneous injection Inject 10 Units under the skin daily at bedtime 7/30/18  Yes Brynn Zamarripa MD   metFORMIN (GLUCOPHAGE) 1000 MG tablet metformin 1,000 mg tablet  BID   Yes Historical Provider, MD   metoclopramide (REGLAN) 10 mg tablet Take 1 tablet (10 mg total) by mouth 3 (three) times a day before meals 9/11/18  Yes Wing Thomas MD   Rivaroxaban (XARELTO PO) Take 35 mg by mouth daily   Yes Historical Provider, MD   divalproex sodium (DEPAKOTE) 250 mg EC tablet Take 3 tablets by mouth daily    Historical Provider, MD   DULoxetine (CYMBALTA) 60 mg delayed release capsule Take 60 mg by mouth daily      Historical Provider, MD   mirtazapine (REMERON) 15 mg tablet Take 1 tablet by mouth daily      Historical Provider, MD   nitroglycerin (NITROSTAT) 0 4 mg SL tablet Place 0 4 mg under the tongue 12/26/17   Historical Provider, MD   oxyCODONE-acetaminophen (PERCOCET) 5-325 mg per tablet Take 1 tablet by mouth every 8 (eight) hours as needed for moderate pain Max Daily Amount: 2 tablets 9/13/18   Wing Thomas MD   promethazine (PHENERGAN) 12 5 MG tablet Take 12 5 mg by mouth daily      Historical Provider, MD   senna ([employer] SENNA) 8 6 MG tablet Take by mouth    Historical Provider, MD   terbinafine (LAMISIL) 250 mg tablet Take 1 tablet by mouth daily    Historical Provider, MD   traZODone (DESYREL) 50 mg tablet Take 50 mg by mouth daily at bedtime    Historical Provider, MD        Inpatient Medications:  Scheduled Meds:    Current Facility-Administered Medications:  acetaminophen 650 mg Oral Q6H PRN Hilario Farley PA-C    amiodarone 200 mg Oral TID With Meals JEANNIE Paris    atorvastatin 10 mg Oral After [employer] YANELIS Wang    chlorhexidine 15 mL Swish & Spit Q12H [facility] Marianela Marmolejo DO    clopidogrel 75 mg Oral Daily Karolina Manzanares PA-C    divalproex sodium 750 mg Oral Daily Karolina Manzanares PA-C    DULoxetine 60 mg Oral Daily Megan Sequeira PA-C    enoxaparin 1 mg/kg Subcutaneous Q12H [facility] Megan Sequeira PA-C    insulin glargine 10 Units Subcutaneous HS Gaetano Klinefelter, MD    insulin lispro 1-5 Units Subcutaneous HS Carol Chicas MD    insulin lispro 1-6 Units Subcutaneous TID AC Carol Chicas MD    metoclopramide 5 mg Intravenous Q6H PRN Avis Garcia PA-C    nafcillin 2,000 mg Intravenous Q4H María Boss MD Last Rate: 2,000 mg (18)   potassium chloride 20 mEq Intravenous Q2H Arturo Serrato Last Rate: 20 mEq (18)   sodium chloride 75 mL/hr Intravenous Continuous Arturo Serrato Last Rate: 75 mL/hr (18)   tbo-filgrastim 480 mcg Subcutaneous Daily Megan Sequeira PA-C    traZODone 50 mg Oral HS Megan Sequeira PA-C      Continuous Infusions:    sodium chloride 75 mL/hr Last Rate: 75 mL/hr (18)     PRN Meds:    acetaminophen 650 mg Q6H PRN   metoclopramide 5 mg Q6H PRN        Allergies:  No Known Allergies      ROS:   Review of Systems   Respiratory: Negative for chest tightness and shortness of breath  Cardiovascular: Negative for chest pain  Gastrointestinal: Negative for abdominal pain  Neurological: Negative for light-headedness and headaches  All other systems reviewed and are negative      Vitals:  Vitals:    18 0736 18 1100 18 1500 18 1900   BP: 94/54 99/53 (!) 97/49 (!) 90/49   BP Location: Left arm Left arm Left arm Left arm   Pulse: 80 88 75 72   Resp: 22 20 20 20   Temp: 97 5 °F (36 4 °C) 97 5 °F (36 4 °C) (!) 97 °F (36 1 °C) 98 °F (36 7 °C)   TempSrc: Axillary Oral Oral Oral   SpO2: 96% 98% 96% 97%   Weight:       Height:         Temperature:   Temp (24hrs), Av 8 °F (36 6 °C), Min:97 °F (36 1 °C), Max:99 8 °F (37 7 °C)    Current Temperature: 98 °F (36 7 °C)     Weights:   IBW: 52 4 kg  Body mass index is 27 42 kg/m²  Hemodynamic Monitoring:       Non-Invasive/Invasive Ventilation Settings:  Respiratory    Lab Data (Last 4 hours)    None         O2/Vent Data (Last 4 hours)    None              No results found for: PHART, LRI0WBC, PO2ART, AAL9AEL, U1CJOOVY, BEART, SOURCE       Physical Exam:  Physical Exam   Constitutional: No distress  HENT:   Head: Normocephalic and atraumatic  Eyes: Conjunctivae and EOM are normal  Pupils are equal, round, and reactive to light  No scleral icterus  Neck: Normal range of motion  No tracheal deviation present  Pulmonary/Chest: Effort normal  No respiratory distress  Abdominal: There is no tenderness  Musculoskeletal: She exhibits no deformity  Neurological: She is alert  No cranial nerve deficit  She exhibits normal muscle tone  Skin: She is not diaphoretic  Psychiatric: She has a normal mood and affect   Her behavior is normal  Judgment and thought content normal         Labs:    Results from last 7 days  Lab Units 09/23/18  0515 09/22/18  0029 09/21/18 0449 09/20/18  0507   WBC Thousand/uL 14 29* 4 11* 2 08* 0 71*   HEMOGLOBIN g/dL 8 7* 8 5* 8 2* 8 2*   HEMATOCRIT % 25 3* 24 8* 23 8* 24 0*   PLATELETS Thousands/uL 49* 69* 94* 146*   NEUTROS PCT %  --   --  42*  --    MONOS PCT %  --   --  5  --    MONO PCT MAN % 5  --   --  8      Results from last 7 days  Lab Units 09/23/18  0515 09/22/18  0029 09/21/18  0449  09/19/18  0518  09/18/18  0448   SODIUM mmol/L 132* 133* 130*  < > 138  < > 134*   POTASSIUM mmol/L 3 0* 3 5 3 5  < > 3 9  < > 4 1   CHLORIDE mmol/L 104 106 104  < > 111*  < > 106   CO2 mmol/L 16* 14* 16*  < > 17*  < > 18*   BUN mg/dL 27* 27* 24  < > 20  < > 26*   CREATININE mg/dL 2 34* 2 20* 1 69*  < > 1 23  < > 1 09   CALCIUM mg/dL 6 7* 6 9* 7 4*  < > 7 2*  < > 7 0*   ALK PHOS U/L 99  --   --   --  75  --  86   ALT U/L <6*  --   --   --  9*  --  18   AST U/L 18  --   --   --  38  --  51*   < > = values in this interval not displayed  Results from last 7 days  Lab Units 09/21/18  0449 09/20/18  0507 09/19/18  0520   MAGNESIUM mg/dL 2 3 1 9 2 6       Results from last 7 days  Lab Units 09/21/18  0449 09/20/18  0507 09/19/18  0520   PHOSPHORUS mg/dL 3 5 2 5* 3 0        Results from last 7 days  Lab Units 09/20/18  0908 09/20/18  0056 09/19/18  1722  09/18/18  0448  09/17/18  1806 09/17/18  0203   INR   --   --   --   --  1 17  --  1 29* 1 22*   PTT seconds 37* >210* 39*  < >  --   < > 51*  --    < > = values in this interval not displayed  Results from last 7 days  Lab Units 09/22/18  0518   LACTIC ACID mmol/L 1 6       0  Lab Value Date/Time   TROPONINI <0 02 09/16/2018 2145   TROPONINI <0 02 09/07/2018 1642   TROPONINI <0 02 07/25/2018 1248   TROPONINI 0 57 (H) 03/18/2016 0623   TROPONINI 0 65 (H) 03/18/2016 0045   TROPONINI 0 64 (H) 03/17/2016 2220        Imaging: No recent imaging   EKG: NSR -  This was personally reviewed by myself  Micro:  Lab Results   Component Value Date    BLOODCX Staphylococcus aureus (A) 09/20/2018    BLOODCX Staphylococcus aureus (A) 09/20/2018    BLOODCX Staphylococcus aureus (A) 09/18/2018    BLOODCX Staphylococcus aureus (A) 09/18/2018    URINECX >100,000 cfu/ml Escherichia coli 03/17/2016       Assessment and Plan:  Upon evaluation in her room the patient was resting comfortably in bed with no current complaints  She denied having any significant chest pain or problems breathing  She did note that she would like her legs moved as she has been having weakness  This weakness has been ongoing for her entire hospital stay  The patient's blood pressures have been low throughout her hospital stay as well as previously upon discharge after her last hospital stay  At this point it is advised to continue with fluid resuscitation as needed with no further interventions    Patient appears to be tolerating her low pressures while and this may be close to baseline for her  Also consider palliative care consult for discussion of goals of care given the complex nature of the patient's cancer, presents of metastasis, and ongoing bacterial endocarditis  VTE Pharmacologic Prophylaxis: Fondaparinux (Arixtra)  VTE Mechanical Prophylaxis: sequential compression device     Invasive lines and devices: Invasive Devices     Peripheral Intravenous Line            Peripheral IV 09/20/18 Left Arm 3 days    Peripheral IV 09/23/18 Left Forearm less than 1 day          Drain            Colostomy RUQ -- days    Urethral Catheter Latex 18 Fr  2 days                 Code Status: Level 1 - Full Code     Given critical illness, patient length of stay will require greater than two midnights  Counseling / Coordination of Care       Portions of the record may have been created with voice recognition software  Occasional wrong word or "sound a like" substitutions may have occurred due to the inherent limitations of voice recognition software  Read the chart carefully and recognize, using context, where substitutions have occurred          Ron Chong MD

## 2018-09-24 NOTE — PROGRESS NOTES
Cardiology Progress Note - mEily Abdullahi 60 y o  female MRN: [id number]    Unit/Bed#: [facility] [place] 828-01 Encounter: [id number]  Assessment and plan  1  Right atrial mass  2  Bacteremia  3  Atrial flutter now in sinus rhythm on anticoagulation  4  Adenocarcinoma of the colon  5  Hypotension improved  6  Aortic valve lesion    Recommendations: The right atrial mass appears to be thrombus on my review  The port was deep into the right atrium I suspect cause trauma near the tricuspid annulus leading to a nidus for thrombus formation  There is small friable strands on the thrombus as well  Aortic valve has 2 small strand like structures which 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  I am not sure the utility of repeat transesophageal echocardiogram at this time  I do not think she would be a great candidate for CT surgery would continue 6 weeks of antibiotics will discuss with Infectious Disease  Subjective:    No significant events overnight  Resting comfortably without complaints  ROS    Objective:   Vitals: Blood pressure 109/58, pulse 82, temperature (!) 97 1 °F (36 2 °C), temperature source Axillary, resp  rate 20, height 5' 3" (1 6 m), weight 70 2 kg (154 lb 12 2 oz), SpO2 95 %, not currently breastfeeding , Body mass index is 27 42 kg/m² , Orthostatic Blood Pressures      Most Recent Value   Blood Pressure  109/58 filed at 09/24/2018 1000   Patient Position - Orthostatic VS  Lying filed at 09/24/2018 7949         Systolic (25HHD), HNV:773 , Min:90 , CSC:577     Diastolic (89OCR), KTT:55, Min:45, Max:58      Intake/Output Summary (Last 24 hours) at 09/24/18 1100  Last data filed at 09/24/18 1055   Gross per 24 hour   Intake           1887 5 ml   Output             1975 ml   Net            -87 5 ml     Weight (last 2 days)     None            Telemetry Review: No significant arrhythmias seen on telemetry review  EKG personally reviewed by Colten Naylor DO       Physical Exam Constitutional: She is oriented to person, place, and time  She appears well-nourished  No distress  HENT:   Head: Atraumatic  Eyes: Conjunctivae are normal  Pupils are equal, round, and reactive to light  Neck: Neck supple  Cardiovascular: Normal rate and regular rhythm  Exam reveals no friction rub  Murmur heard  Pulmonary/Chest: Effort normal and breath sounds normal  No respiratory distress  She has no wheezes  She has no rales  Abdominal: Bowel sounds are normal  She exhibits no distension  There is no tenderness  There is no rebound  Musculoskeletal: She exhibits no edema  Neurological: She is alert and oriented to person, place, and time  No cranial nerve deficit  Skin: Skin is warm and dry  No erythema  Nursing note and vitals reviewed  Laboratory Results:        CBC with diff:   Results from last 7 days  Lab Units 09/24/18  0621 09/23/18  0515 09/22/18  0029 09/21/18  0449 09/20/18  0507 09/19/18  0518 09/19/18  0517  09/18/18  0448 09/17/18  2138  09/17/18  1420   WBC Thousand/uL 25 50* 14 29* 4 11* 2 08* 0 71* 0 60*  --   --  1 41* 1 05*  --  2 22*   HEMOGLOBIN g/dL 8 5* 8 7* 8 5* 8 2* 8 2* 7 2* 7 2*  < > 8 7* 8 9*  < > 6 5*   HEMATOCRIT % 24 9* 25 3* 24 8* 23 8* 24 0* 20 9* 20 9*  < > 24 5* 25 7*  < > 18 7*   MCV fL 80* 81* 80* 79* 81* 80*  --   --  78* 80*  --  79*   PLATELETS Thousands/uL 53* 49* 69* 94* 146* 96*  --   --  74* 67*  --  61*   MCH pg 27 2 27 7 27 5 27 2 27 8 27 5  --   --  27 7 27 8  --  27 5   MCHC g/dL 34 1 34 4 34 3 34 5 34 2 34 4  --   --  35 5 34 6  --  34 8   RDW % 16 8* 16 3* 15 8* 15 2* 15 4* 14 2  --   --  13 6 13 5  --  13 3   MPV fL  --   --  13 2* 13 0* 12 2 11 8  --   --  12 4 12 0  --  13 0*   NRBC AUTO /100 WBCs 0 0 2 3 0 0  --   --  0 0  --  0   NRBC /100 WBC  --  1  --   --  8*  --   --   --   --   --   --   --    < > = values in this interval not displayed        CMP:  Results from last 7 days  Lab Units 09/24/18  0621 09/23/18  0515 09/22/18  0029 09/21/18  0449 09/20/18  0507 09/19/18  0518 09/19/18  0517  09/18/18  0448   SODIUM mmol/L 132* 132* 133* 130* 131* 138 138  < > 134*   POTASSIUM mmol/L 3 5 3 0* 3 5 3 5 3 9 3 9 3 9  < > 4 1   CHLORIDE mmol/L 107 104 106 104 106 111* 112*  < > 106   CO2 mmol/L 14* 16* 14* 16* 16* 17* 17*  < > 18*   BUN mg/dL 27* 27* 27* 24 19 20 19  < > 26*   CREATININE mg/dL 2 32* 2 34* 2 20* 1 69* 1 18 1 23 1 23  < > 1 09   CALCIUM mg/dL 6 5* 6 7* 6 9* 7 4* 7 5* 7 2* 7 4*  < > 7 0*   AST U/L  --  18  --   --   --  38  --   --  51*   ALT U/L  --  <6*  --   --   --  9*  --   --  18   ALK PHOS U/L  --  99  --   --   --  75  --   --  86   EGFR ml/min/1 73sq m 22 22 24 33 51 48 48  < > 56   < > = values in this interval not displayed  BMP:  Results from last 7 days  Lab Units 09/24/18  0621 09/23/18  0515 09/22/18  0029 09/21/18 0449 09/20/18  0507 09/19/18  0518 09/19/18  0517   SODIUM mmol/L 132* 132* 133* 130* 131* 138 138   POTASSIUM mmol/L 3 5 3 0* 3 5 3 5 3 9 3 9 3 9   CHLORIDE mmol/L 107 104 106 104 106 111* 112*   CO2 mmol/L 14* 16* 14* 16* 16* 17* 17*   BUN mg/dL 27* 27* 27* 24 19 20 19   CREATININE mg/dL 2 32* 2 34* 2 20* 1 69* 1 18 1 23 1 23   CALCIUM mg/dL 6 5* 6 7* 6 9* 7 4* 7 5* 7 2* 7 4*       BNP: No results for input(s): BNP in the last 72 hours      Magnesium:   Results from last 7 days  Lab Units 09/21/18  0449 09/20/18  0507 09/19/18  0520 09/19/18  0518 09/19/18  0517 09/18/18  1910 09/18/18  1044   MAGNESIUM mg/dL 2 3 1 9 2 6 2 5 2 6 2 0 2 4       Coags:   Results from last 7 days  Lab Units 09/20/18  0908 09/20/18  0056 09/19/18  1722 09/19/18  0939 09/18/18  0448 09/18/18  0030 09/17/18  1806   PTT seconds 37* >210* 39* 96*  --  35 51*   INR   --   --   --   --  1 17  --  1 29*       TSH:        Hemoglobin A1C   Results from last 7 days  Lab Units 09/22/18  0518   HEMOGLOBIN A1C % 9 6*       Lipid Profile:       Cardiac testing:   Results for orders placed during the hospital encounter of 18   Echo complete with contrast if indicated    Narrative [address]  [facility], [address] [facility]  [phone]    Transthoracic Echocardiogram  2D, M-mode, Doppler, and Color Doppler    Study date:  17-Sep-2018    Patient: [facility]   MR number: [id number]  Account number: [de-identified]  : 1960  Age: 60 years  Gender: Female  Status: Inpatient  Location: Bedside  Height: 63 in  Weight: 125 lb  BP: 91/ 74 mmHg    Indications: AV Disease AV Disease    Diagnoses: I35 8 - Other nonrheumatic aortic valve disorders    Sonographer:  HARJEET Lo  Primary Physician:  Roslynn Habermann, MD  Referring Physician:  Shirin Kilgore:  [facility]  Cardiology Fellow:  Shona Norwood MD  Interpreting Physician:  Mandy Del Rosario MD    SUMMARY    LEFT VENTRICLE:  Systolic function was hyperdynamic  Ejection fraction was estimated to be 55 %  Although no diagnostic regional wall motion abnormality was identified, this possibility cannot be completely excluded on the basis of this study  VENTRICULAR SEPTUM:  There was dyssynergic motion  These changes are consistent with LBBB  MITRAL VALVE:  There was mild regurgitation  AORTIC VALVE:  The valve was probably trileaflet  Leaflets exhibited normal thickness and normal cuspal separation  TRICUSPID VALVE:  There was mild regurgitation  There was a definite, large, sessile, echogenic, fixed mass on the anterior leaflet, on the right atrial aspect  This may represent a thrombus, tumor or vegetation on tricuspid valve  IVC, HEPATIC VEINS:  No obvious thrombus in IVC    RECOMMENDATIONS:  If clinically indicated, transesophageal echocardiography could provide additional information  HISTORY: PRIOR HISTORY: [email or web address] Angioplasty,Bipolar,HTN,Sepsis,Cancer, Sepsis,Smoker    PROCEDURE: The procedure was performed at the bedside  This was a routine study   The transthoracic approach was used  The study included complete 2D imaging, M-mode, complete spectral Doppler, and color Doppler  Image quality was good  LEFT VENTRICLE: Size was normal  Systolic function was hyperdynamic  Ejection fraction was estimated to be 55 %  Although no diagnostic regional wall motion abnormality was identified, this possibility cannot be completely excluded on the  basis of this study  DOPPLER: Due to tachycardia, there was fusion of early and atrial contributions to ventricular filling  The study was not technically sufficient to allow evaluation of LV diastolic function  VENTRICULAR SEPTUM: There was dyssynergic motion  These changes are consistent with LBBB  RIGHT VENTRICLE: The size was normal  Systolic function was normal     LEFT ATRIUM: Size was normal     RIGHT ATRIUM: Size was normal     MITRAL VALVE: Valve structure was normal  There was mild focal thickening, limited to the leaflet margin  There was normal leaflet separation  DOPPLER: The transmitral velocity was within the normal range  There was no evidence for  stenosis  There was mild regurgitation  AORTIC VALVE: The valve was probably trileaflet  Leaflets exhibited normal thickness and normal cuspal separation  DOPPLER: Transaortic velocity was within the normal range  There was no evidence for stenosis  There was no significant  regurgitation  TRICUSPID VALVE: The valve structure was normal  There was normal leaflet separation  There was a definite, large, sessile, echogenic, fixed mass on the anterior leaflet, on the right atrial aspect  This may represent a thrombus, tumor or  vegetation on tricuspid valve  DOPPLER: The transtricuspid velocity was within the normal range  There was no evidence for stenosis  There was mild regurgitation  The tricuspid jet envelope definition was inadequate for estimation of RV  systolic pressure  PULMONIC VALVE: Not well visualized   DOPPLER: The transpulmonic velocity was within the normal range  There was no regurgitation  PERICARDIUM: There was no pericardial effusion  AORTA: The root exhibited normal size  SYSTEMIC VEINS: IVC: No obvious thrombus in IVC The inferior vena cava was not well visualized  The inferior vena cava was normal in size  PULMONARY VEINS: DOPPLER: Doppler signals were not recordable in the pulmonary vein(s)  SYSTEM MEASUREMENT TABLES    2D  %FS: 28 01 %  Ao Diam: 3 cm  EDV(Teich): 41 13 ml  EF(Cube): 62 7 %  EF(Teich): 55 58 %  ESV(Cube): 12 29 ml  ESV(Teich): 18 27 ml  IVSd: 1 04 cm  LA Diam: 3 13 cm  LAAd A2C: 18 19 cm2  LAAd A4C: 12 8 cm2  LAEDV A-L A2C: 62 58 ml  LAEDV A-L A4C: 29 68 ml  LAEDV Index (A-L): 38 95 ml/m2  LAEDV MOD A2C: 57 96 ml  LAEDV MOD A4C: 27 68 ml  LAEDV(A-L): 44 01 ml  LALd A2C: 4 49 cm  LALd A4C: 4 68 cm  LVIDd: 3 21 cm  LVIDs: 2 31 cm  LVPWd: 1 02 cm  SI(Cube): 18 27 ml/m2  SI(Teich): 20 23 ml/m2  SV(Cube): 20 65 ml  SV(Teich): 22 86 ml  rv diam: 2 69 cm    CW  AV Vmax: 1 46 m/s  AV maxP 53 mmHg  RAP: 5 mmHg  TR Vmax: 2 92 m/s  TR maxP 06 mmHg    MM  AV Cusp: 1 5 cm  Ao Diam: 2 93 cm  LA Diam: 2 97 cm  LA/Ao: 1 01  TAPSE: 1 33 cm    PW  LVOT Vmax: 0 89 m/s  LVOT maxPG: 3 14 mmHg  RVSP: 39 06 mmHg    [employer] Accredited Echocardiography Laboratory    Prepared and electronically signed by    Barry Melendrez MD  Signed 17-Sep-2018 16:34:11       Results for orders placed during the hospital encounter of 18   JUNIOR    Narrative [facility]  [facility], [address] [place] [address]  [phone]    Transesophageal Echocardiogram  2D, 3D, Doppler, and Color Doppler    Study date:  18-Sep-2018    Patient: Bakari Crenshaw  MR number: [id number]  Account number: [de-identified]  : 1960  Age: 60 years  Gender: Female  Status: Inpatient  Location: Bedside  Height: 63 in  Weight: 125 lb  BP: 125/ 71 mmHg    Indications: Endocarditis      Diagnoses: I38  - Endocarditis, valve unspecified    Sonographer:  Hazel Russell, [facility]  Interpreting Physician:  Wade Starks MD  Primary Physician:  José Manuel De Guzman MD  Referring Physician:  Randy Castro PA-C  Group:  [facility]  Cardiology Fellow:  Va Rodriguez MD    SUMMARY    LEFT VENTRICLE:  Systolic function was at the lower limits of normal  Ejection fraction was estimated to be 50 %  There were no regional wall motion abnormalities  RIGHT VENTRICLE:  The size was normal   Systolic function was normal     LEFT ATRIUM:  Size was normal   No thrombus was identified  LEFT ATRIAL APPENDAGE:  The size was normal   No thrombus was identified  RIGHT ATRIUM:  There was a large, homogenous, solid, mobile mass, measuring approximately 30 mm x 29 mm in the inferior right atrial cavity with associated smoke in the right atrium  It may represent a thrombus versus a right atrial mass  There is a  freely mobile segment at it's surface and the port tip itself can be seen trying to dislodge part of the mass  There was evidence of spontaneous echo contrast ("smoke")  RIGHT ATRIAL APPENDAGE:  There was no right atrial appendage thrombus identified  MITRAL VALVE:  There was no echocardiographic evidence of vegetation  AORTIC VALVE:  There was a small, strand-like, mobile mass, measuring 7 4 mm on the noncoronary cusp, on the aortic aspect  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  There was also a 2 8 mm echodensity on the LVOT side of the valve  This could represent  redundant valve tissue versus a very small vegetation  TRICUSPID VALVE:  There was mild regurgitation  There was no echocardiographic evidence of vegetation  PULMONIC VALVE:  There was no echocardiographic evidence of vegetation  HISTORY: PRIOR HISTORY: Heroin abuse, Coronary artery disease, Cerebral vascular accident, Diabetes, Schizophrenia, Hypertension, Smoker, Acute renal failure      PROCEDURE: The procedure was performed at the bedside  This was a routine study  The risks and alternatives of the procedure were explained to the patient's next of kin and informed consent was obtained  The transesophageal approach was  used  The study included complete 2D imaging, 3D imaging, complete spectral Doppler, and color Doppler  The heart rate was 72 bpm, at the start of the study  An adult omniplane probe was inserted by the cardiology fellow under direct  supervision of the attending cardiologist  Intubated with ease  One intubation attempt(s)  There was no blood detected on the probe  Image quality was adequate  There were no complications during the procedure  MEDICATIONS: Anesthesia  administered by floor nurse  LEFT VENTRICLE: Size was normal  Systolic function was at the lower limits of normal  Ejection fraction was estimated to be 50 %  There were no regional wall motion abnormalities  Wall thickness was normal  DOPPLER: The study was not  technically sufficient to allow evaluation of LV diastolic function  RIGHT VENTRICLE: The size was normal  Systolic function was normal     LEFT ATRIUM: Size was normal  No thrombus was identified  APPENDAGE: The size was normal  No thrombus was identified  DOPPLER: The function was normal (normal emptying velocity)  RIGHT ATRIUM: Size was normal  A line (catheter or pacing wire) was present  There was a large, homogenous, solid, mobile mass, measuring approximately 30 mm x 29 mm in the inferior right atrial cavity with associated smoke in the right  atrium  It may represent a thrombus versus a right atrial mass  There is a freely mobile segment at it's surface and the port tip itself can be seen trying to dislodge part of the mass  There was evidence of spontaneous echo contrast  ("smoke")  APPENDAGE: There was no right atrial appendage thrombus identified  MITRAL VALVE: Valve structure was normal  There was normal leaflet separation  There was no echocardiographic evidence of vegetation  DOPPLER: There was trace regurgitation  AORTIC VALVE: The valve was trileaflet  Leaflets exhibited normal thickness and normal cuspal separation  There was a small, strand-like, mobile mass, measuring 7 4 mm on the noncoronary cusp, on the aortic aspect  It may represent a  fibroelastoma  There was also a 2 8 mm echodensity on the LVOT side of the valve  This could represent redundant valve tissue versus a very small vegetation  DOPPLER: There was no regurgitation  TRICUSPID VALVE: The valve structure was normal  There was normal leaflet separation  There was no echocardiographic evidence of vegetation  DOPPLER: There was mild regurgitation  PULMONIC VALVE: Leaflets exhibited normal thickness, no calcification, and normal cuspal separation  There was no echocardiographic evidence of vegetation  DOPPLER: There was no significant regurgitation  PERICARDIUM: There was no pericardial effusion  The pericardium was normal in appearance  AORTA: The root exhibited normal size  There was no atheroma  There was no evidence for dissection  There was no evidence for aneurysm  PULMONARY VEINS: The pulmonary veins were normal sized  DOPPLER: Doppler flow pattern was normal in the pulmonary vein(s)  [address] Accredited Echocardiography Laboratory    Prepared and electronically signed by    Jabari Newman MD  Signed 18-Sep-2018 14:08:00       No results found for this or any previous visit  No results found for this or any previous visit      Meds/Allergies   all current active meds have been reviewed  Prescriptions Prior to Admission   Medication    atorvastatin (LIPITOR) 10 mg tablet    clopidogrel (PLAVIX) 75 mg tablet    insulin aspart protamine-insulin aspart (NOVOLOG MIX 70/30) 100 units/mL injection    insulin glargine (LANTUS) 100 units/mL subcutaneous injection    metFORMIN (GLUCOPHAGE) 1000 MG tablet    metoclopramide (REGLAN) 10 mg tablet    Rivaroxaban (XARELTO PO)    divalproex sodium (DEPAKOTE) 250 mg EC tablet    DULoxetine (CYMBALTA) 60 mg delayed release capsule    mirtazapine (REMERON) 15 mg tablet    nitroglycerin (NITROSTAT) 0 4 mg SL tablet    oxyCODONE-acetaminophen (PERCOCET) 5-325 mg per tablet    promethazine (PHENERGAN) 12 5 MG tablet    senna ([employer] SENNA) 8 6 MG tablet    terbinafine (LAMISIL) 250 mg tablet    traZODone (DESYREL) 50 mg tablet         sodium chloride 125 mL/hr Last Rate: 125 mL/hr (09/24/18 0200)     Assessment:  Principal Problem:     HHNC (hyperglycemic hyperosmolar nonketotic coma) ([facility]  Active Problems:    Type 2 diabetes mellitus with complication (HCC)    Hyponatremia    GERD (gastroesophageal reflux disease)    Sepsis (HCC)    Altered mental status    Acute respiratory failure with hypoxia (HCC)    Hypocalcemia    Leukopenia    Cavitating pulmonary nodules     Metabolic acidosis, NAG, bicarbonate losses    Hypomagnesemia    Hypophosphatemia

## 2018-09-24 NOTE — PROGRESS NOTES
Per Dr Yfn Arango [facility] to take off tootie rubin at this time, patient's temperature 97 8 orally  Will continue to monitor

## 2018-09-24 NOTE — SOCIAL WORK
Cm met with pt and Jensen and aware no accepting facility at this time  [facility] and consuelo Casillas reviewing   [facility] requesting last date of drug use  Cm spoke with pt and her daughter and pt states 6 months ago iv drug use  Cm will follow

## 2018-09-24 NOTE — SOCIAL WORK
Cm met with pt and pt daughter Haylie Marroquin  And aware no bed at [facility] requested cm to make referrals to all facilities in [facility] and [place]  Cm make multiple referrals in in

## 2018-09-24 NOTE — NUTRITION
09/24/18 4088   Recommendations/Interventions   Summary Patient's po intake remains poor, 0-25% meal completions noted  Dtr and patient c/o dislike of pureed food items  Speech re-eval completed and agreeable to trial of Level 3 dysphagia, thin liquid  Patient also agreeable to Ensure enlive supplements  Generalized +1 edema  Malnutrition/BMI Present Yes   Malnutrition type Acute illness  (Related to medical condition as evidenced by <75% energy intake needs met >7 days and generalized +1 edema, treated with liberal diet and ensure supplements  )   Degree of Malnutrition Malnutrition of moderate degree   Malnutrition Characteristics Inadequate energy; Fluid accumulation   Interventions Supplement initiate;Diet: order adjustment  (Diet liberalized to Level 1 dysphagia, thin liquid with Ensure enlive TID to optimize calorie/protein intake  Dtr assisting with meals  )   Nutrition Recommendations Other (specify); Lab - consider order (specify)  (Per speech recommendation suggest adjust diet to Level 3 dysphagia, thin liquid   Monitor electrolytes  )

## 2018-09-24 NOTE — PROGRESS NOTES
[address] Internal Medicine Progress Note  Patient: Emily Abdullahi 60 y o  female   MRN: [id number]  PCP: Anselmo Lugo MD  Unit/Bed#: [facility] 828-01 Encounter: [id number]  Date Of Visit: 09/24/18    Assessment:    Principal Problem: HHNC (hyperglycemic hyperosmolar nonketotic coma) ([facility] [address] )  Active Problems:    Type 2 diabetes mellitus with complication (HCC)    Hyponatremia    GERD (gastroesophageal reflux disease)    Sepsis (HCC)    Altered mental status    Acute respiratory failure with hypoxia (HCC)    Hypocalcemia    Leukopenia    Cavitating pulmonary nodules     Metabolic acidosis, NAG, bicarbonate losses    Hypomagnesemia    Hypophosphatemia      Plan: 1     MSSA bacteremia, persistent:    Unfortunately given very persistent nature of disease and bacteremia  Extensive conversation was held between consultants including Cardiology and Infectious Disease  Following plan has been established  Continue treatment with Nafcicillin for now which may be switched to and staff in a   due to acute kidney injury  Repeat blood cultures today  Cardiology is contemplating repeating TTE later this week to re-evaluate for endocarditis  2   Possible right-sided endocarditis versus infected thrombus in the right heart leading to pulmonary septic emboli:  Continue with prolonged antibiotic regimen course  May consider repeat echocardiogram prior to discharge  No indication for surgery at this point  Will follow up on repeat blood cultures        3   Severe sepsis, POA:  The patient developed hypotension overnight  With IV normal saline infusion blood pressure normalized  Patient is completely asymptomatic  Afebrile and stable  Continue treatment with current antibiotic regimen      4   Neutropenia:  Seems to have recovered    Resolved  Current leukocytosis due to [facility] which has been discontinued since     5   Significant leg/back pain-possibly all related to the patient's paraspinal, psoas, 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  No cord compression or vertebral osteo seen on MRI  Neurologically stable pain has resolved  No need for further imaging at this time      6   Colon cancer currently on palliative chemotherapy with chronic port  For palliative chemotherapy may have to be discontinued due to ongoing recurrent sepsis endocarditis at this time  Patient is upset about that but does understand it would not be in her best interest at this point      7   Diabetes  Improving blood glucose levels  Appreciate Endocrinology input     8  Acute kidney injury-suspect a pre renal issue with the patient's hypotension  The blood pressure seems to have improved  Consideration for the possibility of medication effect  Continue his daily labs  Patient excellent urine output at this time  Possibly due to ATN from hypotension? If renal function continues to worsen will consult Nephrology  9   Anemia of chronic disease  Continue to monitor  Transfuse if hemoglobin drops to less than 7 grams/deciliter  Discharge planning:  Discharge planning is yet to be determined  Met with patient's daughter currently by bedside extensive conversation about current treatment plan has been initiated patient and family agree with continued inpatient therapy as described in the assessment and plan  VTE Pharmacologic Prophylaxis:   Pharmacologic: Heparin  Mechanical VTE Prophylaxis in Place: Yes    Patient Centered Rounds: I have performed bedside rounds with nursing staff today  Discussions with Specialists or Other Care Team Provider:  S    Education and Discussions with Family / Patient:  Yes    Time Spent for Care:   Forty-five  More than 50% of total time spent on counseling and coordination of care as described above      Current Length of Stay: 7 day(s)    Current Patient Status: Inpatient   Certification Statement: The patient will continue to require additional inpatient hospital stay due to Sepsis hypotension    Discharge Plan / Estimated Discharge Date: To be determined    Code Status: Level 1 - Full Code      Subjective:   I am doing well today, but I am still very weak     Objective:     Vitals:   Temp (24hrs), Av 4 °F (36 3 °C), Min:94 8 °F (34 9 °C), Max:98 2 °F (36 8 °C)    HR:  [72-95] 95  Resp:  [20-28] 20  BP: ()/(45-58) 102/53  SpO2:  [91 %-97 %] 95 %  Body mass index is 27 42 kg/m²  Input and Output Summary (last 24 hours): Intake/Output Summary (Last 24 hours) at 18 1636  Last data filed at 18 1444   Gross per 24 hour   Intake           2017 5 ml   Output             2125 ml   Net           -107 5 ml       Physical Exam:     Physical Exam    Constitutional:  Well developed, well nourished, no acute distress, patient appears chronically ill  Eyes:  PERRL, conjunctiva normal   HENT:  Atraumatic, external ears normal, nose normal, oropharynx moist, no pharyngeal exudates  Neck- normal range of motion, no tenderness, supple   Respiratory:  No respiratory distress, normal breath sounds, no rales, no wheezing   Cardiovascular:  Normal rate, normal rhythm, no murmurs, no gallops, no rubs   GI:  Soft, nondistended, normal bowel sounds, nontender, no organomegaly, no mass, no rebound, no guarding   :  No costovertebral angle tenderness   Musculoskeletal:  No edema, no tenderness, no deformities   Back- no tenderness  Integument:  Well hydrated, no rash   Lymphatic:  No lymphadenopathy noted   Neurologic:  Alert & oriented x 3, CN 2-12 normal, normal motor function, normal sensory function, no focal deficits noted   Psychiatric:  Speech and behavior appropriate  Additional Data:     Labs:      Results from last 7 days  Lab Units 18  0621  18  0449   WBC Thousand/uL 25 50*  < > 2 08*   HEMOGLOBIN g/dL 8 5*  < > 8 2*   HEMATOCRIT % 24 9*  < > 23 8*   PLATELETS Thousands/uL 53*  < > 94*   NEUTROS PCT %  --   --  42*   LYMPHS PCT %  --   --  51*   LYMPHO PCT % 7*  < >  --    MONOS PCT %  -- --  5   MONO PCT MAN % 4  < >  --    EOS PCT %  --   --  1   EOSINO PCT MANUAL % 0  < >  --    < > = values in this interval not displayed  Results from last 7 days  Lab Units 09/24/18  0621 09/23/18  0515   SODIUM mmol/L 132* 132*   POTASSIUM mmol/L 3 5 3 0*   CHLORIDE mmol/L 107 104   CO2 mmol/L 14* 16*   BUN mg/dL 27* 27*   CREATININE mg/dL 2 32* 2 34*   CALCIUM mg/dL 6 5* 6 7*   ALK PHOS U/L  --  99   ALT U/L  --  <6*   AST U/L  --  18       Results from last 7 days  Lab Units 09/18/18  0448   INR  1 17       * I Have Reviewed All Lab Data Listed Above  * Additional Pertinent Lab Tests Reviewed:  [address] Admission Reviewed    Imaging:    Imaging Reports Reviewed Today Include:  Yes  Imaging Personally Reviewed by Myself Includes:  Yes    Recent Cultures (last 7 days):       Results from last 7 days  Lab Units 09/24/18  1003 09/22/18  0530 09/22/18  0519 09/20/18  0512 09/20/18  0507 09/18/18  0526   BLOOD CULTURE   --  Staphylococcus aureus* Staphylococcus aureus* Staphylococcus aureus* Staphylococcus aureus* Staphylococcus aureus*  Staphylococcus aureus*   GRAM STAIN RESULT   --  Gram positive cocci in clusters Gram positive cocci in clusters Gram positive cocci in clusters Gram positive cocci in clusters Gram positive cocci in clusters  Gram positive cocci in clusters   C DIFF TOXIN B  NEGATIVE for C difficle toxin by PCR    --   --   --   --   --        Last 24 Hours Medication List:     Current Facility-Administered Medications:  acetaminophen 650 mg Oral Q6H PRN Lucille Hines PA-C    acetaminophen 325 mg Oral Q8H [address] Scooby [place]    amiodarone 200 mg Oral TID With Meals JEANNIE Sanchez    [START ON 9/25/2018] atorvastatin 5 mg Oral After Konrad Molina [place]    chlorhexidine 15 mL Swish & Spit Q12H [address] Marianela Marmolejo DO    clopidogrel 75 mg Oral Daily Megan Sequeira PA-C    divalproex sodium 750 mg Oral Daily Hafsa Charles PA-C    DULoxetine 60 mg Oral Daily Megan Sequeira PA-C    enoxaparin 1 mg/kg Subcutaneous Q12H [address] Megan Sequeira PA-C    insulin lispro 1-5 Units Subcutaneous HS Devin Garay MD    insulin lispro 1-6 Units Subcutaneous TID AC Devin Garay MD    lactulose 10 g Oral BID Scooby Peridot    metoclopramide 5 mg Intravenous Q6H PRN Peg Nicholson PA-C    nafcillin 2,000 mg Intravenous Q4H Nj Canela MD Last Rate: 2,000 mg (09/24/18 1545)   sodium bicarbonate infusion 100 mL/hr Intravenous Continuous Mireya JOVANY Hilton DO Last Rate: 100 mL/hr (09/24/18 1555)   traZODone 50 mg Oral HS Sandra Trammell PA-C         Today, Patient Was Seen By: Xochilt France    ** Please Note: This note has been constructed using a voice recognition system   **

## 2018-09-24 NOTE — PROGRESS NOTES
Spoke with Carmela Emerson  Goal temp on tootie hugger is 98 degrees   Will then Lower temp on tootie hugger once temp hits goal and make sure she stays steady before completely ending therapy

## 2018-09-25 ENCOUNTER — APPOINTMENT (INPATIENT)
Dept: RADIOLOGY | Facility: HOSPITAL | Age: 58
DRG: 871 | End: 2018-09-25
Payer: COMMERCIAL

## 2018-09-25 ENCOUNTER — APPOINTMENT (INPATIENT)
Dept: NON INVASIVE DIAGNOSTICS | Facility: HOSPITAL | Age: 58
DRG: 871 | End: 2018-09-25
Payer: COMMERCIAL

## 2018-09-25 PROBLEM — C18.9 COLON CANCER (HCC): Status: ACTIVE | Noted: 2018-09-25

## 2018-09-25 PROBLEM — B95.61 BACTEREMIA DUE TO METHICILLIN SUSCEPTIBLE STAPHYLOCOCCUS AUREUS (MSSA): Status: ACTIVE | Noted: 2018-09-25

## 2018-09-25 PROBLEM — R09.89 SUSPECTED ENDOCARDITIS: Status: ACTIVE | Noted: 2018-09-25

## 2018-09-25 PROBLEM — M54.9 BACK PAIN: Status: ACTIVE | Noted: 2018-09-25

## 2018-09-25 PROBLEM — R78.81 BACTEREMIA DUE TO METHICILLIN SUSCEPTIBLE STAPHYLOCOCCUS AUREUS (MSSA): Status: ACTIVE | Noted: 2018-09-25

## 2018-09-25 LAB
AFP-TM SERPL-MCNC: 0.7 NG/ML (ref 0.5–8)
ALBUMIN SERPL BCP-MCNC: 0.9 G/DL (ref 3.5–5)
ALP SERPL-CCNC: 130 U/L (ref 46–116)
ALT SERPL W P-5'-P-CCNC: <6 U/L (ref 12–78)
ANION GAP SERPL CALCULATED.3IONS-SCNC: 11 MMOL/L (ref 4–13)
ANISOCYTOSIS BLD QL SMEAR: PRESENT
AST SERPL W P-5'-P-CCNC: 22 U/L (ref 5–45)
BACTERIA BLD CULT: ABNORMAL
BACTERIA BLD CULT: ABNORMAL
BASOPHILS # BLD MANUAL: 0 THOUSAND/UL (ref 0–0.1)
BASOPHILS NFR MAR MANUAL: 0 % (ref 0–1)
BILIRUB SERPL-MCNC: 1.55 MG/DL (ref 0.2–1)
BUN SERPL-MCNC: 26 MG/DL (ref 5–25)
BURR CELLS BLD QL SMEAR: PRESENT
CALCIUM SERPL-MCNC: 6.5 MG/DL (ref 8.3–10.1)
CHLORIDE SERPL-SCNC: 105 MMOL/L (ref 100–108)
CO2 SERPL-SCNC: 16 MMOL/L (ref 21–32)
CORTIS SERPL-MCNC: 26.6 UG/DL
CREAT SERPL-MCNC: 2.23 MG/DL (ref 0.6–1.3)
EOSINOPHIL # BLD MANUAL: 0 THOUSAND/UL (ref 0–0.4)
EOSINOPHIL NFR BLD MANUAL: 0 % (ref 0–6)
ERYTHROCYTE [DISTWIDTH] IN BLOOD BY AUTOMATED COUNT: 17.2 % (ref 11.6–15.1)
GFR SERPL CREATININE-BSD FRML MDRD: 23 ML/MIN/1.73SQ M
GLUCOSE SERPL-MCNC: 143 MG/DL (ref 65–140)
GLUCOSE SERPL-MCNC: 162 MG/DL (ref 65–140)
GLUCOSE SERPL-MCNC: 177 MG/DL (ref 65–140)
GLUCOSE SERPL-MCNC: 184 MG/DL (ref 65–140)
GLUCOSE SERPL-MCNC: 220 MG/DL (ref 65–140)
GRAM STN SPEC: ABNORMAL
GRAM STN SPEC: ABNORMAL
HBV CORE AB SER QL: NORMAL
HBV SURFACE AB SER-ACNC: 6.49 MIU/ML
HBV SURFACE AG SER QL: NORMAL
HCT VFR BLD AUTO: 22.9 % (ref 34.8–46.1)
HCV AB SER QL: NORMAL
HGB BLD-MCNC: 8 G/DL (ref 11.5–15.4)
HYPERCHROMIA BLD QL SMEAR: PRESENT
INR PPP: 1.13 (ref 0.86–1.17)
LYMPHOCYTES # BLD AUTO: 1.07 THOUSAND/UL (ref 0.6–4.47)
LYMPHOCYTES # BLD AUTO: 3 % (ref 14–44)
MCH RBC QN AUTO: 27.6 PG (ref 26.8–34.3)
MCHC RBC AUTO-ENTMCNC: 34.9 G/DL (ref 31.4–37.4)
MCV RBC AUTO: 79 FL (ref 82–98)
METAMYELOCYTES NFR BLD MANUAL: 2 % (ref 0–1)
MONOCYTES # BLD AUTO: 0.36 THOUSAND/UL (ref 0–1.22)
MONOCYTES NFR BLD: 1 % (ref 4–12)
MYELOCYTES NFR BLD MANUAL: 3 % (ref 0–1)
NEUTROPHILS # BLD MANUAL: 32.52 THOUSAND/UL (ref 1.85–7.62)
NEUTS SEG NFR BLD AUTO: 91 % (ref 43–75)
NRBC BLD AUTO-RTO: 0 /100 WBCS
PLATELET # BLD AUTO: 62 THOUSANDS/UL (ref 149–390)
PLATELET BLD QL SMEAR: ABNORMAL
POIKILOCYTOSIS BLD QL SMEAR: PRESENT
POTASSIUM SERPL-SCNC: 3.4 MMOL/L (ref 3.5–5.3)
PREALB SERPL-MCNC: 6 MG/DL (ref 18–40)
PROT SERPL-MCNC: 5.6 G/DL (ref 6.4–8.2)
PROTHROMBIN TIME: 14.6 SECONDS (ref 11.8–14.2)
RBC # BLD AUTO: 2.9 MILLION/UL (ref 3.81–5.12)
RBC MORPH BLD: PRESENT
SODIUM SERPL-SCNC: 132 MMOL/L (ref 136–145)
TARGETS BLD QL SMEAR: PRESENT
WBC # BLD AUTO: 35.74 THOUSAND/UL (ref 4.31–10.16)

## 2018-09-25 PROCEDURE — 93308 TTE F-UP OR LMTD: CPT

## 2018-09-25 PROCEDURE — 94762 N-INVAS EAR/PLS OXIMTRY CONT: CPT

## 2018-09-25 PROCEDURE — 93321 DOPPLER ECHO F-UP/LMTD STD: CPT | Performed by: INTERNAL MEDICINE

## 2018-09-25 PROCEDURE — 86704 HEP B CORE ANTIBODY TOTAL: CPT | Performed by: FAMILY MEDICINE

## 2018-09-25 PROCEDURE — 97530 THERAPEUTIC ACTIVITIES: CPT

## 2018-09-25 PROCEDURE — 94760 N-INVAS EAR/PLS OXIMETRY 1: CPT

## 2018-09-25 PROCEDURE — 82105 ALPHA-FETOPROTEIN SERUM: CPT | Performed by: FAMILY MEDICINE

## 2018-09-25 PROCEDURE — 80053 COMPREHEN METABOLIC PANEL: CPT | Performed by: FAMILY MEDICINE

## 2018-09-25 PROCEDURE — 82948 REAGENT STRIP/BLOOD GLUCOSE: CPT

## 2018-09-25 PROCEDURE — 85610 PROTHROMBIN TIME: CPT | Performed by: FAMILY MEDICINE

## 2018-09-25 PROCEDURE — 99232 SBSQ HOSP IP/OBS MODERATE 35: CPT | Performed by: INTERNAL MEDICINE

## 2018-09-25 PROCEDURE — 99233 SBSQ HOSP IP/OBS HIGH 50: CPT | Performed by: INTERNAL MEDICINE

## 2018-09-25 PROCEDURE — 93325 DOPPLER ECHO COLOR FLOW MAPG: CPT | Performed by: INTERNAL MEDICINE

## 2018-09-25 PROCEDURE — 82533 TOTAL CORTISOL: CPT | Performed by: INTERNAL MEDICINE

## 2018-09-25 PROCEDURE — 84134 ASSAY OF PREALBUMIN: CPT | Performed by: FAMILY MEDICINE

## 2018-09-25 PROCEDURE — 97110 THERAPEUTIC EXERCISES: CPT

## 2018-09-25 PROCEDURE — 87340 HEPATITIS B SURFACE AG IA: CPT | Performed by: FAMILY MEDICINE

## 2018-09-25 PROCEDURE — 92526 ORAL FUNCTION THERAPY: CPT

## 2018-09-25 PROCEDURE — 85027 COMPLETE CBC AUTOMATED: CPT | Performed by: INTERNAL MEDICINE

## 2018-09-25 PROCEDURE — 87522 HEPATITIS C REVRS TRNSCRPJ: CPT | Performed by: FAMILY MEDICINE

## 2018-09-25 PROCEDURE — 93308 TTE F-UP OR LMTD: CPT | Performed by: INTERNAL MEDICINE

## 2018-09-25 PROCEDURE — 87205 SMEAR GRAM STAIN: CPT | Performed by: INTERNAL MEDICINE

## 2018-09-25 PROCEDURE — 86803 HEPATITIS C AB TEST: CPT | Performed by: FAMILY MEDICINE

## 2018-09-25 PROCEDURE — 85007 BL SMEAR W/DIFF WBC COUNT: CPT | Performed by: INTERNAL MEDICINE

## 2018-09-25 PROCEDURE — 99232 SBSQ HOSP IP/OBS MODERATE 35: CPT | Performed by: FAMILY MEDICINE

## 2018-09-25 PROCEDURE — 72148 MRI LUMBAR SPINE W/O DYE: CPT

## 2018-09-25 PROCEDURE — 86706 HEP B SURFACE ANTIBODY: CPT | Performed by: FAMILY MEDICINE

## 2018-09-25 RX ORDER — OXYCODONE HYDROCHLORIDE 5 MG/1
2.5 TABLET ORAL EVERY 4 HOURS PRN
Status: COMPLETED | OUTPATIENT
Start: 2018-09-25 | End: 2018-09-26

## 2018-09-25 RX ORDER — OXYCODONE HYDROCHLORIDE 5 MG/1
2.5 TABLET ORAL EVERY 4 HOURS PRN
Status: COMPLETED | OUTPATIENT
Start: 2018-09-25 | End: 2018-09-25

## 2018-09-25 RX ORDER — MIDODRINE HYDROCHLORIDE 5 MG/1
10 TABLET ORAL
Status: DISCONTINUED | OUTPATIENT
Start: 2018-09-25 | End: 2018-09-28

## 2018-09-25 RX ADMIN — MIDODRINE HYDROCHLORIDE 10 MG: 5 TABLET ORAL at 11:25

## 2018-09-25 RX ADMIN — AMIODARONE HYDROCHLORIDE 200 MG: 200 TABLET ORAL at 08:02

## 2018-09-25 RX ADMIN — NAFCILLIN SODIUM 2000 MG: 2 INJECTION, POWDER, LYOPHILIZED, FOR SOLUTION INTRAMUSCULAR; INTRAVENOUS at 17:01

## 2018-09-25 RX ADMIN — CLOPIDOGREL 75 MG: 75 TABLET, FILM COATED ORAL at 08:02

## 2018-09-25 RX ADMIN — TRAZODONE HYDROCHLORIDE 50 MG: 50 TABLET ORAL at 21:30

## 2018-09-25 RX ADMIN — AMIODARONE HYDROCHLORIDE 200 MG: 200 TABLET ORAL at 17:01

## 2018-09-25 RX ADMIN — NAFCILLIN SODIUM 2000 MG: 2 INJECTION, POWDER, LYOPHILIZED, FOR SOLUTION INTRAMUSCULAR; INTRAVENOUS at 13:18

## 2018-09-25 RX ADMIN — NAFCILLIN SODIUM 2000 MG: 2 INJECTION, POWDER, LYOPHILIZED, FOR SOLUTION INTRAMUSCULAR; INTRAVENOUS at 21:24

## 2018-09-25 RX ADMIN — CHLORHEXIDINE GLUCONATE 15 ML: 1.2 RINSE ORAL at 21:30

## 2018-09-25 RX ADMIN — ATORVASTATIN CALCIUM 5 MG: 10 TABLET, FILM COATED ORAL at 17:04

## 2018-09-25 RX ADMIN — SODIUM BICARBONATE 125 ML/HR: 84 INJECTION, SOLUTION INTRAVENOUS at 22:45

## 2018-09-25 RX ADMIN — ENOXAPARIN SODIUM 70 MG: 80 INJECTION SUBCUTANEOUS at 21:30

## 2018-09-25 RX ADMIN — ACETAMINOPHEN 650 MG: 160 SUSPENSION ORAL at 11:25

## 2018-09-25 RX ADMIN — OXYCODONE HYDROCHLORIDE 2.5 MG: 5 TABLET ORAL at 17:01

## 2018-09-25 RX ADMIN — ENOXAPARIN SODIUM 70 MG: 80 INJECTION SUBCUTANEOUS at 08:03

## 2018-09-25 RX ADMIN — NAFCILLIN SODIUM 2000 MG: 2 INJECTION, POWDER, LYOPHILIZED, FOR SOLUTION INTRAMUSCULAR; INTRAVENOUS at 08:56

## 2018-09-25 RX ADMIN — INSULIN LISPRO 1 UNITS: 100 INJECTION, SOLUTION INTRAVENOUS; SUBCUTANEOUS at 08:03

## 2018-09-25 RX ADMIN — ACETAMINOPHEN 325 MG: 325 TABLET, FILM COATED ORAL at 21:30

## 2018-09-25 RX ADMIN — POTASSIUM CHLORIDE 30 MEQ: 1500 TABLET, EXTENDED RELEASE ORAL at 17:01

## 2018-09-25 RX ADMIN — DULOXETINE HYDROCHLORIDE 60 MG: 60 CAPSULE, DELAYED RELEASE ORAL at 08:02

## 2018-09-25 RX ADMIN — NAFCILLIN SODIUM 2000 MG: 2 INJECTION, POWDER, LYOPHILIZED, FOR SOLUTION INTRAMUSCULAR; INTRAVENOUS at 05:32

## 2018-09-25 RX ADMIN — POTASSIUM CHLORIDE 30 MEQ: 1500 TABLET, EXTENDED RELEASE ORAL at 11:25

## 2018-09-25 RX ADMIN — Medication 1 G: at 11:25

## 2018-09-25 RX ADMIN — OXYCODONE HYDROCHLORIDE 2.5 MG: 5 TABLET ORAL at 03:56

## 2018-09-25 RX ADMIN — INSULIN LISPRO 2 UNITS: 100 INJECTION, SOLUTION INTRAVENOUS; SUBCUTANEOUS at 21:31

## 2018-09-25 RX ADMIN — INSULIN LISPRO 1 UNITS: 100 INJECTION, SOLUTION INTRAVENOUS; SUBCUTANEOUS at 11:27

## 2018-09-25 RX ADMIN — NAFCILLIN SODIUM 2000 MG: 2 INJECTION, POWDER, LYOPHILIZED, FOR SOLUTION INTRAMUSCULAR; INTRAVENOUS at 02:24

## 2018-09-25 RX ADMIN — OXYCODONE HYDROCHLORIDE 2.5 MG: 5 TABLET ORAL at 22:44

## 2018-09-25 RX ADMIN — LACTULOSE 10 G: 20 SOLUTION ORAL at 08:02

## 2018-09-25 RX ADMIN — CHLORHEXIDINE GLUCONATE 15 ML: 1.2 RINSE ORAL at 08:02

## 2018-09-25 RX ADMIN — INSULIN LISPRO 1 UNITS: 100 INJECTION, SOLUTION INTRAVENOUS; SUBCUTANEOUS at 17:02

## 2018-09-25 RX ADMIN — OXYCODONE HYDROCHLORIDE 2.5 MG: 5 TABLET ORAL at 08:55

## 2018-09-25 RX ADMIN — SODIUM BICARBONATE 100 ML/HR: 84 INJECTION, SOLUTION INTRAVENOUS at 04:57

## 2018-09-25 RX ADMIN — DIVALPROEX SODIUM 750 MG: 500 TABLET, DELAYED RELEASE ORAL at 08:02

## 2018-09-25 RX ADMIN — MIDODRINE HYDROCHLORIDE 10 MG: 5 TABLET ORAL at 17:01

## 2018-09-25 RX ADMIN — LACTULOSE 10 G: 20 SOLUTION ORAL at 17:02

## 2018-09-25 RX ADMIN — AMIODARONE HYDROCHLORIDE 200 MG: 200 TABLET ORAL at 11:25

## 2018-09-25 NOTE — ASSESSMENT & PLAN NOTE
· Cardiology on board  · Patient is going for another the TTE, may need JUNIOR if the bacteremia is persistent

## 2018-09-25 NOTE — PLAN OF CARE
Problem: PHYSICAL THERAPY ADULT  Goal: Performs mobility at highest level of function for planned discharge setting  See evaluation for individualized goals  Treatment/Interventions: Functional transfer training, LE strengthening/ROM, Therapeutic exercise, Endurance training, Cognitive reorientation, Patient/family training, Equipment eval/education, Bed mobility, Gait training, Compensatory technique education, Spoke to nursing, Spoke to case management          See flowsheet documentation for full assessment, interventions and recommendations  Outcome: Progressing  Prognosis: Poor  Problem List: Decreased strength, Decreased endurance, Impaired balance, Decreased mobility, Decreased cognition, Impaired judgement, Decreased safety awareness, Pain  Assessment: Pt seen for physical therapy treatment consisting of completion of bed mobility activities, sitting EOB, trial of standing x2 (unsucccesful), and supine LE ex's with family education for how to assist pt with completion of exercises  Pt presented with cognative/behavioral issues causing some self-limiting behaviors  Pt also presented with significant c/o back pain despite having had pain medication given by RN prior to PT rx  Pt was not able to achieve fully upright standing even with max assist of 2 and knees blocked in front  Recommend use of smooth move to drop arm recliner or mechanical lift for OOB transfers with nursing rather than attempts at stand/pivot for maximum pt and staff safety  Also recommend d/c to inpatient rehab once pt is medically ready for d/c   Pt's daughter told PT that eventually pt will live with her in her home with 1 step into the home and 1 floor arrangements with 24/7 family assist available  Barriers to Discharge: Decreased caregiver support     Recommendation: Other (Comment) (inpatient rehab  )     PT - OK to Discharge: Yes    See flowsheet documentation for full assessment

## 2018-09-25 NOTE — ASSESSMENT & PLAN NOTE
Follow-up on the blood culture  Currently sepsis resolved  Leukocytosis noted-likely secondary to G-CSF  Trend WBC count

## 2018-09-25 NOTE — PHYSICAL THERAPY NOTE
Physical Therapy Treatment Note:  Karthik Spring, PT     09/25/18 1041   Pain Assessment   Pain Assessment 0-10   Pain Score 7   Pain Type Chronic pain   Pain Location Back   Hospital Pain Intervention(s) Repositioned; Ambulation/increased activity; Emotional support   Response to Interventions tolerated somewhat  (Pt had pain medication given by RN prior to PT rx  )   Restrictions/Precautions   Weight Bearing Precautions Per Order No   Braces or Orthoses Other (Comment)  (none)   Other Precautions Cognitive; Bed Alarm;Multiple lines;Telemetry; Fall Risk;O2;Pain   General   Chart Reviewed Yes   Response to Previous Treatment Patient with no complaints from previous session  (Doubtful how much pt remembers of last rx  )   Family/Caregiver Present Yes  (Daughter who plans to have pt live with her after d/c  )   Cognition   Overall Cognitive Status Impaired   Arousal/Participation Lethargic;Arousable; Cooperative  (More alert and conversational after rx  )   Attention Attends with cues to redirect   Orientation Level Oriented to person;Oriented to situation   Memory Decreased recall of precautions;Decreased long term memory   Following Commands Follows one step commands with increased time or repetition   Comments somewhat cooperative/somewhat self-limiting  Bed Mobility   Rolling L 3  Moderate assistance   Additional items Assist x 1; Increased time required;Verbal cues   Supine to Sit 2  Maximal assistance   Additional items Assist x 2; Increased time required;Verbal cues;LE management   Sit to Supine 2  Maximal assistance   Additional items Assist x 2; Increased time required;Verbal cues;LE management   Additional Comments Pt dependent for moving toward HOB in supine  Transfers   Sit to Stand 2  Maximal assistance   Additional items Assist x 2; Increased time required;Verbal cues  (Unable to achieve fully upright posture  )   Stand to Sit 2  Maximal assistance   Additional items Assist x 2; Increased time required;Verbal cues   Stand pivot (Unsafe to attempt via stand/pivot , HHAx2 , or RW use  )   Additional Comments Limiting factors= Cog deficit/self-limiting behaviors, deconditioning/ dec LE strength, fear  Balance   Static Sitting Fair -   Dynamic Sitting Poor -   Static Standing Zero   Endurance Deficit   Endurance Deficit Yes   Endurance Deficit Description fatigue, cog impairement limiting full participation  Activity Tolerance   Activity Tolerance Patient limited by fatigue   Nurse Made Aware RN consented to allow pt to participate in PT rx  (Recommend smooth move transfers into drop arm chair )   Exercises   Heelslides Supine;5 reps;AAROM; Bilateral   Hip Abduction Supine;5 reps;AAROM; Bilateral   Hip Adduction Supine;5 reps;AAROM; Bilateral   Knee AROM Short Arc Quad Supine;5 reps;AAROM; Bilateral   Ankle Pumps Supine;5 reps;AAROM; Bilateral  (Daughter instructed in how to assist pt with all ex's also  )   Assessment   Prognosis Poor   Problem List Decreased strength;Decreased endurance; Impaired balance;Decreased mobility; Decreased cognition; Impaired judgement;Decreased safety awareness;Pain   Assessment Pt seen for physical therapy treatment consisting of completion of bed mobility activities, sitting EOB, trial of standing x2 (unsucccesful), and supine LE ex's with family education for how to assist pt with completion of exercises  Pt presented with cognative/behavioral issues causing some self-limiting behaviors  Pt also presented with significant c/o back pain despite having had pain medication given by RN prior to PT rx  Pt was not able to achieve fully upright standing even with max assist of 2 and knees blocked in front  Recommend use of smooth move to drop arm recliner or mechanical lift for OOB transfers with nursing rather than attempts at stand/pivot for maximum pt and staff safety    Also recommend d/c to inpatient rehab once pt is medically ready for d/c   Pt's daughter told PT that eventually pt will live with her in her home with 1 step into the home and 1 floor arrangements with 24/7 family assist available  Goals   Patient Goals Pt was not very conversational today  Lethargic  STG Expiration Date 10/04/18   Short Term Goal #2 Goals still appropraite  Treatment Day 1   Plan   Treatment/Interventions Functional transfer training;LE strengthening/ROM; Patient/family training;Equipment eval/education; Bed mobility;Gait training;Spoke to case management;Spoke to nursing;Family   Progress No functional improvements   PT Frequency Other (Comment)  (3-5x/wk)   Recommendation   Recommendation Other (Comment)  (inpatient rehab  )   Equipment Recommended (still assessing  )   PT - OK to Discharge Yes   Additional Comments Yes if d/c is to inpatient rehab when pt is medically ready for d/c

## 2018-09-25 NOTE — PROGRESS NOTES
Progress Note - Nephrology   Emily Abdullahi 60 y o  female MRN: [id number]  Unit/Bed#: [facility] 828-01 Encounter: [id number]    ASSESSMENT AND PLAN:  29-year-old female admitted with a change in mental status associated with severe hyperglycemia hypotension and tachycardia admitted to the intensive care unit on an insulin drip and IV fluids  Possible septic emboli with a thrombus and right atrial mass  Patient with bacteremia with MSSA  Port-A-Cath was removed for possible site of infection  Also had new onset a flutter  We are seeing for RYLAN  1   RYLAN/POA:  Most likely from ATN secondary to volume depletion with diarrhea, poor oral intake and hypotension and possible contrast; ruling out AIN, less likely glomerular nephritis from infection given bland urinalysis without hematuria as well as proteinuria  No evidence of obstructive uropathy  Creatinine slowly improving with IV fluids  Recommendations:  -IV fluids  -support blood pressure, add midodrine:  Rule out adrenal insufficiency with cortisol level  TSH acceptable  Most likely related to prerenal plus possible infection  -check urine for eosinophils, peripheral eosinophils are negative suggesting no AIN  2   Electrolytes:  -hyponatremia in part related to hyperglycemia  Continue with modest fluid restriction and isotonic fluids  Further workup if persistent  -hypokalemia will be repleted  -metabolic acidosis:  Anion gap 11  Most likely related to diarrhea  I will place intravenous bicarbonate in her IV fluids  3  Mineral bone disorder:  Replete hypocalcemia although part of this is related to severe hypoalbuminemia so we will check an ionized calcium  Recheck magnesium    4   Other issues:  -metastatic colon cancer status post diverting colostomy on chemotherapy  -anemia per primary service transfuse as needed for hemoglobin less than 7  -right atrial mass felt to be thrombus per Cardiology  -MSSA bacteremia as outlined followed by Infectious Disease  -diabetes mellitus per primary service    Subjective:   Patient is complaining of ongoing back pain  No chest pain or shortness of Breath  No nausea vomiting or diarrhea this morning  Not a great historian however  Good urine output from a Hanks catheter  Objective:     Vitals: Blood pressure (!) 89/54, pulse 88, temperature 97 6 °F (36 4 °C), resp  rate 22, height 5' 3" (1 6 m), weight 70 2 kg (154 lb 12 2 oz), SpO2 91 %, not currently breastfeeding  ,Body mass index is 27 42 kg/m²  Weight (last 2 days)     None            Intake/Output Summary (Last 24 hours) at 09/25/18 1015  Last data filed at 09/25/18 0900   Gross per 24 hour   Intake             1510 ml   Output             1750 ml   Net             -240 ml       Urethral Catheter Latex 18 Fr   (Active)   Site Assessment Clean;Skin intact 9/25/2018  7:15 AM   Collection Container Standard drainage bag 9/25/2018  7:15 AM   Securement Method Securing device (Describe) 9/25/2018  7:15 AM   Output (mL) 75 mL 9/25/2018  2:44 AM       Physical Exam: General:  Weak appearing no acute distress except complaining of back pain  Skin:  No acute rash  Eyes:  No scleral icterus  ENT:  Moist mucous membranes  Neck:  Supple, no jugular venous distension  Chest:  Clear to auscultation but poor effort  CVS:  No rubs or gallops appreciable  Abdomen:  Soft and nontender, obese, normal bowel sounds  Extremities:  1-2 +lower extremity edema 2/3 the way up the pretibial region bilaterally, no cyanosis  Neuro:  No gross focality but poorly cooperative  Psych:  Alert but not sure totally oriented                Medications:    Scheduled Meds:  Current Facility-Administered Medications:  acetaminophen 650 mg Oral Q6H PRN Jewell Castro PA-C    acetaminophen 325 mg Oral [facility] Scooby [place]    amiodarone 200 mg Oral TID With Meals JEANNIE Mcclain    atorvastatin 5 mg Oral After [employer] [place]    chlorhexidine 15 mL Swish & Spit Q12H [address] Marianela Marmolejo DO    clopidogrel 75 mg Oral Daily Megan Sequeira PA-C    divalproex sodium 750 mg Oral Daily Jeff Estrada PA-C    DULoxetine 60 mg Oral Daily Megan Sequeira PA-C    enoxaparin 1 mg/kg Subcutaneous Q12H [address] Megan Sequeira PA-C    insulin lispro 1-5 Units Subcutaneous HS Khadijah Gandara MD    insulin lispro 1-6 Units Subcutaneous TID AC Khadijah Gandara MD    lactulose 10 g Oral BID Scooby Rosanky    metoclopramide 5 mg Intravenous Q6H PRN Colleen Wesley PA-C    nafcillin 2,000 mg Intravenous Q4H Huma Hampton MD Last Rate: 2,000 mg (09/25/18 0856)   oxyCODONE 2 5 mg Oral Q4H PRN Colleen Wesley PA-C    sodium bicarbonate infusion 100 mL/hr Intravenous Continuous Mireya Gregg Granado DO Last Rate: 100 mL/hr (09/25/18 0457)   traZODone 50 mg Oral HS Megan Sequeira PA-C        PRN Meds:   acetaminophen    metoclopramide    oxyCODONE    Continuous Infusions:  sodium bicarbonate infusion 100 mL/hr Last Rate: 100 mL/hr (09/25/18 0457)       Lab, Imaging and other studies: I have personally reviewed pertinent labs    Laboratory Results:    Results from last 7 days  Lab Units 09/25/18  0435 09/24/18  6220 09/23/18  0515 09/22/18  0029 09/21/18  0449 09/20/18  0507 09/19/18  0520 09/19/18  0518 09/19/18  0517  09/18/18  1910 09/18/18  1044   WBC Thousand/uL 35 74* 25 50* 14 29* 4 11* 2 08* 0 71*  --  0 60*  --   --   --   --    HEMOGLOBIN g/dL 8 0* 8 5* 8 7* 8 5* 8 2* 8 2*  --  7 2* 7 2*  < > 7 6* 7 8*   HEMATOCRIT % 22 9* 24 9* 25 3* 24 8* 23 8* 24 0*  --  20 9* 20 9*  < > 21 1* 21 7*   PLATELETS Thousands/uL 62* 53* 49* 69* 94* 146*  --  96*  --   --   --   --    SODIUM mmol/L 132* 132* 132* 133* 130* 131*  --  138 138  --  135* 134*   POTASSIUM mmol/L 3 4* 3 5 3 0* 3 5 3 5 3 9  --  3 9 3 9  --  4 1 3 6   CHLORIDE mmol/L 105 107 104 106 104 106  --  111* 112*  --  110* 106   CO2 mmol/L 16* 14* 16* 14* 16* 16*  --  17* 17*  --  18* 18*   BUN mg/dL 26* 27* 27* 27* 24 19  --  20 19  -- 20 23   CREATININE mg/dL 2 23* 2 32* 2 34* 2 20* 1 69* 1 18  --  1 23 1 23  --  0 97 1 17   CALCIUM mg/dL 6 5* 6 5* 6 7* 6 9* 7 4* 7 5*  --  7 2* 7 4*  --  7 1* 7 1*   MAGNESIUM mg/dL  --   --   --   --  2 3 1 9 2 6 2 5 2 6  --  2 0 2 4   PHOSPHORUS mg/dL  --   --   --   --  3 5 2 5* 3 0 2 9 2 9  --  2 1* 2 7   < > = values in this interval not displayed  Urinalysis: Lab Results   Component Value Date    COLORU Yellow 09/24/2018    COLORU YELLOW 06/17/2018    CLARITYU Clear 09/24/2018    CLARITYU CLOUDY 06/17/2018    SPECGRAV 1 010 09/24/2018    SPECGRAV 1 010 06/17/2018    PHUR 6 0 09/24/2018    PHUR 6 06/17/2018    LEUKOCYTESUR Negative 09/24/2018    LEUKOCYTESUR >=500/uL (A) 06/17/2018    NITRITE Negative 09/24/2018    NITRITE NEGATIVE 06/17/2018    PROTEINUA Negative 09/24/2018    PROTEINUA 100 (A) 06/17/2018    GLUCOSEU Negative 09/24/2018    GLUCOSEU NEGATIVE 06/17/2018    KETONESU Negative 09/24/2018    KETONESU NEGATIVE 06/17/2018    BILIRUBINUR Negative 09/24/2018    BILIRUBINUR NEGATIVE 06/17/2018    BLOODU Negative 09/24/2018    BLOODU 25/uL (A) 06/17/2018     ABGs: No results found for: Shawn 30  Radiology review:     Portions of the record may have been created with voice recognition software   Occasional wrong word or "sound a like" substitutions may have occurred due to the inherent limitations of voice recognition software   Read the chart carefully and recognize, using context, where substitutions have occurred

## 2018-09-25 NOTE — ASSESSMENT & PLAN NOTE
Persistent MSSA bacteremia  Infectious Disease on board  Continue with the antibiotics  Follow-up on the repeat cultures  For repeat MRI of the thoracic and lumbar spine  Status post Port-A-Cath removal

## 2018-09-25 NOTE — PROGRESS NOTES
Progress note - Palliative and Supportive Care   Emily Abdullahi 60 y o  female [id number]    Assessment:   -   Patient Active Problem List   Diagnosis    CVA (cerebral vascular accident) ([facility] )    Type 2 diabetes mellitus without complication, without long-term current use of insulin (HCC)    Schizoaffective disorder, bipolar type ([facility] )    Essential hypertension    Colonic mass    Type 2 diabetes mellitus with complication ([facility] )    CKD (chronic kidney disease) stage 3, GFR 30-59 ml/min    Pain of right upper extremity    RYLAN (acute kidney injury)     CAD (coronary artery disease)    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HCC)    Large bowel obstruction ([facility] )    Brachial artery stenosis, right (HCC)    Hyponatremia    Rectosigmoid mass - High colostomy output    Leukocytosis    GERD (gastroesophageal reflux disease)    Tobacco abuse    Liver metastasis (HCC)    Mixed hyperlipidemia    Dizziness - due to orthostatic hypotension    Ambulatory dysfunction    Orthostatic hypotension    Chemotherapy induced nausea and vomiting    Lower abdominal pain    HHNC (hyperglycemic hyperosmolar nonketotic coma) (HCC)    Sepsis ([facility] )    Altered mental status    Acute respiratory failure with hypoxia (HCC)    Hypocalcemia    Leukopenia    Cavitating pulmonary nodules     Metabolic acidosis, NAG, bicarbonate losses    Hypomagnesemia    Hypophosphatemia    Bacteremia due to methicillin susceptible Staphylococcus aureus (MSSA)       Plan:  1  Symptom management -    -  Resume low-dose APAP   -   Resume lactulose b i d    -    Concerns for Mets versus activation of hep C virus  Serologies pending  2  Goals -   - adequate pain control  Code Status:   Full code level 1   Decisional apparatus:  Patient is not competent on my exam today  If competence is lost, patient's substitute decision maker would default to   Three daughters [place] 169     Advance Directive / Living Will / POLST:   None on file     I have reviewed the patient's controlled substance dispensing history in the Prescription Drug Monitoring Program in compliance with the [facility] regulations before prescribing any controlled substances  Interval history:        24 hour events reviewed  Patient is seen and examined without family present at bedside  There is a  note left on her lunch tray that is signed by her daughter stating that she will return to help feed her  Patient is resting quietly  She reports she is in some pain which is generalized at this point  She denies nausea, vomiting, chest pain  She tells me there is something wrong with her heart  She nods her head yes when asked if she remembers seeing me during her last admission,   However, she cannot recall what my role is      MEDICATIONS / ALLERGIES:     all current active meds have been reviewed and current meds:   Current Facility-Administered Medications   Medication Dose Route Frequency    acetaminophen (TYLENOL) oral suspension 650 mg  650 mg Oral Q6H PRN    acetaminophen (TYLENOL) tablet 325 mg  325 mg Oral Q8H [address]    amiodarone tablet 200 mg  200 mg Oral TID With Meals    atorvastatin (LIPITOR) tablet 5 mg  5 mg Oral After Dinner    chlorhexidine (PERIDEX) 0 12 % oral rinse 15 mL  15 mL Swish & Spit Q12H [address]    clopidogrel (PLAVIX) tablet 75 mg  75 mg Oral Daily    divalproex sodium (DEPAKOTE) EC tablet 750 mg  750 mg Oral Daily    DULoxetine (CYMBALTA) delayed release capsule 60 mg  60 mg Oral Daily    enoxaparin (LOVENOX) subcutaneous injection 70 mg  1 mg/kg Subcutaneous Q12H [address]    insulin lispro (HumaLOG) 100 units/mL subcutaneous injection 1-5 Units  1-5 Units Subcutaneous HS    insulin lispro (HumaLOG) 100 units/mL subcutaneous injection 1-6 Units  1-6 Units Subcutaneous TID AC    lactulose 20 g/30 mL oral solution 10 g  10 g Oral BID    metoclopramide (REGLAN) injection 5 mg  5 mg Intravenous Q6H PRN    midodrine (PROAMATINE) tablet 10 mg  10 mg Oral TID AC  nafcillin (NALLPEN) 2,000 mg in sodium chloride 0 9 % 100 mL IVPB  2,000 mg Intravenous Q4H    sodium bicarbonate 150 mEq in dextrose 5 % 1,000 mL infusion  125 mL/hr Intravenous Continuous    traZODone (DESYREL) tablet 50 mg  50 mg Oral HS       No Known Allergies    OBJECTIVE:    Physical Exam  Physical Exam   Constitutional: She is easily aroused  She appears ill  No distress  Appears ashen/ pale   HENT:   Head: Normocephalic and atraumatic  Eyes: No scleral icterus  Neck: Neck supple  Cardiovascular: Normal rate and regular rhythm  Pulmonary/Chest: Effort normal    Abdominal: Soft  Bowel sounds are normal    Musculoskeletal: She exhibits no edema  Neurological: She is easily aroused  Skin: Skin is warm and dry  She is not diaphoretic  There is pallor  Psychiatric: She has a normal mood and affect  Nursing note and vitals reviewed  Lab Results:   I have personally reviewed pertinent labs  , CBC:   Lab Results   Component Value Date    WBC 35 74 (HH) 09/25/2018    HGB 8 0 (L) 09/25/2018    HCT 22 9 (L) 09/25/2018    MCV 79 (L) 09/25/2018    PLT 62 (L) 09/25/2018    MCH 27 6 09/25/2018    MCHC 34 9 09/25/2018    RDW 17 2 (H) 09/25/2018    NRBC 0 09/25/2018   , CMP:   Lab Results   Component Value Date     (L) 09/25/2018    K 3 4 (L) 09/25/2018     09/25/2018    CO2 16 (L) 09/25/2018    BUN 26 (H) 09/25/2018    CREATININE 2 23 (H) 09/25/2018    CALCIUM 6 5 (L) 09/25/2018    AST 22 09/25/2018    ALT <6 (L) 09/25/2018    ALKPHOS 130 (H) 09/25/2018    EGFR 23 09/25/2018       Counseling / Coordination of Care  Total floor / unit time spent today 30 minutes  Greater than 50% of total time was spent with the patient and / or family counseling and / or coordination of care  A description of the counseling / coordination of care:  Psychosocial support the goal of care,  Pain control

## 2018-09-25 NOTE — PROGRESS NOTES
Progress Note - Infectious Disease   Emily Abdullahi 60 y o  female MRN: [id number]  Unit/Bed#: [facility] 828-01 Encounter: [id number]      Impression/Plan:  1  MSSA bacteremia, Persistent:  The initial source of this pathogen remains unknown  Cultures from 9/24 are now positive  She is no longer having any hypothermia but continues to have intermittent hypotension  Patient has no new or ongoing sources of bacteremia obvious on exam  -will repeat blood cultures tomorrow  -will continue nafcillin (optimal therapy for MSSA) for now at current dose; if creatinine worsens will change the patient to Ancef  -will follow CBC and chemistry daily  -will repeat MRI of the lumbar and thoracic spine to evaluate for new or developing lesions  -cardiology following and patient planned for TTE, will review case with them given that her cultures are positive again from yesterday about getting JUNIOR as it may impact goals of care discussion with the patient     2   Pulmonary septic emboli: due to infected RA thrombus or  TV endocarditis despite negative ECHO  -antibiotic management as above  -will continue to follow blood cultures for clearance  -will consider additional imaging of her chest if her cultures remain positive  -the patient will likely require prolonged course of antibiotic therapy and potential placement based on her home situation      3  Severe sepsis, POA:   the patient remains hemodynamically stable at this time  She is no longer having episodes of hypothermia a continues to have intermittent hypotension  -this is likely due to problems 1 and 2  -will continue antibiotics and management as above  -C diff negative and stool enteric panel negative      4   Leukocytosis:  Possibly due to continued doses of G-CSF, or mounting response to ongoing infection  -Stopped additional doses of G-CSF  -continue to monitor CBC daily  -antibiotic management and workup as above     5    Back and leg pain:   her pain has improved on exam, the patient remains weak and does not participate in active range of motion  -will repeat MRI of thoracic and lumbar spine, given persistent bacteremia  -antibiotics as above     6  Colon cancer currently on palliative chemo  -patient has had port removal on this admission  -ultimately need to manage her infection before any additional chemotherapy  -palliative care following     7  Diabetes  -patient presented with elevated blood sugars on admission  -current management as per primary service     8  Acute kidney injury- creatinine stable today  -nephrology following  -will monitor chemistry daily  -if creatinine continues to rise tomorrow, will change the patient to Ancef     The above plan was discussed with the primary service      Antibiotics:  Nafcillin 7  Total antibiotics 10    Subjective: The patient denies having any nausea, vomiting, shortness of breath, chest pain  She reports that she overall continues to feel weak  There is no significant change from her ostomy output  She was put in chair with physical therapy but was unable to keep herself up  Per her family she remains in bed for the whole day  The patient reports having some lower back pain  Objective:  Vitals:  HR:  [80-96] 87  Resp:  [20-22] 20  BP: ()/(54-65) 120/65  SpO2:  [91 %-98 %] 95 %  Temp (24hrs), Av °F (36 7 °C), Min:97 6 °F (36 4 °C), Max:98 4 °F (36 9 °C)  Current: Temperature: 98 °F (36 7 °C)    Physical Exam:   General Appearance:  Intermittently falls asleep on exam, nontoxic, no acute distress  Easily awoken by voice  Throat: Oropharynx moist without lesions  Lungs:   Clear to auscultation bilaterally; no wheezes, rhonchi or rales; respirations unlabored   Heart:  RRR; no murmur, rub or gallop   Abdomen:   Soft, non-tender, non-distended, positive bowel sounds  Back: No tenderness to palpation appreciated along the spine paraspinal muscles are the hips bilaterally     Extremities: No clubbing, cyanosis  She has ongoing nonpitting edema in the lower extremities bilaterally  She is able to move her feet and senses me touching her leg but is unable to actively move her her legs  Poor rectal tone noted on exam    Skin: No new rashes or lesions  No draining wounds noted  Her old port site is clear and intact       Labs, Imaging, & Other studies:   All pertinent labs and imaging studies were personally reviewed    Results from last 7 days  Lab Units 09/25/18  0435 09/24/18  0621 09/23/18  0515   WBC Thousand/uL 35 74* 25 50* 14 29*   HEMOGLOBIN g/dL 8 0* 8 5* 8 7*   PLATELETS Thousands/uL 62* 53* 49*       Results from last 7 days  Lab Units 09/25/18  0435 09/24/18  0621 09/23/18  0515  09/19/18  0518   SODIUM mmol/L 132* 132* 132*  < > 138   POTASSIUM mmol/L 3 4* 3 5 3 0*  < > 3 9   CHLORIDE mmol/L 105 107 104  < > 111*   CO2 mmol/L 16* 14* 16*  < > 17*   BUN mg/dL 26* 27* 27*  < > 20   CREATININE mg/dL 2 23* 2 32* 2 34*  < > 1 23   EGFR ml/min/1 73sq m 23 22 22  < > 48   CALCIUM mg/dL 6 5* 6 5* 6 7*  < > 7 2*   AST U/L 22  --  18  --  38   ALT U/L <6*  --  <6*  --  9*   ALK PHOS U/L 130*  --  99  --  75   < > = values in this interval not displayed      Results from last 7 days  Lab Units 09/24/18  1743 09/24/18  1003 09/22/18  0530 09/22/18  0519 09/20/18  0512 09/20/18  0507   BLOOD CULTURE   --   --  Staphylococcus aureus* Staphylococcus aureus* Staphylococcus aureus* Staphylococcus aureus*   GRAM STAIN RESULT  Gram positive cocci in clusters  --  Gram positive cocci in clusters Gram positive cocci in clusters Gram positive cocci in clusters Gram positive cocci in clusters   C DIFF TOXIN B   --  NEGATIVE for C difficle toxin by PCR    --   --   --   --

## 2018-09-25 NOTE — PROGRESS NOTES
Cardiology Progress Note - Emily Abdullahi 60 y o  female MRN: [id number]    Unit/Bed#: [facility] 828-01 Encounter: [id number]    Assessment and plan  1  Right atrial mass  2  Bacteremia  3  Atrial flutter now in sinus rhythm on anticoagulation  4  Adenocarcinoma of the colon  5  Hypotension improved  6  Aortic valve lesion     Recommendations: The right atrial mass appears to be thrombus on my review  The port was deep into the right atrium I suspect cause trauma near the tricuspid annulus leading to a nidus for thrombus formation  There is small friable strands on the thrombus as well  Aortic valve has 2 small strand like structures which 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  I am not sure the utility of repeat transesophageal echocardiogram at this time  I do not think she would be a great candidate for CT surgery would continue 6 weeks of antibiotics  I did discuss the case with ID yesterday  They are looking for prognosis with a JNUIOR if bacteremia doesn't clear  I will order a TTE for now to look at AV  Overall given her advanced cancer and poor response to palliative chemo I would assume her prognosis is poor  May want to have Heme/Onc way in on pts prognosis  Subjective:    No significant events overnight  Denies CP,SOB, palps, dizzy    ROS    Objective:   Vitals: Blood pressure (!) 89/54, pulse 88, temperature 97 6 °F (36 4 °C), resp   rate 22, height 5' 3" (1 6 m), weight 70 2 kg (154 lb 12 2 oz), SpO2 91 %, not currently breastfeeding , Body mass index is 27 42 kg/m² , Orthostatic Blood Pressures      Most Recent Value   Blood Pressure   89/54 filed at 09/25/2018 0700   Patient Position - Orthostatic VS  Lying filed at 09/25/2018 5818         Systolic (10BZL), HHE:608 , Min:89 , SNV:510     Diastolic (53DSL), PZH:65, Min:53, Max:69      Intake/Output Summary (Last 24 hours) at 09/25/18 0947  Last data filed at 09/25/18 0244   Gross per 24 hour   Intake             1270 ml   Output 1750 ml   Net             -480 ml     Weight (last 2 days)     None            Telemetry Review: No significant arrhythmias seen on telemetry review  EKG personally reviewed by Chaparrita Hutton DO  Physical Exam   Constitutional: She is oriented to person, place, and time  She appears well-nourished  No distress  HENT:   Head: Atraumatic  Eyes: Conjunctivae are normal  Pupils are equal, round, and reactive to light  Neck: Neck supple  Cardiovascular: Normal rate and regular rhythm  Exam reveals no friction rub  Murmur heard  Pulmonary/Chest: Effort normal and breath sounds normal  No respiratory distress  She has no wheezes  She has no rales  Abdominal: Bowel sounds are normal  She exhibits no distension  There is no tenderness  There is no rebound  Musculoskeletal: She exhibits no edema  Neurological: She is alert and oriented to person, place, and time  No cranial nerve deficit  Skin: Skin is warm and dry  No erythema  Nursing note and vitals reviewed          Laboratory Results:        CBC with diff:   Results from last 7 days  Lab Units 09/25/18  0435 09/24/18  0621 09/23/18  0515 09/22/18  0029 09/21/18  0449 09/20/18  0507 09/19/18  0518   WBC Thousand/uL 35 74* 25 50* 14 29* 4 11* 2 08* 0 71* 0 60*   HEMOGLOBIN g/dL 8 0* 8 5* 8 7* 8 5* 8 2* 8 2* 7 2*   HEMATOCRIT % 22 9* 24 9* 25 3* 24 8* 23 8* 24 0* 20 9*   MCV fL 79* 80* 81* 80* 79* 81* 80*   PLATELETS Thousands/uL 62* 53* 49* 69* 94* 146* 96*   MCH pg 27 6 27 2 27 7 27 5 27 2 27 8 27 5   MCHC g/dL 34 9 34 1 34 4 34 3 34 5 34 2 34 4   RDW % 17 2* 16 8* 16 3* 15 8* 15 2* 15 4* 14 2   MPV fL  --   --   --  13 2* 13 0* 12 2 11 8   NRBC AUTO /100 WBCs 0 0 0 2 3 0 0   NRBC /100 WBC  --   --  1  --   --  8*  --          CMP:  Results from last 7 days  Lab Units 09/25/18  0435 09/24/18  0621 09/23/18  0515 09/22/18  0029 09/21/18  0449 09/20/18  0507 09/19/18  0518   SODIUM mmol/L 132* 132* 132* 133* 130* 131* 138 POTASSIUM mmol/L 3 4* 3 5 3 0* 3 5 3 5 3 9 3 9   CHLORIDE mmol/L 105 107 104 106 104 106 111*   CO2 mmol/L 16* 14* 16* 14* 16* 16* 17*   BUN mg/dL 26* 27* 27* 27* 24 19 20   CREATININE mg/dL 2 23* 2 32* 2 34* 2 20* 1 69* 1 18 1 23   CALCIUM mg/dL 6 5* 6 5* 6 7* 6 9* 7 4* 7 5* 7 2*   AST U/L 22  --  18  --   --   --  38   ALT U/L <6*  --  <6*  --   --   --  9*   ALK PHOS U/L 130*  --  99  --   --   --  75   EGFR ml/min/1 73sq m 23 22 22 24 33 51 48         BMP:  Results from last 7 days  Lab Units 09/25/18  0435 09/24/18  0621 09/23/18  0515 09/22/18  0029 09/21/18  0449 09/20/18  0507 09/19/18  0518   SODIUM mmol/L 132* 132* 132* 133* 130* 131* 138   POTASSIUM mmol/L 3 4* 3 5 3 0* 3 5 3 5 3 9 3 9   CHLORIDE mmol/L 105 107 104 106 104 106 111*   CO2 mmol/L 16* 14* 16* 14* 16* 16* 17*   BUN mg/dL 26* 27* 27* 27* 24 19 20   CREATININE mg/dL 2 23* 2 32* 2 34* 2 20* 1 69* 1 18 1 23   CALCIUM mg/dL 6 5* 6 5* 6 7* 6 9* 7 4* 7 5* 7 2*       BNP: No results for input(s): BNP in the last 72 hours      Magnesium:   Results from last 7 days  Lab Units 09/21/18  0449 09/20/18  0507 09/19/18  0520 09/19/18  0518 09/19/18  0517 09/18/18  1910 09/18/18  1044   MAGNESIUM mg/dL 2 3 1 9 2 6 2 5 2 6 2 0 2 4       Coags:   Results from last 7 days  Lab Units 09/25/18  0435 09/20/18  0908 09/20/18  0056 09/19/18  1722 09/19/18  0939   PTT seconds  --  37* >210* 39* 96*   INR  1 13  --   --   --   --        TSH:        Hemoglobin A1C   Results from last 7 days  Lab Units 09/22/18  0518   HEMOGLOBIN A1C % 9 6*       Lipid Profile:       Cardiac testing:   Results for orders placed during the hospital encounter of 09/17/18   Echo complete with contrast if indicated    Narrative [address]  [facility] [address]  [phone]    Transthoracic Echocardiogram  2D, M-mode, Doppler, and Color Doppler    Study date:  17-Sep-2018    Patient: [facility]   MR number: [id number]  Account number: [id number]  : 1960  Age: 60 years  Gender: Female  Status: Inpatient  Location: Bedside  Height: 63 in  Weight: 125 lb  BP: 91/ 74 mmHg    Indications: AV Disease AV Disease    Diagnoses: I35 8 - Other nonrheumatic aortic valve disorders    Sonographer:  HARJEET Baugh  Primary Physician:  Syl Angeles MD  Referring Physician:  Brandy Cuff:  [facility]  Cardiology Fellow:  Noel Brantley MD  Interpreting Physician:  Dawson Wagner MD    SUMMARY    LEFT VENTRICLE:  Systolic function was hyperdynamic  Ejection fraction was estimated to be 55 %  Although no diagnostic regional wall motion abnormality was identified, this possibility cannot be completely excluded on the basis of this study  VENTRICULAR SEPTUM:  There was dyssynergic motion  These changes are consistent with LBBB  MITRAL VALVE:  There was mild regurgitation  AORTIC VALVE:  The valve was probably trileaflet  Leaflets exhibited normal thickness and normal cuspal separation  TRICUSPID VALVE:  There was mild regurgitation  There was a definite, large, sessile, echogenic, fixed mass on the anterior leaflet, on the right atrial aspect  This may represent a thrombus, tumor or vegetation on tricuspid valve  IVC, HEPATIC VEINS:  No obvious thrombus in IVC    RECOMMENDATIONS:  If clinically indicated, transesophageal echocardiography could provide additional information  HISTORY: PRIOR HISTORY: [email or web address] Angioplasty,Bipolar,HTN,Sepsis,Cancer, Sepsis,Smoker    PROCEDURE: The procedure was performed at the bedside  This was a routine study  The transthoracic approach was used  The study included complete 2D imaging, M-mode, complete spectral Doppler, and color Doppler  Image quality was good  LEFT VENTRICLE: Size was normal  Systolic function was hyperdynamic  Ejection fraction was estimated to be 55 %   Although no diagnostic regional wall motion abnormality was identified, this possibility cannot be completely excluded on the  basis of this study  DOPPLER: Due to tachycardia, there was fusion of early and atrial contributions to ventricular filling  The study was not technically sufficient to allow evaluation of LV diastolic function  VENTRICULAR SEPTUM: There was dyssynergic motion  These changes are consistent with LBBB  RIGHT VENTRICLE: The size was normal  Systolic function was normal     LEFT ATRIUM: Size was normal     RIGHT ATRIUM: Size was normal     MITRAL VALVE: Valve structure was normal  There was mild focal thickening, limited to the leaflet margin  There was normal leaflet separation  DOPPLER: The transmitral velocity was within the normal range  There was no evidence for  stenosis  There was mild regurgitation  AORTIC VALVE: The valve was probably trileaflet  Leaflets exhibited normal thickness and normal cuspal separation  DOPPLER: Transaortic velocity was within the normal range  There was no evidence for stenosis  There was no significant  regurgitation  TRICUSPID VALVE: The valve structure was normal  There was normal leaflet separation  There was a definite, large, sessile, echogenic, fixed mass on the anterior leaflet, on the right atrial aspect  This may represent a thrombus, tumor or  vegetation on tricuspid valve  DOPPLER: The transtricuspid velocity was within the normal range  There was no evidence for stenosis  There was mild regurgitation  The tricuspid jet envelope definition was inadequate for estimation of RV  systolic pressure  PULMONIC VALVE: Not well visualized  DOPPLER: The transpulmonic velocity was within the normal range  There was no regurgitation  PERICARDIUM: There was no pericardial effusion  AORTA: The root exhibited normal size  SYSTEMIC VEINS: IVC: No obvious thrombus in IVC The inferior vena cava was not well visualized  The inferior vena cava was normal in size      PULMONARY VEINS: DOPPLER: Doppler signals were not recordable in the pulmonary vein(s)  SYSTEM MEASUREMENT TABLES    2D  %FS: 28 01 %  Ao Diam: 3 cm  EDV(Teich): 41 13 ml  EF(Cube): 62 7 %  EF(Teich): 55 58 %  ESV(Cube): 12 29 ml  ESV(Teich): 18 27 ml  IVSd: 1 04 cm  LA Diam: 3 13 cm  LAAd A2C: 18 19 cm2  LAAd A4C: 12 8 cm2  LAEDV A-L A2C: 62 58 ml  LAEDV A-L A4C: 29 68 ml  LAEDV Index (A-L): 38 95 ml/m2  LAEDV MOD A2C: 57 96 ml  LAEDV MOD A4C: 27 68 ml  LAEDV(A-L): 44 01 ml  LALd A2C: 4 49 cm  LALd A4C: 4 68 cm  LVIDd: 3 21 cm  LVIDs: 2 31 cm  LVPWd: 1 02 cm  SI(Cube): 18 27 ml/m2  SI(Teich): 20 23 ml/m2  SV(Cube): 20 65 ml  SV(Teich): 22 86 ml  rv diam: 2 69 cm    CW  AV Vmax: 1 46 m/s  AV maxP 53 mmHg  RAP: 5 mmHg  TR Vmax: 2 92 m/s  TR maxP 06 mmHg    MM  AV Cusp: 1 5 cm  Ao Diam: 2 93 cm  LA Diam: 2 97 cm  LA/Ao: 1 01  TAPSE: 1 33 cm    PW  LVOT Vmax: 0 89 m/s  LVOT maxPG: 3 14 mmHg  RVSP: 39 06 mmHg    [facility]    Prepared and electronically signed by    Dave Sandoval MD  Signed 17-Sep-2018 16:34:11       Results for orders placed during the hospital encounter of 18   JUNIOR    Narrative [facility] 175  [address]  [phone]    Transesophageal Echocardiogram  2D, 3D, Doppler, and Color Doppler    Study date:  18-Sep-2018    Patient: Beuford Lennox  MR number: [id number]  Account number: [de-identified]  : 1960  Age: 60 years  Gender: Female  Status: Inpatient  Location: Bedside  Height: 63 in  Weight: 125 lb  BP: 125/ 71 mmHg    Indications: Endocarditis      Diagnoses: I38  - Endocarditis, valve unspecified    Sonographer:  Elva Howe RDCS  Interpreting Physician:  Stephy Perez MD  Primary Physician:  Fletcher Chapman MD  Referring Physician:  [facility]  Group:  [facility]  Cardiology Fellow:  Love Chung MD    SUMMARY    LEFT VENTRICLE:  Systolic function was at the lower limits of normal  Ejection fraction was estimated to be 50 %  There were no regional wall motion abnormalities  RIGHT VENTRICLE:  The size was normal   Systolic function was normal     LEFT ATRIUM:  Size was normal   No thrombus was identified  LEFT ATRIAL APPENDAGE:  The size was normal   No thrombus was identified  RIGHT ATRIUM:  There was a large, homogenous, solid, mobile mass, measuring approximately 30 mm x 29 mm in the inferior right atrial cavity with associated smoke in the right atrium  It may represent a thrombus versus a right atrial mass  There is a  freely mobile segment at it's surface and the port tip itself can be seen trying to dislodge part of the mass  There was evidence of spontaneous echo contrast ("smoke")  RIGHT ATRIAL APPENDAGE:  There was no right atrial appendage thrombus identified  MITRAL VALVE:  There was no echocardiographic evidence of vegetation  AORTIC VALVE:  There was a small, strand-like, mobile mass, measuring 7 4 mm on the noncoronary cusp, on the aortic aspect  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  There was also a 2 8 mm echodensity on the LVOT side of the valve  This could represent  redundant valve tissue versus a very small vegetation  TRICUSPID VALVE:  There was mild regurgitation  There was no echocardiographic evidence of vegetation  PULMONIC VALVE:  There was no echocardiographic evidence of vegetation  HISTORY: PRIOR HISTORY: Heroin abuse, Coronary artery disease, Cerebral vascular accident, Diabetes, Schizophrenia, Hypertension, Smoker, Acute renal failure  PROCEDURE: The procedure was performed at the bedside  This was a routine study  The risks and alternatives of the procedure were explained to the patient's next of kin and informed consent was obtained  The transesophageal approach was  used  The study included complete 2D imaging, 3D imaging, complete spectral Doppler, and color Doppler   The heart rate was 72 bpm, at the start of the study  An adult omniplane probe was inserted by the cardiology fellow under direct  supervision of the attending cardiologist  Intubated with ease  One intubation attempt(s)  There was no blood detected on the probe  Image quality was adequate  There were no complications during the procedure  MEDICATIONS: Anesthesia  administered by floor nurse  LEFT VENTRICLE: Size was normal  Systolic function was at the lower limits of normal  Ejection fraction was estimated to be 50 %  There were no regional wall motion abnormalities  Wall thickness was normal  DOPPLER: The study was not  technically sufficient to allow evaluation of LV diastolic function  RIGHT VENTRICLE: The size was normal  Systolic function was normal     LEFT ATRIUM: Size was normal  No thrombus was identified  APPENDAGE: The size was normal  No thrombus was identified  DOPPLER: The function was normal (normal emptying velocity)  RIGHT ATRIUM: Size was normal  A line (catheter or pacing wire) was present  There was a large, homogenous, solid, mobile mass, measuring approximately 30 mm x 29 mm in the inferior right atrial cavity with associated smoke in the right  atrium  It may represent a thrombus versus a right atrial mass  There is a freely mobile segment at it's surface and the port tip itself can be seen trying to dislodge part of the mass  There was evidence of spontaneous echo contrast  ("smoke")  APPENDAGE: There was no right atrial appendage thrombus identified  MITRAL VALVE: Valve structure was normal  There was normal leaflet separation  There was no echocardiographic evidence of vegetation  DOPPLER: There was trace regurgitation  AORTIC VALVE: The valve was trileaflet  Leaflets exhibited normal thickness and normal cuspal separation  There was a small, strand-like, mobile mass, measuring 7 4 mm on the noncoronary cusp, on the aortic aspect  It may represent a  fibroelastoma   There was also a 2 8 mm echodensity on the LVOT side of the valve  This could represent redundant valve tissue versus a very small vegetation  DOPPLER: There was no regurgitation  TRICUSPID VALVE: The valve structure was normal  There was normal leaflet separation  There was no echocardiographic evidence of vegetation  DOPPLER: There was mild regurgitation  PULMONIC VALVE: Leaflets exhibited normal thickness, no calcification, and normal cuspal separation  There was no echocardiographic evidence of vegetation  DOPPLER: There was no significant regurgitation  PERICARDIUM: There was no pericardial effusion  The pericardium was normal in appearance  AORTA: The root exhibited normal size  There was no atheroma  There was no evidence for dissection  There was no evidence for aneurysm  PULMONARY VEINS: The pulmonary veins were normal sized  DOPPLER: Doppler flow pattern was normal in the pulmonary vein(s)  [address] Accredited Echocardiography Laboratory    Prepared and electronically signed by    Kyra Willard MD  Signed 18-Sep-2018 14:08:00       No results found for this or any previous visit  No results found for this or any previous visit      Meds/Allergies   all current active meds have been reviewed  Prescriptions Prior to Admission   Medication    atorvastatin (LIPITOR) 10 mg tablet    clopidogrel (PLAVIX) 75 mg tablet    insulin aspart protamine-insulin aspart (NOVOLOG MIX 70/30) 100 units/mL injection    insulin glargine (LANTUS) 100 units/mL subcutaneous injection    metFORMIN (GLUCOPHAGE) 1000 MG tablet    metoclopramide (REGLAN) 10 mg tablet    Rivaroxaban (XARELTO PO)    divalproex sodium (DEPAKOTE) 250 mg EC tablet    DULoxetine (CYMBALTA) 60 mg delayed release capsule    mirtazapine (REMERON) 15 mg tablet    nitroglycerin (NITROSTAT) 0 4 mg SL tablet    oxyCODONE-acetaminophen (PERCOCET) 5-325 mg per tablet    promethazine (PHENERGAN) 12 5 MG tablet    senna ([employer] SENNA) 8 6 MG tablet    terbinafine (LAMISIL) 250 mg tablet    traZODone (DESYREL) 50 mg tablet         sodium bicarbonate infusion 100 mL/hr Last Rate: 100 mL/hr (09/25/18 7998)     Assessment:  Principal Problem:     HHNC (hyperglycemic hyperosmolar nonketotic coma) ([facility] )  Active Problems:    Type 2 diabetes mellitus with complication (HCC)    Hyponatremia    GERD (gastroesophageal reflux disease)    Sepsis (HCC)    Altered mental status    Acute respiratory failure with hypoxia (HCC)    Hypocalcemia    Leukopenia    Cavitating pulmonary nodules     Metabolic acidosis, NAG, bicarbonate losses    Hypomagnesemia    Hypophosphatemia

## 2018-09-25 NOTE — ASSESSMENT & PLAN NOTE
Lab Results   Component Value Date    HGBA1C 9 6 (H) 09/22/2018       Recent Labs      09/24/18   2033  09/25/18   0629  09/25/18   1057  09/25/18   1611   POCGLU  146*  162*  184*  177*       Blood Sugar Average: Last 72    Monitor blood sugar, continue with the current dose of insulin

## 2018-09-25 NOTE — SPEECH THERAPY NOTE
Speech/Language Pathology Progress Note    Patient Name: Emily Abdullahi  Today's Date: 9/25/2018      Subjective:  Pt asleep when SLP arrived but easily woken up and positioned upright in bed  Pt complained of discomfort with upright positioning but was agreeable to remain upright for brief period and PO intake  Objective:  Pt seen for diagnostic swallow therapy at bedside  Due to recent diet change, pt had both puree and dysphagia 3 tray present in room  Pt took only 1 bite of her pureed main course then refused to eat any more  Items from dysphagia 3 tray were then offered including chicken noodle soup and cottage cheese  Mastication of both items was significantly prolonged/protracted, and L sided pocketing was observed  After several minutes, pt was asked and agreed to expectorate the chicken/veggie bolus from the soup which she was unable to swallow  Cottage cheese was also pocketed and masticated excessively however pt was able to clear this bolus with repeated liquid wash  Assessment:  Pt's diet was upgraded to dysphagia 3 due to refusal of pureed foods, however there is concern that mastication of dysphagia 3 diet is not functional for safe/adequate PO intake  May need to consider dysphagia 2 diet vs  deferring to MD for diet orders per family/pt request  Suspect mastication would still be inefficient with dysphagia 2 diet  Plan/Recommendations:  Close supervision and adherence to aspiration precautions needed with intake of dysphagia level 3 diet  If pt is unable swallow, she should be cued to expectorate the bolus  Check for pocketed food prior to reclining in bed   Continue ST x1-2 visits

## 2018-09-25 NOTE — PROGRESS NOTES
Medical Critical Care Attending Progress Note    Pt  Has stabilized at this time  Critical care resource service to sign off  Please contact if any concerns

## 2018-09-25 NOTE — ASSESSMENT & PLAN NOTE
· Patient still complaining of back pain  · Discussed with infectious disease-MRI thoracolumbar spine  · Previous MRI reviewed

## 2018-09-25 NOTE — PROGRESS NOTES
Progress Note - Emily Abdullahi 1960, 60 y o  female MRN: [id number]    Unit/Bed#: [facility] 828-01 Encounter: [id number]    Primary Care Provider: Deep Maddox MD   Date and time admitted to hospital: 9/17/2018  1:36 AM        * Bacteremia due to methicillin susceptible Staphylococcus aureus (MSSA)   Assessment & Plan    Persistent MSSA bacteremia  Infectious Disease on board  Continue with the antibiotics  Follow-up on the repeat cultures  For repeat MRI of the thoracic and lumbar spine  Status post Port-A-Cath removal        Sepsis [facility])   Assessment & Plan    Follow-up on the blood culture  Currently sepsis resolved  Leukocytosis noted-likely secondary to G-CSF  Trend WBC count        HHNC (hyperglycemic hyperosmolar nonketotic coma) [facility])   Assessment & Plan    Lab Results   Component Value Date    HGBA1C 9 6 (H) 09/22/2018       Recent Labs      09/24/18   2033  09/25/18   0629  09/25/18   1057  09/25/18   1611   POCGLU  146*  162*  184*  177*       Blood Sugar Average: Last 72    Monitor blood sugar, continue with the current dose of insulin          Suspected endocarditis   Assessment & Plan    · Cardiology on board  · Patient is going for another the TTE, may need JUNIOR if the bacteremia is persistent        Back pain   Assessment & Plan    · Patient still complaining of back pain  · Discussed with infectious disease-MRI thoracolumbar spine  · Previous MRI reviewed        Colon cancer ([facility] [address] )   Assessment & Plan    Currently on palliative chemotherapy        Leukopenia   Assessment & Plan    · Leukopenia resolved currently leukocytosis after the administration of G-CSF        Acute respiratory failure with hypoxia (HCC)   Assessment & Plan    · Monitor        Altered mental status   Assessment & Plan    · Likely secondary to the infectious process  · Appears to be improving        Hyponatremia   Assessment & Plan    Monitor for now          VTE Pharmacologic Prophylaxis:   Pharmacologic: Enoxaparin (Lovenox)  Mechanical VTE Prophylaxis in Place: Yes    Patient Centered Rounds: I have performed bedside rounds with nursing staff today  Discussions with Specialists or Other Care Team Provider:  Infectious Disease    Education and Discussions with Family / Patient: patient  Daughter updated over the phone    Time Spent for Care: 30 minutes  More than 50% of total time spent on counseling and coordination of care as described above  Current Length of Stay: 8 day(s)    Current Patient Status: Inpatient   Certification Statement: The patient will continue to require additional inpatient hospital stay due to Persistent bacteremia    Discharge Plan:     Code Status: Level 1 - Full Code      Subjective:   Patient seen and examined  Patient complaining of back pain other than that denies any specific complaints    Objective:     Vitals:   Temp (24hrs), Av °F (36 7 °C), Min:97 6 °F (36 4 °C), Max:98 4 °F (36 9 °C)    HR:  [80-99] 80  Resp:  [20-22] 20  BP: ()/(54-69) 121/62  SpO2:  [91 %-98 %] 98 %  Body mass index is 27 42 kg/m²  Input and Output Summary (last 24 hours): Intake/Output Summary (Last 24 hours) at 18 1843  Last data filed at 18 1744   Gross per 24 hour   Intake           3482 5 ml   Output             1600 ml   Net           1882 5 ml       Physical Exam:     Physical Exam   Constitutional: She appears well-developed and well-nourished  HENT:   Head: Normocephalic and atraumatic  Eyes: EOM are normal  Pupils are equal, round, and reactive to light  Neck: Normal range of motion  No tracheal deviation present  No thyromegaly present  Cardiovascular: Normal rate  No murmur heard  Pulmonary/Chest: Effort normal  No respiratory distress  She has no wheezes  Abdominal: Soft  Bowel sounds are normal  She exhibits no distension  Colostomy present   Musculoskeletal: Normal range of motion  She exhibits no edema  Neurological: She is alert   No cranial nerve deficit  Additional Data:     Labs:      Results from last 7 days  Lab Units 09/25/18  0435  09/21/18  0449   WBC Thousand/uL 35 74*  < > 2 08*   HEMOGLOBIN g/dL 8 0*  < > 8 2*   HEMATOCRIT % 22 9*  < > 23 8*   PLATELETS Thousands/uL 62*  < > 94*   NEUTROS PCT %  --   --  42*   LYMPHS PCT %  --   --  51*   LYMPHO PCT % 3*  < >  --    MONOS PCT %  --   --  5   MONO PCT MAN % 1*  < >  --    EOS PCT %  --   --  1   EOSINO PCT MANUAL % 0  < >  --    < > = values in this interval not displayed  Results from last 7 days  Lab Units 09/25/18  0435   SODIUM mmol/L 132*   POTASSIUM mmol/L 3 4*   CHLORIDE mmol/L 105   CO2 mmol/L 16*   BUN mg/dL 26*   CREATININE mg/dL 2 23*   CALCIUM mg/dL 6 5*   ALK PHOS U/L 130*   ALT U/L <6*   AST U/L 22       Results from last 7 days  Lab Units 09/25/18  0435   INR  1 13        * I Have Reviewed All Lab Data Listed Above  * Additional Pertinent Lab Tests Reviewed:  [address] Admission Reviewed    Imaging:    Imaging Reports Reviewed Today Include:   Imaging Personally Reviewed by Myself Includes:      Recent Cultures (last 7 days):       Results from last 7 days  Lab Units 09/24/18  1743 09/24/18  1003 09/22/18  0530 09/22/18  0519 09/20/18  0512 09/20/18  0507   BLOOD CULTURE   --   --  Staphylococcus aureus* Staphylococcus aureus* Staphylococcus aureus* Staphylococcus aureus*   GRAM STAIN RESULT  Gram positive cocci in clusters  --  Gram positive cocci in clusters Gram positive cocci in clusters Gram positive cocci in clusters Gram positive cocci in clusters   C DIFF TOXIN B   --  NEGATIVE for C difficle toxin by PCR    --   --   --   --        Last 24 Hours Medication List:     Current Facility-Administered Medications:  acetaminophen 650 mg Oral Q6H PRN Oly Wen PA-C    acetaminophen 325 mg Oral Q8H [facility] Scooby Mckeon    amiodarone 200 mg Oral TID With Meals JEANNIE Morillo    atorvastatin 5 mg Oral After Dinner Anshu Fox chlorhexidine 15 mL Swish & Spit Q12H [address] Marianela Marmolejo DO    clopidogrel 75 mg Oral Daily Megan Sequeira PA-C    divalproex sodium 750 mg Oral Daily Harvey Thrasher PA-C    DULoxetine 60 mg Oral Daily Megan Sequeira PA-C    enoxaparin 1 mg/kg Subcutaneous Q12H [address] Megan Sequeira PA-C    insulin lispro 1-5 Units Subcutaneous HS Shelby Rivas MD    insulin lispro 1-6 Units Subcutaneous TID AC Matthew Small MD    lactulose 10 g Oral BID [place]    metoclopramide 5 mg Intravenous Q6H PRN Jewell Castro PA-C    midodrine 10 mg Oral TID AC Katy Grayson MD    nafcillin 2,000 mg Intravenous Q4H Vanessa Abbott MD Last Rate: 2,000 mg (09/25/18 1701)   sodium bicarbonate infusion 125 mL/hr Intravenous Continuous Katy Grayson MD Last Rate: 125 mL/hr (09/25/18 1138)   traZODone 50 mg Oral HS Harvey Thrasher PA-C         Today, Patient Was Seen By: Tom Morris MD    ** Please Note: Dictation voice to text software may have been used in the creation of this document   **

## 2018-09-26 ENCOUNTER — APPOINTMENT (INPATIENT)
Dept: RADIOLOGY | Facility: HOSPITAL | Age: 58
DRG: 871 | End: 2018-09-26
Payer: COMMERCIAL

## 2018-09-26 LAB
ABO GROUP BLD: NORMAL
ALBUMIN SERPL BCP-MCNC: 0.7 G/DL (ref 3.5–5)
ALBUMIN SERPL BCP-MCNC: 0.9 G/DL (ref 3.5–5)
ALP SERPL-CCNC: 104 U/L (ref 46–116)
ALP SERPL-CCNC: 130 U/L (ref 46–116)
ALT SERPL W P-5'-P-CCNC: <6 U/L (ref 12–78)
ALT SERPL W P-5'-P-CCNC: <6 U/L (ref 12–78)
ANION GAP SERPL CALCULATED.3IONS-SCNC: 10 MMOL/L (ref 4–13)
ANION GAP SERPL CALCULATED.3IONS-SCNC: 9 MMOL/L (ref 4–13)
AST SERPL W P-5'-P-CCNC: 19 U/L (ref 5–45)
AST SERPL W P-5'-P-CCNC: 22 U/L (ref 5–45)
BASOPHILS # BLD MANUAL: 0 THOUSAND/UL (ref 0–0.1)
BASOPHILS # BLD MANUAL: 0 THOUSAND/UL (ref 0–0.1)
BASOPHILS NFR MAR MANUAL: 0 % (ref 0–1)
BASOPHILS NFR MAR MANUAL: 0 % (ref 0–1)
BILIRUB SERPL-MCNC: 0.82 MG/DL (ref 0.2–1)
BILIRUB SERPL-MCNC: 1.11 MG/DL (ref 0.2–1)
BLD GP AB SCN SERPL QL: NEGATIVE
BUN SERPL-MCNC: 23 MG/DL (ref 5–25)
BUN SERPL-MCNC: 27 MG/DL (ref 5–25)
BURR CELLS BLD QL SMEAR: PRESENT
CA-I BLD-SCNC: 0.88 MMOL/L (ref 1.12–1.32)
CALCIUM SERPL-MCNC: 5.8 MG/DL (ref 8.3–10.1)
CALCIUM SERPL-MCNC: 6.9 MG/DL (ref 8.3–10.1)
CHLORIDE SERPL-SCNC: 92 MMOL/L (ref 100–108)
CHLORIDE SERPL-SCNC: 98 MMOL/L (ref 100–108)
CO2 SERPL-SCNC: 21 MMOL/L (ref 21–32)
CO2 SERPL-SCNC: 26 MMOL/L (ref 21–32)
CREAT SERPL-MCNC: 2.07 MG/DL (ref 0.6–1.3)
CREAT SERPL-MCNC: 2.24 MG/DL (ref 0.6–1.3)
DOHLE BOD BLD QL SMEAR: PRESENT
EOSINOPHIL # BLD MANUAL: 0 THOUSAND/UL (ref 0–0.4)
EOSINOPHIL # BLD MANUAL: 0 THOUSAND/UL (ref 0–0.4)
EOSINOPHIL NFR BLD MANUAL: 0 % (ref 0–6)
EOSINOPHIL NFR BLD MANUAL: 0 % (ref 0–6)
EOSINOPHIL NFR URNS MANUAL: 0 %
ERYTHROCYTE [DISTWIDTH] IN BLOOD BY AUTOMATED COUNT: 17 % (ref 11.6–15.1)
ERYTHROCYTE [DISTWIDTH] IN BLOOD BY AUTOMATED COUNT: 17.4 % (ref 11.6–15.1)
GFR SERPL CREATININE-BSD FRML MDRD: 23 ML/MIN/1.73SQ M
GFR SERPL CREATININE-BSD FRML MDRD: 26 ML/MIN/1.73SQ M
GLUCOSE SERPL-MCNC: 169 MG/DL (ref 65–140)
GLUCOSE SERPL-MCNC: 196 MG/DL (ref 65–140)
GLUCOSE SERPL-MCNC: 196 MG/DL (ref 65–140)
GLUCOSE SERPL-MCNC: 220 MG/DL (ref 65–140)
GLUCOSE SERPL-MCNC: 228 MG/DL (ref 65–140)
GLUCOSE SERPL-MCNC: 504 MG/DL (ref 65–140)
HCT VFR BLD AUTO: 19.7 % (ref 34.8–46.1)
HCT VFR BLD AUTO: 23.6 % (ref 34.8–46.1)
HGB BLD-MCNC: 6.9 G/DL (ref 11.5–15.4)
HGB BLD-MCNC: 8.1 G/DL (ref 11.5–15.4)
HYPERCHROMIA BLD QL SMEAR: PRESENT
LABORATORY COMMENT REPORT: NORMAL
LYMPHOCYTES # BLD AUTO: 0.42 THOUSAND/UL (ref 0.6–4.47)
LYMPHOCYTES # BLD AUTO: 1 % (ref 14–44)
LYMPHOCYTES # BLD AUTO: 1.11 THOUSAND/UL (ref 0.6–4.47)
LYMPHOCYTES # BLD AUTO: 2 % (ref 14–44)
MAGNESIUM SERPL-MCNC: 1.5 MG/DL (ref 1.6–2.6)
MCH RBC QN AUTO: 27.3 PG (ref 26.8–34.3)
MCH RBC QN AUTO: 27.7 PG (ref 26.8–34.3)
MCHC RBC AUTO-ENTMCNC: 34.3 G/DL (ref 31.4–37.4)
MCHC RBC AUTO-ENTMCNC: 35 G/DL (ref 31.4–37.4)
MCV RBC AUTO: 79 FL (ref 82–98)
MCV RBC AUTO: 80 FL (ref 82–98)
METAMYELOCYTES NFR BLD MANUAL: 6 % (ref 0–1)
METAMYELOCYTES NFR BLD MANUAL: 8 % (ref 0–1)
MONOCYTES # BLD AUTO: 0.55 THOUSAND/UL (ref 0–1.22)
MONOCYTES # BLD AUTO: 1.67 THOUSAND/UL (ref 0–1.22)
MONOCYTES NFR BLD: 1 % (ref 4–12)
MONOCYTES NFR BLD: 4 % (ref 4–12)
MYELOCYTES NFR BLD MANUAL: 2 % (ref 0–1)
NEUTROPHILS # BLD MANUAL: 35.84 THOUSAND/UL (ref 1.85–7.62)
NEUTROPHILS # BLD MANUAL: 47.05 THOUSAND/UL (ref 1.85–7.62)
NEUTS BAND NFR BLD MANUAL: 5 % (ref 0–8)
NEUTS BAND NFR BLD MANUAL: 6 % (ref 0–8)
NEUTS SEG NFR BLD AUTO: 79 % (ref 43–75)
NEUTS SEG NFR BLD AUTO: 81 % (ref 43–75)
NRBC BLD AUTO-RTO: 0 /100 WBCS
NRBC BLD AUTO-RTO: 0 /100 WBCS
PHOSPHATE SERPL-MCNC: 2.9 MG/DL (ref 2.7–4.5)
PLATELET # BLD AUTO: 67 THOUSANDS/UL (ref 149–390)
PLATELET # BLD AUTO: 90 THOUSANDS/UL (ref 149–390)
PLATELET BLD QL SMEAR: ABNORMAL
PLATELET BLD QL SMEAR: ABNORMAL
PMV BLD AUTO: 12.8 FL (ref 8.9–12.7)
PMV BLD AUTO: 13.4 FL (ref 8.9–12.7)
POIKILOCYTOSIS BLD QL SMEAR: PRESENT
POIKILOCYTOSIS BLD QL SMEAR: PRESENT
POLYCHROMASIA BLD QL SMEAR: PRESENT
POLYCHROMASIA BLD QL SMEAR: PRESENT
POTASSIUM SERPL-SCNC: 3.3 MMOL/L (ref 3.5–5.3)
POTASSIUM SERPL-SCNC: 3.5 MMOL/L (ref 3.5–5.3)
PROMYELOCYTES NFR BLD MANUAL: 1 % (ref 0–0)
PROMYELOCYTES NFR BLD MANUAL: 2 % (ref 0–0)
PROT SERPL-MCNC: 4.6 G/DL (ref 6.4–8.2)
PROT SERPL-MCNC: 5.9 G/DL (ref 6.4–8.2)
RBC # BLD AUTO: 2.49 MILLION/UL (ref 3.81–5.12)
RBC # BLD AUTO: 2.97 MILLION/UL (ref 3.81–5.12)
RBC MORPH BLD: PRESENT
RBC MORPH BLD: PRESENT
RH BLD: POSITIVE
SODIUM SERPL-SCNC: 127 MMOL/L (ref 136–145)
SODIUM SERPL-SCNC: 129 MMOL/L (ref 136–145)
SPECIMEN EXPIRATION DATE: NORMAL
TOXIC GRANULES BLD QL SMEAR: PRESENT
TOXIC GRANULES BLD QL SMEAR: PRESENT
VARIANT LYMPHS # BLD AUTO: 1 %
VARIANT LYMPHS # BLD AUTO: 1 %
WBC # BLD AUTO: 41.67 THOUSAND/UL (ref 4.31–10.16)
WBC # BLD AUTO: 55.35 THOUSAND/UL (ref 4.31–10.16)
WBC SPEC QL GRAM STN: NORMAL
WBC TOXIC VACUOLES BLD QL SMEAR: PRESENT

## 2018-09-26 PROCEDURE — 99233 SBSQ HOSP IP/OBS HIGH 50: CPT | Performed by: FAMILY MEDICINE

## 2018-09-26 PROCEDURE — 82330 ASSAY OF CALCIUM: CPT | Performed by: INTERNAL MEDICINE

## 2018-09-26 PROCEDURE — 99232 SBSQ HOSP IP/OBS MODERATE 35: CPT | Performed by: INTERNAL MEDICINE

## 2018-09-26 PROCEDURE — 86850 RBC ANTIBODY SCREEN: CPT | Performed by: FAMILY MEDICINE

## 2018-09-26 PROCEDURE — 83735 ASSAY OF MAGNESIUM: CPT | Performed by: INTERNAL MEDICINE

## 2018-09-26 PROCEDURE — 74176 CT ABD & PELVIS W/O CONTRAST: CPT

## 2018-09-26 PROCEDURE — 85007 BL SMEAR W/DIFF WBC COUNT: CPT | Performed by: FAMILY MEDICINE

## 2018-09-26 PROCEDURE — 85027 COMPLETE CBC AUTOMATED: CPT | Performed by: INTERNAL MEDICINE

## 2018-09-26 PROCEDURE — 80053 COMPREHEN METABOLIC PANEL: CPT | Performed by: INTERNAL MEDICINE

## 2018-09-26 PROCEDURE — 85007 BL SMEAR W/DIFF WBC COUNT: CPT | Performed by: INTERNAL MEDICINE

## 2018-09-26 PROCEDURE — 99221 1ST HOSP IP/OBS SF/LOW 40: CPT | Performed by: INTERNAL MEDICINE

## 2018-09-26 PROCEDURE — 80053 COMPREHEN METABOLIC PANEL: CPT | Performed by: FAMILY MEDICINE

## 2018-09-26 PROCEDURE — 85027 COMPLETE CBC AUTOMATED: CPT | Performed by: FAMILY MEDICINE

## 2018-09-26 PROCEDURE — 87040 BLOOD CULTURE FOR BACTERIA: CPT | Performed by: INTERNAL MEDICINE

## 2018-09-26 PROCEDURE — 84100 ASSAY OF PHOSPHORUS: CPT | Performed by: INTERNAL MEDICINE

## 2018-09-26 PROCEDURE — 82948 REAGENT STRIP/BLOOD GLUCOSE: CPT

## 2018-09-26 PROCEDURE — 86901 BLOOD TYPING SEROLOGIC RH(D): CPT | Performed by: FAMILY MEDICINE

## 2018-09-26 PROCEDURE — 71250 CT THORAX DX C-: CPT

## 2018-09-26 PROCEDURE — 99233 SBSQ HOSP IP/OBS HIGH 50: CPT | Performed by: INTERNAL MEDICINE

## 2018-09-26 PROCEDURE — 86900 BLOOD TYPING SEROLOGIC ABO: CPT | Performed by: FAMILY MEDICINE

## 2018-09-26 PROCEDURE — 87186 SC STD MICRODIL/AGAR DIL: CPT | Performed by: INTERNAL MEDICINE

## 2018-09-26 PROCEDURE — 87147 CULTURE TYPE IMMUNOLOGIC: CPT | Performed by: INTERNAL MEDICINE

## 2018-09-26 PROCEDURE — 94762 N-INVAS EAR/PLS OXIMTRY CONT: CPT

## 2018-09-26 RX ORDER — OXYCODONE HYDROCHLORIDE 5 MG/1
2.5 TABLET ORAL EVERY 6 HOURS PRN
Status: DISCONTINUED | OUTPATIENT
Start: 2018-09-26 | End: 2018-09-29

## 2018-09-26 RX ORDER — LIDOCAINE 50 MG/G
1 PATCH TOPICAL DAILY
Status: DISCONTINUED | OUTPATIENT
Start: 2018-09-26 | End: 2018-10-05 | Stop reason: HOSPADM

## 2018-09-26 RX ORDER — AMIODARONE HYDROCHLORIDE 200 MG/1
200 TABLET ORAL 2 TIMES DAILY WITH MEALS
Status: DISCONTINUED | OUTPATIENT
Start: 2018-09-26 | End: 2018-10-05 | Stop reason: HOSPADM

## 2018-09-26 RX ORDER — MAGNESIUM SULFATE HEPTAHYDRATE 40 MG/ML
2 INJECTION, SOLUTION INTRAVENOUS ONCE
Status: COMPLETED | OUTPATIENT
Start: 2018-09-26 | End: 2018-09-27

## 2018-09-26 RX ADMIN — NAFCILLIN SODIUM 2000 MG: 2 INJECTION, POWDER, LYOPHILIZED, FOR SOLUTION INTRAMUSCULAR; INTRAVENOUS at 05:56

## 2018-09-26 RX ADMIN — OXYCODONE HYDROCHLORIDE 2.5 MG: 5 TABLET ORAL at 06:22

## 2018-09-26 RX ADMIN — INSULIN LISPRO 2 UNITS: 100 INJECTION, SOLUTION INTRAVENOUS; SUBCUTANEOUS at 16:30

## 2018-09-26 RX ADMIN — ACETAMINOPHEN 325 MG: 325 TABLET, FILM COATED ORAL at 06:02

## 2018-09-26 RX ADMIN — NAFCILLIN SODIUM 2000 MG: 2 INJECTION, POWDER, LYOPHILIZED, FOR SOLUTION INTRAMUSCULAR; INTRAVENOUS at 21:52

## 2018-09-26 RX ADMIN — MIDODRINE HYDROCHLORIDE 10 MG: 5 TABLET ORAL at 11:37

## 2018-09-26 RX ADMIN — AMIODARONE HYDROCHLORIDE 200 MG: 200 TABLET ORAL at 17:26

## 2018-09-26 RX ADMIN — NAFCILLIN SODIUM 2000 MG: 2 INJECTION, POWDER, LYOPHILIZED, FOR SOLUTION INTRAMUSCULAR; INTRAVENOUS at 02:21

## 2018-09-26 RX ADMIN — TRAZODONE HYDROCHLORIDE 50 MG: 50 TABLET ORAL at 21:57

## 2018-09-26 RX ADMIN — DIVALPROEX SODIUM 750 MG: 500 TABLET, DELAYED RELEASE ORAL at 08:29

## 2018-09-26 RX ADMIN — LIDOCAINE 1 PATCH: 50 PATCH CUTANEOUS at 21:50

## 2018-09-26 RX ADMIN — AMIODARONE HYDROCHLORIDE 200 MG: 200 TABLET ORAL at 08:29

## 2018-09-26 RX ADMIN — NAFCILLIN SODIUM 2000 MG: 2 INJECTION, POWDER, LYOPHILIZED, FOR SOLUTION INTRAMUSCULAR; INTRAVENOUS at 09:41

## 2018-09-26 RX ADMIN — MIDODRINE HYDROCHLORIDE 10 MG: 5 TABLET ORAL at 08:29

## 2018-09-26 RX ADMIN — NAFCILLIN SODIUM 2000 MG: 2 INJECTION, POWDER, LYOPHILIZED, FOR SOLUTION INTRAMUSCULAR; INTRAVENOUS at 13:49

## 2018-09-26 RX ADMIN — ACETAMINOPHEN 650 MG: 160 SUSPENSION ORAL at 20:18

## 2018-09-26 RX ADMIN — CHLORHEXIDINE GLUCONATE 15 ML: 1.2 RINSE ORAL at 20:18

## 2018-09-26 RX ADMIN — MIDODRINE HYDROCHLORIDE 10 MG: 5 TABLET ORAL at 16:30

## 2018-09-26 RX ADMIN — NAFCILLIN SODIUM 2000 MG: 2 INJECTION, POWDER, LYOPHILIZED, FOR SOLUTION INTRAMUSCULAR; INTRAVENOUS at 17:34

## 2018-09-26 RX ADMIN — ACETAMINOPHEN 325 MG: 325 TABLET, FILM COATED ORAL at 13:46

## 2018-09-26 RX ADMIN — MAGNESIUM SULFATE HEPTAHYDRATE 2 G: 40 INJECTION, SOLUTION INTRAVENOUS at 11:37

## 2018-09-26 RX ADMIN — CLOPIDOGREL 75 MG: 75 TABLET, FILM COATED ORAL at 08:29

## 2018-09-26 RX ADMIN — LACTULOSE 10 G: 20 SOLUTION ORAL at 08:29

## 2018-09-26 RX ADMIN — ACETAMINOPHEN 650 MG: 160 SUSPENSION ORAL at 09:41

## 2018-09-26 RX ADMIN — INSULIN LISPRO 2 UNITS: 100 INJECTION, SOLUTION INTRAVENOUS; SUBCUTANEOUS at 08:29

## 2018-09-26 RX ADMIN — LACTULOSE 10 G: 20 SOLUTION ORAL at 17:26

## 2018-09-26 RX ADMIN — DULOXETINE HYDROCHLORIDE 60 MG: 60 CAPSULE, DELAYED RELEASE ORAL at 08:29

## 2018-09-26 RX ADMIN — AMIODARONE HYDROCHLORIDE 200 MG: 200 TABLET ORAL at 11:37

## 2018-09-26 RX ADMIN — ACETAMINOPHEN 650 MG: 160 SUSPENSION ORAL at 02:26

## 2018-09-26 RX ADMIN — CHLORHEXIDINE GLUCONATE 15 ML: 1.2 RINSE ORAL at 08:29

## 2018-09-26 RX ADMIN — ENOXAPARIN SODIUM 70 MG: 80 INJECTION SUBCUTANEOUS at 20:19

## 2018-09-26 RX ADMIN — OXYCODONE HYDROCHLORIDE 2.5 MG: 5 TABLET ORAL at 14:00

## 2018-09-26 RX ADMIN — ATORVASTATIN CALCIUM 5 MG: 10 TABLET, FILM COATED ORAL at 17:26

## 2018-09-26 RX ADMIN — INSULIN LISPRO 2 UNITS: 100 INJECTION, SOLUTION INTRAVENOUS; SUBCUTANEOUS at 12:00

## 2018-09-26 RX ADMIN — INSULIN LISPRO 2 UNITS: 100 INJECTION, SOLUTION INTRAVENOUS; SUBCUTANEOUS at 21:57

## 2018-09-26 NOTE — ASSESSMENT & PLAN NOTE
Follow-up on the blood culture  Currently patient remains afebrile  Worsening  leukocytosis noted-status post GCSF  Trend WBC count

## 2018-09-26 NOTE — ASSESSMENT & PLAN NOTE
· Patient still complaining of back pain  · MRI lumbar spine reviewed  · Pending thoracic spine MRI the

## 2018-09-26 NOTE — ASSESSMENT & PLAN NOTE
Currently on palliative chemotherapy  Hematology Oncology evaluation regarding further treatment options and prognosis-that may aid with  the aggressive management with JUNIOR/CT surgery evaluation

## 2018-09-26 NOTE — CONSULTS
Oncology Consult Note  Emily Abdullahi 60 y o  female MRN: [id number]  Unit/Bed#: [facility] 828-01 Encounter: [id number]      Presenting Complaint:  Metastatic colon cancer  History of Presenting Illness:  A 13-year-old female who was recently diagnosed with metastatic colon cancer located at sigmoid colon  In the pelvis, she has locally advanced disease with adjacent organ invasion  Based on PET-CT scan, she has hypermetabolic liver lesion, consistent with metastatic disease  Her tumor is wild type for K-vaishnavi, and wraN-vaishnavi and BRaf  She has been under the care of Dr Jaison Monzon  She had 2 cycle of systemic chemotherapy with FOLFOX for the 2nd cycle, dose reduction of oxaliplatin was made  She was not given prophylactic pegylated G-CSF  After the 2nd cycle of treatment, she was hospitalized with 2 days of altered mental status  She developed MSSA bacteremia  Therefore, Port-A-Cath was removed  She is on IV antibiotics  She is afebrile  Because of the neutropenia, she was given short-acting G-CSF for 5 days until a few days ago  She has leukocytosis, due to this  I was asked to see her because of the ongoing treatment for metastatic colon cancer  She is not mobile at this time  She does not appear to be in any distress  She is quite poor historian  She has no complaint of shortness of breath  She is afebrile  Review of Systems - As stated in the HPI otherwise the fourteen point review of systems was negative      Past Medical History:   Diagnosis Date    Bipolar depression ([facility] ) 3/17/2016    CAD S/P percutaneous coronary angioplasty 3/17/2016    Cancer (HCC)     Chronic pain     Colonic mass     [facility]     COPD (chronic obstructive pulmonary disease) ([facility] )     CVA (cerebral vascular accident) ([facility] )     R sided weakness    Essential hypertension 3/17/2016    GERD (gastroesophageal reflux disease)     Hepatitis C     Heroin abuse 3/18/2016    History of gastric ulcer     Hypertension     NSTEMI (non-ST elevated myocardial infarction) ([place] [address] ) 07/2012    Psychiatric disorder 09/03/2014    Inpatient admission     Schizoaffective disorder ([place] [address] ) 3/17/2016    Schizophrenia ([place] [address] ) 3/17/2016    STEMI (ST elevation myocardial infarction) ([place] [address] ) 12/2017    Type 2 diabetes mellitus ([place] [address] ) 3/17/2016       Social History     Social History    Marital status: Single     Spouse name: N/A    Number of children: N/A    Years of education: N/A     Social History Main Topics    Smoking status: Current Every Day Smoker     Packs/day: 1 00     Years: 45 00     Types: Cigarettes    Smokeless tobacco: Never Used    Alcohol use No      Comment: "only on occasion"    Drug use: No      Comment: only tried x 1     Sexual activity: Not on file     Other Topics Concern    Not on file     Social History Narrative    No narrative on file       Family History   Problem Relation Age of Onset    Heart attack Father     Cancer Brother         gastric       No Known Allergies      Current Facility-Administered Medications:     acetaminophen (TYLENOL) oral suspension 650 mg, 650 mg, Oral, Q6H PRN, Nydia Tate PA-C, 650 mg at 09/26/18 0941    acetaminophen (TYLENOL) tablet 325 mg, 325 mg, Oral, Q8H [address], Scooby Quincy, 325 mg at 09/26/18 0602    amiodarone tablet 200 mg, 200 mg, Oral, TID With Meals, Salty Del Angel, CRNP, 200 mg at 09/26/18 1137    atorvastatin (LIPITOR) tablet 5 mg, 5 mg, Oral, After [place], 5 mg at 09/25/18 1704    chlorhexidine (PERIDEX) 0 12 % oral rinse 15 mL, 15 mL, Swish & Spit, Q12H [address], Marianela Marmolejo, DO, 15 mL at 09/26/18 2325    clopidogrel (PLAVIX) tablet 75 mg, 75 mg, Oral, Daily, Megan Sequeira PA-C, 75 mg at 09/26/18 0829    divalproex sodium (DEPAKOTE) EC tablet 750 mg, 750 mg, Oral, Daily, Lefty Sequeira PA-C, 750 mg at 09/26/18 5146    DULoxetine (CYMBALTA) delayed release capsule 60 mg, 60 mg, Oral, Daily, Ramirez Gómez, YANELIS, 60 mg at 09/26/18 0829    enoxaparin (LOVENOX) subcutaneous injection 70 mg, 1 mg/kg, Subcutaneous, Q12H [address], Megan Sequeira PA-C, 70 mg at 09/25/18 2130    insulin lispro (HumaLOG) 100 units/mL subcutaneous injection 1-5 Units, 1-5 Units, Subcutaneous, HS, Mariam Rivera MD, 2 Units at 09/25/18 2131    insulin lispro (HumaLOG) 100 units/mL subcutaneous injection 1-6 Units, 1-6 Units, Subcutaneous, TID AC, 2 Units at 09/26/18 0829 **AND** Fingerstick Glucose (POCT), , , TID AC, Mariam Rivera MD    lactulose 20 g/30 mL oral solution 10 g, 10 g, Oral, BID, Scooby Karnes, 10 g at 09/26/18 8434    magnesium sulfate 2 g/50 mL IVPB (premix) 2 g, 2 g, Intravenous, Once, Stephenie Beckett MD, 2 g at 09/26/18 1137    metoclopramide (REGLAN) injection 5 mg, 5 mg, Intravenous, Q6H PRN, Toni Andrade PA-C, 5 mg at 09/24/18 2127    midodrine (PROAMATINE) tablet 10 mg, 10 mg, Oral, TID AC, Lopez Harris MD, 10 mg at 09/26/18 1137    nafcillin (NALLPEN) 2,000 mg in sodium chloride 0 9 % 100 mL IVPB, 2,000 mg, Intravenous, Q4H, Nidia Knowles MD, Last Rate: 100 mL/hr at 09/26/18 0941, 2,000 mg at 09/26/18 0941    oxyCODONE (ROXICODONE) IR tablet 2 5 mg, 2 5 mg, Oral, Q4H PRN, Toni Andrade PA-C, 2 5 mg at 09/26/18 0622    sodium bicarbonate 150 mEq in dextrose 5 % 1,000 mL infusion, 125 mL/hr, Intravenous, Continuous, Lopez Harris MD, Stopped at 09/25/18 2246    traZODone (DESYREL) tablet 50 mg, 50 mg, Oral, HS, Megan Sequeira PA-C, 50 mg at 09/25/18 2130      /69 (BP Location: Left arm)   Pulse 87   Temp 98 °F (36 7 °C) (Axillary)   Resp 20   Ht 5' 3" (1 6 m)   Wt 70 2 kg (154 lb 12 2 oz)   SpO2 95%   BMI 27 42 kg/m²     General Appearance:    Alert, oriented        Eyes:    PERRL   Ears:    Normal external ear canals, both ears   Nose:   Nares normal, septum midline   Throat:   Mucosa moist  Pharynx without injection      Neck:   Supple       Lungs:     Clear to auscultation bilaterally   Chest Wall:    No tenderness or deformity    Heart:    Regular rate and rhythm       Abdomen:     Soft, non-tender, bowel sounds +, no organomegaly, colostomy located in the right lower quadrant  Mild distention abdomen  Extremities:   Extremities no cyanosis or edema       Skin:   no rash or icterus      Lymph nodes:   Cervical, supraclavicular, and axillary nodes normal   Neurologic:   CNII-XII intact, normal strength, sensation and reflexes     Throughout               Recent Results (from the past 48 hour(s))   Urinalysis with microscopic    Collection Time: 09/24/18  2:10 PM   Result Value Ref Range    Clarity, UA Clear     Color, UA Yellow     Specific [place], UA 1 010 1 003 - 1 030    pH, UA 6 0 4 5 - 8 0    Glucose, UA Negative Negative mg/dl    Ketones, UA Negative Negative mg/dl    Blood, UA Negative Negative    Protein, UA Negative Negative mg/dl    Nitrite, UA Negative Negative    Bilirubin, UA Negative Negative    Urobilinogen, UA 0 2 0 2, 1 0 E U /dl E U /dl    Leukocytes, UA Negative Negative    WBC, UA None Seen None Seen, 0-5, 5-55, 5-65 /hpf    RBC, UA None Seen None Seen, 0-5 /hpf    Bacteria, UA None Seen None Seen, Occasional /hpf    Epithelial Cells Occasional None Seen, Occasional /hpf   Fingerstick Glucose (POCT)    Collection Time: 09/24/18  4:15 PM   Result Value Ref Range    POC Glucose 162 (H) 65 - 140 mg/dl   Blood culture    Collection Time: 09/24/18  5:43 PM   Result Value Ref Range    Blood Culture Staphylococcus aureus (A)     Gram Stain Result Gram positive cocci in clusters    Fingerstick Glucose (POCT)    Collection Time: 09/24/18  8:33 PM   Result Value Ref Range    POC Glucose 146 (H) 65 - 140 mg/dl   CBC and differential    Collection Time: 09/25/18  4:35 AM   Result Value Ref Range    WBC 35 74 (HH) 4 31 - 10 16 Thousand/uL    RBC 2 90 (L) 3 81 - 5 12 Million/uL    Hemoglobin 8 0 (L) 11 5 - 15 4 g/dL    Hematocrit 22 9 (L) 34 8 - 46 1 %    MCV 79 (L) 82 - 98 fL    MCH 27 6 26 8 - 34 3 pg    MCHC 34 9 31 4 - 37 4 g/dL    RDW 17 2 (H) 11 6 - 15 1 %    Platelets 62 (L) 671 - 390 Thousands/uL    nRBC 0 /100 WBCs   AFP tumor marker    Collection Time: 09/25/18  4:35 AM   Result Value Ref Range    AFP TUMOR MARKER 0 7 0 5 - 8 ng/mL   Hepatitis B core antibody, total    Collection Time: 09/25/18  4:35 AM   Result Value Ref Range    Hep B Core Total Ab Non-reactive Non-reactive   Hepatitis B surface antibody    Collection Time: 09/25/18  4:35 AM   Result Value Ref Range    Hep B S Ab 6 49 mIU/mL   Hepatitis B surface antigen    Collection Time: 09/25/18  4:35 AM   Result Value Ref Range    Hepatitis B Surface Ag Non-reactive Non-reactive, NonReactive - Confirmed   Hepatitis C antibody    Collection Time: 09/25/18  4:35 AM   Result Value Ref Range    Hepatitis C Ab Low Reactive Non-reactive   Comprehensive metabolic panel    Collection Time: 09/25/18  4:35 AM   Result Value Ref Range    Sodium 132 (L) 136 - 145 mmol/L    Potassium 3 4 (L) 3 5 - 5 3 mmol/L    Chloride 105 100 - 108 mmol/L    CO2 16 (L) 21 - 32 mmol/L    ANION GAP 11 4 - 13 mmol/L    BUN 26 (H) 5 - 25 mg/dL    Creatinine 2 23 (H) 0 60 - 1 30 mg/dL    Glucose 143 (H) 65 - 140 mg/dL    Calcium 6 5 (L) 8 3 - 10 1 mg/dL    AST 22 5 - 45 U/L    ALT <6 (L) 12 - 78 U/L    Alkaline Phosphatase 130 (H) 46 - 116 U/L    Total Protein 5 6 (L) 6 4 - 8 2 g/dL    Albumin 0 9 (L) 3 5 - 5 0 g/dL    Total Bilirubin 1 55 (H) 0 20 - 1 00 mg/dL    eGFR 23 ml/min/1 73sq m   Protime-INR    Collection Time: 09/25/18  4:35 AM   Result Value Ref Range    Protime 14 6 (H) 11 8 - 14 2 seconds    INR 1 13 0 86 - 1 17   Prealbumin    Collection Time: 09/25/18  4:35 AM   Result Value Ref Range    Prealbumin 6 0 (L) 18 0 - 40 0 mg/dL   Manual Differential(PHLEBS Do Not Order)    Collection Time: 09/25/18  4:35 AM   Result Value Ref Range    Segmented % 91 (H) 43 - 75 %    Lymphocytes % 3 (L) 14 - 44 %    Monocytes % 1 (L) 4 - 12 % Eosinophils % 0 0 - 6 %    Basophils % 0 0 - 1 %    Metamyelocytes% 2 (H) 0 - 1 %    Myelocytes % 3 (H) 0 - 1 %    Absolute Neutrophils 32 52 (H) 1 85 - 7 62 Thousand/uL    Lymphocytes Absolute 1 07 0 60 - 4 47 Thousand/uL    Monocytes Absolute 0 36 0 00 - 1 22 Thousand/uL    Eosinophils Absolute 0 00 0 00 - 0 40 Thousand/uL    Basophils Absolute 0 00 0 00 - 0 10 Thousand/uL    Total Counted      RBC Morphology Present     Anisocytosis Present     [place] Cells Present     Hypochromia Present     Poikilocytes Present     Target Cells Present     Platelet Estimate Decreased (A) Adequate   Fingerstick Glucose (POCT)    Collection Time: 09/25/18  6:29 AM   Result Value Ref Range    POC Glucose 162 (H) 65 - 140 mg/dl   Fingerstick Glucose (POCT)    Collection Time: 09/25/18 10:57 AM   Result Value Ref Range    POC Glucose 184 (H) 65 - 140 mg/dl   Cortisol    Collection Time: 09/25/18  1:16 PM   Result Value Ref Range    Cortisol, Random 26 6 ug/dL   Urine Eosinophils    Collection Time: 09/25/18  2:21 PM   Result Value Ref Range    Eosinophil, Urine 0 %   Fingerstick Glucose (POCT)    Collection Time: 09/25/18  4:11 PM   Result Value Ref Range    POC Glucose 177 (H) 65 - 140 mg/dl   Fingerstick Glucose (POCT)    Collection Time: 09/25/18  9:26 PM   Result Value Ref Range    POC Glucose 220 (H) 65 - 140 mg/dl   CBC and differential    Collection Time: 09/26/18  5:45 AM   Result Value Ref Range    WBC 41 67 (HH) 4 31 - 10 16 Thousand/uL    RBC 2 49 (L) 3 81 - 5 12 Million/uL    Hemoglobin 6 9 (LL) 11 5 - 15 4 g/dL    Hematocrit 19 7 (L) 34 8 - 46 1 %    MCV 79 (L) 82 - 98 fL    MCH 27 7 26 8 - 34 3 pg    MCHC 35 0 31 4 - 37 4 g/dL    RDW 17 0 (H) 11 6 - 15 1 %    MPV 12 8 (H) 8 9 - 12 7 fL    Platelets 67 (L) 467 - 390 Thousands/uL    nRBC 0 /100 WBCs   Comprehensive metabolic panel    Collection Time: 09/26/18  5:45 AM   Result Value Ref Range    Sodium 127 (L) 136 - 145 mmol/L    Potassium 3 3 (L) 3 5 - 5 3 mmol/L Chloride 92 (L) 100 - 108 mmol/L    CO2 26 21 - 32 mmol/L    ANION GAP 9 4 - 13 mmol/L    BUN 23 5 - 25 mg/dL    Creatinine 2 07 (H) 0 60 - 1 30 mg/dL    Glucose 504 (HH) 65 - 140 mg/dL    Calcium 5 8 (LL) 8 3 - 10 1 mg/dL    AST 19 5 - 45 U/L    ALT <6 (L) 12 - 78 U/L    Alkaline Phosphatase 104 46 - 116 U/L    Total Protein 4 6 (L) 6 4 - 8 2 g/dL    Albumin 0 7 (L) 3 5 - 5 0 g/dL    Total Bilirubin 0 82 0 20 - 1 00 mg/dL    eGFR 26 ml/min/1 73sq m   Magnesium    Collection Time: 09/26/18  5:45 AM   Result Value Ref Range    Magnesium 1 5 (L) 1 6 - 2 6 mg/dL   Phosphorus    Collection Time: 09/26/18  5:45 AM   Result Value Ref Range    Phosphorus 2 9 2 7 - 4 5 mg/dL   Calcium, ionized    Collection Time: 09/26/18  5:45 AM   Result Value Ref Range    Calcium, Ionized 0 88 (L) 1 12 - 1 32 mmol/L   Manual Differential(PHLEBS Do Not Order)    Collection Time: 09/26/18  5:45 AM   Result Value Ref Range    Segmented % 81 (H) 43 - 75 %    Bands % 5 0 - 8 %    Lymphocytes % 1 (L) 14 - 44 %    Monocytes % 4 4 - 12 %    Eosinophils % 0 0 - 6 %    Basophils % 0 0 - 1 %    Metamyelocytes% 6 (H) 0 - 1 %    Promyelocytes % 2 (H) 0 - 0 %    Atypical Lymphocytes % 1 (H) <=0 %    Absolute Neutrophils 35 84 (H) 1 85 - 7 62 Thousand/uL    Lymphocytes Absolute 0 42 (L) 0 60 - 4 47 Thousand/uL    Monocytes Absolute 1 67 (H) 0 00 - 1 22 Thousand/uL    Eosinophils Absolute 0 00 0 00 - 0 40 Thousand/uL    Basophils Absolute 0 00 0 00 - 0 10 Thousand/uL    Total Counted      Dohle Bodies Present     Toxic Granulation Present     Toxic Vacuolated Neutrophils Present     RBC Morphology Present     [place] Cells Present     Hypochromia Present     Poikilocytes Present     Polychromasia Present     Platelet Estimate Decreased (A) Adequate   Fingerstick Glucose (POCT)    Collection Time: 09/26/18  7:04 AM   Result Value Ref Range    POC Glucose 196 (H) 65 - 140 mg/dl   Comprehensive metabolic panel    Collection Time: 09/26/18 11:04 AM   Result Value Ref Range    Sodium 129 (L) 136 - 145 mmol/L    Potassium 3 5 3 5 - 5 3 mmol/L    Chloride 98 (L) 100 - 108 mmol/L    CO2 21 21 - 32 mmol/L    ANION GAP 10 4 - 13 mmol/L    BUN 27 (H) 5 - 25 mg/dL    Creatinine 2 24 (H) 0 60 - 1 30 mg/dL    Glucose 169 (H) 65 - 140 mg/dL    Calcium 6 9 (L) 8 3 - 10 1 mg/dL    AST 22 5 - 45 U/L    ALT <6 (L) 12 - 78 U/L    Alkaline Phosphatase 130 (H) 46 - 116 U/L    Total Protein 5 9 (L) 6 4 - 8 2 g/dL    Albumin 0 9 (L) 3 5 - 5 0 g/dL    Total Bilirubin 1 11 (H) 0 20 - 1 00 mg/dL    eGFR 23 ml/min/1 73sq m   Type and screen    Collection Time: 09/26/18 11:04 AM   Result Value Ref Range    ABO Grouping A     Rh Factor Positive     Antibody Screen Negative     Specimen Expiration Date [id number]    CBC and differential    Collection Time: 09/26/18 11:05 AM   Result Value Ref Range    WBC 55 35 (HH) 4 31 - 10 16 Thousand/uL    RBC 2 97 (L) 3 81 - 5 12 Million/uL    Hemoglobin 8 1 (L) 11 5 - 15 4 g/dL    Hematocrit 23 6 (L) 34 8 - 46 1 %    MCV 80 (L) 82 - 98 fL    MCH 27 3 26 8 - 34 3 pg    MCHC 34 3 31 4 - 37 4 g/dL    RDW 17 4 (H) 11 6 - 15 1 %    MPV 13 4 (H) 8 9 - 12 7 fL    Platelets 90 (L) 544 - 390 Thousands/uL    nRBC 0 /100 WBCs   Fingerstick Glucose (POCT)    Collection Time: 09/26/18 11:15 AM   Result Value Ref Range    POC Glucose 196 (H) 65 - 140 mg/dl         Xr Chest Portable - 1 View    Result Date: 9/17/2018  Narrative: CHEST INDICATION:   fever, tachycardia  COMPARISON:  9/7/2018 EXAM PERFORMED/VIEWS:  XR CHEST PORTABLE FINDINGS: Cardiomediastinal silhouette appears unremarkable  Somewhat nodular right perihilar density measuring up to 17 mm  Additional right upper lobe 18 mm nodular density  Metastatic foci cannot excluded  Osseous structures appear within normal limits for patient age  Impression: Pulmonary nodular densities to reflect metastatic disease given history of primary neoplasm    Consider follow-up CT chest  The study was marked in EPIC for significant notification  Workstation performed: [id number]     Xr Chest 2 Views    Result Date: 9/7/2018  Narrative: CHEST INDICATION:   near syncope  COMPARISON: 8/15/2018 EXAM PERFORMED/VIEWS:  XR CHEST PA & LATERAL FINDINGS:  Right chest wall port  Cardiomediastinal silhouette appears unremarkable  The lungs are clear  No pneumothorax or pleural effusion  Osseous structures appear within normal limits for patient age  Impression: No acute cardiopulmonary disease  Workstation performed: [id number]     Ct Head Wo Contrast    Result Date: 9/17/2018  Narrative: CT BRAIN - WITHOUT CONTRAST INDICATION:   AMS, reports of recent fall on A/C  COMPARISON:  CT head 9/7/2018 TECHNIQUE:  CT examination of the brain was performed  In addition to axial images, coronal 2D reformatted images were created and submitted for interpretation  Radiation dose length product (DLP) for this visit:  981 26 mGy-cm   This examination, like all CT scans performed in the [facility], was performed utilizing techniques to minimize radiation dose exposure, including the use of iterative  reconstruction and automated exposure control  IMAGE QUALITY:  Diagnostic  FINDINGS: PARENCHYMA: Decreased attenuation is noted in periventricular and subcortical white matter demonstrating an appearance that is statistically most likely to represent mild microangiopathic change; this appearance is similar when compared to most recent prior examination  Chronic lacunar infarction(s) are noted in the left basal ganglia, left centrum semiovale and left thalamus, unchanged from prior exam   Old infarct is also noted in the left cerebellum/PICA territory, unchanged from prior exam  No CT signs of acute infarction  No intracranial mass, mass effect or midline shift  No acute parenchymal hemorrhage  VENTRICLES AND EXTRA-AXIAL SPACES:  Normal for the patient's age  VISUALIZED ORBITS AND PARANASAL SINUSES:  Unremarkable   CALVARIUM AND EXTRACRANIAL SOFT TISSUES:  Normal      Impression: No acute intracranial abnormality  Workstation performed: [id number]     Ct Head Wo Contrast    Result Date: 9/7/2018  Narrative: CT BRAIN - WITHOUT CONTRAST INDICATION:   Dizziness  COMPARISON:  CT brain dated March 17, 2016  TECHNIQUE:  CT examination of the brain was performed  In addition to axial images, coronal 2D reformatted images were created and submitted for interpretation  Radiation dose length product (DLP) for this visit:  944 67 mGy-cm   This examination, like all CT scans performed in the [facility], was performed utilizing techniques to minimize radiation dose exposure, including the use of iterative  reconstruction and automated exposure control  IMAGE QUALITY:  Diagnostic  FINDINGS: PARENCHYMA:  No intracranial mass, mass effect or midline shift  No CT signs of acute infarction  No acute parenchymal hemorrhage  There is an old left basal ganglia lacunar infarct  There is an old left thalamic lacunar infarct  There is mild periventricular white matter low attenuation which is nonspecific and most likely related to chronic small vessel ischemic changes  VENTRICLES AND EXTRA-AXIAL SPACES:  There is prominence of the ventricles and sulci related to mild volume loss  VISUALIZED ORBITS AND PARANASAL SINUSES:  Unremarkable  CALVARIUM AND EXTRACRANIAL SOFT TISSUES:  Normal      Impression: No acute intracranial abnormality  Mild chronic small vessel ischemic changes and volume loss  Old left basal ganglia and thalamic lacunar infarcts  Workstation performed: [id number]     Ct Chest Wo Contrast    Result Date: 9/17/2018  Narrative: CT CHEST WITHOUT IV CONTRAST INDICATION:   Pneumonia complicated / unresolved Shortness of breath  COMPARISON:  PET/CT dated 8/1/2018, chest x-rays dated 9/16/2018, 9/7/2018, 8/15/2018   TECHNIQUE: CT examination of the chest was performed without intravenous contrast   Axial, sagittal, and coronal 2D reformatted images were created from the source data and submitted for interpretation  Radiation dose length product (DLP) for this visit:  262 55 mGy-cm   This examination, like all CT scans performed in the [facility], was performed utilizing techniques to minimize radiation dose exposure, including the use of iterative  reconstruction and automated exposure control  FINDINGS: LUNGS:  Extensive bilateral pulmonary nodules, several of which exhibit cavitation  Findings are most concerning for infectious or inflammatory etiology with metastatic disease unlikely  Consider septic emboli, fungal pneumonia  Septic emboli seems most likely given rapid development of these findings  PLEURA:  Small left basilar pleural effusions with associated atelectasis  HEART/GREAT VESSELS:  Unremarkable for patient's age  MEDIASTINUM AND PAULETTE:  Unremarkable  CHEST WALL AND LOWER NECK:   Unremarkable  VISUALIZED STRUCTURES IN THE UPPER ABDOMEN:  Unremarkable  OSSEOUS STRUCTURES:  No acute fracture or destructive osseous lesion  Impression: 1  Bilateral cavitating pulmonary nodules concerning for septic emboli  Fungal infection also a possibility  Metastatic disease considerably less likely given rapidity of development  2   Small bibasilar pleural effusions noted with mild compressive atelectasis  I personally discussed this study with Dr Bronwyn Barnard on 9/17/2018 at 2:00 PM  Workstation performed: [id number]     Ct Spine Cervical Wo Contrast    Result Date: 9/17/2018  Narrative: CT CERVICAL SPINE - WITHOUT CONTRAST INDICATION:   reports of fall with AMS  COMPARISON:  None  TECHNIQUE:  CT examination of the cervical spine was performed without intravenous contrast   Contiguous axial images were obtained  Sagittal and coronal reconstructions were performed  Radiation dose length product (DLP) for this visit:  341 22 mGy-cm     This examination, like all CT scans performed in the [facility], was performed utilizing techniques to minimize radiation dose exposure, including the use of iterative  reconstruction and automated exposure control  IMAGE QUALITY:  Diagnostic  FINDINGS: ALIGNMENT:  There is reversal of normal cervical lordosis  There is no significant subluxation  No compression deformity  VERTEBRAL BODIES:  No fracture  DEGENERATIVE CHANGES:  Severe degenerative disc disease is noted at C3-C4 level with a large posterior disc osteophyte complex resulting in at least moderate spinal canal stenosis  PREVERTEBRAL AND PARASPINAL SOFT TISSUES:  Unremarkable  THORACIC INLET:  Bilateral cavitating pulmonary nodules are again seen, better characterized on CT chest performed the same day  Impression: No cervical spine fracture or traumatic malalignment  Degenerative disc disease most severe at C3-C4  Workstation performed: [id number]     Mri Lumbar Spine Wo Contrast    Result Date: 9/25/2018  Narrative: MRI LUMBAR SPINE WITHOUT CONTRAST INDICATION: Bactremia, lower extremity weaknwess  COMPARISON:  9/18/2018  TECHNIQUE:  Sagittal T2, sagittal inversion recovery, axial T2 images  IMAGE QUALITY:  Partially nondiagnostic exam due to significant patient artifact resulting in early termination FINDINGS: ALIGNMENT:  Normal alignment  MARROW SIGNAL:  Limited evaluation marrow signal   No gross evidence of lesion  DISTAL CORD AND CONUS: Contrast not clearly visualized, likely terminating in the L1 region as previously demonstrated  PARASPINAL SOFT TISSUES:  No gross evidence of mass  SACRUM:  Not evaluated  LOWER THORACIC DISC SPACES:  No significant degenerative change  Stable T2 hyperintensity in the L5-S1 disc suggesting inflammatory or degenerative change  Grossly stable L5-S1 broad-based posterior disc protrusion and left subarticular and foraminal zone disc extrusion, better visualized on the prior exam   Stable mass effect on the traversing left S1 nerve root    Stable severe bilateral neural foraminal narrowing  Impression: 1  Partially nondiagnostic exam due to severe patient motion artifact  Grossly stable L5-S1 left foraminal and subarticular zone disc extrusion with significant mass effect on the traversing S1 nerve root  Stable severe bilateral neural foraminal narrowing  2   Suboptimal evaluation for osteomyelitis and discitis due to motion artifact and absence of contrast enhancement, though no significant change when compared to the prior exam  Workstation performed: [id number]     Mri Thoracic Spine W Wo Contrast    Result Date: 9/19/2018  Narrative: MRI THORACIC SPINE WITH AND WITHOUT CONTRAST INDICATION:   Lucius Garcia Pain, tenderness, limited range of motion  History of cancer  COMPARISON:  CTA chest, abdomen and pelvis performed on September 17, 2018  TECHNIQUE:  Sagittal T1, sagittal T2, sagittal inversion recovery, axial T2,  axial 2D MERGE  Sagittal and axial T1 postcontrast  IV Contrast:  5 mL of gadobutrol injection (MULTI-DOSE) IMAGE QUALITY:  The patient was on a ventilator and was unable to fully cooperate for the examination  Despite repeat acquisition of multiple pulse sequences, there is significant patient motion which is degrading the assessment  FINDINGS: ALIGNMENT:  Normal alignment of the thoracic spine  No compression fracture  No subluxation  No evidence of scoliosis  MARROW SIGNAL:  Normal marrow signal is identified within the visualized bony structures  No discrete marrow lesion  THORACIC CORD:  Normal signal within the thoracic cord  PREVERTEBRAL AND PARASPINAL SOFT TISSUES:   There is no prevertebral or paravertebral fluid collection  There are bilateral pleural effusions and pulmonary nodules which are better defined on the CTA of the chest, abdomen and pelvis Patient has an oral endotracheal and orogastric tube in place  THORACIC DEGENERATIVE CHANGE:  No disc herniation, canal stenosis or foraminal narrowing  No degenerative changes   POSTCONTRAST:  There is no evidence of osseous or disc or enhancement to suggest osteomyelitis/discitis  There is no enhancing epidural fluid collection  No pathologic cord enhancement is appreciated either  Impression: Limited study due to patient motion  Unremarkable MRI of the thoracic spine  No thoracic osseous or soft tissue enhancement  Bilateral pulmonary and pleural disease, please see the concomitant CTA chest report performed for additional detail regarding the extraspinal findings  Workstation performed: [id number]     Mri Lumbar Spine W Wo Contrast    Result Date: 9/19/2018  Narrative: MRI LUMBAR SPINE WITH AND WITHOUT CONTRAST INDICATION:   Kenny Scott 40-year-old female in the medical ICU with past medical history for altered mental status and severe sepsis possibly secondary to gram-positive bacteremia  Patient has a past medical history of colon cancer and is and is currently being  treated with palliative chemotherapy  COMPARISON:  CTA of the chest, abdomen and pelvis performed on the same day  MRI lumbar spine study from January 17, 2006  CT abdomen and pelvis study from January 4, 2009  TECHNIQUE:  Sagittal T1, sagittal T2, sagittal inversion recovery, axial T1 and axial T2, coronal T2  Sagittal and axial T1 postcontrast  IV Contrast:  5 mL of gadobutrol injection (MULTI-DOSE) IMAGE QUALITY:  Axial images are motion degraded  The postcontrast images were repeated due to patient motion  FINDINGS: ALIGNMENT:  Normal alignment of the lumbar spine  No compression fracture  No spondylolysis or spondylolisthesis  No scoliosis  MARROW SIGNAL:  There is mild increased T1 marrow signal at the L5 and S1 endplates  On the fat-suppressed sagittal T2 series, there is no associated edema  DISTAL CORD AND CONUS:  Normal size and signal of the distal cord and conus  The conus ends at the L1 level  PARASPINAL SOFT TISSUES:  There is mild patchy T2 hyperintense signal within the dorsal paraspinal musculature    This is noted throughout the lumbar spine  There is also T2 hyperintense signal within the posterior half of the psoas musculature beginning  at the inferior L3 level and extending to the superior L5 level  There is also T2 hyperintense signal along the posterior margins of the iliac is musculature  There is no discrete soft tissue fluid collection  On sagittal fat-suppressed T2 imaging, there is also mild edema in the presacral soft tissues without a drainable fluid collection  SACRUM:  Normal signal within the sacrum  No evidence of insufficiency or stress fracture  LOWER THORACIC DISC SPACES:  Normal disc height and signal   No disc herniation, canal stenosis or foraminal narrowing  LUMBAR DISC SPACES:  There is disc desiccation at L2-L3 and L3-L4  At L5-S1, there is marked disc space narrowing and T2 hyperintense signal   Degenerative discogenic disease is noted at this level as discussed below  L1-L2:  Normal  L2-L3:  Normal  L3-L4:  Left foraminal protrusion without nerve root impingement  L4-L5:  Minimal fluid within the facet joints  No osseous erosive changes identified  No disc herniation or disc bulge result in spinal stenosis  L5-S1:  Marked disc space narrowing with fluid in the disc space  There is a circumferential disc bulge and what appears to be herniated disc material in the left lateral recess which is displacing and impinging the traversing left S1 nerve root  There  is amorphous soft tissue surrounding the disc extrusion which may represent granulation tissue  This soft tissue is impinging on the traversing left S1 nerve root  A small amount of phlegmonous tissue is difficult to exclude in this region  No drainable fluid collection is noted in the region  There is moderate right and severe left foraminal stenosis due to the disc bulge and the disc height loss     The exiting left L5 nerve root appears impinged by the disc bulge at this level   POSTCONTRAST IMAGING:  There is no definitive enhancement within the disc space at L5-S1  There is no clearly defined area of L5 or S1 vertebral body enhancement either  Minimal enhancement of the soft tissue within the left lateral recess is noted  No prevertebral or paravertebral enhancing fluid collection is identified  Impression: 1  Severe degenerative changes at the L5-S1 disc space which has progressed over the years when comparing to the prior studies of 2006 and 2009  While early discitis is a radiographic consideration given the fluid within the markedly degenerative disc space, the gas identified on the recent CTA study within the L5-S1 disc space is not consistent with bacterial infection  There is no marrow edema or enhancement within the adjacent endplates or within the disc space to suggest discitis-osteomyelitis at  this time  A short-term follow-up evaluation could be performed  2  Degenerative discogenic disease a chronic disc herniation/extrusion within the left lateral recess of L5-S1 is impinging on the traversing left S1 nerve root  There is asymmetric soft tissue in the left lateral recess which may represent a combination of disc material and granulation tissue  The possibility of a small phlegmon is not completely excluded but less likely  There is no drainable fluid collection  3   Nonspecific psoas, iliacus and dorsal paraspinal myositis without a soft tissue fluid collection  The study was marked in [facility] for immediate notification  Workstation performed: [id number]     Cta Chest Abdomen Pelvis W Wo Contrast    Addendum Date: 9/21/2018 Addendum:   ADDENDUM: There is a filling defect/mass seen within the right atrium    Result Date: 9/21/2018  Narrative: CT ANGIOGRAM OF THE CHEST, ABDOMEN AND PELVIS WITH AND WITHOUT IV CONTRAST INDICATION:  Severe back pain COMPARISON: CT scan of the chest obtained earlier today   TECHNIQUE:  CT angiogram examination of the chest, abdomen and pelvis was performed according to standard protocol  This examination, like all CT scans performed in the [facility], was performed utilizing techniques to minimize radiation dose exposure, including the use of iterative reconstruction and automated exposure control  Contrast as well as noncontrast images were obtained  3D reconstructions were performed an independent workstation, and are supplied for review  Rad dose 2040 03 mGy-cm IV Contrast:  85 mL of iohexol (OMNIPAQUE) Enteric Contrast:  Not administered FINDINGS: VASCULAR STRUCTURES:  There is no evidence of aortic aneurysm or dissection  Atherosclerotic changes of the aorta and its branches is seen  The celiac artery, SMA, renal arteries and the MARIA FERNANDA is patent  Right femoral central catheter is noted  OTHER FINDINGS: CHEST: HEART:  Normal cardiac size  No pericardial effusion  LUNGS:  Extensive bilateral pulmonary nodules several which exhibit cavitation  [place] Organ PLEURA: Small bilateral pleural effusions    MEDIASTINUM AND PAULETTE:  No mass or significant lymphadenopathy  CHEST WALL AND LOWER NECK: Normal  ABDOMEN LIVER/BILIARY TREE:  Heterogeneous enhancement of the liver is seen  GALLBLADDER:  Status post cholecystectomy SPLEEN:  Unremarkable  Normal size  PANCREAS:  Unremarkable  ADRENAL GLANDS:  Nodular hyperplasia of the adrenal glands is seen  KIDNEYS/URETERS:  Symmetric enhancement of the kidneys without evidence of hydronephrosis  Mild contour irregularity of the kidneys is seen which is likely due to prior infections  PELVIS REPRODUCTIVE ORGANS:  Unremarkable for patient's age  URINARY BLADDER:  Partially collapsed around a Hanks catheter balloon  Air is seen within the urinary bladder  ADDITIONAL ABDOMINAL AND PELVIC STRUCTURES: STOMACH AND BOWEL:  No evidence of bowel obstruction  Right lower quadrant ileostomy is seen  ABDOMINOPELVIC CAVITY: Small amount of free fluid in the pelvis  Peripancreatic and retroperitoneal lymph nodes measuring up to 1 2 cm are seen  ABDOMINAL WALL/INGUINAL REGIONS:  Diffuse anasarca is seen  OSSEOUS STRUCTURES:  Degenerative changes in the spine are visualized  Impression: No evidence of aortic dissection or aneurysm  Bilateral cavitating pulmonary nodules similar to prior study  Differential diagnosis is unchanged  Workstation performed: [id number]     Xr Chest Portable Icu    Result Date: 9/19/2018  Narrative: CHEST INDICATION:   f/u resp failure  COMPARISON:  September 16, 2018 EXAM PERFORMED/VIEWS:  XR CHEST PORTABLE ICU FINDINGS:  Endotracheal tube terminates between clavicular heads and saroj  Nasogastric tube descends below the diaphragms  Right chest wall Port-A-Cath seen on the prior study has been removed  Cardiomediastinal silhouette appears unremarkable  Nodular opacity right mid to upper lung corresponds to the abnormality on CT  Numerous additional nodular opacities on CT are not well visualized on this film  Osseous structures appear within normal limits for patient age  Impression: Status post right Port-A-Cath removal  No pneumothorax  Nodular opacity right upper lobe corresponds to CT abnormality  Workstation performed: [id number]       Assessment:  Metastatic colon cancer with wild type K-vaishnavi  Status post 2 cycle of palliative chemotherapy with FOLFOX  Resolved neutropenia  MSSA bacteremia  Plan:  A 69-year-old female with history as described above  She only received 2 cycle of FOLFOX  Therefore, it is too soon to evaluate tumor response  Supportive care should be continued  She is not near to the point where she can resume systemic chemotherapy  Major modification of treatment is necessary, due to the complication

## 2018-09-26 NOTE — PROGRESS NOTES
Cardiology Progress Note - Emily Abdullahi 60 y o  female MRN: [id number]    Unit/Bed#: [facility] 828-01 Encounter: [id number]    Assessment and plan  1   Right atrial mass  2   Bacteremia  3   Atrial flutter now in sinus rhythm on anticoagulation  4   Adenocarcinoma of the colon  5   Hypotension improved  6   Aortic valve lesion     Recommendations:  The right atrial mass appears to be thrombus on my review   The port was deep into the right atrium I suspect cause trauma near the tricuspid annulus leading to a nidus for thrombus formation   There is small friable strands on the thrombus as well   Aortic valve has 2 small strand like structures which 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      I am not sure the utility of repeat transesophageal echocardiogram at this time   I do not think she would be a great candidate for CT surgery would continue 6 weeks of antibiotics  transthoracic echocardiogram did not show much change no significant aortic insufficiency  Overall given her advanced cancer and poor response to palliative chemo I would assume her prognosis is poor  I did speak with the medicine service they will have Oncology weigh in on the patient's prognosis  If A transesophageal echocardiogram will help with this we can surely do it  Patient now has worsening anemia, thrombocytopenia, and leukocytosis        Subjective:    No significant events overnight  No significant change  Denies chest pain or dyspnea    ROS    Objective:   Vitals: Blood pressure 114/59, pulse 88, temperature 97 6 °F (36 4 °C), temperature source Oral, resp   rate 20, height 5' 3" (1 6 m), weight 70 2 kg (154 lb 12 2 oz), SpO2 95 %, not currently breastfeeding , Body mass index is 27 42 kg/m² , Orthostatic Blood Pressures      Most Recent Value   Blood Pressure  114/59 filed at 09/26/2018 0750   Patient Position - Orthostatic VS  Lying filed at 09/26/2018 8188         Systolic (16ZTU), MSB:708 , Min:104 , BSF:072     Diastolic (24hrs), Av, Min:58, Max:65      Intake/Output Summary (Last 24 hours) at 18 0910  Last data filed at 18 0700   Gross per 24 hour   Intake          3195 83 ml   Output             2325 ml   Net           870 83 ml     Weight (last 2 days)     None            Telemetry Review: No significant arrhythmias seen on telemetry review  EKG personally reviewed by Melo Hartman, DO  Physical Exam   Constitutional: She is oriented to person, place, and time  She appears well-nourished  No distress  HENT:   Head: Atraumatic  Eyes: Conjunctivae are normal  Pupils are equal, round, and reactive to light  Neck: Neck supple  Cardiovascular: Normal rate, regular rhythm and normal heart sounds  Exam reveals no friction rub  No murmur heard  Pulmonary/Chest: Effort normal and breath sounds normal  No respiratory distress  She has no wheezes  She has no rales  Abdominal: Bowel sounds are normal  She exhibits no distension  There is no tenderness  There is no rebound  Musculoskeletal: She exhibits no edema  Neurological: She is alert and oriented to person, place, and time  No cranial nerve deficit  Skin: Skin is warm and dry  No erythema  Nursing note and vitals reviewed          Laboratory Results:        CBC with diff:   Results from last 7 days  Lab Units 18  0545 18  0435 18  0621 18  0515 18  0029 18  0449 18  0507   WBC Thousand/uL 41 67* 35 74* 25 50* 14 29* 4 11* 2 08* 0 71*   HEMOGLOBIN g/dL 6 9* 8 0* 8 5* 8 7* 8 5* 8 2* 8 2*   HEMATOCRIT % 19 7* 22 9* 24 9* 25 3* 24 8* 23 8* 24 0*   MCV fL 79* 79* 80* 81* 80* 79* 81*   PLATELETS Thousands/uL 67* 62* 53* 49* 69* 94* 146*   MCH pg 27 7 27 6 27 2 27 7 27 5 27 2 27 8   MCHC g/dL 35 0 34 9 34 1 34 4 34 3 34 5 34 2   RDW % 17 0* 17 2* 16 8* 16 3* 15 8* 15 2* 15 4*   MPV fL 12 8*  --   --   --  13 2* 13 0* 12 2   NRBC AUTO /100 WBCs 0 0 0 0 2 3 0   NRBC /100 WBC  --   --   --  1  --   --  8* CMP:  Results from last 7 days  Lab Units 09/26/18  0545 09/25/18  0435 09/24/18  6552 09/23/18  0515 09/22/18  0029 09/21/18  0449 09/20/18  0507   SODIUM mmol/L 127* 132* 132* 132* 133* 130* 131*   POTASSIUM mmol/L 3 3* 3 4* 3 5 3 0* 3 5 3 5 3 9   CHLORIDE mmol/L 92* 105 107 104 106 104 106   CO2 mmol/L 26 16* 14* 16* 14* 16* 16*   BUN mg/dL 23 26* 27* 27* 27* 24 19   CREATININE mg/dL 2 07* 2 23* 2 32* 2 34* 2 20* 1 69* 1 18   CALCIUM mg/dL 5 8* 6 5* 6 5* 6 7* 6 9* 7 4* 7 5*   AST U/L 19 22  --  18  --   --   --    ALT U/L <6* <6*  --  <6*  --   --   --    ALK PHOS U/L 104 130*  --  99  --   --   --    EGFR ml/min/1 73sq m 26 23 22 22 24 33 51         BMP:  Results from last 7 days  Lab Units 09/26/18  0545 09/25/18  0435 09/24/18  0621 09/23/18  0515 09/22/18  0029 09/21/18  0449 09/20/18  0507   SODIUM mmol/L 127* 132* 132* 132* 133* 130* 131*   POTASSIUM mmol/L 3 3* 3 4* 3 5 3 0* 3 5 3 5 3 9   CHLORIDE mmol/L 92* 105 107 104 106 104 106   CO2 mmol/L 26 16* 14* 16* 14* 16* 16*   BUN mg/dL 23 26* 27* 27* 27* 24 19   CREATININE mg/dL 2 07* 2 23* 2 32* 2 34* 2 20* 1 69* 1 18   CALCIUM mg/dL 5 8* 6 5* 6 5* 6 7* 6 9* 7 4* 7 5*       BNP: No results for input(s): BNP in the last 72 hours      Magnesium:   Results from last 7 days  Lab Units 09/26/18  0545 09/21/18  0449 09/20/18  0507   MAGNESIUM mg/dL 1 5* 2 3 1 9       Coags:   Results from last 7 days  Lab Units 09/25/18  0435 09/20/18  0908 09/20/18  0056 09/19/18  1722 09/19/18  0939   PTT seconds  --  37* >210* 39* 96*   INR  1 13  --   --   --   --        TSH:        Hemoglobin A1C   Results from last 7 days  Lab Units 09/22/18  0518   HEMOGLOBIN A1C % 9 6*       Lipid Profile:       Cardiac testing:   Results for orders placed during the hospital encounter of 09/17/18   Echo complete with contrast if indicated    [address]  [address]  [phone]    Transthoracic Echocardiogram  2D, M-mode, Doppler, and Color Doppler    Study date:  17-Sep-2018    Patient: Dolores Wilson  MR number: [id number]  Account number: [de-identified]  : 1960  Age: 60 years  Gender: Female  Status: Inpatient  Location: Bedside  Height: 63 in  Weight: 125 lb  BP: 91/ 74 mmHg    Indications: AV Disease AV Disease    Diagnoses: I35 8 - Other nonrheumatic aortic valve disorders    Sonographer:  HARJEET Martinez  Primary Physician:  Luis Honeycutt MD  Referring Physician:  Brittny Napier:  [facility]  Cardiology Fellow:  Camila Chahal MD  Interpreting Physician:  Yu South MD    SUMMARY    LEFT VENTRICLE:  Systolic function was hyperdynamic  Ejection fraction was estimated to be 55 %  Although no diagnostic regional wall motion abnormality was identified, this possibility cannot be completely excluded on the basis of this study  VENTRICULAR SEPTUM:  There was dyssynergic motion  These changes are consistent with LBBB  MITRAL VALVE:  There was mild regurgitation  AORTIC VALVE:  The valve was probably trileaflet  Leaflets exhibited normal thickness and normal cuspal separation  TRICUSPID VALVE:  There was mild regurgitation  There was a definite, large, sessile, echogenic, fixed mass on the anterior leaflet, on the right atrial aspect  This may represent a thrombus, tumor or vegetation on tricuspid valve  IVC, HEPATIC VEINS:  No obvious thrombus in IVC    RECOMMENDATIONS:  If clinically indicated, transesophageal echocardiography could provide additional information  HISTORY: PRIOR HISTORY: [email or web address] Angioplasty,Bipolar,HTN,Sepsis,Cancer, Sepsis,Smoker    PROCEDURE: The procedure was performed at the bedside  This was a routine study  The transthoracic approach was used  The study included complete 2D imaging, M-mode, complete spectral Doppler, and color Doppler  Image quality was good      LEFT VENTRICLE: Size was normal  Systolic function was hyperdynamic  Ejection fraction was estimated to be 55 %  Although no diagnostic regional wall motion abnormality was identified, this possibility cannot be completely excluded on the  basis of this study  DOPPLER: Due to tachycardia, there was fusion of early and atrial contributions to ventricular filling  The study was not technically sufficient to allow evaluation of LV diastolic function  VENTRICULAR SEPTUM: There was dyssynergic motion  These changes are consistent with LBBB  RIGHT VENTRICLE: The size was normal  Systolic function was normal     LEFT ATRIUM: Size was normal     RIGHT ATRIUM: Size was normal     MITRAL VALVE: Valve structure was normal  There was mild focal thickening, limited to the leaflet margin  There was normal leaflet separation  DOPPLER: The transmitral velocity was within the normal range  There was no evidence for  stenosis  There was mild regurgitation  AORTIC VALVE: The valve was probably trileaflet  Leaflets exhibited normal thickness and normal cuspal separation  DOPPLER: Transaortic velocity was within the normal range  There was no evidence for stenosis  There was no significant  regurgitation  TRICUSPID VALVE: The valve structure was normal  There was normal leaflet separation  There was a definite, large, sessile, echogenic, fixed mass on the anterior leaflet, on the right atrial aspect  This may represent a thrombus, tumor or  vegetation on tricuspid valve  DOPPLER: The transtricuspid velocity was within the normal range  There was no evidence for stenosis  There was mild regurgitation  The tricuspid jet envelope definition was inadequate for estimation of RV  systolic pressure  PULMONIC VALVE: Not well visualized  DOPPLER: The transpulmonic velocity was within the normal range  There was no regurgitation  PERICARDIUM: There was no pericardial effusion  AORTA: The root exhibited normal size      SYSTEMIC VEINS: IVC: No obvious thrombus in IVC The inferior vena cava was not well visualized  The inferior vena cava was normal in size  PULMONARY VEINS: DOPPLER: Doppler signals were not recordable in the pulmonary vein(s)  SYSTEM MEASUREMENT TABLES    2D  %FS: 28 01 %  Ao Diam: 3 cm  EDV(Teich): 41 13 ml  EF(Cube): 62 7 %  EF(Teich): 55 58 %  ESV(Cube): 12 29 ml  ESV(Teich): 18 27 ml  IVSd: 1 04 cm  LA Diam: 3 13 cm  LAAd A2C: 18 19 cm2  LAAd A4C: 12 8 cm2  LAEDV A-L A2C: 62 58 ml  LAEDV A-L A4C: 29 68 ml  LAEDV Index (A-L): 38 95 ml/m2  LAEDV MOD A2C: 57 96 ml  LAEDV MOD A4C: 27 68 ml  LAEDV(A-L): 44 01 ml  LALd A2C: 4 49 cm  LALd A4C: 4 68 cm  LVIDd: 3 21 cm  LVIDs: 2 31 cm  LVPWd: 1 02 cm  SI(Cube): 18 27 ml/m2  SI(Teich): 20 23 ml/m2  SV(Cube): 20 65 ml  SV(Teich): 22 86 ml  rv diam: 2 69 cm    CW  AV Vmax: 1 46 m/s  AV maxP 53 mmHg  RAP: 5 mmHg  TR Vmax: 2 92 m/s  TR maxP 06 mmHg    MM  AV Cusp: 1 5 cm  Ao Diam: 2 93 cm  LA Diam: 2 97 cm  LA/Ao: 1 01  TAPSE: 1 33 cm    PW  LVOT Vmax: 0 89 m/s  LVOT maxPG: 3 14 mmHg  RVSP: 39 06 mmHg    [facility] Accredited Echocardiography Laboratory    Prepared and electronically signed by    Aung Cabrales MD  Signed 17-Sep-2018 16:34:11       Results for orders placed during the hospital encounter of 18   JUNIOR    Narrative [facility]  [facility], [address] [place] [address]  [phone]    Transesophageal Echocardiogram  2D, 3D, Doppler, and Color Doppler    Study date:  18-Sep-2018    Patient: Fartun Soares  MR number: [id number]  Account number: [de-identified]  : 1960  Age: 60 years  Gender: Female  Status: Inpatient  Location: Bedside  Height: 63 in  Weight: 125 lb  BP: 125/ 71 mmHg    Indications: Endocarditis      Diagnoses: I38  - Endocarditis, valve unspecified    Sonographer:  Cornelio Davis RDCS  Interpreting Physician:  Jackelyn Jaimes MD  Primary Physician:  Amauri Singh MD  Referring Physician:  Magdy Knight PA-C  Group:  [facility]  Cardiology Fellow:  Cedric Bingham MD    SUMMARY    LEFT VENTRICLE:  Systolic function was at the lower limits of normal  Ejection fraction was estimated to be 50 %  There were no regional wall motion abnormalities  RIGHT VENTRICLE:  The size was normal   Systolic function was normal     LEFT ATRIUM:  Size was normal   No thrombus was identified  LEFT ATRIAL APPENDAGE:  The size was normal   No thrombus was identified  RIGHT ATRIUM:  There was a large, homogenous, solid, mobile mass, measuring approximately 30 mm x 29 mm in the inferior right atrial cavity with associated smoke in the right atrium  It may represent a thrombus versus a right atrial mass  There is a  freely mobile segment at it's surface and the port tip itself can be seen trying to dislodge part of the mass  There was evidence of spontaneous echo contrast ("smoke")  RIGHT ATRIAL APPENDAGE:  There was no right atrial appendage thrombus identified  MITRAL VALVE:  There was no echocardiographic evidence of vegetation  AORTIC VALVE:  There was a small, strand-like, mobile mass, measuring 7 4 mm on the noncoronary cusp, on the aortic aspect  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  There was also a 2 8 mm echodensity on the LVOT side of the valve  This could represent  redundant valve tissue versus a very small vegetation  TRICUSPID VALVE:  There was mild regurgitation  There was no echocardiographic evidence of vegetation  PULMONIC VALVE:  There was no echocardiographic evidence of vegetation  HISTORY: PRIOR HISTORY: Heroin abuse, Coronary artery disease, Cerebral vascular accident, Diabetes, Schizophrenia, Hypertension, Smoker, Acute renal failure  PROCEDURE: The procedure was performed at the bedside  This was a routine study  The risks and alternatives of the procedure were explained to the patient's next of kin and informed consent was obtained  The transesophageal approach was  used   The study included complete 2D imaging, 3D imaging, complete spectral Doppler, and color Doppler  The heart rate was 72 bpm, at the start of the study  An adult omniplane probe was inserted by the cardiology fellow under direct  supervision of the attending cardiologist  Intubated with ease  One intubation attempt(s)  There was no blood detected on the probe  Image quality was adequate  There were no complications during the procedure  MEDICATIONS: Anesthesia  administered by floor nurse  LEFT VENTRICLE: Size was normal  Systolic function was at the lower limits of normal  Ejection fraction was estimated to be 50 %  There were no regional wall motion abnormalities  Wall thickness was normal  DOPPLER: The study was not  technically sufficient to allow evaluation of LV diastolic function  RIGHT VENTRICLE: The size was normal  Systolic function was normal     LEFT ATRIUM: Size was normal  No thrombus was identified  APPENDAGE: The size was normal  No thrombus was identified  DOPPLER: The function was normal (normal emptying velocity)  RIGHT ATRIUM: Size was normal  A line (catheter or pacing wire) was present  There was a large, homogenous, solid, mobile mass, measuring approximately 30 mm x 29 mm in the inferior right atrial cavity with associated smoke in the right  atrium  It may represent a thrombus versus a right atrial mass  There is a freely mobile segment at it's surface and the port tip itself can be seen trying to dislodge part of the mass  There was evidence of spontaneous echo contrast  ("smoke")  APPENDAGE: There was no right atrial appendage thrombus identified  MITRAL VALVE: Valve structure was normal  There was normal leaflet separation  There was no echocardiographic evidence of vegetation  DOPPLER: There was trace regurgitation  AORTIC VALVE: The valve was trileaflet  Leaflets exhibited normal thickness and normal cuspal separation   There was a small, strand-like, mobile mass, measuring 7 4 mm on the noncoronary cusp, on the aortic aspect  It may represent a  fibroelastoma  There was also a 2 8 mm echodensity on the LVOT side of the valve  This could represent redundant valve tissue versus a very small vegetation  DOPPLER: There was no regurgitation  TRICUSPID VALVE: The valve structure was normal  There was normal leaflet separation  There was no echocardiographic evidence of vegetation  DOPPLER: There was mild regurgitation  PULMONIC VALVE: Leaflets exhibited normal thickness, no calcification, and normal cuspal separation  There was no echocardiographic evidence of vegetation  DOPPLER: There was no significant regurgitation  PERICARDIUM: There was no pericardial effusion  The pericardium was normal in appearance  AORTA: The root exhibited normal size  There was no atheroma  There was no evidence for dissection  There was no evidence for aneurysm  PULMONARY VEINS: The pulmonary veins were normal sized  DOPPLER: Doppler flow pattern was normal in the pulmonary vein(s)  [address] Accredited Echocardiography Laboratory    Prepared and electronically signed by    Shantanu Oviedo MD  Signed 18-Sep-2018 14:08:00       No results found for this or any previous visit  No results found for this or any previous visit      Meds/Allergies   all current active meds have been reviewed  Prescriptions Prior to Admission   Medication    atorvastatin (LIPITOR) 10 mg tablet    clopidogrel (PLAVIX) 75 mg tablet    insulin aspart protamine-insulin aspart (NOVOLOG MIX 70/30) 100 units/mL injection    insulin glargine (LANTUS) 100 units/mL subcutaneous injection    metFORMIN (GLUCOPHAGE) 1000 MG tablet    metoclopramide (REGLAN) 10 mg tablet    Rivaroxaban (XARELTO PO)    divalproex sodium (DEPAKOTE) 250 mg EC tablet    DULoxetine (CYMBALTA) 60 mg delayed release capsule    mirtazapine (REMERON) 15 mg tablet    nitroglycerin (NITROSTAT) 0 4 mg SL tablet    oxyCODONE-acetaminophen (PERCOCET) 5-325 mg per tablet    promethazine (PHENERGAN) 12 5 MG tablet    senna ([employer] SENNA) 8 6 MG tablet    terbinafine (LAMISIL) 250 mg tablet    traZODone (DESYREL) 50 mg tablet         sodium bicarbonate infusion 125 mL/hr Last Rate: Stopped (09/25/18 5522)     Assessment:  Principal Problem:    Bacteremia due to methicillin susceptible Staphylococcus aureus (MSSA)  Active Problems:    Type 2 diabetes mellitus with complication (HCC)    Hyponatremia    GERD (gastroesophageal reflux disease)    HHNC (hyperglycemic hyperosmolar nonketotic coma) (HCC)    Sepsis (HCC)    Altered mental status    Acute respiratory failure with hypoxia (HCC)    Hypocalcemia    Leukopenia    Cavitating pulmonary nodules     Metabolic acidosis, NAG, bicarbonate losses    Hypomagnesemia    Hypophosphatemia    Colon cancer (HCC)    Back pain    Suspected endocarditis

## 2018-09-26 NOTE — PROGRESS NOTES
Spoke with Dr Nik Hendricks with [facility] patient is in need of a redraw of morning labs patient has only one arm to work with and is dialysis, ok to use a vein other than the arm

## 2018-09-26 NOTE — PROGRESS NOTES
Progress Note - Emily Abdullahi 1960, 60 y o  female MRN: [id number]    Unit/Bed#: [facility] 828-01 Encounter: [id number]    Primary Care Provider: Kwaku Weller MD   Date and time admitted to hospital: 9/17/2018  1:36 AM        * Bacteremia due to methicillin susceptible Staphylococcus aureus (MSSA)   Assessment & Plan    Persistent MSSA bacteremia  Infectious Disease on board  Continue with the antibiotics  Follow-up on the repeat cultures-last culture from 8/24/2018 was still positive  MRI of the lumbar spine reviewed,  Status post Port-A-Cath removal  Discussed with infectious disease-CT scan of the chest abdomen and pelvis  Patient is going for a T JUNIOR followed by a JUNIOR if the needed  Discussed with cardiology-recommended Heme-Onc evaluation given her poor prognosis          Sepsis ([address] )   Assessment & Plan    Follow-up on the blood culture  Currently patient remains afebrile  Worsening  leukocytosis noted-status post GCSF  Trend WBC count          Suspected endocarditis   Assessment & Plan    · Cardiology on board  · Patient is going for another the TTE, may need JUNIOR if the bacteremia is persistent  · Discussed with cardiology  · Currently patient is on therapeutic dose of Lovenox for a flutter/thrombosis        Back pain   Assessment & Plan    · Patient still complaining of back pain  · MRI lumbar spine reviewed  · Pending thoracic spine MRI the        Colon cancer [facility])   Assessment & Plan    Currently on palliative chemotherapy  Hematology Oncology evaluation regarding further treatment options and prognosis-that may aid with  the aggressive management with JUNIOR/CT surgery evaluation        Hypomagnesemia   Assessment & Plan    · Replete        Hypocalcemia   Assessment & Plan    · Replete        Acute respiratory failure with hypoxia (HCC)   Assessment & Plan    · Monitor  · Continue with the supplemental oxygen to keep saturation above 90%        Altered mental status   Assessment & Plan    · Likely secondary to the infectious process  · Appears to be improving  · CT head on 18-no acute intracranial pathology        Hyponatremia   Assessment & Plan    Sodium is 129  Nephrology on board  Monitor        Acute kidney injury [facility])   Assessment & Plan    · Monitor creatinine  · Nephrology on board  · Continue with the IV fluids  · Currently on Lasix        Type 2 diabetes mellitus with complication [facility])   Assessment & Plan    Lab Results   Component Value Date    HGBA1C 9 6 (H) 2018       Recent Labs      18   1611  18   2126  18   0704  18   1115   POCGLU  177*  220*  196*  196*       Blood Sugar Average: Last 72 hrs:  (P) 143 1875   Monitor blood sugar, continue with the current insulin regimen          VTE Pharmacologic Prophylaxis:   Pharmacologic: Enoxaparin (Lovenox)  Mechanical VTE Prophylaxis in Place: Yes    Patient Centered Rounds: I have performed bedside rounds with nursing staff today  Discussions with Specialists or Other Care Team Provider:  Infectious Disease and Cardiology    Education and Discussions with Family / Patient:  Daughter updated in the room    Time Spent for Care: 45 minutes  More than 50% of total time spent on counseling and coordination of care as described above  Current Length of Stay: 9 day(s)    Current Patient Status: Inpatient   Certification Statement: The patient will continue to require additional inpatient hospital stay due to Persistent bacteremia    Discharge Plan:     Code Status: Level 1 - Full Code      Subjective:   Patient seen and examined  Patient is frustrated that she is not able to walk with the physical therapy because of weakness in her legs still complaining of back    Other than that no specific complaints    Objective:     Vitals:   Temp (24hrs), Av 1 °F (36 7 °C), Min:97 6 °F (36 4 °C), Max:98 8 °F (37 1 °C)    HR:  [80-93] 87  Resp:  [18-20] 20  BP: (104-126)/(58-69) 126/69  SpO2:  [91 %-98 %] 95 %  Body mass index is 27 42 kg/m²  Input and Output Summary (last 24 hours): Intake/Output Summary (Last 24 hours) at 09/26/18 1326  Last data filed at 09/26/18 0900   Gross per 24 hour   Intake          3115 83 ml   Output             2325 ml   Net           790 83 ml       Physical Exam:     Physical Exam   Constitutional: She appears well-developed  HENT:   Head: Normocephalic  Eyes: Pupils are equal, round, and reactive to light  Neck: Normal range of motion  No JVD present  Cardiovascular:   No murmur heard  Pulmonary/Chest:   Decreased breath sounds bilateral   Abdominal: Soft  There is no tenderness  There is no rebound  Colostomy present   Musculoskeletal: She exhibits edema  Neurological: She is alert  Moves all 4 extremities spontaneously and on command   Skin: Skin is warm  No erythema  Additional Data:     Labs:      Results from last 7 days  Lab Units 09/26/18  1105 09/26/18  0545  09/21/18  0449   WBC Thousand/uL 55 35* 41 67*  < > 2 08*   HEMOGLOBIN g/dL 8 1* 6 9*  < > 8 2*   HEMATOCRIT % 23 6* 19 7*  < > 23 8*   PLATELETS Thousands/uL 90* 67*  < > 94*   NEUTROS PCT %  --   --   --  42*   LYMPHS PCT %  --   --   --  51*   LYMPHO PCT %  --  1*  < >  --    MONOS PCT %  --   --   --  5   MONO PCT MAN %  --  4  < >  --    EOS PCT %  --   --   --  1   EOSINO PCT MANUAL %  --  0  < >  --    < > = values in this interval not displayed  Results from last 7 days  Lab Units 09/26/18  1104   SODIUM mmol/L 129*   POTASSIUM mmol/L 3 5   CHLORIDE mmol/L 98*   CO2 mmol/L 21   BUN mg/dL 27*   CREATININE mg/dL 2 24*   CALCIUM mg/dL 6 9*   ALK PHOS U/L 130*   ALT U/L <6*   AST U/L 22       Results from last 7 days  Lab Units 09/25/18  0435   INR  1 13       * I Have Reviewed All Lab Data Listed Above  * Additional Pertinent Lab Tests Reviewed:  All Labs For Current Hospital Admission Reviewed    Imaging:    Imaging Reports Reviewed Today Include: mri  Imaging Personally Reviewed by Myself Includes:  mri    Recent Cultures (last 7 days):       Results from last 7 days  Lab Units 09/24/18  1743 09/24/18  1003 09/22/18  0530 09/22/18  0519 09/20/18  0512 09/20/18  0507   BLOOD CULTURE  Staphylococcus aureus*  --  Staphylococcus aureus* Staphylococcus aureus* Staphylococcus aureus* Staphylococcus aureus*   GRAM STAIN RESULT  Gram positive cocci in clusters  --  Gram positive cocci in clusters Gram positive cocci in clusters Gram positive cocci in clusters Gram positive cocci in clusters   C DIFF TOXIN B   --  NEGATIVE for C difficle toxin by PCR    --   --   --   --        Last 24 Hours Medication List:     Current Facility-Administered Medications:  acetaminophen 650 mg Oral Q6H PRN Bud Negro PA-C    acetaminophen 325 mg Oral Q8H [address] Scooby Toole    amiodarone 200 mg Oral TID With Meals JEANNIE Chris    atorvastatin 5 mg Oral After [employer] Toole    chlorhexidine 15 mL Swish & Spit Q12H [address] Marianela Marmolejo DO    clopidogrel 75 mg Oral Daily Megan Sequeira PA-C    divalproex sodium 750 mg Oral Daily Carol Ann Schneider PA-C    DULoxetine 60 mg Oral Daily Megan Sequeira PA-C    enoxaparin 1 mg/kg Subcutaneous Q12H [address] Megan Sequeira PA-C    insulin lispro 1-5 Units Subcutaneous HS Shelby Infante MD    insulin lispro 1-6 Units Subcutaneous TID AC Amrik Cortez MD    lactulose 10 g Oral BID Scooby Toole    magnesium sulfate 2 g Intravenous Once Gemma Rossi MD    metoclopramide 5 mg Intravenous Q6H PRN Bud Negro PA-C    midodrine 10 mg Oral TID AC Queenie Bonner MD    nafcillin 2,000 mg Intravenous Q4H Jemma Knight MD Last Rate: 2,000 mg (09/26/18 0941)   oxyCODONE 2 5 mg Oral Q4H PRN Bud Negro PA-C    sodium bicarbonate infusion 125 mL/hr Intravenous Continuous Queenie Bonner MD Last Rate: Stopped (09/25/18 9578)   traZODone 50 mg Oral HS Carol Ann Schneider PA-C         Today, Patient Was Seen By: Gemma Rossi MD    ** Please Note: Dictation voice to text software may have been used in the creation of this document   **

## 2018-09-26 NOTE — MEDICAL STUDENT
MEDICAL STUDENT  Inpatient H&P for TRAINING ONLY   Not Part of Legal Medical Record       H&P Exam - Emily Abdullahi 60 y o  female MRN: [id number]    Unit/Bed#: [facility] 828-01 Encounter: [id number]    History of Present Illness    Ms Maday Ash is a 59 yo female with PMH of colon cancer s/p loop colostomy currently receiving FOLFOX chemotherapy who presented to [facility] on 9/16 for hyperglycemia above 600, increased lethargy, SOB, thirst and weakness for 2 days and altered mental status  She was found to have a glucose of 952 and diagnosed with hyperglycemic hyperosmolar nonketotic coma, a lactic acidosis of 3 4 and hypotension and was diagnosed with septic shock and an RYLAN  She was given 2L IVF, zosyn and an insulin infusion and then transferred to the [facility] ICU  In the ICU, her HHNC was treated with an insulin drip, electrolyte replacement, and then transitioned to a diet and subcutaneous insulin  She was found to be bacteremic with blood cultures x 4 which grew MSSA  She was treated with Magdalen Pipe, cefazolin and then switched to nafcillin  She was also found to have new onset atrial flutter controlled by IV amiodarone and anticoagulated with a heparin drip  She was switched to PO amiodarone and lovenox  She was also found to have right atrial mass-- believed to be a thrombus from afib or a metastatic lesion  She was transferred to [facility] on 9/20/18 and has continued to be followed by endocrinology, nephrology, cardiology, and palliative care  Today, Ms Maday Ash is very sleepy and could hardly keep her eyes open for longer than a few seconds  She nodded "no" when I asked if she was in pain and grunted in response to all of my questions  ROS:  Patient nodded "no" to questions, however patient had a very difficult time staying awake, unlikely that her answers are true         Historical Information   Past Medical History:   Diagnosis Date    Bipolar depression ([address] ) 3/17/2016    CAD S/P percutaneous coronary angioplasty 3/17/2016    Cancer ([place] [address] )     Chronic pain     Colonic mass     Colostomy care [facility])     COPD (chronic obstructive pulmonary disease) (HCC)     CVA (cerebral vascular accident) ([place] [address] )     R sided weakness    Essential hypertension 3/17/2016    GERD (gastroesophageal reflux disease)     Hepatitis C     Heroin abuse 3/18/2016    History of gastric ulcer     Hypertension     NSTEMI (non-ST elevated myocardial infarction) ([place] [address] ) 07/2012    Psychiatric disorder 09/03/2014    Inpatient admission     Schizoaffective disorder ([place] [address] ) 3/17/2016    Schizophrenia ([place] [address] ) 3/17/2016    STEMI (ST elevation myocardial infarction) ([place] [address] ) 12/2017    Type 2 diabetes mellitus ([place] [address] ) 3/17/2016     Past Surgical History:   Procedure Laterality Date    CARDIAC CATHETERIZATION  07/2016    COLONOSCOPY N/A 6/22/2018    Procedure: COLONOSCOPY;  Surgeon: Glenys Galvez MD;  Location: [facility]; Service: Colorectal    CORONARY ANGIOPLASTY WITH STENT PLACEMENT  07/05/2012    MAKEDA (LAD), PTA (Diag)    CORONARY ANGIOPLASTY WITH STENT PLACEMENT  12/2017    MAKEDA/ PTA (RCA)    FL GUIDED CENTRAL VENOUS ACCESS REPLACEMENT  8/15/2018    DE INSERT PICC W/ SUB-Q PORT N/A 8/15/2018    Procedure: INSERTION VENOUS PORT (PORT-A-CATH);   Surgeon: Goran Steve MD;  [facility];  Service: Colorectal    DE LAP,DIAGNOSTIC ABDOMEN N/A 7/17/2018    Procedure: LAPAROSCOPY DIAGNOSTIC, Laproscopic transverse loop colostomy, lysis of adhesions;  Surgeon: Monroe No MD;  [facility];  Service: Colorectal    DE SIGMOIDOSCOPY FLX DX W/COLLJ SPEC BR/WA IF PFRMD N/A 8/15/2018    Procedure: Obie Marsh;  Surgeon: Goran Steve MD;  [facility];  Service: Colorectal     Social History   History   Alcohol Use No     Comment: "only on occasion"     History   Drug Use No     Comment: only tried x 1      History   Smoking Status    Current Every Day Smoker    Packs/day: 1 00    Years: 45 00    Types: Cigarettes   Smokeless Tobacco    Never Used     Family History:   Family History   Problem Relation Age of Onset    Heart attack Father     Cancer Brother         gastric       Meds/Allergies   PTA meds:   Prior to Admission Medications   Prescriptions Last Dose Informant Patient Reported? Taking?    DULoxetine (CYMBALTA) 60 mg delayed release capsule  Self Yes No   Sig: Take 60 mg by mouth daily     Rivaroxaban (XARELTO PO)   Yes Yes   Sig: Take 35 mg by mouth daily   atorvastatin (LIPITOR) 10 mg tablet  Self No Yes   Sig: Take 1 tablet (10 mg total) by mouth daily   clopidogrel (PLAVIX) 75 mg tablet  Self No Yes   Sig: Take 1 tablet (75 mg total) by mouth daily   divalproex sodium (DEPAKOTE) 250 mg EC tablet  Self Yes No   Sig: Take 3 tablets by mouth daily   insulin aspart protamine-insulin aspart (NOVOLOG MIX 70/30) 100 units/mL injection   No Yes   Sig: Inject 20 Units under the skin 2 (two) times a day before meals   insulin glargine (LANTUS) 100 units/mL subcutaneous injection  Self No Yes   Sig: Inject 10 Units under the skin daily at bedtime   metFORMIN (GLUCOPHAGE) 1000 MG tablet  Self Yes Yes   Sig: metformin 1,000 mg tablet  BID   metoclopramide (REGLAN) 10 mg tablet   No Yes   Sig: Take 1 tablet (10 mg total) by mouth 3 (three) times a day before meals   mirtazapine (REMERON) 15 mg tablet  Self Yes No   Sig: Take 1 tablet by mouth daily     nitroglycerin (NITROSTAT) 0 4 mg SL tablet  Self Yes No   Sig: Place 0 4 mg under the tongue   oxyCODONE-acetaminophen (PERCOCET) 5-325 mg per tablet   No No   Sig: Take 1 tablet by mouth every 8 (eight) hours as needed for moderate pain Max Daily Amount: 2 tablets   promethazine (PHENERGAN) 12 5 MG tablet  Self Yes No   Sig: Take 12 5 mg by mouth daily     senna ([employer] SENNA) 8 6 MG tablet  Self Yes No   Sig: Take by mouth   terbinafine (LAMISIL) 250 mg tablet  Self Yes No   Sig: Take 1 tablet by mouth daily   traZODone (DESYREL) 50 mg tablet  Self Yes No   Sig: Take 50 mg by mouth daily at bedtime      Facility-Administered Medications: None     No Known Allergies    Objective   First Vitals:   Blood Pressure: 103/69 (09/17/18 0200)  Pulse: (!) 130 (09/17/18 0200)  Temperature: (!) 101 5 °F (38 6 °C) (09/17/18 0200)  Temp Source: Probe (09/17/18 0200)  Respirations: (!) 34 (09/17/18 0200)  Height: 5' 3" (160 cm) (09/17/18 0200)  Weight - Scale: 57 1 kg (125 lb 14 1 oz) (09/17/18 0200)  SpO2: 93 % (09/17/18 0200)    Current Vitals:   Blood Pressure: 126/69 (09/26/18 1113)  Pulse: 87 (09/26/18 1113)  Temperature: 98 °F (36 7 °C) (09/26/18 1113)  Temp Source: Axillary (09/26/18 1113)  Respirations: 20 (09/26/18 1113)  Height: 5' 3" (160 cm) (09/17/18 0200)  Weight - Scale: 70 2 kg (154 lb 12 2 oz) (09/20/18 1950)  SpO2: 95 % (09/26/18 1113)      Intake/Output Summary (Last 24 hours) at 09/26/18 1025  Last data filed at 09/26/18 0700   Gross per 24 hour   Intake          3095 83 ml   Output             2325 ml   Net           770 83 ml       Invasive Devices     Peripheral Intravenous Line            Peripheral IV 09/23/18 Left Forearm 2 days          Drain            Colostomy RUQ -- days    Urethral Catheter Latex 18 Fr  5 days                Physical Exam   Constitutional: No distress  Patient could not stay awake  Nodded "no" to all my questions  HENT:   Head: Normocephalic and atraumatic  Cardiovascular: Normal rate and regular rhythm  Difficult to auscultate 2/2 patient's snoring   Pulmonary/Chest: Effort normal and breath sounds normal    Difficult to auscultate 2/2 snoring (unsure if snores were wheezes)   Abdominal: Soft  She exhibits no distension  There is no tenderness  Ostomy bag present with dark liquid contents   Musculoskeletal: She exhibits edema  1+ edema to knees   Skin: Skin is warm and dry  She is not diaphoretic           Lab Results:   Lab Results   Component Value Date    WBC 55 35 (HH) 09/26/2018 HGB 8 1 (L) 2018    HCT 23 6 (L) 2018    MCV 80 (L) 2018    PLT 90 (L) 2018     Lab Results   Component Value Date     (L) 2018    K 3 5 2018    CL 98 (L) 2018    CO2 21 2018    ANIONGAP 7 2018    BUN 27 (H) 2018    CREATININE 2 24 (H) 2018    GLUCOSE 175 (H) 2018    GLUF 165 (H) 2018    CALCIUM 6 9 (L) 2018    AST 22 2018    ALT <6 (L) 2018    ALKPHOS 130 (H) 2018    PROT 7 2 2018    BILITOT 0 4 2018    EGFR 23 2018         Imagin18 MRI lumbar spine:    1  Partially nondiagnostic exam due to severe patient motion artifact  Grossly stable L5-S1 left foraminal and subarticular zone disc extrusion with significant mass effect on the traversing S1 nerve root  Stable severe bilateral neural foraminal narrowing  2   Suboptimal evaluation for osteomyelitis and discitis due to motion artifact and absence of contrast enhancement, though no significant change when compared to the prior exam     18 CT C/A/P pending    EKG, Pathology, and Other Studies:   ECHO 18: LVEF 60%, spherical echogenic mass attached to RA free wall/tricuspid valve    Assessment:  Ms Darcy Zuñiga is a 61 yo female with extensive stage III colorectal adenocarcinoma who was admitted to the ICU for [facility], sepsis with possible endocarditis, persistent MSSA bacteremia, RYLAN and new onset atrial fibrillation  Plan:  HHS, resolved  - endocrine following   - on insulin with frequent accuchecks   Lab Results   Component Value Date    HGBA1C 9 6 (H) 2018   - glucose spiked this morning to 504 at 05:45   - repeat     Right atrial mass with possible septic emboli   - right atrial mass likely thrombus from port-a-cath trauma,possibly infected   - port-a-cath removed as possible spurce of infection   - cards following: TTE, poor candidate for CT surgery, would continue 6 weeks antibiotics   JUNIOR if bacteremia does not clear    Suspected Endocarditis  - aortic valve has small vegetation vs  Fibroelastoma   - cards following: TTE, poor candidate for CT surgery, would continue 6 weeks antibiotics  JUNIOR if bacteremia does not clear  - ID following: continue antibiotics     Persistent MSSA bacteremia   - cultures from 9/24 positive, source unknown   - Port-A-Cath removed as possible source of infection   - WBC increased to 55 35 from 41 67  - CT chest/abdomen/pelvis   - ID following:    - continue nafcillin therapy, if worsens will switch to ancef   - repeat MRI lumbar and thoracic spine     New onset afib  - currently in NSR  - amiodarone 200mg TID   - cards following   - on telemetry     Acute Kidney Injury  - likely from volume depletion from sepsis and hypotension   - Creatinine has been improving with IV fluids and is stable at 2 23- 2 24  - Nephrology following:    - continue IV fluids with bicarbonate   - add midodrine for BP support     F/E/N  - modest fluid restriction, IVF with bicarbonate and D5 at 125/hr  - monitor electrolytes and replete as needed   - hypocalemia- received calcium gluconate 1g    - hypomagnesemia- received magnesium 2g    - hypokalemia- received 30 meq K-dur  - diet: dysphagia diet 3 with enlive supplementation   - nutrition consult: ensure enlive supplements TID, liberalize diet    Back pain  - MRI: Grossly stable L5-S1 left foraminal and subarticular zone disc extrusion with significant mass effect on the traversing S1 nerve root  Stable severe bilateral neural foraminal narrowing      Colon cancer s/p loop colostomy   - Stage IIIC adenocarcinoma of the rectosigmoid colon   - s/p transverse loop colostomy 7/17/18  - receiving palliative FOLFOX therapy - received first course on 8/28/18  - follows outpatient with Dr Christiane Warren   - palliative care following  - oncology consulted    CAD s/p percutaneous coronary angioplasty  - hx NSTEMI in 2012, STEMI 2017  - s/p stent placement in 2017  - cardiology following   - lipitor 5mg, plavix 75 mg daily     CVA with residual right-sided weakness  - lipitor 5 mg qd, plavix 75mg qd     Hepatitis C  - low reactive hep c antibody  - f/u Hep C RNA     Nicotine dependence  - current 1ppd smoker  - 45 pack year history     Bipolar disorder, schizophrenia (schizoaffective?)  - depakote 750 qd  - duloxetine 60 mg qd    DVT ppx:   - lovenox sub q  - SCDs    Myrna Sauceda, MS4  This note is for training purposes only and not part of the legal medical record

## 2018-09-26 NOTE — PHYSICAL THERAPY NOTE
Physical Therapy Cancellation Note      ATTEMPTED TO SEE PT THIS AFTERNOON  PT CURRENTLY OFF UNIT RECEIVING A CAT SCAN  WILL FOLLOW AS APPROPRIATE      Kenzie Sullivan, PTA

## 2018-09-26 NOTE — ASSESSMENT & PLAN NOTE
Lab Results   Component Value Date    HGBA1C 9 6 (H) 09/22/2018       Recent Labs      09/25/18   1611  09/25/18   2126  09/26/18   0704  09/26/18   1115   POCGLU  177*  220*  196*  196*       Blood Sugar Average: Last 72 hrs:  (P) 143 1875   Monitor blood sugar, continue with the current insulin regimen

## 2018-09-26 NOTE — PROGRESS NOTES
Progress Note - Infectious Disease   Emily Abdullahi 60 y o  female MRN: [id number]  Unit/Bed#: [facility] 828-01 Encounter: [id number]      Impression/Plan:  1   MSSA bacteremia, Persistent since 09/16: Lujean Schlatter initial source of this pathogen remains unknown   Cultures from 9/24 are positive  Repeat cultures collected today  She is no longer having episodes of tachycardia and hypothermia or hypotension  Patient has no new or ongoing sources of bacteremia obvious on exam  -will continue nafcillin (optimal therapy for MSSA) for now at current dose; if creatinine worsens will change the patient to Ancef  -will follow CBC and chemistry daily  -MRI spine does not indicate any new or developing lesions compared to prior  -reviewed case with Cardiology service and depending on patient's counts may be eligible for JUNIOR to evaluate for developing sources of her ongoing bacteremia tomorrow  TTE without significant change  -will repeat CT chest and abdomen and pelvis to look for any other ectopic foci or progression of her septic emboli      2   Pulmonary septic emboli:  Likely due to infected RA thrombus, no indication of tricuspid valve lesions on prior and subsequent echo on review with cardiology  -antibiotic management as above  -will continue to follow blood cultures for clearance  -additional imaging as above  -the patient will likely require prolonged course of antibiotic therapy and potential placement based on her home situation      3   Severe sepsis, POA:   the patient remains hemodynamically stable at this time  She is no longer having episodes of hypothermia or hypotension  -this is likely due to problems 1 and 2  -will continue antibiotics and management as above  -C diff negative and stool enteric panel negative      4   Leukocytosis:  Possibly due to continued doses of G-CSF, or mounting response to ongoing infection  No other obvious source on exam to explain her leukocytosis    -no additional doses of G-CSF  -continue to monitor CBC daily  -antibiotic management and workup as above     5   Back and leg pain:   her pain has improved on exam, the patient remains weak and is starting to move her legs more on exam  -MRI without acute finding  -antibiotics as above  -will continue to monitor on serial exams      6   Colon cancer currently on palliative chemo  -patient has had port removal on this admission  -ultimately need to manage her infection before any additional chemotherapy  -palliative care following  -patient has been seen by Oncology     7   Diabetes  -patient presented with elevated blood sugars on admission  -current management as per primary service     8   Acute kidney injury- creatinine increased today  -nephrology following  -will monitor chemistry daily  -if creatinine continues to rise tomorrow, will change the patient to Ancef     The above plan was discussed with the primary service  Antibiotics:  Nafcillin 8  Total antibiotics 11    Subjective: The patient denies any acute events overnight however she is a poor historian  She again is unaware of why she was in the hospital on how long she has been here  She moves her legs more on exam and seems more awake and participates in exam   She denies having any chest pain, nausea, vomiting or shortness of breath  She reports feeling tightness and soreness throughout her back and legs  Objective:  Vitals:  HR:  [80-93] 87  Resp:  [18-20] 20  BP: (104-126)/(58-69) 126/69  SpO2:  [91 %-98 %] 95 %  Temp (24hrs), Av 1 °F (36 7 °C), Min:97 6 °F (36 4 °C), Max:98 8 °F (37 1 °C)  Current: Temperature: 98 °F (36 7 °C)    Physical Exam:   General Appearance:  Alert, interactive, nontoxic, no acute distress  Throat: Oropharynx moist without lesions      Lungs:   Clear to auscultation bilaterally; no wheezes, rhonchi or rales; respirations unlabored   Heart:  RRR; no murmur, rub or gallop   Abdomen:   Soft, non-tender, non-distended, positive bowel sounds  Extremities: No clubbing, cyanosis; she has ongoing nonpitting edema in the lower extremities bilaterally  She is able to sense touch in her lower extremities and she is starting to extend her knees on exam and is moving her toes  She is also able to raise her right upper extremity above her head  Skin: No new rashes or lesions  No draining wounds noted  For prior port site appears clear intact         Labs, Imaging, & Other studies:   All pertinent labs and imaging studies were personally reviewed    Results from last 7 days  Lab Units 09/26/18  1105 09/26/18  0545 09/25/18  0435   WBC Thousand/uL 55 35* 41 67* 35 74*   HEMOGLOBIN g/dL 8 1* 6 9* 8 0*   PLATELETS Thousands/uL 90* 67* 62*       Results from last 7 days  Lab Units 09/26/18  1104 09/26/18  0545 09/25/18  0435   SODIUM mmol/L 129* 127* 132*   POTASSIUM mmol/L 3 5 3 3* 3 4*   CHLORIDE mmol/L 98* 92* 105   CO2 mmol/L 21 26 16*   BUN mg/dL 27* 23 26*   CREATININE mg/dL 2 24* 2 07* 2 23*   EGFR ml/min/1 73sq m 23 26 23   CALCIUM mg/dL 6 9* 5 8* 6 5*   AST U/L 22 19 22   ALT U/L <6* <6* <6*   ALK PHOS U/L 130* 104 130*       Results from last 7 days  Lab Units 09/24/18  1743 09/24/18  1003 09/22/18  0530 09/22/18  0519 09/20/18  0512 09/20/18  0507   BLOOD CULTURE  Staphylococcus aureus*  --  Staphylococcus aureus* Staphylococcus aureus* Staphylococcus aureus* Staphylococcus aureus*   GRAM STAIN RESULT  Gram positive cocci in clusters  --  Gram positive cocci in clusters Gram positive cocci in clusters Gram positive cocci in clusters Gram positive cocci in clusters   C DIFF TOXIN B   --  NEGATIVE for C difficle toxin by PCR    --   --   --   --

## 2018-09-26 NOTE — ASSESSMENT & PLAN NOTE
· Cardiology on board  · Patient is going for another the TTE, may need JUNIOR if the bacteremia is persistent  · Discussed with cardiology  · Currently patient is on therapeutic dose of Lovenox for a flutter/thrombosis

## 2018-09-26 NOTE — PROGRESS NOTES
Progress note - Palliative and Supportive Care   Emily Abdullahi 60 y o  female [id number]    Assessment:   -   Patient Active Problem List   Diagnosis    CVA (cerebral vascular accident) ([place] [address] )    Type 2 diabetes mellitus without complication, without long-term current use of insulin (HCC)    Schizoaffective disorder, bipolar type ([place] [address] )    Essential hypertension    Colonic mass    Type 2 diabetes mellitus with complication ([place] [address] )    CKD (chronic kidney disease) stage 3, GFR 30-59 ml/min    Pain of right upper extremity    Acute kidney injury ([place] [address] )    CAD (coronary artery disease)    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[address] )    Large bowel obstruction ([place] [address] )    Brachial artery stenosis, right (HCC)    Hyponatremia    Rectosigmoid mass - High colostomy output    Leukocytosis    GERD (gastroesophageal reflux disease)    Tobacco abuse    Liver metastasis (HCC)    Mixed hyperlipidemia    Dizziness - due to orthostatic hypotension    Ambulatory dysfunction    Orthostatic hypotension    Chemotherapy induced nausea and vomiting    Lower abdominal pain    HHNC (hyperglycemic hyperosmolar nonketotic coma) ([facility])    Sepsis ([place] [address] )    Altered mental status    Acute respiratory failure with hypoxia (HCC)    Hypocalcemia    Leukopenia    Cavitating pulmonary nodules     Metabolic acidosis, NAG, bicarbonate losses    Hypomagnesemia    Hypophosphatemia    Bacteremia due to methicillin susceptible Staphylococcus aureus (MSSA)    Colon cancer ([place] [address] )    Back pain    Suspected endocarditis       Plan:  1  Symptom management -    -   Nonspecific pain in the setting of metastatic colon cancer status post 2 cycles of palliative chemotherapy   Now with MSSA bacteremia  Per Cardiology patient would not be a candidate for CT surgery and recommended to continue 6 weeks of IV antibiotics  Per Oncology patient underwent 2 cycles of chemo and it is too soon to evaluate response at this time     For now plan to resume IV antibiotics  Resume scheduled Tylenol  And Oxy IR 2 5 mg as needed  Infectious Disease,   Cardiology, med-onc, nephrology following  2  Goals -   -  Adequate pain control  Full cares, no limits     Code Status:   Full code level 1   Decisional apparatus:  Patient is NOT competent on my exam today  If competence is lost, patient's substitute decision maker would default to    Three [place] 169  Advance Directive / Living Will / POLST:   POLST completed  I have reviewed the patient's controlled substance dispensing history in the Prescription Drug Monitoring Program in compliance with the [facility] regulations before prescribing any controlled substances  Interval history:        24 hour events reviewed  Patient is seen and examined without family present at bedside  Nephrology team rounding prior to my visit and patient is to have repeat blood work  Per Oncology, "too soon to evaluate tumor response " Patient continues with bacteremia with pulmonary septic emboli  Echo results pending  Patient with complaints of   Nonspecific pain  Tolerated breakfast    Patient asked me to stay at her bedside until blood draw was completed      MEDICATIONS / ALLERGIES:     all current active meds have been reviewed and current meds:   Current Facility-Administered Medications   Medication Dose Route Frequency    acetaminophen (TYLENOL) oral suspension 650 mg  650 mg Oral Q6H PRN    acetaminophen (TYLENOL) tablet 325 mg  325 mg Oral Q8H [address]    amiodarone tablet 200 mg  200 mg Oral BID With Meals    atorvastatin (LIPITOR) tablet 5 mg  5 mg Oral After Dinner    chlorhexidine (PERIDEX) 0 12 % oral rinse 15 mL  15 mL Swish & Spit Q12H [address]    clopidogrel (PLAVIX) tablet 75 mg  75 mg Oral Daily    divalproex sodium (DEPAKOTE) EC tablet 750 mg  750 mg Oral Daily    DULoxetine (CYMBALTA) delayed release capsule 60 mg  60 mg Oral Daily    enoxaparin (LOVENOX) subcutaneous injection 70 mg  1 mg/kg Subcutaneous Q12H [address]    insulin lispro (HumaLOG) 100 units/mL subcutaneous injection 1-5 Units  1-5 Units Subcutaneous HS    insulin lispro (HumaLOG) 100 units/mL subcutaneous injection 1-6 Units  1-6 Units Subcutaneous TID AC    lactulose 20 g/30 mL oral solution 10 g  10 g Oral BID    metoclopramide (REGLAN) injection 5 mg  5 mg Intravenous Q6H PRN    midodrine (PROAMATINE) tablet 10 mg  10 mg Oral TID AC    nafcillin (NALLPEN) 2,000 mg in sodium chloride 0 9 % 100 mL IVPB  2,000 mg Intravenous Q4H    sodium bicarbonate 150 mEq in dextrose 5 % 1,000 mL infusion  125 mL/hr Intravenous Continuous    traZODone (DESYREL) tablet 50 mg  50 mg Oral HS     Facility-Administered Medications Ordered in Other Encounters   Medication Dose Route Frequency    [START ON 9/27/2018] heparin lock flush 100 units/mL injection 300 Units  300 Units Intracatheter Q1H PRN       No Known Allergies    OBJECTIVE:    Physical Exam  Physical Exam   Constitutional: She is cooperative  She has a sickly appearance  HENT:   Head: Normocephalic and atraumatic  Eyes: No scleral icterus  Neck: Neck supple  Cardiovascular: Normal rate  Pulmonary/Chest: Effort normal    Abdominal: Soft  Musculoskeletal: She exhibits edema  Neurological: She is alert  Skin: Skin is warm and dry  Psychiatric: Her affect is labile  Her speech is delayed and tangential        Lab Results:   I have personally reviewed pertinent labs  , CBC:   Lab Results   Component Value Date    WBC 55 35 (HH) 09/26/2018    HGB 8 1 (L) 09/26/2018    HCT 23 6 (L) 09/26/2018    MCV 80 (L) 09/26/2018    PLT 90 (L) 09/26/2018    MCH 27 3 09/26/2018    MCHC 34 3 09/26/2018    RDW 17 4 (H) 09/26/2018    MPV 13 4 (H) 09/26/2018    NRBC 0 09/26/2018   , CMP:   Lab Results   Component Value Date     (L) 09/26/2018    K 3 5 09/26/2018    CL 98 (L) 09/26/2018    CO2 21 09/26/2018    BUN 27 (H) 09/26/2018    CREATININE 2 24 (H) 09/26/2018    CALCIUM 6 9 (L) 09/26/2018    AST 22 09/26/2018    ALT <6 (L) 09/26/2018    ALKPHOS 130 (H) 09/26/2018    EGFR 23 09/26/2018     Results for Prabha Smith (MRN [id number]) as of 9/26/2018 17:13   Ref  Range 9/25/2018 04:35   HEPATITIS B SURFACE ANTIGEN Latest Ref Range: Non-reactive, NonReactive - Confirmed  Non-reactive   HEPATITIS B SURFACE ANTIBODY Latest Units: mIU/mL 6 49   HEPATITIS B CORE TOTAL ANTIBODY Latest Ref Range: Non-reactive  Non-reactive   HEPATITIS C ANTIBODY Latest Ref Range: Non-reactive  Low Reactive       Counseling / Coordination of Care  Total floor / unit time spent today  40  minutes  Greater than 50% of total time was spent with the patient and / or family counseling and / or coordination of care   A description of the counseling / coordination of care: pain control, psychosocial support,

## 2018-09-26 NOTE — ASSESSMENT & PLAN NOTE
Persistent MSSA bacteremia  Infectious Disease on board  Continue with the antibiotics  Follow-up on the repeat cultures-last culture from 8/24/2018 was still positive  MRI of the lumbar spine reviewed,  Status post Port-A-Cath removal  Discussed with infectious disease-CT scan of the chest abdomen and pelvis  Patient is going for a T JUNIOR followed by a JUNIOR if the needed  Discussed with cardiology-recommended Heme-Onc evaluation given her poor prognosis

## 2018-09-26 NOTE — PROGRESS NOTES
Pt unable to tolerate full MRI at this time d/t increased back pain 10/10  CHESTER Retana, made aware

## 2018-09-26 NOTE — ASSESSMENT & PLAN NOTE
· Likely secondary to the infectious process  · Appears to be improving  · CT head on 09/17/18-no acute intracranial pathology

## 2018-09-26 NOTE — PROGRESS NOTES
Progress Note - Nephrology   Emily Abdullahi 60 y o  female MRN: [id number]  Unit/Bed#: [facility] 828-01 Encounter: [id number]      Assessment/Plan:    Acute kidney injury  · Likely secondary to hypotension and ischemia on presentation; creatinine has been improving; possibly ischemic ATN, unlikely to be AIN as urine eosinophils are 0  · Urine output 800 mL  · Midodrine 10 mg t i d ; to help with kidney perfusion  · Creatinine 2 07 (previous 2 23) (baseline 1-1 1)  · Continue IV fluids 125 mL/hour  · Blood pressure 110 to 120  · Urinary eosinophils pending  · Cortisol 26 6    Electrolytes  · Labs to be redrawn; wait on repeat as all levels are decreased compared to yesterday    Other issues  · Colon cancer-colostomy in place  · Anemia- hemoglobin 6 9 chin to fuse is needed  · Right atrial mass-noted to be thrombus per Cardiology; 6 weeks antibiotic  · Endocarditis-gram-positive cocci in clusters, MSSA; nafcillin  ·  Diabetes mellitus- management per primary team    Subjective:   Patient resting comfortably in bed and awoke upon arousal   She is complaining of back pain and buttocks pain because she is unable to move  Patient denies any change in her colostomy output, but patient is poor historian  Patient had no overnight events  Patient denies fever, chills, nausea, vomiting, and abdominal pain  Objective:     Vitals: Blood pressure 114/59, pulse 88, temperature 97 6 °F (36 4 °C), temperature source Oral, resp  rate 20, height 5' 3" (1 6 m), weight 70 2 kg (154 lb 12 2 oz), SpO2 97 %, not currently breastfeeding  ,Body mass index is 27 42 kg/m²  Weight (last 2 days)     None            Intake/Output Summary (Last 24 hours) at 09/26/18 0815  Last data filed at 09/26/18 0700   Gross per 24 hour   Intake          3435 83 ml   Output             2325 ml   Net          1110 83 ml       Urethral Catheter Latex 18 Fr   (Active)   Site Assessment Clean;Skin intact 9/26/2018  8:06 AM   Collection Container Standard drainage bag 9/26/2018  8:06 AM   Securement Method Securing device (Describe) 9/26/2018  8:06 AM   Output (mL) 250 mL 9/26/2018  7:00 AM     Physical Exam   Constitutional: She is oriented to person, place, and time  She appears well-developed and well-nourished  No distress  HENT:   Head: Normocephalic and atraumatic  Eyes: Conjunctivae are normal  No scleral icterus  Cardiovascular: Normal rate and regular rhythm  Exam reveals no gallop and no friction rub  No murmur heard  Pulmonary/Chest: No respiratory distress  She has no wheezes  She has no rales  Poor inspiratory effort; difficult to assess breath sounds   Abdominal: Soft  Bowel sounds are normal  She exhibits no distension  There is no tenderness  Colostomy bag in place right lower quadrant   Musculoskeletal: Normal range of motion  She exhibits edema  1+ lower extremity edema   Neurological: She is alert and oriented to person, place, and time  Skin: Skin is warm  No rash noted

## 2018-09-27 ENCOUNTER — HOSPITAL ENCOUNTER (OUTPATIENT)
Dept: INFUSION CENTER | Facility: CLINIC | Age: 58
Discharge: HOME | End: 2018-09-27

## 2018-09-27 ENCOUNTER — APPOINTMENT (INPATIENT)
Dept: NON INVASIVE DIAGNOSTICS | Facility: HOSPITAL | Age: 58
DRG: 871 | End: 2018-09-27
Payer: COMMERCIAL

## 2018-09-27 LAB
ANION GAP SERPL CALCULATED.3IONS-SCNC: 9 MMOL/L (ref 4–13)
ANISOCYTOSIS BLD QL SMEAR: PRESENT
BACTERIA BLD CULT: ABNORMAL
BASOPHILS # BLD MANUAL: 0 THOUSAND/UL (ref 0–0.1)
BASOPHILS NFR MAR MANUAL: 0 % (ref 0–1)
BUN SERPL-MCNC: 26 MG/DL (ref 5–25)
BURR CELLS BLD QL SMEAR: PRESENT
CA-I BLD-SCNC: 0.98 MMOL/L (ref 1.12–1.32)
CALCIUM SERPL-MCNC: 7.2 MG/DL (ref 8.3–10.1)
CHLORIDE SERPL-SCNC: 99 MMOL/L (ref 100–108)
CO2 SERPL-SCNC: 23 MMOL/L (ref 21–32)
CREAT SERPL-MCNC: 2.01 MG/DL (ref 0.6–1.3)
EOSINOPHIL # BLD MANUAL: 0 THOUSAND/UL (ref 0–0.4)
EOSINOPHIL NFR BLD MANUAL: 0 % (ref 0–6)
ERYTHROCYTE [DISTWIDTH] IN BLOOD BY AUTOMATED COUNT: 17.6 % (ref 11.6–15.1)
GFR SERPL CREATININE-BSD FRML MDRD: 27 ML/MIN/1.73SQ M
GLUCOSE SERPL-MCNC: 103 MG/DL (ref 65–140)
GLUCOSE SERPL-MCNC: 105 MG/DL (ref 65–140)
GLUCOSE SERPL-MCNC: 142 MG/DL (ref 65–140)
GLUCOSE SERPL-MCNC: 172 MG/DL (ref 65–140)
GLUCOSE SERPL-MCNC: 179 MG/DL (ref 65–140)
GRAM STN SPEC: ABNORMAL
HCT VFR BLD AUTO: 23.1 % (ref 34.8–46.1)
HGB BLD-MCNC: 8 G/DL (ref 11.5–15.4)
HYPERCHROMIA BLD QL SMEAR: PRESENT
LYMPHOCYTES # BLD AUTO: 3.62 THOUSAND/UL (ref 0.6–4.47)
LYMPHOCYTES # BLD AUTO: 5 % (ref 14–44)
MAGNESIUM SERPL-MCNC: 1.6 MG/DL (ref 1.6–2.6)
MCH RBC QN AUTO: 27.4 PG (ref 26.8–34.3)
MCHC RBC AUTO-ENTMCNC: 34.6 G/DL (ref 31.4–37.4)
MCV RBC AUTO: 79 FL (ref 82–98)
METAMYELOCYTES NFR BLD MANUAL: 1 % (ref 0–1)
MONOCYTES # BLD AUTO: 3.62 THOUSAND/UL (ref 0–1.22)
MONOCYTES NFR BLD: 5 % (ref 4–12)
MYELOCYTES NFR BLD MANUAL: 2 % (ref 0–1)
NEUTROPHILS # BLD MANUAL: 62.23 THOUSAND/UL (ref 1.85–7.62)
NEUTS BAND NFR BLD MANUAL: 3 % (ref 0–8)
NEUTS SEG NFR BLD AUTO: 83 % (ref 43–75)
NRBC BLD AUTO-RTO: 0 /100 WBCS
PHOSPHATE SERPL-MCNC: 3.4 MG/DL (ref 2.7–4.5)
PLATELET # BLD AUTO: 111 THOUSANDS/UL (ref 149–390)
PLATELET BLD QL SMEAR: ABNORMAL
PMV BLD AUTO: 11.8 FL (ref 8.9–12.7)
POIKILOCYTOSIS BLD QL SMEAR: PRESENT
POLYCHROMASIA BLD QL SMEAR: PRESENT
POTASSIUM SERPL-SCNC: 3.1 MMOL/L (ref 3.5–5.3)
PROMYELOCYTES NFR BLD MANUAL: 1 % (ref 0–0)
RBC # BLD AUTO: 2.92 MILLION/UL (ref 3.81–5.12)
RBC MORPH BLD: PRESENT
SODIUM SERPL-SCNC: 131 MMOL/L (ref 136–145)
TARGETS BLD QL SMEAR: PRESENT
WBC # BLD AUTO: 72.36 THOUSAND/UL (ref 4.31–10.16)

## 2018-09-27 PROCEDURE — 80048 BASIC METABOLIC PNL TOTAL CA: CPT | Performed by: INTERNAL MEDICINE

## 2018-09-27 PROCEDURE — 93970 EXTREMITY STUDY: CPT

## 2018-09-27 PROCEDURE — 93970 EXTREMITY STUDY: CPT | Performed by: SURGERY

## 2018-09-27 PROCEDURE — 99233 SBSQ HOSP IP/OBS HIGH 50: CPT | Performed by: INTERNAL MEDICINE

## 2018-09-27 PROCEDURE — 82948 REAGENT STRIP/BLOOD GLUCOSE: CPT

## 2018-09-27 PROCEDURE — 99232 SBSQ HOSP IP/OBS MODERATE 35: CPT | Performed by: INTERNAL MEDICINE

## 2018-09-27 PROCEDURE — 85007 BL SMEAR W/DIFF WBC COUNT: CPT | Performed by: FAMILY MEDICINE

## 2018-09-27 PROCEDURE — 94760 N-INVAS EAR/PLS OXIMETRY 1: CPT

## 2018-09-27 PROCEDURE — 84100 ASSAY OF PHOSPHORUS: CPT | Performed by: INTERNAL MEDICINE

## 2018-09-27 PROCEDURE — 99233 SBSQ HOSP IP/OBS HIGH 50: CPT | Performed by: FAMILY MEDICINE

## 2018-09-27 PROCEDURE — 85027 COMPLETE CBC AUTOMATED: CPT | Performed by: FAMILY MEDICINE

## 2018-09-27 PROCEDURE — 82330 ASSAY OF CALCIUM: CPT | Performed by: INTERNAL MEDICINE

## 2018-09-27 PROCEDURE — 83735 ASSAY OF MAGNESIUM: CPT | Performed by: INTERNAL MEDICINE

## 2018-09-27 PROCEDURE — 99232 SBSQ HOSP IP/OBS MODERATE 35: CPT | Performed by: FAMILY MEDICINE

## 2018-09-27 PROCEDURE — 99221 1ST HOSP IP/OBS SF/LOW 40: CPT | Performed by: INTERNAL MEDICINE

## 2018-09-27 RX ORDER — OXYCODONE HYDROCHLORIDE 10 MG/1
TABLET ORAL
Status: DISPENSED
Start: 2018-09-27 | End: 2018-09-27

## 2018-09-27 RX ORDER — SODIUM BICARBONATE 650 MG/1
1300 TABLET ORAL
Status: DISCONTINUED | OUTPATIENT
Start: 2018-09-27 | End: 2018-09-28

## 2018-09-27 RX ORDER — CALCIUM CARBONATE 500(1250)
2 TABLET ORAL 2 TIMES DAILY WITH MEALS
Status: DISCONTINUED | OUTPATIENT
Start: 2018-09-27 | End: 2018-09-28

## 2018-09-27 RX ORDER — POTASSIUM CHLORIDE 20 MEQ/1
40 TABLET, EXTENDED RELEASE ORAL ONCE
Status: COMPLETED | OUTPATIENT
Start: 2018-09-27 | End: 2018-09-27

## 2018-09-27 RX ORDER — SODIUM CHLORIDE AND POTASSIUM CHLORIDE .9; .15 G/100ML; G/100ML
100 SOLUTION INTRAVENOUS CONTINUOUS
Status: DISCONTINUED | OUTPATIENT
Start: 2018-09-27 | End: 2018-09-28

## 2018-09-27 RX ORDER — MAGNESIUM SULFATE 1 G/100ML
1 INJECTION INTRAVENOUS ONCE
Status: COMPLETED | OUTPATIENT
Start: 2018-09-27 | End: 2018-09-27

## 2018-09-27 RX ORDER — OXYCODONE HYDROCHLORIDE 5 MG/1
TABLET ORAL
Status: COMPLETED
Start: 2018-09-27 | End: 2018-09-27

## 2018-09-27 RX ADMIN — NAFCILLIN SODIUM 2000 MG: 2 INJECTION, POWDER, LYOPHILIZED, FOR SOLUTION INTRAMUSCULAR; INTRAVENOUS at 14:48

## 2018-09-27 RX ADMIN — SODIUM BICARBONATE 650 MG TABLET 1300 MG: at 14:49

## 2018-09-27 RX ADMIN — ATORVASTATIN CALCIUM 5 MG: 10 TABLET, FILM COATED ORAL at 17:16

## 2018-09-27 RX ADMIN — AMIODARONE HYDROCHLORIDE 200 MG: 200 TABLET ORAL at 08:45

## 2018-09-27 RX ADMIN — ACETAMINOPHEN 325 MG: 325 TABLET, FILM COATED ORAL at 06:08

## 2018-09-27 RX ADMIN — SODIUM CHLORIDE AND POTASSIUM CHLORIDE 100 ML/HR: .9; .15 SOLUTION INTRAVENOUS at 13:05

## 2018-09-27 RX ADMIN — MIDODRINE HYDROCHLORIDE 10 MG: 5 TABLET ORAL at 06:08

## 2018-09-27 RX ADMIN — INSULIN LISPRO 1 UNITS: 100 INJECTION, SOLUTION INTRAVENOUS; SUBCUTANEOUS at 22:48

## 2018-09-27 RX ADMIN — ACETAMINOPHEN 325 MG: 325 TABLET, FILM COATED ORAL at 14:49

## 2018-09-27 RX ADMIN — ENOXAPARIN SODIUM 70 MG: 80 INJECTION SUBCUTANEOUS at 22:49

## 2018-09-27 RX ADMIN — DULOXETINE HYDROCHLORIDE 60 MG: 60 CAPSULE, DELAYED RELEASE ORAL at 08:44

## 2018-09-27 RX ADMIN — CALCIUM 2 TABLET: 500 TABLET ORAL at 17:16

## 2018-09-27 RX ADMIN — MIDODRINE HYDROCHLORIDE 10 MG: 5 TABLET ORAL at 12:35

## 2018-09-27 RX ADMIN — NAFCILLIN SODIUM 2000 MG: 2 INJECTION, POWDER, LYOPHILIZED, FOR SOLUTION INTRAMUSCULAR; INTRAVENOUS at 01:50

## 2018-09-27 RX ADMIN — CHLORHEXIDINE GLUCONATE 15 ML: 1.2 RINSE ORAL at 22:48

## 2018-09-27 RX ADMIN — MIDODRINE HYDROCHLORIDE 10 MG: 5 TABLET ORAL at 17:15

## 2018-09-27 RX ADMIN — OXYCODONE HYDROCHLORIDE 2.5 MG: 5 TABLET ORAL at 00:57

## 2018-09-27 RX ADMIN — DIVALPROEX SODIUM 750 MG: 500 TABLET, DELAYED RELEASE ORAL at 08:44

## 2018-09-27 RX ADMIN — MAGNESIUM SULFATE HEPTAHYDRATE 1 G: 1 INJECTION, SOLUTION INTRAVENOUS at 19:26

## 2018-09-27 RX ADMIN — NAFCILLIN SODIUM 2000 MG: 2 INJECTION, POWDER, LYOPHILIZED, FOR SOLUTION INTRAMUSCULAR; INTRAVENOUS at 22:49

## 2018-09-27 RX ADMIN — POTASSIUM CHLORIDE 40 MEQ: 1500 TABLET, EXTENDED RELEASE ORAL at 12:28

## 2018-09-27 RX ADMIN — LACTULOSE 10 G: 20 SOLUTION ORAL at 08:45

## 2018-09-27 RX ADMIN — NAFCILLIN SODIUM 2000 MG: 2 INJECTION, POWDER, LYOPHILIZED, FOR SOLUTION INTRAMUSCULAR; INTRAVENOUS at 12:17

## 2018-09-27 RX ADMIN — LACTULOSE 10 G: 20 SOLUTION ORAL at 17:13

## 2018-09-27 RX ADMIN — NAFCILLIN SODIUM 2000 MG: 2 INJECTION, POWDER, LYOPHILIZED, FOR SOLUTION INTRAMUSCULAR; INTRAVENOUS at 05:55

## 2018-09-27 RX ADMIN — NAFCILLIN SODIUM 2000 MG: 2 INJECTION, POWDER, LYOPHILIZED, FOR SOLUTION INTRAMUSCULAR; INTRAVENOUS at 17:45

## 2018-09-27 RX ADMIN — AMIODARONE HYDROCHLORIDE 200 MG: 200 TABLET ORAL at 17:17

## 2018-09-27 RX ADMIN — INSULIN LISPRO 1 UNITS: 100 INJECTION, SOLUTION INTRAVENOUS; SUBCUTANEOUS at 17:19

## 2018-09-27 RX ADMIN — SODIUM BICARBONATE 125 ML/HR: 84 INJECTION, SOLUTION INTRAVENOUS at 02:50

## 2018-09-27 RX ADMIN — ACETAMINOPHEN 325 MG: 325 TABLET, FILM COATED ORAL at 22:47

## 2018-09-27 RX ADMIN — TRAZODONE HYDROCHLORIDE 50 MG: 50 TABLET ORAL at 22:48

## 2018-09-27 RX ADMIN — OXYCODONE HYDROCHLORIDE 2.5 MG: 5 TABLET ORAL at 09:04

## 2018-09-27 RX ADMIN — SODIUM CHLORIDE AND POTASSIUM CHLORIDE 100 ML/HR: .9; .15 SOLUTION INTRAVENOUS at 18:03

## 2018-09-27 RX ADMIN — OXYCODONE HYDROCHLORIDE 2.5 MG: 5 TABLET ORAL at 15:13

## 2018-09-27 RX ADMIN — LIDOCAINE 1 PATCH: 50 PATCH CUTANEOUS at 08:46

## 2018-09-27 RX ADMIN — CLOPIDOGREL 75 MG: 75 TABLET, FILM COATED ORAL at 08:45

## 2018-09-27 RX ADMIN — ENOXAPARIN SODIUM 70 MG: 80 INJECTION SUBCUTANEOUS at 08:46

## 2018-09-27 RX ADMIN — POTASSIUM CHLORIDE 40 MEQ: 1500 TABLET, EXTENDED RELEASE ORAL at 14:49

## 2018-09-27 RX ADMIN — CHLORHEXIDINE GLUCONATE 15 ML: 1.2 RINSE ORAL at 08:45

## 2018-09-27 RX ADMIN — OXYCODONE HYDROCHLORIDE 2.5 MG: 5 TABLET ORAL at 21:09

## 2018-09-27 RX ADMIN — SODIUM BICARBONATE 650 MG TABLET 1300 MG: at 17:15

## 2018-09-27 NOTE — ASSESSMENT & PLAN NOTE
Lab Results   Component Value Date    HGBA1C 9 6 (H) 09/22/2018       Recent Labs      09/26/18   2042  09/27/18   0625  09/27/18   1102  09/27/18   1556   POCGLU  220*  103  142*  172*       Blood Sugar Average: Last 67  HHNC - resolved  Monitor blood sugar, continue with the current dose of insulin  Blood sugar x-ray

## 2018-09-27 NOTE — CONSULTS
Consultation - Pulmonary Medicine   Emily Abdullahi 60 y o  female MRN: [id number]  Unit/Bed#: [facility] 828-01 Encounter: [id number]      Assessment:  1  Bilateral Pleural Effusions  2  Septic Pulmonary Emboli 2/2 RA Thrombus   3  MSSA Bacteremia with Suspected Endocarditis   4  Severe Sepsis   5  Leukocytosis   6  Colon Cancer with Liver Metastasis     Plan:   1  Suspect effusions are 2/2 septic pulmonary emboli versus colon/liver malignancy  Patient currently saturating well on RA and does not appear to have increased work of breathing  Patient remains afebrile with leukocytosis of 55 likely 2/2 G-CSF administration  Will continue to monitor at this time and will consider tapping effusion if imaging or clinical status worsens  F/U repeat CT chest/abdomen/pelvis  2  Emboli likely 2/2 RA thrombus with potential bacterial growth  JUNIOR on Friday (9/28/2018) to evaluate RA thrombus  Cardiology does not recommend CT surgery at this time due to patient's poor prognosis  Continue antibiotic treatment as per ID  Anticoagulation with Lovenox and anti-platelet with Plavix  3  ID closely following  Cultures from 9/24 are positive for MSSA  Repeat blood cultures sent today  Continue Nafcillin (day 8) and monitor CBCs daily  F/U repeat CT chest/abdomen/pelvis to investigate other ectopic foci or progression of septic emboli  JUNIOR as above to investigate for possible endocarditis  4  Patient remains hemodynamically stable  Continue IVF and antibiotic therapy as above  5  Leukocytosis 2/2 G-CSF administration with component of MSSA bacteremia  No additional doses of G-CSF to be given during this hospital course  Continue to monitor CBCs daily  6  Chemo-port has been removed during this admission due to MSSA bacteremia  Palliative Care and Oncology aware  Will not receive chemo until infection has been properly managed        History of Present Illness   Physician Requesting Consult: Fredy Chaparro MD  Reason for Consult / Principal Problem: B/L Pleural Effusions   Hx and PE limited by: Clinical Status   HPI: Emily Almodovar is a 60y o  year old female with a PMHx significant for colon cancer with liver metastases who presents from [facility] ED on 9/16/18 for altered mental status and was found to be septic with an initial lactate of 3 4 on admission  The patient was started on IVF and broad spectrum antibiotics  Blood cultures became positive for MSSA and the patient was transitioned to Nafcillin per ID  She was found to have numerous bilateral pulmonary septic emboli and moderate-sized bilateral pleural effusions on CT chest  Pulmonary was thus consulted for pleural effusion and possible tap  ID and Cardiology are following closely  The patient will undergo JUNIOR on Friday (9/28/2018) to access for possible infection of RA thrombus which was noted on TTE            Inpatient consult to Pulmonology     Performed by  Babatunde Samuels by Makeda Pickering              Review of Systems   Unable to perform ROS: Mental status change       Historical Information   Past Medical History:   Diagnosis Date    Bipolar depression ([facility] ) 3/17/2016    CAD S/P percutaneous coronary angioplasty 3/17/2016    Cancer ([facility] )     Chronic pain     Colonic mass     Colostomy care ([facility] )     COPD (chronic obstructive pulmonary disease) ([address] )     CVA (cerebral vascular accident) ([address] )     R sided weakness    Essential hypertension 3/17/2016    GERD (gastroesophageal reflux disease)     Hepatitis C     Heroin abuse 3/18/2016    History of gastric ulcer     Hypertension     NSTEMI (non-ST elevated myocardial infarction) ([address] ) 07/2012    Psychiatric disorder 09/03/2014    Inpatient admission     Schizoaffective disorder ([facility] ) 3/17/2016    Schizophrenia ([address] ) 3/17/2016    STEMI (ST elevation myocardial infarction) ([address] ) 12/2017    Type 2 diabetes mellitus ([address] ) 3/17/2016     Past Surgical History:   Procedure Laterality Date    CARDIAC CATHETERIZATION  07/2016    COLONOSCOPY N/A 6/22/2018    Procedure: COLONOSCOPY;  Surgeon: Elzbieta Alarcon MD;  Location: [facility]; Service: Colorectal    CORONARY ANGIOPLASTY WITH STENT PLACEMENT  07/05/2012    MAKEDA (LAD), PTA (Diag)    CORONARY ANGIOPLASTY WITH STENT PLACEMENT  12/2017    MAKEDA/ PTA (RCA)    FL GUIDED CENTRAL VENOUS ACCESS REPLACEMENT  8/15/2018    OR INSERT PICC W/ SUB-Q PORT N/A 8/15/2018    Procedure: INSERTION VENOUS PORT (PORT-A-CATH); Surgeon: Lesvia Hurley MD;  [facility]: [facility];  Service: Colorectal    OR LAP,DIAGNOSTIC ABDOMEN N/A 7/17/2018    Procedure: LAPAROSCOPY DIAGNOSTIC, Laproscopic transverse loop colostomy, lysis of adhesions;  Surgeon: Key Morales MD;  Location: [facility];  Service: Colorectal    OR SIGMOIDOSCOPY FLX DX W/COLLJ SPEC BR/WA IF PFRMD N/A 8/15/2018    Procedure: Salvador Cassidy;  Surgeon: Lesvia Hurley MD;  [facility];  Service: Colorectal     Social History   History   Alcohol Use No     Comment: "only on occasion"     History   Drug Use No     Comment: only tried x 1      History   Smoking Status    Current Every Day Smoker    Packs/day: 1 00    Years: 45 00    Types: Cigarettes   Smokeless Tobacco    Never Used       Family History: non-contributory    Meds/Allergies   all current active meds have been reviewed    No Known Allergies    Objective   Vitals: Blood pressure 110/62, pulse 83, temperature (!) 97 3 °F (36 3 °C), resp  rate 18, height 5' 3" (1 6 m), weight 70 2 kg (154 lb 12 2 oz), SpO2 92 %, not currently breastfeeding  ,Body mass index is 27 42 kg/m²      Intake/Output Summary (Last 24 hours) at 09/27/18 1111  Last data filed at 09/27/18 0300   Gross per 24 hour   Intake              680 ml   Output             2725 ml   Net            -2045 ml     Invasive Devices     Peripheral Intravenous Line            Peripheral IV 09/23/18 Left Forearm 3 days          Drain Colostomy RUQ -- days    Urethral Catheter Latex 18 Fr  6 days                Physical Exam   Constitutional: She appears well-developed  No distress  Frail / Cachectic    HENT:   Head: Normocephalic and atraumatic  Eyes: Pupils are equal, round, and reactive to light  Conjunctivae and EOM are normal  Right eye exhibits no discharge  No scleral icterus  Neck: Normal range of motion  Neck supple  No JVD present  No tracheal deviation present  No thyromegaly present  Cardiovascular: Normal rate and regular rhythm  Exam reveals no gallop and no friction rub  No murmur heard  Pulmonary/Chest: Effort normal  No stridor  She has rales  Diminished breath sounds b/l   Abdominal: Soft  Bowel sounds are normal  She exhibits no distension  Musculoskeletal: Normal range of motion  She exhibits no edema or deformity  Neurological:   Slowed mentation    Skin: Skin is warm and dry  No rash noted  No erythema  Lab Results: I have personally reviewed pertinent lab results  Imaging Studies: I have personally reviewed pertinent reports  EKG, Pathology, and Other Studies: I have personally reviewed pertinent reports      VTE Prophylaxis: Enoxaparin (Lovenox)    Code Status: Level 1 - Full Code  Advance Directive and Living Will: Yes    Power of :    POLST:      None

## 2018-09-27 NOTE — PROGRESS NOTES
Progress Note - Nephrology   Emily Abdullahi 60 y o  female MRN: [id number]  Unit/Bed#: [facility] 828-01 Encounter: [id number]      Assessment/Plan:    Acute kidney injury  · Likely secondary to hypotension and ischemia on presentation; creatinine has been improving; possibly ischemic ATN versus pre renal, unlikely to be AIN as urine eosinophils are 0  · Urine output 1075 mL; patient overall fluid balance positive 6,764 mL  · Creatinine 2 01 (previous 2 23) (baseline 1-1 1)  · Midodrine 10 mg t i d   · Continue IV fluids 125 mL/hour  · Blood pressure 115 to 130  · Urinary eosinophils 0; AIN less likely  · Cortisol 26 6     Electrolytes  · Hypokalemia- 3 1; replete as necessary  · Hyponatremia- currently 131-->129; improving, fluid restrict 1200 mL  · Hypocalcemia- ionized calcium 0 98; replete      Other issues  · Colon cancer-colostomy in place; oncology following  · Anemia- hemoglobin 6 9 chin to fuse is needed  · Right atrial mass-noted to be thrombus per Cardiology; 6 weeks antibiotic  · Endocarditis-gram-positive cocci in clusters, MSSA; nafcillin; repeat JUNIOR Friday   · Diabetes mellitus- management per primary team    Subjective:   Patient resting comfortably in bed and awoke upon arousal   She had no complaints this morning and had no overnight events  Patient denies fever, chills, nausea, vomiting, abdominal pain, and headache  Objective:     Vitals: Blood pressure 115/59, pulse 89, temperature 98 1 °F (36 7 °C), temperature source Oral, resp  rate 20, height 5' 3" (1 6 m), weight 70 2 kg (154 lb 12 2 oz), SpO2 95 %, not currently breastfeeding  ,Body mass index is 27 42 kg/m²  Weight (last 2 days)     None            Intake/Output Summary (Last 24 hours) at 09/27/18 0810  Last data filed at 09/27/18 0300   Gross per 24 hour   Intake              800 ml   Output             2725 ml   Net            -1925 ml       Urethral Catheter Latex 18 Fr   (Active)   Site Assessment Clean;Skin intact 9/26/2018  7:40 PM Collection Container Standard drainage bag 9/26/2018  7:40 PM   Securement Method Securing device (Describe) 9/26/2018  7:40 PM   Output (mL) 450 mL 9/27/2018  3:00 AM       Physical Exam   Constitutional: She is oriented to person, place, and time  She appears well-developed and well-nourished  No distress  HENT:   Head: Normocephalic and atraumatic  Eyes: Conjunctivae are normal  No scleral icterus  Cardiovascular: Normal rate and regular rhythm  Exam reveals no gallop and no friction rub  No murmur heard  Pulmonary/Chest: No respiratory distress  She has no wheezes  She has no rales  Poor inspiratory effort   Abdominal: Soft  Bowel sounds are normal  She exhibits no distension  There is no tenderness  Colostomy bag in place right lower quadrant   Musculoskeletal: Normal range of motion  She exhibits edema  1+ lower extremity edema   Neurological: She is alert and oriented to person, place, and time  Skin: Skin is warm  No rash noted

## 2018-09-27 NOTE — PROGRESS NOTES
Progress note - Palliative and Supportive Care   Emily Abdullahi 60 y o  female [id number]    Assessment:   -   Patient Active Problem List   Diagnosis    CVA (cerebral vascular accident) ([place] [address] )    Type 2 diabetes mellitus without complication, without long-term current use of insulin (HCC)    Schizoaffective disorder, bipolar type ([place] [address] )    Essential hypertension    Colonic mass    Type 2 diabetes mellitus with complication ([place] [address] )    CKD (chronic kidney disease) stage 3, GFR 30-59 ml/min    Pain of right upper extremity    Acute kidney injury ([place] [address] )    CAD (coronary artery disease)    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[address] )    Large bowel obstruction ([place] [address] )    Brachial artery stenosis, right (HCC)    Hyponatremia    Rectosigmoid mass - High colostomy output    Leukocytosis    GERD (gastroesophageal reflux disease)    Tobacco abuse    Liver metastasis (HCC)    Mixed hyperlipidemia    Dizziness - due to orthostatic hypotension    Ambulatory dysfunction    Orthostatic hypotension    Chemotherapy induced nausea and vomiting    Lower abdominal pain    [facility]NC [facility]hyperglycemic [facility]osmolar nonketotic coma) [facility]    Sepsis ([place] [address] )    Altered mental status    Acute respiratory failure with hypoxia (HCC)    Hypocalcemia    Leukopenia    Cavitating pulmonary nodules     Metabolic acidosis, NAG, bicarbonate losses    Hypomagnesemia    Hypophosphatemia    Bacteremia due to methicillin susceptible Staphylococcus aureus (MSSA)    Colon cancer ([place] [address] )    Back pain    Suspected endocarditis       Plan:  1  Symptom management -   -  Back pain  Continue scheduled Tylenol for now  Resume scheduled Cymbalta and Oxy IR as needed  Will not adjust opioid regimen at this time as patient is very somnolent  Will continue to reassess patient's comfort needs  -    Metastatic colon cancer status post 2 cycles of palliative chemotherapy now with MSSA bacteremia    Patient not a candidate for CT surgery and will continue on 6 weeks of IV antibiotics  2  Goals -   -    Full cares, no limits  Escalated team meeting at 10:00 AM tomorrow  Code Status: full code - Level  1   Decisional apparatus:  Patient is not competent on my exam today  If competence is lost, patient's substitute decision maker would default to   3 daughter by [place] 169  Advance Directive / Living Will / POLST:  Not on file     I have reviewed the patient's controlled substance dispensing history in the Prescription Drug Monitoring Program in compliance with the [facility] regulations before prescribing any controlled substan adult childrences  Interval history:      24 hour events reviewed  Patient is seen and examined at bedside with daughter Landry Navarrete present  Patient states that she is hungry and would like to eat something  She  Is also complaining of back pain  And appears somnolent  Daughter Landry Navarrete tells me that the patient received a dose of oxycodone IR not too long ago and is due for her next dose at 15:00 she does not believe at this time that her mother is very uncomfortable as she is asleep again          9/24/2018 06:21 9/25/2018 04:35 9/26/2018 05:45 9/26/2018 11:04 9/27/2018 05:07   Albumin  0 9 (L) 0 7 (L) 0 9 (L)      MEDICATIONS / ALLERGIES:     all current active meds have been reviewed and current meds:   Current Facility-Administered Medications   Medication Dose Route Frequency    acetaminophen (TYLENOL) oral suspension 650 mg  650 mg Oral Q6H PRN    acetaminophen (TYLENOL) tablet 325 mg  325 mg Oral Q8H [address]    amiodarone tablet 200 mg  200 mg Oral BID With Meals    atorvastatin (LIPITOR) tablet 5 mg  5 mg Oral After Dinner    calcium carbonate (OYSTER SHELL,OSCAL) 500 mg tablet 2 tablet  2 tablet Oral BID With Meals    chlorhexidine (PERIDEX) 0 12 % oral rinse 15 mL  15 mL Swish & Spit Q12H [address]    clopidogrel (PLAVIX) tablet 75 mg  75 mg Oral Daily    divalproex sodium (DEPAKOTE) EC tablet 750 mg  750 mg Oral Daily    DULoxetine (CYMBALTA) delayed release capsule 60 mg  60 mg Oral Daily    enoxaparin (LOVENOX) subcutaneous injection 70 mg  1 mg/kg Subcutaneous Q12H [address]    insulin lispro (HumaLOG) 100 units/mL subcutaneous injection 1-5 Units  1-5 Units Subcutaneous HS    insulin lispro (HumaLOG) 100 units/mL subcutaneous injection 1-6 Units  1-6 Units Subcutaneous TID AC    lactulose 20 g/30 mL oral solution 10 g  10 g Oral BID    lidocaine (LIDODERM) 5 % patch 1 patch  1 patch Topical Daily    magnesium sulfate IVPB (premix) SOLN 1 g  1 g Intravenous Once    metoclopramide (REGLAN) injection 5 mg  5 mg Intravenous Q6H PRN    midodrine (PROAMATINE) tablet 10 mg  10 mg Oral TID AC    nafcillin (NALLPEN) 2,000 mg in sodium chloride 0 9 % 100 mL IVPB  2,000 mg Intravenous Q4H    oxyCODONE (ROXICODONE) IR tablet 2 5 mg  2 5 mg Oral Q6H PRN    sodium bicarbonate tablet 1,300 mg  1,300 mg Oral BID after meals    sodium chloride 0 9 % with KCl 20 mEq/L infusion (premix)  100 mL/hr Intravenous Continuous    traZODone (DESYREL) tablet 50 mg  50 mg Oral HS     Facility-Administered Medications Ordered in Other Encounters   Medication Dose Route Frequency    heparin lock flush 100 units/mL injection 300 Units  300 Units Intracatheter Q1H PRN       No Known Allergies    OBJECTIVE:    Physical Exam  Physical Exam   Constitutional: She appears toxic  She has a sickly appearance  Ashen/gray  HENT:   Head: Normocephalic and atraumatic  Cardiovascular: Normal rate  Pulmonary/Chest: Effort normal    Abdominal: Soft  Colostomy bag in place with liquid stools  Musculoskeletal: She exhibits edema  Neurological: She is alert  Skin: There is pallor  Psychiatric: Her affect is labile  Her speech is tangential  She is slowed  Cognition and memory are impaired  Nursing note and vitals reviewed  Lab Results:   I have personally reviewed pertinent labs  , CBC:   Lab Results   Component Value Date    WBC 72 36 () 09/27/2018 HGB 8 0 (L) 09/27/2018    HCT 23 1 (L) 09/27/2018    MCV 79 (L) 09/27/2018     (L) 09/27/2018    MCH 27 4 09/27/2018    MCHC 34 6 09/27/2018    RDW 17 6 (H) 09/27/2018    MPV 11 8 09/27/2018    NRBC 0 09/27/2018   , CMP:   Lab Results   Component Value Date     (L) 09/27/2018    K 3 1 (L) 09/27/2018    CL 99 (L) 09/27/2018    CO2 23 09/27/2018    BUN 26 (H) 09/27/2018    CREATININE 2 01 (H) 09/27/2018    CALCIUM 7 2 (L) 09/27/2018    EGFR 27 09/27/2018         Counseling / Coordination of Care  Total floor / unit time spent today  45 minutes  Greater than 50% of total time was spent with the patient and / or family counseling and / or coordination of care   A description of the counseling / coordination of care: psychosocial support, goals of care, symptom management, coordination with medical team

## 2018-09-27 NOTE — ASSESSMENT & PLAN NOTE
· Likely secondary to the infectious process  · Appears to be improving  · CT head on 09/17/18-no acute intracranial pathology  · Patient do not have the ability to make informed medical decision

## 2018-09-27 NOTE — ASSESSMENT & PLAN NOTE
Lab Results   Component Value Date    HGBA1C 9 6 (H) 09/22/2018       Recent Labs      09/26/18   2042  09/27/18   0625  09/27/18   1102  09/27/18   1556   POCGLU  220*  103  142*  172*       Blood Sugar Average: Last 72 hrs:  (P) 166 2   Monitor blood sugar, continue with the current insulin regimen

## 2018-09-27 NOTE — ASSESSMENT & PLAN NOTE
· Cardiology on board  · Patient is going for JUNIOR tomorrow    May have infected thrombus  · Discussed with cardiology  · Currently patient is on therapeutic dose of Lovenox for a flutter/thrombus

## 2018-09-27 NOTE — ASSESSMENT & PLAN NOTE
Patient remains afebrile  Leukocytosis is significantly worsened    I am not sure the GCSF alone is responsible for significantly worsened leukocytosis at this point  Still persistently bacteremic  Trend WBC count

## 2018-09-27 NOTE — CASE MANAGEMENT
Continued Stay Review    Date: 9/27/18    Vital Signs: /92 (BP Location: Left arm)   Pulse 84   Temp 98 °F (36 7 °C) (Oral)   Resp 18   Ht 5' 3" (1 6 m)   Wt 70 2 kg (154 lb 12 2 oz)   SpO2 96%   BMI 27 42 kg/m²     Medications:   Scheduled Meds:   Current Facility-Administered Medications:  acetaminophen 650 mg Oral Q6H PRN    acetaminophen 325 mg Oral Q8H [address]    amiodarone 200 mg Oral BID With Meals    atorvastatin 5 mg Oral After Dinner    calcium carbonate 2 tablet Oral BID With Meals    chlorhexidine 15 mL Swish & Spit Q12H [address]    clopidogrel 75 mg Oral Daily    divalproex sodium 750 mg Oral Daily    DULoxetine 60 mg Oral Daily    enoxaparin 1 mg/kg Subcutaneous Q12H [address]    insulin lispro 1-5 Units Subcutaneous HS    insulin lispro 1-6 Units Subcutaneous TID AC    lactulose 10 g Oral BID    lidocaine 1 patch Topical Daily    magnesium sulfate 1 g Intravenous Once    metoclopramide 5 mg Intravenous Q6H PRN    midodrine 10 mg Oral TID AC    nafcillin 2,000 mg Intravenous Q4H Last Rate: Stopped (09/27/18 1545)   oxyCODONE 2 5 mg Oral Q6H PRN    sodium bicarbonate 1,300 mg Oral BID after meals    sodium chloride 0 9 % with KCl 20 mEq/L 100 mL/hr Intravenous Continuous Last Rate: 100 mL/hr (09/27/18 1305)   traZODone 50 mg Oral HS      Facility-Administered Medications Ordered in Other Encounters:  heparin lock flush 300 Units Intracatheter Q1H PRN Unknown Kussmaul, MD     Continuous Infusions:   sodium chloride 0 9 % with KCl 20 mEq/L 100 mL/hr Last Rate: 100 mL/hr (09/27/18 1305)     PRN Meds:   acetaminophen    metoclopramide    oxyCODONE    Abnormal Labs/Diagnostic Results:   09/27/18 0507   Sodium 131     Potassium 3 1     Chloride 99     BUN 26     Creatinine 2 01     Calcium 7 2        09/27/18 1140   WBC 72 36     TRENDING UP   RBC 2 92     Hemoglobin 8 0     Hematocrit 23 1     MCV 79     RDW 17 6     Platelets 221         Age/Sex: 60 y o  female     Assessment/Plan:   9/27 Pulmonary Consult   No real benefit from thoracentesis specially she is on RA and not in respiratory distress from it  _____________________________  9/27 Medicine Progress Note  * Bacteremia due to methicillin susceptible Staphylococcus aureus (MSSA)   Assessment & Plan     Persistent MSSA bacteremia  Infectious Disease on board  Continue with the antibiotics  Follow-up on the repeat cultures-last culture from 8/26/2018  Is  still positive  Patient is scheduled for JUNIOR tomorrow  Significantly worse so leukocytosis-above 72,000  At this point and not sure we can explain this to Lexy         Suspected endocarditis   Assessment & Plan     · Cardiology on board  · Patient is going for JUNIOR tomorrow  May have infected thrombus  · Discussed with cardiology  · Currently patient is on therapeutic dose of Lovenox for a flutter/thrombus      Acute kidney injury [facility])   Assessment & Plan     · Monitor creatinine  · Nephrology on board  · Continue with the IV fluids    VTE Pharmacologic Prophylaxis:   Pharmacologic: Enoxaparin (Lovenox)  Mechanical VTE Prophylaxis in Place: Yes     Current Length of Stay: 10 day(s)     Current Patient Status: Inpatient   Certification Statement: The patient will continue to require additional inpatient hospital stay due to Persistent bacteremia    Discharge Plan: Previous drug use may be a block to finding ST rehab  Thank you,  Denis Bautista  Utilization Review Department  Phone: [phone]; Fax [phone]  ATTENTION: Please call with any questions or concerns to [phone]  and carefully follow the prompts so that you are directed to the right person  Send all requests for admission clinical reviews, approved or denied determinations and any other requests to fax [phone]   All voicemails are confidential

## 2018-09-27 NOTE — PROGRESS NOTES
Progress Note - Infectious Disease   Emily Abdullahi 60 y o  female MRN: [id number]  Unit/Bed#: [facility] 828-01 Encounter: [id number]      Impression/Plan:  1   MSSA bacteremia, Persistent since 09/16: Isabell Galeazzi initial source of this pathogen remains unknown   Cultures from 9/24 are positive  Repeat cultures from 09/26 are positive  She is no longer having episodes of tachycardia and hypothermia or hypotension  Patient has no new or ongoing sources of bacteremia obvious on exam  -will continue nafcillin   -will follow CBC and chemistry daily  -MRI spine does not indicate any new or developing lesions compared to prior  -CT chest showed increasing pleural effusion, patient evaluated by Pulmonary and no acute intervention  -patient is being followed by Cardiology and plan for JUNIOR tomorrow  -if no additional sources are found will need to assume the possibility that her thrombi is contributing to her persistent bacteremia and will need to discuss her case with CT surgery  Family meeting is also plan for tomorrow given the patient's ongoing cancer and now persistent bacteremia      2   Pulmonary septic emboli:  Likely due to infected RA thrombus, no indication of tricuspid valve lesions on prior and subsequent echo on review with cardiology  -antibiotic management as above  -will continue to follow blood cultures for clearance  -CT reviewed as above     3   Severe sepsis, POA:   the patient remains hemodynamically stable at this time  Clair Torres is no longer having episodes of hypothermia or hypotension  -this is likely due to problems 1 and 2  -will continue antibiotics and management as above  -C diff negative and stool enteric panel negative      4   Leukocytosis:  Possibly due to continued doses of G-CSF, or mounting response to ongoing infection     More concern for the latter given that there has been no decline in the past few days   -no additional doses of G-CSF  -continue to monitor CBC daily  -antibiotic management and workup as above     5   Back and leg pain:   her pain has improved on exam, the patient remains weak and is starting to move her legs more on exam  -MRI without acute finding  -antibiotics as above  -will continue to monitor on serial exams      6   Colon cancer currently on palliative chemo  -patient has had port removal on this admission  -ultimately need to manage her infection before any additional chemotherapy  -palliative care following  -patient has been seen by Oncology     7   Diabetes  -patient presented with elevated blood sugars on admission  -current management as per primary service     8   Acute kidney injury- creatinine decreased today, urine eos noted to be negative  -nephrology following  -will monitor chemistry daily     Disposition:  Contacted by primary service and discussed the above plan  We are also having a family meeting tomorrow morning to discuss the patient's goals of care in the setting of her ongoing bacteremia and advanced colon cancer  Antibiotics:  Nafcillin 9  -total antibiotic 11    Subjective:  Patient has no fever, chills, sweats; no nausea, vomiting, diarrhea; no cough, shortness of breath; no pain  She reports stiffness in her legs and the desire to sit up in a chair  She is overall a poor historian and she does not recall her daughter visiting today  She also does not recall why she is in the hospital     Objective:  Vitals:  HR:  [82-89] 84  Resp:  [18-20] 18  BP: (110-140)/(59-92) 140/92  SpO2:  [90 %-96 %] 96 %  Temp (24hrs), Av 1 °F (36 7 °C), Min:97 3 °F (36 3 °C), Max:98 7 °F (37 1 °C)  Current: Temperature: 98 °F (36 7 °C)    Physical Exam:   General Appearance:  Alert, interactive, nontoxic, no acute distress  Throat: Oropharynx moist without lesions      Lungs:   Clear to auscultation bilaterally; no wheezes, rhonchi or rales; respirations unlabored   Heart:  RRR; no murmur, rub or gallop   Abdomen:   Soft, non-tender, non-distended, positive bowel sounds  Extremities: No clubbing, cyanosis  Ongoing 2 to 3+ lower extremity edema bilaterally  Skin: No new rashes or lesions on exposed skin  No draining wounds noted on exposed skin         Labs, Imaging, & Other studies:   All pertinent labs and imaging studies were personally reviewed    Results from last 7 days  Lab Units 09/27/18  1140 09/26/18  1105 09/26/18  0545   WBC Thousand/uL 72 36* 55 35* 41 67*   HEMOGLOBIN g/dL 8 0* 8 1* 6 9*   PLATELETS Thousands/uL 111* 90* 67*       Results from last 7 days  Lab Units 09/27/18  0507 09/26/18  1104 09/26/18  0545 09/25/18  0435   SODIUM mmol/L 131* 129* 127* 132*   POTASSIUM mmol/L 3 1* 3 5 3 3* 3 4*   CHLORIDE mmol/L 99* 98* 92* 105   CO2 mmol/L 23 21 26 16*   BUN mg/dL 26* 27* 23 26*   CREATININE mg/dL 2 01* 2 24* 2 07* 2 23*   EGFR ml/min/1 73sq m 27 23 26 23   CALCIUM mg/dL 7 2* 6 9* 5 8* 6 5*   AST U/L  --  22 19 22   ALT U/L  --  <6* <6* <6*   ALK PHOS U/L  --  130* 104 130*       Results from last 7 days  Lab Units 09/26/18  0616 09/26/18  0543 09/24/18  1743 09/24/18  1003 09/22/18  0530 09/22/18  0519   BLOOD CULTURE  Staphylococcus aureus*  --  Staphylococcus aureus*  --  Staphylococcus aureus* Staphylococcus aureus*   GRAM STAIN RESULT  Gram positive cocci in clusters Gram positive cocci in clusters Gram positive cocci in clusters  --  Gram positive cocci in clusters Gram positive cocci in clusters   C DIFF TOXIN B   --   --   --  NEGATIVE for C difficle toxin by PCR    --   --

## 2018-09-27 NOTE — PROGRESS NOTES
Cardiology Progress Note - Emily Abdullahi 60 y o  female MRN: [id number]    Unit/Bed#: [facility] 828-01 Encounter: [id number]    Assessment and plan  1   Right atrial mass  2   Bacteremia  3   Atrial flutter now in sinus rhythm on anticoagulation  4   Adenocarcinoma of the colon  5   Hypotension improved  6   Aortic valve lesion     Recommendations:  The right atrial mass appears to be thrombus on my review   The port was deep into the right atrium I suspect cause trauma near the tricuspid annulus leading to a nidus for thrombus formation   There is small friable strands on the thrombus as well   Aortic valve has 2 small strand like structures which represent vegetation      I spoke with ID yesterday they want a repeat transesophageal echocardiogram this will be scheduled for Friday  There may be infection and bacteria on the thrombus in the right atrium  I discussed the case with the patient's daughter and reviewed options  I do not think she would be a great candidate for CT surgery, would continue 6 weeks of antibiotics  Transthoracic echocardiogram did not show much change no significant aortic insufficiency    Overall given her advanced cancer and poor response to palliative chemo I would assume her prognosis is poor       Subjective:    No significant events overnight  Patient denies any complaints  ROS    Objective:   Vitals: Blood pressure 115/59, pulse 89, temperature 98 1 °F (36 7 °C), temperature source Oral, resp   rate 20, height 5' 3" (1 6 m), weight 70 2 kg (154 lb 12 2 oz), SpO2 95 %, not currently breastfeeding , Body mass index is 27 42 kg/m² , Orthostatic Blood Pressures      Most Recent Value   Blood Pressure  115/59 filed at 09/27/2018 4863   Patient Position - Orthostatic VS  Lying filed at 09/27/2018 1579         Systolic (56LFB), OVV:209 , Min:115 , KSC:750     Diastolic (91QPJ), UQJ:90, Min:58, Max:69      Intake/Output Summary (Last 24 hours) at 09/27/18 0859  Last data filed at 09/27/18 0300   Gross per 24 hour   Intake              800 ml   Output             2725 ml   Net            -1925 ml     Weight (last 2 days)     None            Telemetry Review: No significant arrhythmias seen on telemetry review  EKG personally reviewed by Jaja Lan DO  Physical Exam   Constitutional: She is oriented to person, place, and time  She appears well-nourished  No distress  HENT:   Head: Atraumatic  Eyes: Pupils are equal, round, and reactive to light  Conjunctivae are normal    Neck: Neck supple  Cardiovascular: Normal rate, regular rhythm and normal heart sounds  Exam reveals no friction rub  No murmur heard  Pulmonary/Chest: Effort normal and breath sounds normal  No respiratory distress  She has no wheezes  She has no rales  Abdominal: Bowel sounds are normal  She exhibits no distension  There is no tenderness  There is no rebound  Musculoskeletal: She exhibits no edema  Neurological: She is alert and oriented to person, place, and time  No cranial nerve deficit  Skin: Skin is warm and dry  No erythema  Nursing note and vitals reviewed          Laboratory Results:        CBC with diff:   Results from last 7 days  Lab Units 09/26/18  1105 09/26/18  0545 09/25/18  0435 09/24/18  0621 09/23/18  0515 09/22/18  0029 09/21/18  0449   WBC Thousand/uL 55 35* 41 67* 35 74* 25 50* 14 29* 4 11* 2 08*   HEMOGLOBIN g/dL 8 1* 6 9* 8 0* 8 5* 8 7* 8 5* 8 2*   HEMATOCRIT % 23 6* 19 7* 22 9* 24 9* 25 3* 24 8* 23 8*   MCV fL 80* 79* 79* 80* 81* 80* 79*   PLATELETS Thousands/uL 90* 67* 62* 53* 49* 69* 94*   MCH pg 27 3 27 7 27 6 27 2 27 7 27 5 27 2   MCHC g/dL 34 3 35 0 34 9 34 1 34 4 34 3 34 5   RDW % 17 4* 17 0* 17 2* 16 8* 16 3* 15 8* 15 2*   MPV fL 13 4* 12 8*  --   --   --  13 2* 13 0*   NRBC AUTO /100 WBCs 0 0 0 0 0 2 3   NRBC /100 WBC  --   --   --   --  1  --   --          CMP:  Results from last 7 days  Lab Units 09/27/18  0507 09/26/18  1104 09/26/18  0545 09/25/18  0435 18  1138 18  0515 18  0029   SODIUM mmol/L 131* 129* 127* 132* 132* 132* 133*   POTASSIUM mmol/L 3 1* 3 5 3 3* 3 4* 3 5 3 0* 3 5   CHLORIDE mmol/L 99* 98* 92* 105 107 104 106   CO2 mmol/L 23 21 26 16* 14* 16* 14*   BUN mg/dL 26* 27* 23 26* 27* 27* 27*   CREATININE mg/dL 2 01* 2 24* 2 07* 2 23* 2 32* 2 34* 2 20*   CALCIUM mg/dL 7 2* 6 9* 5 8* 6 5* 6 5* 6 7* 6 9*   AST U/L  --    --    --    ALT U/L  --  <6* <6* <6*  --  <6*  --    ALK PHOS U/L  --  130* 104 130*  --  99  --    EGFR ml/min/1 73sq m 27 23 26 23 22 22 24         BMP:  Results from last 7 days  Lab Units 18  0507 18  1104 18  0545 18  0435 18  0621 18  0515 18  0029   SODIUM mmol/L 131* 129* 127* 132* 132* 132* 133*   POTASSIUM mmol/L 3 1* 3 5 3 3* 3 4* 3 5 3 0* 3 5   CHLORIDE mmol/L 99* 98* 92* 105 107 104 106   CO2 mmol/L 23  26 16* 14* 16* 14*   BUN mg/dL 26* 27* 23 26* 27* 27* 27*   CREATININE mg/dL 2 01* 2 24* 2 07* 2 23* 2 32* 2 34* 2 20*   CALCIUM mg/dL 7 2* 6 9* 5 8* 6 5* 6 5* 6 7* 6 9*       BNP: No results for input(s): BNP in the last 72 hours      Magnesium:   Results from last 7 days  Lab Units 18  0507 18  0545 18  0449   MAGNESIUM mg/dL 1 6 1 5* 2 3       Coags:   Results from last 7 days  Lab Units 18  0435 18  0908   PTT seconds  --  37*   INR  1 13  --        TSH:        Hemoglobin A1C   Results from last 7 days  Lab Units 18  0518   HEMOGLOBIN A1C % 9 6*       Lipid Profile:       Cardiac testing:   Results for orders placed during the hospital encounter of 18   Echo complete with contrast if indicated    [address]  [facility], [address] [place] [address]  [phone]    Transthoracic Echocardiogram  2D, M-mode, Doppler, and Color Doppler    Study date:  17-Sep-2018    Patient: Britt Peng  MR number: [id number]  Account number: [de-identified]  : 1960  Age: 60 years  Gender: Female  Status: Inpatient  Location: Bedside  Height: 63 in  Weight: 125 lb  BP: 91/ 74 mmHg    Indications: AV Disease AV Disease    Diagnoses: I35 8 - Other nonrheumatic aortic valve disorders    Sonographer:  HARJEET Coffman  Primary Physician:  Anselmo Lugo MD  Referring Physician:  Wilbert Ponce:  [facility]  Cardiology Fellow:  Nidia Lu MD  Interpreting Physician:  Mei Bright MD    SUMMARY    LEFT VENTRICLE:  Systolic function was hyperdynamic  Ejection fraction was estimated to be 55 %  Although no diagnostic regional wall motion abnormality was identified, this possibility cannot be completely excluded on the basis of this study  VENTRICULAR SEPTUM:  There was dyssynergic motion  These changes are consistent with LBBB  MITRAL VALVE:  There was mild regurgitation  AORTIC VALVE:  The valve was probably trileaflet  Leaflets exhibited normal thickness and normal cuspal separation  TRICUSPID VALVE:  There was mild regurgitation  There was a definite, large, sessile, echogenic, fixed mass on the anterior leaflet, on the right atrial aspect  This may represent a thrombus, tumor or vegetation on tricuspid valve  IVC, HEPATIC VEINS:  No obvious thrombus in IVC    RECOMMENDATIONS:  If clinically indicated, transesophageal echocardiography could provide additional information  HISTORY: PRIOR HISTORY: [email or web address],Bipolar,HTN,Sepsis,Cancer, Sepsis,Smoker    PROCEDURE: The procedure was performed at the bedside  This was a routine study  The transthoracic approach was used  The study included complete 2D imaging, M-mode, complete spectral Doppler, and color Doppler  Image quality was good  LEFT VENTRICLE: Size was normal  Systolic function was hyperdynamic  Ejection fraction was estimated to be 55 %   Although no diagnostic regional wall motion abnormality was identified, this possibility cannot be completely excluded on the  basis of this study  DOPPLER: Due to tachycardia, there was fusion of early and atrial contributions to ventricular filling  The study was not technically sufficient to allow evaluation of LV diastolic function  VENTRICULAR SEPTUM: There was dyssynergic motion  These changes are consistent with LBBB  RIGHT VENTRICLE: The size was normal  Systolic function was normal     LEFT ATRIUM: Size was normal     RIGHT ATRIUM: Size was normal     MITRAL VALVE: Valve structure was normal  There was mild focal thickening, limited to the leaflet margin  There was normal leaflet separation  DOPPLER: The transmitral velocity was within the normal range  There was no evidence for  stenosis  There was mild regurgitation  AORTIC VALVE: The valve was probably trileaflet  Leaflets exhibited normal thickness and normal cuspal separation  DOPPLER: Transaortic velocity was within the normal range  There was no evidence for stenosis  There was no significant  regurgitation  TRICUSPID VALVE: The valve structure was normal  There was normal leaflet separation  There was a definite, large, sessile, echogenic, fixed mass on the anterior leaflet, on the right atrial aspect  This may represent a thrombus, tumor or  vegetation on tricuspid valve  DOPPLER: The transtricuspid velocity was within the normal range  There was no evidence for stenosis  There was mild regurgitation  The tricuspid jet envelope definition was inadequate for estimation of RV  systolic pressure  PULMONIC VALVE: Not well visualized  DOPPLER: The transpulmonic velocity was within the normal range  There was no regurgitation  PERICARDIUM: There was no pericardial effusion  AORTA: The root exhibited normal size  SYSTEMIC VEINS: IVC: No obvious thrombus in IVC The inferior vena cava was not well visualized  The inferior vena cava was normal in size  PULMONARY VEINS: DOPPLER: Doppler signals were not recordable in the pulmonary vein(s)      SYSTEM MEASUREMENT TABLES    2D  %FS: 28 01 %  Ao Diam: 3 cm  EDV(Teich): 41 13 ml  EF(Cube): 62 7 %  EF(Teich): 55 58 %  ESV(Cube): 12 29 ml  ESV(Teich): 18 27 ml  IVSd: 1 04 cm  LA Diam: 3 13 cm  LAAd A2C: 18 19 cm2  LAAd A4C: 12 8 cm2  LAEDV A-L A2C: 62 58 ml  LAEDV A-L A4C: 29 68 ml  LAEDV Index (A-L): 38 95 ml/m2  LAEDV MOD A2C: 57 96 ml  LAEDV MOD A4C: 27 68 ml  LAEDV(A-L): 44 01 ml  LALd A2C: 4 49 cm  LALd A4C: 4 68 cm  LVIDd: 3 21 cm  LVIDs: 2 31 cm  LVPWd: 1 02 cm  SI(Cube): 18 27 ml/m2  SI(Teich): 20 23 ml/m2  SV(Cube): 20 65 ml  SV(Teich): 22 86 ml  rv diam: 2 69 cm    CW  AV Vmax: 1 46 m/s  AV maxP 53 mmHg  RAP: 5 mmHg  TR Vmax: 2 92 m/s  TR maxP 06 mmHg    MM  AV Cusp: 1 5 cm  Ao Diam: 2 93 cm  LA Diam: 2 97 cm  LA/Ao: 1 01  TAPSE: 1 33 cm    PW  LVOT Vmax: 0 89 m/s  LVOT maxPG: 3 14 mmHg  RVSP: 39 06 mmHg    [facility]    Prepared and electronically signed by    Abigail Santo MD  Signed 17-Sep-2018 16:34:11       Results for orders placed during the hospital encounter of 18   JUNIOR    N[facility]ative [facility]  [facility] [place], [address] [facility]  [phone]    Transesophageal Echocardiogram  2D, 3D, Doppler, and Color Doppler    Study date:  18-Sep-2018    Patient: Emily Arango  MR number: [id number]  Account number: [de-identified]  : 1960  Age: 60 years  Gender: Female  Status: Inpatient  Location: Bedside  Height: 63 in  Weight: 125 lb  BP: 125/ 71 mmHg    Indications: Endocarditis      Diagnoses: I38  - Endocarditis, valve unspecified    Sonographer:  Karel Frank RDCS  Interpreting Physician:  Dmitri Wahl MD  Primary Physician:  Orestes Powell MD  Referring Physician:  Singh Hernandez PA-C  Group:  [facility]  Cardiology Fellow:  Jasmina Sexton MD    SUMMARY    LEFT VENTRICLE:  Systolic function was at the lower limits of normal  Ejection fraction was estimated to be 50 %   There were no regional wall motion abnormalities  RIGHT VENTRICLE:  The size was normal   Systolic function was normal     LEFT ATRIUM:  Size was normal   No thrombus was identified  LEFT ATRIAL APPENDAGE:  The size was normal   No thrombus was identified  RIGHT ATRIUM:  There was a large, homogenous, solid, mobile mass, measuring approximately 30 mm x 29 mm in the inferior right atrial cavity with associated smoke in the right atrium  It may represent a thrombus versus a right atrial mass  There is a  freely mobile segment at it's surface and the port tip itself can be seen trying to dislodge part of the mass  There was evidence of spontaneous echo contrast ("smoke")  RIGHT ATRIAL APPENDAGE:  There was no right atrial appendage thrombus identified  MITRAL VALVE:  There was no echocardiographic evidence of vegetation  AORTIC VALVE:  There was a small, strand-like, mobile mass, measuring 7 4 mm on the noncoronary cusp, on the aortic aspect  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  There was also a 2 8 mm echodensity on the LVOT side of the valve  This could represent  redundant valve tissue versus a very small vegetation  TRICUSPID VALVE:  There was mild regurgitation  There was no echocardiographic evidence of vegetation  PULMONIC VALVE:  There was no echocardiographic evidence of vegetation  HISTORY: PRIOR HISTORY: Heroin abuse, Coronary artery disease, Cerebral vascular accident, Diabetes, Schizophrenia, Hypertension, Smoker, Acute renal failure  PROCEDURE: The procedure was performed at the bedside  This was a routine study  The risks and alternatives of the procedure were explained to the patient's next of kin and informed consent was obtained  The transesophageal approach was  used  The study included complete 2D imaging, 3D imaging, complete spectral Doppler, and color Doppler  The heart rate was 72 bpm, at the start of the study   An adult omniplane probe was inserted by the cardiology fellow under direct  supervision of the attending cardiologist  Intubated with ease  One intubation attempt(s)  There was no blood detected on the probe  Image quality was adequate  There were no complications during the procedure  MEDICATIONS: Anesthesia  administered by floor nurse  LEFT VENTRICLE: Size was normal  Systolic function was at the lower limits of normal  Ejection fraction was estimated to be 50 %  There were no regional wall motion abnormalities  Wall thickness was normal  DOPPLER: The study was not  technically sufficient to allow evaluation of LV diastolic function  RIGHT VENTRICLE: The size was normal  Systolic function was normal     LEFT ATRIUM: Size was normal  No thrombus was identified  APPENDAGE: The size was normal  No thrombus was identified  DOPPLER: The function was normal (normal emptying velocity)  RIGHT ATRIUM: Size was normal  A line (catheter or pacing wire) was present  There was a large, homogenous, solid, mobile mass, measuring approximately 30 mm x 29 mm in the inferior right atrial cavity with associated smoke in the right  atrium  It may represent a thrombus versus a right atrial mass  There is a freely mobile segment at it's surface and the port tip itself can be seen trying to dislodge part of the mass  There was evidence of spontaneous echo contrast  ("smoke")  APPENDAGE: There was no right atrial appendage thrombus identified  MITRAL VALVE: Valve structure was normal  There was normal leaflet separation  There was no echocardiographic evidence of vegetation  DOPPLER: There was trace regurgitation  AORTIC VALVE: The valve was trileaflet  Leaflets exhibited normal thickness and normal cuspal separation  There was a small, strand-like, mobile mass, measuring 7 4 mm on the noncoronary cusp, on the aortic aspect  It may represent a  fibroelastoma  There was also a 2 8 mm echodensity on the LVOT side of the valve   This could represent redundant valve tissue versus a very small vegetation  DOPPLER: There was no regurgitation  TRICUSPID VALVE: The valve structure was normal  There was normal leaflet separation  There was no echocardiographic evidence of vegetation  DOPPLER: There was mild regurgitation  PULMONIC VALVE: Leaflets exhibited normal thickness, no calcification, and normal cuspal separation  There was no echocardiographic evidence of vegetation  DOPPLER: There was no significant regurgitation  PERICARDIUM: There was no pericardial effusion  The pericardium was normal in appearance  AORTA: The root exhibited normal size  There was no atheroma  There was no evidence for dissection  There was no evidence for aneurysm  PULMONARY VEINS: The pulmonary veins were normal sized  DOPPLER: Doppler flow pattern was normal in the pulmonary vein(s)  [address] Accredited Echocardiography Laboratory    Prepared and electronically signed by    Olga Damon MD  Signed 18-Sep-2018 14:08:00       No results found for this or any previous visit  No results found for this or any previous visit      Meds/Allergies   all current active meds have been reviewed  Prescriptions Prior to Admission   Medication    atorvastatin (LIPITOR) 10 mg tablet    clopidogrel (PLAVIX) 75 mg tablet    insulin aspart protamine-insulin aspart (NOVOLOG MIX 70/30) 100 units/mL injection    insulin glargine (LANTUS) 100 units/mL subcutaneous injection    metFORMIN (GLUCOPHAGE) 1000 MG tablet    metoclopramide (REGLAN) 10 mg tablet    Rivaroxaban (XARELTO PO)    divalproex sodium (DEPAKOTE) 250 mg EC tablet    DULoxetine (CYMBALTA) 60 mg delayed release capsule    mirtazapine (REMERON) 15 mg tablet    nitroglycerin (NITROSTAT) 0 4 mg SL tablet    oxyCODONE-acetaminophen (PERCOCET) 5-325 mg per tablet    promethazine (PHENERGAN) 12 5 MG tablet    senna ([employer] SENNA) 8 6 MG tablet    terbinafine (LAMISIL) 250 mg tablet    traZODone (DESYREL) 50 mg tablet         sodium bicarbonate infusion 125 mL/hr Last Rate: 125 mL/hr (09/27/18 0250)     Assessment:  Principal Problem:    Bacteremia due to methicillin susceptible Staphylococcus aureus (MSSA)  Active Problems:    Type 2 diabetes mellitus with complication (HCC)    Acute kidney injury ([facility] [address] )    Hyponatremia    GERD (gastroesophageal reflux disease)    HHNC (hyperglycemic hyperosmolar nonketotic coma) (HCC)    Sepsis (HCC)    Altered mental status    Acute respiratory failure with hypoxia (HCC)    Hypocalcemia    Leukopenia    Cavitating pulmonary nodules     Metabolic acidosis, NAG, bicarbonate losses    Hypomagnesemia    Hypophosphatemia    Colon cancer (HCC)    Back pain    Suspected endocarditis

## 2018-09-27 NOTE — PROGRESS NOTES
Per Dr Ezio Castro with nephrology, I am to pass along to the PCA and oncoming nurse that accurate I&O's need to be calculated on Emily  Oncoming nurse and PCA aware

## 2018-09-27 NOTE — PROGRESS NOTES
Progress Note - Emily Abdullahi 1960, 60 y o  female MRN: [id number]    Unit/Bed#: [facility] 828-01 Encounter: [id number]    Primary Care Provider: Manuela Zelaya MD   Date and time admitted to hospital: 9/17/2018  1:36 AM        * Bacteremia due to methicillin susceptible Staphylococcus aureus (MSSA)   Assessment & Plan    Persistent MSSA bacteremia  Infectious Disease on board  Continue with the antibiotics  Follow-up on the repeat cultures-last culture from 8/26/2018  Is  still positive  MRI of the lumbar spine reviewed,  Status post Port-A-Cath removal  Ct of the chest abdomen and pelvis reviewed-pulmonology evaluation appreciated  Since there is no significant respiratory distress no need for thoracocentesis  Patient is scheduled for JUNIOR tomorrow  Significantly worse so leukocytosis-above 72,000  At this point and not sure we can explain this to Neupogen       Sepsis [facility])   Assessment & Plan    Patient remains afebrile  Leukocytosis is significantly worsened  I am not sure the GCSF alone is responsible for significantly worsened leukocytosis at this point  Still persistently bacteremic  Trend WBC count       HHNC (hyperglycemic hyperosmolar nonketotic coma) [facility])   Assessment & Plan    Lab Results   Component Value Date    HGBA1C 9 6 (H) 09/22/2018       Recent Labs      09/26/18   2042  09/27/18   0625  09/27/18   1102  09/27/18   1556   POCGLU  220*  103  142*  172*       Blood Sugar Average: Last 67  HHNC - resolved  Monitor blood sugar, continue with the current dose of insulin  Blood sugar x-ray       Suspected endocarditis   Assessment & Plan    · Cardiology on board  · Patient is going for JUNIOR tomorrow    May have infected thrombus  · Discussed with cardiology  · Currently patient is on therapeutic dose of Lovenox for a flutter/thrombus     Back pain   Assessment & Plan    · Patient still complaining of back pain  · MRI lumbar spine reviewed  · Pain control     Colon cancer [facility])   Assessment & Plan Currently on palliative chemotherapy  Heme-Onc consultation appreciated  Patient currently is not able to have any chemotherapy     Hypomagnesemia   Assessment & Plan    · Replete     Cavitating pulmonary nodules    Assessment & Plan    CT showed stable appearance of the lung lesion     Leukopenia   Assessment & Plan    · Leukopenia resolved currently with leukocytosis after the administration of G-CSF     Hypocalcemia   Assessment & Plan    · Replete     Acute respiratory failure with hypoxia (HCC)   Assessment & Plan    · Monitor  · Continue with the supplemental oxygen to keep saturation above 90%     Altered mental status   Assessment & Plan    · Likely secondary to the infectious process  · Appears to be improving  · CT head on 09/17/18-no acute intracranial pathology  · Patient do not have the ability to make informed medical decision     Hyponatremia   Assessment & Plan    Sodium is 131  Nephrology on board  Monitor     Acute kidney injury [facility])   Assessment & Plan    · Monitor creatinine  · Nephrology on board  · Continue with the IV fluids  ·      Type 2 diabetes mellitus with complication [facility])   Assessment & Plan    Lab Results   Component Value Date    HGBA1C 9 6 (H) 09/22/2018       Recent Labs      09/26/18   2042  09/27/18   0625  09/27/18   1102  09/27/18   1556   POCGLU  220*  103  142*  172*       Blood Sugar Average: Last 72 hrs:  (P) 166 2   Monitor blood sugar, continue with the current insulin regimen         VTE Pharmacologic Prophylaxis:   Pharmacologic: Enoxaparin (Lovenox)  Mechanical VTE Prophylaxis in Place: Yes    Patient Centered Rounds: I have performed bedside rounds with nursing staff today  Discussions with Specialists or Other Care Team Provider:  Cardiology and Infectious  disease  Education and Discussions with Family / Patient:  Daughter updated in the room    Time Spent for Care:  60 minutes   More than 50% of total time spent on counseling and coordination of care as described above  Current Length of Stay: 10 day(s)    Current Patient Status: Inpatient   Certification Statement: The patient will continue to require additional inpatient hospital stay due to Persistent bacteremia    Discharge Plan:     Code Status: Level 1 - Full Code      Subjective:   Patient seen and examined  Family reported that patient is doing okay no specific complaints  Had a discussion with Cardiology and Infectious Disease  Going for a family meeting tomorrow regarding goals of care since the bacteremia is rather persistent    Objective:     Vitals:   Temp (24hrs), Av 1 °F (36 7 °C), Min:97 3 °F (36 3 °C), Max:98 7 °F (37 1 °C)    HR:  [82-89] 84  Resp:  [18-20] 18  BP: (110-140)/(59-92) 140/92  SpO2:  [90 %-96 %] 96 %  Body mass index is 27 42 kg/m²  Input and Output Summary (last 24 hours): Intake/Output Summary (Last 24 hours) at 18 1617  Last data filed at 18 1445   Gross per 24 hour   Intake              665 ml   Output             2675 ml   Net            -2010 ml       Physical Exam:     Physical Exam   Constitutional: She appears well-developed  HENT:   Head: Normocephalic and atraumatic  Eyes: Pupils are equal, round, and reactive to light  Neck: No JVD present  Cardiovascular: Normal rate  No murmur heard  Pulmonary/Chest: Effort normal    Decreased breath sounds bilateral   Abdominal: Soft  There is no tenderness  There is no rebound  Colostomy present   Musculoskeletal: Normal range of motion  She exhibits edema  Neurological: She is alert  No cranial nerve deficit         Additional Data:     Labs:      Results from last 7 days  Lab Units 18  1140  18  0449   WBC Thousand/uL 72 36*  < > 2 08*   HEMOGLOBIN g/dL 8 0*  < > 8 2*   HEMATOCRIT % 23 1*  < > 23 8*   PLATELETS Thousands/uL 111*  < > 94*   NEUTROS PCT %  --   --  42*   LYMPHS PCT %  --   --  51*   LYMPHO PCT % 5*  < >  --    MONOS PCT %  --   --  5   MONO PCT MAN % 5  < >  -- EOS PCT %  --   --  1   EOSINO PCT MANUAL % 0  < >  --    < > = values in this interval not displayed  Results from last 7 days  Lab Units 09/27/18  0507 09/26/18  1104   SODIUM mmol/L 131* 129*   POTASSIUM mmol/L 3 1* 3 5   CHLORIDE mmol/L 99* 98*   CO2 mmol/L 23 21   BUN mg/dL 26* 27*   CREATININE mg/dL 2 01* 2 24*   CALCIUM mg/dL 7 2* 6 9*   ALK PHOS U/L  --  130*   ALT U/L  --  <6*   AST U/L  --  22       Results from last 7 days  Lab Units 09/25/18  0435   INR  1 13       * I Have Reviewed All Lab Data Listed Above  * Additional Pertinent Lab Tests Reviewed:  [address] Admission Reviewed    Imaging:    Imaging Reports Reviewed Today Include:   Imaging Personally Reviewed by Myself Includes:      Recent Cultures (last 7 days):       Results from last 7 days  Lab Units 09/26/18  0616 09/26/18  0543 09/24/18  1743 09/24/18  1003 09/22/18  0530 09/22/18  0519   BLOOD CULTURE  Staphylococcus aureus*  --  Staphylococcus aureus*  --  Staphylococcus aureus* Staphylococcus aureus*   GRAM STAIN RESULT  Gram positive cocci in clusters Gram positive cocci in clusters Gram positive cocci in clusters  --  Gram positive cocci in clusters Gram positive cocci in clusters   C DIFF TOXIN B   --   --   --  NEGATIVE for C difficle toxin by PCR    --   --        Last 24 Hours Medication List:     Current Facility-Administered Medications:  acetaminophen 650 mg Oral Q6H PRN Hamilton Esquivel PA-C    acetaminophen 325 mg Oral Q8H [address] Scooby [place]    amiodarone 200 mg Oral BID With Meals Renetta De Paz, DO    atorvastatin 5 mg Oral After Mckenzie Young [place]    calcium carbonate 2 tablet Oral BID With Meals Max Vargas, DO    chlorhexidine 15 mL Swish & Spit Q12H [address] Marianela Marmolejo, DO    clopidogrel 75 mg Oral Daily Megan Sequeira PA-C    divalproex sodium 750 mg Oral Daily Azeb Coleman PA-C    DULoxetine 60 mg Oral Daily Megan Sequeira PA-C    enoxaparin 1 mg/kg Subcutaneous Q12H [address] Megan Sequeira PA-C    insulin lispro 1-5 Units Subcutaneous HS Rianna Gr MD    insulin lispro 1-6 Units Subcutaneous TID AC Rianna Gr MD    lactulose 10 g Oral BID Sco[place]    lidocaine 1 patch Topical Daily JEANNIE Parekh    magnesium sulfate 1 g Intravenous Once Holley Hauser MD    metoclopramide 5 mg Intravenous Q6H PRN Radha Friend PA-C    midodrine 10 mg Oral TID AC Cierra Calix MD    nafcillin 2,000 mg Intravenous Q4H Gaye Arteaga MD Last Rate: 2,000 mg (09/27/18 1448)   oxyCODONE 2 5 mg Oral Q6H PRN JEANNIE Parekh    sodium bicarbonate 1,300 mg Oral BID after meals Marcial Majano,     sodium chloride 0 9 % with KCl 20 mEq/L 100 mL/hr Intravenous Continuous Cierra Calix MD Last Rate: 100 mL/hr (09/27/18 1305)   traZODone 50 mg Oral HS Megan Sequeira PA-C      Facility-Administered Medications Ordered in Other Encounters:  heparin lock flush 300 Units Hafsa Bajwa PRN Brandon Hamilton MD        Today, Patient Was Seen By: Holley Hauser MD    ** Please Note: Dictation voice to text software may have been used in the creation of this document   **

## 2018-09-27 NOTE — ASSESSMENT & PLAN NOTE
Currently on palliative chemotherapy  Heme-Onc consultation appreciated  Patient currently is not able to have any chemotherapy

## 2018-09-27 NOTE — CASE MANAGEMENT
Thank you,  [address]  Utilization Review Department  Phone: [phone]; Fax [phone]  ATTENTION: Please call with any questions or concerns to [phone]  and carefully follow the prompts so that you are directed to the right person  Send all requests for admission clinical reviews, approved or denied determinations and any other requests to fax [phone]  All voicemails are confidential      ========================================================================    Continued Stay Review    Date: 09/27/2018  Age/Sex: 60 y o  female     Assessment/Plan: 1421, 1556, 1623  * Bacteremia due to methicillin susceptible Staphylococcus aureus (MSSA)   Assessment & Plan     Persistent MSSA bacteremia  Infectious Disease on board  Continue with the antibiotics  Follow-up on the repeat cultures-last culture from 8/26/2018  Is  still positive  MRI of the lumbar spine reviewed,  Status post Port-A-Cath removal  Ct of the chest abdomen and pelvis reviewed-pulmonology evaluation appreciated  Since there is no significant respiratory distress no need for thoracocentesis  Patient is scheduled for JUNIOR tomorrow  Significantly worse so leukocytosis-above 72,000  At this point and not sure we can explain this to Neupogen         Sepsis [facility])   Assessment & Plan     Patient remains afebrile  Leukocytosis is significantly worsened    I am not sure the GCSF alone is responsible for significantly worsened leukocytosis at this point  Still persistently bacteremic  Trend WBC count         HHNC (hyperglycemic hyperosmolar nonketotic coma) [facility])   Assessment & Plan     Lab Results   Component Value Date     HGBA1C 9 6 (H) 09/22/2018                Recent Labs      09/26/18   2042  09/27/18   0625  09/27/18   1102  09/27/18   1556   POCGLU  220*  103  142*  172*         Blood Sugar Average: Last 67  HHNC - resolved  Monitor blood sugar, continue with the current dose of insulin  Blood sugar x-ray       Suspected endocarditis   Assessment & Plan     · Cardiology on board  · Patient is going for JUNIOR tomorrow    May have infected thrombus  · Discussed with cardiology  · Currently patient is on therapeutic dose of Lovenox for a flutter/thrombus      Back pain   Assessment & Plan     · Patient still complaining of back pain  · MRI lumbar spine reviewed  · Pain control      Colon cancer ([facility] [address] )   Assessment & Plan     Currently on palliative chemotherapy  Heme-Onc consultation appreciated  Patient currently is not able to have any chemotherapy      Hypomagnesemia   Assessment & Plan     · Replete      Cavitating pulmonary nodules    Assessment & Plan     CT showed stable appearance of the lung lesion      Leukopenia   Assessment & Plan     · Leukopenia resolved currently with leukocytosis after the administration of G-CSF      Hypocalcemia   Assessment & Plan     · Replete      Acute respiratory failure with hypoxia (HCC)   Assessment & Plan     · Monitor  · Continue with the supplemental oxygen to keep saturation above 90%      Altered mental status   Assessment & Plan     · Likely secondary to the infectious process  · Appears to be improving  · CT head on 09/17/18-no acute intracranial pathology  · Patient do not have the ability to make informed medical decision      Hyponatremia   Assessment & Plan     Sodium is 131  Nephrology on board  Monitor      Acute kidney injury [facility])   Assessment & Plan     · Monitor creatinine  · Nephrology on board  · Continue with the IV fluids  ·        Type 2 diabetes mellitus with complication [facility])   Assessment & Plan             Lab Results   Component Value Date     HGBA1C 9 6 (H) 09/22/2018                Recent Labs      09/26/18   2042  09/27/18   0625  09/27/18   1102  09/27/18   1556   POCGLU  220*  103  142*  172*         Blood Sugar Average: Last 72 hrs:  (P) 166 2   Monitor blood sugar, continue with the current insulin regimen            VTE Pharmacologic Prophylaxis: Pharmacologic: Enoxaparin (Lovenox)  Mechanical VTE Prophylaxis in Place: Yes    Discharge Plan: TBD    Vital Signs: /62   Pulse 83   Temp (!) 97 3 °F (36 3 °C)   Resp 18   Ht 5' 3" (1 6 m)   Wt 70 2 kg (154 lb 12 2 oz)   SpO2 92%   BMI 27 42 kg/m²     Medications:   Scheduled Meds:   Current Facility-Administered Medications:  acetaminophen 650 mg Oral Q6H PRN Alicia Oscar PA-C    acetaminophen 325 mg Oral Q8H [facility] Scooby [place]    amiodarone 200 mg Oral BID With Meals Nora Glagwendolyn, DO    atorvastatin 5 mg Oral After [employer] [place]    calcium carbonate 2 tablet Oral BID With Meals Yamilex Branham, DO    chlorhexidine 15 mL Swish & Spit Q12H [facility] Marianela Marmolejo, DO    clopidogrel 75 mg Oral Daily Megan Sequeira PA-C    divalproex sodium 750 mg Oral Daily Marques Dockery PA-C    DULoxetine 60 mg Oral Daily Megan Sequeira PA-C    enoxaparin 1 mg/kg Subcutaneous Q12H [facility] Megan Sequeira PA-C    insulin lispro 1-5 Units Subcutaneous HS Green [place]anes, MD    insulin lispro 1-6 Units Subcutaneous TID AC [place], MD    lactulose 10 g Oral BID Sc[place]    lidocaine 1 patch Topical Daily JEANNIE Knight    metoclopramide 5 mg Intravenous Q6H PRN Alicia Oscar PA-C    midodrine 10 mg Oral TID AC Campbell Ziegler MD    nafcillin 2,000 mg Intravenous Q4H Claudette Wong MD Last Rate: 2,000 mg (09/27/18 1217)   oxyCODONE 2 5 mg Oral Q6H PRN JEANNIE Knight    potassium chloride 40 mEq Oral Once Campbell Ziegler MD    sodium bicarbonate 1,300 mg Oral BID after meals Yamilex Branham DO    sodium chloride 0 9 % with KCl 20 mEq/L 100 mL/hr Intravenous Continuous Campbell Ziegler MD Last Rate: 100 mL/hr (09/27/18 1305)   traZODone 50 mg Oral HS Megan Sequeira PA-C      Facility-Administered Medications Ordered in Other Encounters:  heparin lock flush 300 Units Intracatheter Q1H PRN Oren Bunch MD     Continuous Infusions:   sodium chloride 0 9 % with KCl 20 mEq/L 100 mL/hr Last Rate: 100 mL/hr (09/27/18 1305)     Abnormal Labs/Diagnostic Results:

## 2018-09-27 NOTE — ASSESSMENT & PLAN NOTE
Persistent MSSA bacteremia  Infectious Disease on board  Continue with the antibiotics  Follow-up on the repeat cultures-last culture from 8/26/2018  Is  still positive  MRI of the lumbar spine reviewed,  Status post Port-A-Cath removal  Ct of the chest abdomen and pelvis reviewed-pulmonology evaluation appreciated  Since there is no significant respiratory distress no need for thoracocentesis  Patient is scheduled for JUNIOR tomorrow  Significantly worse so leukocytosis-above 72,000   At this point and not sure we can explain this to Neupogen

## 2018-09-28 ENCOUNTER — APPOINTMENT (INPATIENT)
Dept: NON INVASIVE DIAGNOSTICS | Facility: HOSPITAL | Age: 58
DRG: 871 | End: 2018-09-28
Payer: COMMERCIAL

## 2018-09-28 ENCOUNTER — ANESTHESIA EVENT (OUTPATIENT)
Dept: NON INVASIVE DIAGNOSTICS | Facility: HOSPITAL | Age: 58
End: 2018-09-28

## 2018-09-28 ENCOUNTER — ANESTHESIA (OUTPATIENT)
Dept: NON INVASIVE DIAGNOSTICS | Facility: HOSPITAL | Age: 58
End: 2018-09-28

## 2018-09-28 LAB
ALBUMIN SERPL BCP-MCNC: 0.8 G/DL (ref 3.5–5)
ALP SERPL-CCNC: 158 U/L (ref 46–116)
ALT SERPL W P-5'-P-CCNC: <6 U/L (ref 12–78)
ANION GAP SERPL CALCULATED.3IONS-SCNC: 9 MMOL/L (ref 4–13)
ANISOCYTOSIS BLD QL SMEAR: PRESENT
AST SERPL W P-5'-P-CCNC: 21 U/L (ref 5–45)
BASOPHILS # BLD MANUAL: 0 THOUSAND/UL (ref 0–0.1)
BASOPHILS NFR MAR MANUAL: 0 % (ref 0–1)
BILIRUB SERPL-MCNC: 0.8 MG/DL (ref 0.2–1)
BUN SERPL-MCNC: 27 MG/DL (ref 5–25)
CA-I BLD-SCNC: 0.95 MMOL/L (ref 1.12–1.32)
CALCIUM SERPL-MCNC: 7.3 MG/DL (ref 8.3–10.1)
CHLORIDE SERPL-SCNC: 98 MMOL/L (ref 100–108)
CO2 SERPL-SCNC: 24 MMOL/L (ref 21–32)
CREAT SERPL-MCNC: 1.89 MG/DL (ref 0.6–1.3)
EOSINOPHIL # BLD MANUAL: 0 THOUSAND/UL (ref 0–0.4)
EOSINOPHIL NFR BLD MANUAL: 0 % (ref 0–6)
ERYTHROCYTE [DISTWIDTH] IN BLOOD BY AUTOMATED COUNT: 17.6 % (ref 11.6–15.1)
GFR SERPL CREATININE-BSD FRML MDRD: 29 ML/MIN/1.73SQ M
GLUCOSE SERPL-MCNC: 131 MG/DL (ref 65–140)
GLUCOSE SERPL-MCNC: 174 MG/DL (ref 65–140)
GLUCOSE SERPL-MCNC: 85 MG/DL (ref 65–140)
GLUCOSE SERPL-MCNC: 87 MG/DL (ref 65–140)
GLUCOSE SERPL-MCNC: 90 MG/DL (ref 65–140)
HCT VFR BLD AUTO: 20.3 % (ref 34.8–46.1)
HCV RNA SERPL NAA+PROBE-ACNC: NORMAL IU/ML
HGB BLD-MCNC: 7 G/DL (ref 11.5–15.4)
LYMPHOCYTES # BLD AUTO: 3.28 THOUSAND/UL (ref 0.6–4.47)
LYMPHOCYTES # BLD AUTO: 5 % (ref 14–44)
MAGNESIUM SERPL-MCNC: 1.8 MG/DL (ref 1.6–2.6)
MCH RBC QN AUTO: 27.3 PG (ref 26.8–34.3)
MCHC RBC AUTO-ENTMCNC: 34.5 G/DL (ref 31.4–37.4)
MCV RBC AUTO: 79 FL (ref 82–98)
METAMYELOCYTES NFR BLD MANUAL: 3 % (ref 0–1)
MICROCYTES BLD QL AUTO: PRESENT
MONOCYTES # BLD AUTO: 1.31 THOUSAND/UL (ref 0–1.22)
MONOCYTES NFR BLD: 2 % (ref 4–12)
MYELOCYTES NFR BLD MANUAL: 1 % (ref 0–1)
NEUTROPHILS # BLD MANUAL: 58.35 THOUSAND/UL (ref 1.85–7.62)
NEUTS SEG NFR BLD AUTO: 89 % (ref 43–75)
NRBC BLD AUTO-RTO: 0 /100 WBCS
OVALOCYTES BLD QL SMEAR: PRESENT
PLATELET # BLD AUTO: 124 THOUSANDS/UL (ref 149–390)
PLATELET BLD QL SMEAR: ABNORMAL
PMV BLD AUTO: 11.7 FL (ref 8.9–12.7)
POLYCHROMASIA BLD QL SMEAR: PRESENT
POTASSIUM SERPL-SCNC: 3.8 MMOL/L (ref 3.5–5.3)
PROT SERPL-MCNC: 5.7 G/DL (ref 6.4–8.2)
RBC # BLD AUTO: 2.56 MILLION/UL (ref 3.81–5.12)
RBC MORPH BLD: PRESENT
SODIUM SERPL-SCNC: 131 MMOL/L (ref 136–145)
TARGETS BLD QL SMEAR: PRESENT
TEST INFORMATION: NORMAL
WBC # BLD AUTO: 65.56 THOUSAND/UL (ref 4.31–10.16)

## 2018-09-28 PROCEDURE — 99233 SBSQ HOSP IP/OBS HIGH 50: CPT | Performed by: FAMILY MEDICINE

## 2018-09-28 PROCEDURE — 82948 REAGENT STRIP/BLOOD GLUCOSE: CPT

## 2018-09-28 PROCEDURE — 82330 ASSAY OF CALCIUM: CPT | Performed by: INTERNAL MEDICINE

## 2018-09-28 PROCEDURE — 85027 COMPLETE CBC AUTOMATED: CPT | Performed by: FAMILY MEDICINE

## 2018-09-28 PROCEDURE — 99232 SBSQ HOSP IP/OBS MODERATE 35: CPT | Performed by: INTERNAL MEDICINE

## 2018-09-28 PROCEDURE — 83735 ASSAY OF MAGNESIUM: CPT | Performed by: INTERNAL MEDICINE

## 2018-09-28 PROCEDURE — 80053 COMPREHEN METABOLIC PANEL: CPT | Performed by: FAMILY MEDICINE

## 2018-09-28 PROCEDURE — 85007 BL SMEAR W/DIFF WBC COUNT: CPT | Performed by: FAMILY MEDICINE

## 2018-09-28 RX ORDER — NYSTATIN 100000 [USP'U]/G
POWDER TOPICAL 2 TIMES DAILY
Status: DISCONTINUED | OUTPATIENT
Start: 2018-09-28 | End: 2018-10-05 | Stop reason: HOSPADM

## 2018-09-28 RX ADMIN — AMIODARONE HYDROCHLORIDE 200 MG: 200 TABLET ORAL at 09:24

## 2018-09-28 RX ADMIN — CHLORHEXIDINE GLUCONATE 15 ML: 1.2 RINSE ORAL at 22:10

## 2018-09-28 RX ADMIN — NAFCILLIN SODIUM 2000 MG: 2 INJECTION, POWDER, LYOPHILIZED, FOR SOLUTION INTRAMUSCULAR; INTRAVENOUS at 09:32

## 2018-09-28 RX ADMIN — NAFCILLIN SODIUM 2000 MG: 2 INJECTION, POWDER, LYOPHILIZED, FOR SOLUTION INTRAMUSCULAR; INTRAVENOUS at 05:17

## 2018-09-28 RX ADMIN — NYSTATIN: 100000 POWDER TOPICAL at 13:44

## 2018-09-28 RX ADMIN — LACTULOSE 10 G: 20 SOLUTION ORAL at 09:24

## 2018-09-28 RX ADMIN — OXYCODONE HYDROCHLORIDE 2.5 MG: 5 TABLET ORAL at 03:05

## 2018-09-28 RX ADMIN — CLOPIDOGREL 75 MG: 75 TABLET, FILM COATED ORAL at 09:25

## 2018-09-28 RX ADMIN — CHLORHEXIDINE GLUCONATE 15 ML: 1.2 RINSE ORAL at 09:24

## 2018-09-28 RX ADMIN — OXYCODONE HYDROCHLORIDE 2.5 MG: 5 TABLET ORAL at 22:28

## 2018-09-28 RX ADMIN — OXYCODONE HYDROCHLORIDE 2.5 MG: 5 TABLET ORAL at 15:35

## 2018-09-28 RX ADMIN — LACTULOSE 10 G: 20 SOLUTION ORAL at 17:51

## 2018-09-28 RX ADMIN — NAFCILLIN SODIUM 2000 MG: 2 INJECTION, POWDER, LYOPHILIZED, FOR SOLUTION INTRAMUSCULAR; INTRAVENOUS at 01:35

## 2018-09-28 RX ADMIN — ENOXAPARIN SODIUM 70 MG: 80 INJECTION SUBCUTANEOUS at 09:24

## 2018-09-28 RX ADMIN — AMIODARONE HYDROCHLORIDE 200 MG: 200 TABLET ORAL at 17:51

## 2018-09-28 RX ADMIN — NYSTATIN 1 APPLICATION: 100000 POWDER TOPICAL at 17:53

## 2018-09-28 RX ADMIN — DIVALPROEX SODIUM 750 MG: 500 TABLET, DELAYED RELEASE ORAL at 09:24

## 2018-09-28 RX ADMIN — MIDODRINE HYDROCHLORIDE 10 MG: 5 TABLET ORAL at 09:24

## 2018-09-28 RX ADMIN — SODIUM CHLORIDE AND POTASSIUM CHLORIDE 100 ML/HR: .9; .15 SOLUTION INTRAVENOUS at 09:32

## 2018-09-28 RX ADMIN — ACETAMINOPHEN 325 MG: 325 TABLET, FILM COATED ORAL at 13:43

## 2018-09-28 RX ADMIN — OXYCODONE HYDROCHLORIDE 2.5 MG: 5 TABLET ORAL at 09:25

## 2018-09-28 RX ADMIN — DULOXETINE HYDROCHLORIDE 60 MG: 60 CAPSULE, DELAYED RELEASE ORAL at 09:25

## 2018-09-28 RX ADMIN — CALCIUM 2 TABLET: 500 TABLET ORAL at 09:25

## 2018-09-28 RX ADMIN — SODIUM BICARBONATE 650 MG TABLET 1300 MG: at 09:25

## 2018-09-28 RX ADMIN — ACETAMINOPHEN 325 MG: 325 TABLET, FILM COATED ORAL at 22:10

## 2018-09-28 RX ADMIN — LIDOCAINE 1 PATCH: 50 PATCH CUTANEOUS at 09:32

## 2018-09-28 RX ADMIN — TRAZODONE HYDROCHLORIDE 50 MG: 50 TABLET ORAL at 22:11

## 2018-09-28 NOTE — ASSESSMENT & PLAN NOTE
Lab Results   Component Value Date    HGBA1C 9 6 (H) 09/22/2018       Recent Labs      09/27/18   2052  09/28/18   0652  09/28/18   1050  09/28/18   1610   POCGLU  179*  90  87  131       Blood Sugar Average: Last 67  HHNC - resolved  Monitor blood sugar, continue with the current dose of insulin  Blood sugar x-ray

## 2018-09-28 NOTE — PLAN OF CARE
CARDIOVASCULAR - ADULT     Maintains optimal cardiac output and hemodynamic stability Not Progressing     Absence of cardiac dysrhythmias or at baseline rhythm Not Progressing        DISCHARGE PLANNING     Discharge to home or other facility with appropriate resources Not Progressing        DISCHARGE PLANNING - CARE MANAGEMENT     Discharge to post-acute care or home with appropriate resources Not Progressing        INFECTION - ADULT     Absence or prevention of progression during hospitalization Not Progressing        Knowledge Deficit     Patient/family/caregiver demonstrates understanding of disease process, treatment plan, medications, and discharge instructions Not Progressing        METABOLIC, FLUID AND ELECTROLYTES - ADULT     Electrolytes maintained within normal limits Not Progressing     Glucose maintained within target range Not Progressing        Nutrition/Hydration-ADULT     Nutrient/Hydration intake appropriate for improving, restoring or maintaining nutritional needs Not Progressing        PAIN - ADULT     Verbalizes/displays adequate comfort level or baseline comfort level Not Progressing        Potential for Falls     Patient will remain free of falls Not Progressing        Prexisting or High Potential for Compromised Skin Integrity     Skin integrity is maintained or improved Not Progressing        RESPIRATORY - ADULT     Achieves optimal ventilation and oxygenation Not Progressing        SAFETY ADULT     Maintain or return to baseline ADL function Not Progressing     Maintain or return mobility status to optimal level Not Progressing

## 2018-09-28 NOTE — PROGRESS NOTES
Progress Note - Nephrology   Emily Abdullahi 60 y o  female MRN: [id number]  Unit/Bed#: [facility] 828-01 Encounter: [id number]      Assessment/Plan:    Acute kidney injury  · Likely secondary to hypertension and ischemia of presentation resulting in ischemic ATN; unlikely AIN as urine eosinophils 0  · Urine output 1200 mL; net balance-  +440 24 hr  · Creatinine 1 89; previous 2 01  · Midodrine 10 mg t i d   · Normal saline with KCl 20 mEq/L 100 mL/hour  · Patient's kidney status continues to improve and she continues to have good urine output; continue to monitor    Electrolytes  · Hyponatremia- currently 131--> 131; patient NPO  · Hypocalcemia- ionized calcium 0 95; continue calcium carbonate 1000 mg b i d   · Mg >2, K> 4    Other issues  · Colon cancer- colostomy in place; oncology following  · Anemia- hemoglobin 7 (previous 8 0); transfuse hemoglobin < 7  · 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 per Cardiology  · Endocarditis- patient currently on nafcillin; possible repeat JUNIOR today  · Diabetes mellitus-management per primary team  · Family meeting later today to discuss goals of care    Subjective:   Patient resting comfortably in bed and awoke upon arousal   She is complaining of back pain which she states has been ongoing and there is no change in description of the pain  She denies fever, chills, nausea, vomiting, shortness of breath, and chest pain  Objective:     Vitals: Blood pressure 126/76, pulse 81, temperature 98 2 °F (36 8 °C), temperature source Oral, resp  rate 18, height 5' 3" (1 6 m), weight 70 2 kg (154 lb 12 2 oz), SpO2 92 %, not currently breastfeeding  ,Body mass index is 27 42 kg/m²  Weight (last 2 days)     None            Intake/Output Summary (Last 24 hours) at 09/28/18 0752  Last data filed at 09/28/18 0330   Gross per 24 hour   Intake             2640 ml   Output             2200 ml   Net              440 ml       Urethral Catheter Latex 18 Fr   (Active)   Site Assessment Clean;Skin intact 9/27/2018  3:30 PM   Collection Container Standard drainage bag 9/27/2018  3:30 PM   Securement Method Securing device (Describe) 9/27/2018  3:30 PM   Output (mL) 300 mL 9/28/2018  3:30 AM     Physical Exam   Constitutional: She is oriented to person, place, and time  She appears well-developed and well-nourished  No distress  HENT:   Head: Normocephalic and atraumatic  Eyes: No scleral icterus  Cardiovascular: Normal rate, regular rhythm, normal heart sounds and intact distal pulses  Exam reveals no gallop and no friction rub  No murmur heard  Pulmonary/Chest: No respiratory distress  She has no wheezes  She has no rales  Poor inspiratory effort   Abdominal: Soft  Bowel sounds are normal  She exhibits no distension  There is no tenderness  Colostomy in right lower quadrant   Musculoskeletal: She exhibits edema  2+ lower extremity edema  1+ upper extremity edema   Neurological: She is alert and oriented to person, place, and time

## 2018-09-28 NOTE — SOCIAL WORK
Transport tentatively set for [facility] [employer] for patient to discharge home ([address] [facility], [address] [place] [address]) through [address] [place] [address]  Cm confirmed with [place] that patient does not need non-emergent BLS transport authorization as long as transport company used is in contract with [employer]  [facility] is in contract  Cm informed RN Severiano Overland and Dr Shiela Arizmendi  Cm to inform patient's family and  Hospice  Cm following

## 2018-09-28 NOTE — SOCIAL WORK
GUILLERMO received a call form Kary Nova with hospice and she got auth for home hospice and all equipment will be delivered tomorrow at 12 noon   Guillermo called [phone] for kyle for BLS transport

## 2018-09-28 NOTE — PROGRESS NOTES
Per the cardiologist, Dr Yareli Reyna, patient will not be getting a JUNIOR today  Patient's family is choosing comfort measures  I spoke with Dr Phuc Elliott with [facility] for diet order and nystatin powder for pannus fold and b/l inner thigh redness

## 2018-09-28 NOTE — PROGRESS NOTES
Progress note - Palliative and Supportive Care   Emily Abdullahi 60 y o  female [id number]    Assessment:   -   Patient Active Problem List   Diagnosis    CVA (cerebral vascular accident) ([place] [address] )    Type 2 diabetes mellitus without complication, without long-term current use of insulin ([facility])    Schizoaffective disorder, bipolar type ([place] [address] )    Essential hypertension    Colonic mass    Type 2 diabetes mellitus with complication ([place] [address] )    CKD (chronic kidney disease) stage 3, GFR 30-59 ml/min    Pain of right upper extremity    Acute kidney injury ([place] [address] )    CAD (coronary artery disease)    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[place] [address] )    Large bowel obstruction ([place] [address] )    Brachial artery stenosis, right (HCC)    Hyponatremia    Rectosigmoid mass - High colostomy output    Leukocytosis    GERD (gastroesophageal reflux disease)    Tobacco abuse    Liver metastasis (HCC)    Mixed hyperlipidemia    Dizziness - due to orthostatic hypotension    Ambulatory dysfunction    Orthostatic hypotension    Chemotherapy induced nausea and vomiting    Lower abdominal pain    HHNC (hyperglycemic hyperosmolar nonketotic coma) ([facility])    Sepsis ([place] [address] )    Altered mental status    Acute respiratory failure with hypoxia ([facility])    Hypocalcemia    Leukopenia    Cavitating pulmonary nodules     Metabolic acidosis, NAG, bicarbonate losses    Hypomagnesemia    Hypophosphatemia    Bacteremia due to methicillin susceptible Staphylococcus aureus (MSSA)    Colon cancer ([place] [address] )    Back pain    Suspected endocarditis       Plan:  1  Symptom management -   -   Comfort medications  Resume scheduled Tylenol, amiodarone, Depakote, Cymbalta, nystatin, trazodone  Oxy IR 2 5 p  O  Q 4 hours as needed for pain  2  Goals -    -  Home on hospice     Code Status:  Comfort cares- Level 4   Decisional apparatus:  Patient is not competent on my exam today    If competence is lost, patient's substitute decision maker would default to  Adult children by [place] 169  Advance Directive / Living Will / POLST:   Not available     I have reviewed the patient's controlled substance dispensing history in the Prescription Drug Monitoring Program in compliance with the [facility] regulations before prescribing any controlled substances  Interval history:        24 hour events reviewed  Patient is seen and examined at bedside after escalated team meeting involving patient's 2 daughters, case management, nephrology team, cardiology team, infectious disease team, palliative team    During the meeting, patient's 2 daughters were educated on patient's current prognosis and comorbid conditions  Patient is not a candidate for CT surgery  With her terminal diagnosis  Of advanced cancer in the setting of comorbid illnesses  Daughters demonstrated understanding and asked appropriate questions  Daughters both expressed that they wish to take their mother home on hospice  to allow her to die with dignity surrounded by people that love and care for her  I accompanied the daughters back to the patient's room and discussed discharge on home hospice with the patient who was in total agreement   was notified      MEDICATIONS / ALLERGIES:     all current active meds have been reviewed and current meds:   Current Facility-Administered Medications   Medication Dose Route Frequency    acetaminophen (TYLENOL) oral suspension 650 mg  650 mg Oral Q6H PRN    acetaminophen (TYLENOL) tablet 325 mg  325 mg Oral Q8H [address]    amiodarone tablet 200 mg  200 mg Oral BID With Meals    chlorhexidine (PERIDEX) 0 12 % oral rinse 15 mL  15 mL Swish & Spit Q12H [address]    divalproex sodium (DEPAKOTE) EC tablet 750 mg  750 mg Oral Daily    DULoxetine (CYMBALTA) delayed release capsule 60 mg  60 mg Oral Daily    lactulose 20 g/30 mL oral solution 10 g  10 g Oral BID    lidocaine (LIDODERM) 5 % patch 1 patch  1 patch Topical Daily    metoclopramide (REGLAN) injection 5 mg  5 mg Intravenous Q6H PRN    nystatin (MYCOSTATIN) powder   Topical BID    oxyCODONE (ROXICODONE) IR tablet 2 5 mg  2 5 mg Oral Q6H PRN    sodium chloride 0 9 % with KCl 20 mEq/L infusion (premix)  100 mL/hr Intravenous Continuous    traZODone (DESYREL) tablet 50 mg  50 mg Oral HS     Facility-Administered Medications Ordered in Other Encounters   Medication Dose Route Frequency    heparin lock flush 100 units/mL injection 300 Units  300 Units Intracatheter Q1H PRN       No Known Allergies    OBJECTIVE:    Physical Exam  Physical Exam   Constitutional: She is sleeping  She is easily aroused  She has a sickly appearance  No distress  HENT:   Head: Normocephalic and atraumatic  Eyes: No scleral icterus  Pulmonary/Chest: Effort normal    Abdominal: Soft  Musculoskeletal: She exhibits no edema  Neurological: She is easily aroused  Waxing and waning  Skin: She is not diaphoretic  There is pallor  Nursing note and vitals reviewed  Lab Results:   I have personally reviewed pertinent labs  , CBC:   Lab Results   Component Value Date    WBC 65 56 (HH) 09/28/2018    HGB 7 0 (L) 09/28/2018    HCT 20 3 (L) 09/28/2018    MCV 79 (L) 09/28/2018     (L) 09/28/2018    MCH 27 3 09/28/2018    MCHC 34 5 09/28/2018    RDW 17 6 (H) 09/28/2018    MPV 11 7 09/28/2018    NRBC 0 09/28/2018   , CMP:   Lab Results   Component Value Date     (L) 09/28/2018    K 3 8 09/28/2018    CL 98 (L) 09/28/2018    CO2 24 09/28/2018    BUN 27 (H) 09/28/2018    CREATININE 1 89 (H) 09/28/2018    CALCIUM 7 3 (L) 09/28/2018    AST 21 09/28/2018    ALT <6 (L) 09/28/2018    ALKPHOS 158 (H) 09/28/2018    EGFR 29 09/28/2018         Counseling / Coordination of Care  Total floor / unit time spent today 55 minutes  Greater than 50% of total time was spent with the patient and / or family counseling and / or coordination of care   A description of the counseling / coordination of care:   Escalated team meeting, discussions of goals of care, education on hospice, psychosocial support, coordination with primary team and specialists

## 2018-09-28 NOTE — SOCIAL WORK
Cm met with pt and family and agreeable to referral for [facility] hospice , referral in in as such    Pt will be  going to [address] [facility] [facility]  [address] [place]  [address]

## 2018-09-28 NOTE — PLAN OF CARE
Problem: Nutrition/Hydration-ADULT  Goal: Nutrient/Hydration intake appropriate for improving, restoring or maintaining nutritional needs  Monitor and assess patient's nutrition/hydration status for malnutrition (ex- brittle hair, bruises, dry skin, pale skin and conjunctiva, muscle wasting, smooth red tongue, and disorientation)  Collaborate with interdisciplinary team and initiate plan and interventions as ordered  Monitor patient's weight and dietary intake as ordered or per policy  Utilize nutrition screening tool and intervene per policy  Determine patient's food preferences and provide high-protein, high-caloric foods as appropriate  INTERVENTIONS:  - Monitor oral intake, urinary output, labs, and treatment plans  - Assess nutrition and hydration status and recommend course of action  - Evaluate amount of meals eaten  - Assist patient with eating if necessary   - Allow adequate time for meals  - Recommend/ encourage appropriate diets, oral nutritional supplements, and vitamin/mineral supplements  - Order, calculate, and assess calorie counts as needed  - Recommend, monitor, and adjust tube feedings and TPN/PPN based on assessed needs  - Assess need for intravenous fluids  - Provide specific nutrition/hydration education as appropriate  - Include patient/family/caregiver in decisions related to nutrition    Outcome: Not Progressing  Patient has transitioned to level 4 comfort care measures only  No further nutrition intervention required

## 2018-09-28 NOTE — PROGRESS NOTES
Progress note to follow  Patient is level 4 comfort care  Plan for home hospice  Case management notified

## 2018-09-28 NOTE — HOSPICE NOTE
Talked with daughter by phone   Her mother will be coming home with her  DME will be delivered in the am tomorrow  Hospice Clinical co-ordinator aware to schedule her open this weekend

## 2018-09-28 NOTE — PROGRESS NOTES
Progress Note - Emily Abdullahi 1960, 60 y o  female MRN: [id number]    Unit/Bed#: [facility] 828-01 Encounter: [id number]    Primary Care Provider: Clement Fowler MD   Date and time admitted to hospital: 9/17/2018  1:36 AM        * Bacteremia due to methicillin susceptible Staphylococcus aureus (MSSA)   Assessment & Plan    Persistent MSSA bacteremia  Infectious Disease on board  Had a family meeting today-had a long conversation with the poor prognosis given her multiple cor morbidities and also metastatic cancer    Family opted for comfort care  Antibiotics were discussed  Hospice evaluation pending and plan is for hospice likely home with hospice       Sepsis [facility])   Assessment & Plan    Patient remains afebrile  Leukocytosis is slightly better       HHNC (hyperglycemic hyperosmolar nonketotic coma) [facility])   Assessment & Plan    Lab Results   Component Value Date    HGBA1C 9 6 (H) 09/22/2018       Recent Labs      09/27/18 2052  09/28/18   0652  09/28/18   1050  09/28/18   1610   POCGLU  179*  90  87  131       Blood Sugar Average: Last 67  HHNC - resolved  Monitor blood sugar, continue with the current dose of insulin  Blood sugar x-ray       Altered mental status   Assessment & Plan    · Likely secondary to the infectious process  · Appears to be improving  · CT head on 09/17/18-no acute intracranial pathology  · Patient do not have the ability to make informed medical decision     Hyponatremia   Assessment & Plan    Rather persistent hyponatremia     Acute kidney injury [facility])   Assessment & Plan    · Monitor creatinine  · Nephrology on board  · For hospice     Type 2 diabetes mellitus with complication [facility])   Assessment & Plan    Lab Results   Component Value Date    HGBA1C 9 6 (H) 09/22/2018       Recent Labs      09/27/18 2052  09/28/18   0652  09/28/18   1050  09/28/18   1610   POCGLU  179*  90  87  131       Blood Sugar Average: Last 72 hrs:  (P) 165 8   Monitor blood sugar, continue with the current insulin regimen       VTE Pharmacologic Prophylaxis:   Pharmacologic: Patient for hospice  Mechanical VTE Prophylaxis in Place: No    Patient Centered Rounds: I have performed bedside rounds with nursing staff today  Discussions with Specialists or Other Care Team Provider:     Education and Discussions with Family / Patient:  Had a family meeting with multiple specialist   Family decided to make the patient hospice/comfort care    Time Spent for Care: 1 hour  More than 50% of total time spent on counseling and coordination of care as described above  Current Length of Stay: 11 day(s)    Current Patient Status: Inpatient   Certification Statement: The patient will continue to require additional inpatient hospital stay due to Arrangement for hospice    Discharge Plan:     Code Status: Level 4 - Comfort Care      Subjective:   Patient seen and examined  No specific complaints offered today  Had a family meeting today and plan is for hospice    Objective:     Vitals:   Temp (24hrs), Av 7 °F (36 5 °C), Min:97 4 °F (36 3 °C), Max:98 2 °F (36 8 °C)    HR:  [80-89] 81  Resp:  [18] 18  BP: (108-134)/() 132/75  SpO2:  [91 %-96 %] 96 %  Body mass index is 27 42 kg/m²  Input and Output Summary (last 24 hours): Intake/Output Summary (Last 24 hours) at 18 1633  Last data filed at 18 1450   Gross per 24 hour   Intake             3175 ml   Output             2625 ml   Net              550 ml       Physical Exam:     Physical Exam   Constitutional: She appears well-developed and well-nourished  HENT:   Head: Normocephalic  Eyes: Pupils are equal, round, and reactive to light  Neck: Normal range of motion  Neck supple  Cardiovascular: Normal rate and regular rhythm  No murmur heard  Pulmonary/Chest: Effort normal and breath sounds normal  No respiratory distress  Abdominal: Soft  She exhibits no distension  Colostomy   Musculoskeletal: Normal range of motion   She exhibits edema  Additional Data:     Labs:      Results from last 7 days  Lab Units 09/28/18  0517   WBC Thousand/uL 65 56*   HEMOGLOBIN g/dL 7 0*   HEMATOCRIT % 20 3*   PLATELETS Thousands/uL 124*   LYMPHO PCT % 5*   MONO PCT MAN % 2*   EOSINO PCT MANUAL % 0       Results from last 7 days  Lab Units 09/28/18  0517   SODIUM mmol/L 131*   POTASSIUM mmol/L 3 8   CHLORIDE mmol/L 98*   CO2 mmol/L 24   BUN mg/dL 27*   CREATININE mg/dL 1 89*   CALCIUM mg/dL 7 3*   ALK PHOS U/L 158*   ALT U/L <6*   AST U/L 21       Results from last 7 days  Lab Units 09/25/18  0435   INR  1 13       * I Have Reviewed All Lab Data Listed Above  * Additional Pertinent Lab Tests Reviewed:  [address] Admission Reviewed    Imaging:    Imaging Reports Reviewed Today Include:   Imaging Personally Reviewed by Myself Includes:      Recent Cultures (last 7 days):       Results from last 7 days  Lab Units 09/26/18  0616 09/26/18  0543 09/24/18  1743 09/24/18  1003 09/22/18  0530 09/22/18  0519   BLOOD CULTURE  Staphylococcus aureus* Staphylococcus aureus* Staphylococcus aureus*  --  Staphylococcus aureus* Staphylococcus aureus*   GRAM STAIN RESULT  Gram positive cocci in clusters Gram positive cocci in clusters Gram positive cocci in clusters  --  Gram positive cocci in clusters Gram positive cocci in clusters   C DIFF TOXIN B   --   --   --  NEGATIVE for C difficle toxin by PCR    --   --        Last 24 Hours Medication List:     Current Facility-Administered Medications:  acetaminophen 650 mg Oral Q6H PRN Colleen Wesley PA-C    acetaminophen 325 mg Oral Q8H [address] [place]    amiodarone 200 mg Oral BID With Meals Renetta De Paz,     chlorhexidine 15 mL Swish & Spit Q12H [address] Marianela Marmolejo DO    divalproex sodium 750 mg Oral Daily Jeff Estrada PA-C    DULoxetine 60 mg Oral Daily Megan Sequeira PA-C    lactulose 10 g Oral BID [place]    lidocaine 1 patch Topical Daily JEANNIE Rivero metoclopramide 5 mg Intravenous Q6H PRN Nir Garza PA-C    nystatin  Topical BID Clarice Sandoval MD    oxyCODONE 2 5 mg Oral Q6H PRN JEANNIE Benitez    sodium chloride 0 9 % with KCl 20 mEq/L 100 mL/hr Intravenous Continuous Philipp Sosa MD Last Rate: 100 mL/hr (09/28/18 0932)   traZODone 50 mg Oral HS Megan Sequeira PA-C      Facility-Administered Medications Ordered in Other Encounters:  heparin lock flush 300 Units Intracatheter Q1H PRN Millie Rivera MD        Today, Patient Was Seen By: Clarice Sandoval MD    ** Please Note: Dictation voice to text software may have been used in the creation of this document   **

## 2018-09-28 NOTE — SOCIAL WORK
Escalated Team Meeting held today with the following individuals present: Dr Andrew Phoenix (SLIM) Dr Enrique Anderson (nephrology), Dr González Zamarripa (palliative care), Jensen Gonzalez (palliative care), Dr Phuong Jamil (ID), Paul Sharma (daughter), Nara Huffman (daughter), Nate Suarez (CSWS)  CSWS attempted to call Yessenia Valle (daughter) as she requested yesterday for her participation in the meeting  Oncology was also invited however was unable to attend  Reviewed patient's course of medical treatment  Patient continues with a blood infection that is unable to be cleared  Patient's cancer is advanced and palliative chemo would help slow disease progression but there will be no cure  Patient is appropriate for hospice care  Patient is not a candidate for open heart surgery due to her other medical conditions  Patient was scheduled for a JUNIOR however this was held today due to needing to have a GOC discussion with the family  Per providers, patient is not competent to make medical decisions, which then would rely on patient's 3 daughters to make healthcare decisions  Hospice and comfort care was described in detail with daughters  CSWS provided updates regarding the previous referrals to [facility] [place] [facility] and [facility] are currently reviewing  Palliative care met with the family separately to speak in detail about hospice options  CSWS received an update from Palliative Care, they have chosen hospice care in the home  Updated MSW Chandler LI and RN ISHAAN LI who will speak with the family to review home hospice agencies to find a preferred agency  Also CM will confirm what address patient will be residing at

## 2018-09-28 NOTE — ASSESSMENT & PLAN NOTE
Lab Results   Component Value Date    HGBA1C 9 6 (H) 09/22/2018       Recent Labs      09/27/18   2052  09/28/18   0652  09/28/18   1050  09/28/18   1610   POCGLU  179*  90  87  131       Blood Sugar Average: Last 72 hrs:  (P) 165 8   Monitor blood sugar, continue with the current insulin regimen

## 2018-09-28 NOTE — ASSESSMENT & PLAN NOTE
Persistent MSSA bacteremia  Infectious Disease on board  Had a family meeting today-had a long conversation with the poor prognosis given her multiple cor morbidities and also metastatic cancer    Family opted for comfort care  Antibiotics were discussed  Hospice evaluation pending and plan is for hospice likely home with hospice

## 2018-09-29 LAB
BACTERIA BLD CULT: ABNORMAL
BACTERIA BLD CULT: ABNORMAL
GLUCOSE SERPL-MCNC: 133 MG/DL (ref 65–140)
GLUCOSE SERPL-MCNC: 143 MG/DL (ref 65–140)
GRAM STN SPEC: ABNORMAL
GRAM STN SPEC: ABNORMAL

## 2018-09-29 PROCEDURE — 99232 SBSQ HOSP IP/OBS MODERATE 35: CPT | Performed by: FAMILY MEDICINE

## 2018-09-29 PROCEDURE — 82948 REAGENT STRIP/BLOOD GLUCOSE: CPT

## 2018-09-29 RX ORDER — OXYCODONE HYDROCHLORIDE 5 MG/1
2.5 TABLET ORAL EVERY 4 HOURS PRN
Status: DISCONTINUED | OUTPATIENT
Start: 2018-09-29 | End: 2018-09-29

## 2018-09-29 RX ORDER — OXYCODONE HYDROCHLORIDE 5 MG/1
5 TABLET ORAL EVERY 4 HOURS PRN
Status: DISCONTINUED | OUTPATIENT
Start: 2018-09-29 | End: 2018-10-01

## 2018-09-29 RX ORDER — OXYCODONE HYDROCHLORIDE 5 MG/1
2.5 TABLET ORAL EVERY 4 HOURS PRN
Status: DISCONTINUED | OUTPATIENT
Start: 2018-09-29 | End: 2018-10-05

## 2018-09-29 RX ADMIN — CHLORHEXIDINE GLUCONATE 15 ML: 1.2 RINSE ORAL at 08:21

## 2018-09-29 RX ADMIN — OXYCODONE HYDROCHLORIDE 2.5 MG: 5 TABLET ORAL at 16:34

## 2018-09-29 RX ADMIN — CHLORHEXIDINE GLUCONATE 15 ML: 1.2 RINSE ORAL at 21:07

## 2018-09-29 RX ADMIN — AMIODARONE HYDROCHLORIDE 200 MG: 200 TABLET ORAL at 08:21

## 2018-09-29 RX ADMIN — LIDOCAINE 1 PATCH: 50 PATCH CUTANEOUS at 08:21

## 2018-09-29 RX ADMIN — AMIODARONE HYDROCHLORIDE 200 MG: 200 TABLET ORAL at 17:23

## 2018-09-29 RX ADMIN — DULOXETINE HYDROCHLORIDE 60 MG: 60 CAPSULE, DELAYED RELEASE ORAL at 08:21

## 2018-09-29 RX ADMIN — ACETAMINOPHEN 325 MG: 325 TABLET, FILM COATED ORAL at 21:07

## 2018-09-29 RX ADMIN — ACETAMINOPHEN 325 MG: 325 TABLET, FILM COATED ORAL at 14:24

## 2018-09-29 RX ADMIN — NYSTATIN: 100000 POWDER TOPICAL at 17:23

## 2018-09-29 RX ADMIN — ACETAMINOPHEN 325 MG: 325 TABLET, FILM COATED ORAL at 05:19

## 2018-09-29 RX ADMIN — OXYCODONE HYDROCHLORIDE 5 MG: 5 TABLET ORAL at 21:31

## 2018-09-29 RX ADMIN — NYSTATIN: 100000 POWDER TOPICAL at 08:30

## 2018-09-29 RX ADMIN — OXYCODONE HYDROCHLORIDE 2.5 MG: 5 TABLET ORAL at 10:36

## 2018-09-29 RX ADMIN — LACTULOSE 10 G: 20 SOLUTION ORAL at 17:23

## 2018-09-29 RX ADMIN — DIVALPROEX SODIUM 750 MG: 500 TABLET, DELAYED RELEASE ORAL at 08:21

## 2018-09-29 RX ADMIN — OXYCODONE HYDROCHLORIDE 2.5 MG: 5 TABLET ORAL at 04:32

## 2018-09-29 RX ADMIN — ACETAMINOPHEN 650 MG: 160 SUSPENSION ORAL at 07:37

## 2018-09-29 RX ADMIN — TRAZODONE HYDROCHLORIDE 50 MG: 50 TABLET ORAL at 21:07

## 2018-09-29 RX ADMIN — LACTULOSE 10 G: 20 SOLUTION ORAL at 08:21

## 2018-09-29 NOTE — SOCIAL WORK
CM spoke with charge nurse Yvan Giron and she notified cm that pt daughter Maria E Lucas does not want to take her mom home on hospice     CM attempted to call Lee Ann Molina and no answer just a busy signal

## 2018-09-29 NOTE — PROGRESS NOTES
[facility] Internal Medicine Progress Note  Patient: Emily Abdullahi 60 y o  female   MRN: [id number]  PCP: Manuela Zelaya MD  Unit/Bed#: [facility] 321-91 Encounter: [id number]  Date Of Visit: 09/29/18    Assessment:    Principal Problem:    Bacteremia due to methicillin susceptible Staphylococcus aureus (MSSA)  Active Problems:    HHNC (hyperglycemic hyperosmolar nonketotic coma) ([facility] )    Sepsis ([facility] )    Type 2 diabetes mellitus with complication ([place] [address] )    Acute kidney injury ([facility] )    Hyponatremia    GERD (gastroesophageal reflux disease)    Altered mental status    Acute respiratory failure with hypoxia (HCC)    Hypocalcemia    Leukopenia    Cavitating pulmonary nodules     Metabolic acidosis, NAG, bicarbonate losses    Hypomagnesemia    Hypophosphatemia    Colon cancer (HCC)    Back pain    Suspected endocarditis      Plan:  1  Bacteremia due to methicillin-susceptible Staphylococcus-currently off of antibiotics  Was unable to clear the bacteremia  Patients family opted for hospice  Currently waiting for safe discharge plan-plan was to be discharged home today but the daughter is not able to accept the patient at home  2  Sepsis-remains afebrile  3  HH NC-present at the time of admission currently resolved  4  Altered mental status-patient do not have the ability to make informed medical decisions  5   Acute kidney injury-no further blood draws  6  Type 2  Dm - no further insulin or blood draws       VTE Pharmacologic Prophylaxis:   Pharmacologic: Comfort care  Mechanical VTE Prophylaxis in Place:  Patient Centered Rounds: I have performed bedside rounds with nursing staff today  Discussions with Specialists or Other Care Team Provider:     Education and Discussions with Family / Patient:     Time Spent for Care: 30 minutes  More than 50% of total time spent on counseling and coordination of care as described above      Current Length of Stay: 12 day(s)    Current Patient Status: Inpatient   Certification Statement: The patient will continue to require additional inpatient hospital stay due to Pending discharge planning    Discharge Plan / Estimated Discharge Date:     Code Status: Level 4 - Comfort Care      Subjective:   Patient seen and examined  No events overnight  Patient was supposed to go home with hospice today but the family is not able to take her home currently  Objective:     Vitals:   Temp (24hrs), Av 8 °F (36 6 °C), Min:97 6 °F (36 4 °C), Max:97 9 °F (36 6 °C)    HR:  [79-87] 81  Resp:  [12-18] 16  BP: (109-127)/(55-76) 109/76  SpO2:  [93 %-95 %] 93 %  Body mass index is 27 42 kg/m²  Input and Output Summary (last 24 hours): Intake/Output Summary (Last 24 hours) at 18  Last data filed at 18 1502   Gross per 24 hour   Intake              120 ml   Output             2325 ml   Net            -2205 ml       Physical Exam:     Physical Exam   Constitutional: She appears well-developed  HENT:   Head: Normocephalic  Eyes: Pupils are equal, round, and reactive to light  Neck: Normal range of motion  No JVD present  Cardiovascular: Normal rate  Pulmonary/Chest: Effort normal  No respiratory distress  Decreased breath sounds bilateral   Abdominal: Soft  Bowel sounds are normal  She exhibits no distension  Musculoskeletal: Normal range of motion  She exhibits no edema  Neurological: She is alert  No cranial nerve deficit  Confused   Skin: Skin is warm and dry  No erythema             Additional Data:     Labs:      Results from last 7 days  Lab Units 18  0517   WBC Thousand/uL 65 56*   HEMOGLOBIN g/dL 7 0*   HEMATOCRIT % 20 3*   PLATELETS Thousands/uL 124*   LYMPHO PCT % 5*   MONO PCT MAN % 2*   EOSINO PCT MANUAL % 0       Results from last 7 days  Lab Units 18  0517   SODIUM mmol/L 131*   POTASSIUM mmol/L 3 8   CHLORIDE mmol/L 98*   CO2 mmol/L 24   BUN mg/dL 27*   CREATININE mg/dL 1 89*   CALCIUM mg/dL 7 3*   ALK PHOS U/L 158*   ALT U/L <6*   AST U/L 21       Results from last 7 days  Lab Units 09/25/18  0435   INR  1 13       * I Have Reviewed All Lab Data Listed Above  * Additional Pertinent Lab Tests Reviewed: [address] Admission Reviewed    Imaging:    Imaging Reports Reviewed Today Include:   Imaging Personally Reviewed by Myself Includes:     Recent Cultures (last 7 days):       Results from last 7 days  Lab Units 09/26/18  0616 09/26/18  0543 09/24/18  1743 09/24/18  1003   BLOOD CULTURE  Staphylococcus aureus* Staphylococcus aureus* Staphylococcus aureus*  --    GRAM STAIN RESULT  Gram positive cocci in clusters Gram positive cocci in clusters Gram positive cocci in clusters  --    C DIFF TOXIN B   --   --   --  NEGATIVE for C difficle toxin by PCR  Last 24 Hours Medication List:     Current Facility-Administered Medications:  acetaminophen 650 mg Oral Q6H PRN Roberth Edwards PA-C   acetaminophen 325 mg Oral Q8H [address] [place]   amiodarone 200 mg Oral BID With Meals Jairo Terry, DO   chlorhexidine 15 mL Swish & Spit Q12H [address] Marianela Marmolejo,    divalproex sodium 750 mg Oral Daily Richard Rendon PA-C   DULoxetine 60 mg Oral Daily Megan Sequeira PA-C   lactulose 10 g Oral BID [place]   lidocaine 1 patch Topical Daily JEANNIE Cole   nystatin  Topical BID Liberty Suresh MD   oxyCODONE 2 5 mg Oral Q6H PRN JEANNIE Cole   traZODone 50 mg Oral HS Richard Rendon PA-C     Facility-Administered Medications Ordered in Other Encounters:  heparin lock flush 300 Units Intracatheter Q1H PRN Steve Hollingsworth MD        Today, Patient Was Seen By: Liberty Suresh MD    ** Please Note: This note has been constructed using a voice recognition system   **

## 2018-09-29 NOTE — SOCIAL WORK
GUILLERMO attempted to call Sen Hernandez multiple times and no answer   Cm called Mariluz [phone] no answer  Cm called Ayush Weinberg [phone] and spoke with her and she states her sister Jesusita Khalil changed her mind and will not allow  Her mother to go to her house  Cm called [phone]  From hospice and left voice message cm awaiting return call

## 2018-09-30 PROBLEM — E44.0 MODERATE PROTEIN-CALORIE MALNUTRITION (HCC): Status: ACTIVE | Noted: 2018-09-30

## 2018-09-30 LAB — GLUCOSE SERPL-MCNC: 181 MG/DL (ref 65–140)

## 2018-09-30 PROCEDURE — 99232 SBSQ HOSP IP/OBS MODERATE 35: CPT | Performed by: INTERNAL MEDICINE

## 2018-09-30 PROCEDURE — 99356 PR PROLONGED SVC I/P OR OBS SETTING 1ST HOUR: CPT | Performed by: FAMILY MEDICINE

## 2018-09-30 PROCEDURE — 99232 SBSQ HOSP IP/OBS MODERATE 35: CPT | Performed by: FAMILY MEDICINE

## 2018-09-30 PROCEDURE — 82948 REAGENT STRIP/BLOOD GLUCOSE: CPT

## 2018-09-30 RX ORDER — HYDROMORPHONE HCL 110MG/55ML
4 PATIENT CONTROLLED ANALGESIA SYRINGE INTRAVENOUS EVERY 4 HOURS PRN
Status: DISCONTINUED | OUTPATIENT
Start: 2018-09-30 | End: 2018-09-30

## 2018-09-30 RX ORDER — HYDROMORPHONE HCL/PF 1 MG/ML
0.2 SYRINGE (ML) INJECTION EVERY 4 HOURS PRN
Status: DISCONTINUED | OUTPATIENT
Start: 2018-09-30 | End: 2018-09-30

## 2018-09-30 RX ORDER — NALOXONE HYDROCHLORIDE 0.4 MG/ML
INJECTION, SOLUTION INTRAMUSCULAR; INTRAVENOUS; SUBCUTANEOUS
Status: DISPENSED
Start: 2018-09-30 | End: 2018-10-01

## 2018-09-30 RX ADMIN — HYDROMORPHONE HYDROCHLORIDE 2 MG: 2 INJECTION INTRAMUSCULAR; INTRAVENOUS; SUBCUTANEOUS at 00:27

## 2018-09-30 RX ADMIN — DULOXETINE HYDROCHLORIDE 60 MG: 60 CAPSULE, DELAYED RELEASE ORAL at 09:45

## 2018-09-30 RX ADMIN — ACETAMINOPHEN 650 MG: 160 SUSPENSION ORAL at 10:46

## 2018-09-30 RX ADMIN — CHLORHEXIDINE GLUCONATE 15 ML: 1.2 RINSE ORAL at 09:48

## 2018-09-30 RX ADMIN — AMIODARONE HYDROCHLORIDE 200 MG: 200 TABLET ORAL at 09:45

## 2018-09-30 RX ADMIN — LACTULOSE 10 G: 20 SOLUTION ORAL at 18:15

## 2018-09-30 RX ADMIN — OXYCODONE HYDROCHLORIDE 5 MG: 5 TABLET ORAL at 22:10

## 2018-09-30 RX ADMIN — NYSTATIN: 100000 POWDER TOPICAL at 18:17

## 2018-09-30 RX ADMIN — LIDOCAINE 1 PATCH: 50 PATCH CUTANEOUS at 09:50

## 2018-09-30 RX ADMIN — TRAZODONE HYDROCHLORIDE 50 MG: 50 TABLET ORAL at 21:58

## 2018-09-30 RX ADMIN — HYDROMORPHONE HYDROCHLORIDE 4 MG: 2 INJECTION INTRAMUSCULAR; INTRAVENOUS; SUBCUTANEOUS at 11:45

## 2018-09-30 RX ADMIN — LACTULOSE 10 G: 20 SOLUTION ORAL at 09:47

## 2018-09-30 RX ADMIN — AMIODARONE HYDROCHLORIDE 200 MG: 200 TABLET ORAL at 18:14

## 2018-09-30 RX ADMIN — CHLORHEXIDINE GLUCONATE 15 ML: 1.2 RINSE ORAL at 21:58

## 2018-09-30 RX ADMIN — DIVALPROEX SODIUM 750 MG: 500 TABLET, DELAYED RELEASE ORAL at 09:45

## 2018-09-30 RX ADMIN — OXYCODONE HYDROCHLORIDE 5 MG: 5 TABLET ORAL at 09:45

## 2018-09-30 RX ADMIN — ACETAMINOPHEN 325 MG: 325 TABLET, FILM COATED ORAL at 06:29

## 2018-09-30 NOTE — PROGRESS NOTES
Patient given 4 mg IV Dilaudid at 1145  When reassessing patient her respiratory rate was 8 and oxygen saturating in the 80s  Patient lethargic, responding to voice and touch  Placed patient on 2 L oxygen via nasal cannula  Spoke to Dr Evangelina Triplett and he ordered Narcan  Dr Humera Caicedo on the floor  Both doctor's came into room  Per Dr Humera Caicedo do not give Narcan but allow patient to rest until medicine wears off  As patient is comfort care she does not want to reverse the effects and put the patient back in pain  Family at bedside and agrees with this plan    Will continue to monitor

## 2018-09-30 NOTE — SOCIAL WORK
CM followed with up with [address]  It is reported that pt may need a guardian and possibly optioned  CM will continue to follow

## 2018-09-30 NOTE — PROGRESS NOTES
[facility] Internal Medicine Progress Note  Patient: Emily Abdullahi 60 y o  female   MRN: [id number]  PCP: Kisha Schwarz MD  Unit/Bed#: [facility] 268-24 Encounter: [id number]  Date Of Visit: 09/30/18    Assessment:    Principal Problem:    Bacteremia due to methicillin susceptible Staphylococcus aureus (MSSA)  Active Problems:    Type 2 diabetes mellitus with complication ([place] [address] )    Acute kidney injury ([place] [address] )    Hyponatremia    GERD (gastroesophageal reflux disease)    HHNC (hyperglycemic hyperosmolar nonketotic coma) (HCC)    Sepsis ([place] [address] )    Altered mental status    Acute respiratory failure with hypoxia ([facility])    Hypocalcemia    Leukopenia    Cavitating pulmonary nodules     Metabolic acidosis, NAG, bicarbonate losses    Hypomagnesemia    Hypophosphatemia    Colon cancer ([facility])    Back pain    Suspected endocarditis    Moderate protein-calorie malnutrition ([place] [address] )      Plan:    1  Bacteremia due to methicillin-susceptible Staphylococcus-currently off of antibiotics  Was unable to clear the bacteremia  Patients family opted for hospice  Currently waiting for safe discharge plan; as per my discussion with case management, likely discharge to SNF in [facility], but will be assessed tomorrow  She will also call hospice care liaison today for re-evaluation  2  Sepsis-remains afebrile  3  HHNC-present at the time of admission currently resolved  4  Altered mental status-patient do not have the ability to make informed medical decisions  5   Acute kidney injury-no further blood draws  6  Type 2  Dm - no further insulin or blood draws  7  Bradypnea with hypoxemia, exacerbated by IV Dilaudid today:  Case discussed with palliative care/hospice care attending  IV Dilaudid was discontinued  Initially, Narcan was ordered, but as per discussion with palliative care attending, this was put on hold at this point    Succeeding discussion with the palliative care attending, she told me that, she may decide to give the patient Narcan but it will be diluted and will be given slowly as patient is in comfort measures  VTE Pharmacologic Prophylaxis:   Pharmacologic: Pharmacologic VTE Prophylaxis contraindicated due to Comfort care/end of life care  Mechanical: Mechanical VTE prophylaxis in place:  None  Discussions with Specialists or Other Care Team Provider:  Dr Bill Arriaga  Case management  Education and Discussions with Family / Patient:  Patient  Patient's family at bedside  I answered questions and concerns  Time Spent for Care: 30 minutes  More than 50% of total time spent on counseling and coordination of care as described above  Current Length of Stay: 13 day(s)    Current Patient Status: Inpatient   Certification Statement: The patient will continue to require additional inpatient hospital stay due to Above findings and plans  Discharge Plan:  None yet  Waiting for placement    Code Status: Level 4 - Comfort Care      Subjective:   Presently, patient is comfortable  However, patient had an episode of low respiratory rate and patient was found to be lethargic, with low oxygen saturation  No noted pains at this point  Objective:     Vitals:   Temp (24hrs), Av 7 °F (36 5 °C), Min:97 4 °F (36 3 °C), Max:98 °F (36 7 °C)    HR:  [81-92] 87  Resp:  [8-18] 8  BP: (109-143)/(69-76) 123/76  SpO2:  [87 %-93 %] 87 %  Body mass index is 27 42 kg/m²  Input and Output Summary (last 24 hours): Intake/Output Summary (Last 24 hours) at 18 1319  Last data filed at 18 0800   Gross per 24 hour   Intake              220 ml   Output             1425 ml   Net            -1205 ml       Physical Exam:     Physical Exam   Constitutional: No distress  HENT:   Head: Normocephalic and atraumatic  Eyes: Right eye exhibits no discharge  Left eye exhibits no discharge  No scleral icterus  Neck: No JVD present  No tracheal deviation present  Cardiovascular: Normal rate, regular rhythm and normal heart sounds    Exam reveals no gallop and no friction rub  No murmur heard  Pulmonary/Chest: Effort normal and breath sounds normal  No stridor  No respiratory distress  She has no wheezes  She has no rales  When I saw the patient, patient's respiratory rate has gone up to 12 per minute   Abdominal: Soft  Bowel sounds are normal  She exhibits no distension  There is no tenderness  There is no rebound and no guarding  Musculoskeletal: She exhibits edema  She exhibits no tenderness  Neurological:   Patient was asleep/lethargic, but patient awakens to name calling; answers few simple questions  Skin: Skin is warm  No rash noted  She is not diaphoretic  No erythema  No pallor  Vitals reviewed  Additional Data:     Labs:      Results from last 7 days  Lab Units 09/28/18  0517   WBC Thousand/uL 65 56*   HEMOGLOBIN g/dL 7 0*   HEMATOCRIT % 20 3*   PLATELETS Thousands/uL 124*   LYMPHO PCT % 5*   MONO PCT MAN % 2*   EOSINO PCT MANUAL % 0       Results from last 7 days  Lab Units 09/28/18  0517   SODIUM mmol/L 131*   POTASSIUM mmol/L 3 8   CHLORIDE mmol/L 98*   CO2 mmol/L 24   BUN mg/dL 27*   CREATININE mg/dL 1 89*   CALCIUM mg/dL 7 3*   ALK PHOS U/L 158*   ALT U/L <6*   AST U/L 21       Results from last 7 days  Lab Units 09/25/18  0435   INR  1 13       * I Have Reviewed All Lab Data Listed Above  * Additional Pertinent Lab Tests Reviewed: [facility] Admission Reviewed    Imaging:    Imaging Reports Reviewed Today Include:  Diagnostic imaging studies that were done on this admission  Imaging Personally Reviewed by Myself Includes:  None      Cultures:   Blood Culture:   Lab Results   Component Value Date    BLOODCX Staphylococcus aureus (A) 09/26/2018    BLOODCX Staphylococcus aureus (A) 09/26/2018    BLOODCX Staphylococcus aureus (A) 09/24/2018    BLOODCX Staphylococcus aureus (A) 09/22/2018    BLOODCX Staphylococcus aureus (A) 09/22/2018    BLOODCX Staphylococcus aureus (A) 09/20/2018    BLOODCX Staphylococcus aureus (A) 09/20/2018     Urine Culture:   Lab Results   Component Value Date    URINECX >100,000 cfu/ml Escherichia coli 03/17/2016     Sputum Culture: No components found for: SPUTUMCX  Wound Culture: No results found for: WOUNDCULT    Last 24 Hours Medication List:     Current Facility-Administered Medications:  acetaminophen 650 mg Oral Q6H PRN Jewell Castro PA-C   amiodarone 200 mg Oral BID With Meals Antwan Escamilla, DO   chlorhexidine 15 mL Swish & Spit Q12H [facility] & assisted Marianela Marmolejo,    divalproex sodium 750 mg Oral Daily Harvey Thrasher PA-C   DULoxetine 60 mg Oral Daily Megan Sequiera PA-C   lactulose 10 g Oral BID Scooby [place]   lidocaine 1 patch Topical Daily JEANNIE Renee   naloxone       nystatin  Topical BID Tom Morris MD   oxyCODONE 2 5 mg Oral Q4H PRN Jewell Castro PA-C   oxyCODONE 5 mg Oral Q4H PRN Jewell Castro PA-C   traZODone 50 mg Oral HS Harvey Thrasher PA-C        Today, Patient Was Seen By: Angel Crandall MD    ** Please Note: Dragon 360 Dictation voice to text software may have been used in the creation of this document   **

## 2018-09-30 NOTE — PROGRESS NOTES
Progress Note - Palliative & Supportive Care  Emily Abdullahi  60 y o   female  [address] 828/[address]HP 80-18   MRN: [id number]  Encounter: [id number]     Assessment  Patient Active Problem List   Diagnosis    CVA (cerebral vascular accident) ([facility] )    Type 2 diabetes mellitus without complication, without long-term current use of insulin ([place] [address] )    Schizoaffective disorder, bipolar type ([place] [address] )    Essential hypertension    Colonic mass    Type 2 diabetes mellitus with complication ([place] [address] )    CKD (chronic kidney disease) stage 3, GFR 30-59 ml/min    Pain of right upper extremity    Acute kidney injury ([place] [address] )    CAD (coronary artery disease)    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HCC)    Large bowel obstruction ([place] [address] )    Brachial artery stenosis, right (HCC)    Hyponatremia    Rectosigmoid mass - High colostomy output    Leukocytosis    GERD (gastroesophageal reflux disease)    Tobacco abuse    Liver metastasis (HCC)    Mixed hyperlipidemia    Dizziness - due to orthostatic hypotension    Ambulatory dysfunction    Orthostatic hypotension    Chemotherapy induced nausea and vomiting    Lower abdominal pain    HHNC (hyperglycemic hyperosmolar nonketotic coma) (HCC)    Sepsis ([facility] )    Altered mental status    Acute respiratory failure with hypoxia (HCC)    Hypocalcemia    Leukopenia    Cavitating pulmonary nodules     Metabolic acidosis, NAG, bicarbonate losses    Hypomagnesemia    Hypophosphatemia    Bacteremia due to methicillin susceptible Staphylococcus aureus (MSSA)    Colon cancer ([facility] )    Back pain    Suspected endocarditis     Plan:  Symptom Management:   Pain - patient is resting comfortably and received a dose of dilaudid 4mg about 1 hours ago  Prior to that she was interactive with family and eat breakfast   This dose was large compared to prior doses  This was discussed with the ordering physician and RN staff  Given the family's confirmed goal for comfort focused care, she was not given narcan    She was monitored closely with a pulse and RR meter  I checked on her frequently throughout the afternoon  At my last check more than 4 hours from my first check, she remained comfortable, somnolent and with regular unlabored respirations with a RR of 8/min  She was responsive to the stimulation of the physical exam   The dilaudid order was discontinued  If the patient has pain overnight she will be offered the oral oxycodone as ordered  Goals of Care:   Level 4   Transitioning to hospice    History  Patient resting comfortably  Oldest daughter at bedside along with the patient's sister  I was notified by [facility] attending about the possible need for narcan due to lethargy and increased respiratory rate  I immediately went to her room to evaluate her with the [facility] attending and her RN  The patient was given oxycodone 5mg this morning but continued to complain of pain  She was then given dilaudid 4mg IV as ordered and after that appeared more somnolent  As of this morning she was eating breakfast and conversing  The family confirmed that her pain goal is comfort  We debated narcan at this point but held off in favor of increased monitoring as the medication had already reached Tmax  The patient's respiratory rate was slow but she was not displaying apnea  ROS: Review of systems not obtained due to patient factors      Meds  all current active meds have been reviewed and current meds:   Current Facility-Administered Medications   Medication Dose Route Frequency    naloxone (NARCAN) 0 4 mg/mL injection **AcuDose Override Pull**        acetaminophen (TYLENOL) oral suspension 650 mg  650 mg Oral Q6H PRN    acetaminophen (TYLENOL) tablet 325 mg  325 mg Oral Q8H [facility]    amiodarone tablet 200 mg  200 mg Oral BID With Meals    chlorhexidine (PERIDEX) 0 12 % oral rinse 15 mL  15 mL Swish & Spit Q12H [facility]    divalproex sodium (DEPAKOTE) EC tablet 750 mg  750 mg Oral Daily    DULoxetine (CYMBALTA) delayed release capsule 60 mg  60 mg Oral Daily    HYDROmorphone (DILAUDID) injection 4 mg  4 mg Intravenous Q4H PRN    lactulose 20 g/30 mL oral solution 10 g  10 g Oral BID    lidocaine (LIDODERM) 5 % patch 1 patch  1 patch Topical Daily    nystatin (MYCOSTATIN) powder   Topical BID    oxyCODONE (ROXICODONE) IR tablet 2 5 mg  2 5 mg Oral Q4H PRN    oxyCODONE (ROXICODONE) IR tablet 5 mg  5 mg Oral Q4H PRN    traZODone (DESYREL) tablet 50 mg  50 mg Oral HS     No Known Allergies    Objective  Physical Exam: /68 (BP Location: Left arm)   Pulse 82   Temp 97 5 °F (36 4 °C) (Axillary)   Resp (!) 8   Ht 5' 3" (1 6 m)   Wt 70 2 kg (154 lb 12 2 oz)   SpO2 98%   BMI 27 42 kg/m²   GENERAL: No acute distress  HEENT: Eyes closed, moist mucus membranes  HEART: NSR, S1, S2  LUNGS: Slow but unlabored respiratory efforts  ABDOMEN: Soft, NT, ND, BS+  EXTREMITIES: cool fingers, warm feet  Bilateral LE pitting edema  NEUROLOGIC: Patient is minimally responsive to the sound of my voice and the stimulation of the physical exam    Lab Results: I have personally reviewed pertinent labs  Counseling / Coordination of Care  Total floor / unit time was in excess of 1 hour due to frequent checking and investigation about dilaudid dosing  Discussed case with SLIM and RN staff        Shiv Benoit MD  Palliative & Supportive Care  Office number: [phone]

## 2018-10-01 PROCEDURE — 99232 SBSQ HOSP IP/OBS MODERATE 35: CPT | Performed by: INTERNAL MEDICINE

## 2018-10-01 RX ORDER — HYDROMORPHONE HCL/PF 1 MG/ML
0.2 SYRINGE (ML) INJECTION ONCE
Status: COMPLETED | OUTPATIENT
Start: 2018-10-01 | End: 2018-10-01

## 2018-10-01 RX ORDER — HYDROMORPHONE HCL/PF 1 MG/ML
SYRINGE (ML) INJECTION
Status: COMPLETED
Start: 2018-10-01 | End: 2018-10-01

## 2018-10-01 RX ORDER — HYDROMORPHONE HCL/PF 1 MG/ML
0.2 SYRINGE (ML) INJECTION
Status: DISCONTINUED | OUTPATIENT
Start: 2018-10-01 | End: 2018-10-02

## 2018-10-01 RX ORDER — OXYCODONE HYDROCHLORIDE 5 MG/1
5 TABLET ORAL EVERY 2 HOUR PRN
Status: DISCONTINUED | OUTPATIENT
Start: 2018-10-01 | End: 2018-10-05

## 2018-10-01 RX ADMIN — DIVALPROEX SODIUM 750 MG: 500 TABLET, DELAYED RELEASE ORAL at 08:33

## 2018-10-01 RX ADMIN — HYDROMORPHONE HYDROCHLORIDE 0.2 MG: 1 INJECTION, SOLUTION INTRAMUSCULAR; INTRAVENOUS; SUBCUTANEOUS at 05:10

## 2018-10-01 RX ADMIN — Medication 0.2 MG: at 19:39

## 2018-10-01 RX ADMIN — OXYCODONE HYDROCHLORIDE 5 MG: 5 TABLET ORAL at 02:04

## 2018-10-01 RX ADMIN — OXYCODONE HYDROCHLORIDE 5 MG: 5 TABLET ORAL at 16:02

## 2018-10-01 RX ADMIN — CHLORHEXIDINE GLUCONATE 15 ML: 1.2 RINSE ORAL at 20:55

## 2018-10-01 RX ADMIN — HYDROMORPHONE HYDROCHLORIDE 0.2 MG: 1 INJECTION, SOLUTION INTRAMUSCULAR; INTRAVENOUS; SUBCUTANEOUS at 18:21

## 2018-10-01 RX ADMIN — LACTULOSE 10 G: 20 SOLUTION ORAL at 08:33

## 2018-10-01 RX ADMIN — Medication 0.2 MG: at 18:21

## 2018-10-01 RX ADMIN — AMIODARONE HYDROCHLORIDE 200 MG: 200 TABLET ORAL at 07:42

## 2018-10-01 RX ADMIN — NYSTATIN: 100000 POWDER TOPICAL at 17:51

## 2018-10-01 RX ADMIN — LACTULOSE 10 G: 20 SOLUTION ORAL at 17:49

## 2018-10-01 RX ADMIN — OXYCODONE HYDROCHLORIDE 5 MG: 5 TABLET ORAL at 23:13

## 2018-10-01 RX ADMIN — LIDOCAINE 1 PATCH: 50 PATCH CUTANEOUS at 08:37

## 2018-10-01 RX ADMIN — ACETAMINOPHEN 650 MG: 160 SUSPENSION ORAL at 09:58

## 2018-10-01 RX ADMIN — AMIODARONE HYDROCHLORIDE 200 MG: 200 TABLET ORAL at 17:48

## 2018-10-01 RX ADMIN — TRAZODONE HYDROCHLORIDE 50 MG: 50 TABLET ORAL at 21:01

## 2018-10-01 RX ADMIN — OXYCODONE HYDROCHLORIDE 5 MG: 5 TABLET ORAL at 07:42

## 2018-10-01 RX ADMIN — DULOXETINE HYDROCHLORIDE 60 MG: 60 CAPSULE, DELAYED RELEASE ORAL at 08:33

## 2018-10-01 RX ADMIN — ACETAMINOPHEN 650 MG: 160 SUSPENSION ORAL at 17:47

## 2018-10-01 RX ADMIN — HYDROMORPHONE HYDROCHLORIDE 0.2 MG: 1 INJECTION, SOLUTION INTRAMUSCULAR; INTRAVENOUS; SUBCUTANEOUS at 19:39

## 2018-10-01 RX ADMIN — NYSTATIN: 100000 POWDER TOPICAL at 08:39

## 2018-10-01 RX ADMIN — CHLORHEXIDINE GLUCONATE 15 ML: 1.2 RINSE ORAL at 08:34

## 2018-10-01 RX ADMIN — OXYCODONE HYDROCHLORIDE 5 MG: 5 TABLET ORAL at 18:43

## 2018-10-01 NOTE — SOCIAL WORK
GUILLERMO  Met with pt daughter Genia Burnham and aware no accepting snf facility for hospice  [facility] following   Campbell Cochran aware out of pocket cost for hospice , cassandra will have to talk to her sisters   Guillermo provided all my contact information    Guillermo will follow

## 2018-10-01 NOTE — PROGRESS NOTES
[facility] Internal Medicine Progress Note  Patient: Emily Abdullahi 60 y o  female   MRN: [id number]  PCP: Shady De La Garza MD  Unit/Bed#: [facility] 030-99 Encounter: [id number]  Date Of Visit: 10/01/18    Assessment:    Principal Problem:    Bacteremia due to methicillin susceptible Staphylococcus aureus (MSSA)  Active Problems:    Type 2 diabetes mellitus with complication ([facility] 75 )    Acute kidney injury ([facility] 75 )    Hyponatremia    GERD (gastroesophageal reflux disease)    HHNC (hyperglycemic hyperosmolar nonketotic coma) (HCC)    Sepsis ([facility] 75 )    Altered mental status    Acute respiratory failure with hypoxia ([facility])    Hypocalcemia    Leukopenia    Cavitating pulmonary nodules     Metabolic acidosis, NAG, bicarbonate losses    Hypomagnesemia    Hypophosphatemia    Colon cancer (HCC)    Back pain    Suspected endocarditis    Moderate protein-calorie malnutrition (HCC)      Plan:    1   Bacteremia due to methicillin-susceptible Staphylococcus-currently off of antibiotics  Was unable to clear the bacteremia  Patients family opted for hospice  Currently waiting for safe discharge plan; as per my discussion with case management, likely discharge to SNF with hospice care, however, no placement yet at this point  Palliative care/hospice care team on board  2  Sepsis-remains afebrile  3   HHNC-present at the time of admission currently resolved  4   Altered mental status-patient do not have the ability to make informed medical decisions  5   Acute kidney injury-no further blood draws  6  Type 2  Dm - no further insulin or blood draws  7  Bradypnea with hypoxemia, exacerbated by IV Dilaudid 9/30, resolved:  Case discussed with palliative care/hospice care attending  IV Dilaudid was discontinued  8   Colon cancer:  Hospice care         VTE Pharmacologic Prophylaxis:   Pharmacologic: Pharmacologic VTE Prophylaxis contraindicated due to Comfort care/end of life care  Mechanical: Mechanical VTE prophylaxis in place  None     Discussions with Specialists or Other Care Team Provider:  Case management  Education and Discussions with Family / Patient:  Patient  I offered to call patient's family, but patient declined  I spoke to the patient's family yesterday and they know the plans  Time Spent for Care: 30 minutes  More than 50% of total time spent on counseling and coordination of care as described above  Current Length of Stay: 14 day(s)    Current Patient Status: Inpatient   Certification Statement: The patient will continue to require additional inpatient hospital stay due to Above findings and plans    Discharge Plan:  None yet  Still waiting for placement  Code Status: Level 4 - Comfort Care      Subjective:   Patient is comfortable when I saw her earlier today  The patient denies any significant pains  No shortness of breath  No complaints      Objective:     Vitals:   Temp (24hrs), Av 4 °F (36 3 °C), Min:97 3 °F (36 3 °C), Max:97 6 °F (36 4 °C)    HR:  [87-94] 88  Resp:  [12-20] 18  BP: (120-160)/(54-84) 160/54  SpO2:  [90 %-96 %] 92 %  Body mass index is 27 42 kg/m²  Input and Output Summary (last 24 hours): Intake/Output Summary (Last 24 hours) at 10/01/18 1622  Last data filed at 10/01/18 1500   Gross per 24 hour   Intake              240 ml   Output             1750 ml   Net            -1510 ml       Physical Exam:     Physical Exam   Constitutional: No distress  HENT:   Head: Normocephalic and atraumatic  Eyes: Right eye exhibits no discharge  Left eye exhibits no discharge  No scleral icterus  Neck: No JVD present  No tracheal deviation present  Cardiovascular: Normal rate, regular rhythm and normal heart sounds  Exam reveals no gallop and no friction rub  No murmur heard  Pulmonary/Chest: Effort normal and breath sounds normal  No stridor  No respiratory distress  She has no wheezes  She has no rales  Abdominal: Soft   Bowel sounds are normal  She exhibits no distension  There is no tenderness  There is no rebound and no guarding  Musculoskeletal: She exhibits edema  She exhibits no tenderness  Neurological: She is alert  No cranial nerve deficit  Skin: Skin is warm  No rash noted  She is not diaphoretic  No erythema  No pallor  Psychiatric: She has a normal mood and affect  Her behavior is normal  Thought content normal    Vitals reviewed  Additional Data:     Labs:      Results from last 7 days  Lab Units 09/28/18  0517   WBC Thousand/uL 65 56*   HEMOGLOBIN g/dL 7 0*   HEMATOCRIT % 20 3*   PLATELETS Thousands/uL 124*   LYMPHO PCT % 5*   MONO PCT MAN % 2*   EOSINO PCT MANUAL % 0       Results from last 7 days  Lab Units 09/28/18  0517   SODIUM mmol/L 131*   POTASSIUM mmol/L 3 8   CHLORIDE mmol/L 98*   CO2 mmol/L 24   BUN mg/dL 27*   CREATININE mg/dL 1 89*   CALCIUM mg/dL 7 3*   ALK PHOS U/L 158*   ALT U/L <6*   AST U/L 21       Results from last 7 days  Lab Units 09/25/18  0435   INR  1 13       * I Have Reviewed All Lab Data Listed Above  * Additional Pertinent Lab Tests Reviewed: [facility] Admission Reviewed    Imaging:    Imaging Reports Reviewed Today Include:  Diagnostic imaging studies that were done on this admission  Imaging Personally Reviewed by Myself Includes:  None      Cultures:   Blood Culture:   Lab Results   Component Value Date    BLOODCX Staphylococcus aureus (A) 09/26/2018    BLOODCX Staphylococcus aureus (A) 09/26/2018    BLOODCX Staphylococcus aureus (A) 09/24/2018    BLOODCX Staphylococcus aureus (A) 09/22/2018    BLOODCX Staphylococcus aureus (A) 09/22/2018    BLOODCX Staphylococcus aureus (A) 09/20/2018    BLOODCX Staphylococcus aureus (A) 09/20/2018     Urine Culture:   Lab Results   Component Value Date    URINECX >100,000 cfu/ml Escherichia coli 03/17/2016     Sputum Culture: No components found for: SPUTUMCX  Wound Culture: No results found for: WOUNDCULT    Last 24 Hours Medication List:     Current Facility-Administered Medications:  acetaminophen 650 mg Oral Q6H PRN Oly Wen PA-C   amiodarone 200 mg Oral BID With Meals Antwan Escamilla, DO   chlorhexidine 15 mL Swish & Spit Q12H [address] Marianela Marmolejo,    divalproex sodium 750 mg Oral Daily Serge Dupree PA-C   DULoxetine 60 mg Oral Daily Megan Sequeira PA-C   lactulose 10 g Oral BID Scooby [place]   lidocaine 1 patch Topical Daily JEANNIE Barcenas   nystatin  Topical BID Crystal Lin MD   oxyCODONE 2 5 mg Oral Q4H PRN Oly Wen PA-C   oxyCODONE 5 mg Oral Q4H PRN Oly Wen PA-C   traZODone 50 mg Oral HS Serge Dupree PA-C        Today, Patient Was Seen By: Vivian Hardin MD    ** Please Note: Dragon 360 Dictation voice to text software may have been used in the creation of this document   **

## 2018-10-01 NOTE — PALLIATIVE CARE CONFERENCE
LCSW visited Pt, llma's room, to offer support  llma's daughter Edi Augustine was present  Edi Augustine expressed frustration towards her family especially her sister Melinda Erazo who was supposed to take her mother home with her  Edi Augustine shared that she wish she could take her mother home but she lives alone in a one bedroom apartment and works full time  Edi Augustine noted to taking today off from work to be with her mother because of the plans changing over the weekend  The family continues to want comfort measures and hospice but it is unclear the plan for care during Emily's decline  Edi Augustine spoke of little finances and wishing she could do more  LCSW engaged in supportive listening  LCSW communicated with CM regarding Pt needs, nursing facility planning, and hospice disposition  [facility] facility was the only facility considering llma and was coming out today to assess her  CM will continue to search for other facilities who may accept llma  llma was lying in bed resting  LCSW spoke to Edi Augustine outside as she did not tell her mother yet that she is not going to her sisters home  Edi Augustine presents as supportive of her mother and guilty that she can not offer more support or care for her mother  LCSW will continue to offer support as needed and accepted

## 2018-10-02 PROBLEM — Z51.5 END OF LIFE CARE: Status: ACTIVE | Noted: 2018-10-02

## 2018-10-02 PROCEDURE — 99232 SBSQ HOSP IP/OBS MODERATE 35: CPT | Performed by: INTERNAL MEDICINE

## 2018-10-02 RX ORDER — HYDROMORPHONE HCL/PF 1 MG/ML
0.2 SYRINGE (ML) INJECTION
Status: DISCONTINUED | OUTPATIENT
Start: 2018-10-02 | End: 2018-10-02

## 2018-10-02 RX ORDER — HYDROMORPHONE HCL/PF 1 MG/ML
0.2 SYRINGE (ML) INJECTION
Status: DISCONTINUED | OUTPATIENT
Start: 2018-10-02 | End: 2018-10-03

## 2018-10-02 RX ORDER — HYDROMORPHONE HCL/PF 1 MG/ML
SYRINGE (ML) INJECTION
Status: COMPLETED
Start: 2018-10-02 | End: 2018-10-02

## 2018-10-02 RX ORDER — HYDROMORPHONE HCL/PF 1 MG/ML
SYRINGE (ML) INJECTION
Status: DISPENSED
Start: 2018-10-02 | End: 2018-10-02

## 2018-10-02 RX ORDER — HYDROMORPHONE HCL/PF 1 MG/ML
0.2 SYRINGE (ML) INJECTION ONCE
Status: COMPLETED | OUTPATIENT
Start: 2018-10-02 | End: 2018-10-02

## 2018-10-02 RX ORDER — LORAZEPAM 2 MG/ML
0.25 INJECTION INTRAMUSCULAR ONCE
Status: COMPLETED | OUTPATIENT
Start: 2018-10-02 | End: 2018-10-02

## 2018-10-02 RX ADMIN — DULOXETINE HYDROCHLORIDE 60 MG: 60 CAPSULE, DELAYED RELEASE ORAL at 09:50

## 2018-10-02 RX ADMIN — OXYCODONE HYDROCHLORIDE 5 MG: 5 TABLET ORAL at 07:41

## 2018-10-02 RX ADMIN — AMIODARONE HYDROCHLORIDE 200 MG: 200 TABLET ORAL at 07:40

## 2018-10-02 RX ADMIN — LORAZEPAM 0.25 MG: 2 INJECTION INTRAMUSCULAR; INTRAVENOUS at 02:30

## 2018-10-02 RX ADMIN — AMIODARONE HYDROCHLORIDE 200 MG: 200 TABLET ORAL at 18:13

## 2018-10-02 RX ADMIN — OXYCODONE HYDROCHLORIDE 2.5 MG: 5 TABLET ORAL at 21:35

## 2018-10-02 RX ADMIN — CHLORHEXIDINE GLUCONATE 15 ML: 1.2 RINSE ORAL at 09:50

## 2018-10-02 RX ADMIN — HYDROMORPHONE HYDROCHLORIDE 0.2 MG: 1 INJECTION, SOLUTION INTRAMUSCULAR; INTRAVENOUS; SUBCUTANEOUS at 11:39

## 2018-10-02 RX ADMIN — DIVALPROEX SODIUM 750 MG: 500 TABLET, DELAYED RELEASE ORAL at 09:50

## 2018-10-02 RX ADMIN — OXYCODONE HYDROCHLORIDE 5 MG: 5 TABLET ORAL at 19:44

## 2018-10-02 RX ADMIN — HYDROMORPHONE HYDROCHLORIDE 0.2 MG: 1 INJECTION, SOLUTION INTRAMUSCULAR; INTRAVENOUS; SUBCUTANEOUS at 02:31

## 2018-10-02 RX ADMIN — CHLORHEXIDINE GLUCONATE 15 ML: 1.2 RINSE ORAL at 21:36

## 2018-10-02 RX ADMIN — NYSTATIN: 100000 POWDER TOPICAL at 18:14

## 2018-10-02 RX ADMIN — Medication 0.2 MG: at 01:37

## 2018-10-02 RX ADMIN — OXYCODONE HYDROCHLORIDE 5 MG: 5 TABLET ORAL at 01:07

## 2018-10-02 RX ADMIN — LACTULOSE 10 G: 20 SOLUTION ORAL at 18:14

## 2018-10-02 RX ADMIN — TRAZODONE HYDROCHLORIDE 50 MG: 50 TABLET ORAL at 21:36

## 2018-10-02 RX ADMIN — ACETAMINOPHEN 650 MG: 160 SUSPENSION ORAL at 08:52

## 2018-10-02 RX ADMIN — HYDROMORPHONE HYDROCHLORIDE 0.2 MG: 1 INJECTION, SOLUTION INTRAMUSCULAR; INTRAVENOUS; SUBCUTANEOUS at 01:37

## 2018-10-02 RX ADMIN — LIDOCAINE 1 PATCH: 50 PATCH CUTANEOUS at 09:51

## 2018-10-02 RX ADMIN — LACTULOSE 10 G: 20 SOLUTION ORAL at 09:49

## 2018-10-02 RX ADMIN — NYSTATIN: 100000 POWDER TOPICAL at 09:51

## 2018-10-02 NOTE — SOCIAL WORK
CM discussed pt during care coordination rounds   Dr Yaima Quinones aware family not willing to take home with home hospice   Cm put multiple SNF referrals for hospice and no accepting facility and family unble to afford daily fees with hospice   Pt may qualify for IP hospice

## 2018-10-02 NOTE — CASE MANAGEMENT
Continued Stay Review    Date: 10/2/18    Vital Signs: /82 (BP Location: Right arm)   Pulse 96   Temp 97 6 °F (36 4 °C) (Oral)   Resp 16   Ht 5' 3" (1 6 m)   Wt 70 2 kg (154 lb 12 2 oz)   SpO2 93%   BMI 27 42 kg/m²     Medications:   Scheduled Meds:   Current Facility-Administered Medications:  acetaminophen 650 mg Oral Q6H PRN   amiodarone 200 mg Oral BID With Meals   chlorhexidine 15 mL Swish & Spit Q12H [facility] & longterm   divalproex sodium 750 mg Oral Daily   DULoxetine 60 mg Oral Daily   HYDROmorphone 0 2 mg Intravenous Q3H PRN   lactulose 10 g Oral BID   lidocaine 1 patch Topical Daily   nystatin  Topical BID   oxyCODONE 2 5 mg Oral Q4H PRN   oxyCODONE 5 mg Oral Q2H PRN   traZODone 50 mg Oral HS     PRN Meds:   Acetaminophen x 2 in last 24 hrs    HYDROmorphone x 4 in last 24 hrs     oxyCODONE x4 in last 24 hrs    Abnormal Labs/Diagnostic Results:    Age/Sex: 60 y o  female     Assessment/Plan: Antiobiotics were unable to clear the MRSA bacteremia  Family is agreeable to palliative care to hospice care  Remains afebrile at this time  Pt is unable to make own decisions  No further lab draws will be done  Discharge Plan: looking for SNF with transition to hospice care      Thank you,  Denis Kerr Utilization Review Department  Phone: [phone]; Fax [phone]  ATTENTION: Please call with any questions or concerns to [phone]  and carefully follow the prompts so that you are directed to the right person  Send all requests for admission clinical reviews, approved or denied determinations and any other requests to fax [phone]   All voicemails are confidential

## 2018-10-03 PROCEDURE — 99232 SBSQ HOSP IP/OBS MODERATE 35: CPT | Performed by: INTERNAL MEDICINE

## 2018-10-03 RX ORDER — HYDROMORPHONE HCL/PF 1 MG/ML
0.5 SYRINGE (ML) INJECTION
Status: DISCONTINUED | OUTPATIENT
Start: 2018-10-03 | End: 2018-10-04

## 2018-10-03 RX ORDER — LORAZEPAM 2 MG/ML
0.5 INJECTION INTRAMUSCULAR EVERY 4 HOURS PRN
Status: DISCONTINUED | OUTPATIENT
Start: 2018-10-03 | End: 2018-10-05

## 2018-10-03 RX ADMIN — DIVALPROEX SODIUM 750 MG: 500 TABLET, DELAYED RELEASE ORAL at 08:23

## 2018-10-03 RX ADMIN — AMIODARONE HYDROCHLORIDE 200 MG: 200 TABLET ORAL at 17:39

## 2018-10-03 RX ADMIN — OXYCODONE HYDROCHLORIDE 5 MG: 5 TABLET ORAL at 08:26

## 2018-10-03 RX ADMIN — AMIODARONE HYDROCHLORIDE 200 MG: 200 TABLET ORAL at 08:23

## 2018-10-03 RX ADMIN — HYDROMORPHONE HYDROCHLORIDE 0.2 MG: 1 INJECTION, SOLUTION INTRAMUSCULAR; INTRAVENOUS; SUBCUTANEOUS at 00:07

## 2018-10-03 RX ADMIN — LACTULOSE 10 G: 20 SOLUTION ORAL at 08:23

## 2018-10-03 RX ADMIN — OXYCODONE HYDROCHLORIDE 5 MG: 5 TABLET ORAL at 15:31

## 2018-10-03 RX ADMIN — TRAZODONE HYDROCHLORIDE 50 MG: 50 TABLET ORAL at 20:57

## 2018-10-03 RX ADMIN — LACTULOSE 10 G: 20 SOLUTION ORAL at 17:39

## 2018-10-03 RX ADMIN — OXYCODONE HYDROCHLORIDE 5 MG: 5 TABLET ORAL at 20:56

## 2018-10-03 RX ADMIN — LIDOCAINE 1 PATCH: 50 PATCH CUTANEOUS at 08:23

## 2018-10-03 RX ADMIN — CHLORHEXIDINE GLUCONATE 15 ML: 1.2 RINSE ORAL at 08:23

## 2018-10-03 RX ADMIN — DULOXETINE HYDROCHLORIDE 60 MG: 60 CAPSULE, DELAYED RELEASE ORAL at 08:23

## 2018-10-03 RX ADMIN — OXYCODONE HYDROCHLORIDE 5 MG: 5 TABLET ORAL at 06:11

## 2018-10-03 RX ADMIN — OXYCODONE HYDROCHLORIDE 5 MG: 5 TABLET ORAL at 10:43

## 2018-10-03 NOTE — PROGRESS NOTES
Progress Note - Emily Abdullahi 1960, 60 y o  female MRN: [id number]    Unit/Bed#: [facility] 828-01 Encounter: [id number]    Primary Care Provider: Luis Honeycutt MD   Date and time admitted to hospital: 2018  1:36 AM    End of life care   Assessment & Plan    Waiting placement to a SNF with Hospice care  No insurance coverage so still @ [facility]  dtrs cannot take her home  Case management following     Back pain   Assessment & Plan    Patient still complaining of back pain  MRI lumbar spine reviewed  Pain control     [facility] ([facility] )   Assessment & Plan    Metastatic POA  Was on palliative chemo prior to admission  Hospice care only     Acute respiratory failure with hypoxia ([facility] )   Assessment & Plan    Seems resolved     Altered mental status   Assessment & Plan    Mild agitation - Sx treatment per palliative care     Acute kidney injury ([facility] )   Assessment & Plan    POA 2" MSSA Bacteremia and sepsis  No further lab draws     * Bacteremia due to methicillin susceptible Staphylococcus aureus (MSSA)   Assessment & Plan    Persistent MSSA bacteremia  Off Abx now  Hospice opted by pt and family         VTE Pharmacologic Prophylaxis: Pharmacologic VTE Prophylaxis contraindicated due to hospice  Mechanical: Mechanical VTE prophylaxis in place  Patient Centered Rounds: I have performed bedside rounds with nursing staff today  Discussions with Specialists or Other Care Team Provider:     Education and Discussions with Family / Patient:     Time Spent for Care: More than 50% of total time spent on counseling and coordination of care as described above      Current Length of Stay: 16 day(s)    Current Patient Status: Inpatient   Certification Statement: The patient will continue to require additional inpatient hospital stay due to as above    Discharge Plan:    Code Status: Level 4 - Comfort Care      Subjective: nil acute    Objective:     Vitals:   Temp (24hrs), Av 8 °F (36 6 °C), Min:97 7 °F (36 5 °C), Max:97 9 °F (36 6 °C)    HR:  [95-96] 95  Resp:  [18-20] 20  BP: (130-139)/(63-91) 137/85  SpO2:  [90 %-96 %] 90 %  Body mass index is 27 42 kg/m²  Input and Output Summary (last 24 hours): Intake/Output Summary (Last 24 hours) at 10/03/18 1933  Last data filed at 10/03/18 1447   Gross per 24 hour   Intake              200 ml   Output             1100 ml   Net             -900 ml       Physical Exam   HENT:   Head: Normocephalic  Eyes: Pupils are equal, round, and reactive to light  Cardiovascular: Normal heart sounds  Pulmonary/Chest: Effort normal    Abdominal: Soft  Neurological: She is alert  Skin: There is pallor  Additional Data:     Labs:      Results from last 7 days  Lab Units 09/28/18  0517   WBC Thousand/uL 65 56*   HEMOGLOBIN g/dL 7 0*   HEMATOCRIT % 20 3*   PLATELETS Thousands/uL 124*   LYMPHO PCT % 5*   MONO PCT MAN % 2*   EOSINO PCT MANUAL % 0       Results from last 7 days  Lab Units 09/28/18  0517   SODIUM mmol/L 131*   POTASSIUM mmol/L 3 8   CHLORIDE mmol/L 98*   CO2 mmol/L 24   BUN mg/dL 27*   CREATININE mg/dL 1 89*   CALCIUM mg/dL 7 3*   ALK PHOS U/L 158*   ALT U/L <6*   AST U/L 21           * I Have Reviewed All Lab Data Listed Above      Imaging:  Imaging Personally Reviewed by Myself Includes:     Cultures:   Blood Culture:   Lab Results   Component Value Date    BLOODCX Staphylococcus aureus (A) 09/26/2018    BLOODCX Staphylococcus aureus (A) 09/26/2018    BLOODCX Staphylococcus aureus (A) 09/24/2018    BLOODCX Staphylococcus aureus (A) 09/22/2018    BLOODCX Staphylococcus aureus (A) 09/22/2018    BLOODCX Staphylococcus aureus (A) 09/20/2018    BLOODCX Staphylococcus aureus (A) 09/20/2018     Urine Culture:   Lab Results   Component Value Date    URINECX >100,000 cfu/ml Escherichia coli 03/17/2016     Sputum Culture: No components found for: SPUTUMCX  Wound Culture: No results found for: WOUNDCULT    Last 24 Hours Medication List:     Current Facility-Administered Medications:  acetaminophen 650 mg Oral Q6H PRN Hamilton Esquivel PA-C   amiodarone 200 mg Oral BID With Meals Antwan Escamilla, DO   chlorhexidine 15 mL Swish & Spit Q12H [address] Marianela Marmolejo, DO   divalproex sodium 750 mg Oral Daily Azeb Coleman PA-C   DULoxetine 60 mg Oral Daily Azeb Coleman PA-C   HYDROmorphone 0 5 mg Intravenous Q3H PRN Leroy Garner MD   lactulose 10 g Oral BID Scooby Crawford   lidocaine 1 patch Topical Daily JEANNIE August   LORazepam 0 5 mg Intravenous Q4H PRN Leroy Garner MD   nystatin  Topical BID Akanksha Brown MD   oxyCODONE 2 5 mg Oral Q4H PRN Hamilton Esquivel PA-C   oxyCODONE 5 mg Oral Q2H PRN JEANNIE Castillo   traZODone 50 mg Oral HS Azeb Coleman PA-C        Today, Patient Was Seen By: Zuleima Delvalle MD    ** Please Note: Dragon 360 Dictation voice to text software may have been used in the creation of this document   **

## 2018-10-03 NOTE — PROGRESS NOTES
Progress Note - Palliative & Supportive Care  Emily Abdullahi  60 y o   female  [address]   MRN: [id number]  Encounter: [id number]     Assessment  Patient Active Problem List   Diagnosis    CVA (cerebral vascular accident) ([place] [address] )    Type 2 diabetes mellitus without complication, without long-term current use of insulin ([place] [address] )    Schizoaffective disorder, bipolar type ([place] [address] )    Essential hypertension    Colonic mass    Type 2 diabetes mellitus with complication ([place] [address] )    CKD (chronic kidney disease) stage 3, GFR 30-59 ml/min (HCC)    Pain of right upper extremity    Acute kidney injury ([place] [address] )    CAD (coronary artery disease)    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HCC)    Large bowel obstruction ([place] [address] )    Brachial artery stenosis, right (HCC)    Hyponatremia    Rectosigmoid mass - High colostomy output    Leukocytosis    GERD (gastroesophageal reflux disease)    Tobacco abuse    Liver metastasis (HCC)    Mixed hyperlipidemia    Dizziness - due to orthostatic hypotension    Ambulatory dysfunction    Orthostatic hypotension    Chemotherapy induced nausea and vomiting    Lower abdominal pain    [facility]otic [facility]    Sepsis ([facility] )    Altered mental status    Acute respiratory failure with hypoxia (HCC)    Hypocalcemia    Leukopenia    Cavitating pulmonary nodules     Metabolic acidosis, NAG, bicarbonate losses    Hypomagnesemia    Hypophosphatemia    Bacteremia due to methicillin susceptible Staphylococcus aureus (MSSA)    Colon cancer ([place] [address] )    Back pain    Suspected endocarditis    Moderate protein-calorie malnutrition (HCC)    End of life care     Plan:  Symptom Management:   Comfort medications:  Resume Oxy IR   2 5 mg every 4 hours as needed for moderate pain and 5 mg every 2 hours as needed for severe pain  Hydromorphone 0 4 mg IV every 3 hours as needed for breakthrough pain also added  Add Ativan 0 5 mg IV q4hr PRN agitation/anxiety  Resume Cymbalta  Goals of Care:   Level 4    Awaiting placement On hospice  Spoke with patient's daughter, Paul Norris, and updated on current condition  Paul Norris will be in later today to visit  She knows where to reach us if she has any questions or concerns  History    Patient is seen and examined at bedside with LCSW present  Patient is asking for her daughter, Paul Norris, today  Per RN, patient is not tolerating much of her meals, however, continues to drink and tolerate po medications  No visitors per RN this shift or last shift  ROS: + Belly pain    Meds  all current active meds have been reviewed and current meds:   Current Facility-Administered Medications   Medication Dose Route Frequency    acetaminophen (TYLENOL) oral suspension 650 mg  650 mg Oral Q6H PRN    amiodarone tablet 200 mg  200 mg Oral BID With Meals    chlorhexidine (PERIDEX) 0 12 % oral rinse 15 mL  15 mL Swish & Spit Q12H [address]    divalproex sodium (DEPAKOTE) EC tablet 750 mg  750 mg Oral Daily    DULoxetine (CYMBALTA) delayed release capsule 60 mg  60 mg Oral Daily    HYDROmorphone (DILAUDID) injection 0 5 mg  0 5 mg Intravenous Q3H PRN    lactulose 20 g/30 mL oral solution 10 g  10 g Oral BID    lidocaine (LIDODERM) 5 % patch 1 patch  1 patch Topical Daily    LORazepam (ATIVAN) 2 mg/mL injection 0 5 mg  0 5 mg Intravenous Q4H PRN    nystatin (MYCOSTATIN) powder   Topical BID    oxyCODONE (ROXICODONE) IR tablet 2 5 mg  2 5 mg Oral Q4H PRN    oxyCODONE (ROXICODONE) IR tablet 5 mg  5 mg Oral Q2H PRN    traZODone (DESYREL) tablet 50 mg  50 mg Oral HS     No Known Allergies    Objective  Physical Exam: /91 (BP Location: Left arm)   Pulse 95   Temp 97 7 °F (36 5 °C) (Oral)   Resp 20   Ht 5' 3" (1 6 m)   Wt 70 2 kg (154 lb 12 2 oz)   SpO2 96%   BMI 27 42 kg/m²   GENERAL: No acute distress  HEENT:   Eyes open  HEART:  S1-S2 present, + murmur Tachycardic HR: 140's  LUNGS:   RR: 32, diminished breath sounds bilaterally     ABDOMEN: Generalized tenderness, + fluid shift, + BS,   EXTREMITIES:   +Anasarca   Worsening edema  NEUROLOGIC:   Oriented to person  Lab Results: I have personally reviewed pertinent labs  Counseling / Coordination of Care  Total floor / unit time spent today 35 minutes  Greater than 50% of total time was spent with the patient and / or family counseling and / or coordination of care  A description of the counseling / coordination of care:   Symptom management, psychosocial support, contacting family       Pauline Zuñiga MD  Palliative & Supportive Care  Office number: [phone]

## 2018-10-03 NOTE — SOCIAL WORK
CM met with Duran Cruz at bedside   Cm asked Duran Cruz if she was agreeable for cm to put referrals out of [facility]   Kait agreeable and 40 new referrals in in for snf with hospice   Duran Cruz states that no one in family is able to do home hospice due to work schedules  Cm will follow

## 2018-10-03 NOTE — PROGRESS NOTES
Progress Note - Emily Abdullahi 1960, 60 y o  female MRN: [id number]    Unit/Bed#: [facility] 828-01 Encounter: [id number]    Primary Care Provider: Anselmo Lugo MD   Date and time admitted to hospital: 2018  1:36 AM     End of life care   Assessment & Plan    Waiting placement to a SNF with Hospice care  No insurance coverage so still @ [facility]  dtrs cannot take her home  Case management following     Colon cancer [facility])   Assessment & Plan    Metastatic POA  Was on palliative chemo prior to admission  Hospice care only     Cavitating pulmonary nodules    Assessment & Plan    CT showed stable appearance of the lung lesion     Acute respiratory failure with hypoxia ([facility] )   Assessment & Plan    Seems resolved     Altered mental status   Assessment & Plan    · Mild agitation - Sx treatment per palliative care     Acute kidney injury ([facility] )   Assessment & Plan    POA 2" MSSA Bacteremia and sepsis  No further lab draws     * Bacteremia due to methicillin susceptible Staphylococcus aureus (MSSA)   Assessment & Plan    Persistent MSSA bacteremia  Off Abx now  Hospice opted by pt and family         VTE Pharmacologic Prophylaxis: Pharmacologic VTE Prophylaxis contraindicated due to comfort  Mechanical: Mechanical VTE prophylaxis in place  Patient Centered Rounds: I have performed bedside rounds with nursing staff today  Discussions with Specialists or Other Care Team Provider:     Education and Discussions with Family / Patient:     Time Spent for Care: More than 50% of total time spent on counseling and coordination of care as described above      Current Length of Stay: 15 day(s)    Current Patient Status: Inpatient   Certification Statement: The patient will continue to require additional inpatient hospital stay due to as above    Discharge Plan:    Code Status: Level 4 - Comfort Care      Subjective: no complaints    Objective:     Vitals:   Temp (24hrs), Av 7 °F (36 5 °C), Min:97 6 °F (36 4 °C), Max:97 8 °F (36 6 °C)    HR:  [96-97] 96  Resp:  [16-18] 18  BP: (111-131)/(55-82) 130/63  SpO2:  [93 %-94 %] 93 %  Body mass index is 27 42 kg/m²  Input and Output Summary (last 24 hours): Intake/Output Summary (Last 24 hours) at 10/02/18 2309  Last data filed at 10/02/18 2135   Gross per 24 hour   Intake              200 ml   Output              825 ml   Net             -625 ml       Physical Exam   HENT:   Head: Normocephalic  Eyes: Pupils are equal, round, and reactive to light  Cardiovascular: Normal heart sounds  Pulmonary/Chest: Effort normal    Psychiatric:   Mild agitation with pain         Additional Data:     Labs:      Results from last 7 days  Lab Units 09/28/18  0517   WBC Thousand/uL 65 56*   HEMOGLOBIN g/dL 7 0*   HEMATOCRIT % 20 3*   PLATELETS Thousands/uL 124*   LYMPHO PCT % 5*   MONO PCT MAN % 2*   EOSINO PCT MANUAL % 0       Results from last 7 days  Lab Units 09/28/18  0517   SODIUM mmol/L 131*   POTASSIUM mmol/L 3 8   CHLORIDE mmol/L 98*   CO2 mmol/L 24   BUN mg/dL 27*   CREATININE mg/dL 1 89*   CALCIUM mg/dL 7 3*   ALK PHOS U/L 158*   ALT U/L <6*   AST U/L 21           * I Have Reviewed All Lab Data Listed Above      Imaging:  Imaging Personally Reviewed by Myself Includes:     Cultures:   Blood Culture:   Lab Results   Component Value Date    BLOODCX Staphylococcus aureus (A) 09/26/2018    BLOODCX Staphylococcus aureus (A) 09/26/2018    BLOODCX Staphylococcus aureus (A) 09/24/2018    BLOODCX Staphylococcus aureus (A) 09/22/2018    BLOODCX Staphylococcus aureus (A) 09/22/2018    BLOODCX Staphylococcus aureus (A) 09/20/2018    BLOODCX Staphylococcus aureus (A) 09/20/2018     Urine Culture:   Lab Results   Component Value Date    URINECX >100,000 cfu/ml Escherichia coli 03/17/2016     Sputum Culture: No components found for: SPUTUMCX  Wound Culture: No results found for: WOUNDCULT    Last 24 Hours Medication List:     Current Facility-Administered Medications:  acetaminophen 650 mg Oral Q6H PRN Toni Andrade PA-C   amiodarone 200 mg Oral BID With Meals Genaro Smith DO   chlorhexidine 15 mL Swish & Spit Q12H [facility] Marianela DO Jaleel   divalproex sodium 750 mg Oral Daily Lizbeth Edmonds PA-C   DULoxetine 60 mg Oral Daily Lizbeth Edmonds PA-C   HYDROmorphone 0 2 mg Intravenous Q3H PRN Liz Strauss MD   lactulose 10 g Oral BID Scooby Caswell   lidocaine 1 patch Topical Daily JEANNIE Love   nystatin  Topical BID Stephenie Beckett MD   oxyCODONE 2 5 mg Oral Q4H PRN Toni Andrade PA-C   oxyCODONE 5 mg Oral Q2H PRN JEANNIE Summers   traZODone 50 mg Oral HS Lizbeth Edmonds PA-C        Today, Patient Was Seen By: Anna Hunter MD    ** Please Note: Dragon 360 Dictation voice to text software may have been used in the creation of this document   **

## 2018-10-03 NOTE — PALLIATIVE CARE CONFERENCE
JADW and Dr Romero Nix visited Pt, Emily's, room to offer support and assess needs  Emily was lying in bed awake  Dr Romero Nix spoke to Emily about her pain and comfort  Emily admitted to having pain in her abdomen  No family present at this time  Dr Romero Nix did update daughter Ambrosio Felipe by phone regarding Emily's current condition  No other needs at this time  Emily's legs were visible more swollen then yesterday  Emily was able to show us where she was having pain in her abdomen which was also swollen  LCSW will continue to visit to offer support to Emily and her family as needed and accepted

## 2018-10-03 NOTE — ASSESSMENT & PLAN NOTE
Waiting placement to a SNF with Hospice care  No insurance coverage so still @ [facility]  dtrs cannot take her home  Case management following

## 2018-10-03 NOTE — PROGRESS NOTES
Progress Note - Palliative & Supportive Care  Emily Abdullahi  60 y o   female  [address]   MRN: [id number]  Encounter: [id number]     Assessment  Patient Active Problem List   Diagnosis    CVA (cerebral vascular accident) ([place] [address] )    Type 2 diabetes mellitus without complication, without long-term current use of insulin ([place] [address] )    Schizoaffective disorder, bipolar type ([place] [address] )    Essential hypertension    Colonic mass    Type 2 diabetes mellitus with complication ([place] [address] )    CKD (chronic kidney disease) stage 3, GFR 30-59 ml/min (HCC)    Pain of right upper extremity    Acute kidney injury ([place] [address] )    CAD (coronary artery disease)    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HCC)    Large bowel obstruction ([place] [address] )    Brachial artery stenosis, right (HCC)    Hyponatremia    Rectosigmoid mass - High colostomy output    Leukocytosis    GERD (gastroesophageal reflux disease)    Tobacco abuse    Liver metastasis (HCC)    Mixed hyperlipidemia    Dizziness - due to orthostatic hypotension    Ambulatory dysfunction    Orthostatic hypotension    Chemotherapy induced nausea and vomiting    Lower abdominal pain    HHNC [facility]glycemic [facility]osmolar [facility]otic coma) [facility]    [facility]    Altered mental status    Acute respiratory failure with hypoxia (HCC)    Hypocalcemia    Leukopenia    Cavitating pulmonary nodules     Metabolic acidosis, NAG, bicarbonate losses    Hypomagnesemia    Hypophosphatemia    Bacteremia due to methicillin susceptible Staphylococcus aureus (MSSA)    Colon cancer ([facility] )    Back pain    Suspected endocarditis    Moderate protein-calorie malnutrition (HCC)    End of life care     Plan:  Symptom Management:   Pain -   Resume Oxy IR   2 5 mg every 4 hours as needed for moderate pain and 5 mg every 2 hours as needed for severe pain  Hydromorphone 0 2 mg IV every 3 hours as needed for breakthrough pain also added  Resume Cymbalta      Goals of Care:   Level 4    Awaiting placement On hospice  History    Patient is seen and examined at bedside with medical student present  24 hour events reviewed  Patient tolerated breakfast today  She reports back pain  She seems to remember seeing me previously  She appears lethargic  ROS: +back pain, denies sob/chest pain, denies nausea/vomiting/diarrhea, denies seizures  Meds  all current active meds have been reviewed and current meds:   Current Facility-Administered Medications   Medication Dose Route Frequency    acetaminophen (TYLENOL) oral suspension 650 mg  650 mg Oral Q6H PRN    amiodarone tablet 200 mg  200 mg Oral BID With Meals    chlorhexidine (PERIDEX) 0 12 % oral rinse 15 mL  15 mL Swish & Spit Q12H [address]    divalproex sodium (DEPAKOTE) EC tablet 750 mg  750 mg Oral Daily    DULoxetine (CYMBALTA) delayed release capsule 60 mg  60 mg Oral Daily    HYDROmorphone (DILAUDID) injection 0 2 mg  0 2 mg Intravenous Q3H PRN    lactulose 20 g/30 mL oral solution 10 g  10 g Oral BID    lidocaine (LIDODERM) 5 % patch 1 patch  1 patch Topical Daily    nystatin (MYCOSTATIN) powder   Topical BID    oxyCODONE (ROXICODONE) IR tablet 2 5 mg  2 5 mg Oral Q4H PRN    oxyCODONE (ROXICODONE) IR tablet 5 mg  5 mg Oral Q2H PRN    traZODone (DESYREL) tablet 50 mg  50 mg Oral HS     No Known Allergies    Objective  Physical Exam: /63 (BP Location: Left arm)   Pulse 96   Temp 97 8 °F (36 6 °C) (Oral)   Resp 18   Ht 5' 3" (1 6 m)   Wt 70 2 kg (154 lb 12 2 oz)   SpO2 93%   BMI 27 42 kg/m²   GENERAL: No acute distress  HEENT:   Eyes open  HEART:  S1-S2 present, no murmurs rubs or gallops  LUNGS:   Normal effort  ABDOMEN:   Soft nontender nondistended bowel sounds present  EXTREMITIES:   Bilateral lower extremity pitting edema 2+  NEUROLOGIC:   Oriented to person  Lab Results: I have personally reviewed pertinent labs  Counseling / Coordination of Care  Total floor / unit time spent today 25 minutes   Greater than 50% of total time was spent with the patient and / or family counseling and / or coordination of care   A description of the counseling / coordination of care:   Symptom management, psychosocial support    Atif Kennedy MD  Palliative & Supportive Care  Office number: [phone]

## 2018-10-04 PROCEDURE — 99232 SBSQ HOSP IP/OBS MODERATE 35: CPT | Performed by: INTERNAL MEDICINE

## 2018-10-04 RX ORDER — HYDROMORPHONE HCL/PF 1 MG/ML
0.5 SYRINGE (ML) INJECTION
Status: DISCONTINUED | OUTPATIENT
Start: 2018-10-04 | End: 2018-10-05

## 2018-10-04 RX ORDER — GUAIFENESIN/DEXTROMETHORPHAN 100-10MG/5
10 SYRUP ORAL EVERY 4 HOURS PRN
Status: DISCONTINUED | OUTPATIENT
Start: 2018-10-04 | End: 2018-10-05 | Stop reason: HOSPADM

## 2018-10-04 RX ORDER — HYDROMORPHONE HCL/PF 1 MG/ML
SYRINGE (ML) INJECTION
Status: COMPLETED
Start: 2018-10-04 | End: 2018-10-04

## 2018-10-04 RX ADMIN — HYDROMORPHONE HYDROCHLORIDE 0.5 MG: 1 INJECTION, SOLUTION INTRAMUSCULAR; INTRAVENOUS; SUBCUTANEOUS at 11:15

## 2018-10-04 RX ADMIN — OXYCODONE HYDROCHLORIDE 5 MG: 5 TABLET ORAL at 21:24

## 2018-10-04 RX ADMIN — LIDOCAINE 1 PATCH: 50 PATCH CUTANEOUS at 10:39

## 2018-10-04 RX ADMIN — DIVALPROEX SODIUM 750 MG: 500 TABLET, DELAYED RELEASE ORAL at 08:03

## 2018-10-04 RX ADMIN — DULOXETINE HYDROCHLORIDE 60 MG: 60 CAPSULE, DELAYED RELEASE ORAL at 08:03

## 2018-10-04 RX ADMIN — LACTULOSE 10 G: 20 SOLUTION ORAL at 18:42

## 2018-10-04 RX ADMIN — CHLORHEXIDINE GLUCONATE 15 ML: 1.2 RINSE ORAL at 10:38

## 2018-10-04 RX ADMIN — LACTULOSE 10 G: 20 SOLUTION ORAL at 10:38

## 2018-10-04 RX ADMIN — OXYCODONE HYDROCHLORIDE 5 MG: 5 TABLET ORAL at 00:37

## 2018-10-04 RX ADMIN — OXYCODONE HYDROCHLORIDE 5 MG: 5 TABLET ORAL at 07:59

## 2018-10-04 RX ADMIN — NYSTATIN: 100000 POWDER TOPICAL at 18:45

## 2018-10-04 RX ADMIN — HYDROMORPHONE HYDROCHLORIDE 0.5 MG: 1 INJECTION, SOLUTION INTRAMUSCULAR; INTRAVENOUS; SUBCUTANEOUS at 23:26

## 2018-10-04 RX ADMIN — TRAZODONE HYDROCHLORIDE 50 MG: 50 TABLET ORAL at 21:45

## 2018-10-04 RX ADMIN — AMIODARONE HYDROCHLORIDE 200 MG: 200 TABLET ORAL at 07:59

## 2018-10-04 RX ADMIN — AMIODARONE HYDROCHLORIDE 200 MG: 200 TABLET ORAL at 18:42

## 2018-10-04 NOTE — ASSESSMENT & PLAN NOTE
Discussed with the family at bedside, questions answered    Overall guarded prognosis explained  Awaiting hospice placement, discussed with case management  Discussed with palliative care

## 2018-10-04 NOTE — PROGRESS NOTES
Progress Note - Emily Abdullahi 1960, 60 y o  female MRN: [id number]    Unit/Bed#: [facility] 828-01 Encounter: [id number]    Primary Care Provider: Michael Page MD   Date and time admitted to hospital: 2018  1:36 AM        End of life care   Assessment & Plan    Discussed with the family at bedside, questions answered  Overall guarded prognosis explained  Awaiting hospice placement, discussed with case management  Discussed with palliative care     Colon cancer [facility])   Assessment & Plan    Palliative care following     Acute respiratory failure with hypoxia ([address] )   Assessment & Plan    Monitor     * Bacteremia due to methicillin susceptible Staphylococcus aureus (MSSA)   Assessment & Plan    Persistent MSSA bacteremia  Off Abx now  Hospice opted by pt and family           VTE Pharmacologic Prophylaxis:   Pharmacologic: Comfort measures only  Mechanical VTE Prophylaxis in Place: Yes    Patient Centered Rounds: I have performed bedside rounds with nursing staff today  Discussions with Specialists or Other Care Team Provider:  Palliative Care    Education and Discussions with Family / Patient:  Discussed with the patient and family at bedside in detail questions answered    Time Spent for Care: 30 minutes  More than 50% of total time spent on counseling and coordination of care as described above      Current Length of Stay: 17 day(s)    Current Patient Status: Inpatient   Certification Statement: The patient will continue to require additional inpatient hospital stay due to As mentioned    Discharge Plan:  Pending placement discussed with     Code Status: Level 4 - Comfort Care      Subjective:     Complains of cough on and off  History chart labs medications reviewed    Objective:     Vitals:   Temp (24hrs), Av °F (36 7 °C), Min:97 8 °F (36 6 °C), Max:98 1 °F (36 7 °C)    HR:  [76-97] 76  Resp:  [16-20] 20  BP: (116-130)/(63-84) 116/63  SpO2:  [90 %-98 %] 98 %  Body mass index is 27 42 kg/m²  Input and Output Summary (last 24 hours): Intake/Output Summary (Last 24 hours) at 10/04/18 1615  Last data filed at 10/04/18 1404   Gross per 24 hour   Intake              480 ml   Output              950 ml   Net             -470 ml       Physical Exam:     Physical Exam    Comfortably lying in bed  Neck supple  Lungs diminished breath sounds bilaterally  Heart sounds S1-S2 noted  Abdomen soft  Awake obeys simple commands   No rash  Pulses noted    Additional Data:     Labs:      Results from last 7 days  Lab Units 09/28/18  0517   WBC Thousand/uL 65 56*   HEMOGLOBIN g/dL 7 0*   HEMATOCRIT % 20 3*   PLATELETS Thousands/uL 124*   LYMPHO PCT % 5*   MONO PCT MAN % 2*   EOSINO PCT MANUAL % 0       Results from last 7 days  Lab Units 09/28/18  0517   SODIUM mmol/L 131*   POTASSIUM mmol/L 3 8   CHLORIDE mmol/L 98*   CO2 mmol/L 24   BUN mg/dL 27*   CREATININE mg/dL 1 89*   ANION GAP mmol/L 9   CALCIUM mg/dL 7 3*   ALBUMIN g/dL 0 8*   TOTAL BILIRUBIN mg/dL 0 80   ALK PHOS U/L 158*   ALT U/L <6*   AST U/L 21           Results from last 7 days  Lab Units 09/30/18  1055 09/29/18  1627 09/29/18  1109 09/28/18  2106 09/28/18  1610 09/28/18  1050 09/28/18  0652 09/27/18  2052   POC GLUCOSE mg/dl 181* 133 143* 174* 131 87 90 179*                   * I Have Reviewed All Lab Data Listed Above  * Additional Pertinent Lab Tests Reviewed:  [address] Admission Reviewed    Imaging:    Imaging Reports Reviewed Today Include:  Imaging studies reviewed  Imaging Personally Reviewed by Myself Includes:     Recent Cultures (last 7 days):           Last 24 Hours Medication List:     Current Facility-Administered Medications:  acetaminophen 650 mg Oral Q6H PRN Hilario Farley PA-C   amiodarone 200 mg Oral BID With Meals Anwtan Escamilla, DO   chlorhexidine 15 mL Swish & Spit Q12H [address] Marianela Marmolejo, DO   divalproex sodium 750 mg Oral Daily Patricia Robles PA-C   DULoxetine 60 mg Oral Daily [place] OLGUIN YANELIS Sequeira   HYDROmorphone 0 5 mg Intravenous Q3H PRN Lanis Chamber, MD   lactulose 10 g Oral BID Scooby Malheur   lidocaine 1 patch Topical Daily JEANNIE Hong   LORazepam 0 5 mg Intravenous Q4H PRN Lanis Chamber, MD   nystatin  Topical BID Meenakshi Leger MD   oxyCODONE 2 5 mg Oral Q4H PRN Samson Cordero PA-C   oxyCODONE 5 mg Oral Q2H PRN JEANNIE Francisco   traZODone 50 mg Oral [facility] DearYANELIS        Today, Patient Was Seen By: Antionette Weston MD    ** Please Note: Dictation voice to text software may have been used in the creation of this document   **

## 2018-10-04 NOTE — HOSPICE NOTE
Met with daughter Santiago Sun to have consents for hospice signed  Pt for transport at 1100am on Friday to [place] and will be same day open onto [facility] once there  Palliative to write scripts for comfort meds to accompany pt to [place]  Copies of consents and original DNR given to Wes Davis

## 2018-10-04 NOTE — PROGRESS NOTES
Progress note - Palliative and Supportive Care   Emily Abdullahi 60 y o  female [id number]    Assessment:   -   Patient Active Problem List   Diagnosis    CVA (cerebral vascular accident) ([facility] )    Type 2 diabetes mellitus without complication, without long-term current use of insulin (HCC)    Schizoaffective disorder, bipolar type ([facility] )    Essential hypertension    Colonic mass    Type 2 diabetes mellitus with complication ([facility] )    CKD (chronic kidney disease) stage 3, GFR 30-59 ml/min (HCC)    Pain of right upper extremity    [facility]    CAD (coronary artery disease)    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HCC)    Large bowel obstruction ([facility] )    Brachial artery stenosis, right (HCC)    Hyponatremia    Rectosigmoid mass - High colostomy output    Leukocytosis    GERD (gastroesophageal reflux disease)    Tobacco abuse    Liver metastasis (HCC)    Mixed hyperlipidemia    Dizziness - due to orthostatic hypotension    Ambulatory dysfunction    Orthostatic hypotension    Chemotherapy induced nausea and vomiting    Lower abdominal pain    HHNC (hyperglycemic hyperosmolar nonketotic coma) ([facility])    Sepsis ([facility] )    Altered mental status    Acute respiratory failure with hypoxia (HCC)    Hypocalcemia    Leukopenia    Cavitating pulmonary nodules     Metabolic acidosis, NAG, bicarbonate losses    Hypomagnesemia    Hypophosphatemia    Bacteremia due to methicillin susceptible Staphylococcus aureus (MSSA)    Colon cancer ([facility] )    Back pain    Suspected endocarditis    Moderate protein-calorie malnutrition ([facility])    End of life care       Plan:  1  Symptom management -    -   Comfort medications:  Resume Oxy IR as long as patient can  Safely swallow  Patient does have Dilaudid IV for breakthrough pain and if she is unable to swallow safely  Resume Ativan as needed  2  Goals -    - Anticipating discharge tomorrow on hospice     I spent a lengthy amount of time today counseling family on patient's current condition and  Decline  Family had questions regarding restarting IV antibiotics and concerns as patient is not tolerating much by mouth  Family was educated on comfort goals  Prescriptions for discharge tomorrow placed with Case management  (Roxanal and Ativan solution )   Discussed same with [facility] attending  Code Status:   Comfort care- Level  4   Decisional apparatus:  Patient is not competent on my exam today  If competence is lost, patient's substitute decision maker would default to adult children by [place] 169  Advance Directive / Living Will / POLST: Not on file     I have reviewed the patient's controlled substance dispensing history in the Prescription Drug Monitoring Program in compliance with the [facility] regulations before prescribing any controlled substances  Interval history:         24 hour events reviewed  Patient is seen and examined at bedside without family present  Patient is unable to follow simple commands today and is difficult to arouse  She appears much more edematous today  She did tolerate some of her breakfast this morning per nursing report      MEDICATIONS / ALLERGIES:     all current active meds have been reviewed and current meds:   Current Facility-Administered Medications   Medication Dose Route Frequency    acetaminophen (TYLENOL) oral suspension 650 mg  650 mg Oral Q6H PRN    amiodarone tablet 200 mg  200 mg Oral BID With Meals    chlorhexidine (PERIDEX) 0 12 % oral rinse 15 mL  15 mL Swish & Spit Q12H [facility]    dextromethorphan-guaiFENesin (ROBITUSSIN DM)  mg/5 mL oral syrup 10 mL  10 mL Oral Q4H PRN    divalproex sodium (DEPAKOTE) EC tablet 750 mg  750 mg Oral Daily    DULoxetine (CYMBALTA) delayed release capsule 60 mg  60 mg Oral Daily    HYDROmorphone (DILAUDID) injection 0 5 mg  0 5 mg Intravenous Q3H PRN    lactulose 20 g/30 mL oral solution 10 g  10 g Oral BID    lidocaine (LIDODERM) 5 % patch 1 patch  1 patch Topical Daily    LORazepam (ATIVAN) 2 mg/mL injection 0 5 mg  0 5 mg Intravenous Q4H PRN    nystatin (MYCOSTATIN) powder   Topical BID    oxyCODONE (ROXICODONE) IR tablet 2 5 mg  2 5 mg Oral Q4H PRN    oxyCODONE (ROXICODONE) IR tablet 5 mg  5 mg Oral Q2H PRN    traZODone (DESYREL) tablet 50 mg  50 mg Oral HS       No Known Allergies    OBJECTIVE:    Physical Exam  Physical Exam   Constitutional: She appears lethargic  She has a sickly appearance  No distress  HENT:   Head: Normocephalic and atraumatic  Eyes: No scleral icterus  Neck: JVD present  Cardiovascular: Tachycardia present  Pulmonary/Chest: Tachypnea noted  She has rales  Abdominal: Soft  Bowel sounds are normal    + colostomy   Neurological: She appears lethargic  She is disoriented  Patient is not following commands today  Skin: She is not diaphoretic  There is pallor  Anasarca  Psychiatric: Her speech is slurred  She is slowed and withdrawn  She is inattentive  Nursing note and vitals reviewed  Counseling / Coordination of Care  Total floor / unit time spent today 45 minutes  Greater than 50% of total time was spent with the patient and / or family counseling and / or coordination of care  A description of the counseling / coordination of care:   Psychosocial support, discharge planning, family meeting

## 2018-10-04 NOTE — SOCIAL WORK
Mane spoke with Rosamaria Emmanuel and pt accepted at [facility] and family agreeable and cassandra spoke with Linda Hollis   Cm notified Dmitri Galicia from hospice    Taylor will sign all papers with [employer] and mane put POLST papers and pain medication scripts in chart

## 2018-10-04 NOTE — HOSPICE NOTE
GUILLERMO Nolen called to report [facility] willing to accept pt  Liaison spoke with daughter Kunal Mcneal, she is coming in to bedside after her doctors appointment and will sign consents  Pt to be set up for transport in AM on Friday and will be same day open at [facility]  Palliative care will write scripts for comfort meds to accompany pt to facility  Mishauna aware

## 2018-10-04 NOTE — SOCIAL WORK
CM spoke with Antelmo Rao and hospice cannot open her until tomorrow   Cm set up transport with [facility] for 11 am on 10/5/18  Cm notifed Vianca with hospice , cm notified Sukhwinder Alatorre , pt nurse , and cm called pt daughter Ambrosio Felipe and aware  Cm completed facility transfer form and CMN

## 2018-10-05 VITALS
HEIGHT: 63 IN | WEIGHT: 154.76 LBS | HEART RATE: 82 BPM | DIASTOLIC BLOOD PRESSURE: 80 MMHG | SYSTOLIC BLOOD PRESSURE: 140 MMHG | TEMPERATURE: 97.6 F | RESPIRATION RATE: 18 BRPM | BODY MASS INDEX: 27.42 KG/M2 | OXYGEN SATURATION: 100 %

## 2018-10-05 PROCEDURE — 99233 SBSQ HOSP IP/OBS HIGH 50: CPT | Performed by: INTERNAL MEDICINE

## 2018-10-05 PROCEDURE — 99239 HOSP IP/OBS DSCHRG MGMT >30: CPT | Performed by: INTERNAL MEDICINE

## 2018-10-05 RX ORDER — SENNA PLUS 8.6 MG/1
1 TABLET ORAL 2 TIMES DAILY
Refills: 0
Start: 2018-10-05

## 2018-10-05 RX ORDER — MORPHINE SULFATE 100 MG/5ML
10 SOLUTION ORAL EVERY 6 HOURS PRN
Status: DISCONTINUED | OUTPATIENT
Start: 2018-10-05 | End: 2018-10-05 | Stop reason: HOSPADM

## 2018-10-05 RX ORDER — GUAIFENESIN/DEXTROMETHORPHAN 100-10MG/5
10 SYRUP ORAL EVERY 4 HOURS PRN
Qty: 118 ML | Refills: 0 | Status: SHIPPED | OUTPATIENT
Start: 2018-10-05

## 2018-10-05 RX ORDER — LORAZEPAM 2 MG/ML
1 CONCENTRATE ORAL EVERY 6 HOURS PRN
Qty: 30 ML | Refills: 0
Start: 2018-10-05 | End: 2018-10-15

## 2018-10-05 RX ORDER — LACTULOSE 20 G/30ML
10 SOLUTION ORAL 2 TIMES DAILY
Refills: 0
Start: 2018-10-05

## 2018-10-05 RX ORDER — LIDOCAINE 50 MG/G
1 PATCH TOPICAL DAILY
Qty: 30 PATCH | Refills: 0
Start: 2018-10-06

## 2018-10-05 RX ORDER — ACETAMINOPHEN 160 MG/5ML
650 SUSPENSION, ORAL (FINAL DOSE FORM) ORAL EVERY 6 HOURS PRN
Qty: 118 ML | Refills: 0 | Status: SHIPPED | OUTPATIENT
Start: 2018-10-05

## 2018-10-05 RX ORDER — MORPHINE SULFATE 100 MG/5ML
10 SOLUTION ORAL EVERY 6 HOURS PRN
Refills: 0
Start: 2018-10-05 | End: 2018-10-15

## 2018-10-05 RX ADMIN — LIDOCAINE 1 PATCH: 50 PATCH CUTANEOUS at 08:59

## 2018-10-05 RX ADMIN — NYSTATIN: 100000 POWDER TOPICAL at 08:59

## 2018-10-05 RX ADMIN — DULOXETINE HYDROCHLORIDE 60 MG: 60 CAPSULE, DELAYED RELEASE ORAL at 08:59

## 2018-10-05 RX ADMIN — LACTULOSE 10 G: 20 SOLUTION ORAL at 08:58

## 2018-10-05 RX ADMIN — CHLORHEXIDINE GLUCONATE 15 ML: 1.2 RINSE ORAL at 08:58

## 2018-10-05 RX ADMIN — OXYCODONE HYDROCHLORIDE 5 MG: 5 TABLET ORAL at 09:00

## 2018-10-05 RX ADMIN — DIVALPROEX SODIUM 750 MG: 500 TABLET, DELAYED RELEASE ORAL at 08:59

## 2018-10-05 RX ADMIN — AMIODARONE HYDROCHLORIDE 200 MG: 200 TABLET ORAL at 08:59

## 2018-10-05 NOTE — PROGRESS NOTES
10/05/18 0900   Spiritual Beliefs/Perceptions   Support Systems Family members   Stress Factors   Patient Stress Factors Health changes   Family Stress Factors None identified   Coping Responses   Patient Coping Accepting   Family Coping Accepting   Family Spiritual Assessment   Feelings of Being Disconnected/Cut-off family pulling together in support of pt   Plan of Care   Comments cultivated a relationship of care and support  Listened empathically  normalized experience of family  provided prayer  relational resources utlized  Expressed intermediate hope  Expressed humor    Verbally processed emotions   Assessment Completed by: Unit visit

## 2018-10-05 NOTE — DISCHARGE SUMMARY
Discharge Summary - [facility] Internal Medicine    Patient Information: Emily Abdullahi 60 y o  female MRN: [id number]  Unit/Bed#: [facility] 828-01 Encounter: [id number]    Discharging Physician / Practitioner: Bakari Howe MD  PCP: Elijah Burgos MD  Admission Date: 9/17/2018  Discharge Date: 10/05/18    Disposition:     Other: SNF    Reason for Admission:  Change in mental status    Discharge Diagnoses:     Principal Problem:    Bacteremia due to methicillin susceptible Staphylococcus aureus (MSSA)  Active Problems:    Type 2 diabetes mellitus with complication ([facility] )    Acute kidney injury ([facility] )    Hyponatremia    GERD (gastroesophageal reflux disease)    HHNC (hyperglycemic hyperosmolar nonketotic coma) ([facility] )    Sepsis ([facility] )    Altered mental status    Acute respiratory failure with hypoxia ([facility] )    Hypocalcemia    Leukopenia    Cavitating pulmonary nodules     Metabolic acidosis, NAG, bicarbonate losses    Hypomagnesemia    Hypophosphatemia    Colon cancer ([facility] )    Back pain    Suspected endocarditis    Moderate protein-calorie malnutrition ([facility] )    End of life care  Resolved Problems:    * No resolved hospital problems  *      Consultations During Hospital Stay:  · Cardiology  · Infectious disease  · Colorectal  · Endocrinology  · Palliative care  · Nephrology  · Medical Oncology  · Pulmonary    Procedures Performed:     · Chest CT bilateral cavitating pulmonary nodules concerning for septic emboli  · CT head no acute interval abnormality  · CT cervical spine no cervical spine fracture  · MRI thoracic and lumbar spine  · CT chest abdomen persistent bilateral pulmonary nodules with central cavitation, moderate pleural effusion, no acute intra-abdominal abnormality  · Lower extremity venous Doppler no acute DVT noted bilaterally  · 2D echo  TRICUSPID VALVE: There was mild regurgitation  There was a definite, large, sessile, echogenic, fixed mass on the anterior leaflet, on the right atrial aspect   This may represent a thrombus, tumor or vegetation on tricuspid valve  Hospital Course:     Emily Ash is a 60 y o  female patient who originally presented to the hospital on 9/17/2018 due to transfer from [address] [address] ED to the ICU at [facility] for altered mental status and hyperglycemia glucose of 600  On admission he was admitted to the ICU with a diagnosis of encephalopathy hypoglycemia lactic acidosis AG AMA hypotension shock fever hyponatremia thrombocytopenia bandemia  She has history of advanced colon cancer  Patient was noted to acute hypoxic respiratory failure requiring mechanical ventilation  She was also noted to have AFib RVR requiring amiodarone  Patient was stabilized and transferred out of the ICU  Her evaluation revealed pulmonary nodules with cavitation likely due to septic emboli tricuspid valve fixed mass on enter leaflet which could be due to a tumor vegetation or thrombus  Patient was noted to have MSSA bacteremia  She was placed on IV antibiotics by Infectious Disease    Given her multiple comorbidities and overall guarded prognosis the patient her family and consultants along with palliative care opted for comfort measures only, she will be transferred to SNF for hospice care  Kindly review the chart for details      Condition at Discharge: fair     Discharge Day Visit / Exam:     Subjective:      Comfortably lying in bed  Reports feeling okay    Vitals: Blood Pressure: 140/80 (10/05/18 0700)  Pulse: 82 (10/05/18 0700)  Temperature: 97 6 °F (36 4 °C) (10/05/18 0700)  Temp Source: Oral (10/05/18 0700)  Respirations: 18 (10/05/18 0700)  Height: 5' 3" (160 cm) (09/17/18 0200)  Weight - Scale: 70 2 kg (154 lb 12 2 oz) (09/20/18 1950)  SpO2: 100 % (10/05/18 0700)  Exam:   Physical Exam    Comfortably lying in bed  Neck supple  Lungs diminished breath sounds bilaterally  Heart sounds S1-S2 noted  Abdomen soft  Awake obeys simple commands  No rash  Pulses noted    Discharge instructions/Information to patient and family:   See after visit summary for information provided to patient and family  Discharge plan discussed with palliative care  Discharge plan discussed with the patient and her family at bedside in detail questions answered    Provisions for Follow-Up Care:  See after visit summary for information related to follow-up care and any pertinent home health orders  Planned Readmission: no     Discharge Statement:  I spent 50 minutes discharging the patient  This time was spent on the day of discharge  I had direct contact with the patient on the day of discharge  Greater than 50% of the total time was spent examining patient, answering all patient questions, arranging and discussing plan of care with patient as well as directly providing post-discharge instructions  Additional time then spent on discharge activities  Discharge Medications:  See after visit summary for reconciled discharge medications provided to patient and family        ** Please Note: This note has been constructed using a voice recognition system **

## 2018-10-11 ENCOUNTER — TELEPHONE (OUTPATIENT)
Dept: PALLIATIVE MEDICINE | Facility: CLINIC | Age: 58
End: 2018-10-11

## 2019-01-07 LAB — SCAN RESULT: NORMAL

## 2022-06-23 NOTE — NURSING NOTE
Called pt re need for the appt. She reports that she lost Medicaid, she applied and was approved for [employer] via phone call. She reports pending cards.  I will ask financial coord  To verify.  I will close/deny her case till she calls with updated insurance information.   
Pt complaining of 10/10 pain in back and buttocks at 2130  Christine Moreira w/ [facility] to order an increased dose and frequency of pain medication  Ordered 5 mg Oxycodone q 4 hrs  Pt continued to experience 9/10 pain after receiving 5 mg oxy  Attempted repositioning to relieve pain but was unsuccessful  At 0000, contacted Palliative care to order a change in pain management  Ordered 4 mg dilaudid q 4 hrs  Only administered 2 mg and pt has been sleeping comfortably since 
no

## 2024-03-10 NOTE — PLAN OF CARE
CARDIOVASCULAR - ADULT     Maintains optimal cardiac output and hemodynamic stability Adequate for Discharge     Absence of cardiac dysrhythmias or at baseline rhythm Adequate for Discharge        METABOLIC, FLUID AND ELECTROLYTES - ADULT     Electrolytes maintained within normal limits Adequate for Discharge     Glucose maintained within target range Adequate for Discharge          DISCHARGE PLANNING     Discharge to home or other facility with appropriate resources Progressing        DISCHARGE PLANNING - CARE MANAGEMENT     Discharge to post-acute care or home with appropriate resources Progressing        INFECTION - ADULT     Absence or prevention of progression during hospitalization Progressing        Knowledge Deficit     Patient/family/caregiver demonstrates understanding of disease process, treatment plan, medications, and discharge instructions Progressing        Nutrition/Hydration-ADULT     Nutrient/Hydration intake appropriate for improving, restoring or maintaining nutritional needs Progressing        PAIN - ADULT     Verbalizes/displays adequate comfort level or baseline comfort level Progressing        Potential for Falls     Patient will remain free of falls Progressing        Prexisting or High Potential for Compromised Skin Integrity     Skin integrity is maintained or improved Progressing        RESPIRATORY - ADULT     Achieves optimal ventilation and oxygenation Progressing        SAFETY ADULT     Maintain or return to baseline ADL function Progressing     Maintain or return mobility status to optimal level Progressing 15:45

## 2025-06-12 NOTE — PLAN OF CARE
June 12, 2025      Amelia A Guido  [address] [facility] [id number]    Dear Ms. Lora,    Your procedure is scheduled with Dr. Tom Navarro on September 8, 2025 at:    [facility]  [address] [place]  [address]  [phone]  Please enter through the main doors near the [facility] [place] and check in at Day Surgery Registration.     Please register at [facility].  You can expect to be contacted by our team a few days beforehand to confirm your arrival and procedure time.    The following appointment(s) have been scheduled for you:    Post Op vist with Amanda BOYD at the [facility], Provider Office [place], 5th Floor, [address] ([address] [place] [address])  on September 23, 2025 at 9 am.     To better prepare for your surgery, please follow these instructions:    Please schedule an appointment with your Primary Care Physician for a Preoperative History and Physical for 2-3 weeks before your surgery date.    This will be a physical examination and lab work to clear you for your surgery.  We will send them the information about your planned procedure, what testing needs to be performed and where to send the results.      If you have a Walker  available, please bring with you on the day of your procedure.     Total joints only- All dental appointments should be completed 14 days prior to surgery. This will help prevent any sort of delay in surgery.     You will receive a call from the hospital to schedule an appointment to attend the hospital's Joint Academy class.  This session will help you prepare for surgery and recovery.    All Weight Loss Medication (both traditional weight loss and diabetic medications used to treat weight loss) must be stopped 7 days before surgery or your surgery will be cancelled.  If you are diabetic, please consult with your prescribing physician for any alternative or additional medication  Nutrition/Hydration-ADULT     Nutrient/Hydration intake appropriate for improving, restoring or maintaining nutritional needs Progressing        PAIN - ADULT     Verbalizes/displays adequate comfort level or baseline comfort level Progressing        Potential for Falls     Patient will remain free of falls Progressing        Prexisting or High Potential for Compromised Skin Integrity     Skin integrity is maintained or improved Progressing        SAFETY ADULT     Maintain or return to baseline ADL function Progressing     Maintain or return mobility status to optimal level Progressing instructions before surgery.     Starting 14 days (two weeks) prior to your surgery, please do not take any aspirin products, anti-inflammatory medication or blood thinners.  This includes products such as Rabia-Tridell, Pepto Bismol, Motrin, Ibuprofen and Advil should also be avoided.    (Standard Tylenol products are aspirin free, so standard Tylenol products are OK to take for pain.).      If you take any other prescription medication, including blood thinners such as Coumadin, Eliquis, Plavix, or Xarelto, please contact your ordering or primary care physician ASAP, so that you can inform them about your upcoming surgery and so that they can decide with you if any changes to your medication schedule are necessary.  If approved by your physician, you may take blood pressure and heart medications the morning of the procedure with a small amount of water.    STAAR: Do not have anything to eat starting at midnight, the night before your surgery.  You may have clear liquids up to 4 hours prior to surgery unless otherwise directed.  Clear liquids include water, apple juice, coffee or tea WITHOUT milk or creamer.     You will need someone to drive you home and remain with you up to 24 hours after you have been discharged.    Pre-Procedural COVID Testing is NOT required as long as you remain symptom-free from now through your surgery date.    At any time, if you experience COVID-like symptoms, please contact your primary care physician for evaluation.    If you test positive for COVID between now and your surgery date, please call our office as soon as possible.  We might need to postpone your procedure until it is safe for you to proceed.      If you have any work related and/or disability forms that need to be completed, please contact the Forms Completion Department at [phone]. Forms can be dropped off at any of our [facility] locations or faxed to forms completion at [phone]. Please be advised that it  can take 10 to 14 business days to complete these requests.    Before your surgery, you will receive an invitation via email from [employer], our partner in Patient Education.    This informative web-based education program will give you helpful information pertaining to your upcoming surgery and recovery.  We encourage you to watch the videos before your surgery. They contain valuable information that will help you know what to expect, what you can do to recover and answer some of the questions you may have. Having that knowledge can help you work towards getting back to your normal routines as soon as possible.    If you have questions regarding the procedure, medications, rehab, etc., please contact Dr. Navarro's  nursing team at [phone].    If you have any scheduling questions or need to reschedule, please contact me at the telephone number and extension listed below.   Thank you,  Amparo [phone]  Surgery Scheduler for Dr. Tom Navarro  [facility]      MEDICATIONS TO STOP / CHANGE BEFORE SURGERY    Please read through this list to make sure you are adjusting your medication appropriately before surgery.  Failure to do so might result in cancellation or rescheduling surgery.    BLOOD THINNERS / ANTICOAGULATION MEDICATIONS  If you take prescription medication that is a blood thinner, please contact the prescribing provider or primary care ASAP to determine when to hold the medication prior to surgery.     Examples include:  Warfarin (Coumadin)     Clopidrogel (Plavix)  Apixaban (Eliquis)  Rivaroxaban (Xarelto)    ASPIRIN and ANTI-INFLAMMATORY (NSAID) PRODUCTS   Do not take FOURTEEN (14) days prior to procedure.      OVER-THE-COUNTER:    Aspirin… including:  Anacin, Judie, [place], Aspergum, Aspercin, Aspermin, Aspertab, Back-quell, Duradyne, Empirin, Gemnisyn, Genprin, Gensan, Magnaprin, McNess Pain Tab, Momentum, P-A-C, Pain Reliever Tabs, Tri-Pain Caplets, Vanquish Caplet.  Buffered  Aspirin… including:  Ascriptin, Bufferin, Ecotrin, Buffaprin, Buffasal, Buffinol, COPE.  Aspirin Suppositories (generic, any strength)  Excedrin, Excedrin Extra, Excedrin IB  Ibuprofen… including: Advil, Nuprin, Motrin IB, Adapin, Genpril, Ibufen 200, Menadol, Midol IB, Dristan Sinus, Ursinus Inlay Tabs, Dimetapp Sinus, Valparin, Haltran Tabs, Frank's Pills, Eb.  Naproxen… including: Aleve  Rabia Trade or Bromo-Trade  Pepto Bismol     PRESCRIPTION:    Brand Name Generic Name Brand Name Generic Name      Fiorinal   butalbital, aspirin, caffeine    Lodine   etodolac      Naprosyn, Anaprox   naproxen    Mobic   meloxicam      Voltaren, Cataflam   diclofenac    Meclomen   meclofenamate      Feldene   piroxicam    Nalfon   fenoprofen      Motrin (Rx), Rufen   ibuprofen    Ponstel   mefenamic acid      Ansaid   flurbiprofen    Relafen   nabumetone      Orudis   ketoprofen    Toradol   ketorolac      Dolobid   diflunisal    Azdone Tabs   aspirin plus hydrocodone      Clinoril   sulindac    Percodan   aspirin plus oxycodone      Indocin   indomethacin    Synalgos   aspirin plus dihydrocodeine      Tolectin   tolmetin    Daypro   oxaprozin             WEIGHT LOSS MEDICATIONS    If you are taking these medications and have DIABETES please contact your primary care doctor about when to stop these medications and whether you will need an alternative medication.    If on WEEKLY dosing, hold SEVEN (7) days prior to procedure.   If on DAILY dosing, hold on the day of procedure.   Dulaglutide (Trulicity)  Exenatide (Bydureon BCise, Byetta)  Liraglutide (Victoza, Saxenda)  Pramlintide acetate (Symlin)  Semaglutide (Ozempic, Wegovy, Rybelsus)  Tirzepatide (Mounjaro)  Liraglutide + insulin Degludec (Xultophy)  Lixisenatide + insulin Glargine (Soliqua)    Do not take SEVEN (7) days prior to procedure.    Benzphetamine  Diethylpropion  Phendimetrazine  Phentermine (Adipex, Lomaira)  Phentermine/topiramate (Qsymia)...  Note: to  prevent seizures from abrupt withdrawal, take a dose every other day for at least 1 week before stopping treatment.    Herbs and Dietary Supplements    Do not take SEVEN (7) days prior to procedure.        Alfalfa    American ginseng    Sloane    Anise    Arnica montana    Asafetida    Darling bark    Bilberry    Birch    Black cohosh    Bladderwrack    Bogbean    Boldo    Borage seed oil    Bromelain    Capsicum    Cat's claw    Celery     Chamomile   Sardis    Clove    Coleus    Cordyceps       Dandelion     Danshen    Devil's claw    Dong quai    EPA (eicosapentaenoic acid, found in fish oils)    Evening primrose oil    Fenugreek    Feverfew    Fish oil    Flaxseed/flax powder     Garlic    Debra    Ginkgo biloba    Grapefruit juice     Grapeseed     Green tea    Guggul    Gymnestra    Horse chestnut    Horseradish    Licorie root    Lovage root    Male fern    Meadowsweet    Melatonin    Nordihydroguairetic acid (NDGA)    Omega-3 fatty acids    Onion    Papain    Panax ginseng    Parsley      Passionflower    Poplar   Prickly jayashree    Propolis    Quassia    Red clover    Reishi    Saw palmetto    Siberian ginseng    Sweet clover    Rue    Sweet birch    Turmeric    Vitamin E    White willow    Wild carrot    Wild lettuce    Winona    [place]    Yucca                      Pre Surgical 4% Chlorhexidine Gluconate Instructions   Getting your skin ready for surgery is extremely important! To do this, it is necessary to cleanse your skin with 4% Chlorhexidine Gluconate (CHG) the night before and the morning of your surgery.   To Do list:    If not provided by your doctor's office, purchase an 8-ounce bottle of 4% CHG at any pharmacy. You will use half of a bottle (4 ounces) for each shower.    Gather other supplies for your shower: freshly laundered washcloths, towels, and clothes.    Read all instructions prior to starting your shower.     Showering instructions:   1. Wash your hair, face and body using your  normal shampoo and soap.   2. Completely rinse off all shampoo and soap.   3. Turn off the shower.   4. Use half of the bottle (4 ounces) of CHG with a wet, clean washcloth to apply to your entire body (neck down to your toes). Do not use on your face, hair, or genital areas.   5. Leave on for 2-3 minutes, then turn on the shower and completely rinse off. DO NOT use regular soap after washing with the CHG.   6. Dry off with a freshly laundered towel before dressing with freshly laundered clothing.     Important Information:    After the first use of CHG, it is important to sleep in freshly laundered bed linens.    Do not shave/remove any body hair or apply any lotions, oils, deodorants, powders or perfumes to your body the night before and the day of surgery.    Throughout this process, good hand hygiene is important to keep the skin ready for surgery.    If allergic reaction occurs, stop using and rinse skin free of product.      Examples of Chlorhexidine 4% soap:     Warning Examples of Chlorhexidine 4% soap: CHG is for external use only. Keep out of eyes, ears and mouth. If it should get into any of these areas, rinse the area out promptly and thoroughly with water. Do not use CHG to clean the genital area or deep skin wounds.      For questions about this process, please contact your surgeon's office.                 To enroll in the Joint Academy or for more information, please call:  [facility] [phone]     Joint Academy Class   Your doctor has suggested that you attend the [facility] Joint Academy class. This class has been designed to help you get ready for your surgery and help you know what to expect, as you recover. Most people leave the hospital either the day of surgery or spend one night in the hospital after surgery.  The more you know before surgery, the more likely you will be able to return home and have a quick and successful recovery. The Joint Academy class is taught by  expert nurses or therapists. During the class they will share important information about how to prepare for your surgery, what happens during hospital stay and what to expect during recovery. This class may be held either virtual or in-person depending on the location of your surgery.     Getting ready for Class:   It is best to schedule the class 4-6 weeks before your surgery. This allows you time to gather equipment, get your home ready and give you time to ask questions and prepare.   Before you come to class, we strongly suggest that you select a care partner (A family member or friend) who can help and support you as you heal at home. Your care partner should also attend the class with you.   To Schedule your Joint Academy Class:   You will receive a phone call to schedule the Joint Academy class, or you can call and schedule a class. When you call to schedule the class, you will be given the specific date, time and place to attend.           Insurance Authorization Need to Know's    Prior to your surgical procedure, our team will contact your Insurance Company to initiate a Pre-Authorization request.  This is not a guarantee of payment from your insurance company, but rather a step taken to ensure that we have all of the information and documentation for them to confirm the procedure is one that is eligible for coverage under your plan.    We will contact you if we either need more information from you to fulfill the requirements of your insurance company, or if we need to discuss any concerns that may lead to postponement or cancellation of your procedure. If you have any questions regarding your surgery authorization, please check with your insurance company or call our office for an update.    If you need information regarding your level of benefits or out-of-pocket expenses, please contact your insurance company directly.  They can also confirm for you whether or not we (the surgeon and the  hospital/surgery center) are in your plan's preferred network (aka 'in-network').    What to do if… My Insurance Changes:  If, at any time, your insurance company, plan or even card changes… Please call our office as soon as possible so that our team can be sure to update your records.  We will need to make sure to submit any PreAuth or rosa to the correct, up-to-date insurance plan.      What to do if… My Insurance Requires A Referral:  If your insurance company requires a Referral for Specialty Care or to see a Specialist, you will need to confirm with them if you have one on file.    If your insurance carrier does not have a referral, then you will need to contact your Primary Care Physician to have one directly submitted to your insurance company ASAP.    Without a referral on file, your insurance company will not Pre-Authorize your surgery and may not cover any of your care with our specialty.    What to do if… I have Month-to-Month Coverage/Premiums:  If you have an insurance plan that is paid for month to month, or is subject to plan change on a monthly basis, please be aware we cannot initiate PreAuthorization until just before the month of your surgery, as your insurance company will need to verify your premium payments/eligibility first.    What to do if… I Do Not Have Insurance Coverage or Have other Insurance/Billing Questions:  Please call our Patient Contact Center:  [phone] to speak with a team member about your billing needs, including possible coverage options, setting up payment plans, our fee schedule, etc.

## (undated) DEVICE — INTENDED FOR TISSUE SEPARATION, AND OTHER PROCEDURES THAT REQUIRE A SHARP SURGICAL BLADE TO PUNCTURE OR CUT.: Brand: BARD-PARKER SAFETY BLADES SIZE 15, STERILE

## (undated) DEVICE — GLOVE SRG BIOGEL 7.5

## (undated) DEVICE — INTENDED FOR TISSUE SEPARATION, AND OTHER PROCEDURES THAT REQUIRE A SHARP SURGICAL BLADE TO PUNCTURE OR CUT.: Brand: BARD-PARKER SAFETY BLADES SIZE 11, STERILE

## (undated) DEVICE — NONREINFORCED SURGICAL GOWN, 4XLARGE: Brand: CONVERTORS

## (undated) DEVICE — PACK PBDS STERILE LAP LITHOTOMY RF

## (undated) DEVICE — ENDOPATH XCEL BLADELESS TROCARS WITH STABILITY SLEEVES: Brand: ENDOPATH XCEL

## (undated) DEVICE — GLOVE INDICATOR PI UNDERGLOVE SZ 7.5 BLUE

## (undated) DEVICE — CHLORAPREP HI-LITE 26ML ORANGE

## (undated) DEVICE — CO2 AND WATER TUBING/CAP SET FOR OLYMPUS® SCOPES & UCR: Brand: ERBE

## (undated) DEVICE — SUT PDS II 1 CTX 36 IN Z371T

## (undated) DEVICE — LIGHT HANDLE COVER SLEEVE DISP BLUE STELLAR

## (undated) DEVICE — SUT SILK 2-0 TIES 144 IN LA55G

## (undated) DEVICE — POOLE SUCTION HANDLE: Brand: CARDINAL HEALTH

## (undated) DEVICE — VIAL DECANTER

## (undated) DEVICE — ADHESIVE SKN CLSR HISTOACRYL FLEX 0.5ML LF

## (undated) DEVICE — INSUFLATION TUBING INSUFLOW (LEXION)

## (undated) DEVICE — TUBING SUCTION 5MM X 12 FT

## (undated) DEVICE — SUT PROLENE 2-0 CT-2 30 IN 8411H

## (undated) DEVICE — 3M™ TEGADERM™ TRANSPARENT FILM DRESSING FRAME STYLE, 1628, 6 IN X 8 IN (15 CM X 20 CM), 10/CT 8CT/CASE: Brand: 3M™ TEGADERM™

## (undated) DEVICE — SUT VICRYL 0 CT-1 27 IN J260H

## (undated) DEVICE — REM POLYHESIVE ADULT PATIENT RETURN ELECTRODE: Brand: VALLEYLAB

## (undated) DEVICE — PLUMEPEN PRO 10FT

## (undated) DEVICE — SUT PROLENE 2-0 KS 30 IN 8623H

## (undated) DEVICE — STRL COTTON TIP APPLCTR 6IN PK: Brand: CARDINAL HEALTH

## (undated) DEVICE — TRAY FOLEY 16FR URIMETER SURESTEP

## (undated) DEVICE — SUT VICRYL 0 REEL 54 IN J287G

## (undated) DEVICE — SCD SEQUENTIAL COMPRESSION COMFORT SLEEVE MEDIUM KNEE LENGTH: Brand: KENDALL SCD

## (undated) DEVICE — 3000CC GUARDIAN II: Brand: GUARDIAN

## (undated) DEVICE — Device: Brand: DEFENDO AIR/WATER/SUCTION AND BIOPSY VALVE

## (undated) DEVICE — SUT MONOCRYL 4-0 PS-2 18 IN Y496G

## (undated) DEVICE — GLOVE INDICATOR PI UNDERGLOVE SZ 8 BLUE

## (undated) DEVICE — SYRINGE 20ML LL

## (undated) DEVICE — STERILE ACCESS CATHETER PACK: Brand: CARDINAL HEALTH

## (undated) DEVICE — ENSEAL LAPAROSCOPIC TISSUE SEALER G2 STRAIGHT JAW FOR USE WITH G2 GENERATOR 5MM DIAMETER 35CM SHAFT LENGTH: Brand: ENSEAL

## (undated) DEVICE — BAG DECANTER

## (undated) DEVICE — DRAPE C-ARM X-RAY

## (undated) DEVICE — TRAVELKIT CONTAINS FIRST STEP KIT (200ML EP-4 KIT) AND SOILED SCOPE BAG - 1 KIT: Brand: TRAVELKIT CONTAINS FIRST STEP KIT AND SOILED SCOPE BAG

## (undated) DEVICE — SPONGE STICK WITH PVP-I: Brand: KENDALL

## (undated) DEVICE — GAUZE SPONGES,16 PLY: Brand: CURITY

## (undated) DEVICE — ANTI-FOG SOLUTION WITH FOAM PAD: Brand: DEVON

## (undated) DEVICE — IRRIG ENDO FLO TUBING

## (undated) DEVICE — BOVIE LONG TIP

## (undated) DEVICE — SUT VICRYL 3-0 SH 27 IN J416H

## (undated) DEVICE — PROVE COVER: Brand: UNBRANDED